# Patient Record
Sex: FEMALE | Race: WHITE | NOT HISPANIC OR LATINO | Employment: OTHER | URBAN - METROPOLITAN AREA
[De-identification: names, ages, dates, MRNs, and addresses within clinical notes are randomized per-mention and may not be internally consistent; named-entity substitution may affect disease eponyms.]

---

## 2017-01-12 PROBLEM — E66.01 MORBID OBESITY DUE TO EXCESS CALORIES (H): Status: ACTIVE | Noted: 2017-01-12

## 2017-01-30 ENCOUNTER — OFFICE VISIT (OUTPATIENT)
Dept: RHEUMATOLOGY | Facility: CLINIC | Age: 67
End: 2017-01-30
Payer: MEDICARE

## 2017-01-30 VITALS
WEIGHT: 243 LBS | SYSTOLIC BLOOD PRESSURE: 150 MMHG | RESPIRATION RATE: 18 BRPM | HEIGHT: 64 IN | BODY MASS INDEX: 41.48 KG/M2 | HEART RATE: 94 BPM | DIASTOLIC BLOOD PRESSURE: 83 MMHG

## 2017-01-30 DIAGNOSIS — M15.0 PRIMARY OSTEOARTHRITIS INVOLVING MULTIPLE JOINTS: Primary | ICD-10-CM

## 2017-01-30 PROCEDURE — 99203 OFFICE O/P NEW LOW 30 MIN: CPT | Performed by: INTERNAL MEDICINE

## 2017-01-30 RX ORDER — NAPROXEN 500 MG/1
500 TABLET ORAL 2 TIMES DAILY PRN
Qty: 60 TABLET | Refills: 3 | Status: SHIPPED | OUTPATIENT
Start: 2017-01-30 | End: 2021-01-18

## 2017-01-30 NOTE — NURSING NOTE
"Chief Complaint   Patient presents with     Consult     Arthritis, Pain in hand up through arm        Initial /83 mmHg  Pulse 94  Resp 18  Ht 1.626 m (5' 4\")  Wt 110.224 kg (243 lb)  BMI 41.69 kg/m2 Estimated body mass index is 41.69 kg/(m^2) as calculated from the following:    Height as of this encounter: 1.626 m (5' 4\").    Weight as of this encounter: 110.224 kg (243 lb).  BP completed using cuff size: kirti Figueroa CMA     "

## 2017-01-31 NOTE — PROGRESS NOTES
REFERRING PHYSICIAN:  Dung Prieto MD       CHIEF COMPLAINT:  Joint pain.      HISTORY OF PRESENT ILLNESS:  Thomas Davila is a 66-year-old female who presents for evaluation of worsening joint pain with swelling.  This is specifically the right second PIP joint.  She has apparently seen an orthopedist about this who did not give her a lot of information and did not really tell her anything about what to do or expect.  She is taking NSAIDs occasionally, which does provide her with relief.      She has not had any labs performed.  She does not have any rash or psoriasis.  She does not have chronic diarrhea history, inflammatory bowel disease.  She did not have any unexplained symptoms such as fevers, chills or sweats.  There is no history of pleurisy, pericarditis, hepatitis, nephritis.  She has never had a stroke, blood clot, cardiac event or gastric ulcer.      PAST MEDICAL HISTORY:  Breast cancer status post chemotherapy, endometrial adenocarcinoma, nontoxic multinodular goiter, morbid obesity.      MEDICATIONS:   1.  Aspirin 81 mg daily.   2.  Prilosec 20 mg daily.   3.  Maxzide 37.5/25 mg daily.   4.  Lisinopril 20 mg daily.      DRUG ALLERGIES:  Percocet.      SOCIAL HISTORY:  Not a smoker or drinker.      FAMILY HISTORY:  Sister with osteoarthritis.      REVIEW OF SYSTEMS:  As noted above.      PHYSICAL EXAMINATION:   VITAL SIGNS:  Blood pressure 150/83, pulse 94, weight 243.   HEENT:  She is normocephalic and atraumatic.  Sclerae are clear and moist.  Oropharynx without lesions.   NECK:  Supple, without lymphadenopathy.   JOINTS:  There are large Heberden's nodules at the DIP joints and Leyla's nodules of the PIP joints.  There is also bony hypertrophy consistent with osteoarthritis bilateral first CMC and MCP joints.  There is no synovitis of the wrists, MCPs or PIPs.   SKIN:  Without lesions.   NEUROLOGICAL:  Nonfocal.      IMPRESSION:  Generalized osteoarthritis.      RECOMMENDATIONS:   1.   Discussed the etiology, pathogenesis, treatment options of osteoarthritis.  Discussed that unfortunately there is no preventive or remittive treatment.  Treatment is aimed at mitigating symptoms through the use of things like Tylenol, NSAIDs and analgesics.  I will give her prescription for Naprosyn 500 mg that can be used daily or b.i.d. as needed.  Would recommend NSAID labs every 6-12 months.   2.  There are signs that she has an autoimmune or inflammatory condition that would require immunosuppression.   3.  If the finger remains a problem, she could try a cortisone injection, which is basically palliative and does not lead to any remission or improvement of the nodule.  It is just for pain relief.         TRISTIN LEWIS MD             D: 2017 16:43   T: 2017 09:30   MT: BLAINE      Name:     CONCHIS GUAMAN   MRN:      [id number]        Account:      [id number]   :      1950           Visit Date:   2017      Document: [id number]       cc: Dung Prieto MD

## 2017-03-23 ENCOUNTER — TELEPHONE (OUTPATIENT)
Dept: FAMILY MEDICINE | Facility: CLINIC | Age: 67
End: 2017-03-23

## 2017-03-23 NOTE — LETTER
[facility]  [address] [place]  [facility] [address]  Phone: [phone]    March 23, 2017    Thomas Davila  [address]  [facility] [address]              Dear Ms. Davila,      Your health care team at [facility] would like to help your keep up with your health maintenance screening.     Your provider has done a chart review and at this time it is recommended for you to complete the following:    >Mammogram for breast cancer screening. You can make an appointment by calling [phone].    >Colon cancer screening by either FIT lab test or colonoscopy. Please call the care team with your choice of screening and we will assist you in getting this important screening done. Call [phone], ask for Dr. Prieto's care team.    If you have had these done at another facility, please ask them to send the results to us, so we can have current information in your chart.            Sincerely,      DIAMOND Romano ([facility]) for   Dung Prieto MD

## 2017-03-23 NOTE — TELEPHONE ENCOUNTER
Composite cancer screening  Chart review shows that this patient is due/due soon for the following Mammogram and Colonoscopy (has done FIT in the past)  Summary:    Patient is due/failing the following:   FIT and MAMMOGRAM    Action needed:   Make appt for mammogram  She is to call care team to arrange colon cancer screening (FIT vs colonoscopy)    Type of outreach:    Sent letter.    Questions for provider review:    None                                                                                                                                    DIAMOND Harris ([facility])

## 2017-03-31 ENCOUNTER — HOSPITAL ENCOUNTER (OUTPATIENT)
Dept: LAB | Facility: CLINIC | Age: 67
Discharge: HOME | End: 2017-03-31
Attending: INTERNAL MEDICINE | Admitting: INTERNAL MEDICINE
Payer: MEDICARE

## 2017-03-31 DIAGNOSIS — C54.1 ENDOMETRIAL ADENOCARCINOMA (H): ICD-10-CM

## 2017-03-31 LAB
ALBUMIN SERPL-MCNC: 3.7 G/DL (ref 3.4–5)
ALP SERPL-CCNC: 49 U/L (ref 40–150)
ALT SERPL W P-5'-P-CCNC: 42 U/L (ref 0–50)
ANION GAP SERPL CALCULATED.3IONS-SCNC: 9 MMOL/L (ref 3–14)
AST SERPL W P-5'-P-CCNC: 17 U/L (ref 0–45)
BASOPHILS # BLD AUTO: 0.1 10E9/L (ref 0–0.2)
BASOPHILS NFR BLD AUTO: 1.1 %
BILIRUB SERPL-MCNC: 0.4 MG/DL (ref 0.2–1.3)
BUN SERPL-MCNC: 21 MG/DL (ref 7–30)
CALCIUM SERPL-MCNC: 8.6 MG/DL (ref 8.5–10.1)
CANCER AG125 SERPL-ACNC: 11 U/ML (ref 0–30)
CHLORIDE SERPL-SCNC: 107 MMOL/L (ref 94–109)
CO2 SERPL-SCNC: 26 MMOL/L (ref 20–32)
CREAT SERPL-MCNC: 0.59 MG/DL (ref 0.52–1.04)
DIFFERENTIAL METHOD BLD: NORMAL
EOSINOPHIL # BLD AUTO: 0.1 10E9/L (ref 0–0.7)
EOSINOPHIL NFR BLD AUTO: 3 %
ERYTHROCYTE [DISTWIDTH] IN BLOOD BY AUTOMATED COUNT: 13 % (ref 10–15)
GFR SERPL CREATININE-BSD FRML MDRD: ABNORMAL ML/MIN/1.7M2
GLUCOSE SERPL-MCNC: 101 MG/DL (ref 70–99)
HCT VFR BLD AUTO: 44.4 % (ref 35–47)
HGB BLD-MCNC: 14.9 G/DL (ref 11.7–15.7)
IMM GRANULOCYTES # BLD: 0 10E9/L (ref 0–0.4)
IMM GRANULOCYTES NFR BLD: 0.2 %
LYMPHOCYTES # BLD AUTO: 1.1 10E9/L (ref 0.8–5.3)
LYMPHOCYTES NFR BLD AUTO: 24.8 %
MCH RBC QN AUTO: 30.6 PG (ref 26.5–33)
MCHC RBC AUTO-ENTMCNC: 33.6 G/DL (ref 31.5–36.5)
MCV RBC AUTO: 91 FL (ref 78–100)
MONOCYTES # BLD AUTO: 0.4 10E9/L (ref 0–1.3)
MONOCYTES NFR BLD AUTO: 8.7 %
NEUTROPHILS # BLD AUTO: 2.7 10E9/L (ref 1.6–8.3)
NEUTROPHILS NFR BLD AUTO: 62.2 %
PLATELET # BLD AUTO: 155 10E9/L (ref 150–450)
POTASSIUM SERPL-SCNC: 4.2 MMOL/L (ref 3.4–5.3)
PROT SERPL-MCNC: 6.7 G/DL (ref 6.8–8.8)
RBC # BLD AUTO: 4.87 10E12/L (ref 3.8–5.2)
SODIUM SERPL-SCNC: 142 MMOL/L (ref 133–144)
WBC # BLD AUTO: 4.4 10E9/L (ref 4–11)

## 2017-03-31 PROCEDURE — 36415 COLL VENOUS BLD VENIPUNCTURE: CPT | Performed by: INTERNAL MEDICINE

## 2017-03-31 PROCEDURE — 80053 COMPREHEN METABOLIC PANEL: CPT | Performed by: INTERNAL MEDICINE

## 2017-03-31 PROCEDURE — 85025 COMPLETE CBC W/AUTO DIFF WBC: CPT | Performed by: INTERNAL MEDICINE

## 2017-03-31 PROCEDURE — 86304 IMMUNOASSAY TUMOR CA 125: CPT | Performed by: INTERNAL MEDICINE

## 2017-04-03 ENCOUNTER — TELEPHONE (OUTPATIENT)
Dept: DERMATOLOGY | Facility: CLINIC | Age: 67
End: 2017-04-03

## 2017-04-03 ENCOUNTER — ONCOLOGY VISIT (OUTPATIENT)
Dept: ONCOLOGY | Facility: CLINIC | Age: 67
End: 2017-04-03
Attending: INTERNAL MEDICINE
Payer: MEDICARE

## 2017-04-03 ENCOUNTER — OFFICE VISIT (OUTPATIENT)
Dept: DERMATOLOGY | Facility: CLINIC | Age: 67
End: 2017-04-03
Payer: MEDICARE

## 2017-04-03 VITALS — HEART RATE: 84 BPM | DIASTOLIC BLOOD PRESSURE: 68 MMHG | OXYGEN SATURATION: 95 % | SYSTOLIC BLOOD PRESSURE: 148 MMHG

## 2017-04-03 VITALS
TEMPERATURE: 97.4 F | WEIGHT: 240.7 LBS | OXYGEN SATURATION: 96 % | DIASTOLIC BLOOD PRESSURE: 73 MMHG | BODY MASS INDEX: 41.09 KG/M2 | HEART RATE: 97 BPM | SYSTOLIC BLOOD PRESSURE: 135 MMHG | HEIGHT: 64 IN | RESPIRATION RATE: 18 BRPM

## 2017-04-03 DIAGNOSIS — E04.1 THYROID NODULE: ICD-10-CM

## 2017-04-03 DIAGNOSIS — C54.1 ENDOMETRIAL ADENOCARCINOMA (H): Primary | ICD-10-CM

## 2017-04-03 DIAGNOSIS — D22.9 NEVUS: ICD-10-CM

## 2017-04-03 DIAGNOSIS — D18.00 ANGIOMA: ICD-10-CM

## 2017-04-03 DIAGNOSIS — Z86.718 PERSONAL HISTORY OF DVT (DEEP VEIN THROMBOSIS): ICD-10-CM

## 2017-04-03 DIAGNOSIS — D04.62 SQUAMOUS CELL CARCINOMA IN SITU OF SKIN OF LEFT UPPER ARM: ICD-10-CM

## 2017-04-03 DIAGNOSIS — D48.5 NEOPLASM OF UNCERTAIN BEHAVIOR OF SKIN: Primary | ICD-10-CM

## 2017-04-03 DIAGNOSIS — L81.4 LENTIGO: ICD-10-CM

## 2017-04-03 DIAGNOSIS — E66.3 OVERWEIGHT: ICD-10-CM

## 2017-04-03 DIAGNOSIS — K76.0 FATTY LIVER: ICD-10-CM

## 2017-04-03 DIAGNOSIS — Z85.828 HISTORY OF SKIN CANCER: ICD-10-CM

## 2017-04-03 DIAGNOSIS — C44.02 SQUAMOUS CELL CARCINOMA, LIP: Primary | ICD-10-CM

## 2017-04-03 DIAGNOSIS — L57.0 AK (ACTINIC KERATOSIS): ICD-10-CM

## 2017-04-03 PROCEDURE — 99214 OFFICE O/P EST MOD 30 MIN: CPT | Mod: 25 | Performed by: PHYSICIAN ASSISTANT

## 2017-04-03 PROCEDURE — 99214 OFFICE O/P EST MOD 30 MIN: CPT | Performed by: INTERNAL MEDICINE

## 2017-04-03 PROCEDURE — 11100 HC BIOPSY SKIN/SUBQ/MUC MEM, EACH ADDTL LESION: CPT | Mod: 59 | Performed by: PHYSICIAN ASSISTANT

## 2017-04-03 PROCEDURE — 88331 PATH CONSLTJ SURG 1 BLK 1SPC: CPT | Mod: 26 | Performed by: DERMATOLOGY

## 2017-04-03 PROCEDURE — 99211 OFF/OP EST MAY X REQ PHY/QHP: CPT

## 2017-04-03 PROCEDURE — 40490 BIOPSY OF LIP: CPT | Performed by: PHYSICIAN ASSISTANT

## 2017-04-03 ASSESSMENT — PAIN SCALES - GENERAL: PAINLEVEL: EXTREME PAIN (8)

## 2017-04-03 NOTE — PROGRESS NOTES
HPI:   Thomas Davila is a 67 year old female who presents for Full skin cancer screening.  chief complaint  Last Skin Exam: Over 1 year ago      1st Baseline: no  Personal HX of Skin Cancer: Yes multiple NMSC; both BCC and SCC   Personal HX of Malignant Melanoma: no   Family HX of Skin Cancer / Malignant Melanoma: no  Personal HX of Atypical Moles:   no  Risk factors: Frequent sun exposure. 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   New / Changing lesions:Yes one area on upper lip  Social History:   On review of systems, there are no further skin complaints, patient is feeling otherwise well.  See patient intake sheet.  ROS of the following were done and are negative: Constitutional, Eyes, Ears, Nose,   Mouth, Throat, Cardiovascular, Respiratory, GI, Genitourinary, Musculoskeletal,   Psychiatric, Endocrine, Allergic/Immunologic.    PHYSICAL EXAM:   Weight:  BP:   Skin exam performed as follows: Type 2 skin. Mood appropriate  Alert and Oriented X 3. Well developed, well nourished in no distress.  General appearance: Normal  Head including face: Normal  Eyes: conjunctiva and lids: Normal  Mouth: Lips, teeth, gums: Normal  Neck: Normal  Chest-breast/axillae: Normal  Back: Normal  Spleen and liver: Normal  Cardiovascular: Exam of peripheral vascular system by observation for swelling, varicosities, edema: Normal  Genitalia: groin, buttocks: Normal  Extremities: digits/nails (clubbing): Normal  Eccrine and Apocrine glands: Normal  Right upper extremity: Normal  Left upper extremity: Normal  Right lower extremity: Normal  Left lower extremity: Normal  Skin: Scalp and body hair: See below    Pt deferred exam of breasts, groin, buttocks: No    Other physical findings:  1. Multiple pigmented macules on extremities and trunk  2. Multiple pigmented macules on face, trunk and extremities  3. Multiple vascular papules on trunk, arms and legs  4. Multiple scattered keratotic  plaques  5. 3 mm pink papule on right upper lip  6. 6 mm pink papule on left upper arm  7. Numerous gritty papules on bilateral forerams       Except as noted above, no other signs of skin cancer or melanoma.     ASSESSMENT/PLAN:   Benign Full skin cancer screening today. . Patient with history of multiple BCC and SCC  Advised on monthly self exams and 1 year  Patient Education: Appropriate brochures given.    Multiple benign appearing nevi on arms, legs and trunk. Discussed ABCDEs of melanoma and sunscreen.   Multiple lentigos on arms, legs and trunk. Advised benign, no treatment needed.  Multiple scattered angiomas. Advised benign, no treatment needed.   Seborrheic keratosis on arms, legs and trunk. Advised benign, no treatment needed.  R/o BCC on right upper lip. Shave bx in typical fashion .  Area cleaned with betadyne and anesthetized with 1% lidocaine with epi .  Dermablade used to remove the lesion and sent to pathology. Bleeding was cauterized. Pt tolerated procedure well.  R/o BCC on left upper arm. Shave bx in typical fashion .  Area cleaned with betadyne and anesthetized with 1% lidocaine with epi .  Dermablade used to remove the lesion and sent to pathology. Bleeding was cauterized. Pt tolerated procedure well.  Numerous AKs on bilateral forearms, well over 15 on each. Discussed Efudex vs PDT vs LN2. After discussion, she would like to proceed with PDT. Information given and she will schedule for this.   History of multiple NMSC; continue Q 6-12 month FSE      Follow-up: pending path/Q 6-12 month FSE    1.) Patient was asked about new and changing moles. YES  2.) Patient received a complete physical skin examination: YES  3.) Patient was counseled to perform a monthly self skin examination: YES  Scribed By: Maryana Velasquez, MS, PA-C

## 2017-04-03 NOTE — PROGRESS NOTES
R upper lip:There is hyperkeratosis & parakeratosis of the epidermis, with full thickness epidermal involvement by atypical keratinocytes with rare pale vacuolated cells invading dermis.      L upper arm:There is hyperkeratosis & parakeratosis of the epidermis, with full thickness epidermal involvement by atypical keratinocytes with rare pale vacuolated cells.  Unremarkable dermis.        R upper lip squamous cell carcinoma excision  L upper arm squamous cell carcinoma in situ LASER

## 2017-04-03 NOTE — NURSING NOTE
"Thomas Davila is a 67 year old female who presents for:  Chief Complaint   Patient presents with     Oncology Clinic Visit     6 month recheck Endocrine CA, review labs         Initial Vitals:  /73 (BP Location: Right arm, Patient Position: Right side, Cuff Size: Adult Large)  Pulse 97  Temp 97.4  F (36.3  C) (Tympanic)  Resp 18  Ht 1.626 m (5' 4\")  Wt 109.2 kg (240 lb 11.2 oz)  SpO2 96%  Breastfeeding? No  BMI 41.32 kg/m2 Estimated body mass index is 41.32 kg/(m^2) as calculated from the following:    Height as of this encounter: 1.626 m (5' 4\").    Weight as of this encounter: 109.2 kg (240 lb 11.2 oz).. Body surface area is 2.22 meters squared. BP completed using cuff size: large  Extreme Pain (8) No LMP recorded. Patient has had a hysterectomy. Allergies and medications reviewed.     Medications: Medication refills not needed today.  Pharmacy name entered into EPIC:    [id number] [address]      Comments: 6 month recheck Endocrine CA, review labs     7 minutes for nursing intake (face to face time)   Kendra Jenkins CMA        "

## 2017-04-03 NOTE — PATIENT INSTRUCTIONS
We would like to see you back in 6 months with labs and a CT Chest/ABD/Pelvis scan.    When you are in need of a refill, please call your pharmacy and they will send us a request.  Copy of appointments, and after visit summary (AVS) given to patient.  If you have any questions please call Lorraine Jones RN, BSN, OCN Oncology Hematology  [facility] [phone]. For questions after business hours, or on holidays/weekends, please call our after hours Nurse Triage line [phone]. Thank you.

## 2017-04-03 NOTE — NURSING NOTE
"Initial /68  Pulse 84  SpO2 95% Estimated body mass index is 41.71 kg/(m^2) as calculated from the following:    Height as of 1/30/17: 1.626 m (5' 4\").    Weight as of 1/30/17: 110.2 kg (243 lb). .      "

## 2017-04-03 NOTE — PROGRESS NOTES
CHIEF COMPLAINT AND REASON FOR VISIT: endometrial cancer 2013 s/p adjuvant chemo, RT.      HISTORY OF PRESENT ILLNESS:  She presented with  postmenopausal bleeding in 12/2012.  It became heavier in January 2013.  Her last menstrual period was around 1998.     Endometrial biopsy performed in primary care's office showed endometrial adenocarcinoma, FIGO grade 1/3, arising from background of atypical complex hyperplasia, HPV positive, type 66.  Pelvic ultrasound indicating uterus is about 9 cm, no fibroid, endometriosis, abnormally thickened, measured 3 cm.    She saw Dr. Walker from the  and underwent Da Sasha-assisted total hysterectomy and bilateral salpingo-oophorectomy and lymph node dissection.  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  Ovaries, fallopian tubes, cervical, lower uterine segment are negative.  Cytology wash negative.  There was focal suspicion for angiolymphatic invasion.  Left pelvic lymph nodes 1/8 were positive, right pelvic nodes total 7 were negative. She has stage IIIC pathologic [id number] endometrial carcinoma with focal squamous cell differentiation.   Dr. Walker, GYN oncologist, recommended carbo and Taxol x3 followed by radiation followed by 3 more carbo and Taxol.    Chemotherapy portion was carbo and Taxol, carbo at AUC 6, Taxol 175 mg/meter squared x3 finished in April 2013. External pelvic Radiation is done 5/28/2013. She finished brachy RT  X 3 in early June 2013.  3 more carbo/taxol finished in 8/2013.    PMH: She had remote history of left breast cancer then treated with lumpectomy, left-sided axillary lymph node dissection, radiation and tamoxifen for 1 year, discontinued due to DVT.  She was treated with anticoagulation, aspirin. but is currently not on any blood thinner.   Left  "superficial thrombus in 4/2013 s/p 6 months of coumadin.  HTN    REVIEW OF SYSTEMS:   Occasional diarrhea.   Likes to eat, concerned about over weight.   Lots of arthritis.   She is seeing dermatologist for skin biopsies. She is doing light therapy for \"SCC\".       PHYSICAL EXAMINATION:   VITAL SIGNS: Blood pressure 135/73, pulse 97, temperature 97.4  F (36.3  C), temperature source Tympanic, resp. rate 18, height 1.626 m (5' 4\"), weight 109.2 kg (240 lb 11.2 oz), SpO2 96 %, not currently breastfeeding.  GENERAL APPEARANCE:   She is morbidly obese, not in acute distress.  Very pleasant.   HEENT: The patient is normocephalic, atraumatic. Pupils are equal react to light.  Sclerae are anicteric.  Moist oral mucosa.  Negative pharynx.  No oral thrush.   NECK:  Supple.  No jugular venous distention.  Thyroid is not palpable.   LYMPH NODES:  Superficial lymphadenopathy is not appreciable in the bilateral cervical, supraclavicular, axillary or inguinal adenopathy.   CARDIOVASCULAR:  S1, S2 regular with no murmurs or gallops.  No carotid or abdominal bruits.   PULMONARY:  Lungs are clear to auscultation and percussion bilaterally.  There is no wheezing or rhonchi.   GASTROINTESTINAL:  Abdomen is soft, nontender.  No hepatosplenomegaly.  No signs of ascites.  No mass appreciable.   MUSCULOSKELETAL/EXTREMITIES:  No edema.  No cyanotic changes. multiple joints deformities on fingers.  No lymphedema.   NEUROLOGIC:  Cranial nerves II-XII are grossly intact.  Sensation intact.  Muscle strength and muscle tone symmetrical all through 5/5.   BACK:  No spinal or paraspinal tenderness.  No CVA tenderness.   Skin: erythematous scaly lesion on right upper arm.      CURRENT LAB DATA   CBC DIFF, CMP,  are fine    Current image  CT 9/2016 body CT: negative.     Old data reviewed with summary  CT 9/2015:  1. No evidence of metastatic or recurrent neoplasm.  2. There is a small superficial collection and mild adjacent inflammatory " change involving the lower right abdominal wall at the site of a previously seen fat-containing ventral hernia. This could  relate to interval hernia repair with possibly a small hematoma or seroma (she had ventral hernia surgery at [facility] in 7/2015)    Thyroid US 3/2015: Stable multinodular thyroid gland from 9/19/2014.     CT body  9/2014- 1. No convincing evidence for recurrent or metastatic malignancy in the chest, abdomen, or pelvis.   2. Diffuse fatty infiltration of the liver.   3. Indeterminate 1.6 cm left thyroid nodule is unchanged. Consider thyroid ultrasound for further evaluation.     CT body 10/2013 - Hepatic fatty infiltration. Scattered colonic diverticulosis. Nonenlarged left external iliac lymph node.    Sono of left leg 4/2013 - There is extensive occlusive thrombus throughout the greater saphenous vein.    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  Ovaries, fallopian tubes, cervical, lower uterine segment are negative.  Cytology wash negative.  There was focal suspicion for angiolymphatic invasion.  Left pelvic lymph nodes 1/8 were positive, right pelvic nodes total 7 were negative. She has stage IIIC pathologic T1bN1.    Pelvic ultrasound 01/2013 -  indicating uterus is about 9 cm, no fibroid, endometriosis, abnormally thickened, measured 3 cm.            ASSESSMENT AND PLAN:     1.  stage IIIC endometrioid carcinoma with focal squamous cell differentiation, human papilloma virus positive, status post Da Sasha-assisted total hysterectomy, bilateral salpingo-oophorectomy, lymph node dissection.      Treatment plan was sandwich chemo -radiation followed by more chemotherapy.  Chemotherapy portion is carbo and Taxol, carbo at AUC 6, Taxol 175 mg/meter squared x3 finished in April. External pelvic  Radiation is done 5/28/2013. She had brachy RT  X 3 in early June.  3 more chemo with carbo/taxol till 8/2013.    She needs ongoing cancer surveillance visit q 6 months with labs and CT prn at this point.      2. Left thyroid nodule on CT.  Thyroid sono 3/2015  found stable multinodular thyroid gland. Encourage communicate with PMD may need to see endo. She is in the process of it now.     3. Fatty liver. Diet fat reduction counseling is provided.    4. Hx of DVT on tamoxifen. She is off coumadin now.   She is advised on  mg use post meals.      5. Overweight. Weight loss counseling is provided. She admits she likes to eat, always hungry and not willing to change it.

## 2017-04-03 NOTE — MR AVS SNAPSHOT
After Visit Summary   4/3/2017    Thomas Davila    MRN: [id number]           Patient Information     Date Of Birth          1950        Visit Information        Provider Department      4/3/2017 12:15 PM Maryana Velasquez PA-C [facility]        Today's Diagnoses     Neoplasm of uncertain behavior of skin    -  1      Care Instructions          Wound Care Instructions     FOR SUPERFICIAL WOUNDS     [facility] [phone]    [facility] [phone]                       AFTER 24 HOURS YOU SHOULD REMOVE THE BANDAGE AND BEGIN DAILY DRESSING CHANGES AS FOLLOWS:     1) Remove Dressing.     2) Clean and dry the area with tap water using a Q-tip or sterile gauze pad.     3) Apply Vaseline, Aquaphor, Polysporin ointment or Bacitracin ointment over entire wound.  Do NOT use Neosporin ointment.     4) Cover the wound with a band-aid, or a sterile non-stick gauze pad and micropore paper tape      REPEAT THESE INSTRUCTIONS AT LEAST ONCE A DAY UNTIL THE WOUND HAS COMPLETELY HEALED.    It is an old wives tale that a wound heals better when it is exposed to air and allowed to dry out. The wound will heal faster with a better cosmetic result if it is kept moist with ointment and covered with a bandage.    **Do not let the wound dry out.**      Supplies Needed:      *Cotton tipped applicators (Q-tips)    *Polysporin Ointment or Bacitracin Ointment (NOT NEOSPORIN)    *Band-aids or non-stick gauze pads and micropore paper tape.      PATIENT INFORMATION:    During the healing process you will notice a number of changes. All wounds develop a small halo of redness surrounding the wound.  This means healing is occurring. Severe itching with extensive redness usually indicates sensitivity to the ointment or bandage tape used to dress the wound.  You should call our office if this develops.      Swelling  and/or discoloration around your surgical site is common, particularly  when performed around the eye.    All wounds normally drain.  The larger the wound the more drainage there will be.  After 7-10 days, you will notice the wound beginning to shrink and new skin will begin to grow.  The wound is healed when you can see skin has formed over the entire area.  A healed wound has a healthy, shiny look to the surface and is red to dark pink in color to normalize.  Wounds may take approximately 4-6 weeks to heal.  Larger wounds may take 6-8 weeks.  After the wound is healed you may discontinue dressing changes.    You may experience a sensation of tightness as your wound heals. This is normal and will gradually subside.    Your healed wound may be sensitive to temperature changes. This sensitivity improves with time, but if you re having a lot of discomfort, try to avoid temperature extremes.    Patients frequently experience itching after their wound appears to have healed because of the continue healing under the skin.  Plain Vaseline will help relieve the itching.        POSSIBLE COMPLICATIONS    BLEEDIN. Leave the bandage in place.  2. Use tightly rolled up gauze or a cloth to apply direct pressure over the bandage for 30  minutes.  3. Reapply pressure for an additional 30 minutes if necessary  4. Use additional gauze and tape to maintain pressure once the bleeding has stopped.          Follow-ups after your visit        Your next 10 appointments already scheduled     2017  1:00 PM CDT   Return Visit with Alissa Chacon MD   [facility] ([facility])    [facility]  [address]  [facility] [id number] [phone]              Who to contact     If you have questions or need follow up information about today's clinic visit or your schedule please contact [facility] directly at [phone].  Normal or non-critical lab and imaging results will be communicated to you by MyChart, letter or phone within 4  "business days after the clinic has received the results. If you do not hear from us within 7 days, please contact the clinic through [employer] or phone. If you have a critical or abnormal lab result, we will notify you by phone as soon as possible.  Submit refill requests through [employer]ari or call your pharmacy and they will forward the refill request to us. Please allow 3 business days for your refill to be completed.          Additional Information About Your Visit        Zipari Information     [employer]ari lets you send messages to your doctor, view your test results, renew your prescriptions, schedule appointments and more. To sign up, go to [email or web address] . Click on \"Log in\" on the left side of the screen, which will take you to the Welcome page. Then click on \"Sign up Now\" on the right side of the page.     You will be asked to enter the access code listed below, as well as some personal information. Please follow the directions to create your username and password.     Your access code is: [email or web address]  Expires: 2017 12:23 PM     Your access code will  in 90 days. If you need help or a new code, please call your [place] clinic or [phone].        Care EveryWhere ID     This is your Care EveryWhere ID. This could be used by other organizations to access your [place] medical records  [id number]        Your Vitals Were     Pulse Pulse Oximetry                84 95%           Blood Pressure from Last 3 Encounters:   17 148/68   17 150/83   16 144/70    Weight from Last 3 Encounters:   17 110.2 kg (243 lb)   16 110.2 kg (243 lb)   10/03/16 110.5 kg (243 lb 8 oz)              We Performed the Following     BIOPSY SKIN/SUBQ/MUC MEM, EACH ADDTL LESION     BIOPSY SKIN/SUBQ/MUC MEM, SINGLE LESION        Primary Care Provider Office Phone # Fax #    Dung Prieto [phone]       [facility] [address] [address]      "   Thank you!     Thank you for choosing [facility]  for your care. Our goal is always to provide you with excellent care. Hearing back from our patients is one way we can continue to improve our services. Please take a few minutes to complete the written survey that you may receive in the mail after your visit with us. Thank you!             Your Updated Medication List - Protect others around you: Learn how to safely use, store and throw away your medicines at www.disposemymeds.org.          This list is accurate as of: 4/3/17 12:23 PM.  Always use your most recent med list.                   Brand Name Dispense Instructions for use    ACETAMINOPHEN PO      Take 500 mg by mouth as needed for pain       * aspirin 325 MG tablet      Take 1 tablet by mouth every other day       * aspirin 81 MG tablet      Take by mouth daily       lisinopril 20 MG tablet    PRINIVIL/ZESTRIL    90 tablet    Take 1 tablet (20 mg) by mouth daily       naproxen 500 MG tablet    NAPROSYN    60 tablet    Take 1 tablet (500 mg) by mouth 2 times daily as needed for moderate pain       omeprazole 20 MG CR capsule    priLOSEC    90 capsule    Take 1 capsule (20 mg) by mouth daily       * order for DME     1 Device    Medium Tri [place]       * order for DME     1 Device    Equipment being ordered: Gell Heel inserts       triamterene-hydrochlorothiazide 37.5-25 MG per tablet    MAXZIDE-25    90 tablet    Take 1 tablet by mouth daily       * Notice:  This list has 4 medication(s) that are the same as other medications prescribed for you. Read the directions carefully, and ask your doctor or other care provider to review them with you.

## 2017-04-03 NOTE — TELEPHONE ENCOUNTER
Spoke to pt and reviewed results. Pt verbalized understanding.  PDT cancelled and MOHS and laser scheduled. Packet sent.  [place] DEE RN BSN PHN  Specialty Clinics

## 2017-04-03 NOTE — TELEPHONE ENCOUNTER
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

## 2017-04-03 NOTE — TELEPHONE ENCOUNTER
Patient is scheduled for PDT on 4-10-17. Should probably postpone that appt and have Skin cancers treated first. Message left to return call. Azucena Calle RN

## 2017-04-03 NOTE — PATIENT INSTRUCTIONS
Wound Care Instructions     FOR SUPERFICIAL WOUNDS     [facility] [phone]    [facility] [phone]                       AFTER 24 HOURS YOU SHOULD REMOVE THE BANDAGE AND BEGIN DAILY DRESSING CHANGES AS FOLLOWS:     1) Remove Dressing.     2) Clean and dry the area with tap water using a Q-tip or sterile gauze pad.     3) Apply Vaseline, Aquaphor, Polysporin ointment or Bacitracin ointment over entire wound.  Do NOT use Neosporin ointment.     4) Cover the wound with a band-aid, or a sterile non-stick gauze pad and micropore paper tape      REPEAT THESE INSTRUCTIONS AT LEAST ONCE A DAY UNTIL THE WOUND HAS COMPLETELY HEALED.    It is an old wives tale that a wound heals better when it is exposed to air and allowed to dry out. The wound will heal faster with a better cosmetic result if it is kept moist with ointment and covered with a bandage.    **Do not let the wound dry out.**      Supplies Needed:      *Cotton tipped applicators (Q-tips)    *Polysporin Ointment or Bacitracin Ointment (NOT NEOSPORIN)    *Band-aids or non-stick gauze pads and micropore paper tape.      PATIENT INFORMATION:    During the healing process you will notice a number of changes. All wounds develop a small halo of redness surrounding the wound.  This means healing is occurring. Severe itching with extensive redness usually indicates sensitivity to the ointment or bandage tape used to dress the wound.  You should call our office if this develops.      Swelling  and/or discoloration around your surgical site is common, particularly when performed around the eye.    All wounds normally drain.  The larger the wound the more drainage there will be.  After 7-10 days, you will notice the wound beginning to shrink and new skin will begin to grow.  The wound is healed when you can see skin has formed over the entire area.  A healed wound has a healthy, shiny look to the surface and is red to dark pink in color  to normalize.  Wounds may take approximately 4-6 weeks to heal.  Larger wounds may take 6-8 weeks.  After the wound is healed you may discontinue dressing changes.    You may experience a sensation of tightness as your wound heals. This is normal and will gradually subside.    Your healed wound may be sensitive to temperature changes. This sensitivity improves with time, but if you re having a lot of discomfort, try to avoid temperature extremes.    Patients frequently experience itching after their wound appears to have healed because of the continue healing under the skin.  Plain Vaseline will help relieve the itching.        POSSIBLE COMPLICATIONS    BLEEDIN. Leave the bandage in place.  2. Use tightly rolled up gauze or a cloth to apply direct pressure over the bandage for 30  minutes.  3. Reapply pressure for an additional 30 minutes if necessary  4. Use additional gauze and tape to maintain pressure once the bleeding has stopped.

## 2017-04-10 ENCOUNTER — OFFICE VISIT (OUTPATIENT)
Dept: DERMATOLOGY | Facility: CLINIC | Age: 67
End: 2017-04-10
Payer: MEDICARE

## 2017-04-10 VITALS — DIASTOLIC BLOOD PRESSURE: 75 MMHG | OXYGEN SATURATION: 93 % | SYSTOLIC BLOOD PRESSURE: 151 MMHG | HEART RATE: 81 BPM

## 2017-04-10 DIAGNOSIS — C44.02 SQUAMOUS CELL CARCINOMA, LIP: Primary | ICD-10-CM

## 2017-04-10 DIAGNOSIS — D04.62 SQUAMOUS CELL CARCINOMA IN SITU OF SKIN OF LEFT UPPER ARM: ICD-10-CM

## 2017-04-10 PROCEDURE — 17311 MOHS 1 STAGE H/N/HF/G: CPT | Performed by: DERMATOLOGY

## 2017-04-10 PROCEDURE — 17262 DSTRJ MAL LES T/A/L 1.1-2.0: CPT | Mod: 59 | Performed by: DERMATOLOGY

## 2017-04-10 PROCEDURE — 13152 CMPLX RPR E/N/E/L 2.6-7.5 CM: CPT | Mod: 59 | Performed by: DERMATOLOGY

## 2017-04-10 NOTE — PATIENT INSTRUCTIONS
Sutured Wound Care Instructions  For Lips or Chin       ? No strenuous activity for 48 hours. Resume moderate activity in 48 hours. No heavy exercising until you are seen for follow up in one week.     ? Take Tylenol as needed for discomfort.                         ? Do not drink alcoholic beverages for 48 hours.     ? Keep the pressure bandage in place for 24 hours. If the bandage becomes blood tinged or loose, reinforce it with gauze and tape.       (Refer to the reverse side of this page for management of bleeding).    ? Remove bandage in 24 hours     ? Leave the flat bandage in place until your follow up appointment.    ? Keep the bandage dry. Wash around it carefully.    ? If the tape becomes soiled or starts to come off, reinforce it with additional paper tape.    ? Do not smoke for 3 weeks; smoking is detrimental to wound healing.    ? It is normal to have swelling and bruising around the surgical site. The bruising will fade in approximately 10-14 days. Elevate the area to reduce swelling.    ? Try to keep your lips / chin as immobile as possible. Refrain from laughing, smiling and yawning for 3 weeks.    ? Eat soft foods for the first 24 hours and take small bites of food for the entire three weeks.    ? When brushing your teeth, you should use a child s toothbrush or use mouth wash to prevent stretching of surgery site.    ? Numbness, itchiness and sensitivity to temperature changes can occur after surgery and may take up to 18 months to normalize.          POSSIBLE COMPLICATIONS      BLEEDIN. Leave the bandage in place.  2. Use tightly rolled up gauze or a cloth to apply direct pressure over the bandage for 20   minutes.  3. Reapply pressure for an additional 20 minutes if necessary  4. Call the office or go to the nearest emergency room if pressure fails to stop the bleeding.  5. Use additional gauze and tape to maintain pressure once the bleeding has stopped.        PAIN:    1. Post  operative pain should slowly get better, never worse.  2. A severe increase in pain may indicate a problem. Call the office if this occurs.            In case of emergency phone:  Dr Golden: [phone]      Open Wound Care           ? No strenuous activity for 48 hours    ? Take Tylenol as needed for discomfort.                                                .         ? Do not drink alcoholic beverages for 48 hours.    ? Keep the pressure bandage in place for 24 hours. If the bandage becomes blood tinged or loose, reinforce it with gauze and tape.        (Refer to the reverse side of this page for management of bleeding).    ? Remove bandage in 24 hours and begin wound care as follows:     1. Clean area with tap water using a Q tip or gauze pad, (shower / bathe normally)  2. Dry wound with Q tip or gauze pad  3. Apply Aquaphor, Vaseline, Polysporin or Bacitracin Ointment with a Q tip    Do NOT use Neosporin Ointment *  4. Cover the wound with a band-aid or nonstick gauze pad and paper tape.  5. Repeat wound care once a day until wound is completely healed.    It is an old wives tale that a wound heals better when it is exposed to air and allowed to dry out. The wound will heal faster with a better cosmetic result if it is kept moist with ointment and covered with a bandage.  Do not let the wound dry out.      Supplies Needed:                Qtips or gauze pads                Polysporin or Bacitracin Ointment                Bandaids or nonstick gauze pads and paper tape    Wound care kits and brown paper tape are available for purchase at   the pharmacy.    BLEEDIN. Use tightly rolled up gauze or cloth to apply direct pressure over the bandage for 20   minutes.  2. Reapply pressure for an additional 20 minutes if necessary  3. Call the office or go to the nearest emergency room if pressure fails to stop the bleeding.  4. Use additional gauze and tape to maintain pressure once the bleeding has  stopped.  5. Begin wound care 24 hours after surgery as directed.                  WOUND HEALING    1. One week after surgery a pink / red halo will form around the outside of the wound.   This is new skin.  2. The center of the wound will appear yellowish white and produce some drainage.  3. The pink halo will slowly migrate in toward the center of the wound until the wound is covered with new shiny pink skin.  4. There will be no more drainage when the wound is completely healed.  5. It will take six months to one year for the redness to fade.  6. The scar may be itchy, tight and sensitive to extreme temperatures for a year after the surgery.  7. Massaging the area several times a day for several minutes after the wound is completely healed will help the scar soften and normalize faster. Begin massage only after healing is complete.      In case of emergency call: Dr Golden: [phone]     [facility]: [phone]    [facility]: [phone]

## 2017-04-10 NOTE — NURSING NOTE
"Initial /75  Pulse 81  SpO2 93% Estimated body mass index is 41.32 kg/(m^2) as calculated from the following:    Height as of 4/3/17: 1.626 m (5' 4\").    Weight as of 4/3/17: 109.2 kg (240 lb 11.2 oz). .      "

## 2017-04-10 NOTE — MR AVS SNAPSHOT
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 No strenuous activity for 48 hours. Resume moderate activity in 48 hours. No heavy exercising until you are seen for follow up in one week.     ? Take Tylenol as needed for discomfort.                         ? Do not drink alcoholic beverages for 48 hours.     ? Keep the pressure bandage in place for 24 hours. If the bandage becomes blood tinged or loose, reinforce it with gauze and tape.       (Refer to the reverse side of this page for management of bleeding).    ? Remove bandage in 24 hours     ? Leave the flat bandage in place until your follow up appointment.    ? Keep the bandage dry. Wash around it carefully.    ? If the tape becomes soiled or starts to come off, reinforce it with additional paper tape.    ? Do not smoke for 3 weeks; smoking is detrimental to wound healing.    ? It is normal to have swelling and bruising around the surgical site. The bruising will fade in approximately 10-14 days. Elevate the area to reduce swelling.    ? Try to keep your lips / chin as immobile as possible. Refrain from laughing, smiling and yawning for 3 weeks.    ? Eat soft foods for the first 24 hours and take small bites of food for the entire three weeks.    ? When brushing your teeth, you should use a child s toothbrush or use mouth wash to prevent stretching of surgery site.    ? Numbness, itchiness and sensitivity to temperature changes can occur after surgery and may take up to 18 months to normalize.          POSSIBLE  COMPLICATIONS      BLEEDIN. Leave the bandage in place.  2. Use tightly rolled up gauze or a cloth to apply direct pressure over the bandage for 20   minutes.  3. Reapply pressure for an additional 20 minutes if necessary  4. Call the office or go to the nearest emergency room if pressure fails to stop the bleeding.  5. Use additional gauze and tape to maintain pressure once the bleeding has stopped.        PAIN:    1. Post operative pain should slowly get better, never worse.  2. A severe increase in pain may indicate a problem. Call the office if this occurs.            In case of emergency phone:  Dr Golden: [phone]      Open Wound Care           ? No strenuous activity for 48 hours    ? Take Tylenol as needed for discomfort.                                                .         ? Do not drink alcoholic beverages for 48 hours.    ? Keep the pressure bandage in place for 24 hours. If the bandage becomes blood tinged or loose, reinforce it with gauze and tape.        (Refer to the reverse side of this page for management of bleeding).    ? Remove bandage in 24 hours and begin wound care as follows:     1. Clean area with tap water using a Q tip or gauze pad, (shower / bathe normally)  2. Dry wound with Q tip or gauze pad  3. Apply Aquaphor, Vaseline, Polysporin or Bacitracin Ointment with a Q tip    Do NOT use Neosporin Ointment *  4. Cover the wound with a band-aid or nonstick gauze pad and paper tape.  5. Repeat wound care once a day until wound is completely healed.    It is an old wives tale that a wound heals better when it is exposed to air and allowed to dry out. The wound will heal faster with a better cosmetic result if it is kept moist with ointment and covered with a bandage.  Do not let the wound dry out.      Supplies Needed:                Qtips or gauze pads                Polysporin or Bacitracin Ointment                Bandaids or nonstick gauze pads and paper tape    Wound care  kits and brown paper tape are available for purchase at   the pharmacy.    BLEEDIN. Use tightly rolled up gauze or cloth to apply direct pressure over the bandage for 20   minutes.  2. Reapply pressure for an additional 20 minutes if necessary  3. Call the office or go to the nearest emergency room if pressure fails to stop the bleeding.  4. Use additional gauze and tape to maintain pressure once the bleeding has stopped.  5. Begin wound care 24 hours after surgery as directed.                  WOUND HEALING    1. One week after surgery a pink / red halo will form around the outside of the wound.   This is new skin.  2. The center of the wound will appear yellowish white and produce some drainage.  3. The pink halo will slowly migrate in toward the center of the wound until the wound is covered with new shiny pink skin.  4. There will be no more drainage when the wound is completely healed.  5. It will take six months to one year for the redness to fade.  6. The scar may be itchy, tight and sensitive to extreme temperatures for a year after the surgery.  7. Massaging the area several times a day for several minutes after the wound is completely healed will help the scar soften and normalize faster. Begin massage only after healing is complete.      In case of emergency call: Dr Golden: [phone]     [facility]: [phone]    [facility]: [phone]            Follow-ups after your visit        Your next 10 appointments already scheduled     Sep 27, 2017 12:50 PM CDT   LAB with [facility] ([facility])    [address] [address] [phone]           Patient must bring picture ID.  Patient should be prepared to give a urine specimen  Please do not eat 10-12 hours before your appointment if you are coming in fasting for labs on lipids, cholesterol, or glucose (sugar).  Pregnant women should follow their Care Team  instructions. Water with medications is okay. Do not drink coffee or other fluids.   If you have concerns about taking  your medications, please ask at office or if scheduling via Acumen, send a message by clicking on Secure Messaging, Message Your Care Team.            Sep 27, 2017  1:00 PM CDT   CT CHEST/ABDOMEN/PELVIS W CONTRAST with [facility]   [facility] ([facility])    [address]  [facility] [place] [address] [phone]           Please bring any scans or X-rays taken at other hospitals, if similar tests were done. Also bring a list of your medicines, including vitamins, minerals and over-the-counter drugs. It is safest to leave personal items at home.  Be sure to tell your doctor:   If you have any allergies.   If there s any chance you are pregnant.   If you are breastfeeding.   If you have any special needs.  You may have contrast for this exam. To prepare:   Do not eat or drink for 2 hours before your exam. If you need to take medicine, you may take it with small sips of water. (We may ask you to take liquid medicine as well.)   The day before your exam, drink extra fluids at least six 8-ounce glasses (unless your doctor tells you to restrict your fluids).  Patients over 70 or patients with diabetes or kidney problems:   If you haven t had a blood test (creatinine test) within the last 30 days, go to your clinic or Diagnostic Imaging Department for this test.  If you have diabetes:   If your kidney function is normal, continue taking your metformin (Avandamet, Glucophage, Glucovance, Metaglip) on the day of your exam.   If your kidney function is abnormal, wait 48 hours before restarting this medicine.  You will have oral contrast for this exam:   You will drink the contrast at home. Get this from your clinic or Diagnostic Imaging Department. Please follow the directions given.  Please wear loose clothing, such as a sweat suit or jogging clothes. Avoid snaps, zippers and other  "metal. We may ask you to undress and put on a hospital gown.  If you have any questions, please call the Imaging Department where you will have your exam.            Oct 02, 2017  1:30 PM CDT   Return Visit with Alissa Chacon MD   [facility] ([facility])    [facility]  [address]  [facility] [place] [address]   [phone]              Who to contact     If you have questions or need follow up information about today's clinic visit or your schedule please contact [facility] directly at [phone].  Normal or non-critical lab and imaging results will be communicated to you by EBDSofthart, letter or phone within 4 business days after the clinic has received the results. If you do not hear from us within 7 days, please contact the clinic through MyChart or phone. If you have a critical or abnormal lab result, we will notify you by phone as soon as possible.  Submit refill requests through [employer] or call your pharmacy and they will forward the refill request to us. Please allow 3 business days for your refill to be completed.          Additional Information About Your Visit        EBDSoftharMedPassage Information     [employer] lets you send messages to your doctor, view your test results, renew your prescriptions, schedule appointments and more. To sign up, go to [email or web address] . Click on \"Log in\" on the left side of the screen, which will take you to the Welcome page. Then click on \"Sign up Now\" on the right side of the page.     You will be asked to enter the access code listed below, as well as some personal information. Please follow the directions to create your username and password.     Your access code is: [email or web address]  Expires: 2017 12:23 PM     Your access code will  in 90 days. If you need help or a new code, please call your [facility] clinic or [phone].        Care EveryWhere ID     This is your Care EveryWhere ID. This could be used " by other organizations to access your [facility] medical records  [id number]        Your Vitals Were     Pulse Pulse Oximetry                81 93%           Blood Pressure from Last 3 Encounters:   04/10/17 151/75   04/03/17 135/73   04/03/17 148/68    Weight from Last 3 Encounters:   04/03/17 109.2 kg (240 lb 11.2 oz)   01/30/17 110.2 kg (243 lb)   12/30/16 110.2 kg (243 lb)              We Performed the Following     DESTR MALIG LESION TRUNK/ARM/LEG 0.6-1.0 CM     MOHS HEAD/NCK/HND/FT/GEN 1ST STAGE UP T0 5 BLOCKS     REPAIR COMPLEX, WOUND LID/NOSE/EAR/LIP 2.6-7.5 CM        Primary Care Provider Office Phone # Fax #    Dung Prieto [phone]       [facility] [address] [place] [address]        Thank you!     Thank you for choosing [facility]  for your care. Our goal is always to provide you with excellent care. Hearing back from our patients is one way we can continue to improve our services. Please take a few minutes to complete the written survey that you may receive in the mail after your visit with us. Thank you!             Your Updated Medication List - Protect others around you: Learn how to safely use, store and throw away your medicines at www.disposemymeds.org.          This list is accurate as of: 4/10/17 11:27 AM.  Always use your most recent med list.                   Brand Name Dispense Instructions for use    ACETAMINOPHEN PO      Take 500 mg by mouth as needed for pain       aspirin 81 MG tablet      Take by mouth daily       lisinopril 20 MG tablet    PRINIVIL/ZESTRIL    90 tablet    Take 1 tablet (20 mg) by mouth daily       naproxen 500 MG tablet    NAPROSYN    60 tablet    Take 1 tablet (500 mg) by mouth 2 times daily as needed for moderate pain       omeprazole 20 MG CR capsule    priLOSEC     TAKE ONE CAPSULE BY MOUTH DAILY BEFORE A MEAL       * order for DME     1 Device    Medium Tri Tracy       * order for DME     1 Device     Equipment being ordered: Gell Heel inserts       triamterene-hydrochlorothiazide 37.5-25 MG per tablet    MAXZIDE-25    90 tablet    Take 1 tablet by mouth daily       * Notice:  This list has 2 medication(s) that are the same as other medications prescribed for you. Read the directions carefully, and ask your doctor or other care provider to review them with you.

## 2017-04-10 NOTE — PROGRESS NOTES
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 Associated symptoms: none.  Patient has no other skin complaints today.  Remainder of the HPI, Meds, PMH, Allergies, FH, and SH was reviewed in chart.    Pertinent Hx:   Non-melanoma skin cancer   Past Medical History:   Diagnosis Date     Acute parametritis and pelvic cellulitis     salpingitis     ASCUS with positive high risk HPV 2013     Asthma     No recent issues     Basal cell carcinoma      breast cancer     left lumpectomy, radiation, tamoxifen     Breast cancer (H)     L Breast; Lumpectomy & Radiation     Chronic salpingitis and oophoritis     PID        Embolism and thrombosis of unspecified site     left leg, due to tamoxifen therapy     Generalized osteoarthrosis, unspecified site     hands     Leiomyoma of uterus, unspecified      Other malignant neoplasm of skin, site unspecified 2006    Basal cell cancer on nose     Squamous cell carcinoma (H)      Thrombosis of leg     Left     Unspecified essential hypertension        Past Surgical History:   Procedure Laterality Date     BREAST LUMPECTOMY, RT/LT      left     C/SECTION, LOW TRANSVERSE  1972,     , Low Transverse x2     CL AFF SURGICAL PATHOLOGY      Breast reduction     COLONOSCOPY  10/15/02    normal     FOOT SURGERY      R Foot      HC EXCISION BREAST LESION, OPEN >=1  98    1) Needle localization with generous lumpectomy 2) Left axillary node dissection.     HC LAPAROSCOPY, SURGICAL, ABDOMEN, PERITONEUM & OMENTUM; DX W/ OR W/O SPECIMEN(S)  94     HYSTERECTOMY, PAP NO LONGER INDICATED       INSERT PORT VASCULAR ACCESS  3/14/2013    Procedure: INSERT PORT VASCULAR ACCESS;  Port Revision;  Surgeon: Arash Puente MD;  Location: [facility] OR     LAPAROSCOPIC HYSTERECTOMY TOTAL, BILATERAL  SALPINGO-OOPHORECTOMY, NODE DISSECTION, COMBINED  1/31/2013    Procedure: COMBINED LAPAROSCOPIC HYSTERECTOMY TOTAL, SALPINGO-OOPHORECTOMY, NODE DISSECTION;  Laparosocpic  Total Abdominal Hysterectomy, Bilateral Salphino-Oophorectomy, Bilateral Pelvic Lymph Node Dissection, Pelvic Washings, Cystoscopy;  Surgeon: Tanya Walker MD;  Location: [facility] OR     SURGICAL HISTORY OF -   06/22/93    1) Excision of digital cyst right mid finger  2)  Excision of dermatofibroma left calf     SURGICAL HISTORY OF -   12/18/2001    Excision of mucinous cyst & partial ostectomy, bone removal of benign osteophytic overgrowth osteophyte of the distal aspect of the middle phalanx and distal phalanx     SURGICAL HISTORY OF -   2006    Basal cell cancer removal     TONSILLECTOMY       TUBAL LIGATION  1978        Family History   Problem Relation Age of Onset     CEREBROVASCULAR DISEASE Mother      Hypertension Mother      DIABETES Mother      Thyroid Disease Mother      Arthritis Mother      Alzheimer Disease Mother      Neurologic Disorder Mother      Parkinsons     HEART DISEASE Father      Hypertension Father      DIABETES Father      Thyroid Disease Father      Arthritis Father      Blood Disease Father      Anesthesia Reaction Sister      Thyroid Disease Sister      Anesthesia Reaction Sister      Arthritis Sister      RA     Allergies Son      GASTROINTESTINAL DISEASE Son      GERD     Allergies Son      percocett     GASTROINTESTINAL DISEASE Son      GERD     Hypertension Son        Social History     Social History     Marital status:      Spouse name: N/A     Number of children: 2     Years of education: N/A     Occupational History      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 No     Social History Narrative    Sabianism--declines all blood products       Outpatient Encounter Prescriptions as of 4/10/2017   Medication Sig Dispense Refill     omeprazole (PRILOSEC) 20 MG CR capsule TAKE ONE CAPSULE BY MOUTH DAILY BEFORE A MEAL  3     ACETAMINOPHEN PO Take 500 mg by mouth as needed for pain       naproxen (NAPROSYN) 500 MG tablet Take 1 tablet (500 mg) by mouth 2 times daily as needed for moderate pain 60 tablet 3     aspirin 81 MG tablet Take by mouth daily       triamterene-hydrochlorothiazide (MAXZIDE-25) 37.5-25 MG per tablet Take 1 tablet by mouth daily 90 tablet 3     lisinopril (PRINIVIL/ZESTRIL) 20 MG tablet Take 1 tablet (20 mg) by mouth daily 90 tablet 3     ORDER FOR DME Equipment being ordered: Gell Heel inserts 1 Device 0     ORDER FOR DME Medium Tri Tracy 1 Device 0     No facility-administered encounter medications on file as of 4/10/2017.              Review Of Systems  Skin: As above  Eyes: negative  Ears/Nose/Throat: negative  Respiratory: No shortness of breath, dyspnea on exertion, cough, or hemoptysis  Cardiovascular: negative  Gastrointestinal: negative  Genitourinary: negative  Musculoskeletal: negative  Neurologic: negative  Psychiatric: negative  Hematologic/Lymphatic/Immunologic: negative  Endocrine: negative      O:   NAD, WDWN, Alert & Oriented, Mood & Affect wnl, Vitals stable   Here today alone   /75  Pulse 81  SpO2 93%   General appearance normal   Vitals stable   Alert, oriented and in no acute distress      Following lymph nodes palpated: Occipital, Cervical, Supraclavicular no lad   r upper lip at v 4mm scaly papule    L arm 6mm red scaly papule       Eyes: Conjunctivae/lids:Normal     ENT: Lips, buccal mucosa, tongue: normal    MSK:Normal    Cardiovascular: peripheral edema none    Pulm: Breathing Normal    Lymph Nodes: No Head and Neck Lymphadenopathy     Neuro/Psych: Orientation:Normal;  Mood/Affect:Normal      A/P:  1. L arm squamous cell carcinoma in situ   LASER DESTRUCTION:  Treatment options discussed. Clinical lesion marked under good light. Lesion plus 4mm margin of clinically normal skin treated with CO2 laser vaporization x 2 passes. Patient tolerated procedure well. Routine wound care.    2. R lip squamous cell carcinoma   MOHS:   Location    After PGACAC discussed with patient, decision for Mohs surgery was made. Indication for Mohs was Location. Patient confirmed the site with Dr. Golden.  After anesthesia with LEC, the tumor was excised using standard Mohs technique in 1 stages(s).  CLEAR MARGINS OBTAINED and Final defect size was 0.9cm.       REPAIR COMPLEX: Because of the tightness of the surrounding skin and Because of the size and full thickness nature of the defect, a complex closure was planned. After LEC anesthesia and prep, Burow's triangles were excised in the relaxed skin tension lines. The wound edges were widely undermined by dissection in the subcutaneous plane until adequate tissue mobility was obtained. Hemostasis was obtained. The wound edges were closed in a layered fashion using Vicryl and Fast Absorbing Plain Gut sutures. Postoperative length was 3 cm.   EBL minimal; complications none; wound care routine.  The patient was discharged in good condition and will return in one week for wound evaluation.    BENIGN LESIONS DISCUSSED WITH PATIENT:  I discussed the specifics of tumor, prognosis, and genetics of benign lesions.  I explained that treatment of these lesions would be purely cosmetic and not medically neccessary.  I discussed with patient different removal options including excision, cautery and /or laser.      Nature and genetics of benign skin lesions dicussed with patient.  Signs and Symptoms of skin cancer discussed with patient.  Patient encouraged to perform monthly skin exams.  UV precautions reviewed with patient.  Skin care regimen reviewed with  patient: Eliminate harsh soaps, i.e. Dial, zest, irsih spring; Mild soaps such as Cetaphil or Dove sensitive skin, avoid hot or cold showers, aggressive use of emollients including vanicream, cetaphil or cerave discussed with patient.    Risks of non-melanoma skin cancer discussed with patient   Return to clinic 6 months

## 2017-04-10 NOTE — NURSING NOTE
Surgical Office Location :   [facility]  [address] [place] [address]

## 2017-04-17 ENCOUNTER — ALLIED HEALTH/NURSE VISIT (OUTPATIENT)
Dept: DERMATOLOGY | Facility: CLINIC | Age: 67
End: 2017-04-17
Payer: MEDICARE

## 2017-04-17 ENCOUNTER — OFFICE VISIT (OUTPATIENT)
Dept: DERMATOLOGY | Facility: CLINIC | Age: 67
End: 2017-04-17
Payer: MEDICARE

## 2017-04-17 DIAGNOSIS — Z53.9 ERRONEOUS ENCOUNTER--DISREGARD: Primary | ICD-10-CM

## 2017-04-17 DIAGNOSIS — L57.0 AK (ACTINIC KERATOSIS): Primary | ICD-10-CM

## 2017-04-17 PROCEDURE — 17000 DESTRUCT PREMALG LESION: CPT | Mod: 79

## 2017-04-17 NOTE — PATIENT INSTRUCTIONS
WOUND CARE INSTRUCTIONS  for  ONE WEEK AFTER SURGERY          1) Leave flat bandage on your skin for one week after today s bandage change.  2) In one week when you remove the bandage, you may resume your regular skin care routine, including washing with mild soap and water, applying moisturizer, make-up and sunscreen.    3) If there are any open or bleeding areas at the incision/graft site you should begin to cover the area with a bandage daily as follows:    1) Clean and dry the area with plain tap water using a Q-tip or sterile gauze pad.  2) Apply Polysporin or Bacitracin ointment to the open area.  3) Cover the wound with a band-aid or a sterile non-stick gauze pad and micropore paper tape.             *Once the bandages are removed, the scar will be red and firm (especially in the lip/chin area). This is normal and will fade in time. It might take 6-12 months for this to happen.     *Massaging the area will help the scar soften and fade quicker. Begin to massage the area one month after the bandages have been removed. To massage apply pressure directly and firmly over the scar with the fingertips and move in a circular motion. Massage the area for a few minutes several times a day. Continue to massage the site for several months.    *Approximately 6-8 weeks after surgery it is not uncommon to see the formation of  tender pimple-like  bump along the scar. This is normal. As the scar continues to mature and the stitches underneath the skin begin to dissolve, this might occur. Do not pick or squeeze, this will resolve on it s own. Should one break open producing a small amount of drainage, apply Polysporin or Bacitracin ointment a few times a day until the wound is completely healed.    *Numbness in the surgical area is expected. It might take 12-18 months for the feeling to return to normal. During this time sensations of itchiness, tingling and occasional sharp pains might be noted. These feelings are normal  and will subside once the nerves have completely healed.         IN CASE OF EMERGENCY: Dr Golden [phone]     If you were seen in [place] call: [phone]    If you were seen in [place] call: [phone]

## 2017-04-17 NOTE — NURSING NOTE
Pt returned to clinic for post surgery 1 week follow up bandage change. Pt has no complaints, denies pain. Patients bandage fell off a few days ago. Right upper lip was cleansed with normal saline. Site is healing and wound edges approximating well. Reapplied new steri strips and paper tape.    Advised to watch for signs/sx of infection; spreading redness, drainage, odor, fever. Call or report promptly to clinic. Pt given written instructions and informed to rtc as needed. Patient verbalized understanding.     Eri Campos, CMA

## 2017-04-17 NOTE — MR AVS SNAPSHOT
After Visit Summary   4/17/2017    Thomas Davila    MRN: [id number]           Patient Information     Date Of Birth          1950        Visit Information        Provider Department      4/17/2017 2:15 PM Lisa Keys [facility]        Care Instructions    WOUND CARE INSTRUCTIONS  for  ONE WEEK AFTER SURGERY          1) Leave flat bandage on your skin for one week after today s bandage change.  2) In one week when you remove the bandage, you may resume your regular skin care routine, including washing with mild soap and water, applying moisturizer, make-up and sunscreen.    3) If there are any open or bleeding areas at the incision/graft site you should begin to cover the area with a bandage daily as follows:    1) Clean and dry the area with plain tap water using a Q-tip or sterile gauze pad.  2) Apply Polysporin or Bacitracin ointment to the open area.  3) Cover the wound with a band-aid or a sterile non-stick gauze pad and micropore paper tape.             *Once the bandages are removed, the scar will be red and firm (especially in the lip/chin area). This is normal and will fade in time. It might take 6-12 months for this to happen.     *Massaging the area will help the scar soften and fade quicker. Begin to massage the area one month after the bandages have been removed. To massage apply pressure directly and firmly over the scar with the fingertips and move in a circular motion. Massage the area for a few minutes several times a day. Continue to massage the site for several months.    *Approximately 6-8 weeks after surgery it is not uncommon to see the formation of  tender pimple-like  bump along the scar. This is normal. As the scar continues to mature and the stitches underneath the skin begin to dissolve, this might occur. Do not pick or squeeze, this will resolve on it s own. Should one break open producing a small amount of drainage, apply Polysporin or Bacitracin  ointment a few times a day until the wound is completely healed.    *Numbness in the surgical area is expected. It might take 12-18 months for the feeling to return to normal. During this time sensations of itchiness, tingling and occasional sharp pains might be noted. These feelings are normal and will subside once the nerves have completely healed.         IN CASE OF EMERGENCY: Dr Golden [phone]     If you were seen in [place] call: [phone]    If you were seen in [place] call: [phone]            Follow-ups after your visit        Your next 10 appointments already scheduled     Apr 17, 2017  3:00 PM CDT   Return Visit with Maryana Velasquez PA-C   [facility]    [address] [address]   [phone]            Apr 20, 2017  1:30 PM CDT   PDT with Maryana Velasquez PA-C   [facility] ([facility])    [address] [address]   [phone]            Sep 27, 2017 12:50 PM CDT   LAB with [facility] ([facility])    [address] [address]   [phone]           Patient must bring picture ID.  Patient should be prepared to give a urine specimen  Please do not eat 10-12 hours before your appointment if you are coming in fasting for labs on lipids, cholesterol, or glucose (sugar).  Pregnant women should follow their Care Team instructions. Water with medications is okay. Do not drink coffee or other fluids.   If you have concerns about taking  your medications, please ask at office or if scheduling via [employer], send a message by clicking on Secure Messaging, Message Your Care Team.            Sep 27, 2017  1:00 PM CDT   CT CHEST/ABDOMEN/PELVIS W CONTRAST with WYCT1   [facility] ([facility])    [address] [address]   [phone]           Please bring any scans or X-rays taken at  other hospitals, if similar tests were done. Also bring a list of your medicines, including vitamins, minerals and over-the-counter drugs. It is safest to leave personal items at home.  Be sure to tell your doctor:   If you have any allergies.   If there s any chance you are pregnant.   If you are breastfeeding.   If you have any special needs.  You may have contrast for this exam. To prepare:   Do not eat or drink for 2 hours before your exam. If you need to take medicine, you may take it with small sips of water. (We may ask you to take liquid medicine as well.)   The day before your exam, drink extra fluids at least six 8-ounce glasses (unless your doctor tells you to restrict your fluids).  Patients over 70 or patients with diabetes or kidney problems:   If you haven t had a blood test (creatinine test) within the last 30 days, go to your clinic or Diagnostic Imaging Department for this test.  If you have diabetes:   If your kidney function is normal, continue taking your metformin (Avandamet, Glucophage, Glucovance, Metaglip) on the day of your exam.   If your kidney function is abnormal, wait 48 hours before restarting this medicine.  You will have oral contrast for this exam:   You will drink the contrast at home. Get this from your clinic or Diagnostic Imaging Department. Please follow the directions given.  Please wear loose clothing, such as a sweat suit or jogging clothes. Avoid snaps, zippers and other metal. We may ask you to undress and put on a hospital gown.  If you have any questions, please call the Imaging Department where you will have your exam.            Oct 02, 2017  1:30 PM CDT   Return Visit with Alissa Chacon MD   [facility] ([facility])    [facility]  [address]  [facility] [address] [phone]              Who to contact     If you have questions or need follow up information about today's clinic visit or your schedule  "please contact [facility] directly at [phone].  Normal or non-critical lab and imaging results will be communicated to you by MyChart, letter or phone within 4 business days after the clinic has received the results. If you do not hear from us within 7 days, please contact the clinic through [employer] or phone. If you have a critical or abnormal lab result, we will notify you by phone as soon as possible.  Submit refill requests through [employer] or call your pharmacy and they will forward the refill request to us. Please allow 3 business days for your refill to be completed.          Additional Information About Your Visit        [email or web address] Information     [employer] lets you send messages to your doctor, view your test results, renew your prescriptions, schedule appointments and more. To sign up, go to [email or web address] . Click on \"Log in\" on the left side of the screen, which will take you to the Welcome page. Then click on \"Sign up Now\" on the right side of the page.     You will be asked to enter the access code listed below, as well as some personal information. Please follow the directions to create your username and password.     Your access code is: [email or web address]  Expires: 2017 12:23 PM     Your access code will  in 90 days. If you need help or a new code, please call your [place] clinic or [phone].        Care EveryWhere ID     This is your Care EveryWhere ID. This could be used by other organizations to access your [place] medical records  [id number]         Blood Pressure from Last 3 Encounters:   04/10/17 151/75   17 135/73   17 148/68    Weight from Last 3 Encounters:   17 109.2 kg (240 lb 11.2 oz)   17 110.2 kg (243 lb)   16 110.2 kg (243 lb)              Today, you had the following     No orders found for display       Primary Care Provider Office Phone # Fax #    Dung Prieto [phone]       [facility] [address] [address]        Thank you!     Thank you for choosing [facility]  for your care. Our goal is always to provide you with excellent care. Hearing back from our patients is one way we can continue to improve our services. Please take a few minutes to complete the written survey that you may receive in the mail after your visit with us. Thank you!             Your Updated Medication List - Protect others around you: Learn how to safely use, store and throw away your medicines at www.disposemymeds.org.          This list is accurate as of: 4/17/17  2:21 PM.  Always use your most recent med list.                   Brand Name Dispense Instructions for use    ACETAMINOPHEN PO      Take 500 mg by mouth as needed for pain       aspirin 81 MG tablet      Take by mouth daily       lisinopril 20 MG tablet    PRINIVIL/ZESTRIL    90 tablet    Take 1 tablet (20 mg) by mouth daily       naproxen 500 MG tablet    NAPROSYN    60 tablet    Take 1 tablet (500 mg) by mouth 2 times daily as needed for moderate pain       omeprazole 20 MG CR capsule    priLOSEC     TAKE ONE CAPSULE BY MOUTH DAILY BEFORE A MEAL       * order for DME     1 Device    Medium Tri Tracy       * order for DME     1 Device    Equipment being ordered: Gell Heel inserts       triamterene-hydrochlorothiazide 37.5-25 MG per tablet    MAXZIDE-25    90 tablet    Take 1 tablet by mouth daily       * Notice:  This list has 2 medication(s) that are the same as other medications prescribed for you. Read the directions carefully, and ask your doctor or other care provider to review them with you.

## 2017-04-17 NOTE — PROGRESS NOTES
HPI:   Thomas Davila is a 67 year old female who presents for evaluation of a scaly spot on the lip  chief complaint  Location: left upper lip   Condition present for:  awhile.   Previous treatments include: none    Review Of Systems  Eyes: negative  Ears/Nose/Throat: negative  Respiratory: No shortness of breath, dyspnea on exertion, cough, or hemoptysis  Cardiovascular: negative  Gastrointestinal: negative  Genitourinary: negative  Musculoskeletal: negative  Neurologic: negative  Psychiatric: negative        PHYSICAL EXAM:      Skin exam performed as follows: Type 2 skin. Mood appropriate  Alert and Oriented X 3. Well developed, well nourished in no distress.  General appearance: Normal  Head including face: Normal  Eyes: conjunctiva and lids: Normal  Mouth: Lips, teeth, gums: Normal  Neck: Normal  Chest-breast/axillae: Normal  Back: Normal  Spleen and liver: Normal  Cardiovascular: Exam of peripheral vascular system by observation for swelling, varicosities, edema: Normal  Genitalia: groin, buttocks: Normal  Extremities: digits/nails (clubbing): Normal  Eccrine and Apocrine glands: Normal  Right upper extremity: Normal  Left upper extremity: Normal  Right lower extremity: Normal  Left lower extremity: Normal  Skin: Scalp and body hair: See below    1. Gritty papule on left upper vermilion border    ASSESSMENT/PLAN:     1. Actinic keratosis on left upper vermilion border. As precancerous, cryosurgery performed. Advised on blistering and post-op care. Advised if not resolved in 1-2 months to return for evaluation (by July 2017)          Follow-up: 6 month FSE/PRN sooner  CC:   Scribed By: Maryana Velasquez MS, PA-C

## 2017-04-17 NOTE — MR AVS SNAPSHOT
After Visit Summary   4/17/2017    Thomas Davila    MRN: [id number]           Patient Information     Date Of Birth          1950        Visit Information        Provider Department      4/17/2017 3:00 PM Maryana Velasquez PA-C [facility]        Today's Diagnoses     ERRONEOUS ENCOUNTER--DISREGARD    -  1       Follow-ups after your visit        Your next 10 appointments already scheduled     Apr 17, 2017  3:00 PM CDT   Return Visit with Maryana Velasquez PA-C   [facility] ([facility])    [address] [address]   [phone]            Apr 20, 2017  1:30 PM CDT   PDT with Maryana Velasquez PA-C   [facility] ([facility])    [address] [address]   [phone]            Sep 27, 2017 12:50 PM CDT   LAB with [facility] ([facility])    [address] [address]   [phone]           Patient must bring picture ID.  Patient should be prepared to give a urine specimen  Please do not eat 10-12 hours before your appointment if you are coming in fasting for labs on lipids, cholesterol, or glucose (sugar).  Pregnant women should follow their Care Team instructions. Water with medications is okay. Do not drink coffee or other fluids.   If you have concerns about taking  your medications, please ask at office or if scheduling via [employer], send a message by clicking on Secure Messaging, Message Your Care Team.            Sep 27, 2017  1:00 PM CDT   CT CHEST/ABDOMEN/PELVIS W CONTRAST with WYCT1   [facility] CT ([facility])    [address] [address]   [phone]           Please bring any scans or X-rays taken at other hospitals, if similar tests were done. Also bring a list of your medicines, including vitamins, minerals and over-the-counter drugs. It is safest to leave personal  items at home.  Be sure to tell your doctor:   If you have any allergies.   If there s any chance you are pregnant.   If you are breastfeeding.   If you have any special needs.  You may have contrast for this exam. To prepare:   Do not eat or drink for 2 hours before your exam. If you need to take medicine, you may take it with small sips of water. (We may ask you to take liquid medicine as well.)   The day before your exam, drink extra fluids at least six 8-ounce glasses (unless your doctor tells you to restrict your fluids).  Patients over 70 or patients with diabetes or kidney problems:   If you haven t had a blood test (creatinine test) within the last 30 days, go to your clinic or Diagnostic Imaging Department for this test.  If you have diabetes:   If your kidney function is normal, continue taking your metformin (Avandamet, Glucophage, Glucovance, Metaglip) on the day of your exam.   If your kidney function is abnormal, wait 48 hours before restarting this medicine.  You will have oral contrast for this exam:   You will drink the contrast at home. Get this from your clinic or Diagnostic Imaging Department. Please follow the directions given.  Please wear loose clothing, such as a sweat suit or jogging clothes. Avoid snaps, zippers and other metal. We may ask you to undress and put on a hospital gown.  If you have any questions, please call the Imaging Department where you will have your exam.            Oct 02, 2017  1:30 PM CDT   Return Visit with Alissa Chacon MD   [facility] ([facility])    [facility]  [address]  [facility] [address] [phone]              Who to contact     If you have questions or need follow up information about today's clinic visit or your schedule please contact [facility] directly at [phone].  Normal or non-critical lab and imaging results will be communicated to you by MyChart, letter or phone  "within 4 business days after the clinic has received the results. If you do not hear from us within 7 days, please contact the clinic through [email or web address] or phone. If you have a critical or abnormal lab result, we will notify you by phone as soon as possible.  Submit refill requests through [email or web address] or call your pharmacy and they will forward the refill request to us. Please allow 3 business days for your refill to be completed.          Additional Information About Your Visit        [email or web address] Information     [email or web address] lets you send messages to your doctor, view your test results, renew your prescriptions, schedule appointments and more. To sign up, go to [email or web address] . Click on \"Log in\" on the left side of the screen, which will take you to the Welcome page. Then click on \"Sign up Now\" on the right side of the page.     You will be asked to enter the access code listed below, as well as some personal information. Please follow the directions to create your username and password.     Your access code is: [email or web address]  Expires: 2017 12:23 PM     Your access code will  in 90 days. If you need help or a new code, please call your [place] clinic or [phone].        Care EveryWhere ID     This is your Care EveryWhere ID. This could be used by other organizations to access your [place] medical records  [id number]         Blood Pressure from Last 3 Encounters:   04/10/17 151/75   17 135/73   17 148/68    Weight from Last 3 Encounters:   17 109.2 kg (240 lb 11.2 oz)   17 110.2 kg (243 lb)   16 110.2 kg (243 lb)              Today, you had the following     No orders found for display       Primary Care Provider Office Phone # Fax #    Dung Prieto [phone]       [facility] [address] [address]        Thank you!     Thank you for choosing [facility]  for your care. Our goal is always to provide you with " excellent care. Hearing back from our patients is one way we can continue to improve our services. Please take a few minutes to complete the written survey that you may receive in the mail after your visit with us. Thank you!             Your Updated Medication List - Protect others around you: Learn how to safely use, store and throw away your medicines at www.disposemymeds.org.          This list is accurate as of: 4/17/17  2:35 PM.  Always use your most recent med list.                   Brand Name Dispense Instructions for use    ACETAMINOPHEN PO      Take 500 mg by mouth as needed for pain       aspirin 81 MG tablet      Take by mouth daily       lisinopril 20 MG tablet    PRINIVIL/ZESTRIL    90 tablet    Take 1 tablet (20 mg) by mouth daily       naproxen 500 MG tablet    NAPROSYN    60 tablet    Take 1 tablet (500 mg) by mouth 2 times daily as needed for moderate pain       omeprazole 20 MG CR capsule    priLOSEC     TAKE ONE CAPSULE BY MOUTH DAILY BEFORE A MEAL       * order for DME     1 Device    Medium Tri Tracy       * order for DME     1 Device    Equipment being ordered: Gell Heel inserts       triamterene-hydrochlorothiazide 37.5-25 MG per tablet    MAXZIDE-25    90 tablet    Take 1 tablet by mouth daily       * Notice:  This list has 2 medication(s) that are the same as other medications prescribed for you. Read the directions carefully, and ask your doctor or other care provider to review them with you.

## 2017-05-09 NOTE — LETTER
[facility]  [address] [address]  Phone: [phone]    April 3, 2017      Thomas Davila  [address] [address]              Dear Ms. Davila,    You are scheduled for Mohs Surgery on   April 10th at  7:45 am.    We are located at the [facility] in [place]. Please check in at the Dermatology Clinic located on the 2nd floor at the end of the sanchez.    You don't need to arrive more than 5-10 minutes prior to your appointment time.    Be sure to eat a good breakfast and bathe and wash your hair prior to Surgery.   If you are taking any anti-coagulants that are prescribed by your Doctor (such as Coumadin/warfarin, Plavix, Aspirin, Ibuprofen), please continue taking them.    However, If you are taking anti-coagulants over the counter without a Doctor's order for a Medical condition, please discontinue them 10 days prior to Surgery.         Walt Golden [phone]                [facility]  Outpatient Pulmonary Rehabilitation Flowsheet  [address], [address] [place], [place] [address]    Tha Gresham  1944       Patient's carry-over of treatment delivered by: performs DB at home    Objective Daily Findings      Respiratory Muscle Functioning/Exercise Conditioning/Strengthening Session:    Time In: 12:30  Time Out::1:30  Session Number: 10  O2 with Theapy: 0 L/min    Pre SPO2 95  Pre HR:92  Pre BP: 109/83    RR: 18 Wt: 146  Temp: not tested by patient   Post SPO2: 97 Post HR: 90  Post BP: 112/70    Self Care Home Management Instruction/Education    Self Care Home Management Instruction Education: Breathing Retaining and Exercise Concepts. Patient's Response to Education: Patient actively participated in education. Comments: Individual Therapy               Goal     Actual                      During Therapy                 Post-Therapy     % SpO2 HR RPD 1 - 4 RPE 6-20 Pain  1 - 10 % SpO2 HR RPD 1 - 4 RPE 6-20 Pain 0 - 10   Stretching/DB  /Monitoring               IS 20 min x 1750 10 min x 1500 96 82 1 8 1 96 80 1 8 1   Stepper 30 min x L5 25 min x L4 97 95 2 9 1 96 98 2 10 1   Arm Bike 30 min x 25 coy 25 min x 20 coy 96 68 2 12 4 96 76 2 13 4   Weighted DB                Arm Weights                 Patient's Response to Therapy: Good effort and Good motivation. Comments: c/o pain in shoulders on arm bike. Time In:      Session Number:  Time Out:      O2 with therapy:  L/min         Individual Therapy               Goal     Actual                      During Therapy           Post Therapy     % SpO2 HR RPD 1 - 4 RPE 6-20 Pain  1 - 10 % SpO2 HR RPD 1 - 4 RPE 6-20 Pain 0 - 10   Stretching/DB  /Monitoring               Stat Bike               Stepper               Treadmill               Rest/PLB                    Patient's response to therapy: Good effort and Good motivation.     Assessment    Patient's response: Patient progressing towardsd LTGs evident by: Increased PLB and Increased Pacing. Plan: Continue as written    Code     Billin units      Physician supervision provided this date of service by: Dr. Madalyn Saleh     Therapist Signature:  GIANCARLO Brink    Therapist PRINTED Name and Credential: GIANCARLO Brink    2017  4:22 PM

## 2017-08-09 ENCOUNTER — TELEPHONE (OUTPATIENT)
Dept: FAMILY MEDICINE | Facility: CLINIC | Age: 67
End: 2017-08-09

## 2017-08-09 NOTE — TELEPHONE ENCOUNTER
8/9/2017      Patient state that she has up coming MRI scan appointment and if anything is out of the ordinary she will schedule.          Outreach ,  [place]

## 2017-09-26 RX ORDER — IOPAMIDOL 755 MG/ML
100 INJECTION, SOLUTION INTRAVASCULAR ONCE
Status: DISCONTINUED | OUTPATIENT
Start: 2017-09-27 | End: 2017-09-27 | Stop reason: CLARIF

## 2017-09-27 ENCOUNTER — HOSPITAL ENCOUNTER (OUTPATIENT)
Dept: LAB | Facility: CLINIC | Age: 67
End: 2017-09-27
Attending: INTERNAL MEDICINE
Payer: MEDICARE

## 2017-09-27 ENCOUNTER — TELEPHONE (OUTPATIENT)
Dept: DERMATOLOGY | Facility: CLINIC | Age: 67
End: 2017-09-27

## 2017-09-27 ENCOUNTER — HOSPITAL ENCOUNTER (OUTPATIENT)
Dept: CT IMAGING | Facility: CLINIC | Age: 67
Discharge: HOME | End: 2017-09-27
Attending: INTERNAL MEDICINE | Admitting: INTERNAL MEDICINE
Payer: MEDICARE

## 2017-09-27 ENCOUNTER — OFFICE VISIT (OUTPATIENT)
Dept: DERMATOLOGY | Facility: CLINIC | Age: 67
End: 2017-09-27
Payer: MEDICARE

## 2017-09-27 VITALS — HEIGHT: 64 IN | OXYGEN SATURATION: 97 % | HEART RATE: 89 BPM

## 2017-09-27 DIAGNOSIS — C54.1 ENDOMETRIAL ADENOCARCINOMA (H): ICD-10-CM

## 2017-09-27 DIAGNOSIS — Z85.828 HISTORY OF SKIN CANCER: ICD-10-CM

## 2017-09-27 DIAGNOSIS — D04.39 SQUAMOUS CELL CARCINOMA IN SITU OF SKIN OF RIGHT CHEEK: ICD-10-CM

## 2017-09-27 DIAGNOSIS — D18.00 ANGIOMA: ICD-10-CM

## 2017-09-27 DIAGNOSIS — H01.113: Primary | ICD-10-CM

## 2017-09-27 DIAGNOSIS — L82.1 SK (SEBORRHEIC KERATOSIS): ICD-10-CM

## 2017-09-27 DIAGNOSIS — L81.4 LENTIGO: ICD-10-CM

## 2017-09-27 LAB
ALBUMIN SERPL-MCNC: 4.1 G/DL (ref 3.4–5)
ALP SERPL-CCNC: 59 U/L (ref 40–150)
ALT SERPL W P-5'-P-CCNC: 49 U/L (ref 0–50)
ANION GAP SERPL CALCULATED.3IONS-SCNC: 10 MMOL/L (ref 3–14)
AST SERPL W P-5'-P-CCNC: 23 U/L (ref 0–45)
BASOPHILS # BLD AUTO: 0 10E9/L (ref 0–0.2)
BASOPHILS NFR BLD AUTO: 0.7 %
BILIRUB SERPL-MCNC: 0.6 MG/DL (ref 0.2–1.3)
BUN SERPL-MCNC: 14 MG/DL (ref 7–30)
CALCIUM SERPL-MCNC: 9.1 MG/DL (ref 8.5–10.1)
CANCER AG125 SERPL-ACNC: 10 U/ML (ref 0–30)
CHLORIDE SERPL-SCNC: 102 MMOL/L (ref 94–109)
CO2 SERPL-SCNC: 27 MMOL/L (ref 20–32)
CREAT SERPL-MCNC: 0.62 MG/DL (ref 0.52–1.04)
DIFFERENTIAL METHOD BLD: NORMAL
EOSINOPHIL # BLD AUTO: 0.1 10E9/L (ref 0–0.7)
EOSINOPHIL NFR BLD AUTO: 1.8 %
ERYTHROCYTE [DISTWIDTH] IN BLOOD BY AUTOMATED COUNT: 12.9 % (ref 10–15)
GFR SERPL CREATININE-BSD FRML MDRD: >90 ML/MIN/1.7M2
GLUCOSE SERPL-MCNC: 87 MG/DL (ref 70–99)
HCT VFR BLD AUTO: 45.3 % (ref 35–47)
HGB BLD-MCNC: 15.4 G/DL (ref 11.7–15.7)
IMM GRANULOCYTES # BLD: 0 10E9/L (ref 0–0.4)
IMM GRANULOCYTES NFR BLD: 0.2 %
LYMPHOCYTES # BLD AUTO: 1.1 10E9/L (ref 0.8–5.3)
LYMPHOCYTES NFR BLD AUTO: 24.9 %
MCH RBC QN AUTO: 30.7 PG (ref 26.5–33)
MCHC RBC AUTO-ENTMCNC: 34 G/DL (ref 31.5–36.5)
MCV RBC AUTO: 90 FL (ref 78–100)
MONOCYTES # BLD AUTO: 0.4 10E9/L (ref 0–1.3)
MONOCYTES NFR BLD AUTO: 8.2 %
NEUTROPHILS # BLD AUTO: 2.9 10E9/L (ref 1.6–8.3)
NEUTROPHILS NFR BLD AUTO: 64.2 %
PLATELET # BLD AUTO: 170 10E9/L (ref 150–450)
POTASSIUM SERPL-SCNC: 3.5 MMOL/L (ref 3.4–5.3)
PROT SERPL-MCNC: 7.6 G/DL (ref 6.8–8.8)
RBC # BLD AUTO: 5.02 10E12/L (ref 3.8–5.2)
SODIUM SERPL-SCNC: 139 MMOL/L (ref 133–144)
WBC # BLD AUTO: 4.5 10E9/L (ref 4–11)

## 2017-09-27 PROCEDURE — 85025 COMPLETE CBC W/AUTO DIFF WBC: CPT | Performed by: INTERNAL MEDICINE

## 2017-09-27 PROCEDURE — 74176 CT ABD & PELVIS W/O CONTRAST: CPT

## 2017-09-27 PROCEDURE — 86304 IMMUNOASSAY TUMOR CA 125: CPT | Performed by: INTERNAL MEDICINE

## 2017-09-27 PROCEDURE — 11100 HC BIOPSY SKIN/SUBQ/MUC MEM, SINGLE LESION: CPT | Performed by: DERMATOLOGY

## 2017-09-27 PROCEDURE — 88331 PATH CONSLTJ SURG 1 BLK 1SPC: CPT | Performed by: DERMATOLOGY

## 2017-09-27 PROCEDURE — 36415 COLL VENOUS BLD VENIPUNCTURE: CPT | Performed by: INTERNAL MEDICINE

## 2017-09-27 PROCEDURE — 99214 OFFICE O/P EST MOD 30 MIN: CPT | Mod: 25 | Performed by: DERMATOLOGY

## 2017-09-27 PROCEDURE — 80053 COMPREHEN METABOLIC PANEL: CPT | Performed by: INTERNAL MEDICINE

## 2017-09-27 RX ORDER — PIMECROLIMUS 10 MG/G
CREAM TOPICAL 2 TIMES DAILY
Qty: 30 G | Refills: 0 | Status: SHIPPED | OUTPATIENT
Start: 2017-09-27 | End: 2017-12-04

## 2017-09-27 NOTE — MR AVS SNAPSHOT
After Visit Summary   9/27/2017    Thomas Davila    MRN: [id number]           Patient Information     Date Of Birth          1950        Visit Information        Provider Department      9/27/2017 1:45 PM Walt Golden MD [facility]        Care Instructions          Wound Care Instructions     FOR SUPERFICIAL WOUNDS     [facility] [phone]    [facility] [phone]    Right cheek                   AFTER 24 HOURS YOU SHOULD REMOVE THE BANDAGE AND BEGIN DAILY DRESSING CHANGES AS FOLLOWS:     1) Remove Dressing.     2) Clean and dry the area with tap water using a Q-tip or sterile gauze pad.     3) Apply Vaseline, Aquaphor, Polysporin ointment or Bacitracin ointment over entire wound.  Do NOT use Neosporin ointment.     4) Cover the wound with a band-aid, or a sterile non-stick gauze pad and micropore paper tape      REPEAT THESE INSTRUCTIONS AT LEAST ONCE A DAY UNTIL THE WOUND HAS COMPLETELY HEALED.    It is an old wives tale that a wound heals better when it is exposed to air and allowed to dry out. The wound will heal faster with a better cosmetic result if it is kept moist with ointment and covered with a bandage.    **Do not let the wound dry out.**      Supplies Needed:      *Cotton tipped applicators (Q-tips)    *Polysporin Ointment or Bacitracin Ointment (NOT NEOSPORIN)    *Band-aids or non-stick gauze pads and micropore paper tape.      PATIENT INFORMATION:    During the healing process you will notice a number of changes. All wounds develop a small halo of redness surrounding the wound.  This means healing is occurring. Severe itching with extensive redness usually indicates sensitivity to the ointment or bandage tape used to dress the wound.  You should call our office if this develops.      Swelling  and/or discoloration around your surgical site is common, particularly when performed around the eye.    All wounds normally drain.   The larger the wound the more drainage there will be.  After 7-10 days, you will notice the wound beginning to shrink and new skin will begin to grow.  The wound is healed when you can see skin has formed over the entire area.  A healed wound has a healthy, shiny look to the surface and is red to dark pink in color to normalize.  Wounds may take approximately 4-6 weeks to heal.  Larger wounds may take 6-8 weeks.  After the wound is healed you may discontinue dressing changes.    You may experience a sensation of tightness as your wound heals. This is normal and will gradually subside.    Your healed wound may be sensitive to temperature changes. This sensitivity improves with time, but if you re having a lot of discomfort, try to avoid temperature extremes.    Patients frequently experience itching after their wound appears to have healed because of the continue healing under the skin.  Plain Vaseline will help relieve the itching.        POSSIBLE COMPLICATIONS    BLEEDIN. Leave the bandage in place.  2. Use tightly rolled up gauze or a cloth to apply direct pressure over the bandage for 30  minutes.  3. Reapply pressure for an additional 30 minutes if necessary  4. Use additional gauze and tape to maintain pressure once the bleeding has stopped.            Follow-ups after your visit        Your next 10 appointments already scheduled     Oct 02, 2017  1:30 PM CDT   Return Visit with Alissa Chacon MD   [facility] ([facility])    [facility]  [address]  [place] [facility] [address]   [phone]              Future tests that were ordered for you today     Open Future Orders        Priority Expected Expires Ordered    CT Chest Abdomen Pelvis w/o Contrast Routine 10/1/2017 2017 4/3/2017            Who to contact     If you have questions or need follow up information about today's clinic visit or your schedule please contact [facility]  "directly at [phone].  Normal or non-critical lab and imaging results will be communicated to you by [employer], letter or phone within 4 business days after the clinic has received the results. If you do not hear from us within 7 days, please contact the clinic through [employer] or phone. If you have a critical or abnormal lab result, we will notify you by phone as soon as possible.  Submit refill requests through [employer] or call your pharmacy and they will forward the refill request to us. Please allow 3 business days for your refill to be completed.          Additional Information About Your Visit        [employer] Information     [employer] lets you send messages to your doctor, view your test results, renew your prescriptions, schedule appointments and more. To sign up, go to [email or web address] . Click on \"Log in\" on the left side of the screen, which will take you to the Welcome page. Then click on \"Sign up Now\" on the right side of the page.     You will be asked to enter the access code listed below, as well as some personal information. Please follow the directions to create your username and password.     Your access code is: [email or web address]  Expires: 2017  1:56 PM     Your access code will  in 90 days. If you need help or a new code, please call your [place] clinic or [phone].        Care EveryWhere ID     This is your Care EveryWhere ID. This could be used by other organizations to access your [place] medical records  [id number]        Your Vitals Were     Pulse Height Pulse Oximetry             89 1.626 m (5' 4\") 97%          Blood Pressure from Last 3 Encounters:   04/10/17 151/75   17 135/73   17 148/68    Weight from Last 3 Encounters:   17 109.2 kg (240 lb 11.2 oz)   17 110.2 kg (243 lb)   16 110.2 kg (243 lb)              Today, you had the following     No orders found for display       Primary Care Provider Office Phone # Fax #    Dung Prieto, " [phone]       [address] [address]        Equal Access to Services     DASHAWN SALDAÑA : Hadii aad ku haddominickalexis Janeeali, wawillardda glynnbrendonha, yennyta kabhupendrada chuyita, perez sandeein hayaan omachan seaman laHuyyeimy sanchez. So [facility] [phone].    ATENCIÓN: Si habla español, tiene a schneider disposición servicios gratuitos de asistencia lingüística. Llame al [phone].    We comply with applicable federal civil rights laws and [place] laws. We do not discriminate on the basis of race, color, national origin, age, disability sex, sexual orientation or gender identity.            Thank you!     Thank you for choosing [facility]  for your care. Our goal is always to provide you with excellent care. Hearing back from our patients is one way we can continue to improve our services. Please take a few minutes to complete the written survey that you may receive in the mail after your visit with us. Thank you!             Your Updated Medication List - Protect others around you: Learn how to safely use, store and throw away your medicines at www.disposemymeds.org.          This list is accurate as of: 9/27/17  1:57 PM.  Always use your most recent med list.                   Brand Name Dispense Instructions for use Diagnosis    ACETAMINOPHEN PO      Take 500 mg by mouth as needed for pain        aspirin 81 MG tablet      Take by mouth daily        lisinopril 20 MG tablet    PRINIVIL/ZESTRIL    90 tablet    Take 1 tablet (20 mg) by mouth daily    Essential hypertension       naproxen 500 MG tablet    NAPROSYN    60 tablet    Take 1 tablet (500 mg) by mouth 2 times daily as needed for moderate pain    Primary osteoarthritis involving multiple joints       omeprazole 20 MG CR capsule    priLOSEC     TAKE ONE CAPSULE BY MOUTH DAILY BEFORE A MEAL        * order for DME     1 Device    Medium Tri Tracy    Right foot pain, Posterior tibialis muscle dysfunction       * order for DME     1 Device     Equipment being ordered: Gell Heel inserts    Plantar fasciitis, left       triamterene-hydrochlorothiazide 37.5-25 MG per tablet    MAXZIDE-25    90 tablet    Take 1 tablet by mouth daily    Essential hypertension       * Notice:  This list has 2 medication(s) that are the same as other medications prescribed for you. Read the directions carefully, and ask your doctor or other care provider to review them with you.

## 2017-09-27 NOTE — TELEPHONE ENCOUNTER
Spoke with pt and reviewed results. Pt verbalized understanding. Appt made for mohs and letter and packet sent.  [place] DEE RN BSN PHN  Specialty Clinics

## 2017-09-27 NOTE — NURSING NOTE
"Initial Pulse 89  Ht 1.626 m (5' 4\")  SpO2 97% Estimated body mass index is 41.32 kg/(m^2) as calculated from the following:    Height as of 4/3/17: 1.626 m (5' 4\").    Weight as of 4/3/17: 109.2 kg (240 lb 11.2 oz). .      "

## 2017-09-27 NOTE — PROGRESS NOTES
Thomas Davila is a 67 year old year old female patient here today for f/u hx of non-melanoma skin cancer.  Today she notes ithcing on right eyelid and non healing spot on right cheek.   .  Patient states this has been present for weeks on eyelid and not sure on right cheek .  Patient reports the following symptoms:  No thealing on right cheek .  Patient reports the following previous treatments none.  Patient reports the following modifying factors none.  Associated symptoms: none.  Patient has no other skin complaints today.  Remainder of the HPI, Meds, PMH, Allergies, FH, and SH was reviewed in chart.    Pertinent Hx:   Non-melanoma skin cancer   Past Medical History:   Diagnosis Date     Acute parametritis and pelvic cellulitis     salpingitis     ASCUS with positive high risk HPV 2013     Asthma     No recent issues     Basal cell carcinoma      breast cancer     left lumpectomy, radiation, tamoxifen     Breast cancer (H)     L Breast; Lumpectomy & Radiation     Chronic salpingitis and oophoritis     PID        Embolism and thrombosis of unspecified site     left leg, due to tamoxifen therapy     Generalized osteoarthrosis, unspecified site     hands     Leiomyoma of uterus, unspecified      Other malignant neoplasm of skin, site unspecified 2006    Basal cell cancer on nose     Squamous cell carcinoma      Thrombosis of leg     Left     Unspecified essential hypertension        Past Surgical History:   Procedure Laterality Date     BREAST LUMPECTOMY, RT/LT      left     C/SECTION, LOW TRANSVERSE  1972,     , Low Transverse x2     CL AFF SURGICAL PATHOLOGY      Breast reduction     COLONOSCOPY  10/15/02    normal     FOOT SURGERY      R Foot      HC EXCISION BREAST LESION, OPEN >=1  98    1) Needle localization with generous lumpectomy 2) Left axillary node dissection.     HC LAPAROSCOPY, SURGICAL, ABDOMEN, PERITONEUM & OMENTUM; DX W/ OR W/O SPECIMEN(S)   02/07/94     HYSTERECTOMY, PAP NO LONGER INDICATED       INSERT PORT VASCULAR ACCESS  3/14/2013    Procedure: INSERT PORT VASCULAR ACCESS;  Port Revision;  Surgeon: Arash Puente MD;  Location: [facility] OR     LAPAROSCOPIC HYSTERECTOMY TOTAL, BILATERAL SALPINGO-OOPHORECTOMY, NODE DISSECTION, COMBINED  1/31/2013    Procedure: COMBINED LAPAROSCOPIC HYSTERECTOMY TOTAL, SALPINGO-OOPHORECTOMY, NODE DISSECTION;  Laparosocpic  Total Abdominal Hysterectomy, Bilateral Salphino-Oophorectomy, Bilateral Pelvic Lymph Node Dissection, Pelvic Washings, Cystoscopy;  Surgeon: Tanya Walker MD;  Location: [facility] OR     SURGICAL HISTORY OF -   06/22/93    1) Excision of digital cyst right mid finger  2)  Excision of dermatofibroma left calf     SURGICAL HISTORY OF -   12/18/2001    Excision of mucinous cyst & partial ostectomy, bone removal of benign osteophytic overgrowth osteophyte of the distal aspect of the middle phalanx and distal phalanx     SURGICAL HISTORY OF -   2006    Basal cell cancer removal     TONSILLECTOMY       TUBAL LIGATION  1978        Family History   Problem Relation Age of Onset     CEREBROVASCULAR DISEASE Mother      Hypertension Mother      DIABETES Mother      Thyroid Disease Mother      Arthritis Mother      Alzheimer Disease Mother      Neurologic Disorder Mother      Parkinsons     HEART DISEASE Father      Hypertension Father      DIABETES Father      Thyroid Disease Father      Arthritis Father      Blood Disease Father      Anesthesia Reaction Sister      Thyroid Disease Sister      Anesthesia Reaction Sister      Arthritis Sister      RA     Allergies Son      GASTROINTESTINAL DISEASE Son      GERD     Allergies Son      percocett     GASTROINTESTINAL DISEASE Son      GERD     Hypertension Son        Social History     Social History     Marital status:      Spouse name: N/A     Number of children: 2     Years of education: N/A     Occupational History      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 No     Social History Narrative    [employer]--declines all blood products       Outpatient Encounter Prescriptions as of 9/27/2017   Medication Sig Dispense Refill     omeprazole (PRILOSEC) 20 MG CR capsule TAKE ONE CAPSULE BY MOUTH DAILY BEFORE A MEAL  3     ACETAMINOPHEN PO Take 500 mg by mouth as needed for pain       naproxen (NAPROSYN) 500 MG tablet Take 1 tablet (500 mg) by mouth 2 times daily as needed for moderate pain 60 tablet 3     aspirin 81 MG tablet Take by mouth daily       triamterene-hydrochlorothiazide (MAXZIDE-25) 37.5-25 MG per tablet Take 1 tablet by mouth daily 90 tablet 3     lisinopril (PRINIVIL/ZESTRIL) 20 MG tablet Take 1 tablet (20 mg) by mouth daily 90 tablet 3     ORDER FOR DME Equipment being ordered: Gell Heel inserts 1 Device 0     ORDER FOR DME Medium Tri Tracy 1 Device 0     [DISCONTINUED] iopamidol (ISOVUE-370) solution 100 mL        [DISCONTINUED] sodium chloride 0.9 % bag 500mL for CT scan flush use        No facility-administered encounter medications on file as of 9/27/2017.              Review Of Systems  Skin: As above  Eyes: negative  Ears/Nose/Throat: negative  Respiratory: No shortness of breath, dyspnea on exertion, cough, or hemoptysis  Cardiovascular: negative  Gastrointestinal: negative  Genitourinary: negative  Musculoskeletal: negative  Neurologic: negative  Psychiatric: negative  Hematologic/Lymphatic/Immunologic: negative  Endocrine: negative      O:   NAD, WDWN, Alert & Oriented, Mood & Affect wnl, Vitals stable   Here today alone   Pulse 89  Ht 1.626 m (5' 4\")  SpO2 97%   General appearance normal   Vitals stable   Alert, oriented and in no acute " distress      Following lymph nodes palpated: Occipital, Cervical, Supraclavicular no lad   R uppe rlid lichenified plaque    Stuck on papules and brown macules on trunk and ext    Red papules on trunk    R cheek ill-defined crusted 1cm bleeding scaly papule         The remainder of the full exam was unremarkable; the following areas were examined:  conjunctiva/lids, oral mucosa, neck, peripheral vascular system, abdomen, lymph nodes, digits/nails, eccrine and apocrine glands, scalp/hair, face, neck, chest, abdomen, buttocks, back, RUE, LUE, RLE, LLE       Eyes: Conjunctivae/lids:Normal     ENT: Lips, buccal mucosa, tongue: normal    MSK:Normal    Cardiovascular: peripheral edema none    Pulm: Breathing Normal    Lymph Nodes: No Head and Neck Lymphadenopathy     Neuro/Psych: Orientation:Normal; Mood/Affect:Normal      MICRO:   R cheek:There is hyperkeratosis & parakeratosis of the epidermis, with full thickness epidermal involvement by atypical keratinocytes with rare pale vacuolated cells.  Unremarkable dermis.    A/P:  1. Seborrheic keratosis, lentigo, hx of non-melanoma skin cancer, angioma  2. Eyelid dermatitis  Skin care discussed with patient   elidel twice daily  3. R cheek r.o squamous cell carcinoma   TANGENTIAL BIOPSY IN HOUSE:  After consent, anesthesia with LEC and prep, tangential excision performed and dx above confirmed with frozen section histology.  No complications and routine wound care.  Patient told result squamous cell carcinoma in situ carlos gonzalez .      BENIGN LESIONS DISCUSSED WITH PATIENT:  I discussed the specifics of tumor, prognosis, and genetics of benign lesions.  I explained that treatment of these lesions would be purely cosmetic and not medically neccessary.  I discussed with patient different removal options including excision, cautery and /or laser.      Nature and genetics of benign skin lesions dicussed with patient.  Signs and Symptoms of skin cancer discussed with  patient.  ABCDEs of melanoma reviewed with patient.  Patient encouraged to perform monthly skin exams.  UV precautions reviewed with patient.  Skin care regimen reviewed with patient: Eliminate harsh soaps, i.e. Dial, zest, irsih spring; Mild soaps such as Cetaphil or Dove sensitive skin, avoid hot or cold showers, aggressive use of emollients including vanicream, cetaphil or cerave discussed with patient.    Risks of non-melanoma skin cancer discussed with patient   Return to clinic 6 months

## 2017-09-27 NOTE — PATIENT INSTRUCTIONS
Wound Care Instructions     FOR SUPERFICIAL WOUNDS     [facility] [phone]    [facility] [phone]    Right cheek                   AFTER 24 HOURS YOU SHOULD REMOVE THE BANDAGE AND BEGIN DAILY DRESSING CHANGES AS FOLLOWS:     1) Remove Dressing.     2) Clean and dry the area with tap water using a Q-tip or sterile gauze pad.     3) Apply Vaseline, Aquaphor, Polysporin ointment or Bacitracin ointment over entire wound.  Do NOT use Neosporin ointment.     4) Cover the wound with a band-aid, or a sterile non-stick gauze pad and micropore paper tape      REPEAT THESE INSTRUCTIONS AT LEAST ONCE A DAY UNTIL THE WOUND HAS COMPLETELY HEALED.    It is an old wives tale that a wound heals better when it is exposed to air and allowed to dry out. The wound will heal faster with a better cosmetic result if it is kept moist with ointment and covered with a bandage.    **Do not let the wound dry out.**      Supplies Needed:      *Cotton tipped applicators (Q-tips)    *Polysporin Ointment or Bacitracin Ointment (NOT NEOSPORIN)    *Band-aids or non-stick gauze pads and micropore paper tape.      PATIENT INFORMATION:    During the healing process you will notice a number of changes. All wounds develop a small halo of redness surrounding the wound.  This means healing is occurring. Severe itching with extensive redness usually indicates sensitivity to the ointment or bandage tape used to dress the wound.  You should call our office if this develops.      Swelling  and/or discoloration around your surgical site is common, particularly when performed around the eye.    All wounds normally drain.  The larger the wound the more drainage there will be.  After 7-10 days, you will notice the wound beginning to shrink and new skin will begin to grow.  The wound is healed when you can see skin has formed over the entire area.  A healed wound has a healthy, shiny look to the surface and is red to dark  pink in color to normalize.  Wounds may take approximately 4-6 weeks to heal.  Larger wounds may take 6-8 weeks.  After the wound is healed you may discontinue dressing changes.    You may experience a sensation of tightness as your wound heals. This is normal and will gradually subside.    Your healed wound may be sensitive to temperature changes. This sensitivity improves with time, but if you re having a lot of discomfort, try to avoid temperature extremes.    Patients frequently experience itching after their wound appears to have healed because of the continue healing under the skin.  Plain Vaseline will help relieve the itching.        POSSIBLE COMPLICATIONS    BLEEDIN. Leave the bandage in place.  2. Use tightly rolled up gauze or a cloth to apply direct pressure over the bandage for 30  minutes.  3. Reapply pressure for an additional 30 minutes if necessary  4. Use additional gauze and tape to maintain pressure once the bleeding has stopped.

## 2017-09-27 NOTE — LETTER
[facility]  [address] [address]  Phone: [phone]       September 27, 2017         Thomas Davila  [address] [address]            Dear Thomas:    You are scheduled for Mohs Surgery on    November 6th at 7:45 am    We are located at the [facility] in [place]. Please check in at the Dermatology Clinic located on the 2nd floor at the end of the sanchez.    You don't need to arrive more than 5-10 minutes prior to your appointment time.    Be sure to eat a good breakfast and bathe and wash your hair prior to Surgery.   If you are taking any anti-coagulants that are prescribed by your Doctor (such as Coumadin/warfarin, Plavix, Aspirin, Ibuprofen), please continue taking them.    However, If you are taking anti-coagulants over the counter without a Doctor's order for a Medical condition, please discontinue them 10 days prior to Surgery.         Walt Golden [phone] .

## 2017-09-27 NOTE — TELEPHONE ENCOUNTER
----- Message from Walt Golden MD sent at 9/27/2017  2:22 PM CDT -----  R cheek squamous cell carcinoma in situ schedule

## 2017-10-02 ENCOUNTER — ONCOLOGY VISIT (OUTPATIENT)
Dept: ONCOLOGY | Facility: CLINIC | Age: 67
End: 2017-10-02
Attending: INTERNAL MEDICINE
Payer: MEDICARE

## 2017-10-02 VITALS
HEART RATE: 106 BPM | SYSTOLIC BLOOD PRESSURE: 134 MMHG | OXYGEN SATURATION: 95 % | HEIGHT: 64 IN | RESPIRATION RATE: 18 BRPM | BODY MASS INDEX: 41.64 KG/M2 | DIASTOLIC BLOOD PRESSURE: 78 MMHG | WEIGHT: 243.9 LBS | TEMPERATURE: 97.9 F

## 2017-10-02 DIAGNOSIS — K76.0 FATTY LIVER: ICD-10-CM

## 2017-10-02 DIAGNOSIS — C54.1 ENDOMETRIAL ADENOCARCINOMA (H): Primary | ICD-10-CM

## 2017-10-02 DIAGNOSIS — Z86.718 PERSONAL HISTORY OF DVT (DEEP VEIN THROMBOSIS): ICD-10-CM

## 2017-10-02 DIAGNOSIS — E66.3 OVERWEIGHT: ICD-10-CM

## 2017-10-02 PROCEDURE — 99211 OFF/OP EST MAY X REQ PHY/QHP: CPT

## 2017-10-02 PROCEDURE — 99214 OFFICE O/P EST MOD 30 MIN: CPT | Performed by: INTERNAL MEDICINE

## 2017-10-02 ASSESSMENT — PAIN SCALES - GENERAL: PAINLEVEL: NO PAIN (0)

## 2017-10-02 NOTE — PATIENT INSTRUCTIONS
We would like to see you back in 6 months for a follow up appointment with labs prior. When you are in need of a refill, please call your pharmacy and they will send us a request.  Copy of appointments, and after visit summary (AVS) given to patient.  If you have any questions please call Jojo Banks RN, BSN Oncology Hematology  [facility] [phone]. For questions after business hours, or on holidays/weekends, please call our after hours Nurse Triage line [phone]. Thank you.             6 months f/u with labs.

## 2017-10-02 NOTE — PROGRESS NOTES
CHIEF COMPLAINT AND REASON FOR VISIT: endometrial cancer 2013 s/p adjuvant chemo, RT.      HISTORY OF PRESENT ILLNESS:  She presented with  postmenopausal bleeding in 12/2012.  It became heavier in January 2013.  Her last menstrual period was around 1998.     Endometrial biopsy performed in primary care's office showed endometrial adenocarcinoma, FIGO grade 1/3, arising from background of atypical complex hyperplasia, HPV positive, type 66.  Pelvic ultrasound indicating uterus is about 9 cm, no fibroid, endometriosis, abnormally thickened, measured 3 cm.    She saw Dr. Walker from the  and underwent Da Sasha-assisted total hysterectomy and bilateral salpingo-oophorectomy and lymph node dissection.  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  Ovaries, fallopian tubes, cervical, lower uterine segment are negative.  Cytology wash negative.  There was focal suspicion for angiolymphatic invasion.  Left pelvic lymph nodes 1/8 were positive, right pelvic nodes total 7 were negative. She has stage IIIC pathologic [id number] endometrial carcinoma with focal squamous cell differentiation.   Dr. Walker, GYN oncologist, recommended carbo and Taxol x3 followed by radiation followed by 3 more carbo and Taxol.    Chemotherapy portion was carbo and Taxol, carbo at AUC 6, Taxol 175 mg/meter squared x3 finished in April 2013. External pelvic Radiation is done 5/28/2013. She finished brachy RT  X 3 in early June 2013.  3 more carbo/taxol finished in 8/2013.      PMH: She had remote history of left breast cancer then treated with lumpectomy, left-sided axillary lymph node dissection, radiation and tamoxifen for 1 year, discontinued due to DVT.  She was treated with anticoagulation, aspirin. but is currently not on any blood thinner.   Left  "superficial thrombus in 4/2013 s/p 6 months of coumadin.  HTN      REVIEW OF SYSTEMS:   Likes to eat, concerned about over weight. She is always hungry. She is under stress taking care of her  who has Perkinism's disease.    Lots of arthritis.   She is seeing dermatologist for skin biopsies. She is doing light therapy for \"SCC\".       PHYSICAL EXAMINATION:   VITAL SIGNS: Blood pressure 134/78, pulse 106, temperature 97.9  F (36.6  C), temperature source Tympanic, resp. rate 18, height 1.632 m (5' 4.25\"), weight 110.6 kg (243 lb 14.4 oz), SpO2 95 %, not currently breastfeeding.  GENERAL APPEARANCE:   She is morbidly obese, not in acute distress.  Very pleasant.   HEENT: The patient is normocephalic, atraumatic. Pupils are equal react to light.  Sclerae are anicteric.  Moist oral mucosa.  Negative pharynx.  No oral thrush.   NECK:  Supple.  No jugular venous distention.  Thyroid is not palpable.   LYMPH NODES:  Superficial lymphadenopathy is not appreciable in the bilateral cervical, supraclavicular, axillary or inguinal adenopathy.   CARDIOVASCULAR:  S1, S2 regular with no murmurs or gallops.  No carotid or abdominal bruits.   PULMONARY:  Lungs are clear to auscultation and percussion bilaterally.  There is no wheezing or rhonchi.   GASTROINTESTINAL:  Abdomen is soft, nontender.  No hepatosplenomegaly.  No signs of ascites.  No mass appreciable.   MUSCULOSKELETAL/EXTREMITIES:  No edema.  No cyanotic changes. multiple joints deformities on fingers.  No lymphedema.   NEUROLOGIC:  Cranial nerves II-XII are grossly intact.  Sensation intact.  Muscle strength and muscle tone symmetrical all through 5/5.   BACK:  No spinal or paraspinal tenderness.  No CVA tenderness.   Skin: erythematous scaly lesion on right upper arm.      CURRENT LAB DATA REVIEWED  CBC DIFF, CMP,  are fine    Current image REVIEWED  CT 9/2017 body CT: negative, fatty liver.     Old data reviewed with summary  CT 9/2015:  1. No evidence of " metastatic or recurrent neoplasm.  2. There is a small superficial collection and mild adjacent inflammatory change involving the lower right abdominal wall at the site of a previously seen fat-containing ventral hernia. This could  relate to interval hernia repair with possibly a small hematoma or seroma (she had ventral hernia surgery at [facility] in 7/2015)    Thyroid US 3/2015: Stable multinodular thyroid gland from 9/19/2014.     CT body  9/2014- 1. No convincing evidence for recurrent or metastatic malignancy in the chest, abdomen, or pelvis.   2. Diffuse fatty infiltration of the liver.   3. Indeterminate 1.6 cm left thyroid nodule is unchanged. Consider thyroid ultrasound for further evaluation.     CT body 10/2013 - Hepatic fatty infiltration. Scattered colonic diverticulosis. Nonenlarged left external iliac lymph node.    Sono of left leg 4/2013 - There is extensive occlusive thrombus throughout the greater saphenous vein.    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  Ovaries, fallopian tubes, cervical, lower uterine segment are negative.  Cytology wash negative.  There was focal suspicion for angiolymphatic invasion.  Left pelvic lymph nodes 1/8 were positive, right pelvic nodes total 7 were negative. She has stage IIIC pathologic T1bN1.    Pelvic ultrasound 01/2013 -  indicating uterus is about 9 cm, no fibroid, endometriosis, abnormally thickened, measured 3 cm.            ASSESSMENT AND PLAN:     1.  stage IIIC endometrioid carcinoma with focal squamous cell differentiation, human papilloma virus positive, status post Da Sasha-assisted total hysterectomy, bilateral salpingo-oophorectomy, lymph node dissection.      Treatment plan was sandwich chemo -radiation followed by more chemotherapy.  Chemotherapy  portion is carbo and Taxol, carbo at AUC 6, Taxol 175 mg/meter squared x3 finished in April. External pelvic Radiation is done 5/28/2013. She had brachy RT  X 3 in early June.  3 more chemo with carbo/taxol till 8/2013.    She needs ongoing cancer surveillance visit q 6 months with labs and CT prn at this point.      2. Fatty liver. Diet fat reduction counseling is provided.    3. Hx of DVT on tamoxifen. She is off coumadin now.   She is advised on ASAuse post meals.      4. Overweight. Weight loss counseling is provided. She admits she likes to eat, always hungry and not willing to change it.

## 2017-10-02 NOTE — NURSING NOTE
"Oncology Rooming Note    October 2, 2017 1:17 PM   Thomas Davila is a 67 year old female who presents for:    Chief Complaint   Patient presents with     Oncology Clinic Visit     6 month recheck Endometrial CA, review Labs & CT      Initial Vitals: /78 (BP Location: Right arm, Patient Position: Sitting, Cuff Size: Adult Regular)  Pulse 106  Temp 97.9  F (36.6  C) (Tympanic)  Resp 18  Ht 1.632 m (5' 4.25\")  Wt 110.6 kg (243 lb 14.4 oz)  SpO2 95%  Breastfeeding? No  BMI 41.54 kg/m2 Estimated body mass index is 41.54 kg/(m^2) as calculated from the following:    Height as of this encounter: 1.632 m (5' 4.25\").    Weight as of this encounter: 110.6 kg (243 lb 14.4 oz). Body surface area is 2.24 meters squared.  No Pain (0) Comment: Data Unavailable   No LMP recorded. Patient has had a hysterectomy.  Allergies reviewed: Yes  Medications reviewed: Yes    Medications: Medication refills not needed today.  Pharmacy name entered into EPIC:      [id number] - [place], [place] - [address] [place] [address]  [employer] - EMPLOY[employer] ONLY MAIL ORDER    Clinical concerns: 6 month recheck Endometrial CA, review Labs & CT     7  minutes for nursing intake (face to face time)     Kendra Jenkins CMA              "

## 2017-11-06 ENCOUNTER — OFFICE VISIT (OUTPATIENT)
Dept: DERMATOLOGY | Facility: CLINIC | Age: 67
End: 2017-11-06
Payer: MEDICARE

## 2017-11-06 VITALS — OXYGEN SATURATION: 94 % | HEART RATE: 96 BPM | DIASTOLIC BLOOD PRESSURE: 80 MMHG | SYSTOLIC BLOOD PRESSURE: 151 MMHG

## 2017-11-06 DIAGNOSIS — D04.39 SQUAMOUS CELL CARCINOMA IN SITU OF SKIN OF RIGHT CHEEK: Primary | ICD-10-CM

## 2017-11-06 PROCEDURE — 13132 CMPLX RPR F/C/C/M/N/AX/G/H/F: CPT | Mod: 51 | Performed by: DERMATOLOGY

## 2017-11-06 PROCEDURE — 17311 MOHS 1 STAGE H/N/HF/G: CPT | Performed by: DERMATOLOGY

## 2017-11-06 NOTE — PATIENT INSTRUCTIONS
Sutured Wound Care     [facility]: [phone]    [facility]: [phone]    Right cheek      ? No strenuous activity for 48 hours. Resume moderate activity in 48 hours. No heavy exercising until you are seen for follow up in one week.     ? Take Tylenol as needed for discomfort.                         ? Do not drink alcoholic beverages for 48 hours.     ? Keep the pressure bandage in place for 24 hours. If the bandage becomes blood tinged or loose, reinforce it with gauze and tape.        (Refer to the reverse side of this page for management of bleeding).    ? Remove pressure bandage in 24 hours     ? Leave the flat bandage in place until your follow up appointment.    ? Keep the bandage dry. Wash around it carefully.    ? If the tape becomes soiled or starts to come off, reinforce it with additional paper tape.    ? Do not smoke for 3 weeks; smoking is detrimental to wound healing.    ? It is normal to have swelling and bruising around the surgical site. The bruising will fade in approximately 10-14 days. Elevate the area to reduce swelling.    ? Numbness, itchiness and sensitivity to temperature changes can occur after surgery and may take up to 18 months to normalize.      POSSIBLE COMPLICATIONS    BLEEDIN. Leave the bandage in place.  2. Use tightly rolled up gauze or a cloth to apply direct pressure over the bandage for 20   minutes.  3. Reapply pressure for an additional 20 minutes if necessary  4. Call the office or go to the nearest emergency room if pressure fails to stop the bleeding.  5. Use additional gauze and tape to maintain pressure once the bleeding has stopped.        PAIN:    1. Post operative pain should slowly get better, never worse.  2. A severe increase in pain may indicate a problem. Call the office if this occurs.    In case of emergency phone:Dr Golden [phone]

## 2017-11-06 NOTE — LETTER
2017         RE: Thomas Davila  [id number]  [facility] [id number]        Dear Colleague,    Thank you for referring your patient, Thomas Davila, to the [facility]. Please see a copy of my visit note below.    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 Patient reports the following modifying factors none.  Associated symptoms: none.  Patient has no other skin complaints today.  Remainder of the HPI, Meds, PMH, Allergies, FH, and SH was reviewed in chart.    Pertinent Hx:   Non-melanoma skin cancer   Past Medical History:   Diagnosis Date     Acute parametritis and pelvic cellulitis     salpingitis     ASCUS with positive high risk HPV 2013     Asthma     No recent issues     Basal cell carcinoma      breast cancer     left lumpectomy, radiation, tamoxifen     Breast cancer (H)     L Breast; Lumpectomy & Radiation     Chronic salpingitis and oophoritis     PID        Embolism and thrombosis of unspecified site     left leg, due to tamoxifen therapy     Generalized osteoarthrosis, unspecified site     hands     Leiomyoma of uterus, unspecified      Other malignant neoplasm of skin, site unspecified 2006    Basal cell cancer on nose     Squamous cell carcinoma      Thrombosis of leg     Left     Unspecified essential hypertension        Past Surgical History:   Procedure Laterality Date     BREAST LUMPECTOMY, RT/LT      left     C/SECTION, LOW TRANSVERSE  1972,     , Low Transverse x2     CL AFF SURGICAL PATHOLOGY      Breast reduction     COLONOSCOPY  10/15/02    normal     FOOT SURGERY      R Foot      HC EXCISION BREAST LESION, OPEN >=1  98    1) Needle localization with generous lumpectomy 2) Left axillary node dissection.     HC LAPAROSCOPY, SURGICAL, ABDOMEN, PERITONEUM & OMENTUM; DX W/ OR W/O SPECIMEN(S)  94      HYSTERECTOMY, PAP NO LONGER INDICATED       INSERT PORT VASCULAR ACCESS  3/14/2013    Procedure: INSERT PORT VASCULAR ACCESS;  Port Revision;  Surgeon: Arash Puente MD;  Location: [facility] OR     LAPAROSCOPIC HYSTERECTOMY TOTAL, BILATERAL SALPINGO-OOPHORECTOMY, NODE DISSECTION, COMBINED  1/31/2013    Procedure: COMBINED LAPAROSCOPIC HYSTERECTOMY TOTAL, SALPINGO-OOPHORECTOMY, NODE DISSECTION;  Laparosocpic  Total Abdominal Hysterectomy, Bilateral Salphino-Oophorectomy, Bilateral Pelvic Lymph Node Dissection, Pelvic Washings, Cystoscopy;  Surgeon: Tanya Walker MD;  Location: [facility] OR     SURGICAL HISTORY OF -   06/22/93    1) Excision of digital cyst right mid finger  2)  Excision of dermatofibroma left calf     SURGICAL HISTORY OF -   12/18/2001    Excision of mucinous cyst & partial ostectomy, bone removal of benign osteophytic overgrowth osteophyte of the distal aspect of the middle phalanx and distal phalanx     SURGICAL HISTORY OF -   2006    Basal cell cancer removal     TONSILLECTOMY       TUBAL LIGATION  1978        Family History   Problem Relation Age of Onset     CEREBROVASCULAR DISEASE Mother      Hypertension Mother      DIABETES Mother      Thyroid Disease Mother      Arthritis Mother      Alzheimer Disease Mother      Neurologic Disorder Mother      Parkinsons     HEART DISEASE Father      Hypertension Father      DIABETES Father      Thyroid Disease Father      Arthritis Father      Blood Disease Father      Anesthesia Reaction Sister      Thyroid Disease Sister      Anesthesia Reaction Sister      Arthritis Sister      RA     Allergies Son      GASTROINTESTINAL DISEASE Son      GERD     Allergies Son      percocett     GASTROINTESTINAL DISEASE Son      GERD     Hypertension Son        Social History     Social History     Marital status:      Spouse name: N/A     Number of children: 2     Years of education: N/A     Occupational History      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 No     Social History Narrative    Ep[employer]op[employer]--declines all blood products       Outpatient Encounter Prescriptions as of 11/6/2017   Medication Sig Dispense Refill     pimecrolimus (ELIDEL) 1 % cream Apply topically 2 times daily 30 g 0     omeprazole (PRILOSEC) 20 MG CR capsule TAKE ONE CAPSULE BY MOUTH DAILY BEFORE A MEAL  3     ACETAMINOPHEN PO Take 500 mg by mouth as needed for pain       naproxen (NAPROSYN) 500 MG tablet Take 1 tablet (500 mg) by mouth 2 times daily as needed for moderate pain 60 tablet 3     aspirin 81 MG tablet Take by mouth daily       triamterene-hydrochlorothiazide (MAXZIDE-25) 37.5-25 MG per tablet Take 1 tablet by mouth daily 90 tablet 3     lisinopril (PRINIVIL/ZESTRIL) 20 MG tablet Take 1 tablet (20 mg) by mouth daily 90 tablet 3     ORDER FOR DME Equipment being ordered: Gell Heel inserts 1 Device 0     ORDER FOR DME Medium Tri Tracy 1 Device 0     No facility-administered encounter medications on file as of 11/6/2017.              Review Of Systems  Skin: As above  Eyes: negative  Ears/Nose/Throat: negative  Respiratory: No shortness of breath, dyspnea on exertion, cough, or hemoptysis  Cardiovascular: negative  Gastrointestinal: negative  Genitourinary: negative  Musculoskeletal: negative  Neurologic: negative  Psychiatric: negative  Hematologic/Lymphatic/Immunologic: negative  Endocrine: negative      O:   NAD, WDWN, Alert & Oriented, Mood & Affect wnl, Vitals stable   Here today alone   /80  Pulse 96  SpO2 94%   General appearance normal   Vitals stable   Alert, oriented and in no acute distress      Following lymph nodes palpated: Occipital, Cervical, Supraclavicular no lad   r cheekl  ill-defined 1cm scaly papule       Eyes: Conjunctivae/lids:Normal     ENT: Lips, buccal mucosa, tongue: normal    MSK:Normal    Cardiovascular: peripheral edema none    Pulm: Breathing Normal    Lymph Nodes: No Head and Neck Lymphadenopathy     Neuro/Psych: Orientation:Normal; Mood/Affect:Normal      A/P:  1., R cheek squamous cell carcinoma in situ  MOHS:   Location and Ill-defined margins    After PGACAC discussed with patient, decision for Mohs surgery was made. Indication for Mohs was Location and Ill-defined margins. Patient confirmed the site with Dr. Golden.  After anesthesia with LEC, the tumor was excised using standard Mohs technique in 1 stages(s).  CLEAR MARGINS OBTAINED and Final defect size was 1.5 cm.       REPAIR COMPLEX: Because of the tightness of the surrounding skin and Because of the size and full thickness nature of the defect, a complex closure was planned. After LEC anesthesia and prep, Burow's triangles were excised in the relaxed skin tension lines. The wound edges were widely undermined by dissection in the subcutaneous plane until adequate tissue mobility was obtained. Hemostasis was obtained. The wound edges were closed in a layered fashion using Vicryl and Fast Absorbing Plain Gut sutures. Postoperative length was 3.9 cm.   EBL minimal; complications none; wound care routine.  The patient was discharged in good condition and will return in one week for wound evaluation.    BENIGN LESIONS DISCUSSED WITH PATIENT:  I discussed the specifics of tumor, prognosis, and genetics of benign lesions.  I explained that treatment of these lesions would be purely cosmetic and not medically neccessary.  I discussed with patient different removal options including excision, cautery and /or laser.      Nature and genetics of benign skin lesions dicussed with patient.  Signs and Symptoms of skin cancer discussed with patient.  Patient encouraged to perform monthly skin exams.  UV precautions reviewed  with patient.  Skin care regimen reviewed with patient: Eliminate harsh soaps, i.e. Dial, zest, irsih spring; Mild soaps such as Cetaphil or Dove sensitive skin, avoid hot or cold showers, aggressive use of emollients including vanicream, cetaphil or cerave discussed with patient.    Risks of non-melanoma skin cancer discussed with patient   Return to clinic 6 months      Again, thank you for allowing me to participate in the care of your patient.        Sincerely,        Walt Golden MD

## 2017-11-06 NOTE — MR AVS SNAPSHOT
After Visit Summary   2017    Thomas Davila    MRN: [id number]           Patient Information     Date Of Birth          1950        Visit Information        Provider Department      2017 7:45 AM Walt Golden MD [facility]        Care Instructions      Sutured Wound Care     [facility]: [phone]    [facility]: [phone]    Right cheek      ? No strenuous activity for 48 hours. Resume moderate activity in 48 hours. No heavy exercising until you are seen for follow up in one week.     ? Take Tylenol as needed for discomfort.                         ? Do not drink alcoholic beverages for 48 hours.     ? Keep the pressure bandage in place for 24 hours. If the bandage becomes blood tinged or loose, reinforce it with gauze and tape.        (Refer to the reverse side of this page for management of bleeding).    ? Remove pressure bandage in 24 hours     ? Leave the flat bandage in place until your follow up appointment.    ? Keep the bandage dry. Wash around it carefully.    ? If the tape becomes soiled or starts to come off, reinforce it with additional paper tape.    ? Do not smoke for 3 weeks; smoking is detrimental to wound healing.    ? It is normal to have swelling and bruising around the surgical site. The bruising will fade in approximately 10-14 days. Elevate the area to reduce swelling.    ? Numbness, itchiness and sensitivity to temperature changes can occur after surgery and may take up to 18 months to normalize.      POSSIBLE COMPLICATIONS    BLEEDIN. Leave the bandage in place.  2. Use tightly rolled up gauze or a cloth to apply direct pressure over the bandage for 20   minutes.  3. Reapply pressure for an additional 20 minutes if necessary  4. Call the office or go to the nearest emergency room if pressure fails to stop the bleeding.  5. Use additional gauze and tape to maintain pressure once the bleeding has  stopped.        PAIN:    1. Post operative pain should slowly get better, never worse.  2. A severe increase in pain may indicate a problem. Call the office if this occurs.    In case of emergency phone:Dr Golden [phone]            Follow-ups after your visit        Your next 10 appointments already scheduled     Mar 28, 2018  1:20 PM CDT   LAB with [facility] ([facility])    [address]  [facility] [place] [id number] [phone]           Please do not eat 10-12 hours before your appointment if you are coming in fasting for labs on lipids, cholesterol, or glucose (sugar). This does not apply to pregnant women. Water, hot tea and black coffee (with nothing added) are okay. Do not drink other fluids, diet soda or chew gum.            Apr 02, 2018  1:45 PM CDT   Return Visit with Alissa Chacon MD   [facility] ([facility])    [facility]  [address]  [facility] [place] [id number] [phone]              Who to contact     If you have questions or need follow up information about today's clinic visit or your schedule please contact [facility] directly at [phone].  Normal or non-critical lab and imaging results will be communicated to you by LMN-1hart, letter or phone within 4 business days after the clinic has received the results. If you do not hear from us within 7 days, please contact the clinic through LMN-1hart or phone. If you have a critical or abnormal lab result, we will notify you by phone as soon as possible.  Submit refill requests through My 1% or call your pharmacy and they will forward the refill request to us. Please allow 3 business days for your refill to be completed.          Additional Information About Your Visit        My 1% Information     My 1% lets you send messages to your doctor, view your test results, renew your prescriptions, schedule appointments and more. To  "sign up, go to [email or web address] . Click on \"Log in\" on the left side of the screen, which will take you to the Welcome page. Then click on \"Sign up Now\" on the right side of the page.     You will be asked to enter the access code listed below, as well as some personal information. Please follow the directions to create your username and password.     Your access code is: [email or web address]  Expires: 2017 12:56 PM     Your access code will  in 90 days. If you need help or a new code, please call your [place] clinic or [phone].        Care EveryWhere ID     This is your Care EveryWhere ID. This could be used by other organizations to access your [place] medical records  [id number]        Your Vitals Were     Pulse Pulse Oximetry                96 94%           Blood Pressure from Last 3 Encounters:   17 151/80   10/02/17 134/78   04/10/17 151/75    Weight from Last 3 Encounters:   10/02/17 110.6 kg (243 lb 14.4 oz)   17 109.2 kg (240 lb 11.2 oz)   17 110.2 kg (243 lb)              Today, you had the following     No orders found for display       Primary Care Provider Office Phone # Fax #    Dung Prieto [phone] [address] [facility] [address] [facility] [address]        Equal Access to Services     DASHAWN SALDAÑA AH: Hadii florence velez hadasho Sojazmyne, waaxda luqadaha, qaybta kaalmada adechanyada, perez sanchez. So [facility] [phone].    ATENCIÓN: Si habla español, tiene a schneider disposición servicios gratuitos de asistencia lingüística. Negro garsia [phone].    We comply with applicable federal civil rights laws and [place] laws. We do not discriminate on the basis of race, color, national origin, age, disability, sex, sexual orientation, or gender identity.            Thank you!     Thank you for choosing [facility]  for your care. Our goal is always to provide you with excellent care. Hearing back from our patients is one way we " can continue to improve our services. Please take a few minutes to complete the written survey that you may receive in the mail after your visit with us. Thank you!             Your Updated Medication List - Protect others around you: Learn how to safely use, store and throw away your medicines at www.disposemymeds.org.          This list is accurate as of: 11/6/17  9:55 AM.  Always use your most recent med list.                   Brand Name Dispense Instructions for use Diagnosis    ACETAMINOPHEN PO      Take 500 mg by mouth as needed for pain        aspirin 81 MG tablet      Take by mouth daily        lisinopril 20 MG tablet    PRINIVIL/ZESTRIL    90 tablet    Take 1 tablet (20 mg) by mouth daily    Essential hypertension       naproxen 500 MG tablet    NAPROSYN    60 tablet    Take 1 tablet (500 mg) by mouth 2 times daily as needed for moderate pain    Primary osteoarthritis involving multiple joints       omeprazole 20 MG CR capsule    priLOSEC     TAKE ONE CAPSULE BY MOUTH DAILY BEFORE A MEAL        * order for DME     1 Device    Medium Tri Tracy    Right foot pain, Posterior tibialis muscle dysfunction       * order for DME     1 Device    Equipment being ordered: Gell Heel inserts    Plantar fasciitis, left       pimecrolimus 1 % cream    ELIDEL    30 g    Apply topically 2 times daily    Contact dermatitis of eyelid, right, Squamous cell carcinoma in situ of skin of right cheek, Lentigo, SK (seborrheic keratosis), History of skin cancer, Angioma       triamterene-hydrochlorothiazide 37.5-25 MG per tablet    MAXZIDE-25    90 tablet    Take 1 tablet by mouth daily    Essential hypertension       * Notice:  This list has 2 medication(s) that are the same as other medications prescribed for you. Read the directions carefully, and ask your doctor or other care provider to review them with you.

## 2017-11-06 NOTE — PROGRESS NOTES
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 Patient reports the following modifying factors none.  Associated symptoms: none.  Patient has no other skin complaints today.  Remainder of the HPI, Meds, PMH, Allergies, FH, and SH was reviewed in chart.    Pertinent Hx:   Non-melanoma skin cancer   Past Medical History:   Diagnosis Date     Acute parametritis and pelvic cellulitis     salpingitis     ASCUS with positive high risk HPV 2013     Asthma     No recent issues     Basal cell carcinoma      breast cancer     left lumpectomy, radiation, tamoxifen     Breast cancer (H)     L Breast; Lumpectomy & Radiation     Chronic salpingitis and oophoritis     PID        Embolism and thrombosis of unspecified site     left leg, due to tamoxifen therapy     Generalized osteoarthrosis, unspecified site     hands     Leiomyoma of uterus, unspecified      Other malignant neoplasm of skin, site unspecified 2006    Basal cell cancer on nose     Squamous cell carcinoma      Thrombosis of leg     Left     Unspecified essential hypertension        Past Surgical History:   Procedure Laterality Date     BREAST LUMPECTOMY, RT/LT      left     C/SECTION, LOW TRANSVERSE  1972,     , Low Transverse x2     CL [facility]FF SURGICAL PATHOLOGY      Breast reduction     COLONOSCOPY  10/15/02    normal     FOOT SURGERY      R Foot      HC EXCISION BREAST LESION, OPEN >=1  98    1) Needle localization with generous lumpectomy 2) Left axillary node dissection.     HC LAPAROSCOPY, SURGICAL, ABDOMEN, PERITONEUM & OMENTUM; DX W/ OR W/O SPECIMEN(S)  94     HYSTERECTOMY, PAP NO LONGER INDICATED       INSERT PORT VASCULAR ACCESS  3/14/2013    Procedure: INSERT PORT VASCULAR ACCESS;  Port Revision;  Surgeon: Arash Puente MD;  Location: [facility] OR     LAPAROSCOPIC HYSTERECTOMY TOTAL, BILATERAL  SALPINGO-OOPHORECTOMY, NODE DISSECTION, COMBINED  1/31/2013    Procedure: COMBINED LAPAROSCOPIC HYSTERECTOMY TOTAL, SALPINGO-OOPHORECTOMY, NODE DISSECTION;  Laparosocpic  Total Abdominal Hysterectomy, Bilateral Salphino-Oophorectomy, Bilateral Pelvic Lymph Node Dissection, Pelvic Washings, Cystoscopy;  Surgeon: Tanya Walker MD;  Location: [facility] OR     SURGICAL HISTORY OF -   06/22/93    1) Excision of digital cyst right mid finger  2)  Excision of dermatofibroma left calf     SURGICAL HISTORY OF -   12/18/2001    Excision of mucinous cyst & partial ostectomy, bone removal of benign osteophytic overgrowth osteophyte of the distal aspect of the middle phalanx and distal phalanx     SURGICAL HISTORY OF -   2006    Basal cell cancer removal     TONSILLECTOMY       TUBAL LIGATION  1978        Family History   Problem Relation Age of Onset     CEREBROVASCULAR DISEASE Mother      Hypertension Mother      DIABETES Mother      Thyroid Disease Mother      Arthritis Mother      Alzheimer Disease Mother      Neurologic Disorder Mother      Parkinsons     HEART DISEASE Father      Hypertension Father      DIABETES Father      Thyroid Disease Father      Arthritis Father      Blood Disease Father      Anesthesia Reaction Sister      Thyroid Disease Sister      Anesthesia Reaction Sister      Arthritis Sister      RA     Allergies Son      GASTROINTESTINAL DISEASE Son      GERD     Allergies Son      percocett     GASTROINTESTINAL DISEASE Son      GERD     Hypertension Son        Social History     Social History     Marital status:      Spouse name: N/A     Number of children: 2     Years of education: N/A     Occupational History      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 No     Social History Narrative    Presybeterian--declines all blood products       Outpatient Encounter Prescriptions as of 11/6/2017   Medication Sig Dispense Refill     pimecrolimus (ELIDEL) 1 % cream Apply topically 2 times daily 30 g 0     omeprazole (PRILOSEC) 20 MG CR capsule TAKE ONE CAPSULE BY MOUTH DAILY BEFORE A MEAL  3     ACETAMINOPHEN PO Take 500 mg by mouth as needed for pain       naproxen (NAPROSYN) 500 MG tablet Take 1 tablet (500 mg) by mouth 2 times daily as needed for moderate pain 60 tablet 3     aspirin 81 MG tablet Take by mouth daily       triamterene-hydrochlorothiazide (MAXZIDE-25) 37.5-25 MG per tablet Take 1 tablet by mouth daily 90 tablet 3     lisinopril (PRINIVIL/ZESTRIL) 20 MG tablet Take 1 tablet (20 mg) by mouth daily 90 tablet 3     ORDER FOR DME Equipment being ordered: Gell Heel inserts 1 Device 0     ORDER FOR DME Medium Tri Tracy 1 Device 0     No facility-administered encounter medications on file as of 11/6/2017.              Review Of Systems  Skin: As above  Eyes: negative  Ears/Nose/Throat: negative  Respiratory: No shortness of breath, dyspnea on exertion, cough, or hemoptysis  Cardiovascular: negative  Gastrointestinal: negative  Genitourinary: negative  Musculoskeletal: negative  Neurologic: negative  Psychiatric: negative  Hematologic/Lymphatic/Immunologic: negative  Endocrine: negative      O:   NAD, WDWN, Alert & Oriented, Mood & Affect wnl, Vitals stable   Here today alone   /80  Pulse 96  SpO2 94%   General appearance normal   Vitals stable   Alert, oriented and in no acute distress      Following lymph nodes palpated: Occipital, Cervical, Supraclavicular no lad   r cheekl ill-defined 1cm scaly papule       Eyes: Conjunctivae/lids:Normal     ENT: Lips, buccal mucosa, tongue: normal    MSK:Normal    Cardiovascular: peripheral edema none    Pulm: Breathing Normal    Lymph Nodes: No Head and Neck Lymphadenopathy      Neuro/Psych: Orientation:Normal; Mood/Affect:Normal      A/P:  1., R cheek squamous cell carcinoma in situ  MOHS:   Location and Ill-defined margins    After PGACAC discussed with patient, decision for Mohs surgery was made. Indication for Mohs was Location and Ill-defined margins. Patient confirmed the site with Dr. Golden.  After anesthesia with LEC, the tumor was excised using standard Mohs technique in 1 stages(s).  CLEAR MARGINS OBTAINED and Final defect size was 1.5 cm.       REPAIR COMPLEX: Because of the tightness of the surrounding skin and Because of the size and full thickness nature of the defect, a complex closure was planned. After LEC anesthesia and prep, Burow's triangles were excised in the relaxed skin tension lines. The wound edges were widely undermined by dissection in the subcutaneous plane until adequate tissue mobility was obtained. Hemostasis was obtained. The wound edges were closed in a layered fashion using Vicryl and Fast Absorbing Plain Gut sutures. Postoperative length was 3.9 cm.   EBL minimal; complications none; wound care routine.  The patient was discharged in good condition and will return in one week for wound evaluation.    BENIGN LESIONS DISCUSSED WITH PATIENT:  I discussed the specifics of tumor, prognosis, and genetics of benign lesions.  I explained that treatment of these lesions would be purely cosmetic and not medically neccessary.  I discussed with patient different removal options including excision, cautery and /or laser.      Nature and genetics of benign skin lesions dicussed with patient.  Signs and Symptoms of skin cancer discussed with patient.  Patient encouraged to perform monthly skin exams.  UV precautions reviewed with patient.  Skin care regimen reviewed with patient: Eliminate harsh soaps, i.e. Dial, zest, irsih spring; Mild soaps such as Cetaphil or Dove sensitive skin, avoid hot or cold showers, aggressive use of emollients including vanicream,  cetaphil or cerave discussed with patient.    Risks of non-melanoma skin cancer discussed with patient   Return to clinic 6 months

## 2017-11-06 NOTE — NURSING NOTE
"Initial /80  Pulse 96  SpO2 94% Estimated body mass index is 41.54 kg/(m^2) as calculated from the following:    Height as of 10/2/17: 1.632 m (5' 4.25\").    Weight as of 10/2/17: 110.6 kg (243 lb 14.4 oz). .    Moon Soto LPN    "

## 2017-11-10 DIAGNOSIS — K21.9 GASTROESOPHAGEAL REFLUX DISEASE WITHOUT ESOPHAGITIS: ICD-10-CM

## 2017-11-13 ENCOUNTER — ALLIED HEALTH/NURSE VISIT (OUTPATIENT)
Dept: DERMATOLOGY | Facility: CLINIC | Age: 67
End: 2017-11-13
Payer: MEDICARE

## 2017-11-13 DIAGNOSIS — Z48.01 ENCOUNTER FOR CHANGE OR REMOVAL OF SURGICAL WOUND DRESSING: Primary | ICD-10-CM

## 2017-11-13 PROCEDURE — 99207 ZZC NO CHARGE NURSE ONLY: CPT

## 2017-11-13 NOTE — PATIENT INSTRUCTIONS
WOUND CARE INSTRUCTIONS  for  ONE WEEK AFTER SURGERY          1) Leave flat bandage on your skin for one week after today s bandage change.  2) In one week when you remove the bandage, you may resume your regular skin care routine, including washing with mild soap and water, applying moisturizer, make-up and sunscreen.    3) If there are any open or bleeding areas at the incision/graft site you should begin to cover the area with a bandage daily as follows:    1) Clean and dry the area with plain tap water using a Q-tip or sterile gauze pad.  2) Apply Polysporin or Bacitracin ointment to the open area.  3) Cover the wound with a band-aid or a sterile non-stick gauze pad and micropore paper tape.             *Once the bandages are removed, the scar will be red and firm (especially in the lip/chin area). This is normal and will fade in time. It might take 6-12 months for this to happen.     *Massaging the area will help the scar soften and fade quicker. Begin to massage the area one month after the bandages have been removed. To massage apply pressure directly and firmly over the scar with the fingertips and move in a circular motion. Massage the area for a few minutes several times a day. Continue to massage the site for several months.    *Approximately 6-8 weeks after surgery it is not uncommon to see the formation of  tender pimple-like  bump along the scar. This is normal. As the scar continues to mature and the stitches underneath the skin begin to dissolve, this might occur. Do not pick or squeeze, this will resolve on it s own. Should one break open producing a small amount of drainage, apply Polysporin or Bacitracin ointment a few times a day until the wound is completely healed.    *Numbness in the surgical area is expected. It might take 12-18 months for the feeling to return to normal. During this time sensations of itchiness, tingling and occasional sharp pains might be noted. These feelings are normal  and will subside once the nerves have completely healed.         IN CASE OF EMERGENCY: Dr Golden [phone]     If you were seen in [place] call: [phone]    If you were seen in [place] call: [phone]

## 2017-11-13 NOTE — MR AVS SNAPSHOT
After Visit Summary   11/13/2017    Thomas Davila    MRN: [id number]           Patient Information     Date Of Birth          1950        Visit Information        Provider Department      11/13/2017 1:00 PM [facility]        Care Instructions    WOUND CARE INSTRUCTIONS  for  ONE WEEK AFTER SURGERY          1) Leave flat bandage on your skin for one week after today s bandage change.  2) In one week when you remove the bandage, you may resume your regular skin care routine, including washing with mild soap and water, applying moisturizer, make-up and sunscreen.    3) If there are any open or bleeding areas at the incision/graft site you should begin to cover the area with a bandage daily as follows:    1) Clean and dry the area with plain tap water using a Q-tip or sterile gauze pad.  2) Apply Polysporin or Bacitracin ointment to the open area.  3) Cover the wound with a band-aid or a sterile non-stick gauze pad and micropore paper tape.             *Once the bandages are removed, the scar will be red and firm (especially in the lip/chin area). This is normal and will fade in time. It might take 6-12 months for this to happen.     *Massaging the area will help the scar soften and fade quicker. Begin to massage the area one month after the bandages have been removed. To massage apply pressure directly and firmly over the scar with the fingertips and move in a circular motion. Massage the area for a few minutes several times a day. Continue to massage the site for several months.    *Approximately 6-8 weeks after surgery it is not uncommon to see the formation of  tender pimple-like  bump along the scar. This is normal. As the scar continues to mature and the stitches underneath the skin begin to dissolve, this might occur. Do not pick or squeeze, this will resolve on it s own. Should one break open producing a small amount of drainage, apply Polysporin or Bacitracin  ointment a few times a day until the wound is completely healed.    *Numbness in the surgical area is expected. It might take 12-18 months for the feeling to return to normal. During this time sensations of itchiness, tingling and occasional sharp pains might be noted. These feelings are normal and will subside once the nerves have completely healed.         IN CASE OF EMERGENCY: Dr Golden [phone]     If you were seen in [place] call: [phone]    If you were seen in [place] call: [phone]              Follow-ups after your visit        Your next 10 appointments already scheduled     Mar 28, 2018  1:20 PM CDT   LAB with [facility] ([facility])    [address] [facility] [address] [phone]           Please do not eat 10-12 hours before your appointment if you are coming in fasting for labs on lipids, cholesterol, or glucose (sugar). This does not apply to pregnant women. Water, hot tea and black coffee (with nothing added) are okay. Do not drink other fluids, diet soda or chew gum.            Apr 02, 2018  1:45 PM CDT   Return Visit with Alissa Chacon MD   [facility] ([facility])    [facility]  [address]  [facility] [address] [phone]              Who to contact     If you have questions or need follow up information about today's clinic visit or your schedule please contact [facility] directly at [phone].  Normal or non-critical lab and imaging results will be communicated to you by MyChart, letter or phone within 4 business days after the clinic has received the results. If you do not hear from us within 7 days, please contact the clinic through MyChart or phone. If you have a critical or abnormal lab result, we will notify you by phone as soon as possible.  Submit refill requests through [employer] or call your pharmacy and they will forward the refill  "request to us. Please allow 3 business days for your refill to be completed.          Additional Information About Your Visit        MyChart Information     [email or web address] lets you send messages to your doctor, view your test results, renew your prescriptions, schedule appointments and more. To sign up, go to [email or web address] . Click on \"Log in\" on the left side of the screen, which will take you to the Welcome page. Then click on \"Sign up Now\" on the right side of the page.     You will be asked to enter the access code listed below, as well as some personal information. Please follow the directions to create your username and password.     Your access code is: [email or web address]  Expires: 2017 12:56 PM     Your access code will  in 90 days. If you need help or a new code, please call your [facility] clinic or [phone].        Care EveryWhere ID     This is your Care EveryWhere ID. This could be used by other organizations to access your [facility] medical records  [id number]         Blood Pressure from Last 3 Encounters:   17 151/80   10/02/17 134/78   04/10/17 151/75    Weight from Last 3 Encounters:   10/02/17 110.6 kg (243 lb 14.4 oz)   17 109.2 kg (240 lb 11.2 oz)   17 110.2 kg (243 lb)              Today, you had the following     No orders found for display       Primary Care Provider Office Phone # Fax #    Dung Prieto [phone] [address] [address]        Equal Access to Services     [facility] PIPER : Hadii florence velez hadasho Sojazmyne, waaxda luqadaha, qaybta kaalmada perez boogie. So [facility] [phone].    ATENCIÓN: Si habla español, tiene a schneider disposición servicios gratuitos de asistencia lingüística. Llame al [phone].    We comply with applicable federal civil rights laws and [place] laws. We do not discriminate on the basis of race, color, national origin, age, disability, sex, sexual " orientation, or gender identity.            Thank you!     Thank you for choosing [facility]  for your care. Our goal is always to provide you with excellent care. Hearing back from our patients is one way we can continue to improve our services. Please take a few minutes to complete the written survey that you may receive in the mail after your visit with us. Thank you!             Your Updated Medication List - Protect others around you: Learn how to safely use, store and throw away your medicines at www.disposemymeds.org.          This list is accurate as of: 11/13/17  1:01 PM.  Always use your most recent med list.                   Brand Name Dispense Instructions for use Diagnosis    ACETAMINOPHEN PO      Take 500 mg by mouth as needed for pain        aspirin 81 MG tablet      Take by mouth daily        lisinopril 20 MG tablet    PRINIVIL/ZESTRIL    90 tablet    Take 1 tablet (20 mg) by mouth daily    Essential hypertension       naproxen 500 MG tablet    NAPROSYN    60 tablet    Take 1 tablet (500 mg) by mouth 2 times daily as needed for moderate pain    Primary osteoarthritis involving multiple joints       omeprazole 20 MG CR capsule    priLOSEC     TAKE ONE CAPSULE BY MOUTH DAILY BEFORE A MEAL        * order for DME     1 Device    Medium Tri Tracy    Right foot pain, Posterior tibialis muscle dysfunction       * order for DME     1 Device    Equipment being ordered: Gell Heel inserts    Plantar fasciitis, left       pimecrolimus 1 % cream    ELIDEL    30 g    Apply topically 2 times daily    Contact dermatitis of eyelid, right, 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 Read the directions carefully, and ask your doctor or other care provider to review them with you.

## 2017-11-15 NOTE — TELEPHONE ENCOUNTER
Routing refill request to provider for review/approval because:  Medication is reported/historical    Shelley Munguia RN

## 2017-11-22 NOTE — TELEPHONE ENCOUNTER
Pharmacy is calling wanting this filled today   Please advise     Stefany Morales  [facility] [place]

## 2017-11-22 NOTE — TELEPHONE ENCOUNTER
Patient is requesting Omeprazole Rx  She usually get this OTC, but it is cheaper if she has an Rx for it   Scheduled appt with provider 12/4/17  Patient would like enough medication to last her to her appt 12/4/17  Pharm ready    Routing refill request to provider for review/approval because:  Medication is reported/historical    Annette GOMEZ Rn

## 2017-12-04 ENCOUNTER — OFFICE VISIT (OUTPATIENT)
Dept: FAMILY MEDICINE | Facility: CLINIC | Age: 67
End: 2017-12-04
Payer: MEDICARE

## 2017-12-04 VITALS
BODY MASS INDEX: 40.98 KG/M2 | SYSTOLIC BLOOD PRESSURE: 122 MMHG | WEIGHT: 246 LBS | TEMPERATURE: 98.3 F | HEART RATE: 76 BPM | DIASTOLIC BLOOD PRESSURE: 70 MMHG | HEIGHT: 65 IN

## 2017-12-04 DIAGNOSIS — Z00.01 ENCOUNTER FOR ROUTINE ADULT HEALTH EXAMINATION WITH ABNORMAL FINDINGS: Primary | ICD-10-CM

## 2017-12-04 DIAGNOSIS — H91.93 DECREASED HEARING OF BOTH EARS: ICD-10-CM

## 2017-12-04 DIAGNOSIS — Z13.6 CARDIOVASCULAR SCREENING; LDL GOAL LESS THAN 130: ICD-10-CM

## 2017-12-04 DIAGNOSIS — I10 ESSENTIAL HYPERTENSION, BENIGN: ICD-10-CM

## 2017-12-04 DIAGNOSIS — R79.89 ABNORMAL TSH: ICD-10-CM

## 2017-12-04 DIAGNOSIS — Z12.11 SPECIAL SCREENING FOR MALIGNANT NEOPLASMS, COLON: ICD-10-CM

## 2017-12-04 DIAGNOSIS — I10 ESSENTIAL HYPERTENSION: ICD-10-CM

## 2017-12-04 DIAGNOSIS — E66.01 MORBID OBESITY (H): ICD-10-CM

## 2017-12-04 DIAGNOSIS — Z12.31 ENCOUNTER FOR SCREENING MAMMOGRAM FOR BREAST CANCER: ICD-10-CM

## 2017-12-04 PROCEDURE — G0438 PPPS, INITIAL VISIT: HCPCS | Performed by: FAMILY MEDICINE

## 2017-12-04 PROCEDURE — 99213 OFFICE O/P EST LOW 20 MIN: CPT | Mod: 25 | Performed by: FAMILY MEDICINE

## 2017-12-04 PROCEDURE — 82274 ASSAY TEST FOR BLOOD FECAL: CPT | Performed by: FAMILY MEDICINE

## 2017-12-04 RX ORDER — LISINOPRIL 20 MG/1
20 TABLET ORAL DAILY
Qty: 90 TABLET | Refills: 3 | Status: SHIPPED | OUTPATIENT
Start: 2017-12-04 | End: 2018-12-14

## 2017-12-04 RX ORDER — TRIAMTERENE/HYDROCHLOROTHIAZID 37.5-25 MG
1 TABLET ORAL DAILY
Qty: 90 TABLET | Refills: 3 | Status: SHIPPED | OUTPATIENT
Start: 2017-12-04 | End: 2018-12-14

## 2017-12-04 NOTE — PROGRESS NOTES
SUBJECTIVE:   Thomas Davila is a 67 year old female who presents for Preventive Visit.  Chief Complaint   Patient presents with     Wellness Visit     had coffee with cream this morning     Are you in the first 12 months of your Medicare Part B coverage?  No    Healthy Habits:    Do you get at least three servings of calcium containing foods daily (dairy, green leafy vegetables, etc.)? yes    Amount of exercise or daily activities, outside of work: SILVER SNEAKERS DAILY    Problems taking medications regularly No    Medication side effects: No    Have you had an eye exam in the past two years? yes    Do you see a dentist twice per year? once    Do you have sleep apnea, excessive snoring or daytime drowsiness?yes-snores    COGNITIVE SCREEN  1) Repeat 3 items (Banana, Sunrise, Chair)    2) Clock draw: NORMAL  3) 3 item recall: Recalls 3 objects  Results: 3 items recalled: COGNITIVE IMPAIRMENT LESS LIKELY    Mini-CogTM Copyright S Brennen. Licensed by the author for use in [facility]; reprinted with permission (vasile@[facility]). All rights reserved.       and family report she is not hearing well.  She has to ask questions on what they are saying.  She feels her hearing is fine.  No pain.  No otorrhea.  No problems with wax that she is aware of at this time.       Reviewed and updated as needed this visit by clinical staffTobacco  Allergies  Med Hx  Surg Hx  Fam Hx  Soc Hx        Reviewed and updated as needed this visit by Provider        Social History   Substance Use Topics     Smoking status: Never Smoker     Smokeless tobacco: Never Used     Alcohol use Yes      Comment: Rare       The patient does not drink >3 drinks per day nor >7 drinks per week.     Today's PHQ-2 Score:   PHQ-2 ( 1999 [employer]) 12/4/2017 4/3/2017   Q1: Little interest or pleasure in doing things 0 0   Q2: Feeling down, depressed or hopeless 0 0   PHQ-2 Score 0 0       Do you feel safe in your environment - Yes    Do you  "have a Health Care Directive?: Yes: Advance Directive has been received and scanned.    Current providers sharing in care for this patient include:   Patient Care Team:  Dung Prieto MD as PCP - General  , Alissa Kelly MD as MD (Oncology)  Ana Maria Padilla, RN as Registered Nurse (Oncology)      Hearing impairment: Yes, Feel that people are mumbling or not speaking clearly.    Ability to successfully perform activities of daily living: Yes, no assistance needed     Fall risk:  Fallen 2 or more times in the past year?: Yes  Any fall with injury in the past year?: No  Timed Up and Go Test (>13.5 is fall risk; contact physician) : 10    Home safety:  none identified      The following health maintenance items are reviewed in Epic and correct as of today:  Health Maintenance   Topic Date Due     HEPATITIS C SCREENING  02/17/1966     ADVANCE DIRECTIVE PLANNING Q5 YRS  02/17/2003     PNEUMOCOCCAL (1 of 2 - PCV13) 02/17/2013     MAMMO SCREEN Q2 YR (SYSTEM ASSIGNED)  05/07/2016     FIT Q1 YR  03/14/2017     INFLUENZA VACCINE (SYSTEM ASSIGNED)  09/01/2017     FALL RISK ASSESSMENT  04/03/2018     LIPID SCREEN Q5 YR FEMALE (SYSTEM ASSIGNED)  03/10/2021     TETANUS IMMUNIZATION (SYSTEM ASSIGNED)  07/18/2022     DEXA SCAN SCREENING (SYSTEM ASSIGNED)  Completed             ROS:  Review Of Systems  Skin: seeing dermatology regularly  Eyes: has start of cataracts  Ears/Nose/Throat: question of hearing  Respiratory: No shortness of breath, dyspnea on exertion, cough, or hemoptysis  Cardiovascular: negative  Gastrointestinal: negative  Genitourinary: negative  Musculoskeletal: negative  Neurologic: negative  Psychiatric: negative  Hematologic/Lymphatic/Immunologic: negative  Endocrine: negative      OBJECTIVE:   /70 (Cuff Size: Adult Large)  Pulse 76  Temp 98.3  F (36.8  C) (Tympanic)  Ht 5' 4.75\" (1.645 m)  Wt 246 lb (111.6 kg)  BMI 41.25 kg/m2 Estimated body mass index is 41.25 kg/(m^2) as calculated from the " "following:    Height as of this encounter: 5' 4.75\" (1.645 m).    Weight as of this encounter: 246 lb (111.6 kg).  EXAM:   GENERAL: healthy, alert and no distress  EYES: Eyes grossly normal to inspection, PERRL and conjunctivae and sclerae normal  HENT: ear canals and TM's normal, nose and mouth without ulcers or lesions  NECK: no adenopathy, no asymmetry, masses, or scars and thyroid normal to palpation  RESP: lungs clear to auscultation - no rales, rhonchi or wheezes  BREAST: NE  CV: regular rate and rhythm, normal S1 S2, no S3 or S4, no murmur, click or rub, no peripheral edema and peripheral pulses strong  ABDOMEN: soft, nontender, no hepatosplenomegaly, no masses and bowel sounds normal   (female): NE  MS: no gross musculoskeletal defects noted, no edema  SKIN: no suspicious lesions or rashes  NEURO: Normal strength and tone, mentation intact and speech normal  PSYCH: mentation appears normal, affect normal/bright  LYMPH: anterior cervical: no adenopathy  posterior cervical: no adenopathy    ASSESSMENT / PLAN:     (Z00.01) Encounter for routine adult health examination with abnormal findings  (primary encounter diagnosis)  Comment: Discussed healthy lifestyle and preventative cares.    Plan:     (H91.93) Decreased hearing of both ears  Comment: referral is done for further eval  Plan: AUDIOLOGY ADULT REFERRAL, OTOLARYNGOLOGY         REFERRAL, OFFICE/OUTPT VISIT,EST,LEVL III          Need to work on staying active, due to weight and blood pressure    (I10) Essential hypertension, benign  Comment: controlled  Plan:     (Z12.31) Encounter for screening mammogram for breast cancer  Comment:   Plan: *MA Screening Digital Bilateral            (Z13.6) CARDIOVASCULAR SCREENING; LDL GOAL LESS THAN 130  Comment:   Plan: Lipid panel reflex to direct LDL Fasting            (I10) Essential hypertension  Comment:   Plan: lisinopril (PRINIVIL/ZESTRIL) 20 MG tablet,         triamterene-hydrochlorothiazide (MAXZIDE-25)        " " 37.5-25 MG per tablet            (R94.6) Abnormal TSH  Comment:   Plan: TSH, T4 FREE, CANCELED: TSH, CANCELED: T4 FREE        Needs to have this checked    (Z12.11) Special screening for malignant neoplasms, colon  Comment:   Plan: Fecal colorectal cancer screen (FIT)                End of Life Planning:  Patient currently has an advanced directive: No.  I have verified the patient's ablity to prepare an advanced directive/make health care decisions.  Literature was provided to assist patient in preparing an advanced directive.    COUNSELING:  Reviewed preventive health counseling, as reflected in patient instructions       Regular exercise       Healthy diet/nutrition       Vision screening       Hearing screening       Dental care       Colon cancer screening        Estimated body mass index is 41.25 kg/(m^2) as calculated from the following:    Height as of this encounter: 5' 4.75\" (1.645 m).    Weight as of this encounter: 246 lb (111.6 kg).  Weight management plan: diet and exercise   reports that she has never smoked. She has never used smokeless tobacco.  diet and exercise    Appropriate preventive services were discussed with this patient, including applicable screening as appropriate for cardiovascular disease, diabetes, osteopenia/osteoporosis, and glaucoma.  As appropriate for age/gender, discussed screening for colorectal cancer, prostate cancer, breast cancer, and cervical cancer. Checklist reviewing preventive services available has been given to the patient.    Reviewed patients plan of care and provided an AVS. The Basic Care Plan (routine screening as documented in Health Maintenance) for Thomas meets the Care Plan requirement. This Care Plan has been established and reviewed with the Patient.    Counseling Resources:  ATP IV Guidelines  Pooled Cohorts Equation Calculator  Breast Cancer Risk Calculator  FRAX Risk Assessment  ICSI Preventive Guidelines  Dietary Guidelines for Americans, 2010  USDA's " MyPlate  ASA Prophylaxis  Lung CA Screening    Dung Prieto MD  [facility]

## 2017-12-04 NOTE — NURSING NOTE
HEARING FREQUENCY:   Right Ear:  500 Hz: 25 db HL   1000 Hz: 20 db HL   2000 Hz: 25 db HL   4000 Hz: unable to hear  Left Ear:  500 Hz: 20 db HL   1000 Hz: 20 db HL   2000 Hz: 20 db HL   4000 Hz: unable to hear  DIAMOND Harris ([facility])

## 2017-12-04 NOTE — MR AVS SNAPSHOT
After Visit Summary   12/4/2017    Thomas Davila    MRN: [id number]           Patient Information     Date Of Birth          1950        Visit Information        Provider Department      12/4/2017 9:40 AM Dung Prieto MD [facility]        Today's Diagnoses     Encounter for routine adult health examination without abnormal findings    -  1    Essential hypertension, benign        Encounter for screening mammogram for breast cancer        CARDIOVASCULAR SCREENING; LDL GOAL LESS THAN 130        Essential hypertension        Abnormal TSH        Special screening for malignant neoplasms, colon        Decreased hearing of both ears          Care Instructions    Please schedule a fasting lab visit for your thyroid and also cholesterol.    Please return your FIT test.    Please get a mammogram done.    I refilled your medications.    When you get your labs done on 3/28/2018, please make sure you are fasting since I will be checking a cholesterol level that day.     Please see audiology for your decrease hearing.       Preventive Health Recommendations    Female Ages 65 +    Yearly exam:     See your health care provider every year in order to  o Review health changes.   o Discuss preventive care.    o Review your medicines if your doctor has prescribed any.      You no longer need a yearly Pap test unless you've had an abnormal Pap test in the past 10 years. If you have vaginal symptoms, such as bleeding or discharge, be sure to talk with your provider about a Pap test.      Every 1 to 2 years, have a mammogram.  If you are over 69, talk with your health care provider about whether or not you want to continue having screening mammograms.      Every 10 years, have a colonoscopy. Or, have a yearly FIT test (stool test). These exams will check for colon cancer.       Have a cholesterol test every 5 years, or more often if your doctor advises it.       Have a diabetes test (fasting  glucose) every three years. If you are at risk for diabetes, you should have this test more often.       At age 65, have a bone density scan (DEXA) to check for osteoporosis (brittle bone disease).    Shots:    Get a flu shot each year.    Get a tetanus shot every 10 years.    Talk to your doctor about your pneumonia vaccines. There are now two you should receive - Pneumovax (PPSV 23) and Prevnar (PCV 13).    Talk to your doctor about the shingles vaccine.    Talk to your doctor about the hepatitis B vaccine.    Nutrition:     Eat at least 5 servings of fruits and vegetables each day.      Eat whole-grain bread, whole-wheat pasta and brown rice instead of white grains and rice.      Talk to your provider about Calcium and Vitamin D.     Lifestyle    Exercise at least 150 minutes a week (30 minutes a day, 5 days a week). This will help you control your weight and prevent disease.      Limit alcohol to one drink per day.      No smoking.       Wear sunscreen to prevent skin cancer.       See your dentist twice a year for an exam and cleaning.      See your eye doctor every 1 to 2 years to screen for conditions such as glaucoma, macular degeneration and cataracts.          Follow-ups after your visit        Additional Services     AUDIOLOGY ADULT REFERRAL       Your provider has referred you to: [facility] [phone]   [email or web address]    Specialty Testing:  Audiogram w/Tymps and Reflexes (Comprehensive Audiology Evaluation)            OTOLARYNGOLOGY REFERRAL       Your provider has referred you to: [facility]: [facility] [phone]   [email or web address]    Please be aware that coverage of these services is subject to the terms and limitations of your health insurance plan.  Call member services at your health plan with any benefit or coverage questions.      Please bring the following with you to your  appointment:    (1) Any X-Rays, CTs or MRIs which have been performed.  Contact the facility where they were done to arrange for  prior to your scheduled appointment.   (2) List of current medications  (3) This referral request   (4) Any documents/labs given to you for this referral                  Follow-up notes from your care team     Return in about 1 year (around 12/4/2018).      Your next 10 appointments already scheduled     Mar 28, 2018  1:20 PM CDT   LAB with [facility] ([facility])    [address]  [facility] [place] [address]   [phone]           Please do not eat 10-12 hours before your appointment if you are coming in fasting for labs on lipids, cholesterol, or glucose (sugar). This does not apply to pregnant women. Water, hot tea and black coffee (with nothing added) are okay. Do not drink other fluids, diet soda or chew gum.            Apr 02, 2018  1:45 PM CDT   Return Visit with Alissa Chacon MD   [facility] ([facility])    [facility]  [address]  [facility] [place] [address]   [phone]              Future tests that were ordered for you today     Open Future Orders        Priority Expected Expires Ordered    TSH Routine  4/4/2018 12/4/2017    T4 FREE Routine  4/4/2018 12/4/2017    Fecal colorectal cancer screen (FIT) Routine 12/25/2017 2/26/2018 12/4/2017    *MA Screening Digital Bilateral Routine  12/4/2018 12/4/2017    Lipid panel reflex to direct LDL Fasting Routine  6/4/2018 12/4/2017            Who to contact     If you have questions or need follow up information about today's clinic visit or your schedule please contact [facility] directly at [phone].  Normal or non-critical lab and imaging results will be communicated to you by MyChart, letter or phone within 4 business days after the clinic has received the results. If you do not hear from us within 7 days,  "please contact the clinic through [employer] or phone. If you have a critical or abnormal lab result, we will notify you by phone as soon as possible.  Submit refill requests through [employer] or call your pharmacy and they will forward the refill request to us. Please allow 3 business days for your refill to be completed.          Additional Information About Your Visit        [employer] Information     [employer] lets you send messages to your doctor, view your test results, renew your prescriptions, schedule appointments and more. To sign up, go to [email or web address] . Click on \"Log in\" on the left side of the screen, which will take you to the Welcome page. Then click on \"Sign up Now\" on the right side of the page.     You will be asked to enter the access code listed below, as well as some personal information. Please follow the directions to create your username and password.     Your access code is: [email or web address]  Expires: 2017 12:56 PM     Your access code will  in 90 days. If you need help or a new code, please call your [place] clinic or [phone].        Care EveryWhere ID     This is your Care EveryWhere ID. This could be used by other organizations to access your [place] medical records  [id number]        Your Vitals Were     Pulse Temperature Height BMI (Body Mass Index)          76 98.3  F (36.8  C) (Tympanic) 5' 4.75\" (1.645 m) 41.25 kg/m2         Blood Pressure from Last 3 Encounters:   17 122/70   17 151/80   10/02/17 134/78    Weight from Last 3 Encounters:   17 246 lb (111.6 kg)   10/02/17 243 lb 14.4 oz (110.6 kg)   17 240 lb 11.2 oz (109.2 kg)              We Performed the Following     AUDIOLOGY ADULT REFERRAL     OTOLARYNGOLOGY REFERRAL          Today's Medication Changes          These changes are accurate as of: 17 11:12 AM.  If you have any questions, ask your nurse or doctor.               These medicines have changed or have updated prescriptions. "        Dose/Directions    omeprazole 20 MG CR capsule   Commonly known as:  priLOSEC   This may have changed:  Another medication with the same name was removed. Continue taking this medication, and follow the directions you see here.   Used for:  Gastroesophageal reflux disease without esophagitis   Changed by:  Dung Prieto MD        TAKE ONE CAPSULE BY MOUTH DAILY BEFORE A MEAL   Quantity:  90 capsule   Refills:  1            Where to get your medicines      These medications were sent to [id number] [address]  [address], [facility] [address]     Phone:  [phone]     lisinopril 20 MG tablet    triamterene-hydrochlorothiazide 37.5-25 MG per tablet                Primary Care Provider Office Phone # Fax #    Dung Prieto [phone] [address] [facility] [address]        Equal Access to Services     [facility]: Hadii florence ku hadasho Soomaali, waaxda luqadaha, qaybta kaalmada adeegyada, perez ignacioin hayaan marcos martinez . So [facility] [phone].    ATENCIÓN: Si habla español, tiene a schneider disposición servicios gratuitos de asistencia lingüística. Llame al [phone].    We comply with applicable federal civil rights laws and [place] laws. We do not discriminate on the basis of race, color, national origin, age, disability, sex, sexual orientation, or gender identity.            Thank you!     Thank you for choosing [facility]  for your care. Our goal is always to provide you with excellent care. Hearing back from our patients is one way we can continue to improve our services. Please take a few minutes to complete the written survey that you may receive in the mail after your visit with us. Thank you!             Your Updated Medication List - Protect others around you: Learn how to safely use, store and throw away your medicines at www.disposemymeds.org.          This list is accurate  as of: 12/4/17 11:12 AM.  Always use your most recent med list.                   Brand Name Dispense Instructions for use Diagnosis    ACETAMINOPHEN PO      Take 500 mg by mouth as needed for pain        aspirin 81 MG tablet      Take by mouth daily        lisinopril 20 MG tablet    PRINIVIL/ZESTRIL    90 tablet    Take 1 tablet (20 mg) by mouth daily    Essential hypertension       naproxen 500 MG tablet    NAPROSYN    60 tablet    Take 1 tablet (500 mg) by mouth 2 times daily as needed for moderate pain    Primary osteoarthritis involving multiple joints       omeprazole 20 MG CR capsule    priLOSEC    90 capsule    TAKE ONE CAPSULE BY MOUTH DAILY BEFORE A MEAL    Gastroesophageal reflux disease without esophagitis       * order for DME     1 Device    Medium Tri Tracy    Right foot pain, Posterior tibialis muscle dysfunction       * order for DME     1 Device    Equipment being ordered: Gell Heel inserts    Plantar fasciitis, left       triamterene-hydrochlorothiazide 37.5-25 MG per tablet    MAXZIDE-25    90 tablet    Take 1 tablet by mouth daily    Essential hypertension       UNABLE TO FIND      MEDICATION NAME: TUMERIC SUPPLEMENT        * Notice:  This list has 2 medication(s) that are the same as other medications prescribed for you. Read the directions carefully, and ask your doctor or other care provider to review them with you.

## 2017-12-04 NOTE — NURSING NOTE
"Chief Complaint   Patient presents with     Wellness Visit     had coffee with cream this morning       Initial /86 (Cuff Size: Adult Large)  Pulse 76  Temp 98.3  F (36.8  C) (Tympanic)  Ht 5' 4.75\" (1.645 m)  Wt 246 lb (111.6 kg)  BMI 41.25 kg/m2 Estimated body mass index is 41.25 kg/(m^2) as calculated from the following:    Height as of this encounter: 5' 4.75\" (1.645 m).    Weight as of this encounter: 246 lb (111.6 kg).  Medication Reconciliation: complete  "

## 2017-12-04 NOTE — PATIENT INSTRUCTIONS
Please schedule a fasting lab visit for your thyroid and also cholesterol.    Please return your FIT test.    Please get a mammogram done.    I refilled your medications.    When you get your labs done on 3/28/2018, please make sure you are fasting since I will be checking a cholesterol level that day.     Please see audiology for your decrease hearing.       Preventive Health Recommendations    Female Ages 65 +    Yearly exam:     See your health care provider every year in order to  o Review health changes.   o Discuss preventive care.    o Review your medicines if your doctor has prescribed any.      You no longer need a yearly Pap test unless you've had an abnormal Pap test in the past 10 years. If you have vaginal symptoms, such as bleeding or discharge, be sure to talk with your provider about a Pap test.      Every 1 to 2 years, have a mammogram.  If you are over 69, talk with your health care provider about whether or not you want to continue having screening mammograms.      Every 10 years, have a colonoscopy. Or, have a yearly FIT test (stool test). These exams will check for colon cancer.       Have a cholesterol test every 5 years, or more often if your doctor advises it.       Have a diabetes test (fasting glucose) every three years. If you are at risk for diabetes, you should have this test more often.       At age 65, have a bone density scan (DEXA) to check for osteoporosis (brittle bone disease).    Shots:    Get a flu shot each year.    Get a tetanus shot every 10 years.    Talk to your doctor about your pneumonia vaccines. There are now two you should receive - Pneumovax (PPSV 23) and Prevnar (PCV 13).    Talk to your doctor about the shingles vaccine.    Talk to your doctor about the hepatitis B vaccine.    Nutrition:     Eat at least 5 servings of fruits and vegetables each day.      Eat whole-grain bread, whole-wheat pasta and brown rice instead of white grains and rice.      Talk to your  provider about Calcium and Vitamin D.     Lifestyle    Exercise at least 150 minutes a week (30 minutes a day, 5 days a week). This will help you control your weight and prevent disease.      Limit alcohol to one drink per day.      No smoking.       Wear sunscreen to prevent skin cancer.       See your dentist twice a year for an exam and cleaning.      See your eye doctor every 1 to 2 years to screen for conditions such as glaucoma, macular degeneration and cataracts.

## 2017-12-05 ENCOUNTER — OFFICE VISIT (OUTPATIENT)
Dept: AUDIOLOGY | Facility: CLINIC | Age: 67
End: 2017-12-05
Payer: MEDICARE

## 2017-12-05 DIAGNOSIS — Z13.6 CARDIOVASCULAR SCREENING; LDL GOAL LESS THAN 130: ICD-10-CM

## 2017-12-05 DIAGNOSIS — R79.89 ABNORMAL TSH: ICD-10-CM

## 2017-12-05 DIAGNOSIS — H90.3 BILATERAL HIGH FREQUENCY SENSORINEURAL HEARING LOSS: Primary | ICD-10-CM

## 2017-12-05 LAB
CHOLEST SERPL-MCNC: 176 MG/DL
HDLC SERPL-MCNC: 49 MG/DL
LDLC SERPL CALC-MCNC: 100 MG/DL
NONHDLC SERPL-MCNC: 127 MG/DL
T4 FREE SERPL-MCNC: 1.04 NG/DL (ref 0.76–1.46)
TRIGL SERPL-MCNC: 135 MG/DL
TSH SERPL DL<=0.005 MIU/L-ACNC: 3.78 MU/L (ref 0.4–4)

## 2017-12-05 PROCEDURE — 84443 ASSAY THYROID STIM HORMONE: CPT | Performed by: FAMILY MEDICINE

## 2017-12-05 PROCEDURE — 84439 ASSAY OF FREE THYROXINE: CPT | Performed by: FAMILY MEDICINE

## 2017-12-05 PROCEDURE — 92557 COMPREHENSIVE HEARING TEST: CPT | Performed by: AUDIOLOGIST

## 2017-12-05 PROCEDURE — 99207 ZZC NO CHARGE LOS: CPT | Performed by: AUDIOLOGIST

## 2017-12-05 PROCEDURE — 92567 TYMPANOMETRY: CPT | Performed by: AUDIOLOGIST

## 2017-12-05 PROCEDURE — 36415 COLL VENOUS BLD VENIPUNCTURE: CPT | Performed by: FAMILY MEDICINE

## 2017-12-05 PROCEDURE — 80061 LIPID PANEL: CPT | Performed by: FAMILY MEDICINE

## 2017-12-05 NOTE — PROGRESS NOTES
AUDIOLOGY REPORT    SUBJECTIVE:  Thomas Davila is a 67 year old female who was seen in the Audiology Clinic at [facility] for an audiologic evaluation, referred by Dr. Prieto.  No previous audiograms are available at today's appointment.  The patient reports she is having difficulty hearing her  who speaks softly and is unable to annunciate. . The patient denies bilateral otalgia, bilateral drainage and history of noise exposure.     OBJECTIVE:  Otoscopic exam indicates ears are clear of cerumen bilaterally     Pure Tone Thresholds assessed using conventional audiometry with good  reliability from 250-8000 Hz bilaterally using circumaural headphones     RIGHT:  normal and mild sensorineural hearing loss    LEFT:    normal and mild sensorineural hearing loss    Tympanogram:    RIGHT: restricted eardrum mobility     LEFT:   normal eardrum mobility    Speech Reception Threshold:    RIGHT: 15 dB HL    LEFT:   15 dB HL  Word Recognition Score:     RIGHT: 92% at 55 dB HL using W22 recorded word list.    LEFT:   96% at 55 dB HL using W22 recorded word list.      ASSESSMENT:   Normal hearing through 2000 Hz sloping to a mild high frequency sensorineural hearing loss bilaterally.     Today s results were discussed with the patient in detail.     PLAN: It is recommended that the patient return to clinic if notes changes in her hearing. Patient was counseled regarding hearing loss and impact on communication.    Please call this clinic with questions regarding these results or recommendations.        Nancy Flynn M.A. IVETTE-AAA  Clinical audiologist [place] # [id number]  12/5/2017

## 2017-12-05 NOTE — MR AVS SNAPSHOT
"              After Visit Summary   12/5/2017    Thomas Davila    MRN: [id number]           Patient Information     Date Of Birth          1950        Visit Information        Provider Department      12/5/2017 11:00 AM Nancy Flynn AuD [facility]        Today's Diagnoses     Bilateral high frequency sensorineural hearing loss    -  1       Follow-ups after your visit        Your next 10 appointments already scheduled     Dec 12, 2017  4:15 PM CST   (Arrive by 4:00 PM)   MA SCREENING DIGITAL BILATERAL with [facility]   [facility] ([facility])    [address] [facility] [place] [address]   [phone]           Do not use any powder, lotion or deodorant under your arms or on your breast. If you do, we will ask you to remove it before your exam.  Wear comfortable, two-piece clothing.  If you have any allergies, tell your care team.  Bring any previous mammograms from other facilities or have them mailed to the breast center. Three-dimensional (3D) mammograms are available at [place] locations in [facility], [facility], [facility], and [place]. [facility] locations include [place] [facility] in [place]. Benefits of 3D mammograms include: - Improved rate of cancer detection - Decreases your chance of having to go back for more tests, which means fewer: - \"False-positive\" results (This means that there is an abnormal area but it isn't cancer.) - Invasive testing procedures, such as a biopsy or surgery - Can provide clearer images of the breast if you have dense breast tissue. 3D mammography is an optional exam that anyone can have with a 2D mammogram. It doesn't replace or take the place of a 2D mammogram. 2D mammograms remain an effective screening test for all women.  Not all insurance companies cover the cost of a 3D mammogram. Check with your insurance.            Mar 28, 2018  1:20 PM CDT   [facility] with [place] [facility]   [facility] ([facility])    [address]  [facility] [address] [phone]           Please do not eat 10-12 hours before your appointment if you are coming in fasting for labs on lipids, cholesterol, or glucose (sugar). This does not apply to pregnant women. Water, hot tea and black coffee (with nothing added) are okay. Do not drink other fluids, diet soda or chew gum.            Apr 02, 2018  1:45 PM CDT   Return Visit with Alissa Chacon MD   [facility] ([facility])    [facility]  [address]  [facility] [address] [phone]              Future tests that were ordered for you today     Open Future Orders        Priority Expected Expires Ordered    Fecal colorectal cancer screen (FIT) Routine 12/25/2017 2/26/2018 12/4/2017    *MA Screening Digital Bilateral Routine  12/4/2018 12/4/2017            Who to contact     If you have questions or need follow up information about today's clinic visit or your schedule please contact [facility] directly at [phone].  Normal or non-critical lab and imaging results will be communicated to you by MyChart, letter or phone within 4 business days after the clinic has received the results. If you do not hear from us within 7 days, please contact the clinic through [employer] or phone. If you have a critical or abnormal lab result, we will notify you by phone as soon as possible.  Submit refill requests through [employer] or call your pharmacy and they will forward the refill request to us. Please allow 3 business days for your refill to be completed.          Additional Information About Your Visit        [employer] Information     [employer] lets you send messages to your doctor, view your test results, renew your prescriptions, schedule appointments and more. To sign up, go to [email or web address] . Click on \"Log in\" on the left side of the screen, which " "will take you to the Welcome page. Then click on \"Sign up Now\" on the right side of the page.     You will be asked to enter the access code listed below, as well as some personal information. Please follow the directions to create your username and password.     Your access code is: [email or web address]  Expires: 2017 12:56 PM     Your access code will  in 90 days. If you need help or a new code, please call your [place] clinic or [phone].        Care EveryWhere ID     This is your Care EveryWhere ID. This could be used by other organizations to access your [place] medical records  [id number]         Blood Pressure from Last 3 Encounters:   17 122/70   17 151/80   10/02/17 134/78    Weight from Last 3 Encounters:   17 246 lb (111.6 kg)   10/02/17 243 lb 14.4 oz (110.6 kg)   17 240 lb 11.2 oz (109.2 kg)              We Performed the Following     AUDIOGRAM/TYMPANOGRAM - INTERFACE     COMPREHENSIVE HEARING TEST     TYMPANOMETRY        Primary Care Provider Office Phone # Fax #    Dung Prieto [phone] [address] [facility] [address]        Equal Access to Services     DASHAWN SALDAÑA AH: Hadii aad ku hadasho Soomaali, waaxda luqadaha, qaybta kaalmada adeegyada, waxay idiin hayaan marcos martinez . So [facility] [phone].    ATENCIÓN: Si habla español, tiene a schneider disposición servicios gratuitos de asistencia lingüística. Llame al [phone].    We comply with applicable federal civil rights laws and [place] laws. We do not discriminate on the basis of race, color, national origin, age, disability, sex, sexual orientation, or gender identity.            Thank you!     Thank you for choosing [facility]  for your care. Our goal is always to provide you with excellent care. Hearing back from our patients is one way we can continue to improve our services. Please take a few minutes to complete the written survey that you may receive " in the mail after your visit with us. Thank you!             Your Updated Medication List - Protect others around you: Learn how to safely use, store and throw away your medicines at www.disposemymeds.org.          This list is accurate as of: 12/5/17 12:59 PM.  Always use your most recent med list.                   Brand Name Dispense Instructions for use Diagnosis    ACETAMINOPHEN PO      Take 500 mg by mouth as needed for pain        aspirin 81 MG tablet      Take by mouth daily        lisinopril 20 MG tablet    PRINIVIL/ZESTRIL    90 tablet    Take 1 tablet (20 mg) by mouth daily    Essential hypertension       naproxen 500 MG tablet    NAPROSYN    60 tablet    Take 1 tablet (500 mg) by mouth 2 times daily as needed for moderate pain    Primary osteoarthritis involving multiple joints       omeprazole 20 MG CR capsule    priLOSEC    90 capsule    TAKE ONE CAPSULE BY MOUTH DAILY BEFORE A MEAL    Gastroesophageal reflux disease without esophagitis       * order for DME     1 Device    Medium Tri Tracy    Right foot pain, Posterior tibialis muscle dysfunction       * order for DME     1 Device    Equipment being ordered: Gell Heel inserts    Plantar fasciitis, left       triamterene-hydrochlorothiazide 37.5-25 MG per tablet    MAXZIDE-25    90 tablet    Take 1 tablet by mouth daily    Essential hypertension       UNABLE TO FIND      MEDICATION NAME: TUMERIC SUPPLEMENT        * Notice:  This list has 2 medication(s) that are the same as other medications prescribed for you. Read the directions carefully, and ask your doctor or other care provider to review them with you.

## 2017-12-08 DIAGNOSIS — Z12.11 SPECIAL SCREENING FOR MALIGNANT NEOPLASMS, COLON: ICD-10-CM

## 2017-12-09 LAB — HEMOCCULT STL QL IA: NEGATIVE

## 2017-12-12 ENCOUNTER — HOSPITAL ENCOUNTER (OUTPATIENT)
Dept: MAMMOGRAPHY | Facility: CLINIC | Age: 67
Discharge: HOME | End: 2017-12-12
Attending: FAMILY MEDICINE | Admitting: FAMILY MEDICINE
Payer: MEDICARE

## 2017-12-12 DIAGNOSIS — Z12.31 ENCOUNTER FOR SCREENING MAMMOGRAM FOR BREAST CANCER: ICD-10-CM

## 2017-12-12 PROCEDURE — 77063 BREAST TOMOSYNTHESIS BI: CPT

## 2017-12-12 PROCEDURE — G0202 SCR MAMMO BI INCL CAD: HCPCS

## 2018-03-28 ENCOUNTER — HOSPITAL ENCOUNTER (OUTPATIENT)
Dept: LAB | Facility: CLINIC | Age: 68
Discharge: HOME | End: 2018-03-28
Attending: INTERNAL MEDICINE | Admitting: INTERNAL MEDICINE
Payer: MEDICARE

## 2018-03-28 DIAGNOSIS — Z86.718 PERSONAL HISTORY OF DVT (DEEP VEIN THROMBOSIS): ICD-10-CM

## 2018-03-28 DIAGNOSIS — C54.1 ENDOMETRIAL ADENOCARCINOMA (H): ICD-10-CM

## 2018-03-28 DIAGNOSIS — K76.0 FATTY LIVER: ICD-10-CM

## 2018-03-28 DIAGNOSIS — E66.3 OVERWEIGHT: ICD-10-CM

## 2018-03-28 LAB
ALBUMIN SERPL-MCNC: 3.7 G/DL (ref 3.4–5)
ALP SERPL-CCNC: 45 U/L (ref 40–150)
ALT SERPL W P-5'-P-CCNC: 49 U/L (ref 0–50)
ANION GAP SERPL CALCULATED.3IONS-SCNC: 8 MMOL/L (ref 3–14)
AST SERPL W P-5'-P-CCNC: 29 U/L (ref 0–45)
BASOPHILS # BLD AUTO: 0 10E9/L (ref 0–0.2)
BASOPHILS NFR BLD AUTO: 0.2 %
BILIRUB SERPL-MCNC: 0.3 MG/DL (ref 0.2–1.3)
BUN SERPL-MCNC: 23 MG/DL (ref 7–30)
CALCIUM SERPL-MCNC: 8.6 MG/DL (ref 8.5–10.1)
CANCER AG125 SERPL-ACNC: 9 U/ML (ref 0–30)
CHLORIDE SERPL-SCNC: 103 MMOL/L (ref 94–109)
CO2 SERPL-SCNC: 25 MMOL/L (ref 20–32)
CREAT SERPL-MCNC: 0.99 MG/DL (ref 0.52–1.04)
DIFFERENTIAL METHOD BLD: NORMAL
EOSINOPHIL # BLD AUTO: 0.1 10E9/L (ref 0–0.7)
EOSINOPHIL NFR BLD AUTO: 1.9 %
ERYTHROCYTE [DISTWIDTH] IN BLOOD BY AUTOMATED COUNT: 13.3 % (ref 10–15)
GFR SERPL CREATININE-BSD FRML MDRD: 55 ML/MIN/1.7M2
GLUCOSE SERPL-MCNC: 141 MG/DL (ref 70–99)
HCT VFR BLD AUTO: 43.5 % (ref 35–47)
HGB BLD-MCNC: 15 G/DL (ref 11.7–15.7)
IMM GRANULOCYTES # BLD: 0 10E9/L (ref 0–0.4)
IMM GRANULOCYTES NFR BLD: 0.2 %
LYMPHOCYTES # BLD AUTO: 1.2 10E9/L (ref 0.8–5.3)
LYMPHOCYTES NFR BLD AUTO: 24.5 %
MCH RBC QN AUTO: 31.3 PG (ref 26.5–33)
MCHC RBC AUTO-ENTMCNC: 34.5 G/DL (ref 31.5–36.5)
MCV RBC AUTO: 91 FL (ref 78–100)
MONOCYTES # BLD AUTO: 0.3 10E9/L (ref 0–1.3)
MONOCYTES NFR BLD AUTO: 7.2 %
NEUTROPHILS # BLD AUTO: 3.1 10E9/L (ref 1.6–8.3)
NEUTROPHILS NFR BLD AUTO: 66 %
PLATELET # BLD AUTO: 164 10E9/L (ref 150–450)
POTASSIUM SERPL-SCNC: 3.5 MMOL/L (ref 3.4–5.3)
PROT SERPL-MCNC: 6.9 G/DL (ref 6.8–8.8)
RBC # BLD AUTO: 4.79 10E12/L (ref 3.8–5.2)
SODIUM SERPL-SCNC: 136 MMOL/L (ref 133–144)
WBC # BLD AUTO: 4.7 10E9/L (ref 4–11)

## 2018-03-28 PROCEDURE — 36415 COLL VENOUS BLD VENIPUNCTURE: CPT | Performed by: INTERNAL MEDICINE

## 2018-03-28 PROCEDURE — 86304 IMMUNOASSAY TUMOR CA 125: CPT | Performed by: INTERNAL MEDICINE

## 2018-03-28 PROCEDURE — 85025 COMPLETE CBC W/AUTO DIFF WBC: CPT | Performed by: INTERNAL MEDICINE

## 2018-03-28 PROCEDURE — 80053 COMPREHEN METABOLIC PANEL: CPT | Performed by: INTERNAL MEDICINE

## 2018-04-02 ENCOUNTER — ONCOLOGY VISIT (OUTPATIENT)
Dept: ONCOLOGY | Facility: CLINIC | Age: 68
End: 2018-04-02
Attending: INTERNAL MEDICINE
Payer: MEDICARE

## 2018-04-02 VITALS
HEART RATE: 101 BPM | BODY MASS INDEX: 43.17 KG/M2 | TEMPERATURE: 98.3 F | RESPIRATION RATE: 20 BRPM | HEIGHT: 64 IN | DIASTOLIC BLOOD PRESSURE: 78 MMHG | OXYGEN SATURATION: 95 % | WEIGHT: 252.9 LBS | SYSTOLIC BLOOD PRESSURE: 165 MMHG

## 2018-04-02 DIAGNOSIS — K76.0 FATTY LIVER: ICD-10-CM

## 2018-04-02 DIAGNOSIS — Z86.718 PERSONAL HISTORY OF DVT (DEEP VEIN THROMBOSIS): ICD-10-CM

## 2018-04-02 DIAGNOSIS — E66.3 OVERWEIGHT: ICD-10-CM

## 2018-04-02 DIAGNOSIS — C54.1 ENDOMETRIAL ADENOCARCINOMA (H): Primary | ICD-10-CM

## 2018-04-02 PROCEDURE — 99214 OFFICE O/P EST MOD 30 MIN: CPT | Performed by: INTERNAL MEDICINE

## 2018-04-02 PROCEDURE — G0463 HOSPITAL OUTPT CLINIC VISIT: HCPCS

## 2018-04-02 ASSESSMENT — PAIN SCALES - GENERAL: PAINLEVEL: NO PAIN (0)

## 2018-04-02 NOTE — PATIENT INSTRUCTIONS
We would like to see you back in 1 year for a follow up appointment with labs prior.     When you are in need of a refill, please call your pharmacy and they will send us a request.      Copy of appointments, and after visit summary (AVS) given to patient.      If you have any questions please call Jojo Banks RN, BSN Oncology Hematology  [facility] [phone]. For questions after business hours, or on holidays/weekends, please call our after hours Nurse Triage line [phone]. Thank you.       1 yr f/u with labs.

## 2018-04-02 NOTE — NURSING NOTE
"Oncology Rooming Note    April 2, 2018 1:43 PM   Thomas Davila is a 68 year old female who presents for:    Chief Complaint   Patient presents with     Oncology Clinic Visit     6 month recheck Endometrial adenocarcinoma, review labs     Initial Vitals: /78 (BP Location: Right arm, Patient Position: Sitting, Cuff Size: Adult Large)  Pulse 101  Temp 98.3  F (36.8  C) (Tympanic)  Resp 20  Ht 1.626 m (5' 4\")  Wt 114.7 kg (252 lb 14.4 oz)  SpO2 95%  Breastfeeding? No  BMI 43.41 kg/m2 Estimated body mass index is 43.41 kg/(m^2) as calculated from the following:    Height as of this encounter: 1.626 m (5' 4\").    Weight as of this encounter: 114.7 kg (252 lb 14.4 oz). Body surface area is 2.28 meters squared.  No Pain (0) Comment: Data Unavailable   No LMP recorded. Patient has had a hysterectomy.  Allergies reviewed: Yes  Medications reviewed: Yes    Medications: Medication refills not needed today.  Pharmacy name entered into EPIC:        [id number] - [address]      Clinical concerns: 6 month recheck Endometrial adenocarcinoma, review labs.     10  minutes for nursing intake (face to face time)     Kendra Jenkins CMA              "

## 2018-04-02 NOTE — LETTER
4/2/2018         RE: Thomas Davila  [address]  [place] [address]        Dear Colleague,    Thank you for referring your patient, Thomas Davila, to the [facility]. Please see a copy of my visit note below.    CHIEF COMPLAINT AND REASON FOR VISIT: endometrial cancer 2013 s/p adjuvant chemo, RT.      HISTORY OF PRESENT ILLNESS:  She presented with  postmenopausal bleeding in 12/2012.  It became heavier in January 2013.  Her last menstrual period was around 1998.     Endometrial biopsy performed in primary care's office showed endometrial adenocarcinoma, FIGO grade 1/3, arising from background of atypical complex hyperplasia, HPV positive, type 66.  Pelvic ultrasound indicating uterus is about 9 cm, no fibroid, endometriosis, abnormally thickened, measured 3 cm.    She saw Dr. 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  Ovaries, fallopian tubes, cervical, lower uterine segment are negative.  Cytology wash negative.  There was focal suspicion for angiolymphatic invasion.  Left pelvic lymph nodes 1/8 were positive, right pelvic nodes total 7 were negative. She has stage IIIC pathologic [id number] endometrial carcinoma with focal squamous cell differentiation.   Dr. Walker, GYN oncologist, recommended carbo and Taxol x3 followed by radiation followed by 3 more carbo and Taxol.    Chemotherapy portion was carbo and Taxol, carbo at AUC 6, Taxol 175 mg/meter squared x3 finished in April 2013. External pelvic Radiation is done 5/28/2013. She finished brachy RT  X 3 in early June 2013.  3 more carbo/taxol finished in 8/2013.      PMH: She had remote history of left breast  "cancer then treated with lumpectomy, left-sided axillary lymph node dissection, radiation and tamoxifen for 1 year, discontinued due to DVT.  She was treated with anticoagulation, aspirin. but is currently not on any blood thinner.   Left superficial thrombus in 4/2013 s/p 6 months of coumadin.  HTN      REVIEW OF SYSTEMS:   Likes to eat, concerned about over weight. She is always hungry.   She has lots of arthritis.   She is seeing dermatologist for skin biopsies and \"SCC\".       PHYSICAL EXAMINATION:   VITAL SIGNS: Blood pressure 165/78, pulse 101, temperature 98.3  F (36.8  C), temperature source Tympanic, resp. rate 20, height 1.626 m (5' 4\"), weight 114.7 kg (252 lb 14.4 oz), SpO2 95 %, not currently breastfeeding.  GENERAL APPEARANCE:   She is morbidly obese, not in acute distress.  Very pleasant.   HEENT: The patient is normocephalic, atraumatic. Pupils are equal react to light.  Sclerae are anicteric.  Moist oral mucosa.  Negative pharynx.  No oral thrush.   NECK:  Supple.  No jugular venous distention.  Thyroid is not palpable.   LYMPH NODES:  Superficial lymphadenopathy is not appreciable in the bilateral cervical, supraclavicular, axillary or inguinal adenopathy.   CARDIOVASCULAR:  S1, S2 regular with no murmurs or gallops.  No carotid or abdominal bruits.   PULMONARY:  Lungs are clear to auscultation and percussion bilaterally.  There is no wheezing or rhonchi.   GASTROINTESTINAL:  Abdomen is soft, nontender.  No hepatosplenomegaly.  No signs of ascites.  No mass appreciable.   MUSCULOSKELETAL/EXTREMITIES:  No edema.  No cyanotic changes. multiple joints deformities on fingers.  No lymphedema.   NEUROLOGIC:  Cranial nerves II-XII are grossly intact.  Sensation intact.  Muscle strength and muscle tone symmetrical all through 5/5.   BACK:  No spinal or paraspinal tenderness.  No CVA tenderness.   Skin: erythematous scaly lesion on right upper arm.      CURRENT LAB DATA REVIEWED  CBC DIFF, CMP,  are " fine    Current image REVIEWED  12/2017 MA: negative      Old data reviewed with summary  CT 9/2017 body CT: negative, fatty liver.   CT 9/2015:  1. No evidence of metastatic or recurrent neoplasm.  2. There is a small superficial collection and mild adjacent inflammatory change involving the lower right abdominal wall at the site of a previously seen fat-containing ventral hernia. This could  relate to interval hernia repair with possibly a small hematoma or seroma (she had ventral hernia surgery at [facility] in 7/2015)    Thyroid US 3/2015: Stable multinodular thyroid gland from 9/19/2014.     CT body  9/2014- 1. No convincing evidence for recurrent or metastatic malignancy in the chest, abdomen, or pelvis.   2. Diffuse fatty infiltration of the liver.   3. Indeterminate 1.6 cm left thyroid nodule is unchanged. Consider thyroid ultrasound for further evaluation.     CT body 10/2013 - Hepatic fatty infiltration. Scattered colonic diverticulosis. Nonenlarged left external iliac lymph node.    Sono of left leg 4/2013 - There is extensive occlusive thrombus throughout the greater saphenous vein.    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  Ovaries, fallopian tubes, cervical, lower uterine segment are negative.  Cytology wash negative.  There was focal suspicion for angiolymphatic invasion.  Left pelvic lymph nodes 1/8 were positive, right pelvic nodes total 7 were negative. She has stage IIIC pathologic T1bN1.    Pelvic ultrasound 01/2013 -  indicating uterus is about 9 cm, no fibroid, endometriosis, abnormally thickened, measured 3 cm.            ASSESSMENT AND PLAN:     1.  stage IIIC endometrioid carcinoma with focal squamous cell differentiation, human papilloma virus positive, status post Da Sasha-assisted total  hysterectomy, bilateral salpingo-oophorectomy, lymph node dissection.      Treatment plan was sandwich chemo -radiation followed by more chemotherapy.  Chemotherapy portion is carbo and Taxol, carbo at AUC 6, Taxol 175 mg/meter squared x3 finished in April. External pelvic Radiation is done 5/28/2013. She had brachy RT  X 3 in early June.  3 more chemo with carbo/taxol till 8/2013.    She needs ongoing cancer surveillance visit q 12 months with labs and CT prn at this point.      2. Fatty liver. Diet fat reduction counseling is provided.    3. Hx of DVT on tamoxifen. She is off coumadin now.   She is advised on ASAuse post meals.      4. Overweight. Weight loss counseling is provided. She admits she likes to eat, always hungry and not willing to change it.         Again, thank you for allowing me to participate in the care of your patient.        Sincerely,        Alissa Chacon MD, MD

## 2018-04-02 NOTE — PROGRESS NOTES
CHIEF COMPLAINT AND REASON FOR VISIT: endometrial cancer 2013 s/p adjuvant chemo, RT.      HISTORY OF PRESENT ILLNESS:  She presented with  postmenopausal bleeding in 12/2012.  It became heavier in January 2013.  Her last menstrual period was around 1998.     Endometrial biopsy performed in primary care's office showed endometrial adenocarcinoma, FIGO grade 1/3, arising from background of atypical complex hyperplasia, HPV positive, type 66.  Pelvic ultrasound indicating uterus is about 9 cm, no fibroid, endometriosis, abnormally thickened, measured 3 cm.    She saw Dr. 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  Ovaries, fallopian tubes, cervical, lower uterine segment are negative.  Cytology wash negative.  There was focal suspicion for angiolymphatic invasion.  Left pelvic lymph nodes 1/8 were positive, right pelvic nodes total 7 were negative. She has stage IIIC pathologic [id number] endometrial carcinoma with focal squamous cell differentiation.   Dr. Walker, GYN oncologist, recommended carbo and Taxol x3 followed by radiation followed by 3 more carbo and Taxol.    Chemotherapy portion was carbo and Taxol, carbo at AUC 6, Taxol 175 mg/meter squared x3 finished in April 2013. External pelvic Radiation is done 5/28/2013. She finished brachy RT  X 3 in early June 2013.  3 more carbo/taxol finished in 8/2013.      PMH: She had remote history of left breast cancer then treated with lumpectomy, left-sided axillary lymph node dissection, radiation and tamoxifen for 1 year, discontinued due to DVT.  She was treated with anticoagulation, aspirin. but is currently not on any blood thinner.   Left  "superficial thrombus in 4/2013 s/p 6 months of coumadin.  HTN      REVIEW OF SYSTEMS:   Likes to eat, concerned about over weight. She is always hungry.   She has lots of arthritis.   She is seeing dermatologist for skin biopsies and \"SCC\".       PHYSICAL EXAMINATION:   VITAL SIGNS: Blood pressure 165/78, pulse 101, temperature 98.3  F (36.8  C), temperature source Tympanic, resp. rate 20, height 1.626 m (5' 4\"), weight 114.7 kg (252 lb 14.4 oz), SpO2 95 %, not currently breastfeeding.  GENERAL APPEARANCE:   She is morbidly obese, not in acute distress.  Very pleasant.   HEENT: The patient is normocephalic, atraumatic. Pupils are equal react to light.  Sclerae are anicteric.  Moist oral mucosa.  Negative pharynx.  No oral thrush.   NECK:  Supple.  No jugular venous distention.  Thyroid is not palpable.   LYMPH NODES:  Superficial lymphadenopathy is not appreciable in the bilateral cervical, supraclavicular, axillary or inguinal adenopathy.   CARDIOVASCULAR:  S1, S2 regular with no murmurs or gallops.  No carotid or abdominal bruits.   PULMONARY:  Lungs are clear to auscultation and percussion bilaterally.  There is no wheezing or rhonchi.   GASTROINTESTINAL:  Abdomen is soft, nontender.  No hepatosplenomegaly.  No signs of ascites.  No mass appreciable.   MUSCULOSKELETAL/EXTREMITIES:  No edema.  No cyanotic changes. multiple joints deformities on fingers.  No lymphedema.   NEUROLOGIC:  Cranial nerves II-XII are grossly intact.  Sensation intact.  Muscle strength and muscle tone symmetrical all through 5/5.   BACK:  No spinal or paraspinal tenderness.  No CVA tenderness.   Skin: erythematous scaly lesion on right upper arm.      CURRENT LAB DATA REVIEWED  CBC DIFF, CMP,  are fine    Current image REVIEWED  12/2017 MA: negative      Old data reviewed with summary  CT 9/2017 body CT: negative, fatty liver.   CT 9/2015:  1. No evidence of metastatic or recurrent neoplasm.  2. There is a small superficial " collection and mild adjacent inflammatory change involving the lower right abdominal wall at the site of a previously seen fat-containing ventral hernia. This could  relate to interval hernia repair with possibly a small hematoma or seroma (she had ventral hernia surgery at [facility] in 7/2015)    Thyroid US 3/2015: Stable multinodular thyroid gland from 9/19/2014.     CT body  9/2014- 1. No convincing evidence for recurrent or metastatic malignancy in the chest, abdomen, or pelvis.   2. Diffuse fatty infiltration of the liver.   3. Indeterminate 1.6 cm left thyroid nodule is unchanged. Consider thyroid ultrasound for further evaluation.     CT body 10/2013 - Hepatic fatty infiltration. Scattered colonic diverticulosis. Nonenlarged left external iliac lymph node.    Sono of left leg 4/2013 - There is extensive occlusive thrombus throughout the greater saphenous vein.    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  Ovaries, fallopian tubes, cervical, lower uterine segment are negative.  Cytology wash negative.  There was focal suspicion for angiolymphatic invasion.  Left pelvic lymph nodes 1/8 were positive, right pelvic nodes total 7 were negative. She has stage IIIC pathologic T1bN1.    Pelvic ultrasound 01/2013 -  indicating uterus is about 9 cm, no fibroid, endometriosis, abnormally thickened, measured 3 cm.            ASSESSMENT AND PLAN:     1.  stage IIIC endometrioid carcinoma with focal squamous cell differentiation, human papilloma virus positive, status post Da Sasha-assisted total hysterectomy, bilateral salpingo-oophorectomy, lymph node dissection.      Treatment plan was sandwich chemo -radiation followed by more chemotherapy.  Chemotherapy portion is carbo and Taxol, carbo at AUC 6, Taxol 175 mg/meter squared  x3 finished in April. External pelvic Radiation is done 5/28/2013. She had brachy RT  X 3 in early June.  3 more chemo with carbo/taxol till 8/2013.    She needs ongoing cancer surveillance visit q 12 months with labs and CT prn at this point.      2. Fatty liver. Diet fat reduction counseling is provided.    3. Hx of DVT on tamoxifen. She is off coumadin now.   She is advised on ASAuse post meals.      4. Overweight. Weight loss counseling is provided. She admits she likes to eat, always hungry and not willing to change it.

## 2018-04-02 NOTE — MR AVS SNAPSHOT
After Visit Summary   4/2/2018    Thomas Davila    MRN: [id number]           Patient Information     Date Of Birth          1950        Visit Information        Provider Department      4/2/2018 1:45 PM Alissa Chacon MD [facility]        Today's Diagnoses     Endometrial adenocarcinoma (H)    -  1    Personal history of DVT (deep vein thrombosis)        Overweight        Fatty liver          Care Instructions    We would like to see you back in 1 year for a follow up appointment with labs prior.     When you are in need of a refill, please call your pharmacy and they will send us a request.      Copy of appointments, and after visit summary (AVS) given to patient.      If you have any questions please call Jojo Banks RN, BSN Oncology Hematology  [facility] [phone]. For questions after business hours, or on holidays/weekends, please call our after hours Nurse Triage line [phone]. Thank you.       1 yr f/u with labs.           Follow-ups after your visit        Your next 10 appointments already scheduled     Mar 27, 2019  1:20 PM CDT   LAB with [facility] ([facility])    [address] [address] [phone]           Please do not eat 10-12 hours before your appointment if you are coming in fasting for labs on lipids, cholesterol, or glucose (sugar). This does not apply to pregnant women. Water, hot tea and black coffee (with nothing added) are okay. Do not drink other fluids, diet soda or chew gum.            Apr 01, 2019 11:15 AM CDT   Return Visit with Alissa Chacon MD   [facility] ([facility])    [facility]  [address]  [place] [place] [facility] [place] [address]   [phone]              Future tests that were ordered for you today     Open Future Orders        Priority Expected Expires Ordered    CBC with platelets  "differential Routine 3/1/2019 2019 2018    Comprehensive metabolic panel Routine 3/1/2019 2019 2018     Routine 3/1/2019 2019 2018            Who to contact     If you have questions or need follow up information about today's clinic visit or your schedule please contact [facility] directly at [phone].  Normal or non-critical lab and imaging results will be communicated to you by [employer], letter or phone within 4 business days after the clinic has received the results. If you do not hear from us within 7 days, please contact the clinic through [employer] or phone. If you have a critical or abnormal lab result, we will notify you by phone as soon as possible.  Submit refill requests through [employer] or call your pharmacy and they will forward the refill request to us. Please allow 3 business days for your refill to be completed.          Additional Information About Your Visit        [employer] Information     [employer] lets you send messages to your doctor, view your test results, renew your prescriptions, schedule appointments and more. To sign up, go to [email or web address] . Click on \"Log in\" on the left side of the screen, which will take you to the Welcome page. Then click on \"Sign up Now\" on the right side of the page.     You will be asked to enter the access code listed below, as well as some personal information. Please follow the directions to create your username and password.     Your access code is: [email or web address]  Expires: 2018  2:06 PM     Your access code will  in 90 days. If you need help or a new code, please call your [facility] clinic or [phone].        Care EveryWhere ID     This is your Care EveryWhere ID. This could be used by other organizations to access your [facility] medical records  [id number]        Your Vitals Were     Pulse Temperature Respirations Height Pulse Oximetry Breastfeeding?    101 98.3  F (36.8  C) (Tympanic) 20 1.626 m (5' " "4\") 95% No    BMI (Body Mass Index)                   43.41 kg/m2            Blood Pressure from Last 3 Encounters:   04/02/18 165/78   12/04/17 122/70   11/06/17 151/80    Weight from Last 3 Encounters:   04/02/18 114.7 kg (252 lb 14.4 oz)   12/04/17 111.6 kg (246 lb)   10/02/17 110.6 kg (243 lb 14.4 oz)               Primary Care Provider Office Phone # Fax #    Dung Prieto [phone] [address] [address]        Equal Access to Services     [facility] : Hadii florence Alcantara, waaxda luomar, qaybta kaalmada chuyita, perez sanchez. So [facility] [phone].    ATENCIÓN: Si habla español, tiene a schneider disposición servicios gratuitos de asistencia lingüística. [facility] [phone].    We comply with applicable federal civil rights laws and [place] laws. We do not discriminate on the basis of race, color, national origin, age, disability, sex, sexual orientation, or gender identity.            Thank you!     Thank you for choosing [facility]  for your care. Our goal is always to provide you with excellent care. Hearing back from our patients is one way we can continue to improve our services. Please take a few minutes to complete the written survey that you may receive in the mail after your visit with us. Thank you!             Your Updated Medication List - Protect others around you: Learn how to safely use, store and throw away your medicines at www.disposemymeds.org.          This list is accurate as of 4/2/18  2:06 PM.  Always use your most recent med list.                   Brand Name Dispense Instructions for use Diagnosis    ACETAMINOPHEN PO      Take 500 mg by mouth as needed for pain        aspirin 81 MG tablet      Take by mouth daily        lisinopril 20 MG tablet    PRINIVIL/ZESTRIL    90 tablet    Take 1 tablet (20 mg) by mouth daily    Essential hypertension       naproxen 500 MG tablet    NAPROSYN    60 tablet "    Take 1 tablet (500 mg) by mouth 2 times daily as needed for moderate pain    Primary osteoarthritis involving multiple joints       omeprazole 20 MG CR capsule    priLOSEC    90 capsule    TAKE ONE CAPSULE BY MOUTH DAILY BEFORE A MEAL    Gastroesophageal reflux disease without esophagitis       * order for DME     1 Device    Medium Tri Tracy    Right foot pain, Posterior tibialis muscle dysfunction       * order for DME     1 Device    Equipment being ordered: Gell Heel inserts    Plantar fasciitis, left       triamterene-hydrochlorothiazide 37.5-25 MG per tablet    MAXZIDE-25    90 tablet    Take 1 tablet by mouth daily    Essential hypertension       * Notice:  This list has 2 medication(s) that are the same as other medications prescribed for you. Read the directions carefully, and ask your doctor or other care provider to review them with you.

## 2018-05-07 ENCOUNTER — OFFICE VISIT (OUTPATIENT)
Dept: DERMATOLOGY | Facility: CLINIC | Age: 68
End: 2018-05-07
Payer: MEDICARE

## 2018-05-07 ENCOUNTER — TELEPHONE (OUTPATIENT)
Dept: DERMATOLOGY | Facility: CLINIC | Age: 68
End: 2018-05-07

## 2018-05-07 VITALS — OXYGEN SATURATION: 97 % | DIASTOLIC BLOOD PRESSURE: 91 MMHG | SYSTOLIC BLOOD PRESSURE: 131 MMHG | HEART RATE: 88 BPM

## 2018-05-07 DIAGNOSIS — L82.1 SK (SEBORRHEIC KERATOSIS): ICD-10-CM

## 2018-05-07 DIAGNOSIS — L81.4 LENTIGO: ICD-10-CM

## 2018-05-07 DIAGNOSIS — L57.0 AK (ACTINIC KERATOSIS): Primary | ICD-10-CM

## 2018-05-07 DIAGNOSIS — Z85.828 HISTORY OF SKIN CANCER: ICD-10-CM

## 2018-05-07 PROCEDURE — 11100 CL FROZEN SECTION FIRST SPEC: CPT | Performed by: DERMATOLOGY

## 2018-05-07 PROCEDURE — 99213 OFFICE O/P EST LOW 20 MIN: CPT | Mod: 25 | Performed by: DERMATOLOGY

## 2018-05-07 PROCEDURE — 88331 PATH CONSLTJ SURG 1 BLK 1SPC: CPT | Performed by: DERMATOLOGY

## 2018-05-07 RX ORDER — FLUOROURACIL 50 MG/G
CREAM TOPICAL
Qty: 40 G | Refills: 1 | Status: SHIPPED | OUTPATIENT
Start: 2018-05-07 | End: 2019-03-15

## 2018-05-07 NOTE — PROGRESS NOTES
Thomas Davila is a 68 year old year old female patient here today for f/u h xof msc.  Today she note sbleeding spot on ab.  She also notes rough spot son arms.   .  Patient states this has been present for months on ab.  .  Patient reports the following symptoms:  bleeding.  Patient reports the following previous treatments none.  Patient reports the following modifying factors none.  Associated symptoms: none.  Patient has no other skin complaints today.  Remainder of the HPI, Meds, PMH, Allergies, FH, and SH was reviewed in chart.      Past Medical History:   Diagnosis Date     Acute parametritis and pelvic cellulitis     salpingitis     ASCUS with positive high risk HPV 2013     Asthma     No recent issues     Basal cell carcinoma      breast cancer     left lumpectomy, radiation, tamoxifen     Breast cancer (H)     L Breast; Lumpectomy & Radiation     Chronic salpingitis and oophoritis     PID        Embolism and thrombosis of unspecified site     left leg, due to tamoxifen therapy     Generalized osteoarthrosis, unspecified site     hands     Leiomyoma of uterus, unspecified      Other malignant neoplasm of skin, site unspecified 2006    Basal cell cancer on nose     Squamous cell carcinoma      Thrombosis of leg     Left     Unspecified essential hypertension        Past Surgical History:   Procedure Laterality Date     BREAST LUMPECTOMY, RT/LT      left     C/SECTION, LOW TRANSVERSE  1972,     , Low Transverse x2     CL AFF SURGICAL PATHOLOGY      Breast reduction     COLONOSCOPY  10/15/02    normal     FOOT SURGERY      R Foot      HC EXCISION BREAST LESION, OPEN >=1  98    1) Needle localization with generous lumpectomy 2) Left axillary node dissection.     HC LAPAROSCOPY, SURGICAL, ABDOMEN, PERITONEUM & OMENTUM; DX W/ OR W/O SPECIMEN(S)  94     HYSTERECTOMY, PAP NO LONGER INDICATED       INSERT PORT VASCULAR ACCESS  3/14/2013    Procedure:  INSERT PORT VASCULAR ACCESS;  Port Revision;  Surgeon: Arash Puente MD;  Location: [facility]     LAPAROSCOPIC HYSTERECTOMY TOTAL, BILATERAL SALPINGO-OOPHORECTOMY, NODE DISSECTION, COMBINED  1/31/2013    Procedure: COMBINED LAPAROSCOPIC HYSTERECTOMY TOTAL, SALPINGO-OOPHORECTOMY, NODE DISSECTION;  Laparosocpic  Total Abdominal Hysterectomy, Bilateral Salphino-Oophorectomy, Bilateral Pelvic Lymph Node Dissection, Pelvic Washings, Cystoscopy;  Surgeon: Tanya Walker MD;  Location: [facility]     SURGICAL HISTORY OF -   06/22/93    1) Excision of digital cyst right mid finger  2)  Excision of dermatofibroma left calf     SURGICAL HISTORY OF -   12/18/2001    Excision of mucinous cyst & partial ostectomy, bone removal of benign osteophytic overgrowth osteophyte of the distal aspect of the middle phalanx and distal phalanx     SURGICAL HISTORY OF -   2006    Basal cell cancer removal     TONSILLECTOMY       TUBAL LIGATION  1978        Family History   Problem Relation Age of Onset     CEREBROVASCULAR DISEASE Mother      Hypertension Mother      DIABETES Mother      Thyroid Disease Mother      Arthritis Mother      Alzheimer Disease Mother      Neurologic Disorder Mother      Parkinsons     HEART DISEASE Father      Hypertension Father      DIABETES Father      Thyroid Disease Father      Arthritis Father      Blood Disease Father      Anesthesia Reaction Sister      Thyroid Disease Sister      Anesthesia Reaction Sister      Arthritis Sister      RA     Hypertension Brother      Allergies Son      GASTROINTESTINAL DISEASE Son      GERD     Hypertension Brother      Allergies Son      percocett     GASTROINTESTINAL DISEASE Son      GERD     Hypertension Son        Social History     Social History     Marital status:      Spouse name: N/A     Number of children: 2     Years of education: N/A     Occupational History      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 No     Social History Narrative    Sikhism--declines all blood products       Outpatient Encounter Prescriptions as of 5/7/2018   Medication Sig Dispense Refill     ACETAMINOPHEN PO Take 500 mg by mouth as needed for pain       aspirin 81 MG tablet Take by mouth daily       lisinopril (PRINIVIL/ZESTRIL) 20 MG tablet Take 1 tablet (20 mg) by mouth daily 90 tablet 3     naproxen (NAPROSYN) 500 MG tablet Take 1 tablet (500 mg) by mouth 2 times daily as needed for moderate pain 60 tablet 3     omeprazole (PRILOSEC) 20 MG CR capsule TAKE ONE CAPSULE BY MOUTH DAILY BEFORE A MEAL 90 capsule 1     ORDER FOR DME Medium Tri Tracy 1 Device 0     ORDER FOR DME Equipment being ordered: Gell Heel inserts 1 Device 0     triamterene-hydrochlorothiazide (MAXZIDE-25) 37.5-25 MG per tablet Take 1 tablet by mouth daily 90 tablet 3     No facility-administered encounter medications on file as of 5/7/2018.              Review Of Systems  Skin: As above  Eyes: negative  Ears/Nose/Throat: negative  Respiratory: No shortness of breath, dyspnea on exertion, cough, or hemoptysis  Cardiovascular: negative  Gastrointestinal: negative  Genitourinary: negative  Musculoskeletal: negative  Neurologic: negative  Psychiatric: negative  Hematologic/Lymphatic/Immunologic: negative  Endocrine: negative      O:   NAD, WDWN, Alert & Oriented, Mood & Affect wnl, Vitals stable   Here today alone   BP (!) 131/91  Pulse 88  SpO2 97%   General appearance normal   Vitals stable   Alert, oriented and in no acute distress      Following lymph nodes palpated: Occipital, Cervical, Supraclavicular no lad   grittypapules on arms   bleeidng 1.2cm red plaque on right ab    Stuck on papules and brown macules on trunk and ext            The remainder of the full exam was unremarkable; the following areas were examined:  conjunctiva/lids, oral mucosa, neck, peripheral vascular system, abdomen, lymph nodes, digits/nails, eccrine and apocrine glands, scalp/hair, face, neck, chest, abdomen, buttocks, back, RUE, LUE, RLE, LLE       Eyes: Conjunctivae/lids:Normal     ENT: Lips, buccal mucosa, tongue: normal    MSK:Normal    Cardiovascular: peripheral edema none    Pulm: Breathing Normal    Lymph Nodes: No Head and Neck Lymphadenopathy     Neuro/Psych: Orientation:Normal; Mood/Affect:Normal      MICRO:   R ab:There is hyperkeratosis and focal parakeratosis of the epidermis, overlying atypical keratinocytes,  by areas of orthokeratosis, there are scattered basal atypical keratinocytes: with varying degrees of overlying loss of maturation, hyperchromatism, pleomorphism, increased and abnormal mitoses, dyskeratosis.  The dermis shows a variable inflammatory infiltrate.   A/P:  1. R ab r/o basal cell carcinoma   TANGENTIAL BIOPSY IN HOUSE:  After consent, anesthesia with LEC and prep, tangential excision performed and dx above confirmed with frozen section histology.  No complications and routine wound care.  Patient told result actinic keratosis     2. Seborrheic keratosis, letnigo, hx of non-melanoma skin cancer  3. Actinic keratosis  Efudex twice daily 3 weeks to arms   Irritation discussed with patient  .      BENIGN LESIONS DISCUSSED WITH PATIENT:  I discussed the specifics of tumor, prognosis, and genetics of benign lesions.  I explained that treatment of these lesions would be purely cosmetic and not medically neccessary.  I discussed with patient different removal options including excision, cautery and /or laser.      Nature and genetics of benign skin lesions dicussed with patient.  Signs and Symptoms of skin cancer discussed with patient.  Patient encouraged to perform monthly skin exams.  UV precautions reviewed with  patient.  Patient to follow up with Primary Care provider regarding elevated blood pressure.  Skin care regimen reviewed with patient: Eliminate harsh soaps, i.e. Dial, zest, irsih spring; Mild soaps such as Cetaphil or Dove sensitive skin, avoid hot or cold showers, aggressive use of emollients including vanicream, cetaphil or cerave discussed with patient.    Risks of non-melanoma skin cancer discussed with patient   Return to clinic 4 months

## 2018-05-07 NOTE — TELEPHONE ENCOUNTER
----- Message from Walt Golden MD sent at 5/7/2018  1:36 PM CDT -----  r ab actinic keratosis cryo

## 2018-05-07 NOTE — MR AVS SNAPSHOT
After Visit Summary   5/7/2018    Thomas Davila    MRN: [id number]           Patient Information     Date Of Birth          1950        Visit Information        Provider Department      5/7/2018 11:45 AM Walt Golden MD [facility]        Care Instructions          Wound Care Instructions     FOR SUPERFICIAL WOUNDS     [facility] [phone]    [facility] [phone]  Right abdomen                       AFTER 24 HOURS YOU SHOULD REMOVE THE BANDAGE AND BEGIN DAILY DRESSING CHANGES AS FOLLOWS:     1) Remove Dressing.     2) Clean and dry the area with tap water using a Q-tip or sterile gauze pad.     3) Apply Vaseline, Aquaphor, Polysporin ointment or Bacitracin ointment over entire wound.  Do NOT use Neosporin ointment.     4) Cover the wound with a band-aid, or a sterile non-stick gauze pad and micropore paper tape      REPEAT THESE INSTRUCTIONS AT LEAST ONCE A DAY UNTIL THE WOUND HAS COMPLETELY HEALED.    It is an old wives tale that a wound heals better when it is exposed to air and allowed to dry out. The wound will heal faster with a better cosmetic result if it is kept moist with ointment and covered with a bandage.    **Do not let the wound dry out.**      Supplies Needed:      *Cotton tipped applicators (Q-tips)    *Polysporin Ointment or Bacitracin Ointment (NOT NEOSPORIN)    *Band-aids or non-stick gauze pads and micropore paper tape.      PATIENT INFORMATION:    During the healing process you will notice a number of changes. All wounds develop a small halo of redness surrounding the wound.  This means healing is occurring. Severe itching with extensive redness usually indicates sensitivity to the ointment or bandage tape used to dress the wound.  You should call our office if this develops.      Swelling  and/or discoloration around your surgical site is common, particularly when performed around the eye.    All wounds normally  drain.  The larger the wound the more drainage there will be.  After 7-10 days, you will notice the wound beginning to shrink and new skin will begin to grow.  The wound is healed when you can see skin has formed over the entire area.  A healed wound has a healthy, shiny look to the surface and is red to dark pink in color to normalize.  Wounds may take approximately 4-6 weeks to heal.  Larger wounds may take 6-8 weeks.  After the wound is healed you may discontinue dressing changes.    You may experience a sensation of tightness as your wound heals. This is normal and will gradually subside.    Your healed wound may be sensitive to temperature changes. This sensitivity improves with time, but if you re having a lot of discomfort, try to avoid temperature extremes.    Patients frequently experience itching after their wound appears to have healed because of the continue healing under the skin.  Plain Vaseline will help relieve the itching.        POSSIBLE COMPLICATIONS    BLEEDIN. Leave the bandage in place.  2. Use tightly rolled up gauze or a cloth to apply direct pressure over the bandage for 30  minutes.  3. Reapply pressure for an additional 30 minutes if necessary  4. Use additional gauze and tape to maintain pressure once the bleeding has stopped.            Follow-ups after your visit        Your next 10 appointments already scheduled     Mar 27, 2019  1:20 PM CDT   LAB with [facility] ([facility])    [address] [place] [id number] [phone]           Please do not eat 10-12 hours before your appointment if you are coming in fasting for labs on lipids, cholesterol, or glucose (sugar). This does not apply to pregnant women. Water, hot tea and black coffee (with nothing added) are okay. Do not drink other fluids, diet soda or chew gum.            2019 11:15 AM CDT   Return Visit with Alissa Chacon MD   [facility] ([place]  "[facility]  [address] [place] [address]  [facility] [address]   [phone]              Who to contact     If you have questions or need follow up information about today's clinic visit or your schedule please contact [facility] directly at [phone].  Normal or non-critical lab and imaging results will be communicated to you by MyChart, letter or phone within 4 business days after the clinic has received the results. If you do not hear from us within 7 days, please contact the clinic through MyChart or phone. If you have a critical or abnormal lab result, we will notify you by phone as soon as possible.  Submit refill requests through [employer] or call your pharmacy and they will forward the refill request to us. Please allow 3 business days for your refill to be completed.          Additional Information About Your Visit        [employer] Information     [employer] lets you send messages to your doctor, view your test results, renew your prescriptions, schedule appointments and more. To sign up, go to [email or web address] . Click on \"Log in\" on the left side of the screen, which will take you to the Welcome page. Then click on \"Sign up Now\" on the right side of the page.     You will be asked to enter the access code listed below, as well as some personal information. Please follow the directions to create your username and password.     Your access code is: [email or web address]  Expires: 2018  2:06 PM     Your access code will  in 90 days. If you need help or a new code, please call your [facility] or [phone].        Care EveryWhere ID     This is your Care EveryWhere ID. This could be used by other organizations to access your [place] medical records  [id number]        Your Vitals Were     Pulse Pulse Oximetry                88 97%           Blood Pressure from Last 3 Encounters:   18 (!) 131/91   18 165/78   17 122/70    " Weight from Last 3 Encounters:   04/02/18 114.7 kg (252 lb 14.4 oz)   12/04/17 111.6 kg (246 lb)   10/02/17 110.6 kg (243 lb 14.4 oz)              Today, you had the following     No orders found for display       Primary Care Provider Office Phone # Fax #    Dung Prieto [phone] [address] [facility] [address]        Equal Access to Services     DASHAWN SALDAÑA : Hadii aad ku hadasho Soomaali, waaxda luqadaha, qaybta kaalmada adeegyada, waxay idiin hayaan adeeg kharash la'yeimy . [facility] [phone].    ATENCIÓN: Si habla español, tiene a schneider disposición servicios gratuitos de asistencia lingüística. [facility] [phone].    We comply with applicable federal civil rights laws and [place] laws. We do not discriminate on the basis of race, color, national origin, age, disability, sex, sexual orientation, or gender identity.            Thank you!     Thank you for choosing [facility]  for your care. Our goal is always to provide you with excellent care. Hearing back from our patients is one way we can continue to improve our services. Please take a few minutes to complete the written survey that you may receive in the mail after your visit with us. Thank you!             Your Updated Medication List - Protect others around you: Learn how to safely use, store and throw away your medicines at www.disposemymeds.org.          This list is accurate as of 5/7/18 11:54 AM.  Always use your most recent med list.                   Brand Name Dispense Instructions for use Diagnosis    ACETAMINOPHEN PO      Take 500 mg by mouth as needed for pain        aspirin 81 MG tablet      Take by mouth daily        lisinopril 20 MG tablet    PRINIVIL/ZESTRIL    90 tablet    Take 1 tablet (20 mg) by mouth daily    Essential hypertension       naproxen 500 MG tablet    NAPROSYN    60 tablet    Take 1 tablet (500 mg) by mouth 2 times daily as needed for moderate pain    Primary  osteoarthritis involving multiple joints       omeprazole 20 MG CR capsule    priLOSEC    90 capsule    TAKE ONE CAPSULE BY MOUTH DAILY BEFORE A MEAL    Gastroesophageal reflux disease without esophagitis       * order for DME     1 Device    Medium Tri Tracy    Right foot pain, Posterior tibialis muscle dysfunction       * order for DME     1 Device    Equipment being ordered: Gell Heel inserts    Plantar fasciitis, left       triamterene-hydrochlorothiazide 37.5-25 MG per tablet    MAXZIDE-25    90 tablet    Take 1 tablet by mouth daily    Essential hypertension       * Notice:  This list has 2 medication(s) that are the same as other medications prescribed for you. Read the directions carefully, and ask your doctor or other care provider to review them with you.

## 2018-05-07 NOTE — LETTER
2018         RE: Thomas Davila  [address]  [facility] [address]        Dear Colleague,    Thank you for referring your patient, Thomas Davila, to the [facility]. Please see a copy of my visit note below.    Thomas Davila is a 68 year old year old female patient here today for f/u h xof msc.  Today she note sbleeding spot on ab.  She also notes rough spot son arms.   .  Patient states this has been present for months on ab.  .  Patient reports the following symptoms:  bleeding.  Patient reports the following previous treatments none.  Patient reports the following modifying factors none.  Associated symptoms: none.  Patient has no other skin complaints today.  Remainder of the HPI, Meds, PMH, Allergies, FH, and SH was reviewed in chart.      Past Medical History:   Diagnosis Date     Acute parametritis and pelvic cellulitis     salpingitis     ASCUS with positive high risk HPV 2013     Asthma     No recent issues     Basal cell carcinoma      breast cancer     left lumpectomy, radiation, tamoxifen     Breast cancer (H)     L Breast; Lumpectomy & Radiation     Chronic salpingitis and oophoritis     PID        Embolism and thrombosis of unspecified site     left leg, due to tamoxifen therapy     Generalized osteoarthrosis, unspecified site     hands     Leiomyoma of uterus, unspecified      Other malignant neoplasm of skin, site unspecified 2006    Basal cell cancer on nose     Squamous cell carcinoma      Thrombosis of leg     Left     Unspecified essential hypertension        Past Surgical History:   Procedure Laterality Date     BREAST LUMPECTOMY, RT/LT      left     C/SECTION, LOW TRANSVERSE  1972,     , Low Transverse x2     CL AFF SURGICAL PATHOLOGY      Breast reduction     COLONOSCOPY  10/15/02    normal     FOOT SURGERY      R Foot      HC EXCISION BREAST LESION, OPEN >=1  98    1) Needle localization with generous  lumpectomy 2) Left axillary node dissection.     HC LAPAROSCOPY, SURGICAL, ABDOMEN, PERITONEUM & OMENTUM; DX W/ OR W/O SPECIMEN(S)  02/07/94     HYSTERECTOMY, PAP NO LONGER INDICATED       INSERT PORT VASCULAR ACCESS  3/14/2013    Procedure: INSERT PORT VASCULAR ACCESS;  Port Revision;  Surgeon: Arash Puente MD;  Location: [facility]     LAPAROSCOPIC HYSTERECTOMY TOTAL, BILATERAL SALPINGO-OOPHORECTOMY, NODE DISSECTION, COMBINED  1/31/2013    Procedure: COMBINED LAPAROSCOPIC HYSTERECTOMY TOTAL, SALPINGO-OOPHORECTOMY, NODE DISSECTION;  Laparosocpic  Total Abdominal Hysterectomy, Bilateral Salphino-Oophorectomy, Bilateral Pelvic Lymph Node Dissection, Pelvic Washings, Cystoscopy;  Surgeon: Tanya Walker MD;  Location: [facility]     SURGICAL HISTORY OF -   06/22/93    1) Excision of digital cyst right mid finger  2)  Excision of dermatofibroma left calf     SURGICAL HISTORY OF -   12/18/2001    Excision of mucinous cyst & partial ostectomy, bone removal of benign osteophytic overgrowth osteophyte of the distal aspect of the middle phalanx and distal phalanx     SURGICAL HISTORY OF -   2006    Basal cell cancer removal     TONSILLECTOMY       TUBAL LIGATION  1978        Family History   Problem Relation Age of Onset     CEREBROVASCULAR DISEASE Mother      Hypertension Mother      DIABETES Mother      Thyroid Disease Mother      Arthritis Mother      Alzheimer Disease Mother      Neurologic Disorder Mother      Parkinsons     HEART DISEASE Father      Hypertension Father      DIABETES Father      Thyroid Disease Father      Arthritis Father      Blood Disease Father      Anesthesia Reaction Sister      Thyroid Disease Sister      Anesthesia Reaction Sister      Arthritis Sister      RA     Hypertension Brother      Allergies Son      GASTROINTESTINAL DISEASE Son      GERD     Hypertension Brother      Allergies Son      percocett     GASTROINTESTINAL DISEASE Son      GERD     Hypertension Son        Social History      Social History     Marital status:      Spouse name: N/A     Number of children: 2     Years of education: N/A     Occupational History      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 No     Social History Narrative    L[employer]-day--declines all blood products       Outpatient Encounter Prescriptions as of 5/7/2018   Medication Sig Dispense Refill     ACETAMINOPHEN PO Take 500 mg by mouth as needed for pain       aspirin 81 MG tablet Take by mouth daily       lisinopril (PRINIVIL/ZESTRIL) 20 MG tablet Take 1 tablet (20 mg) by mouth daily 90 tablet 3     naproxen (NAPROSYN) 500 MG tablet Take 1 tablet (500 mg) by mouth 2 times daily as needed for moderate pain 60 tablet 3     omeprazole (PRILOSEC) 20 MG CR capsule TAKE ONE CAPSULE BY MOUTH DAILY BEFORE A MEAL 90 capsule 1     ORDER FOR DME Medium Tri Tracy 1 Device 0     ORDER FOR DME Equipment being ordered: Gell Heel inserts 1 Device 0     triamterene-hydrochlorothiazide (MAXZIDE-25) 37.5-25 MG per tablet Take 1 tablet by mouth daily 90 tablet 3     No facility-administered encounter medications on file as of 5/7/2018.              Review Of Systems  Skin: As above  Eyes: negative  Ears/Nose/Throat: negative  Respiratory: No shortness of breath, dyspnea on exertion, cough, or hemoptysis  Cardiovascular: negative  Gastrointestinal: negative  Genitourinary: negative  Musculoskeletal: negative  Neurologic: negative  Psychiatric: negative  Hematologic/Lymphatic/Immunologic: negative  Endocrine: negative      O:   NAD, WDWN, Alert & Oriented, Mood & Affect wnl, Vitals stable   Here today alone   BP (!) 131/91  Pulse 88  SpO2 97%   General appearance normal   Vitals  stable   Alert, oriented and in no acute distress      Following lymph nodes palpated: Occipital, Cervical, Supraclavicular no lad   grittypapules on arms   bleeidng 1.2cm red plaque on right ab    Stuck on papules and brown macules on trunk and ext           The remainder of the full exam was unremarkable; the following areas were examined:  conjunctiva/lids, oral mucosa, neck, peripheral vascular system, abdomen, lymph nodes, digits/nails, eccrine and apocrine glands, scalp/hair, face, neck, chest, abdomen, buttocks, back, RUE, LUE, RLE, LLE       Eyes: Conjunctivae/lids:Normal     ENT: Lips, buccal mucosa, tongue: normal    MSK:Normal    Cardiovascular: peripheral edema none    Pulm: Breathing Normal    Lymph Nodes: No Head and Neck Lymphadenopathy     Neuro/Psych: Orientation:Normal; Mood/Affect:Normal      MICRO:   R ab:There is hyperkeratosis and focal parakeratosis of the epidermis, overlying atypical keratinocytes,  by areas of orthokeratosis, there are scattered basal atypical keratinocytes: with varying degrees of overlying loss of maturation, hyperchromatism, pleomorphism, increased and abnormal mitoses, dyskeratosis.  The dermis shows a variable inflammatory infiltrate.   A/P:  1. R ab r/o basal cell carcinoma   TANGENTIAL BIOPSY IN HOUSE:  After consent, anesthesia with LEC and prep, tangential excision performed and dx above confirmed with frozen section histology.  No complications and routine wound care.  Patient told result actinic keratosis     2. Seborrheic keratosis, letnigo, hx of non-melanoma skin cancer  3. Actinic keratosis  Efudex twice daily 3 weeks to arms   Irritation discussed with patient  .      BENIGN LESIONS DISCUSSED WITH PATIENT:  I discussed the specifics of tumor, prognosis, and genetics of benign lesions.  I explained that treatment of these lesions would be purely cosmetic and not medically neccessary.  I discussed with patient different removal options including  excision, cautery and /or laser.      Nature and genetics of benign skin lesions dicussed with patient.  Signs and Symptoms of skin cancer discussed with patient.  Patient encouraged to perform monthly skin exams.  UV precautions reviewed with patient.  Patient to follow up with Primary Care provider regarding elevated blood pressure.  Skin care regimen reviewed with patient: Eliminate harsh soaps, i.e. Dial, zest, irsih spring; Mild soaps such as Cetaphil or Dove sensitive skin, avoid hot or cold showers, aggressive use of emollients including vanicream, cetaphil or cerave discussed with patient.    Risks of non-melanoma skin cancer discussed with patient   Return to clinic 4 months      Again, thank you for allowing me to participate in the care of your patient.        Sincerely,        Walt Golden MD

## 2018-05-07 NOTE — PATIENT INSTRUCTIONS
Wound Care Instructions     FOR SUPERFICIAL WOUNDS     [facility] [phone]    [facility] [phone]  Right abdomen                       AFTER 24 HOURS YOU SHOULD REMOVE THE BANDAGE AND BEGIN DAILY DRESSING CHANGES AS FOLLOWS:     1) Remove Dressing.     2) Clean and dry the area with tap water using a Q-tip or sterile gauze pad.     3) Apply Vaseline, Aquaphor, Polysporin ointment or Bacitracin ointment over entire wound.  Do NOT use Neosporin ointment.     4) Cover the wound with a band-aid, or a sterile non-stick gauze pad and micropore paper tape      REPEAT THESE INSTRUCTIONS AT LEAST ONCE A DAY UNTIL THE WOUND HAS COMPLETELY HEALED.    It is an old wives tale that a wound heals better when it is exposed to air and allowed to dry out. The wound will heal faster with a better cosmetic result if it is kept moist with ointment and covered with a bandage.    **Do not let the wound dry out.**      Supplies Needed:      *Cotton tipped applicators (Q-tips)    *Polysporin Ointment or Bacitracin Ointment (NOT NEOSPORIN)    *Band-aids or non-stick gauze pads and micropore paper tape.      PATIENT INFORMATION:    During the healing process you will notice a number of changes. All wounds develop a small halo of redness surrounding the wound.  This means healing is occurring. Severe itching with extensive redness usually indicates sensitivity to the ointment or bandage tape used to dress the wound.  You should call our office if this develops.      Swelling  and/or discoloration around your surgical site is common, particularly when performed around the eye.    All wounds normally drain.  The larger the wound the more drainage there will be.  After 7-10 days, you will notice the wound beginning to shrink and new skin will begin to grow.  The wound is healed when you can see skin has formed over the entire area.  A healed wound has a healthy, shiny look to the surface and is red to dark  pink in color to normalize.  Wounds may take approximately 4-6 weeks to heal.  Larger wounds may take 6-8 weeks.  After the wound is healed you may discontinue dressing changes.    You may experience a sensation of tightness as your wound heals. This is normal and will gradually subside.    Your healed wound may be sensitive to temperature changes. This sensitivity improves with time, but if you re having a lot of discomfort, try to avoid temperature extremes.    Patients frequently experience itching after their wound appears to have healed because of the continue healing under the skin.  Plain Vaseline will help relieve the itching.        POSSIBLE COMPLICATIONS    BLEEDIN. Leave the bandage in place.  2. Use tightly rolled up gauze or a cloth to apply direct pressure over the bandage for 30  minutes.  3. Reapply pressure for an additional 30 minutes if necessary  4. Use additional gauze and tape to maintain pressure once the bleeding has stopped.

## 2018-05-07 NOTE — NURSING NOTE
Chief Complaint   Patient presents with     Skin Check       Vitals:    05/07/18 1140 05/07/18 1141   BP: (!) 131/91 (!) 131/91   Pulse: 88    SpO2: 97%      Wt Readings from Last 1 Encounters:   04/02/18 114.7 kg (252 lb 14.4 oz)       Mila Snider LPN.................5/7/2018

## 2018-05-09 ENCOUNTER — OFFICE VISIT (OUTPATIENT)
Dept: DERMATOLOGY | Facility: CLINIC | Age: 68
End: 2018-05-09
Payer: MEDICARE

## 2018-05-09 DIAGNOSIS — K21.9 GASTROESOPHAGEAL REFLUX DISEASE WITHOUT ESOPHAGITIS: ICD-10-CM

## 2018-05-09 DIAGNOSIS — L57.0 AK (ACTINIC KERATOSIS): Primary | ICD-10-CM

## 2018-05-09 PROCEDURE — 17000 DESTRUCT PREMALG LESION: CPT | Performed by: DERMATOLOGY

## 2018-05-09 NOTE — LETTER
2018         RE: Thomas Davila  [address]  [facility] [address]        Dear Colleague,    Thank you for referring your patient, Thomas Davila, to the [facility]. Please see a copy of my visit note below.    Thomas Davila is a 68 year old year old female patient here today for evaluation and managment of actinic keratosis.  Surgical site doing well. Patient has no other skin complaints today.  Remainder of the HPI, Meds, PMH, Allergies, FH, and SH was reviewed in chart.      Past Medical History:   Diagnosis Date     Acute parametritis and pelvic cellulitis     salpingitis     ASCUS with positive high risk HPV 2013     Asthma     No recent issues     Basal cell carcinoma      breast cancer     left lumpectomy, radiation, tamoxifen     Breast cancer (H)     L Breast; Lumpectomy & Radiation     Chronic salpingitis and oophoritis     PID        Embolism and thrombosis of unspecified site     left leg, due to tamoxifen therapy     Generalized osteoarthrosis, unspecified site     hands     Leiomyoma of uterus, unspecified      Other malignant neoplasm of skin, site unspecified 2006    Basal cell cancer on nose     Squamous cell carcinoma      Thrombosis of leg     Left     Unspecified essential hypertension        Past Surgical History:   Procedure Laterality Date     BREAST LUMPECTOMY, RT/LT      left     C/SECTION, LOW TRANSVERSE  1972,     , Low Transverse x2     CL AFF SURGICAL PATHOLOGY      Breast reduction     COLONOSCOPY  10/15/02    normal     FOOT SURGERY      R Foot      HC EXCISION BREAST LESION, OPEN >=1  98    1) Needle localization with generous lumpectomy 2) Left axillary node dissection.     HC LAPAROSCOPY, SURGICAL, ABDOMEN, PERITONEUM & OMENTUM; DX W/ OR W/O SPECIMEN(S)  94     HYSTERECTOMY, PAP NO LONGER INDICATED       INSERT PORT VASCULAR ACCESS  3/14/2013    Procedure: INSERT PORT VASCULAR ACCESS;   Port Revision;  Surgeon: Arash Puente MD;  Location: [facility]     LAPAROSCOPIC HYSTERECTOMY TOTAL, BILATERAL SALPINGO-OOPHORECTOMY, NODE DISSECTION, COMBINED  1/31/2013    Procedure: COMBINED LAPAROSCOPIC HYSTERECTOMY TOTAL, SALPINGO-OOPHORECTOMY, NODE DISSECTION;  Laparosocpic  Total Abdominal Hysterectomy, Bilateral Salphino-Oophorectomy, Bilateral Pelvic Lymph Node Dissection, Pelvic Washings, Cystoscopy;  Surgeon: Tanya Walker MD;  Location: [facility]     SURGICAL HISTORY OF -   06/22/93    1) Excision of digital cyst right mid finger  2)  Excision of dermatofibroma left calf     SURGICAL HISTORY OF -   12/18/2001    Excision of mucinous cyst & partial ostectomy, bone removal of benign osteophytic overgrowth osteophyte of the distal aspect of the middle phalanx and distal phalanx     SURGICAL HISTORY OF -   2006    Basal cell cancer removal     TONSILLECTOMY       TUBAL LIGATION  1978        Family History   Problem Relation Age of Onset     CEREBROVASCULAR DISEASE Mother      Hypertension Mother      DIABETES Mother      Thyroid Disease Mother      Arthritis Mother      Alzheimer Disease Mother      Neurologic Disorder Mother      Parkinsons     HEART DISEASE Father      Hypertension Father      DIABETES Father      Thyroid Disease Father      Arthritis Father      Blood Disease Father      Anesthesia Reaction Sister      Thyroid Disease Sister      Anesthesia Reaction Sister      Arthritis Sister      RA     Hypertension Brother      Allergies Son      GASTROINTESTINAL DISEASE Son      GERD     Hypertension Brother      Allergies Son      percocett     GASTROINTESTINAL DISEASE Son      GERD     Hypertension Son        Social History     Social History     Marital status:      Spouse name: N/A     Number of children: 2     Years of education: N/A     Occupational History      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 No     Social History Narrative    [employer]--declines all blood products       Outpatient Encounter Prescriptions as of 5/9/2018   Medication Sig Dispense Refill     ACETAMINOPHEN PO Take 500 mg by mouth as needed for pain       aspirin 81 MG tablet Take by mouth daily       fluorouracil (EFUDEX) 5 % cream Twice daily for 3 weeks to arms 40 g 1     lisinopril (PRINIVIL/ZESTRIL) 20 MG tablet Take 1 tablet (20 mg) by mouth daily 90 tablet 3     naproxen (NAPROSYN) 500 MG tablet Take 1 tablet (500 mg) by mouth 2 times daily as needed for moderate pain 60 tablet 3     omeprazole (PRILOSEC) 20 MG CR capsule TAKE ONE CAPSULE BY MOUTH DAILY BEFORE A MEAL 90 capsule 1     ORDER FOR DME Medium Tri Tracy 1 Device 0     ORDER FOR DME Equipment being ordered: Gell Heel inserts 1 Device 0     triamterene-hydrochlorothiazide (MAXZIDE-25) 37.5-25 MG per tablet Take 1 tablet by mouth daily 90 tablet 3     No facility-administered encounter medications on file as of 5/9/2018.              Review Of Systems  Skin: As above  Eyes: negative  Ears/Nose/Throat: negative  Respiratory: No shortness of breath, dyspnea on exertion, cough, or hemoptysis  Cardiovascular: negative  Gastrointestinal: negative  Genitourinary: negative  Musculoskeletal: negative  Neurologic: negative  Psychiatric: negative  Hematologic/Lymphatic/Immunologic: negative  Endocrine: negative      O:   NAD, WDWN, Alert & Oriented, Mood & Affect wnl, Vitals stable   Here today alone   There were no vitals taken for this visit.   General appearance normal   Vitals stable   Alert, oriented and in no acute distress     R ab gritty plaque       Eyes: Conjunctivae/lids:Normal     ENT: Lips, buccal mucosa, tongue: normal    MSK:Normal    Cardiovascular: peripheral  edema none    Pulm: Breathing Normal        A/P:  1. Actinic keratosis   Pathophysiology discussed with pateint   LN2:  Treated with LN2 for 5s for 1-2 cycles. Warned risks of blistering, pain, pigment change, scarring, and incomplete resolution.  Advised patient to return if lesions do not completely resolve.  Wound care sheet given.    Patient to follow up with Primary Care provider regarding elevated blood pressure.  BENIGN LESIONS DISCUSSED WITH PATIENT:  I discussed the specifics of tumor, prognosis, and genetics of benign lesions.  I explained that treatment of these lesions would be purely cosmetic and not medically neccessary.  I discussed with patient different removal options including excision, cautery and /or laser.      Nature and genetics of benign skin lesions dicussed with patient.  Signs and Symptoms of skin cancer discussed with patient.  Patient encouraged to perform monthly skin exams.  UV precautions reviewed with patient.  Patient to follow up with Primary Care provider regarding elevated blood pressure.  Skin care regimen reviewed with patient: Eliminate harsh soaps, i.e. Dial, zest, irsih spring; Mild soaps such as Cetaphil or Dove sensitive skin, avoid hot or cold showers, aggressive use of emollients including vanicream, cetaphil or cerave discussed with patient.    Risks of non-melanoma skin cancer discussed with patient   Return to clinic 6 months      Again, thank you for allowing me to participate in the care of your patient.        Sincerely,        Walt Golden MD

## 2018-05-09 NOTE — MR AVS SNAPSHOT
After Visit Summary   5/9/2018    Thomas Davila    MRN: [id number]           Patient Information     Date Of Birth          1950        Visit Information        Provider Department      5/9/2018 10:00 AM Walt Golden MD [facility]        Care Instructions    WOUND CARE INSTRUCTIONS   FOR CRYOSURGERY   This area treated with liquid nitrogen will form a blister. You do not need to bandage the area until after the blister forms and breaks (which may be a few days). When the blister breaks, begin daily dressing changes as follows:   1) Clean and dry the area with tap water using clean Q-tip or sterile gauze pad.   2) Apply Polysporin ointment or Bacitracin ointment over entire wound. Do NOT use Neosporin ointment.   3) Cover the wound with a band-aid or sterile non-stick gauze pad and micropore paper tape.   REPEAT THESE INSTRUCTIONS AT LEAST ONCE A DAY UNTIL THE WOUND HAS COMPLETELY HEALED.   It is an old wives tale that a wound heals better when it is exposed to air and allowed to dry out. The wound will heal faster with a better cosmetic result if it is kept moist with ointment and covered with a bandage.   Do not let the wound dry out.   IMPORTANT INFORMATION ON REVERSE SIDE   Supplies Needed:   *Cotton tipped applicators (Q-tips)   *Polysporin ointment or Bacitracin ointment (NOT NEOSPORIN)   *Band-aids, or non stick gauze pads and micropore paper tape   PATIENT INFORMATION   During the healing process you will notice a number of changes. All wounds develop a small halo of redness surrounding the wound. This means healing is occurring. Severe itching with extensive redness usually indicates sensitivity to the ointment or bandage tape used to dress the wound. You should call our office if this develops.   Swelling and/or discoloration around your surgical site is common, particularly when performed around the eye.   All wounds normally drain. The larger the wound the  more drainage there will be. After 7-10 days, you will notice the wound beginning to shrink and new skin will begin to grow. The wound is healed when you can see skin has formed over the entire area. A healed wound has a healthy, shiny look to the surface and is red to dark pink in color to normalize. Wounds may take approximately 4-6 weeks to heal. Larger wounds may take 6-8 weeks. After the wound is healed you may discontinue dressing changes.   You may experience a sensation of tightness as your wound heals. This is normal and will gradually subside.   Your healed wound may be sensitive to temperature changes. This sensitivity improves with time, but if you re having a lot of discomfort, try to avoid temperature extremes.   Patients frequently experience itching after their wound appears to have healed because of the continue healing under the skin. Plain Vaseline will help relieve the itching.                 Follow-ups after your visit        Your next 10 appointments already scheduled     Mar 27, 2019  1:20 PM CDT   LAB with [facility] ([facility])    [address] [id number] [phone]           Please do not eat 10-12 hours before your appointment if you are coming in fasting for labs on lipids, cholesterol, or glucose (sugar). This does not apply to pregnant women. Water, hot tea and black coffee (with nothing added) are okay. Do not drink other fluids, diet soda or chew gum.            Apr 01, 2019 11:15 AM CDT   Return Visit with Alissa Chacon MD   [facility] ([facility])    [facility]  [address]  [facility] [id number] [phone]              Who to contact     If you have questions or need follow up information about today's clinic visit or your schedule please contact [facility] directly at [phone].  Normal or non-critical lab and imaging results will be  "communicated to you by MyChart, letter or phone within 4 business days after the clinic has received the results. If you do not hear from us within 7 days, please contact the clinic through [employer] or phone. If you have a critical or abnormal lab result, we will notify you by phone as soon as possible.  Submit refill requests through [employer] or call your pharmacy and they will forward the refill request to us. Please allow 3 business days for your refill to be completed.          Additional Information About Your Visit        [employer] Information     [employer] lets you send messages to your doctor, view your test results, renew your prescriptions, schedule appointments and more. To sign up, go to [email or web address] . Click on \"Log in\" on the left side of the screen, which will take you to the Welcome page. Then click on \"Sign up Now\" on the right side of the page.     You will be asked to enter the access code listed below, as well as some personal information. Please follow the directions to create your username and password.     Your access code is: [email or web address]  Expires: 2018  2:06 PM     Your access code will  in 90 days. If you need help or a new code, please call your [place] clinic or [phone].        Care EveryWhere ID     This is your Care EveryWhere ID. This could be used by other organizations to access your [place] medical records  [id number]         Blood Pressure from Last 3 Encounters:   18 (!) 131/91   18 165/78   17 122/70    Weight from Last 3 Encounters:   18 114.7 kg (252 lb 14.4 oz)   17 111.6 kg (246 lb)   10/02/17 110.6 kg (243 lb 14.4 oz)              Today, you had the following     No orders found for display       Primary Care Provider Office Phone # Fax #    Dung Prieto [phone] [address] [facility] [address]        Equal Access to Services     DASHAWN SALDAÑA AH: Hadii pa Alvarez " abhijeet letyblake herculesperez forbes. So [facility] [phone].    ATENCIÓN: Si bert stock, tiene a schneider disposición servicios gratuitos de asistencia lingüística. Negro al [phone].    We comply with applicable federal civil rights laws and [place] laws. We do not discriminate on the basis of race, color, national origin, age, disability, sex, sexual orientation, or gender identity.            Thank you!     Thank you for choosing [facility]  for your care. Our goal is always to provide you with excellent care. Hearing back from our patients is one way we can continue to improve our services. Please take a few minutes to complete the written survey that you may receive in the mail after your visit with us. Thank you!             Your Updated Medication List - Protect others around you: Learn how to safely use, store and throw away your medicines at www.disposemymeds.org.          This list is accurate as of 5/9/18 10:08 AM.  Always use your most recent med list.                   Brand Name Dispense Instructions for use Diagnosis    ACETAMINOPHEN PO      Take 500 mg by mouth as needed for pain        aspirin 81 MG tablet      Take by mouth daily        fluorouracil 5 % cream    EFUDEX    40 g    Twice daily for 3 weeks to arms    AK (actinic keratosis), History of skin cancer, SK (seborrheic keratosis), Lentigo       lisinopril 20 MG tablet    PRINIVIL/ZESTRIL    90 tablet    Take 1 tablet (20 mg) by mouth daily    Essential hypertension       naproxen 500 MG tablet    NAPROSYN    60 tablet    Take 1 tablet (500 mg) by mouth 2 times daily as needed for moderate pain    Primary osteoarthritis involving multiple joints       omeprazole 20 MG CR capsule    priLOSEC    90 capsule    TAKE ONE CAPSULE BY MOUTH DAILY BEFORE A MEAL    Gastroesophageal reflux disease without esophagitis       * order for DME     1 Device    Medium Tri Tracy    Right foot pain,  Posterior tibialis muscle dysfunction       * order for DME     1 Device    Equipment being ordered: Gell Heel inserts    Plantar fasciitis, left       triamterene-hydrochlorothiazide 37.5-25 MG per tablet    MAXZIDE-25    90 tablet    Take 1 tablet by mouth daily    Essential hypertension       * Notice:  This list has 2 medication(s) that are the same as other medications prescribed for you. Read the directions carefully, and ask your doctor or other care provider to review them with you.

## 2018-05-09 NOTE — PROGRESS NOTES
Thomas Davila is a 68 year old year old female patient here today for evaluation and managment of actinic keratosis.  Surgical site doing well. Patient has no other skin complaints today.  Remainder of the HPI, Meds, PMH, Allergies, FH, and SH was reviewed in chart.      Past Medical History:   Diagnosis Date     Acute parametritis and pelvic cellulitis     salpingitis     ASCUS with positive high risk HPV 2013     Asthma     No recent issues     Basal cell carcinoma      breast cancer     left lumpectomy, radiation, tamoxifen     Breast cancer (H)     L Breast; Lumpectomy & Radiation     Chronic salpingitis and oophoritis     PID        Embolism and thrombosis of unspecified site     left leg, due to tamoxifen therapy     Generalized osteoarthrosis, unspecified site     hands     Leiomyoma of uterus, unspecified      Other malignant neoplasm of skin, site unspecified 2006    Basal cell cancer on nose     Squamous cell carcinoma      Thrombosis of leg     Left     Unspecified essential hypertension        Past Surgical History:   Procedure Laterality Date     BREAST LUMPECTOMY, RT/LT      left     C/SECTION, LOW TRANSVERSE  1972,     , Low Transverse x2     CL AFF SURGICAL PATHOLOGY      Breast reduction     COLONOSCOPY  10/15/02    normal     FOOT SURGERY      R Foot      HC EXCISION BREAST LESION, OPEN >=1  98    1) Needle localization with generous lumpectomy 2) Left axillary node dissection.     HC LAPAROSCOPY, SURGICAL, ABDOMEN, PERITONEUM & OMENTUM; DX W/ OR W/O SPECIMEN(S)  94     HYSTERECTOMY, PAP NO LONGER INDICATED       INSERT PORT VASCULAR ACCESS  3/14/2013    Procedure: INSERT PORT VASCULAR ACCESS;  Port Revision;  Surgeon: Arash Puente MD;  Location: [facility] OR     LAPAROSCOPIC HYSTERECTOMY TOTAL, BILATERAL SALPINGO-OOPHORECTOMY, NODE DISSECTION, COMBINED  2013    Procedure: COMBINED LAPAROSCOPIC HYSTERECTOMY TOTAL,  SALPINGO-OOPHORECTOMY, NODE DISSECTION;  Laparosocpic  Total Abdominal Hysterectomy, Bilateral Salphino-Oophorectomy, Bilateral Pelvic Lymph Node Dissection, Pelvic Washings, Cystoscopy;  Surgeon: Tanya Walker MD;  Location: [facility] OR     SURGICAL HISTORY OF -   06/22/93    1) Excision of digital cyst right mid finger  2)  Excision of dermatofibroma left calf     SURGICAL HISTORY OF -   12/18/2001    Excision of mucinous cyst & partial ostectomy, bone removal of benign osteophytic overgrowth osteophyte of the distal aspect of the middle phalanx and distal phalanx     SURGICAL HISTORY OF -   2006    Basal cell cancer removal     TONSILLECTOMY       TUBAL LIGATION  1978        Family History   Problem Relation Age of Onset     CEREBROVASCULAR DISEASE Mother      Hypertension Mother      DIABETES Mother      Thyroid Disease Mother      Arthritis Mother      Alzheimer Disease Mother      Neurologic Disorder Mother      Parkinsons     HEART DISEASE Father      Hypertension Father      DIABETES Father      Thyroid Disease Father      Arthritis Father      Blood Disease Father      Anesthesia Reaction Sister      Thyroid Disease Sister      Anesthesia Reaction Sister      Arthritis Sister      RA     Hypertension Brother      Allergies Son      GASTROINTESTINAL DISEASE Son      GERD     Hypertension Brother      Allergies Son      percocett     GASTROINTESTINAL DISEASE Son      GERD     Hypertension Son        Social History     Social History     Marital status:      Spouse name: N/A     Number of children: 2     Years of education: N/A     Occupational History      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 No     Social History  Narrative    Buddhism--declines all blood products       Outpatient Encounter Prescriptions as of 5/9/2018   Medication Sig Dispense Refill     ACETAMINOPHEN PO Take 500 mg by mouth as needed for pain       aspirin 81 MG tablet Take by mouth daily       fluorouracil (EFUDEX) 5 % cream Twice daily for 3 weeks to arms 40 g 1     lisinopril (PRINIVIL/ZESTRIL) 20 MG tablet Take 1 tablet (20 mg) by mouth daily 90 tablet 3     naproxen (NAPROSYN) 500 MG tablet Take 1 tablet (500 mg) by mouth 2 times daily as needed for moderate pain 60 tablet 3     omeprazole (PRILOSEC) 20 MG CR capsule TAKE ONE CAPSULE BY MOUTH DAILY BEFORE A MEAL 90 capsule 1     ORDER FOR DME Medium Tri Tracy 1 Device 0     ORDER FOR DME Equipment being ordered: Gell Heel inserts 1 Device 0     triamterene-hydrochlorothiazide (MAXZIDE-25) 37.5-25 MG per tablet Take 1 tablet by mouth daily 90 tablet 3     No facility-administered encounter medications on file as of 5/9/2018.              Review Of Systems  Skin: As above  Eyes: negative  Ears/Nose/Throat: negative  Respiratory: No shortness of breath, dyspnea on exertion, cough, or hemoptysis  Cardiovascular: negative  Gastrointestinal: negative  Genitourinary: negative  Musculoskeletal: negative  Neurologic: negative  Psychiatric: negative  Hematologic/Lymphatic/Immunologic: negative  Endocrine: negative      O:   NAD, WDWN, Alert & Oriented, Mood & Affect wnl, Vitals stable   Here today alone   There were no vitals taken for this visit.   General appearance normal   Vitals stable   Alert, oriented and in no acute distress     R ab gritty plaque       Eyes: Conjunctivae/lids:Normal     ENT: Lips, buccal mucosa, tongue: normal    MSK:Normal    Cardiovascular: peripheral edema none    Pulm: Breathing Normal        A/P:  1. Actinic keratosis   Pathophysiology discussed with pateint   LN2:  Treated with LN2 for 5s for 1-2 cycles. Warned risks of blistering, pain, pigment change, scarring, and  incomplete resolution.  Advised patient to return if lesions do not completely resolve.  Wound care sheet given.    Patient to follow up with Primary Care provider regarding elevated blood pressure.  BENIGN LESIONS DISCUSSED WITH PATIENT:  I discussed the specifics of tumor, prognosis, and genetics of benign lesions.  I explained that treatment of these lesions would be purely cosmetic and not medically neccessary.  I discussed with patient different removal options including excision, cautery and /or laser.      Nature and genetics of benign skin lesions dicussed with patient.  Signs and Symptoms of skin cancer discussed with patient.  Patient encouraged to perform monthly skin exams.  UV precautions reviewed with patient.  Patient to follow up with Primary Care provider regarding elevated blood pressure.  Skin care regimen reviewed with patient: Eliminate harsh soaps, i.e. Dial, zest, irsih spring; Mild soaps such as Cetaphil or Dove sensitive skin, avoid hot or cold showers, aggressive use of emollients including vanicream, cetaphil or cerave discussed with patient.    Risks of non-melanoma skin cancer discussed with patient   Return to clinic 6 months

## 2018-11-03 DIAGNOSIS — K21.9 GASTROESOPHAGEAL REFLUX DISEASE WITHOUT ESOPHAGITIS: ICD-10-CM

## 2018-11-05 NOTE — TELEPHONE ENCOUNTER
Medication is being filled for 1 time refill only due to:  Patient needs to be seen because she is due for OV Dec 2018..   Mattie GOMEZ RN

## 2018-11-05 NOTE — TELEPHONE ENCOUNTER
"Requested Prescriptions   Pending Prescriptions Disp Refills     omeprazole (PRILOSEC) 20 MG CR capsule [Pharmacy Med Name: OMEPRAZOLE 20MG DR CAP] 90 capsule      Sig: TAKE ONE CAPSULE BY MOUTH DAILY BEFORE A MEAL    PPI Protocol Passed    11/3/2018  1:00 AM       Passed - Not on Clopidogrel (unless Pantoprazole ordered)       Passed - No diagnosis of osteoporosis on record       Passed - Recent (12 mo) or future (30 days) visit within the authorizing provider's specialty    Patient had office visit in the last 12 months or has a visit in the next 30 days with authorizing provider or within the authorizing provider's specialty.  See \"Patient Info\" tab in inbasket, or \"Choose Columns\" in Meds & Orders section of the refill encounter.             Passed - Patient is age 18 or older       Passed - No active pregnacy on record       Passed - No positive pregnancy test in past 12 months          "

## 2018-11-07 ENCOUNTER — OFFICE VISIT (OUTPATIENT)
Dept: DERMATOLOGY | Facility: CLINIC | Age: 68
End: 2018-11-07
Payer: MEDICARE

## 2018-11-07 VITALS — DIASTOLIC BLOOD PRESSURE: 81 MMHG | HEART RATE: 101 BPM | SYSTOLIC BLOOD PRESSURE: 139 MMHG | RESPIRATION RATE: 16 BRPM

## 2018-11-07 DIAGNOSIS — D18.00 ANGIOMA: ICD-10-CM

## 2018-11-07 DIAGNOSIS — L82.1 SK (SEBORRHEIC KERATOSIS): ICD-10-CM

## 2018-11-07 DIAGNOSIS — Z85.828 HISTORY OF SKIN CANCER: ICD-10-CM

## 2018-11-07 DIAGNOSIS — L81.4 LENTIGO: ICD-10-CM

## 2018-11-07 DIAGNOSIS — L57.0 AK (ACTINIC KERATOSIS): Primary | ICD-10-CM

## 2018-11-07 PROCEDURE — 99213 OFFICE O/P EST LOW 20 MIN: CPT | Performed by: DERMATOLOGY

## 2018-11-07 NOTE — PROGRESS NOTES
Thomas Davila is a 68 year old year old female patient here today for rough spot son arms.  She has not used efudex yet.   .  Patient states this has been present for a while.  Patient reports the following symptoms:  none .  Patient reports the following previous treatments none.  Patient reports the following modifying factors none.  Associated symptoms: none.  Patient has no other skin complaints today.  Remainder of the HPI, Meds, PMH, Allergies, FH, and SH was reviewed in chart.      Past Medical History:   Diagnosis Date     Acute parametritis and pelvic cellulitis     salpingitis     ASCUS with positive high risk HPV 2013     Asthma     No recent issues     Basal cell carcinoma      breast cancer     left lumpectomy, radiation, tamoxifen     Breast cancer (H)     L Breast; Lumpectomy & Radiation     Chronic salpingitis and oophoritis     PID        Embolism and thrombosis of unspecified site     left leg, due to tamoxifen therapy     Generalized osteoarthrosis, unspecified site     hands     Leiomyoma of uterus, unspecified      Other malignant neoplasm of skin, site unspecified 2006    Basal cell cancer on nose     Squamous cell carcinoma      Thrombosis of leg     Left     Unspecified essential hypertension        Past Surgical History:   Procedure Laterality Date     BREAST LUMPECTOMY, RT/LT      left     C/SECTION, LOW TRANSVERSE  1972,     , Low Transverse x2     CL AFF SURGICAL PATHOLOGY      Breast reduction     COLONOSCOPY  10/15/02    normal     FOOT SURGERY      R Foot      HC EXCISION BREAST LESION, OPEN >=1  98    1) Needle localization with generous lumpectomy 2) Left axillary node dissection.     HC LAPAROSCOPY, SURGICAL, ABDOMEN, PERITONEUM & OMENTUM; DX W/ OR W/O SPECIMEN(S)  94     HYSTERECTOMY, PAP NO LONGER INDICATED       INSERT PORT VASCULAR ACCESS  3/14/2013    Procedure: INSERT PORT VASCULAR ACCESS;  Port Revision;  Surgeon:  Arash Puente MD;  Location: [facility]     LAPAROSCOPIC HYSTERECTOMY TOTAL, BILATERAL SALPINGO-OOPHORECTOMY, NODE DISSECTION, COMBINED  1/31/2013    Procedure: COMBINED LAPAROSCOPIC HYSTERECTOMY TOTAL, SALPINGO-OOPHORECTOMY, NODE DISSECTION;  Laparosocpic  Total Abdominal Hysterectomy, Bilateral Salphino-Oophorectomy, Bilateral Pelvic Lymph Node Dissection, Pelvic Washings, Cystoscopy;  Surgeon: Tanya Walker MD;  Location: [facility]     SURGICAL HISTORY OF -   06/22/93    1) Excision of digital cyst right mid finger  2)  Excision of dermatofibroma left calf     SURGICAL HISTORY OF -   12/18/2001    Excision of mucinous cyst & partial ostectomy, bone removal of benign osteophytic overgrowth osteophyte of the distal aspect of the middle phalanx and distal phalanx     SURGICAL HISTORY OF -   2006    Basal cell cancer removal     TONSILLECTOMY       TUBAL LIGATION  1978        Family History   Problem Relation Age of Onset     Cerebrovascular Disease Mother      Hypertension Mother      Diabetes Mother      Thyroid Disease Mother      Arthritis Mother      Alzheimer Disease Mother      Neurologic Disorder Mother      Parkinsons     HEART DISEASE Father      Hypertension Father      Diabetes Father      Thyroid Disease Father      Arthritis Father      Blood Disease Father      Anesthesia Reaction Sister      Thyroid Disease Sister      Anesthesia Reaction Sister      Arthritis Sister      RA     Hypertension Brother      Allergies Son      GASTROINTESTINAL DISEASE Son      GERD     Hypertension Brother      Allergies Son      percocett     GASTROINTESTINAL DISEASE Son      GERD     Hypertension Son        Social History     Social History     Marital status:      Spouse name: N/A     Number of children: 2     Years of education: N/A     Occupational History      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 No     Social History Narrative    [employer]--declines all blood products       Outpatient Encounter Prescriptions as of 11/7/2018   Medication Sig Dispense Refill     ACETAMINOPHEN PO Take 500 mg by mouth as needed for pain       aspirin 81 MG tablet Take by mouth daily       lisinopril (PRINIVIL/ZESTRIL) 20 MG tablet Take 1 tablet (20 mg) by mouth daily 90 tablet 3     naproxen (NAPROSYN) 500 MG tablet Take 1 tablet (500 mg) by mouth 2 times daily as needed for moderate pain 60 tablet 3     omeprazole (PRILOSEC) 20 MG CR capsule TAKE ONE CAPSULE BY MOUTH DAILY BEFORE A MEAL 30 capsule 0     triamterene-hydrochlorothiazide (MAXZIDE-25) 37.5-25 MG per tablet Take 1 tablet by mouth daily 90 tablet 3     fluorouracil (EFUDEX) 5 % cream Twice daily for 3 weeks to arms (Patient not taking: Reported on 11/7/2018) 40 g 1     ORDER FOR DME Equipment being ordered: Gell Heel inserts 1 Device 0     ORDER FOR DME Medium Tri Tracy 1 Device 0     No facility-administered encounter medications on file as of 11/7/2018.              Review Of Systems  Skin: As above  Eyes: negative  Ears/Nose/Throat: negative  Respiratory: No shortness of breath, dyspnea on exertion, cough, or hemoptysis  Cardiovascular: negative  Gastrointestinal: negative  Genitourinary: negative  Musculoskeletal: negative  Neurologic: negative  Psychiatric: negative  Hematologic/Lymphatic/Immunologic: negative  Endocrine: negative      O:   NAD, WDWN, Alert & Oriented, Mood & Affect wnl, Vitals stable   Here today alone   /81  Pulse 101  Resp 16   General appearance normal   Vitals stable   Alert, oriented and in no acute distress      Following lymph nodes palpated: Occipital, Cervical, Supraclavicular no lad   Gritty papules on arsm        Stuck on papules and brown  macules on trunk and ext   Red papules on trunk     The remainder of the full exam was unremarkable; the following areas were examined:  conjunctiva/lids, oral mucosa, neck, peripheral vascular system, abdomen, lymph nodes, digits/nails, eccrine and apocrine glands, scalp/hair, face, neck, chest, abdomen, buttocks, back, RUE, LUE, RLE, LLE       Eyes: Conjunctivae/lids:Normal     ENT: Lips, buccal mucosa, tongue: normal    MSK:Normal    Cardiovascular: peripheral edema none    Pulm: Breathing Normal    Lymph Nodes: No Head and Neck Lymphadenopathy     Neuro/Psych: Orientation:Normal; Mood/Affect:Normal      A/P:  1. Seborrheic keratosis, lentigo, angioma, hx of non-melanoma skin cancer   2. Actinic keratosis   Efudex twice daily 3 weeks to arms    BENIGN LESIONS DISCUSSED WITH PATIENT:  I discussed the specifics of tumor, prognosis, and genetics of benign lesions.  I explained that treatment of these lesions would be purely cosmetic and not medically neccessary.  I discussed with patient different removal options including excision, cautery and /or laser.      Nature and genetics of benign skin lesions dicussed with patient.  Signs and Symptoms of skin cancer discussed with patient.  ABCDEs of melanoma reviewed with patient.  Patient encouraged to perform monthly skin exams.  UV precautions reviewed with patient.  Patient to follow up with Primary Care provider regarding elevated blood pressure.  Skin care regimen reviewed with patient: Eliminate harsh soaps, i.e. Dial, zest, irsih spring; Mild soaps such as Cetaphil or Dove sensitive skin, avoid hot or cold showers, aggressive use of emollients including vanicream, cetaphil or cerave discussed with patient.    Risks of non-melanoma skin cancer discussed with patient   Return to clinic 6 months  Patient to follow up with Primary Care provider regarding elevated blood pressure.

## 2018-11-07 NOTE — MR AVS SNAPSHOT
After Visit Summary   11/7/2018    Thomas Davila    MRN: [id number]           Patient Information     Date Of Birth          1950        Visit Information        Provider Department      11/7/2018 1:45 PM Walt Golden MD [facility]        Today's Diagnoses     AK (actinic keratosis)    -  1    History of skin cancer        Lentigo        SK (seborrheic keratosis)        Angioma           Follow-ups after your visit        Your next 10 appointments already scheduled     Nov 07, 2018  1:45 PM CST   [facility] Visit with Walt Golden MD   [facility]    [address] [address]   [phone]            Mar 27, 2019  1:20 PM CDT   LAB [facility]    [address] [address]   [phone]           Please do not eat 10-12 hours before your appointment if you are coming in fasting for labs on lipids, cholesterol, or glucose (sugar). This does not apply to pregnant women. Water, hot tea and black coffee (with nothing added) are okay. Do not drink other fluids, diet soda or chew gum.            Apr 01, 2019 11:15 AM CDT   Return Visit with Alissa Chacon MD   [facility]    [facility]  [address]  [place] [address]   [phone]              Who to contact     If you have questions or need follow up information about today's clinic visit or your schedule please contact [facility] directly at [phone].  Normal or non-critical lab and imaging results will be communicated to you by MyChart, letter or phone within 4 business days after the clinic has received the results. If you do not hear from us within 7 days, please contact the clinic through MyChart or phone. If you have a critical or abnormal lab result, we will notify you by phone as  soon as possible.  Submit refill requests through [employer] or call your pharmacy and they will forward the refill request to us. Please allow 3 business days for your refill to be completed.          Additional Information About Your Visit        Care EveryWhere ID     This is your Care EveryWhere ID. This could be used by other organizations to access your [facility] medical records  [id number]        Your Vitals Were     Pulse Respirations                101 16           Blood Pressure from Last 3 Encounters:   11/07/18 139/81   05/07/18 (!) 131/91   04/02/18 165/78    Weight from Last 3 Encounters:   04/02/18 114.7 kg (252 lb 14.4 oz)   12/04/17 111.6 kg (246 lb)   10/02/17 110.6 kg (243 lb 14.4 oz)              Today, you had the following     No orders found for display       Primary Care Provider Office Phone # Fax #    Dung Prieto [phone] [address] [address]        Equal Access to Services     DASHAWN SALDAÑA : Hadii aad ku hadasho Soomaali, waaxda luqadaha, qaybta kaalmada adeegyada, waxay idiin haypieron marcos martinez . So [facility] [phone].    ATENCIÓN: Si habla español, tiene a schneider disposición servicios gratuitos de asistencia lingüística. Negro al [phone].    We comply with applicable federal civil rights laws and [place] laws. We do not discriminate on the basis of race, color, national origin, age, disability, sex, sexual orientation, or gender identity.            Thank you!     Thank you for choosing [facility]  for your care. Our goal is always to provide you with excellent care. Hearing back from our patients is one way we can continue to improve our services. Please take a few minutes to complete the written survey that you may receive in the mail after your visit with us. Thank you!             Your Updated Medication List - Protect others around you: Learn how to safely use, store and throw away your medicines at  www.disposemymeds.org.          This list is accurate as of 11/7/18  1:41 PM.  Always use your most recent med list.                   Brand Name Dispense Instructions for use Diagnosis    ACETAMINOPHEN PO      Take 500 mg by mouth as needed for pain        aspirin 81 MG tablet      Take by mouth daily        fluorouracil 5 % cream    EFUDEX    40 g    Twice daily for 3 weeks to arms    AK (actinic keratosis), History of skin cancer, SK (seborrheic keratosis), Lentigo       lisinopril 20 MG tablet    PRINIVIL/ZESTRIL    90 tablet    Take 1 tablet (20 mg) by mouth daily    Essential hypertension       naproxen 500 MG tablet    NAPROSYN    60 tablet    Take 1 tablet (500 mg) by mouth 2 times daily as needed for moderate pain    Primary osteoarthritis involving multiple joints       omeprazole 20 MG CR capsule    priLOSEC    30 capsule    TAKE ONE CAPSULE BY MOUTH DAILY BEFORE A MEAL    Gastroesophageal reflux disease without esophagitis       * order for DME     1 Device    Medium Tri Tracy    Right foot pain, Posterior tibialis muscle dysfunction       * order for DME     1 Device    Equipment being ordered: Gell Heel inserts    Plantar fasciitis, left       triamterene-hydrochlorothiazide 37.5-25 MG per tablet    MAXZIDE-25    90 tablet    Take 1 tablet by mouth daily    Essential hypertension       * Notice:  This list has 2 medication(s) that are the same as other medications prescribed for you. Read the directions carefully, and ask your doctor or other care provider to review them with you.

## 2018-11-07 NOTE — LETTER
2018         RE: Thomas Davila  [address]  [place] [address]        Dear Colleague,    Thank you for referring your patient, Thomas Davila, to the [facility]. Please see a copy of my visit note below.    Thomas Davila is a 68 year old year old female patient here today for rough spot son arms.  She has not used efudex yet.   .  Patient states this has been present for a while.  Patient reports the following symptoms:  none .  Patient reports the following previous treatments none.  Patient reports the following modifying factors none.  Associated symptoms: none.  Patient has no other skin complaints today.  Remainder of the HPI, Meds, PMH, Allergies, FH, and SH was reviewed in chart.      Past Medical History:   Diagnosis Date     Acute parametritis and pelvic cellulitis     salpingitis     ASCUS with positive high risk HPV 2013     Asthma     No recent issues     Basal cell carcinoma      breast cancer     left lumpectomy, radiation, tamoxifen     Breast cancer (H)     L Breast; Lumpectomy & Radiation     Chronic salpingitis and oophoritis     PID        Embolism and thrombosis of unspecified site     left leg, due to tamoxifen therapy     Generalized osteoarthrosis, unspecified site     hands     Leiomyoma of uterus, unspecified      Other malignant neoplasm of skin, site unspecified 2006    Basal cell cancer on nose     Squamous cell carcinoma      Thrombosis of leg     Left     Unspecified essential hypertension        Past Surgical History:   Procedure Laterality Date     BREAST LUMPECTOMY, RT/LT      left     C/SECTION, LOW TRANSVERSE  1972,     , Low Transverse x2     CL AFF SURGICAL PATHOLOGY      Breast reduction     COLONOSCOPY  10/15/02    normal     FOOT SURGERY      R Foot      HC EXCISION BREAST LESION, OPEN >=1  98    1) Needle localization with generous lumpectomy 2) Left axillary node dissection.      HC LAPAROSCOPY, SURGICAL, ABDOMEN, PERITONEUM & OMENTUM; DX W/ OR W/O SPECIMEN(S)  02/07/94     HYSTERECTOMY, PAP NO LONGER INDICATED       INSERT PORT VASCULAR ACCESS  3/14/2013    Procedure: INSERT PORT VASCULAR ACCESS;  Port Revision;  Surgeon: Arash Puente MD;  Location: [facility]     LAPAROSCOPIC HYSTERECTOMY TOTAL, BILATERAL SALPINGO-OOPHORECTOMY, NODE DISSECTION, COMBINED  1/31/2013    Procedure: COMBINED LAPAROSCOPIC HYSTERECTOMY TOTAL, SALPINGO-OOPHORECTOMY, NODE DISSECTION;  Laparosocpic  Total Abdominal Hysterectomy, Bilateral Salphino-Oophorectomy, Bilateral Pelvic Lymph Node Dissection, Pelvic Washings, Cystoscopy;  Surgeon: Tanya Walker MD;  Location[facility]     SURGICAL HISTORY OF -   06/22/93    1) Excision of digital cyst right mid finger  2)  Excision of dermatofibroma left calf     SURGICAL HISTORY OF -   12/18/2001    Excision of mucinous cyst & partial ostectomy, bone removal of benign osteophytic overgrowth osteophyte of the distal aspect of the middle phalanx and distal phalanx     SURGICAL HISTORY OF -   2006    Basal cell cancer removal     TONSILLECTOMY       TUBAL LIGATION  1978        Family History   Problem Relation Age of Onset     Cerebrovascular Disease Mother      Hypertension Mother      Diabetes Mother      Thyroid Disease Mother      Arthritis Mother      Alzheimer Disease Mother      Neurologic Disorder Mother      Parkinsons     HEART DISEASE Father      Hypertension Father      Diabetes Father      Thyroid Disease Father      Arthritis Father      Blood Disease Father      Anesthesia Reaction Sister      Thyroid Disease Sister      Anesthesia Reaction Sister      Arthritis Sister      RA     Hypertension Brother      Allergies Son      GASTROINTESTINAL DISEASE Son      GERD     Hypertension Brother      Allergies Son      percocett     GASTROINTESTINAL DISEASE Son      GERD     Hypertension Son        Social History     Social History     Marital status:       Spouse name: N/A     Number of children: 2     Years of education: N/A     Occupational History      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 No     Social History Narrative    [employer]--declines all blood products       Outpatient Encounter Prescriptions as of 11/7/2018   Medication Sig Dispense Refill     ACETAMINOPHEN PO Take 500 mg by mouth as needed for pain       aspirin 81 MG tablet Take by mouth daily       lisinopril (PRINIVIL/ZESTRIL) 20 MG tablet Take 1 tablet (20 mg) by mouth daily 90 tablet 3     naproxen (NAPROSYN) 500 MG tablet Take 1 tablet (500 mg) by mouth 2 times daily as needed for moderate pain 60 tablet 3     omeprazole (PRILOSEC) 20 MG CR capsule TAKE ONE CAPSULE BY MOUTH DAILY BEFORE A MEAL 30 capsule 0     triamterene-hydrochlorothiazide (MAXZIDE-25) 37.5-25 MG per tablet Take 1 tablet by mouth daily 90 tablet 3     fluorouracil (EFUDEX) 5 % cream Twice daily for 3 weeks to arms (Patient not taking: Reported on 11/7/2018) 40 g 1     ORDER FOR DME Equipment being ordered: Gell Heel inserts 1 Device 0     ORDER FOR DME Medium Tri Tracy 1 Device 0     No facility-administered encounter medications on file as of 11/7/2018.              Review Of Systems  Skin: As above  Eyes: negative  Ears/Nose/Throat: negative  Respiratory: No shortness of breath, dyspnea on exertion, cough, or hemoptysis  Cardiovascular: negative  Gastrointestinal: negative  Genitourinary: negative  Musculoskeletal: negative  Neurologic: negative  Psychiatric: negative  Hematologic/Lymphatic/Immunologic: negative  Endocrine: negative      O:   NAD, WDWN, Alert & Oriented, Mood & Affect wnl, Vitals stable   Here today alone   /81   Pulse 101  Resp 16   General appearance normal   Vitals stable   Alert, oriented and in no acute distress      Following lymph nodes palpated: Occipital, Cervical, Supraclavicular no lad   Gritty papules on arsm        Stuck on papules and brown macules on trunk and ext   Red papules on trunk     The remainder of the full exam was unremarkable; the following areas were examined:  conjunctiva/lids, oral mucosa, neck, peripheral vascular system, abdomen, lymph nodes, digits/nails, eccrine and apocrine glands, scalp/hair, face, neck, chest, abdomen, buttocks, back, RUE, LUE, RLE, LLE       Eyes: Conjunctivae/lids:Normal     ENT: Lips, buccal mucosa, tongue: normal    MSK:Normal    Cardiovascular: peripheral edema none    Pulm: Breathing Normal    Lymph Nodes: No Head and Neck Lymphadenopathy     Neuro/Psych: Orientation:Normal; Mood/Affect:Normal      A/P:  1. Seborrheic keratosis, lentigo, angioma, hx of non-melanoma skin cancer   2. Actinic keratosis   Efudex twice daily 3 weeks to arms    BENIGN LESIONS DISCUSSED WITH PATIENT:  I discussed the specifics of tumor, prognosis, and genetics of benign lesions.  I explained that treatment of these lesions would be purely cosmetic and not medically neccessary.  I discussed with patient different removal options including excision, cautery and /or laser.      Nature and genetics of benign skin lesions dicussed with patient.  Signs and Symptoms of skin cancer discussed with patient.  ABCDEs of melanoma reviewed with patient.  Patient encouraged to perform monthly skin exams.  UV precautions reviewed with patient.  Patient to follow up with Primary Care provider regarding elevated blood pressure.  Skin care regimen reviewed with patient: Eliminate harsh soaps, i.e. Dial, zest, irsih spring; Mild soaps such as Cetaphil or Dove sensitive skin, avoid hot or cold showers, aggressive use of emollients including vanicream, cetaphil or cerave discussed with patient.    Risks of  non-melanoma skin cancer discussed with patient   Return to clinic 6 months  Patient to follow up with Primary Care provider regarding elevated blood pressure.        Again, thank you for allowing me to participate in the care of your patient.        Sincerely,        Walt Golden MD

## 2018-11-07 NOTE — NURSING NOTE
"Initial There were no vitals taken for this visit. Estimated body mass index is 43.41 kg/(m^2) as calculated from the following:    Height as of 4/2/18: 5' 4\" (1.626 m).    Weight as of 4/2/18: 252 lb 14.4 oz (114.7 kg). .    Chief Complaint   Patient presents with     Derm Problem     Skin check     Summer LEON CMA    "

## 2018-12-10 ENCOUNTER — TELEPHONE (OUTPATIENT)
Dept: FAMILY MEDICINE | Facility: CLINIC | Age: 68
End: 2018-12-10

## 2018-12-10 DIAGNOSIS — K21.9 GASTROESOPHAGEAL REFLUX DISEASE WITHOUT ESOPHAGITIS: ICD-10-CM

## 2018-12-10 NOTE — TELEPHONE ENCOUNTER
"Requested Prescriptions   Pending Prescriptions Disp Refills     omeprazole (PRILOSEC) 20 MG DR capsule [Pharmacy Med Name: OMEPRAZOLE 20MG DR CAP] 30 capsule 0    Last Written Prescription Date:  11/5/18  Last Fill Quantity: 30,  # refills: 0   Last office visit: 12/4/2017 with prescribing provider:  Conner   Future Office Visit:   Next 5 appointments (look out 90 days)    Mar 04, 2019  9:00 AM CST  [facility] [address] [address]  [phone]          Sig: TAKE 1 CAPSULE BY MOUTH DAILY BEFORE A MEAL    PPI Protocol Failed - 12/10/2018  1:01 AM       Failed - Recent (12 mo) or future (30 days) visit within the authorizing provider's specialty    Patient had office visit in the last 12 months or has a visit in the next 30 days with authorizing provider or within the authorizing provider's specialty.  See \"Patient Info\" tab in inbasket, or \"Choose Columns\" in Meds & Orders section of the refill encounter.             Passed - Not on Clopidogrel (unless Pantoprazole ordered)       Passed - No diagnosis of osteoporosis on record       Passed - Patient is age 18 or older       Passed - No active pregnacy on record       Passed - No positive pregnancy test in past 12 months          "

## 2018-12-11 NOTE — TELEPHONE ENCOUNTER
Due for an OV. 30 day supply provided. Left message for patient to call back at [phone].  Marlena CHURCH RN

## 2018-12-14 ENCOUNTER — OFFICE VISIT (OUTPATIENT)
Dept: FAMILY MEDICINE | Facility: CLINIC | Age: 68
End: 2018-12-14
Payer: MEDICARE

## 2018-12-14 VITALS
WEIGHT: 246 LBS | BODY MASS INDEX: 42.23 KG/M2 | HEART RATE: 87 BPM | RESPIRATION RATE: 16 BRPM | DIASTOLIC BLOOD PRESSURE: 66 MMHG | TEMPERATURE: 98.2 F | SYSTOLIC BLOOD PRESSURE: 134 MMHG | OXYGEN SATURATION: 96 %

## 2018-12-14 DIAGNOSIS — I10 ESSENTIAL HYPERTENSION: ICD-10-CM

## 2018-12-14 DIAGNOSIS — E04.9 GOITER: Primary | ICD-10-CM

## 2018-12-14 DIAGNOSIS — K21.9 GASTROESOPHAGEAL REFLUX DISEASE WITHOUT ESOPHAGITIS: ICD-10-CM

## 2018-12-14 LAB
ANION GAP SERPL CALCULATED.3IONS-SCNC: 9 MMOL/L (ref 3–14)
BUN SERPL-MCNC: 18 MG/DL (ref 7–30)
CALCIUM SERPL-MCNC: 9 MG/DL (ref 8.5–10.1)
CHLORIDE SERPL-SCNC: 104 MMOL/L (ref 94–109)
CO2 SERPL-SCNC: 21 MMOL/L (ref 20–32)
CREAT SERPL-MCNC: 0.64 MG/DL (ref 0.52–1.04)
GFR SERPL CREATININE-BSD FRML MDRD: >90 ML/MIN/1.7M2
GLUCOSE SERPL-MCNC: 79 MG/DL (ref 70–99)
POTASSIUM SERPL-SCNC: 4.1 MMOL/L (ref 3.4–5.3)
SODIUM SERPL-SCNC: 134 MMOL/L (ref 133–144)
TSH SERPL DL<=0.005 MIU/L-ACNC: 3.1 MU/L (ref 0.4–4)

## 2018-12-14 PROCEDURE — 36415 COLL VENOUS BLD VENIPUNCTURE: CPT | Performed by: INTERNAL MEDICINE

## 2018-12-14 PROCEDURE — 80048 BASIC METABOLIC PNL TOTAL CA: CPT | Performed by: INTERNAL MEDICINE

## 2018-12-14 PROCEDURE — 84443 ASSAY THYROID STIM HORMONE: CPT | Performed by: INTERNAL MEDICINE

## 2018-12-14 PROCEDURE — 99214 OFFICE O/P EST MOD 30 MIN: CPT | Performed by: INTERNAL MEDICINE

## 2018-12-14 RX ORDER — TRIAMTERENE/HYDROCHLOROTHIAZID 37.5-25 MG
1 TABLET ORAL DAILY
Qty: 90 TABLET | Refills: 3 | Status: SHIPPED | OUTPATIENT
Start: 2018-12-14 | End: 2019-08-06

## 2018-12-14 RX ORDER — LISINOPRIL 20 MG/1
20 TABLET ORAL DAILY
Qty: 90 TABLET | Refills: 3 | Status: SHIPPED | OUTPATIENT
Start: 2018-12-14 | End: 2019-08-06

## 2018-12-14 SDOH — HEALTH STABILITY: MENTAL HEALTH: HOW OFTEN DO YOU HAVE A DRINK CONTAINING ALCOHOL?: NEVER

## 2018-12-14 NOTE — PROGRESS NOTES
SUBJECTIVE:   Thomas Davila is a 68 year old female who presents to clinic today for the following health issues:    Chief Complaint   Patient presents with     Hypertension     Blood Draw     has goiter and wants thyroid checked       Hypertension Follow-up      Outpatient blood pressures are not being checked.    Low Salt Diet: not monitoring salt      Amount of exercise or physical activity: 4-5 days/week for an average of 30-45 minutes, walking at [employer] [employer]    Problems taking medications regularly: No    Medication side effects: none    Diet: regular (no restrictions)      She is feeling somewhat lethargic but attributes this to lifestyle factors, doesn't think she is having thyroid problems currently.      Take omperazole once daily for reflux and this works well.  If she misses a day, symptoms return.       Problem list and histories reviewed & adjusted, as indicated.  Additional history: as documented    Patient Active Problem List   Diagnosis     Essential hypertension, benign     GERD (gastroesophageal reflux disease)     Sebaceous cyst     CARDIOVASCULAR SCREENING; LDL GOAL LESS THAN 130     Itchy eyes     Plantar warts     Breast cancer (H)     Endometrial adenocarcinoma (H)     Cancer of endometrium     Encounter for long-term current use of medication     Malignant neoplasm of other specified sites of female genital organs     Other specified prophylactic or treatment measure     Nontoxic multinodular goiter     Morbid obesity (H)     Past Surgical History:   Procedure Laterality Date     BREAST LUMPECTOMY, RT/LT      left     C/SECTION, LOW TRANSVERSE  1972,     , Low Transverse x2     CL AFF SURGICAL PATHOLOGY      Breast reduction     COLONOSCOPY  10/15/02    normal     FOOT SURGERY  2011    R Foot      HC EXCISION BREAST LESION, OPEN >=1  98    1) Needle localization with generous lumpectomy 2) Left axillary node dissection.     HC LAPAROSCOPY,  SURGICAL, ABDOMEN, PERITONEUM & OMENTUM; DX W/ OR W/O SPECIMEN(S)  02/07/94     HYSTERECTOMY, PAP NO LONGER INDICATED       INSERT PORT VASCULAR ACCESS  3/14/2013    Procedure: INSERT PORT VASCULAR ACCESS;  Port Revision;  Surgeon: Arash Puente MD;  Location: [facility]     LAPAROSCOPIC HYSTERECTOMY TOTAL, BILATERAL SALPINGO-OOPHORECTOMY, NODE DISSECTION, COMBINED  1/31/2013    Procedure: COMBINED LAPAROSCOPIC HYSTERECTOMY TOTAL, SALPINGO-OOPHORECTOMY, NODE DISSECTION;  Laparosocpic  Total Abdominal Hysterectomy, Bilateral Salphino-Oophorectomy, Bilateral Pelvic Lymph Node Dissection, Pelvic Washings, Cystoscopy;  Surgeon: Tanya Walker MD;  Location: [facility]     SURGICAL HISTORY OF -   06/22/93    1) Excision of digital cyst right mid finger  2)  Excision of dermatofibroma left calf     SURGICAL HISTORY OF -   12/18/2001    Excision of mucinous cyst & partial ostectomy, bone removal of benign osteophytic overgrowth osteophyte of the distal aspect of the middle phalanx and distal phalanx     SURGICAL HISTORY OF -   2006    Basal cell cancer removal     TONSILLECTOMY       TUBAL LIGATION  1978       Social History     Tobacco Use     Smoking status: Never Smoker     Smokeless tobacco: Never Used   Substance Use Topics     Alcohol use: Yes     Frequency: Never     Comment: Rare     Family History   Problem Relation Age of Onset     Cerebrovascular Disease Mother      Hypertension Mother      Diabetes Mother      Thyroid Disease Mother      Arthritis Mother      Alzheimer Disease Mother      Neurologic Disorder Mother         Parkinsons     Heart Disease Father      Hypertension Father      Diabetes Father      Thyroid Disease Father      Arthritis Father      Blood Disease Father      Anesthesia Reaction Sister      Thyroid Disease Sister      Anesthesia Reaction Sister      Arthritis Sister         RA     Hypertension Brother      Allergies Son      Gastrointestinal Disease Son         GERD     Hypertension  Brother      Allergies Son         percocett     Gastrointestinal Disease Son         GERD     Hypertension Son          Current Outpatient Medications   Medication Sig Dispense Refill     aspirin 81 MG tablet Take by mouth daily       lisinopril (PRINIVIL/ZESTRIL) 20 MG tablet Take 1 tablet (20 mg) by mouth daily 90 tablet 3     omeprazole (PRILOSEC) 20 MG DR capsule Take 1 capsule (20 mg) by mouth daily 90 capsule 3     triamterene-HCTZ (MAXZIDE-25) 37.5-25 MG tablet Take 1 tablet by mouth daily 90 tablet 3     ACETAMINOPHEN PO Take 500 mg by mouth as needed for pain       fluorouracil (EFUDEX) 5 % cream Twice daily for 3 weeks to arms (Patient not taking: Reported on 11/7/2018) 40 g 1     naproxen (NAPROSYN) 500 MG tablet Take 1 tablet (500 mg) by mouth 2 times daily as needed for moderate pain 60 tablet 3     ORDER FOR DME Equipment being ordered: Gell Heel inserts 1 Device 0     ORDER FOR DME Medium Tri Tracy 1 Device 0     Allergies   Allergen Reactions     Metal [Staples]      Not staples, but surgical screws     Percocet [Oxycodone-Acetaminophen] Nausea and Vomiting       Reviewed and updated as needed this visit by clinical staff  Tobacco  Meds  Med Hx  Surg Hx  Fam Hx  Soc Hx      Reviewed and updated as needed this visit by Provider         ROS:  Constitutional, endo, cardiovascular systems are negative, except as otherwise noted.    OBJECTIVE:     /66 (Patient Position: Left side, Cuff Size: Adult Large)   Pulse 87   Temp 98.2  F (36.8  C) (Tympanic)   Resp 16   Wt 111.6 kg (246 lb)   SpO2 96%   BMI 42.23 kg/m    Body mass index is 42.23 kg/m .  GENERAL: healthy, alert and no distress  NECK: no adenopathy, no asymmetry, masses, or scars and thyroid normal to palpation  RESP: lungs clear to auscultation - no rales, rhonchi or wheezes  CV: regular rate and rhythm, normal S1 S2, no S3 or S4, no murmur, click or rub, no peripheral edema    Diagnostic Test Results:  Results for orders placed or  performed in visit on 12/14/18 (from the past 24 hour(s))   Basic metabolic panel   Result Value Ref Range    Sodium 134 133 - 144 mmol/L    Potassium 4.1 3.4 - 5.3 mmol/L    Chloride 104 94 - 109 mmol/L    Carbon Dioxide 21 20 - 32 mmol/L    Anion Gap 9 3 - 14 mmol/L    Glucose 79 70 - 99 mg/dL    Urea Nitrogen 18 7 - 30 mg/dL    Creatinine 0.64 0.52 - 1.04 mg/dL    GFR Estimate >90 >60 mL/min/1.7m2    GFR Estimate If Black >90 >60 mL/min/1.7m2    Calcium 9.0 8.5 - 10.1 mg/dL   TSH with free T4 reflex   Result Value Ref Range    TSH 3.10 0.40 - 4.00 mU/L       ASSESSMENT/PLAN:       1. Essential hypertension    BP okay.  Discussed new recommends for goal of <130/80 but she is concerned about possible increased risk of side effects from increasing medication, so will continue current med doses for now.     - Basic metabolic panel  - lisinopril (PRINIVIL/ZESTRIL) 20 MG tablet; Take 1 tablet (20 mg) by mouth daily  Dispense: 90 tablet; Refill: 3  - triamterene-HCTZ (MAXZIDE-25) 37.5-25 MG tablet; Take 1 tablet by mouth daily  Dispense: 90 tablet; Refill: 3    2. Gastroesophageal reflux disease without esophagitis    Stable, has symptoms if she stops med, refills provided.     - omeprazole (PRILOSEC) 20 MG DR capsule; Take 1 capsule (20 mg) by mouth daily  Dispense: 90 capsule; Refill: 3    3. Goiter    No symptoms, TSH normal today.  Continue yearly monitoring.     - TSH with free T4 reflex    Bobby Gandhi MD  [facility]

## 2019-02-15 ENCOUNTER — OFFICE VISIT (OUTPATIENT)
Dept: FAMILY MEDICINE | Facility: CLINIC | Age: 69
End: 2019-02-15
Payer: MEDICARE

## 2019-02-15 VITALS
HEART RATE: 86 BPM | OXYGEN SATURATION: 99 % | BODY MASS INDEX: 42.03 KG/M2 | DIASTOLIC BLOOD PRESSURE: 82 MMHG | WEIGHT: 246.2 LBS | RESPIRATION RATE: 16 BRPM | TEMPERATURE: 97.7 F | HEIGHT: 64 IN | SYSTOLIC BLOOD PRESSURE: 124 MMHG

## 2019-02-15 DIAGNOSIS — E66.01 OBESITY, MORBID, BMI 40.0-49.9 (H): ICD-10-CM

## 2019-02-15 DIAGNOSIS — M79.18 RIGHT BUTTOCK PAIN: Primary | ICD-10-CM

## 2019-02-15 PROCEDURE — 99214 OFFICE O/P EST MOD 30 MIN: CPT | Performed by: INTERNAL MEDICINE

## 2019-02-15 RX ORDER — PHENTERMINE HYDROCHLORIDE 15 MG/1
15 CAPSULE ORAL EVERY MORNING
Qty: 30 CAPSULE | Refills: 0 | Status: SHIPPED | OUTPATIENT
Start: 2019-02-15 | End: 2019-03-15

## 2019-02-15 ASSESSMENT — MIFFLIN-ST. JEOR: SCORE: 1621.76

## 2019-02-15 NOTE — PROGRESS NOTES
SUBJECTIVE:   Thomas Davila is a 68 year old female who presents to clinic today for the following health issues:    Chief Complaint   Patient presents with     Back Pain     Patient wanted a referral for her back was wondering if she needs one for ortho or PT.      Weight Problem     Patient would like to discuss weight with you, thinking it could be why her back hurts. She stress eats.     Back Pain       Duration: about 2 months.  Had some leg issues on the right since having radiation for cancer        Specific cause: none    Description:   Location of pain: gluteus right  Character of pain: dull ache  Pain radiation:none and into right grown down front of leg into the thigh, not past the knee  New numbness or weakness in legs, not attributed to pain:  no   Has a lot of cracking and snapping when she moves her leg around    Intensity: Currently 4-7/10, moderate    History:   Pain interferes with job: No, just walking  History of back problems: no prior back problems  Any previous MRI or X-rays: None, patient had uterian cancer so lots of MRI's but nothing of the back.  Sees a specialist for back pain:  Yes years ago, PT  Therapies tried without relief: Ibuprofen, heat and stretch    Alleviating factors:   Improved by: ibuprofen      Precipitating factors:  Worsened by: Sitting and walking.      She has an appointment with Dr. Weber in sports med on 2/20, would like a referral to make sure insurance will cover it        Accompanying Signs & Symptoms:  Risk of Fracture:  None  Risk of Cauda Equina:  None  Risk of Infection:  None  Risk of Cancer:  None  Risk of Ankylosing Spondylitis:  Onset at age <35, male, AND morning back stiffness. no       Thomas was on a medication for appetite suppression in the past and had success with that.  Chart review shows that she was on phentermine 15mg in 2005- notes indicate this was working and she had lost 20 pounds.  She states she was also on something else before  then.          Problem list and histories reviewed & adjusted, as indicated.  Additional history: as documented    Patient Active Problem List   Diagnosis     Essential hypertension, benign     GERD (gastroesophageal reflux disease)     Sebaceous cyst     CARDIOVASCULAR SCREENING; LDL GOAL LESS THAN 130     Itchy eyes     Plantar warts     Breast cancer (H)     Endometrial adenocarcinoma (H)     Cancer of endometrium     Encounter for long-term current use of medication     Malignant neoplasm of other specified sites of female genital organs     Other specified prophylactic or treatment measure     Nontoxic multinodular goiter     Morbid obesity (H)     Past Surgical History:   Procedure Laterality Date     BREAST LUMPECTOMY, RT/LT      left     C/SECTION, LOW TRANSVERSE  ,     , Low Transverse x2     CL AFF SURGICAL PATHOLOGY      Breast reduction     COLONOSCOPY  10/15/02    normal     FOOT SURGERY      R Foot      HC EXCISION BREAST LESION, OPEN >=1  98    1) Needle localization with generous lumpectomy 2) Left axillary node dissection.     HC LAPAROSCOPY, SURGICAL, ABDOMEN, PERITONEUM & OMENTUM; DX W/ OR W/O SPECIMEN(S)  94     HYSTERECTOMY, PAP NO LONGER INDICATED       INSERT PORT VASCULAR ACCESS  3/14/2013    Procedure: INSERT PORT VASCULAR ACCESS;  Port Revision;  Surgeon: Arash Puente MD;  Location: [facility]     LAPAROSCOPIC HYSTERECTOMY TOTAL, BILATERAL SALPINGO-OOPHORECTOMY, NODE DISSECTION, COMBINED  2013    Procedure: COMBINED LAPAROSCOPIC HYSTERECTOMY TOTAL, SALPINGO-OOPHORECTOMY, NODE DISSECTION;  Laparosocpic  Total Abdominal Hysterectomy, Bilateral Salphino-Oophorectomy, Bilateral Pelvic Lymph Node Dissection, Pelvic Washings, Cystoscopy;  Surgeon: Tanya Walker MD;  Location: [facility] OR     SURGICAL HISTORY OF -   93    1) Excision of digital cyst right mid finger  2)  Excision of dermatofibroma left calf     SURGICAL HISTORY OF -   2001     Excision of mucinous cyst & partial ostectomy, bone removal of benign osteophytic overgrowth osteophyte of the distal aspect of the middle phalanx and distal phalanx     SURGICAL HISTORY OF -   2006    Basal cell cancer removal     TONSILLECTOMY       TUBAL LIGATION  1978       Social History     Tobacco Use     Smoking status: Never Smoker     Smokeless tobacco: Never Used   Substance Use Topics     Alcohol use: Yes     Frequency: Never     Comment: Rare     Family History   Problem Relation Age of Onset     Cerebrovascular Disease Mother      Hypertension Mother      Diabetes Mother      Thyroid Disease Mother      Arthritis Mother      Alzheimer Disease Mother      Neurologic Disorder Mother         Parkinsons     Heart Disease Father      Hypertension Father      Diabetes Father      Thyroid Disease Father      Arthritis Father      Blood Disease Father      Anesthesia Reaction Sister      Thyroid Disease Sister      Anesthesia Reaction Sister      Arthritis Sister         RA     Hypertension Brother      Allergies Son      Gastrointestinal Disease Son         GERD     Hypertension Brother      Allergies Son         percocett     Gastrointestinal Disease Son         GERD     Hypertension Son          Current Outpatient Medications   Medication Sig Dispense Refill     ACETAMINOPHEN PO Take 500 mg by mouth as needed for pain       aspirin 81 MG tablet Take by mouth daily       lisinopril (PRINIVIL/ZESTRIL) 20 MG tablet Take 1 tablet (20 mg) by mouth daily 90 tablet 3     omeprazole (PRILOSEC) 20 MG DR capsule Take 1 capsule (20 mg) by mouth daily 90 capsule 3     phentermine (ADIPEX-P) 15 MG capsule Take 1 capsule (15 mg) by mouth every morning 30 capsule 0     triamterene-HCTZ (MAXZIDE-25) 37.5-25 MG tablet Take 1 tablet by mouth daily 90 tablet 3     fluorouracil (EFUDEX) 5 % cream Twice daily for 3 weeks to arms (Patient not taking: Reported on 11/7/2018) 40 g 1     naproxen (NAPROSYN) 500 MG tablet Take 1  "tablet (500 mg) by mouth 2 times daily as needed for moderate pain (Patient not taking: Reported on 2/15/2019) 60 tablet 3     ORDER FOR DME Equipment being ordered: Gell Heel inserts 1 Device 0     ORDER FOR DME Medium Tri Tracy 1 Device 0     Allergies   Allergen Reactions     Metal [Staples]      Not staples, but surgical screws     Percocet [Oxycodone-Acetaminophen] Nausea and Vomiting       Reviewed and updated as needed this visit by clinical staff       Reviewed and updated as needed this visit by Provider         ROS:  Constitutional, MSK systems are negative, except as otherwise noted.    OBJECTIVE:     /82   Pulse 86   Temp 97.7  F (36.5  C) (Tympanic)   Resp 16   Ht 1.626 m (5' 4\")   Wt 111.7 kg (246 lb 3.2 oz)   SpO2 99%   BMI 42.26 kg/m    Body mass index is 42.26 kg/m .    GENERAL: healthy, alert and no distress, obese  BACK: No pain to palpation of back or buttocks, good ROM  NEURO: Normal strength and tone in legs, normal light touch sensation, symmetrically absent Patellar reflexes  PSYCH: tangential- told me all about how she reacts to different types of suture material      ASSESSMENT/PLAN:       1. Right buttock pain    Pain appears likely muscular due to location and lack of numbness/tingling/radiation beyond the knee.  She wondered about bursitis, but pain is not in the typical location for this.  I do think PT would be beneficial, referral placed.  She already has a sport med appointment placed- sounds like she thought this was PT, but that she will plan to keep that appointment, and she requested a referral for this as well in case that is needed for insurance coverage.     - ORTHO  REFERRAL  - PHYSICAL THERAPY REFERRAL; Future    2. Obesity, morbid, BMI 40.0-49.9 (H)    She would like to retry medication for weight loss.  Per her chart, she was on phentermine in 2005 and had some success, so we'll retry this plus I recommended RD consult.  Follow-up in 1 month for " recheck.     - NUTRITION REFERRAL  - phentermine (ADIPEX-P) 15 MG capsule; Take 1 capsule (15 mg) by mouth every morning  Dispense: 30 capsule; Refill: 0      Bobby Gandhi MD  [facility]

## 2019-02-20 ENCOUNTER — OFFICE VISIT (OUTPATIENT)
Dept: ORTHOPEDICS | Facility: CLINIC | Age: 69
End: 2019-02-20
Payer: MEDICARE

## 2019-02-20 ENCOUNTER — ANCILLARY PROCEDURE (OUTPATIENT)
Dept: GENERAL RADIOLOGY | Facility: CLINIC | Age: 69
End: 2019-02-20
Attending: PEDIATRICS
Payer: MEDICARE

## 2019-02-20 VITALS
BODY MASS INDEX: 42 KG/M2 | HEIGHT: 64 IN | WEIGHT: 246 LBS | DIASTOLIC BLOOD PRESSURE: 87 MMHG | SYSTOLIC BLOOD PRESSURE: 160 MMHG

## 2019-02-20 DIAGNOSIS — M53.3 SACRAL PAIN: Primary | ICD-10-CM

## 2019-02-20 DIAGNOSIS — M54.50 LOWER BACK PAIN: ICD-10-CM

## 2019-02-20 PROCEDURE — 72100 X-RAY EXAM L-S SPINE 2/3 VWS: CPT

## 2019-02-20 PROCEDURE — 99204 OFFICE O/P NEW MOD 45 MIN: CPT | Performed by: PEDIATRICS

## 2019-02-20 ASSESSMENT — MIFFLIN-ST. JEOR: SCORE: 1615.85

## 2019-02-20 NOTE — PATIENT INSTRUCTIONS
Plan:  - Today's Plan of Care:  MRI of the pelvis. Call [phone] to schedule MRI  Limit Weight Bearing with use of cane    -We also discussed other future treatment options:  Rehab: Physical Therapy    Follow Up: In clinic with Dr. Weber after MRI (wait at least 1-2 days)    If you have any further questions for your physician or physician s care team you can call [phone] and use option 3 to leave a voice message. Calls received during business hours will be returned same day.

## 2019-02-20 NOTE — LETTER
2/20/2019         RE: Thomas Davila  [address]  [facility] [address]        Dear Colleague,    Thank you for referring your patient, Thomas Davila, to the [facility]. Please see a copy of my visit note below.    Sports Medicine Clinic Visit    PCP: Dung Prieto    Thomas Davila is a 69 year old female who is seen  in consultation at the request of Bobby Gandhi MD presenting with right sided low back/buttock pain.    Injury: She reports 2 months lower back and SI joint pain. She reports no known injury. She states the pain is a dull ache that throbs on occasion. She reports  symptoms and pain in SI joint with motion of her right leg. She has radiating pain to her right thigh at times. She has had a history of cancer and radiation therapy to the area. She has had a significant weight increase over the last one year.    Thomas was asked to complete the Oswestry Low Back Disability Index and Jami Start Back screening tool.  today in the office.  Disability score: 28.89%.     Location of Pain: right SI joint and hip  Duration of Pain: 2 month(s)  Rating of Pain at worst: 7/10  Rating of Pain Currently: 2/10  Symptoms are better with: Ibuprofen  Symptoms are worse with: sitting on right hip and walking and stairs  Additional Features:   Positive: popping, grinding, instability and weakness   Negative: swelling, bruising, catching, locking, paresthesias and numbness  Other evaluation and/or treatments so far consists of: shoe inserts  Prior History of related problems: Several rounds on radiation due to several cancers    Social History: retired    Review of Systems  Skin: no bruising, no swelling  Musculoskeletal: as above  Neurologic: no numbness, paresthesias  Remainder of review of systems is negative including constitutional, CV, pulmonary, GI, except as noted in HPI or medical history.    Patient's current problem list, past medical and surgical  history, and family history were reviewed.    Patient Active Problem List   Diagnosis     Essential hypertension, benign     GERD (gastroesophageal reflux disease)     Sebaceous cyst     CARDIOVASCULAR SCREENING; LDL GOAL LESS THAN 130     Itchy eyes     Plantar warts     Breast cancer (H)     Endometrial adenocarcinoma (H)     Cancer of endometrium     Encounter for long-term current use of medication     Malignant neoplasm of other specified sites of female genital organs     Other specified prophylactic or treatment measure     Nontoxic multinodular goiter     Morbid obesity (H)     Past Medical History:   Diagnosis Date     Acute parametritis and pelvic cellulitis     salpingitis     ASCUS with positive high risk HPV 2013     Asthma     No recent issues     Basal cell carcinoma      breast cancer 1998    left lumpectomy, radiation, tamoxifen     Breast cancer (H)     L Breast; Lumpectomy & Radiation     Chronic salpingitis and oophoritis     PID        Embolism and thrombosis of unspecified site     left leg, due to tamoxifen therapy     Generalized osteoarthrosis, unspecified site     hands     Leiomyoma of uterus, unspecified      Other malignant neoplasm of skin, site unspecified 2006    Basal cell cancer on nose     Squamous cell carcinoma      Thrombosis of leg     Left     Unspecified essential hypertension      Past Surgical History:   Procedure Laterality Date     BREAST LUMPECTOMY, RT/LT      left     C/SECTION, LOW TRANSVERSE  1972,     , Low Transverse x2     CL AFF SURGICAL PATHOLOGY      Breast reduction     COLONOSCOPY  10/15/02    normal     FOOT SURGERY      R Foot      HC EXCISION BREAST LESION, OPEN >=1  98    1) Needle localization with generous lumpectomy 2) Left axillary node dissection.     HC LAPAROSCOPY, SURGICAL, ABDOMEN, PERITONEUM & OMENTUM; DX W/ OR W/O SPECIMEN(S)  94     HYSTERECTOMY, PAP NO LONGER INDICATED       INSERT PORT  "VASCULAR ACCESS  3/14/2013    Procedure: INSERT PORT VASCULAR ACCESS;  Port Revision;  Surgeon: Arash Puente MD;  Location: [facility]     LAPAROSCOPIC HYSTERECTOMY TOTAL, BILATERAL SALPINGO-OOPHORECTOMY, NODE DISSECTION, COMBINED  1/31/2013    Procedure: COMBINED LAPAROSCOPIC HYSTERECTOMY TOTAL, SALPINGO-OOPHORECTOMY, NODE DISSECTION;  Laparosocpic  Total Abdominal Hysterectomy, Bilateral Salphino-Oophorectomy, Bilateral Pelvic Lymph Node Dissection, Pelvic Washings, Cystoscopy;  Surgeon: Tanya Walker MD;  Location: [facility]     SURGICAL HISTORY OF -   06/22/93    1) Excision of digital cyst right mid finger  2)  Excision of dermatofibroma left calf     SURGICAL HISTORY OF -   12/18/2001    Excision of mucinous cyst & partial ostectomy, bone removal of benign osteophytic overgrowth osteophyte of the distal aspect of the middle phalanx and distal phalanx     SURGICAL HISTORY OF -   2006    Basal cell cancer removal     TONSILLECTOMY       TUBAL LIGATION  1978     Family History   Problem Relation Age of Onset     Cerebrovascular Disease Mother      Hypertension Mother      Diabetes Mother      Thyroid Disease Mother      Arthritis Mother      Alzheimer Disease Mother      Neurologic Disorder Mother         Parkinsons     Heart Disease Father      Hypertension Father      Diabetes Father      Thyroid Disease Father      Arthritis Father      Blood Disease Father      Anesthesia Reaction Sister      Thyroid Disease Sister      Anesthesia Reaction Sister      Arthritis Sister         RA     Hypertension Brother      Allergies Son      Gastrointestinal Disease Son         GERD     Hypertension Brother      Allergies Son         percocett     Gastrointestinal Disease Son         GERD     Hypertension Son        Objective  /87 (BP Location: Left arm, Patient Position: Chair, Cuff Size: Adult Regular)   Ht 1.626 m (5' 4\")   Wt 111.6 kg (246 lb)   BMI 42.23 kg/m       GENERAL APPEARANCE: healthy, alert and no " distress   GAIT: NORMAL  SKIN: no suspicious lesions or rashes  HEENT: Sclera clear, anicteric  CV: good peripheral pulses  RESP: Breathing not labored  NEURO: Normal strength and tone, mentation intact and speech normal  PSYCH:  mentation appears normal and affect normal/bright    Low back exam:    Inspection:     no visible deformity in the low back       normal skin       normal vascular       normal lymphatic       no asymmetry    Posture:      rounded shoulders and upper back       lumbar lordosis diminished    Foot Inspection:     no deformity noted    Tender:     Right sided sacrum and into buttock    Non Tender:     remainder of lumbar spine    ROM:      limited flexion due to pain       limited extension due to pain    Strength:     hip flexion 5/5 bilateral       knee extension 5/5 bilateral       ankle dorsiflexion 5/5 bilateral       ankle plantarflexion 5/5 bilateral       dorsiflexion of the great toe 5/5 bilateral       able to heel and toe walk    Reflexes:     patellar (L3, L4) symmetric diminished       achilles tendons (S1) symmetric diminished    Sensation:    grossly intact throughout lower extremities    Special tests:      straight leg raise left negative        straight leg raise right negative       positive (+) LINDA  Right - with pain in buttocks       positive (+) FADIR  Right - with pain in buttocks  - negative log roll right, negative fulcrum      Radiology  I ordered, visualized and reviewed these images with the patient  2 XR views of lumbar spine reviewed: no acute bony abnormality, mild degenerative changes throughout, no significant hip arthritis from what is included in field of view  - will follow official read    Assessment:  1. Sacral pain      Sacral pain, given radiation some concern for stress fracture, will obtain MRI.  Discussed limiting weight bearing in the interim with cane.  Would consider PT pending clinical course.    Plan:  - Today's Plan of Care:  MRI of the  pelvis. Call [phone] to schedule MRI  Limit Weight Bearing with use of cane    -We also discussed other future treatment options:  Rehab: Physical Therapy    Follow Up: In clinic with Dr. Weber after MRI (wait at least 1-2 days)    Concerning signs and symptoms were reviewed.  The patient expressed understanding of this management plan and all questions were answered at this time.    Thanks for the opportunity to participate in the care of this patient, I will keep you updated on their progress.    CC: MD Elmira Gonzalez MD [place]  Primary Care Sports Medicine  [facility]    Again, thank you for allowing me to participate in the care of your patient.        Sincerely,        Elmira Weber MD

## 2019-02-20 NOTE — PROGRESS NOTES
Sports Medicine Clinic Visit    PCP: Dung Prieto    Thomas Davila is a 69 year old female who is seen  in consultation at the request of Bobby Gandhi MD presenting with right sided low back/buttock pain.    Injury: She reports 2 months lower back and SI joint pain. She reports no known injury. She states the pain is a dull ache that throbs on occasion. She reports  symptoms and pain in SI joint with motion of her right leg. She has radiating pain to her right thigh at times. She has had a history of cancer and radiation therapy to the area. She has had a significant weight increase over the last one year.    Thomas was asked to complete the Oswestry Low Back Disability Index and Jami Start Back screening tool.  today in the office.  Disability score: 28.89%.     Location of Pain: right SI joint and hip  Duration of Pain: 2 month(s)  Rating of Pain at worst: 7/10  Rating of Pain Currently: 2/10  Symptoms are better with: Ibuprofen  Symptoms are worse with: sitting on right hip and walking and stairs  Additional Features:   Positive: popping, grinding, instability and weakness   Negative: swelling, bruising, catching, locking, paresthesias and numbness  Other evaluation and/or treatments so far consists of: shoe inserts  Prior History of related problems: Several rounds on radiation due to several cancers    Social History: retired    Review of Systems  Skin: no bruising, no swelling  Musculoskeletal: as above  Neurologic: no numbness, paresthesias  Remainder of review of systems is negative including constitutional, CV, pulmonary, GI, except as noted in HPI or medical history.    Patient's current problem list, past medical and surgical history, and family history were reviewed.    Patient Active Problem List   Diagnosis     Essential hypertension, benign     GERD (gastroesophageal reflux disease)     Sebaceous cyst     CARDIOVASCULAR SCREENING; LDL GOAL LESS THAN 130     Itchy eyes     Plantar  warts     Breast cancer (H)     Endometrial adenocarcinoma (H)     Cancer of endometrium     Encounter for long-term current use of medication     Malignant neoplasm of other specified sites of female genital organs     Other specified prophylactic or treatment measure     Nontoxic multinodular goiter     Morbid obesity (H)     Past Medical History:   Diagnosis Date     Acute parametritis and pelvic cellulitis     salpingitis     ASCUS with positive high risk HPV 2013     Asthma     No recent issues     Basal cell carcinoma      breast cancer 1998    left lumpectomy, radiation, tamoxifen     Breast cancer (H)     L Breast; Lumpectomy & Radiation     Chronic salpingitis and oophoritis     PID        Embolism and thrombosis of unspecified site     left leg, due to tamoxifen therapy     Generalized osteoarthrosis, unspecified site     hands     Leiomyoma of uterus, unspecified      Other malignant neoplasm of skin, site unspecified 2006    Basal cell cancer on nose     Squamous cell carcinoma      Thrombosis of leg     Left     Unspecified essential hypertension      Past Surgical History:   Procedure Laterality Date     BREAST LUMPECTOMY, RT/LT      left     C/SECTION, LOW TRANSVERSE  1972, 1976    , Low Transverse x2     CL AFF SURGICAL PATHOLOGY      Breast reduction     COLONOSCOPY  10/15/02    normal     FOOT SURGERY      R Foot      HC EXCISION BREAST LESION, OPEN >=1  98    1) Needle localization with generous lumpectomy 2) Left axillary node dissection.     HC LAPAROSCOPY, SURGICAL, ABDOMEN, PERITONEUM & OMENTUM; DX W/ OR W/O SPECIMEN(S)  94     HYSTERECTOMY, PAP NO LONGER INDICATED       INSERT PORT VASCULAR ACCESS  3/14/2013    Procedure: INSERT PORT VASCULAR ACCESS;  Port Revision;  Surgeon: Arash Puente MD;  Location: [facility]     LAPAROSCOPIC HYSTERECTOMY TOTAL, BILATERAL SALPINGO-OOPHORECTOMY, NODE DISSECTION, COMBINED  2013    Procedure:  "COMBINED LAPAROSCOPIC HYSTERECTOMY TOTAL, SALPINGO-OOPHORECTOMY, NODE DISSECTION;  Laparosocpic  Total Abdominal Hysterectomy, Bilateral Salphino-Oophorectomy, Bilateral Pelvic Lymph Node Dissection, Pelvic Washings, Cystoscopy;  Surgeon: Tanya Walker MD;  Location: [facility] OR     SURGICAL HISTORY OF -   06/22/93    1) Excision of digital cyst right mid finger  2)  Excision of dermatofibroma left calf     SURGICAL HISTORY OF -   12/18/2001    Excision of mucinous cyst & partial ostectomy, bone removal of benign osteophytic overgrowth osteophyte of the distal aspect of the middle phalanx and distal phalanx     SURGICAL HISTORY OF -   2006    Basal cell cancer removal     TONSILLECTOMY       TUBAL LIGATION  1978     Family History   Problem Relation Age of Onset     Cerebrovascular Disease Mother      Hypertension Mother      Diabetes Mother      Thyroid Disease Mother      Arthritis Mother      Alzheimer Disease Mother      Neurologic Disorder Mother         Parkinsons     Heart Disease Father      Hypertension Father      Diabetes Father      Thyroid Disease Father      Arthritis Father      Blood Disease Father      Anesthesia Reaction Sister      Thyroid Disease Sister      Anesthesia Reaction Sister      Arthritis Sister         RA     Hypertension Brother      Allergies Son      Gastrointestinal Disease Son         GERD     Hypertension Brother      Allergies Son         percocett     Gastrointestinal Disease Son         GERD     Hypertension Son        Objective  /87 (BP Location: Left arm, Patient Position: Chair, Cuff Size: Adult Regular)   Ht 1.626 m (5' 4\")   Wt 111.6 kg (246 lb)   BMI 42.23 kg/m      GENERAL APPEARANCE: healthy, alert and no distress   GAIT: NORMAL  SKIN: no suspicious lesions or rashes  HEENT: Sclera clear, anicteric  CV: good peripheral pulses  RESP: Breathing not labored  NEURO: Normal strength and tone, mentation intact and speech normal  PSYCH:  mentation appears normal and " affect normal/bright    Low back exam:    Inspection:     no visible deformity in the low back       normal skin       normal vascular       normal lymphatic       no asymmetry    Posture:      rounded shoulders and upper back       lumbar lordosis diminished    Foot Inspection:     no deformity noted    Tender:     Right sided sacrum and into buttock    Non Tender:     remainder of lumbar spine    ROM:      limited flexion due to pain       limited extension due to pain    Strength:     hip flexion 5/5 bilateral       knee extension 5/5 bilateral       ankle dorsiflexion 5/5 bilateral       ankle plantarflexion 5/5 bilateral       dorsiflexion of the great toe 5/5 bilateral       able to heel and toe walk    Reflexes:     patellar (L3, L4) symmetric diminished       achilles tendons (S1) symmetric diminished    Sensation:    grossly intact throughout lower extremities    Special tests:      straight leg raise left negative        straight leg raise right negative       positive (+) LINDA  Right - with pain in buttocks       positive (+) FADIR  Right - with pain in buttocks  - negative log roll right, negative fulcrum      Radiology  I ordered, visualized and reviewed these images with the patient  2 XR views of lumbar spine reviewed: no acute bony abnormality, mild degenerative changes throughout, no significant hip arthritis from what is included in field of view  - will follow official read    Assessment:  1. Sacral pain      Sacral pain, given radiation some concern for stress fracture, will obtain MRI.  Discussed limiting weight bearing in the interim with cane.  Would consider PT pending clinical course.    Plan:  - Today's Plan of Care:  MRI of the pelvis. Call [phone] to schedule MRI  Limit Weight Bearing with use of cane    -We also discussed other future treatment options:  Rehab: Physical Therapy    Follow Up: In clinic with Dr. Weber after MRI (wait at least 1-2 days)    Concerning signs and  symptoms were reviewed.  The patient expressed understanding of this management plan and all questions were answered at this time.    Thanks for the opportunity to participate in the care of this patient, I will keep you updated on their progress.    CC: MD Elmira Gonzalez MD [place]  Primary Care Sports Medicine  [facility]

## 2019-02-21 ENCOUNTER — HOSPITAL ENCOUNTER (OUTPATIENT)
Dept: MRI IMAGING | Facility: CLINIC | Age: 69
Discharge: HOME | End: 2019-02-21
Attending: PEDIATRICS | Admitting: PEDIATRICS
Payer: MEDICARE

## 2019-02-21 ENCOUNTER — HOSPITAL ENCOUNTER (OUTPATIENT)
Dept: PHYSICAL THERAPY | Facility: CLINIC | Age: 69
Setting detail: THERAPIES SERIES
End: 2019-02-21
Attending: INTERNAL MEDICINE
Payer: MEDICARE

## 2019-02-21 DIAGNOSIS — M79.18 RIGHT BUTTOCK PAIN: ICD-10-CM

## 2019-02-21 DIAGNOSIS — M53.3 SACRAL PAIN: ICD-10-CM

## 2019-02-21 PROCEDURE — 97161 PT EVAL LOW COMPLEX 20 MIN: CPT | Mod: GP | Performed by: PHYSICAL THERAPIST

## 2019-02-21 PROCEDURE — 72195 MRI PELVIS W/O DYE: CPT

## 2019-02-21 PROCEDURE — 97110 THERAPEUTIC EXERCISES: CPT | Mod: GP | Performed by: PHYSICAL THERAPIST

## 2019-02-21 NOTE — PROGRESS NOTES
[facility]          OUTPATIENT PHYSICAL THERAPY ORTHOPEDIC EVALUATION  PLAN OF TREATMENT FOR OUTPATIENT REHABILITATION  (COMPLETE FOR INITIAL CLAIMS ONLY)  Patient's Last Name, First Name, M.I.  YOB: 1950  Thomas Davila    Provider s Name:  [facility]   Medical Record No.  [id number]   Start of Care Date:  02/21/19   Onset Date:  12/01/19   Type:     _X__PT   ___OT   ___SLP Medical Diagnosis:   R Buttock pain     PT Diagnosis:  R buttock pain   Visits from SOC:  1      _________________________________________________________________________________  Plan of Treatment/Functional Goals:  balance training, neuromuscular re-education, ROM, strengthening, stretching, manual therapy, joint mobilization  to address impairments above, improve overall functional capacity.         Goals  Goal Identifier: 1  Goal Description: Pt will be able to bend down and pick items off the floor without pain  Target Date: 05/02/19    Goal Identifier: 2  Goal Description: Pt will be able to walk > 1 mile without pain with normalized gait pattern  Target Date: 05/02/19    Goal Identifier: 3  Goal Description: Pt will be able to sit from > 1 hour without pain  Target Date: 05/02/19    Goal Identifier: 4  Goal Description: Pt will demonstrate independence with updated HEP  Target Date: 05/02/19      Therapy Frequency:  1 time/week  Predicted Duration of Therapy Intervention:  10    Sean Kelley PT                 I CERTIFY THE NEED FOR THESE SERVICES FURNISHED UNDER        THIS PLAN OF TREATMENT AND WHILE UNDER MY CARE     (Physician co-signature of this document indicates review and certification of the therapy plan).                       Certification Date From:    2/21/2019  Certification Date To:   5/2/2019    Referring Provider:  Bobby Gandhi MD    Initial Assessment        See Epic Evaluation Start of Care Date: 02/21/19             Thomas DUTTA PT Initial  Evaluation  02/21/19 1300   General Information   Type of Visit Initial OP Ortho PT Evaluation   Start of Care Date 02/21/19   Referring Physician Bobby Gandhi MD   Patient/Family Goals Statement no more pain, get stronger   Orders Evaluate and Treat   Insurance Type Medicare   Medical Diagnosis R buttock pain   Surgical/Medical history reviewed Yes   Precautions/Limitations no known precautions/limitations   General Information Comments History of Breast and Uterine Cancer, Strangulated hernia on the R side, HBP, 2 c-sections   Body Part(s)   Body Part(s) Hip;Lumbar Spine/SI   Presentation and Etiology   Pertinent history of current problem (include personal factors and/or comorbidities that impact the POC) Pt states that her chief R buttock pain started about 3 months ago without tramua incident. She reports a history of hernia on that side as well as lymph node removal from prior bouts with cancer. She has been exercising with silver sneakers without relief. She has had some recent increase in weight, but otherwise reports no other vague symptoms.    Impairments A. Pain;B. Decreased WB tolerance;E. Decreased flexibility;F. Decreased strength and endurance;R. Other   Impairment comment numbness and burning at times   Functional Limitations perform activities of daily living   Symptom Location R Buttock   How/Where did it occur With repetition/overuse;From insidious onset;From Degenerative Joint Disease   Onset date of current episode/exacerbation 12/01/19   Chronicity New   Pain rating (0-10 point scale) Best (/10);Worst (/10)   Best (/10) 2   Worst (/10) 7   Pain quality C. Aching;G. Cramping;H. Other   Pain quality comment throbbing   Frequency of pain/symptoms B. Intermittent   Pain/symptoms are: Other   Pain symptoms comment sometimes   Pain/symptoms exacerbated by A. Sitting;B. Walking   Pain/symptoms eased by C. Rest;I. OTC medication(s)  (Ibuprofen)   Progression of symptoms since onset: Unchanged    Current / Previous Interventions   Diagnostic Tests: X-ray   X-ray Results unremarkable   Prior Level of Function   Prior Level of Function-Mobility Independent with ADLs/IADLs, assists  who has parkinson's , exercises   Current Level of Function   Patient role/employment history F. Retired   Fall Risk Screen   Fall screen completed by PT   Have you fallen 2 or more times in the past year? No   Have you fallen and had an injury in the past year? No   Is patient a fall risk? No   Fall screen comments Pt had fallen on butt in chair this past summer, but reports no other falls and was not injured at the time   System Outcome Measures   Outcome Measures Low Back Pain (see Oswestry and Jami)  (medium risk, 14%)   Hip Objective Findings   Side (if bilateral, select both right and left) Right   Observation Pt reports relief when pressing and pulling up the lower right quadrant of the abdmoinal walls and associated adipose tissue   Integumentary  no redness, swelling or brusing   Gait/Locomotion slightly antalgic on the right.    Functional Closed Chain Screen no pain with functional squat   LINDA Test limited but non-painful R   FADIR Test +R   Hip Special Tests Comments scour + R   Palpation slight tenderness in medial R hip   Accessory Motion/Joint Mobility relief with R hip distraction   Right Hip Flexion PROM 120 no pain   Right Hip ER PROM 30 no pain   Right Hip IR PROM 15 limited pain   Right Hip Ext PROM normals   Right Hip Flexion Strength 5/5    Right Hip Abduction Strength 4-/5   Right Knee Flexion Strength 5/5   Right Knee Extension Strength 5/5   Fabio Flexibility Test normal range for psoas and TFL, retus tight   Right Hamstring Flexibility slight tightness helga   Lumbar Spine/SI Objective Findings   Flexion ROM full    Extension ROM full slight pain   Right Side Bending ROM full   Left Side Bending ROM full   Repeated Extension-Standing ROM slight improvement   Repeated Flexion-Standing ROM no pain    Pelvic Screen unremarkable   Ankle Dorsiflexion (L4) Strength 5/5 helga   SLR - helga   Crossover SLR - helga   Segmental Mobility CPA/UPA to L1-L5 non-painful   Sensation Testing unremarkable   Patellar Tendon Reflexes  1+ helga   Achilles Tendon Reflexes 1+ helga   Palpation no tenderness noted   Planned Therapy Interventions   Planned Therapy Interventions balance training;neuromuscular re-education;ROM;strengthening;stretching;manual therapy;joint mobilization   Planned Therapy Interventions Comment to address impairments above, improve overall functional capacity.    Clinical Impression   Criteria for Skilled Therapeutic Interventions Met yes, treatment indicated   PT Diagnosis R buttock pain   Influenced by the following impairments pain, weakness, decreased ROM   Functional limitations due to impairments pain with functional actvities, functional weakness   Clinical Presentation Stable/Uncomplicated   Clinical Presentation Rationale PT judgement, subjective report, objective data   Clinical Decision Making (Complexity) Low complexity   Therapy Frequency 1 time/week   Predicted Duration of Therapy Intervention (days/wks) 10   Risk & Benefits of therapy have been explained Yes   Patient, Family & other staff in agreement with plan of care Yes   Clinical Impression Comments Pt is a pleasant 68 y/o female presenting to PT with R buttock pain. Upon evaluation her pain appears more related to the hip than the low back. She will benefit from skilled PT to address problems list above and improve overall functional capacity. Pt is a good PT candidate.    Education Assessment   Preferred Learning Style Listening;Demonstration;Pictures/video   Barriers to Learning No barriers   ORTHO GOALS   PT Ortho Eval Goals 1;2;3;4   Ortho Goal 1   Goal Identifier 1   Goal Description Pt will be able to bend down and pick items off the floor without pain   Target Date 05/02/19   Ortho Goal 2   Goal Identifier 2   Goal Description Pt will be  able to walk > 1 mile without pain with normalized gait pattern   Target Date 05/02/19   Ortho Goal 3   Goal Identifier 3   Goal Description Pt will be able to sit from > 1 hour without pain   Target Date 05/02/19   Ortho Goal 4   Goal Identifier 4   Goal Description Pt will demonstrate independence with updated HEP   Target Date 05/02/19   Total Evaluation Time   PT Eval, Low Complexity Minutes (59196) 25       Please Contact me with any questions or concerns. Thank you for for patience and cooperation.     Sean Kelley PT, DPT  Flexible Workforce Physical Therapist   [facility]  [email or web address]

## 2019-02-22 ENCOUNTER — TELEPHONE (OUTPATIENT)
Dept: ORTHOPEDICS | Facility: CLINIC | Age: 69
End: 2019-02-22

## 2019-02-22 NOTE — TELEPHONE ENCOUNTER
Please call patient with MRI results:    MR PELVIS BONE WITHOUT CONTRAST  2/21/2019  3:39 PM     HISTORY: Back or sacroiliac joint pain, spondyloarthropathy suspected,  initial exam. Evaluate stress fracture, history of radiation,  posterior sacral/pelvis pain. Sacral pain.     TECHNIQUE: Multiplanar, multisequence without contrast.     FINDINGS:   Osseous and Cartilaginous Structures: Bilateral sacral fracturing with  prominent adjacent edema. There is mild edema within both iliac bones  adjacent to the sacroiliac joints. This could represent radiation  osteitis, versus reactive subchondral change related to underlying  sacroiliac arthropathy. Mild left hip osteoarthritis. Symphysis pubis  degenerative change. There is edema of the symphysis pubis which  extends into the adjacent portions of the superior and inferior pubic  rami bilaterally. This could represent radiation osteitis, versus  stress reaction; no discrete fracture line is seen in this area.      Acetabular Labrum: Minimal left juxtaacetabular cyst formation  posteriorly. This may well relate to underlying labral tearing. If  indicated clinically, MR arthrography would be considered the study of  choice in this regard.     Hip joint space: No significant overall joint effusion.      Trochanteric and Iliopsoas Bursae: Minimal right trochanteric  bursitis.     Common Hamstring Tendon: No evidence of tear or significant  tendinosis.     Additional Findings: The gluteus medius and minimus tendons appear  unremarkable.                                                                      IMPRESSION:  1. Bilateral sacral fracturing.  2. Symphysis pubis edema which extends to the adjacent superior and  inferior pubic rami bilaterally. This could represent radiation  osteitis versus stress reaction.  3. Mild left hip osteoarthritis. Minimal juxta acetabular cyst, which  may well relate to underlying labral tearing.  4. Minimal right trochanteric bursitis.    In  Summary:  - Bilateral Sacral fractures with various other areas of edema (symphesis pubis, pubic rami)  - Mild left hip arthritis  - Mild right trochanteric bursitis    I Recommend:  - Continue to limit weight bearing  - Schedule follow up with me to review results further and discuss next steps  - Follow up with primary care to discuss bone health work up    Elmira Weber MD

## 2019-02-22 NOTE — TELEPHONE ENCOUNTER
Spoke with patient. Discussed MRI results and recommendations. Scheduled f/u visit with Dr Weber on 2/26/19.  William Chawla ATC

## 2019-02-26 ENCOUNTER — HOSPITAL ENCOUNTER (EMERGENCY)
Facility: CLINIC | Age: 69
Discharge: HOME | End: 2019-02-26
Attending: NURSE PRACTITIONER | Admitting: NURSE PRACTITIONER
Payer: MEDICARE

## 2019-02-26 ENCOUNTER — OFFICE VISIT (OUTPATIENT)
Dept: ORTHOPEDICS | Facility: CLINIC | Age: 69
End: 2019-02-26
Payer: MEDICARE

## 2019-02-26 VITALS
WEIGHT: 248 LBS | HEART RATE: 102 BPM | OXYGEN SATURATION: 97 % | TEMPERATURE: 97.4 F | RESPIRATION RATE: 18 BRPM | BODY MASS INDEX: 42.34 KG/M2 | DIASTOLIC BLOOD PRESSURE: 90 MMHG | SYSTOLIC BLOOD PRESSURE: 154 MMHG | HEIGHT: 64 IN

## 2019-02-26 VITALS
DIASTOLIC BLOOD PRESSURE: 86 MMHG | HEIGHT: 64 IN | WEIGHT: 248 LBS | BODY MASS INDEX: 42.34 KG/M2 | SYSTOLIC BLOOD PRESSURE: 138 MMHG

## 2019-02-26 DIAGNOSIS — S32.10XD CLOSED FRACTURE OF SACRUM WITH ROUTINE HEALING, UNSPECIFIED PORTION OF SACRUM, SUBSEQUENT ENCOUNTER: Primary | ICD-10-CM

## 2019-02-26 DIAGNOSIS — H69.91 EUSTACHIAN TUBE DYSFUNCTION, RIGHT: ICD-10-CM

## 2019-02-26 DIAGNOSIS — M53.3 SACRAL BACK PAIN: ICD-10-CM

## 2019-02-26 DIAGNOSIS — H65.193 ACUTE MEE (MIDDLE EAR EFFUSION), BILATERAL: ICD-10-CM

## 2019-02-26 PROCEDURE — 99283 EMERGENCY DEPT VISIT LOW MDM: CPT | Mod: Z6 | Performed by: NURSE PRACTITIONER

## 2019-02-26 PROCEDURE — 99213 OFFICE O/P EST LOW 20 MIN: CPT | Performed by: PEDIATRICS

## 2019-02-26 PROCEDURE — 99282 EMERGENCY DEPT VISIT SF MDM: CPT | Performed by: NURSE PRACTITIONER

## 2019-02-26 ASSESSMENT — ENCOUNTER SYMPTOMS
FEVER: 0
COUGH: 0
NEUROLOGICAL NEGATIVE: 1
SORE THROAT: 0

## 2019-02-26 ASSESSMENT — MIFFLIN-ST. JEOR
SCORE: 1624.92
SCORE: 1624.92

## 2019-02-26 NOTE — ED NOTES
Pt presents to the ER with c/o R ear fullness.  Pt was cleaning R ear with Q tip yesterday when part of the Q tip got stuck in ear- she states she was able to get it out, but she feels like her ear still has a lot of wax in it. Pt reports difficulty hearing out of R ear.  Denies pain.

## 2019-02-26 NOTE — PATIENT INSTRUCTIONS
Plan:  - Today's Plan of Care:  Rehab: Physical Therapy: [facility] - [phone] -  Tia Adal  Follow up with PCP to [facility] bone health    Follow Up: as needed    If you have any further questions for your physician or physician s care team you can call [phone] and use option 3 to leave a voice message. Calls received during business hours will be returned same day.

## 2019-02-26 NOTE — LETTER
2/26/2019         RE: Thomas Davila  [address]  [place] [facility] [address]        Dear Colleague,    Thank you for referring your patient, Thomas Davila, to the [facility]. Please see a copy of my visit note below.    Sports Medicine Clinic Visit - Interim History February 26, 2019    PCP: Dung Prieto    Thomas Davila is a 69 year old female who is seen in f/u up for Sacral pain. Since last visit on 2/20/19, patient has completed a pelvic MRI. She reports an improvement in her sacral pain. She has not really been limiting her weight bearing. She has been to one session of physical therapy.    Initial History 2/20/2019: She reports 2 months lower back and SI joint pain. She reports no known injury. She states the pain is a dull ache that throbs on occasion. She reports  symptoms and pain in SI joint with motion of her right leg. She has radiating pain to her right thigh at times. She has had a history of cancer and radiation therapy to the area. She has had a significant weight increase over the last one year.    Symptoms are better with: Ibuprofen  Symptoms are worse with: sitting of right hip, stairs and walking  Additional Features:   Positive: popping, grinding, instability and weakness   Negative: swelling, bruising, catching, locking, paresthesias and numbness    Social History: retired    Review of Systems  Skin: no bruising, no swelling  Musculoskeletal: as above  Neurologic: no numbness, paresthesias  Remainder of review of systems is negative including constitutional, CV, pulmonary, GI, except as noted in HPI or medical history.    Patient's current problem list, past medical and surgical history, and family history were reviewed.    Patient Active Problem List   Diagnosis     Essential hypertension, benign     GERD (gastroesophageal reflux disease)     Sebaceous cyst     CARDIOVASCULAR SCREENING; LDL GOAL LESS THAN 130     Itchy eyes      Plantar warts     Breast cancer (H)     Endometrial adenocarcinoma (H)     Cancer of endometrium     Encounter for long-term current use of medication     Malignant neoplasm of other specified sites of female genital organs     Other specified prophylactic or treatment measure     Nontoxic multinodular goiter     Morbid obesity (H)     Past Medical History:   Diagnosis Date     Acute parametritis and pelvic cellulitis     salpingitis     ASCUS with positive high risk HPV 2013     Asthma     No recent issues     Basal cell carcinoma      breast cancer     left lumpectomy, radiation, tamoxifen     Breast cancer (H)     L Breast; Lumpectomy & Radiation     Chronic salpingitis and oophoritis     PID        Embolism and thrombosis of unspecified site     left leg, due to tamoxifen therapy     Generalized osteoarthrosis, unspecified site     hands     Leiomyoma of uterus, unspecified      Other malignant neoplasm of skin, site unspecified 2006    Basal cell cancer on nose     Squamous cell carcinoma      Thrombosis of leg     Left     Unspecified essential hypertension      Past Surgical History:   Procedure Laterality Date     BREAST LUMPECTOMY, RT/LT      left     C/SECTION, LOW TRANSVERSE  1972, 1976    , Low Transverse x2     CL AFF SURGICAL PATHOLOGY      Breast reduction     COLONOSCOPY  10/15/02    normal     FOOT SURGERY      R Foot      HC EXCISION BREAST LESION, OPEN >=1  98    1) Needle localization with generous lumpectomy 2) Left axillary node dissection.     HC LAPAROSCOPY, SURGICAL, ABDOMEN, PERITONEUM & OMENTUM; DX W/ OR W/O SPECIMEN(S)  94     HYSTERECTOMY, PAP NO LONGER INDICATED       INSERT PORT VASCULAR ACCESS  3/14/2013    Procedure: INSERT PORT VASCULAR ACCESS;  Port Revision;  Surgeon: Arash Puente MD;  Location: [facility]     LAPAROSCOPIC HYSTERECTOMY TOTAL, BILATERAL SALPINGO-OOPHORECTOMY, NODE DISSECTION, COMBINED  2013    Procedure:  "COMBINED LAPAROSCOPIC HYSTERECTOMY TOTAL, SALPINGO-OOPHORECTOMY, NODE DISSECTION;  Laparosocpic  Total Abdominal Hysterectomy, Bilateral Salphino-Oophorectomy, Bilateral Pelvic Lymph Node Dissection, Pelvic Washings, Cystoscopy;  Surgeon: Tanya Walker MD;  Location: [facility] OR     SURGICAL HISTORY OF -   06/22/93    1) Excision of digital cyst right mid finger  2)  Excision of dermatofibroma left calf     SURGICAL HISTORY OF -   12/18/2001    Excision of mucinous cyst & partial ostectomy, bone removal of benign osteophytic overgrowth osteophyte of the distal aspect of the middle phalanx and distal phalanx     SURGICAL HISTORY OF -   2006    Basal cell cancer removal     TONSILLECTOMY       TUBAL LIGATION  1978     Family History   Problem Relation Age of Onset     Cerebrovascular Disease Mother      Hypertension Mother      Diabetes Mother      Thyroid Disease Mother      Arthritis Mother      Alzheimer Disease Mother      Neurologic Disorder Mother         Parkinsons     Heart Disease Father      Hypertension Father      Diabetes Father      Thyroid Disease Father      Arthritis Father      Blood Disease Father      Anesthesia Reaction Sister      Thyroid Disease Sister      Anesthesia Reaction Sister      Arthritis Sister         RA     Hypertension Brother      Allergies Son      Gastrointestinal Disease Son         GERD     Hypertension Brother      Allergies Son         percocett     Gastrointestinal Disease Son         GERD     Hypertension Son        Objective  /86 (BP Location: Left arm, Patient Position: Chair, Cuff Size: Adult Regular)   Ht 1.626 m (5' 4\")   Wt 112.5 kg (248 lb)   BMI 42.57 kg/m       GENERAL APPEARANCE: healthy, alert and no distress, overweight  GAIT: NORMAL  SKIN: no suspicious lesions or rashes  HEENT: Sclera clear, anicteric  CV: good peripheral pulses  RESP: Breathing not labored  NEURO: Normal strength and tone, mentation intact and speech normal  PSYCH:  mentation appears " normal and affect normal/bright    Low back exam:     Inspection:     no visible deformity in the low back       normal skin       normal vascular       normal lymphatic       no asymmetry     Posture:      rounded shoulders and upper back       lumbar lordosis diminished     Foot Inspection:     no deformity noted     Tender:     Right sided sacrum and into buttock     Non Tender:     remainder of lumbar spine     ROM:      limited flexion due to pain       limited extension due to pain     Strength:     hip flexion 5/5 bilateral       knee extension 5/5 bilateral       ankle dorsiflexion 5/5 bilateral       ankle plantarflexion 5/5 bilateral       dorsiflexion of the great toe 5/5 bilateral       able to heel and toe walk     Reflexes:     patellar (L3, L4) symmetric diminished       achilles tendons (S1) symmetric diminished     Sensation:    grossly intact throughout lower extremities     Special tests:      straight leg raise left negative        straight leg raise right negative       positive (+) LINDA  Right - with pain in buttocks       positive (+) FADIR  Right - with pain in buttocks  - negative log roll right, negative fulcrum    Radiology  I ordered, visualized and reviewed these images with the patient  MR PELVIS BONE WITHOUT CONTRAST  2/21/2019  3:39 PM  HISTORY: Back or sacroiliac joint pain, spondyloarthropathy suspected,  initial exam. Evaluate stress fracture, history of radiation,  posterior sacral/pelvis pain. Sacral pain.  TECHNIQUE: Multiplanar, multisequence without contrast.  FINDINGS:   Osseous and Cartilaginous Structures: Bilateral sacral fracturing with  prominent adjacent edema. There is mild edema within both iliac bones  adjacent to the sacroiliac joints. This could represent radiation  osteitis, versus reactive subchondral change related to underlying  sacroiliac arthropathy. Mild left hip osteoarthritis. Symphysis pubis  degenerative change. There is edema of the symphysis pubis  which  extends into the adjacent portions of the superior and inferior pubic  rami bilaterally. This could represent radiation osteitis, versus  stress reaction; no discrete fracture line is seen in this area.   Acetabular Labrum: Minimal left juxtaacetabular cyst formation  posteriorly. This may well relate to underlying labral tearing. If  indicated clinically, MR arthrography would be considered the study of  choice in this regard.  Hip joint space: No significant overall joint effusion.   Trochanteric and Iliopsoas Bursae: Minimal right trochanteric  bursitis.  Common Hamstring Tendon: No evidence of tear or significant  tendinosis.  Additional Findings: The gluteus medius and minimus tendons appear  unremarkable.                                                     IMPRESSION:  1. Bilateral sacral fracturing.  2. Symphysis pubis edema which extends to the adjacent superior and  inferior pubic rami bilaterally. This could represent radiation  osteitis versus stress reaction.  3. Mild left hip osteoarthritis. Minimal juxta acetabular cyst, which  may well relate to underlying labral tearing.  4. Minimal right trochanteric bursitis.    Assessment:  1. Closed fracture of sacrum with routine healing, unspecified portion of sacrum, subsequent encounter    2. Sacral back pain      Bilateral Sacral stress fractures with various other areas of edema in the pelvis.  Discussed supportive care including limited weight bearing if painful, rest.  Could benefit from pelvic floor PT given injuries.  Follow up with PCP for likely bone health work up.    Plan:  - Today's Plan of Care:  Rehab: Physical Therapy: [facility] - [phone] -  Bailee Galeas  Follow up with PCP to eval bone health    Follow Up: as needed    Concerning signs and symptoms were reviewed.  The patient expressed understanding of this management plan and all questions were answered at this time.    Elmira Weber MD CAQ  Primary Care Sports  Medicine  [facility]    Again, thank you for allowing me to participate in the care of your patient.        Sincerely,        Elmira Weber MD

## 2019-02-26 NOTE — PROGRESS NOTES
Sports Medicine Clinic Visit - Interim History February 26, 2019    PCP: Dung Prieto    Thomas Davila is a 69 year old female who is seen in f/u up for Sacral pain. Since last visit on 2/20/19, patient has completed a pelvic MRI. She reports an improvement in her sacral pain. She has not really been limiting her weight bearing. She has been to one session of physical therapy.    Initial History 2/20/2019: She reports 2 months lower back and SI joint pain. She reports no known injury. She states the pain is a dull ache that throbs on occasion. She reports  symptoms and pain in SI joint with motion of her right leg. She has radiating pain to her right thigh at times. She has had a history of cancer and radiation therapy to the area. She has had a significant weight increase over the last one year.    Symptoms are better with: Ibuprofen  Symptoms are worse with: sitting of right hip, stairs and walking  Additional Features:   Positive: popping, grinding, instability and weakness   Negative: swelling, bruising, catching, locking, paresthesias and numbness    Social History: retired    Review of Systems  Skin: no bruising, no swelling  Musculoskeletal: as above  Neurologic: no numbness, paresthesias  Remainder of review of systems is negative including constitutional, CV, pulmonary, GI, except as noted in HPI or medical history.    Patient's current problem list, past medical and surgical history, and family history were reviewed.    Patient Active Problem List   Diagnosis     Essential hypertension, benign     GERD (gastroesophageal reflux disease)     Sebaceous cyst     CARDIOVASCULAR SCREENING; LDL GOAL LESS THAN 130     Itchy eyes     Plantar warts     Breast cancer (H)     Endometrial adenocarcinoma (H)     Cancer of endometrium     Encounter for long-term current use of medication     Malignant neoplasm of other specified sites of female genital organs     Other specified prophylactic or treatment  measure     Nontoxic multinodular goiter     Morbid obesity (H)     Past Medical History:   Diagnosis Date     Acute parametritis and pelvic cellulitis     salpingitis     ASCUS with positive high risk HPV 2013     Asthma     No recent issues     Basal cell carcinoma      breast cancer 1998    left lumpectomy, radiation, tamoxifen     Breast cancer (H)     L Breast; Lumpectomy & Radiation     Chronic salpingitis and oophoritis     PID        Embolism and thrombosis of unspecified site     left leg, due to tamoxifen therapy     Generalized osteoarthrosis, unspecified site     hands     Leiomyoma of uterus, unspecified      Other malignant neoplasm of skin, site unspecified 2006    Basal cell cancer on nose     Squamous cell carcinoma      Thrombosis of leg     Left     Unspecified essential hypertension      Past Surgical History:   Procedure Laterality Date     BREAST LUMPECTOMY, RT/LT      left     C/SECTION, LOW TRANSVERSE  1972,     , Low Transverse x2     CL AFF SURGICAL PATHOLOGY      Breast reduction     COLONOSCOPY  10/15/02    normal     FOOT SURGERY      R Foot      HC EXCISION BREAST LESION, OPEN >=1  98    1) Needle localization with generous lumpectomy 2) Left axillary node dissection.     HC LAPAROSCOPY, SURGICAL, ABDOMEN, PERITONEUM & OMENTUM; DX W/ OR W/O SPECIMEN(S)  94     HYSTERECTOMY, PAP NO LONGER INDICATED       INSERT PORT VASCULAR ACCESS  3/14/2013    Procedure: INSERT PORT VASCULAR ACCESS;  Port Revision;  Surgeon: Arash Puente MD;  Location: [facility]     LAPAROSCOPIC HYSTERECTOMY TOTAL, BILATERAL SALPINGO-OOPHORECTOMY, NODE DISSECTION, COMBINED  2013    Procedure: COMBINED LAPAROSCOPIC HYSTERECTOMY TOTAL, SALPINGO-OOPHORECTOMY, NODE DISSECTION;  Laparosocpic  Total Abdominal Hysterectomy, Bilateral Salphino-Oophorectomy, Bilateral Pelvic Lymph Node Dissection, Pelvic Washings, Cystoscopy;  Surgeon: Tanya Walker MD;   "Location: [facility]     SURGICAL HISTORY OF -   06/22/93    1) Excision of digital cyst right mid finger  2)  Excision of dermatofibroma left calf     SURGICAL HISTORY OF -   12/18/2001    Excision of mucinous cyst & partial ostectomy, bone removal of benign osteophytic overgrowth osteophyte of the distal aspect of the middle phalanx and distal phalanx     SURGICAL HISTORY OF -   2006    Basal cell cancer removal     TONSILLECTOMY       TUBAL LIGATION  1978     Family History   Problem Relation Age of Onset     Cerebrovascular Disease Mother      Hypertension Mother      Diabetes Mother      Thyroid Disease Mother      Arthritis Mother      Alzheimer Disease Mother      Neurologic Disorder Mother         Parkinsons     Heart Disease Father      Hypertension Father      Diabetes Father      Thyroid Disease Father      Arthritis Father      Blood Disease Father      Anesthesia Reaction Sister      Thyroid Disease Sister      Anesthesia Reaction Sister      Arthritis Sister         RA     Hypertension Brother      Allergies Son      Gastrointestinal Disease Son         GERD     Hypertension Brother      Allergies Son         percocett     Gastrointestinal Disease Son         GERD     Hypertension Son        Objective  /86 (BP Location: Left arm, Patient Position: Chair, Cuff Size: Adult Regular)   Ht 1.626 m (5' 4\")   Wt 112.5 kg (248 lb)   BMI 42.57 kg/m      GENERAL APPEARANCE: healthy, alert and no distress, overweight  GAIT: NORMAL  SKIN: no suspicious lesions or rashes  HEENT: Sclera clear, anicteric  CV: good peripheral pulses  RESP: Breathing not labored  NEURO: Normal strength and tone, mentation intact and speech normal  PSYCH:  mentation appears normal and affect normal/bright    Low back exam:     Inspection:     no visible deformity in the low back       normal skin       normal vascular       normal lymphatic       no asymmetry     Posture:      rounded shoulders and upper back       lumbar lordosis " diminished     Foot Inspection:     no deformity noted     Tender:     Right sided sacrum and into buttock     Non Tender:     remainder of lumbar spine     ROM:      limited flexion due to pain       limited extension due to pain     Strength:     hip flexion 5/5 bilateral       knee extension 5/5 bilateral       ankle dorsiflexion 5/5 bilateral       ankle plantarflexion 5/5 bilateral       dorsiflexion of the great toe 5/5 bilateral       able to heel and toe walk     Reflexes:     patellar (L3, L4) symmetric diminished       achilles tendons (S1) symmetric diminished     Sensation:    grossly intact throughout lower extremities     Special tests:      straight leg raise left negative        straight leg raise right negative       positive (+) LINDA  Right - with pain in buttocks       positive (+) FADIR  Right - with pain in buttocks  - negative log roll right, negative fulcrum    Radiology  I ordered, visualized and reviewed these images with the patient  MR PELVIS BONE WITHOUT CONTRAST  2/21/2019  3:39 PM  HISTORY: Back or sacroiliac joint pain, spondyloarthropathy suspected,  initial exam. Evaluate stress fracture, history of radiation,  posterior sacral/pelvis pain. Sacral pain.  TECHNIQUE: Multiplanar, multisequence without contrast.  FINDINGS:   Osseous and Cartilaginous Structures: Bilateral sacral fracturing with  prominent adjacent edema. There is mild edema within both iliac bones  adjacent to the sacroiliac joints. This could represent radiation  osteitis, versus reactive subchondral change related to underlying  sacroiliac arthropathy. Mild left hip osteoarthritis. Symphysis pubis  degenerative change. There is edema of the symphysis pubis which  extends into the adjacent portions of the superior and inferior pubic  rami bilaterally. This could represent radiation osteitis, versus  stress reaction; no discrete fracture line is seen in this area.   Acetabular Labrum: Minimal left juxtaacetabular cyst  formation  posteriorly. This may well relate to underlying labral tearing. If  indicated clinically, MR arthrography would be considered the study of  choice in this regard.  Hip joint space: No significant overall joint effusion.   Trochanteric and Iliopsoas Bursae: Minimal right trochanteric  bursitis.  Common Hamstring Tendon: No evidence of tear or significant  tendinosis.  Additional Findings: The gluteus medius and minimus tendons appear  unremarkable.                                                     IMPRESSION:  1. Bilateral sacral fracturing.  2. Symphysis pubis edema which extends to the adjacent superior and  inferior pubic rami bilaterally. This could represent radiation  osteitis versus stress reaction.  3. Mild left hip osteoarthritis. Minimal juxta acetabular cyst, which  may well relate to underlying labral tearing.  4. Minimal right trochanteric bursitis.    Assessment:  1. Closed fracture of sacrum with routine healing, unspecified portion of sacrum, subsequent encounter    2. Sacral back pain      Bilateral Sacral stress fractures with various other areas of edema in the pelvis.  Discussed supportive care including limited weight bearing if painful, rest.  Could benefit from pelvic floor PT given injuries.  Follow up with PCP for likely bone health work up.    Plan:  - Today's Plan of Care:  Rehab: Physical Therapy: [facility] - [phone] -  [facility]  Follow up with PCP to eval bone health    Follow Up: as needed    Concerning signs and symptoms were reviewed.  The patient expressed understanding of this management plan and all questions were answered at this time.    Elmira Weber MD CAQ  Primary Care Sports Medicine  [facility]

## 2019-02-26 NOTE — ED PROVIDER NOTES
History     Chief Complaint   Patient presents with     Foreign Body in Ear     right ear plugged.  pt put q-tip in ear yesterday and has been plugged, unable to hear out of right ear     HPI  Thomas Davila is a 69 year old female who presents emergency department for evaluation of right ear fullness.  Patient reports 2-3 days of right ear feeling plugged.  Yesterday she attempted to use a Q-tip to clean out her ear, which worsened her symptoms.  Patient reports diminished hearing from her right ear.  Denies pain.  Denies fever.    Allergies:  Allergies   Allergen Reactions     Metal [Staples]      Not staples, but surgical screws     Percocet [Oxycodone-Acetaminophen] Nausea and Vomiting       Problem List:    Patient Active Problem List    Diagnosis Date Noted     Morbid obesity (H) 12/04/2017     Priority: Medium     Nontoxic multinodular goiter 10/06/2014     Priority: Medium     Other specified prophylactic or treatment measure 07/25/2013     Priority: Medium     Malignant neoplasm of other specified sites of female genital organs 02/21/2013     Priority: Medium     Encounter for long-term current use of medication 02/13/2013     Priority: Medium     Problem list name updated by automated process. Provider to review       Cancer of endometrium 01/25/2013     Priority: Medium     1/17/13: Pap--ASCUS, +HR HPV 66.   1/31/13: MONICA performed for endometrial cancer.   2/13/13: Oncology office visit: Plan - chemotherapy with radiation following.   4/2/13: Oncology office visit: Treatment plan is chemotherapy, then Radiation, followed by more chemotherapy.  11/7/13: Surviellance visit. Next visit scheduled 3/6/14.  3/6/14: Surveillance visit. Next visit scheduled 9/4/14.    9/4/14: Surveillance visit. 6 month surveillance visit scheduled 3/5/15.   3/5/15: Surveillance visit. 6 month surveillance visit scheduled 9/14/15.   9/14/15: Surveillance visit. Next visit scheduled 3/17/16.   04/04/16: Surveillance visit.  Next visit scheduled 10/03/16.   10/03/16: Surveillance visit. Next visit scheduled 04/03/17.   04/03/17: Surveillance visit. Next visit scheduled 10/02/17.       Breast cancer (H) 01/17/2013     Priority: Medium     Dx 1998  Developed DVT with Tamoxifen       Endometrial adenocarcinoma (H) 01/17/2013     Priority: Medium     Pelvic sono with 3 cm endometrial thickening  EMB 1/17/2013  Abundant fleshy tissue removed  Pt aware that DDX: uterine cancer or endometrial polyp; if cancer then will be referred to [facility]; if polyp, then hysterectomy and polypectomy  Best contact number  Cell [phone] (pt OK with messages left)  FINAL DIAGNOSIS:  Endometrial biopsy:  - Endometrioid adenocarcinoma, FIGO Grade I of III, arising in  background of atypical complex  hyperplasia.             Itchy eyes 07/18/2012     Priority: Medium     Plantar warts 07/18/2012     Priority: Medium     CARDIOVASCULAR SCREENING; LDL GOAL LESS THAN 130 10/31/2010     Priority: Medium     Sebaceous cyst 02/15/2010     Priority: Medium     GERD (gastroesophageal reflux disease) 09/29/2009     Priority: Medium     Essential hypertension, benign 11/30/2006     Priority: Medium     Maxide, Prinivil          Past Medical History:    Past Medical History:   Diagnosis Date     Acute parametritis and pelvic cellulitis 1994     ASCUS with positive high risk HPV 1/2013     Asthma      Basal cell carcinoma      breast cancer 1998     Breast cancer (H) 1998     Chronic salpingitis and oophoritis 02/94     Embolism and thrombosis of unspecified site      Generalized osteoarthrosis, unspecified site      Leiomyoma of uterus, unspecified      Other malignant neoplasm of skin, site unspecified 11/2006     Squamous cell carcinoma      Thrombosis of leg      Unspecified essential hypertension        Past Surgical History:    Past Surgical History:   Procedure Laterality Date     BREAST LUMPECTOMY, RT/LT  1998    left     C/SECTION, LOW TRANSVERSE  1972,      , Low Transverse x2     CL AFF SURGICAL PATHOLOGY      Breast reduction     COLONOSCOPY  10/15/02    normal     FOOT SURGERY      R Foot      HC EXCISION BREAST LESION, OPEN >=1  98    1) Needle localization with generous lumpectomy 2) Left axillary node dissection.     HC LAPAROSCOPY, SURGICAL, ABDOMEN, PERITONEUM & OMENTUM; DX W/ OR W/O SPECIMEN(S)  94     HYSTERECTOMY, PAP NO LONGER INDICATED       INSERT PORT VASCULAR ACCESS  3/14/2013    Procedure: INSERT PORT VASCULAR ACCESS;  Port Revision;  Surgeon: Arash Puente MD;  Location: [facility]     LAPAROSCOPIC HYSTERECTOMY TOTAL, BILATERAL SALPINGO-OOPHORECTOMY, NODE DISSECTION, COMBINED  2013    Procedure: COMBINED LAPAROSCOPIC HYSTERECTOMY TOTAL, SALPINGO-OOPHORECTOMY, NODE DISSECTION;  Laparosocpic  Total Abdominal Hysterectomy, Bilateral Salphino-Oophorectomy, Bilateral Pelvic Lymph Node Dissection, Pelvic Washings, Cystoscopy;  Surgeon: Tanya Walker MD;  Location: [facility] OR     SURGICAL HISTORY OF -   93    1) Excision of digital cyst right mid finger  2)  Excision of dermatofibroma left calf     SURGICAL HISTORY OF -   2001    Excision of mucinous cyst & partial ostectomy, bone removal of benign osteophytic overgrowth osteophyte of the distal aspect of the middle phalanx and distal phalanx     SURGICAL HISTORY OF -       Basal cell cancer removal     TONSILLECTOMY       TUBAL LIGATION         Family History:    Family History   Problem Relation Age of Onset     Cerebrovascular Disease Mother      Hypertension Mother      Diabetes Mother      Thyroid Disease Mother      Arthritis Mother      Alzheimer Disease Mother      Neurologic Disorder Mother         Parkinsons     Heart Disease Father      Hypertension Father      Diabetes Father      Thyroid Disease Father      Arthritis Father      Blood Disease Father      Anesthesia Reaction Sister      Thyroid Disease Sister      Anesthesia Reaction  "Sister      Arthritis Sister         RA     Hypertension Brother      Allergies Son      Gastrointestinal Disease Son         GERD     Hypertension Brother      Allergies Son         percocett     Gastrointestinal Disease Son         GERD     Hypertension Son        Social History:  Marital Status:   [2]  Social History     Tobacco Use     Smoking status: Never Smoker     Smokeless tobacco: Never Used   Substance Use Topics     Alcohol use: Yes     Frequency: Never     Comment: Rare     Drug use: No        Medications:      ACETAMINOPHEN PO   aspirin 81 MG tablet   fluorouracil (EFUDEX) 5 % cream   lisinopril (PRINIVIL/ZESTRIL) 20 MG tablet   naproxen (NAPROSYN) 500 MG tablet   omeprazole (PRILOSEC) 20 MG DR capsule   ORDER FOR DME   ORDER FOR DME   phentermine (ADIPEX-P) 15 MG capsule   triamterene-HCTZ (MAXZIDE-25) 37.5-25 MG tablet         Review of Systems   Constitutional: Negative for fever.   HENT: Positive for congestion. Negative for sore throat.         Right ear fullness   Respiratory: Negative for cough.    Cardiovascular: Negative for chest pain.   Neurological: Negative.        Physical Exam   BP: 180/86  Pulse: 102  Heart Rate: 102  Temp: 97.4  F (36.3  C)  Resp: 18  Height: 162.6 cm (5' 4\")  Weight: 112.5 kg (248 lb)  SpO2: 95 %      Physical Exam    GENERAL APPEARANCE: healthy, alert and no distress  EYES: EOMI, conjunctiva clear  HENT: bilateral ear canals clear, intact, and without inflammation. Right ear canal with scant amount of cerumen at 3o'clock. Right Middle ear effusion present. Left ear canal intact. Left TM middle ear effusion. Nose normal.  Oropharynx without ulcers, erythema or lesions  NECK: supple, nontender, no lymphadenopathy  RESP: lungs clear to auscultation - no rales, rhonchi or wheezes  CV: regular rates and rhythm, normal S1 S2, no murmur noted      ED Course        Procedures             No results found for this or any previous visit (from the past 24 " hour(s)).    Medications - No data to display    Assessments & Plan (with Medical Decision Making)   69 year old with complaint of right ear fullness and diminished hearing. No pain, no fevers. Does report some sinus congestion.    On exam there is scant amount of cerumen in the canal. Bilateral ear canals are without erythema or inflammation. Bilateral ANGELY present. Right ear irrigated and cleared of the small amount of cerumen. No evidence of infection. Patient instructed as follows:  Flonase 2 sprays each nostril daily.  If still having problems with right ear after 5-7 days follow-up with your regular doctor for recheck or [phone].      I have reviewed the nursing notes.    I have reviewed the findings, diagnosis, plan and need for follow up with the patient.       Medication List      There are no discharge medications for this visit.         Final diagnoses:   Eustachian tube dysfunction, right   Acute ANGELY (middle ear effusion), bilateral       2/26/2019   [facility] EMERGENCY DEPARTMENT     Brenda Blake APRN CNP  02/26/19 1038

## 2019-02-26 NOTE — DISCHARGE INSTRUCTIONS
Flonase 2 sprays each nostril daily.  If still having problems with right ear after 5-7 days follow-up with your regular doctor for recheck or [phone].

## 2019-02-26 NOTE — ED AVS SNAPSHOT
[facility] Emergency Department  [address] [address]  Phone:  [phone]  Fax:  [phone]                                    Thomas Davila   MRN: [id number]    Department:  [facility] Emergency Department   Date of Visit:  2/26/2019           After Visit Summary Signature Page    I have received my discharge instructions, and my questions have been answered. I have discussed any challenges I see with this plan with the nurse or doctor.    ..........................................................................................................................................  Patient/Patient Representative Signature      ..........................................................................................................................................  Patient Representative Print Name and Relationship to Patient    ..................................................               ................................................  Date                                   Time    ..........................................................................................................................................  Reviewed by Signature/Title    ...................................................              ..............................................  Date                                               Time          22EPIC Rev 08/18

## 2019-03-12 ENCOUNTER — HOSPITAL ENCOUNTER (OUTPATIENT)
Dept: PHYSICAL THERAPY | Facility: CLINIC | Age: 69
Setting detail: THERAPIES SERIES
End: 2019-03-12
Attending: PEDIATRICS
Payer: MEDICARE

## 2019-03-12 DIAGNOSIS — M54.50 RIGHT-SIDED LOW BACK PAIN WITHOUT SCIATICA, UNSPECIFIED CHRONICITY: Primary | ICD-10-CM

## 2019-03-12 PROCEDURE — 97110 THERAPEUTIC EXERCISES: CPT | Mod: GP | Performed by: PHYSICAL THERAPIST

## 2019-03-12 PROCEDURE — 97140 MANUAL THERAPY 1/> REGIONS: CPT | Mod: GP | Performed by: PHYSICAL THERAPIST

## 2019-03-14 NOTE — PROGRESS NOTES
SUBJECTIVE:   Thomas Davila is a 69 year old female who presents to clinic today for the following health issues:    I saw Thomas on 2/15.  She had been on phentermine in the past and wanted to retry this.  We started a 15mg dose and she was referred to RD (no visits noted in chart).       Wt Readings from Last 10 Encounters:   03/15/19 107 kg (236 lb)   19 112.5 kg (248 lb)   19 112.5 kg (248 lb)   19 111.6 kg (246 lb)   02/15/19 111.7 kg (246 lb 3.2 oz)   18 111.6 kg (246 lb)   18 114.7 kg (252 lb 14.4 oz)   17 111.6 kg (246 lb)   10/02/17 110.6 kg (243 lb 14.4 oz)   17 109.2 kg (240 lb 11.2 oz)       She says occasionally the phentermine causes some jitters.  She feels is monitoring BPs at home and have been 130s, occasionally to 150s    She was advised to have a DEXA after falling and having some sacral stress fractures.  Scan in 2016 showed lowest bone density of -0.8 in the spine.      Problem list and histories reviewed & adjusted, as indicated.  Additional history: as documented    Patient Active Problem List   Diagnosis     Essential hypertension, benign     GERD (gastroesophageal reflux disease)     Sebaceous cyst     CARDIOVASCULAR SCREENING; LDL GOAL LESS THAN 130     Itchy eyes     Plantar warts     Breast cancer (H)     Endometrial adenocarcinoma (H)     Cancer of endometrium     Encounter for long-term current use of medication     Malignant neoplasm of other specified sites of female genital organs     Other specified prophylactic or treatment measure     Nontoxic multinodular goiter     Morbid obesity (H)     Past Surgical History:   Procedure Laterality Date     BREAST LUMPECTOMY, RT/LT      left     C/SECTION, LOW TRANSVERSE  ,     , Low Transverse x2     CL AFF SURGICAL PATHOLOGY      Breast reduction     COLONOSCOPY  10/15/02    normal     FOOT SURGERY  2011    R Foot      HC EXCISION BREAST LESION, OPEN >=1  98    1)  Needle localization with generous lumpectomy 2) Left axillary node dissection.     HC LAPAROSCOPY, SURGICAL, ABDOMEN, PERITONEUM & OMENTUM; DX W/ OR W/O SPECIMEN(S)  02/07/94     HYSTERECTOMY, PAP NO LONGER INDICATED       INSERT PORT VASCULAR ACCESS  3/14/2013    Procedure: INSERT PORT VASCULAR ACCESS;  Port Revision;  Surgeon: Arash Puente MD;  Location: [facility]     LAPAROSCOPIC HYSTERECTOMY TOTAL, BILATERAL SALPINGO-OOPHORECTOMY, NODE DISSECTION, COMBINED  1/31/2013    Procedure: COMBINED LAPAROSCOPIC HYSTERECTOMY TOTAL, SALPINGO-OOPHORECTOMY, NODE DISSECTION;  Laparosocpic  Total Abdominal Hysterectomy, Bilateral Salphino-Oophorectomy, Bilateral Pelvic Lymph Node Dissection, Pelvic Washings, Cystoscopy;  Surgeon: Tanya Walker MD;  Location: [facility] OR     SURGICAL HISTORY OF -   06/22/93    1) Excision of digital cyst right mid finger  2)  Excision of dermatofibroma left calf     SURGICAL HISTORY OF -   12/18/2001    Excision of mucinous cyst & partial ostectomy, bone removal of benign osteophytic overgrowth osteophyte of the distal aspect of the middle phalanx and distal phalanx     SURGICAL HISTORY OF -   2006    Basal cell cancer removal     TONSILLECTOMY       TUBAL LIGATION  1978       Social History     Tobacco Use     Smoking status: Never Smoker     Smokeless tobacco: Never Used   Substance Use Topics     Alcohol use: Yes     Frequency: Never     Comment: Rare     Family History   Problem Relation Age of Onset     Cerebrovascular Disease Mother      Hypertension Mother      Diabetes Mother      Thyroid Disease Mother      Arthritis Mother      Alzheimer Disease Mother      Neurologic Disorder Mother         Parkinsons     Heart Disease Father      Hypertension Father      Diabetes Father      Thyroid Disease Father      Arthritis Father      Blood Disease Father      Anesthesia Reaction Sister      Thyroid Disease Sister      Anesthesia Reaction Sister      Arthritis Sister         RA      "Hypertension Brother      Allergies Son      Gastrointestinal Disease Son         GERD     Hypertension Brother      Allergies Son         percocett     Gastrointestinal Disease Son         GERD     Hypertension Son          Current Outpatient Medications   Medication Sig Dispense Refill     aspirin 81 MG tablet Take 81 mg by mouth daily        lisinopril (PRINIVIL/ZESTRIL) 20 MG tablet Take 1 tablet (20 mg) by mouth daily 90 tablet 3     naproxen (NAPROSYN) 500 MG tablet Take 1 tablet (500 mg) by mouth 2 times daily as needed for moderate pain 60 tablet 3     omeprazole (PRILOSEC) 20 MG DR capsule Take 1 capsule (20 mg) by mouth daily 90 capsule 3     order for DME Equipment being ordered: hernia belt 1 Units 0     phentermine (ADIPEX-P) 15 MG capsule Take 1 capsule (15 mg) by mouth every morning 30 capsule 1     triamterene-HCTZ (MAXZIDE-25) 37.5-25 MG tablet Take 1 tablet by mouth daily 90 tablet 3     Allergies   Allergen Reactions     Metal [Staples] Other (See Comments)     Not staples, but surgical screws     Percocet [Oxycodone-Acetaminophen] Nausea and Vomiting       Reviewed and updated as needed this visit by clinical staff  Tobacco  Allergies  Meds  Med Hx  Surg Hx  Fam Hx  Soc Hx      Reviewed and updated as needed this visit by Provider         ROS:  Constitutional, abdominal systems are negative, except as otherwise noted.    OBJECTIVE:     /82   Pulse 100   Temp 98.2  F (36.8  C) (Tympanic)   Ht 1.626 m (5' 4\")   Wt 107 kg (236 lb)   BMI 40.51 kg/m    Body mass index is 40.51 kg/m .  GENERAL: healthy, alert and no distress  ABDOMEN: Soft, nontender, surgical incision in the right lower quadrant where she experiences discomfort from hernia      ASSESSMENT/PLAN:         1. Obesity, morbid, BMI 40.0-49.9 (H)    She has had good weight loss over the past month on the 15 mg dose of phentermine.  We will continue this for another 2 months, and recheck again in clinic.  If she wishes to " continue with medication at that point, would recommend adding in topiramate as well.  She has yet to see RD.    - phentermine (ADIPEX-P) 15 MG capsule; Take 1 capsule (15 mg) by mouth every morning  Dispense: 30 capsule; Refill: 1    2. History of Malignant neoplasm of left female breast, unspecified estrogen receptor status, unspecified site of breast (H)    She would like to discuss possible breast reconstruction surgery, referral placed to plastic surgery.    - PLASTIC SURGERY REFERRAL    3. Hernia of right abdominal wall    Physical therapy suggested that she have a hernia belt to wear during exercises, order placed.    - order for DME; Equipment being ordered: hernia belt  Dispense: 1 Units; Refill: 0    4. Post-menopausal    Sports medicine suggested that she have a repeat DEXA scan due to stress fractures in the pelvis seen recently on pelvic MRI, order placed.    - DX Hip/Pelvis/Spine; Future    Chart documentation was done using Dragon dictation software. Although reviewed after completion, some errors may remain.     Bobby Gandhi MD  [facility] - IM

## 2019-03-15 ENCOUNTER — OFFICE VISIT (OUTPATIENT)
Dept: FAMILY MEDICINE | Facility: CLINIC | Age: 69
End: 2019-03-15
Payer: MEDICARE

## 2019-03-15 VITALS
HEART RATE: 100 BPM | TEMPERATURE: 98.2 F | WEIGHT: 236 LBS | SYSTOLIC BLOOD PRESSURE: 138 MMHG | HEIGHT: 64 IN | DIASTOLIC BLOOD PRESSURE: 82 MMHG | BODY MASS INDEX: 40.29 KG/M2

## 2019-03-15 DIAGNOSIS — C50.912 MALIGNANT NEOPLASM OF LEFT FEMALE BREAST, UNSPECIFIED ESTROGEN RECEPTOR STATUS, UNSPECIFIED SITE OF BREAST (H): ICD-10-CM

## 2019-03-15 DIAGNOSIS — E66.01 OBESITY, MORBID, BMI 40.0-49.9 (H): Primary | ICD-10-CM

## 2019-03-15 DIAGNOSIS — Z78.0 POST-MENOPAUSAL: ICD-10-CM

## 2019-03-15 DIAGNOSIS — K41.90 FEMORAL HERNIA OF RIGHT SIDE: ICD-10-CM

## 2019-03-15 PROCEDURE — 99214 OFFICE O/P EST MOD 30 MIN: CPT | Performed by: INTERNAL MEDICINE

## 2019-03-15 RX ORDER — PHENTERMINE HYDROCHLORIDE 15 MG/1
15 CAPSULE ORAL EVERY MORNING
Qty: 30 CAPSULE | Refills: 1 | Status: SHIPPED | OUTPATIENT
Start: 2019-03-15 | End: 2019-05-15

## 2019-03-15 ASSESSMENT — MIFFLIN-ST. JEOR: SCORE: 1570.49

## 2019-03-15 NOTE — PATIENT INSTRUCTIONS
CALCIUM    Recommendations:  Teenagers and premenopausal women: 1200 mg/day  Pregnant and Lactating women: 1500 mg/day  Men and Postmenopausal women on estrogen: 1200mg/day  Postmenopausal women not on estrogen: 1500 mg/day    If you are not eating dairy products you also need 400 IU of vitamin D per day which can be obtained in either a multivitamin or in some of the Calcium tablets.    Dietary sources: These also contain vitamin D  Milk                            8 oz            300 mg  Yogurt                          1 cup           400 mg  Hard cheese                     1.5 oz          300 mg  Cottage cheese                  2 cup           300 mg  Orange juice with Calcium       8 oz            300 mg  Low fat dairy sources are recommended    Supplements:  Tums EX                         300 mg  Tums Ultra                      400 mg  Caltrate 600                    600 mg  Oscal                           500 mg  Oscal/D                         500 mg plus vitamin D  Women's Formula Multivitamin    450 mg

## 2019-03-19 ENCOUNTER — HOSPITAL ENCOUNTER (OUTPATIENT)
Dept: PHYSICAL THERAPY | Facility: CLINIC | Age: 69
Setting detail: THERAPIES SERIES
End: 2019-03-19
Attending: PEDIATRICS
Payer: MEDICARE

## 2019-03-19 PROCEDURE — 97140 MANUAL THERAPY 1/> REGIONS: CPT | Mod: GP | Performed by: PHYSICAL THERAPIST

## 2019-03-19 PROCEDURE — 97110 THERAPEUTIC EXERCISES: CPT | Mod: GP | Performed by: PHYSICAL THERAPIST

## 2019-03-27 ENCOUNTER — HOSPITAL ENCOUNTER (OUTPATIENT)
Dept: LAB | Facility: CLINIC | Age: 69
Discharge: HOME | End: 2019-03-27
Attending: INTERNAL MEDICINE | Admitting: INTERNAL MEDICINE
Payer: MEDICARE

## 2019-03-27 DIAGNOSIS — C54.1 ENDOMETRIAL ADENOCARCINOMA (H): ICD-10-CM

## 2019-03-27 LAB
ALBUMIN SERPL-MCNC: 3.8 G/DL (ref 3.4–5)
ALP SERPL-CCNC: 69 U/L (ref 40–150)
ALT SERPL W P-5'-P-CCNC: 58 U/L (ref 0–50)
ANION GAP SERPL CALCULATED.3IONS-SCNC: 6 MMOL/L (ref 3–14)
AST SERPL W P-5'-P-CCNC: 27 U/L (ref 0–45)
BASOPHILS # BLD AUTO: 0 10E9/L (ref 0–0.2)
BASOPHILS NFR BLD AUTO: 0.4 %
BILIRUB SERPL-MCNC: 0.3 MG/DL (ref 0.2–1.3)
BUN SERPL-MCNC: 18 MG/DL (ref 7–30)
CALCIUM SERPL-MCNC: 9.2 MG/DL (ref 8.5–10.1)
CANCER AG125 SERPL-ACNC: 11 U/ML (ref 0–30)
CHLORIDE SERPL-SCNC: 101 MMOL/L (ref 94–109)
CO2 SERPL-SCNC: 28 MMOL/L (ref 20–32)
CREAT SERPL-MCNC: 0.64 MG/DL (ref 0.52–1.04)
DIFFERENTIAL METHOD BLD: NORMAL
EOSINOPHIL # BLD AUTO: 0.1 10E9/L (ref 0–0.7)
EOSINOPHIL NFR BLD AUTO: 1.6 %
ERYTHROCYTE [DISTWIDTH] IN BLOOD BY AUTOMATED COUNT: 12.4 % (ref 10–15)
GFR SERPL CREATININE-BSD FRML MDRD: 90 ML/MIN/{1.73_M2}
GLUCOSE SERPL-MCNC: 105 MG/DL (ref 70–99)
HCT VFR BLD AUTO: 46.5 % (ref 35–47)
HGB BLD-MCNC: 15.3 G/DL (ref 11.7–15.7)
IMM GRANULOCYTES # BLD: 0 10E9/L (ref 0–0.4)
IMM GRANULOCYTES NFR BLD: 0.5 %
LYMPHOCYTES # BLD AUTO: 1.1 10E9/L (ref 0.8–5.3)
LYMPHOCYTES NFR BLD AUTO: 19.2 %
MCH RBC QN AUTO: 29.7 PG (ref 26.5–33)
MCHC RBC AUTO-ENTMCNC: 32.9 G/DL (ref 31.5–36.5)
MCV RBC AUTO: 90 FL (ref 78–100)
MONOCYTES # BLD AUTO: 0.4 10E9/L (ref 0–1.3)
MONOCYTES NFR BLD AUTO: 7.7 %
NEUTROPHILS # BLD AUTO: 3.9 10E9/L (ref 1.6–8.3)
NEUTROPHILS NFR BLD AUTO: 70.6 %
NRBC # BLD AUTO: 0 10*3/UL
NRBC BLD AUTO-RTO: 0 /100
PLATELET # BLD AUTO: 196 10E9/L (ref 150–450)
POTASSIUM SERPL-SCNC: 3.4 MMOL/L (ref 3.4–5.3)
PROT SERPL-MCNC: 7.2 G/DL (ref 6.8–8.8)
RBC # BLD AUTO: 5.16 10E12/L (ref 3.8–5.2)
SODIUM SERPL-SCNC: 135 MMOL/L (ref 133–144)
WBC # BLD AUTO: 5.6 10E9/L (ref 4–11)

## 2019-03-27 PROCEDURE — 80053 COMPREHEN METABOLIC PANEL: CPT | Performed by: INTERNAL MEDICINE

## 2019-03-27 PROCEDURE — 85025 COMPLETE CBC W/AUTO DIFF WBC: CPT | Performed by: INTERNAL MEDICINE

## 2019-03-27 PROCEDURE — 86304 IMMUNOASSAY TUMOR CA 125: CPT | Performed by: INTERNAL MEDICINE

## 2019-04-01 ENCOUNTER — ONCOLOGY VISIT (OUTPATIENT)
Dept: ONCOLOGY | Facility: CLINIC | Age: 69
End: 2019-04-01
Attending: INTERNAL MEDICINE
Payer: MEDICARE

## 2019-04-01 VITALS
HEIGHT: 64 IN | SYSTOLIC BLOOD PRESSURE: 159 MMHG | BODY MASS INDEX: 40.29 KG/M2 | TEMPERATURE: 97.4 F | DIASTOLIC BLOOD PRESSURE: 84 MMHG | RESPIRATION RATE: 20 BRPM | WEIGHT: 236 LBS | OXYGEN SATURATION: 96 % | HEART RATE: 103 BPM

## 2019-04-01 DIAGNOSIS — C54.1 ENDOMETRIAL ADENOCARCINOMA (H): Primary | ICD-10-CM

## 2019-04-01 DIAGNOSIS — E66.3 OVERWEIGHT: ICD-10-CM

## 2019-04-01 DIAGNOSIS — Z86.718 PERSONAL HISTORY OF DVT (DEEP VEIN THROMBOSIS): ICD-10-CM

## 2019-04-01 PROCEDURE — 99214 OFFICE O/P EST MOD 30 MIN: CPT | Performed by: INTERNAL MEDICINE

## 2019-04-01 PROCEDURE — G0463 HOSPITAL OUTPT CLINIC VISIT: HCPCS

## 2019-04-01 ASSESSMENT — PAIN SCALES - GENERAL: PAINLEVEL: NO PAIN (0)

## 2019-04-01 ASSESSMENT — MIFFLIN-ST. JEOR: SCORE: 1570.49

## 2019-04-01 NOTE — PATIENT INSTRUCTIONS
Dr. Chacon would like you to follow up PRN.          When you are in need of a refill, please call your pharmacy and they will send us a request.      Copy of appointments, and after visit summary (AVS) given to patient.      If you have any questions please call Jojo Banks RN, BSN Oncology Hematology  [facility] [phone]. For questions after business hours, or on holidays/weekends, please call our after hours Nurse Triage line [phone]. Thank you.       F/u prn.

## 2019-04-01 NOTE — PROGRESS NOTES
CHIEF COMPLAINT AND REASON FOR VISIT: endometrial cancer 2013 s/p adjuvant chemo, RT.      HISTORY OF PRESENT ILLNESS:  She presented with  postmenopausal bleeding in 12/2012.  It became heavier in January 2013.  Her last menstrual period was around 1998.     Endometrial biopsy performed in primary care's office showed endometrial adenocarcinoma, FIGO grade 1/3, arising from background of atypical complex hyperplasia, HPV positive, type 66.  Pelvic ultrasound indicating uterus is about 9 cm, no fibroid, endometriosis, abnormally thickened, measured 3 cm.    She saw Dr. 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  Ovaries, fallopian tubes, cervical, lower uterine segment are negative.  Cytology wash negative.  There was focal suspicion for angiolymphatic invasion.  Left pelvic lymph nodes 1/8 were positive, right pelvic nodes total 7 were negative. She has stage IIIC pathologic A1mB8PS endometrial carcinoma with focal squamous cell differentiation.   Dr. Walker, GYN oncologist, recommended carbo and Taxol x3 followed by radiation followed by 3 more carbo and Taxol.    Chemotherapy portion was carbo and Taxol, carbo at AUC 6, Taxol 175 mg/meter squared x3 finished in April 2013. External pelvic Radiation is done 5/28/2013. She finished brachy RT  X 3 in early June 2013.  3 more carbo/taxol finished in 8/2013.      PMH: She had remote history of left breast cancer then treated with lumpectomy, left-sided axillary lymph node dissection, radiation and tamoxifen for 1 year, discontinued due to DVT.  She was treated with anticoagulation, aspirin. but is currently not on any blood thinner.   Left  "superficial thrombus in 4/2013 s/p 6 months of coumadin.  HTN      REVIEW OF SYSTEMS:   Likes to eat, concerned about over weight. She is always hungry.   She has lots of arthritis.   She is seeing dermatologist for skin biopsies and \"SCC\".   She fell and fractured her pelvic.       PHYSICAL EXAMINATION:   VITAL SIGNS: Blood pressure 159/84, pulse 103, temperature 97.4  F (36.3  C), temperature source Tympanic, resp. rate 20, height 1.626 m (5' 4\"), weight 107 kg (236 lb), SpO2 96 %, not currently breastfeeding.  GENERAL APPEARANCE:   She is morbidly obese, not in acute distress.  Very pleasant.   HEENT: The patient is normocephalic, atraumatic. Pupils are equal react to light.  Sclerae are anicteric.  Moist oral mucosa.  Negative pharynx.  No oral thrush.   NECK:  Supple.  No jugular venous distention.  Thyroid is not palpable.   LYMPH NODES:  Superficial lymphadenopathy is not appreciable in the bilateral cervical, supraclavicular, axillary or inguinal adenopathy.   CARDIOVASCULAR:  S1, S2 regular with no murmurs or gallops.  No carotid or abdominal bruits.   PULMONARY:  Lungs are clear to auscultation and percussion bilaterally.  There is no wheezing or rhonchi.   GASTROINTESTINAL:  Abdomen is soft, nontender.  No hepatosplenomegaly.  No signs of ascites.  No mass appreciable.   MUSCULOSKELETAL/EXTREMITIES:  No edema.  No cyanotic changes. multiple joints deformities on fingers.  No lymphedema.   NEUROLOGIC:  Cranial nerves II-XII are grossly intact.  Sensation intact.  Muscle strength and muscle tone symmetrical all through 5/5.   BACK:  No spinal or paraspinal tenderness.  No CVA tenderness.   Skin: erythematous scaly lesion on right upper arm.      CURRENT LAB DATA REVIEWED  CBC DIFF, CMP,  are fine    Current image REVIEWED  2/2019 MRI - 1. Bilateral sacral fracturing.  2. Symphysis pubis edema which extends to the adjacent superior and inferior pubic rami bilaterally. This could represent radiation " osteitis versus stress reaction.      Old data reviewed with summary  CT 9/2017 body CT: negative, fatty liver.   CT 9/2015:  1. No evidence of metastatic or recurrent neoplasm.  2. There is a small superficial collection and mild adjacent inflammatory change involving the lower right abdominal wall at the site of a previously seen fat-containing ventral hernia. This could  relate to interval hernia repair with possibly a small hematoma or seroma (she had ventral hernia surgery at [facility] in 7/2015)    Thyroid US 3/2015: Stable multinodular thyroid gland from 9/19/2014.     CT body  9/2014- 1. No convincing evidence for recurrent or metastatic malignancy in the chest, abdomen, or pelvis.   2. Diffuse fatty infiltration of the liver.   3. Indeterminate 1.6 cm left thyroid nodule is unchanged. Consider thyroid ultrasound for further evaluation.     CT body 10/2013 - Hepatic fatty infiltration. Scattered colonic diverticulosis. Nonenlarged left external iliac lymph node.    Sono of left leg 4/2013 - There is extensive occlusive thrombus throughout the greater saphenous vein.    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  Ovaries, fallopian tubes, cervical, lower uterine segment are negative.  Cytology wash negative.  There was focal suspicion for angiolymphatic invasion.  Left pelvic lymph nodes 1/8 were positive, right pelvic nodes total 7 were negative. She has stage IIIC pathologic T1bN1.    Pelvic ultrasound 01/2013 -  indicating uterus is about 9 cm, no fibroid, endometriosis, abnormally thickened, measured 3 cm.            ASSESSMENT AND PLAN:     1.  stage IIIC endometrioid carcinoma with focal squamous cell differentiation, human papilloma virus positive, status post Da Sasha-assisted total hysterectomy, bilateral  salpingo-oophorectomy, lymph node dissection.      Treatment plan was sandwich chemo -radiation followed by more chemotherapy.  Chemotherapy portion is carbo and Taxol, carbo at AUC 6, Taxol 175 mg/meter squared x3 finished in April. External pelvic Radiation is done 5/28/2013. She had brachy RT  X 3 in early June.  3 more chemo with carbo/taxol till 8/2013.    She wants to come to see us annually still.      2. Fatty liver. Diet fat reduction counseling is provided.    3. Hx of DVT on tamoxifen. She is off coumadin now.   She is advised on ASAuse post meals.      4. Overweight. Weight loss counseling is provided. She admits she likes to eat, always hungry and not willing to change it.

## 2019-04-01 NOTE — PROGRESS NOTES
"  Oncology Rooming Note    April 1, 2019 11:38 AM   Thomas Davila is a 69 year old female who presents for:    Chief Complaint   Patient presents with     Oncology Clinic Visit     1 year recheck Endometrial adenocarcinoma, review labs      Initial Vitals: /84 (BP Location: Right arm, Patient Position: Sitting, Cuff Size: Adult Large)   Pulse 103   Temp 97.4  F (36.3  C) (Tympanic)   Resp 20   Ht 1.626 m (5' 4\")   Wt 107 kg (236 lb)   SpO2 96%   BMI 40.51 kg/m   Estimated body mass index is 40.51 kg/m  as calculated from the following:    Height as of this encounter: 1.626 m (5' 4\").    Weight as of this encounter: 107 kg (236 lb). Body surface area is 2.2 meters squared.  No Pain (0) Comment: Data Unavailable   No LMP recorded. Patient has had a hysterectomy.  Allergies reviewed: Yes  Medications reviewed: Yes    Medications: Medication refills not needed today.  Pharmacy name entered into EPIC:      [id number] - [address]      Clinical concerns: 1 year recheck Endometrial adenocarcinoma, review labs.       Kendra Jenkins CMA                      "

## 2019-04-01 NOTE — LETTER
"    4/1/2019         RE: Thomas Davila  [address]  [facility] [address]        Dear Colleague,    Thank you for referring your patient, Thomas Davila, to the [facility]. Please see a copy of my visit note below.      Oncology Rooming Note    April 1, 2019 11:38 AM   Thomas Davila is a 69 year old female who presents for:    Chief Complaint   Patient presents with     Oncology Clinic Visit     1 year recheck Endometrial adenocarcinoma, review labs      Initial Vitals: /84 (BP Location: Right arm, Patient Position: Sitting, Cuff Size: Adult Large)   Pulse 103   Temp 97.4  F (36.3  C) (Tympanic)   Resp 20   Ht 1.626 m (5' 4\")   Wt 107 kg (236 lb)   SpO2 96%   BMI 40.51 kg/m    Estimated body mass index is 40.51 kg/m  as calculated from the following:    Height as of this encounter: 1.626 m (5' 4\").    Weight as of this encounter: 107 kg (236 lb). Body surface area is 2.2 meters squared.  No Pain (0) Comment: Data Unavailable   No LMP recorded. Patient has had a hysterectomy.  Allergies reviewed: Yes  Medications reviewed: Yes    Medications: Medication refills not needed today.  Pharmacy name entered into EPIC:      [facility] [address]      Clinical concerns: 1 year recheck Endometrial adenocarcinoma, review labs.       Kendra Jenkins CMA                        CHIEF COMPLAINT AND REASON FOR VISIT: endometrial cancer 2013 s/p adjuvant chemo, RT.      HISTORY OF PRESENT ILLNESS:  She presented with  postmenopausal bleeding in 12/2012.  It became heavier in January 2013.  Her last menstrual period was around 1998.     Endometrial biopsy performed in primary care's office showed endometrial adenocarcinoma, FIGO grade 1/3, arising from background of atypical complex hyperplasia, HPV positive, type 66.  Pelvic ultrasound indicating uterus is about 9 cm, no fibroid, endometriosis, abnormally thickened, measured 3 cm.    She saw Dr. 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  Ovaries, fallopian tubes, cervical, lower uterine segment are negative.  Cytology wash negative.  There was focal suspicion for angiolymphatic invasion.  Left pelvic lymph nodes 1/8 were positive, right pelvic nodes total 7 were negative. She has stage IIIC pathologic [id number] endometrial carcinoma with focal squamous cell differentiation.   Dr. Walker, GYN oncologist, recommended carbo and Taxol x3 followed by radiation followed by 3 more carbo and Taxol.    Chemotherapy portion was carbo and Taxol, carbo at AUC 6, Taxol 175 mg/meter squared x3 finished in April 2013. External pelvic Radiation is done 5/28/2013. She finished brachy RT  X 3 in early June 2013.  3 more carbo/taxol finished in 8/2013.      PMH: She had remote history of left breast cancer then treated with lumpectomy, left-sided axillary lymph node dissection, radiation and tamoxifen for 1 year, discontinued due to DVT.  She was treated with anticoagulation, aspirin. but is currently not on any blood thinner.   Left superficial thrombus in 4/2013 s/p 6 months of coumadin.  HTN      REVIEW OF SYSTEMS:   Likes to eat, concerned about over weight. She is always hungry.   She has lots of arthritis.   She is seeing dermatologist for skin biopsies and \"SCC\".   She fell and fractured her pelvic.       PHYSICAL EXAMINATION:   VITAL SIGNS: Blood pressure 159/84, pulse 103, temperature 97.4  F (36.3  C), temperature source Tympanic, resp. rate 20, height 1.626 m (5' 4\"), weight 107 kg (236 lb), SpO2 96 %, not currently breastfeeding.  GENERAL APPEARANCE:   She is morbidly obese, not in acute distress.  " Very pleasant.   HEENT: The patient is normocephalic, atraumatic. Pupils are equal react to light.  Sclerae are anicteric.  Moist oral mucosa.  Negative pharynx.  No oral thrush.   NECK:  Supple.  No jugular venous distention.  Thyroid is not palpable.   LYMPH NODES:  Superficial lymphadenopathy is not appreciable in the bilateral cervical, supraclavicular, axillary or inguinal adenopathy.   CARDIOVASCULAR:  S1, S2 regular with no murmurs or gallops.  No carotid or abdominal bruits.   PULMONARY:  Lungs are clear to auscultation and percussion bilaterally.  There is no wheezing or rhonchi.   GASTROINTESTINAL:  Abdomen is soft, nontender.  No hepatosplenomegaly.  No signs of ascites.  No mass appreciable.   MUSCULOSKELETAL/EXTREMITIES:  No edema.  No cyanotic changes. multiple joints deformities on fingers.  No lymphedema.   NEUROLOGIC:  Cranial nerves II-XII are grossly intact.  Sensation intact.  Muscle strength and muscle tone symmetrical all through 5/5.   BACK:  No spinal or paraspinal tenderness.  No CVA tenderness.   Skin: erythematous scaly lesion on right upper arm.      CURRENT LAB DATA REVIEWED  CBC DIFF, CMP,  are fine    Current image REVIEWED  2/2019 MRI - 1. Bilateral sacral fracturing.  2. Symphysis pubis edema which extends to the adjacent superior and inferior pubic rami bilaterally. This could represent radiation osteitis versus stress reaction.      Old data reviewed with summary  CT 9/2017 body CT: negative, fatty liver.   CT 9/2015:  1. No evidence of metastatic or recurrent neoplasm.  2. There is a small superficial collection and mild adjacent inflammatory change involving the lower right abdominal wall at the site of a previously seen fat-containing ventral hernia. This could  relate to interval hernia repair with possibly a small hematoma or seroma (she had ventral hernia surgery at [facility] in 7/2015)    Thyroid US 3/2015: Stable multinodular thyroid gland from 9/19/2014.     CT body   9/2014- 1. No convincing evidence for recurrent or metastatic malignancy in the chest, abdomen, or pelvis.   2. Diffuse fatty infiltration of the liver.   3. Indeterminate 1.6 cm left thyroid nodule is unchanged. Consider thyroid ultrasound for further evaluation.     CT body 10/2013 - Hepatic fatty infiltration. Scattered colonic diverticulosis. Nonenlarged left external iliac lymph node.    Sono of left leg 4/2013 - There is extensive occlusive thrombus throughout the greater saphenous vein.    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  Ovaries, fallopian tubes, cervical, lower uterine segment are negative.  Cytology wash negative.  There was focal suspicion for angiolymphatic invasion.  Left pelvic lymph nodes 1/8 were positive, right pelvic nodes total 7 were negative. She has stage IIIC pathologic T1bN1.    Pelvic ultrasound 01/2013 -  indicating uterus is about 9 cm, no fibroid, endometriosis, abnormally thickened, measured 3 cm.            ASSESSMENT AND PLAN:     1.  stage IIIC endometrioid carcinoma with focal squamous cell differentiation, human papilloma virus positive, status post Da Sasha-assisted total hysterectomy, bilateral salpingo-oophorectomy, lymph node dissection.      Treatment plan was sandwich chemo -radiation followed by more chemotherapy.  Chemotherapy portion is carbo and Taxol, carbo at AUC 6, Taxol 175 mg/meter squared x3 finished in April. External pelvic Radiation is done 5/28/2013. She had brachy RT  X 3 in early June.  3 more chemo with carbo/taxol till 8/2013.    She wants to come to see us annually still.      2. Fatty liver. Diet fat reduction counseling is provided.    3. Hx of DVT on tamoxifen. She is off coumadin now.   She is advised on ASAuse post meals.      4. Overweight. Weight  loss counseling is provided. She admits she likes to eat, always hungry and not willing to change it.         Again, thank you for allowing me to participate in the care of your patient.        Sincerely,        Alissa Chacon MD, MD

## 2019-04-10 ENCOUNTER — HOSPITAL ENCOUNTER (OUTPATIENT)
Dept: PHYSICAL THERAPY | Facility: CLINIC | Age: 69
Setting detail: THERAPIES SERIES
End: 2019-04-10
Attending: PEDIATRICS
Payer: MEDICARE

## 2019-04-10 PROCEDURE — 97110 THERAPEUTIC EXERCISES: CPT | Mod: GP | Performed by: PHYSICAL THERAPIST

## 2019-04-10 PROCEDURE — 97140 MANUAL THERAPY 1/> REGIONS: CPT | Mod: GP | Performed by: PHYSICAL THERAPIST

## 2019-04-11 NOTE — PROGRESS NOTES
"Physical Therapy Progress Note    Patient Name: Thomas Davila  MR#: [id number]  : 1950  MD name: Bobby Gandhi MD  Pt seen from: 19 to 4/10/19  Diagnosis: LBP, hip pain           04/10/19 1400   Signing Clinician's Name / Credentials   Signing clinician's name / credentials Elisha Galeas, PT MA #[id number]   Session Number   Session Number 4/10 planned Medicare   Progress Note/Recertification   Progress Note Due Date 19   Progress Note Completed Date 04/10/19   Recertification Due Date 19   Ortho Goal 1   Goal Description Pt will be able to bend down and pick items off the floor without pain.  Met: \"for the most part yes.\"    Target Date 19   Goal Identifier 1   Date Met 04/10/19   Ortho Goal 2   Goal Description Pt will be able to walk > 1 mile without pain with normalized gait pattern.  Met: \"I can do a mile without pain limiting, on the treadmill.\"    Target Date 19   Goal Identifier 2   Date Met 04/10/19   Ortho Goal 3   Goal Description Pt will be able to sit from > 1 hour without pain.  Not Fully Met: gets \"fidgety or feel like I need to shift.\"    Target Date 19   Goal Identifier 3   Ortho Goal 4   Goal Description Pt will demonstrate independence with updated HEP  (Ongoing will update at each session.)   Target Date 19   Goal Identifier 4   Subjective Report   Subjective Report \"The exercises that you gave me really help.\"     Objective Measure 1   Objective Measure Pelvic Alignment   Details WNL in sagittal and transverse planes   Objective Measure 2   Objective Measure Segmental Mobility   Details No c/o LBP today, \"just sore from the weather, all over.\"    Objective Measure 3   Objective Measure Pain   Details Points to (R) SIJ/gluteal region today, but not rated pain.  Primary c/o today is \"amelia horses\" and muscle spasms in LE's.  Points to adductors and HS.    Therapeutic Procedure/exercise   Therapeutic Procedures: strength, endurance, ROM, " "flexibillity minutes (19200) 45   Skilled Intervention Exers: stretching, cramp relieving techniques; progression of HEP   Patient Response Pt states the exers have been helping, but give me mm cramps sometimes.  Able to demo all of today's exers without increasing symptoms.  Did get cramp in (R) HS with bridging.    Treatment Detail Reviewed HS and piriformis stretching. Told to do 3x per day - stretchier/more flexible mm will have less cramping.  Taught HS set with minimal knee flexion to relieve cramps in long sit or supine.  Told to push heel through floor if sitting to inhibit the mm.  Instructed to try ankle alphabets with feet elevated prior to any exer and prior to going to bed, as these are her primary times to get the cramps. Told to simply move LE's in marching or heel slide (knee flexion/extension) motion if been sitting > 30 mins to watch movie, read, etc.  Taught SL ITB stretching.  Additionally taught to \"tenderize\" the ITB with rolling pin or similar solid device to decrease tension at knee and hip to further decrease pelvic/groin \"soreness\" that she is c/o today.    Manual Therapy   Manual Therapy: Mobilization, MFR, MLD, friction massage minutes (25080) 10   Skilled Intervention MT: STM, MFR   Patient Response \"Oh so sore\" at first with ITB STM, but more tolerated after firs few mins.   Treatment Detail SL with pillow between knees for support.  STM and MFR to (B) ITB to decrease tension and allow ease of mvmt at (B) hips and knees.    Plan   Home program Issued sheet for ITB stretch, ankle alphabets, seated heel sliding, and HS sets in long sit.    Plan for next session See pt in one week. If doing well, then will decrease to every other week and plan for d/c to home program.  See for up to 3 more sessions over 6 weeks.   Comments   Comments Goals not yet fully met.    Total Session Time   Timed Code Treatment Minutes 55 (3TE,MT)   Total Treatment Time (sum of timed and untimed services) 55 " (3TE,MT)   Thank you for the referral of this patient.  Elisha Galeas, PT, MA  #[id number]

## 2019-04-17 ENCOUNTER — HOSPITAL ENCOUNTER (OUTPATIENT)
Dept: PHYSICAL THERAPY | Facility: CLINIC | Age: 69
Setting detail: THERAPIES SERIES
End: 2019-04-17
Attending: PEDIATRICS
Payer: MEDICARE

## 2019-04-17 PROCEDURE — 97110 THERAPEUTIC EXERCISES: CPT | Mod: GP | Performed by: PHYSICAL THERAPIST

## 2019-04-17 PROCEDURE — 97140 MANUAL THERAPY 1/> REGIONS: CPT | Mod: GP | Performed by: PHYSICAL THERAPIST

## 2019-05-14 NOTE — PROGRESS NOTES
SUBJECTIVE:   Thomas Davila is a 69 year old female who presents to clinic today for the following health issues:  Chief Complaint   Patient presents with     Recheck Medication     phentermine       HPI     I saw Thomas on March 15 for follow-up of weight loss.  She was having good weight loss with 15 mg dose of phentermine, so would continue this for another 2 months.  We are considering adding topiramate for long-term use.  She has yet to see the nutritionist at that point, and I do not see any notes since then.    Weight is down from 236 pounds on 4/1 to 231lb 9.6oz today.  Was at 248 pounds in February      Medication Followup of phentermine     Taking Medication as prescribed: yes    Side Effects:  None    Medication Helping Symptoms:  Yes    PATIENT reports she really does not watch what she eats.   sometimes wants to go out to eat and his health is not good, so she will agree to eating out when he wants.  He also cooks for her and feels bad if she doesn't eat what he makes.  Does not have the appetite she use to.      She is still deferring nutritionist visit     Additional history: as documented    Reviewed and updated as needed this visit by clinical staff  Tobacco  Allergies  Meds  Med Hx  Surg Hx  Fam Hx  Soc Hx        Reviewed and updated as needed this visit by Provider           Patient Active Problem List   Diagnosis     Essential hypertension, benign     GERD (gastroesophageal reflux disease)     Sebaceous cyst     CARDIOVASCULAR SCREENING; LDL GOAL LESS THAN 130     Itchy eyes     Plantar warts     Breast cancer (H)     Endometrial adenocarcinoma (H)     Cancer of endometrium     Encounter for long-term current use of medication     Malignant neoplasm of other specified sites of female genital organs     Other specified prophylactic or treatment measure     Nontoxic multinodular goiter     Morbid obesity (H)     Past Surgical History:   Procedure Laterality Date     BREAST  LUMPECTOMY, RT/LT      left     C/SECTION, LOW TRANSVERSE  1972, 1976    , Low Transverse x2     CL AFF SURGICAL PATHOLOGY      Breast reduction     COLONOSCOPY  10/15/02    normal     FOOT SURGERY      R Foot      HC EXCISION BREAST LESION, OPEN >=1  98    1) Needle localization with generous lumpectomy 2) Left axillary node dissection.     HC LAPAROSCOPY, SURGICAL, ABDOMEN, PERITONEUM & OMENTUM; DX W/ OR W/O SPECIMEN(S)  94     HYSTERECTOMY, PAP NO LONGER INDICATED       INSERT PORT VASCULAR ACCESS  3/14/2013    Procedure: INSERT PORT VASCULAR ACCESS;  Port Revision;  Surgeon: Arash Puente MD;  Location: [facility]     LAPAROSCOPIC HYSTERECTOMY TOTAL, BILATERAL SALPINGO-OOPHORECTOMY, NODE DISSECTION, COMBINED  2013    Procedure: COMBINED LAPAROSCOPIC HYSTERECTOMY TOTAL, SALPINGO-OOPHORECTOMY, NODE DISSECTION;  Laparosocpic  Total Abdominal Hysterectomy, Bilateral Salphino-Oophorectomy, Bilateral Pelvic Lymph Node Dissection, Pelvic Washings, Cystoscopy;  Surgeon: Tanya Walker MD;  Location: [facility] OR     SURGICAL HISTORY OF -   93    1) Excision of digital cyst right mid finger  2)  Excision of dermatofibroma left calf     SURGICAL HISTORY OF -   2001    Excision of mucinous cyst & partial ostectomy, bone removal of benign osteophytic overgrowth osteophyte of the distal aspect of the middle phalanx and distal phalanx     SURGICAL HISTORY OF -       Basal cell cancer removal     TONSILLECTOMY       TUBAL LIGATION         Social History     Tobacco Use     Smoking status: Never Smoker     Smokeless tobacco: Never Used   Substance Use Topics     Alcohol use: Yes     Frequency: Never     Comment: Rare     Family History   Problem Relation Age of Onset     Cerebrovascular Disease Mother      Hypertension Mother      Diabetes Mother      Thyroid Disease Mother      Arthritis Mother      Alzheimer Disease Mother      Neurologic Disorder Mother         Parkinsons      Heart Disease Father      Hypertension Father      Diabetes Father      Thyroid Disease Father      Arthritis Father      Blood Disease Father      Anesthesia Reaction Sister      Thyroid Disease Sister      Anesthesia Reaction Sister      Arthritis Sister         RA     Hypertension Brother      Allergies Son      Gastrointestinal Disease Son         GERD     Hypertension Brother      Allergies Son         percocett     Gastrointestinal Disease Son         GERD     Hypertension Son          Current Outpatient Medications   Medication Sig Dispense Refill     aspirin 81 MG tablet Take 81 mg by mouth daily        lisinopril (PRINIVIL/ZESTRIL) 20 MG tablet Take 1 tablet (20 mg) by mouth daily 90 tablet 3     omeprazole (PRILOSEC) 20 MG DR capsule Take 1 capsule (20 mg) by mouth daily 90 capsule 3     phentermine (ADIPEX-P) 15 MG capsule Take 1 capsule (15 mg) by mouth every morning 30 capsule 2     topiramate (TOPAMAX) 50 MG tablet Take 0.5 tablets (25 mg) by mouth daily for 14 days, THEN 1 tablet (50 mg) daily. 97 tablet 0     triamterene-HCTZ (MAXZIDE-25) 37.5-25 MG tablet Take 1 tablet by mouth daily 90 tablet 3     vitamin D3 (CHOLECALCIFEROL) 97469 units (250 mcg) capsule Take 1 capsule by mouth daily       naproxen (NAPROSYN) 500 MG tablet Take 1 tablet (500 mg) by mouth 2 times daily as needed for moderate pain (Patient not taking: Reported on 4/1/2019) 60 tablet 3     order for DME Equipment being ordered: hernia belt 1 Units 0     Allergies   Allergen Reactions     Metal [Staples] Other (See Comments)     Not staples, but surgical screws     Percocet [Oxycodone-Acetaminophen] Nausea and Vomiting       ROS:  Constitutional, cardiovascular systems are negative, except as otherwise noted.    OBJECTIVE:     /84 (BP Location: Right arm, Patient Position: Sitting, Cuff Size: Adult Large)   Pulse 87   Temp 97.6  F (36.4  C) (Tympanic)   Resp 14   Wt 105.1 kg (231 lb 9.6 oz)   SpO2 98%   BMI 39.75  kg/m    Body mass index is 39.75 kg/m .  GENERAL: healthy, alert and no distress  RESP: lungs clear to auscultation - no rales, rhonchi or wheezes  CV: regular rate and rhythm, normal S1 S2, no S3 or S4, no murmur, click or rub, no peripheral edema and peripheral pulses strong      ASSESSMENT/PLAN:         1. Obesity, Class II, BMI 35-39.9    Thomas continues to have weight loss on the phentermine.  She has been on this for 3 months now, so we'll try adding some topiramate for long term therapy.  She declines to see the RD at this point- she says she is not able to modify her diet currently due to her 's poor health.  Plan for clinic follow-up in 3 months.     - phentermine (ADIPEX-P) 15 MG capsule; Take 1 capsule (15 mg) by mouth every morning  Dispense: 30 capsule; Refill: 2  - topiramate (TOPAMAX) 50 MG tablet; Take 0.5 tablets (25 mg) by mouth daily for 14 days, THEN 1 tablet (50 mg) daily.  Dispense: 97 tablet; Refill: 0      Bobby Gandhi MD  [facility]

## 2019-05-15 ENCOUNTER — OFFICE VISIT (OUTPATIENT)
Dept: FAMILY MEDICINE | Facility: CLINIC | Age: 69
End: 2019-05-15
Payer: MEDICARE

## 2019-05-15 VITALS
SYSTOLIC BLOOD PRESSURE: 132 MMHG | DIASTOLIC BLOOD PRESSURE: 84 MMHG | OXYGEN SATURATION: 98 % | WEIGHT: 231.6 LBS | BODY MASS INDEX: 39.75 KG/M2 | TEMPERATURE: 97.6 F | RESPIRATION RATE: 14 BRPM | HEART RATE: 87 BPM

## 2019-05-15 DIAGNOSIS — E66.812 OBESITY, CLASS II, BMI 35-39.9: ICD-10-CM

## 2019-05-15 PROCEDURE — 99213 OFFICE O/P EST LOW 20 MIN: CPT | Performed by: INTERNAL MEDICINE

## 2019-05-15 RX ORDER — TOPIRAMATE 50 MG/1
TABLET, FILM COATED ORAL
Qty: 97 TABLET | Refills: 0 | Status: SHIPPED | OUTPATIENT
Start: 2019-05-15 | End: 2019-08-05

## 2019-05-15 RX ORDER — PHENTERMINE HYDROCHLORIDE 15 MG/1
15 CAPSULE ORAL EVERY MORNING
Qty: 30 CAPSULE | Refills: 2 | Status: SHIPPED | OUTPATIENT
Start: 2019-05-15 | End: 2019-08-06

## 2019-05-15 NOTE — PATIENT INSTRUCTIONS
Continue the current dose of phentermine and we'll add in topiramate as well.  If the topiramate makes you sleepy, try taking it at night.

## 2019-05-18 ENCOUNTER — NURSE TRIAGE (OUTPATIENT)
Dept: NURSING | Facility: CLINIC | Age: 69
End: 2019-05-18

## 2019-05-18 NOTE — TELEPHONE ENCOUNTER
In to see the the Dr about her weight and calling to see if now that they started her on Toprimate is she supposed to stop her Phentermine or continue taking it and how does she get it because she is out. Dr's note says continue and prescription at pharmacy, I passed that information on to  Thomas.    Yohana Kahn RN/ [facility]      Reason for Disposition    Health Information question, no triage required and triager able to answer question    Protocols used: INFORMATION ONLY CALL-A-AH

## 2019-05-20 ENCOUNTER — TELEPHONE (OUTPATIENT)
Dept: FAMILY MEDICINE | Facility: CLINIC | Age: 69
End: 2019-05-20

## 2019-05-20 ENCOUNTER — OFFICE VISIT (OUTPATIENT)
Dept: DERMATOLOGY | Facility: CLINIC | Age: 69
End: 2019-05-20
Payer: MEDICARE

## 2019-05-20 VITALS — HEART RATE: 90 BPM | DIASTOLIC BLOOD PRESSURE: 67 MMHG | OXYGEN SATURATION: 97 % | SYSTOLIC BLOOD PRESSURE: 126 MMHG

## 2019-05-20 DIAGNOSIS — L57.0 AK (ACTINIC KERATOSIS): Primary | ICD-10-CM

## 2019-05-20 DIAGNOSIS — Z85.828 HISTORY OF SKIN CANCER: ICD-10-CM

## 2019-05-20 DIAGNOSIS — L82.1 SEBORRHEIC KERATOSIS: ICD-10-CM

## 2019-05-20 DIAGNOSIS — E66.812 OBESITY, CLASS II, BMI 35-39.9: ICD-10-CM

## 2019-05-20 DIAGNOSIS — L81.4 LENTIGO: ICD-10-CM

## 2019-05-20 PROCEDURE — 17000 DESTRUCT PREMALG LESION: CPT | Performed by: DERMATOLOGY

## 2019-05-20 PROCEDURE — 17003 DESTRUCT PREMALG LES 2-14: CPT | Performed by: DERMATOLOGY

## 2019-05-20 PROCEDURE — 99213 OFFICE O/P EST LOW 20 MIN: CPT | Mod: 25 | Performed by: DERMATOLOGY

## 2019-05-20 NOTE — TELEPHONE ENCOUNTER
Left message for the patient to call the clinic.  We do not replace lost or stolen Rx this is a [facility] policy. Mary TRIMBLE RN

## 2019-05-20 NOTE — TELEPHONE ENCOUNTER
Reason for Call:  Other prescription    Detailed comments: pt is calling about phentermine Rx and stating she is not sure where it is. She took it by hand from her OV 5/15. Please advise pt  will be here to see derm at 1040    Phone Number Patient can be reached at: Home number on file [phone] (home)    Best Time: any    Can we leave a detailed message on this number? YES    Call taken on 5/20/2019 at 9:10 AM by Bonita Reyes

## 2019-05-20 NOTE — TELEPHONE ENCOUNTER
Requested Prescriptions   Pending Prescriptions Disp Refills     phentermine (ADIPEX-P) 15 MG capsule 30 capsule 2     Sig: Take 1 capsule (15 mg) by mouth every morning   Last Written Prescription Date:  5/15/19  Last Fill Quantity: 30 cap,  # refills: 2   Last office visit: 5/15/2019 with prescribing provider:  KARLO Gandhi   Future Office Visit:   Next 5 appointments (look out 90 days)    Aug 12, 2019  1:00 PM CDT  Office Visit with Bobby Gandhi MD  [facility] ([facility]) [address] [facility] [address]  [phone]             There is no refill protocol information for this order

## 2019-05-20 NOTE — LETTER
2019         RE: Thomas Davila  [id number]  [facility] [id number]        Dear Colleague,    Thank you for referring your patient, Thomas Davila, to the [facility]. Please see a copy of my visit note below.    Thomas Davila is a 69 year old year old female patient here today for rough spot on left thigh.   .  Patient states this has been present for a while.  Patient reports the following symptoms:  rough.  Patient reports the following previous treatments none.  She used efudex on arms with good effect.  .  Patient reports the following modifying factors none.  Associated symptoms: none.  Patient has no other skin complaints today.  Remainder of the HPI, Meds, PMH, Allergies, FH, and SH was reviewed in chart.      Past Medical History:   Diagnosis Date     Acute parametritis and pelvic cellulitis     salpingitis     ASCUS with positive high risk HPV 2013     Asthma     No recent issues     Basal cell carcinoma      breast cancer     left lumpectomy, radiation, tamoxifen     Breast cancer (H)     L Breast; Lumpectomy & Radiation     Chronic salpingitis and oophoritis     PID        Embolism and thrombosis of unspecified site     left leg, due to tamoxifen therapy     Generalized osteoarthrosis, unspecified site     hands     Leiomyoma of uterus, unspecified      Other malignant neoplasm of skin, site unspecified 2006    Basal cell cancer on nose     Squamous cell carcinoma      Thrombosis of leg     Left     Unspecified essential hypertension        Past Surgical History:   Procedure Laterality Date     BREAST LUMPECTOMY, RT/LT      left     C/SECTION, LOW TRANSVERSE  1972, 1976    , Low Transverse x2     CL AFF SURGICAL PATHOLOGY      Breast reduction     COLONOSCOPY  10/15/02    normal     FOOT SURGERY      R Foot      HC EXCISION BREAST LESION, OPEN >=1  98    1) Needle localization with generous lumpectomy 2) Left axillary  node dissection.     HC LAPAROSCOPY, SURGICAL, ABDOMEN, PERITONEUM & OMENTUM; DX W/ OR W/O SPECIMEN(S)  02/07/94     HYSTERECTOMY, PAP NO LONGER INDICATED       INSERT PORT VASCULAR ACCESS  3/14/2013    Procedure: INSERT PORT VASCULAR ACCESS;  Port Revision;  Surgeon: Arash Puente MD;  Location: [facility]     LAPAROSCOPIC HYSTERECTOMY TOTAL, BILATERAL SALPINGO-OOPHORECTOMY, NODE DISSECTION, COMBINED  1/31/2013    Procedure: COMBINED LAPAROSCOPIC HYSTERECTOMY TOTAL, SALPINGO-OOPHORECTOMY, NODE DISSECTION;  Laparosocpic  Total Abdominal Hysterectomy, Bilateral Salphino-Oophorectomy, Bilateral Pelvic Lymph Node Dissection, Pelvic Washings, Cystoscopy;  Surgeon: Tanya Walker MD;  Location: [facility]     SURGICAL HISTORY OF -   06/22/93    1) Excision of digital cyst right mid finger  2)  Excision of dermatofibroma left calf     SURGICAL HISTORY OF -   12/18/2001    Excision of mucinous cyst & partial ostectomy, bone removal of benign osteophytic overgrowth osteophyte of the distal aspect of the middle phalanx and distal phalanx     SURGICAL HISTORY OF -   2006    Basal cell cancer removal     TONSILLECTOMY       TUBAL LIGATION  1978        Family History   Problem Relation Age of Onset     Cerebrovascular Disease Mother      Hypertension Mother      Diabetes Mother      Thyroid Disease Mother      Arthritis Mother      Alzheimer Disease Mother      Neurologic Disorder Mother         Parkinsons     Heart Disease Father      Hypertension Father      Diabetes Father      Thyroid Disease Father      Arthritis Father      Blood Disease Father      Anesthesia Reaction Sister      Thyroid Disease Sister      Anesthesia Reaction Sister      Arthritis Sister         RA     Hypertension Brother      Allergies Son      Gastrointestinal Disease Son         GERD     Hypertension Brother      Allergies Son         percocett     Gastrointestinal Disease Son         GERD     Hypertension Son        Social History     Socioeconomic  History     Marital status:      Spouse name: Not on file     Number of children: 2     Years of education: Not on file     Highest education level: Not on file   Occupational History     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   Social Needs     Financial resource strain: Not on file     Food insecurity:     Worry: Not on file     Inability: Not on file     Transportation needs:     Medical: Not on file     Non-medical: Not on file   Tobacco Use     Smoking status: Never Smoker     Smokeless tobacco: Never Used   Substance and Sexual Activity     Alcohol use: Yes     Frequency: Never     Comment: Rare     Drug use: No     Sexual activity: Yes     Partners: Male   Lifestyle     Physical activity:     Days per week: Not on file     Minutes per session: Not on file     Stress: Not on file   Relationships     Social connections:     Talks on phone: Not on file     Gets together: Not on file     Attends Zoroastrian service: Not on file     Active member of club or organization: Not on file     Attends meetings of clubs or organizations: Not on file     Relationship status: Not on file     Intimate partner violence:     Fear of current or ex partner: Not on file     Emotionally abused: Not on file     Physically abused: Not on file     Forced sexual activity: Not on file   Other Topics Concern     Parent/sibling w/ CABG, MI or angioplasty before 65F 55M? No   Social History Narrative    Bahai--declines all blood products       Outpatient Encounter Medications as of 5/20/2019   Medication Sig Dispense Refill     aspirin 81 MG tablet Take 81 mg by mouth daily        lisinopril (PRINIVIL/ZESTRIL) 20 MG tablet Take 1 tablet (20 mg) by mouth daily 90 tablet 3     omeprazole (PRILOSEC) 20 MG DR capsule Take 1 capsule (20 mg) by mouth daily 90 capsule 3     phentermine (ADIPEX-P) 15 MG capsule Take 1 capsule (15 mg) by mouth every morning 30 capsule 2     topiramate (TOPAMAX) 50 MG tablet  Take 0.5 tablets (25 mg) by mouth daily for 14 days, THEN 1 tablet (50 mg) daily. 97 tablet 0     triamterene-HCTZ (MAXZIDE-25) 37.5-25 MG tablet Take 1 tablet by mouth daily 90 tablet 3     vitamin D3 (CHOLECALCIFEROL) 98607 units (250 mcg) capsule Take 1 capsule by mouth daily       naproxen (NAPROSYN) 500 MG tablet Take 1 tablet (500 mg) by mouth 2 times daily as needed for moderate pain (Patient not taking: Reported on 4/1/2019) 60 tablet 3     order for DME Equipment being ordered: hernia belt (Patient not taking: Reported on 5/20/2019) 1 Units 0     No facility-administered encounter medications on file as of 5/20/2019.              Review Of Systems  Skin: As above  Eyes: negative  Ears/Nose/Throat: negative  Respiratory: No shortness of breath, dyspnea on exertion, cough, or hemoptysis  Cardiovascular: negative  Gastrointestinal: negative  Genitourinary: negative  Musculoskeletal: negative  Neurologic: negative  Psychiatric: negative  Hematologic/Lymphatic/Immunologic: negative  Endocrine: negative      O:   NAD, WDWN, Alert & Oriented, Mood & Affect wnl, Vitals stable   Here today alone   /67   Pulse 90   SpO2 97%    General appearance normal   Vitals stable   Alert, oriented and in no acute distress        Stuck on papules and brown macules on trunk and ext   L thigh gritty papule   L hand 3 gritty papules     The remainder of expanded problem focused exam was unremarkable; the following areas were examined:  scalp/hair, conjunctiva/lids, face, neck, lips, legs , chest, digits/nails, RUE, LUE.      Eyes: Conjunctivae/lids:Normal     ENT: Lips, buccal mucosa, tongue: normal    MSK:Normal    Cardiovascular: peripheral edema none    Pulm: Breathing Normal    Neuro/Psych: Orientation:Normal; Mood/Affect:Normal      A/P:  1. Seborrheic keratosis, lentigo, hx of non-melanoma skin cancer   2. L thigh and L hand actinic keratosis   LN2:  Treated with LN2 for 5s for 1-2 cycles. Warned risks of blistering,  pain, pigment change, scarring, and incomplete resolution.  Advised patient to return if lesions do not completely resolve.  Wound care sheet given.    BENIGN LESIONS DISCUSSED WITH PATIENT:  I discussed the specifics of tumor, prognosis, and genetics of benign lesions.  I explained that treatment of these lesions would be purely cosmetic and not medically neccessary.  I discussed with patient different removal options including excision, cautery and /or laser.      Nature and genetics of benign skin lesions dicussed with patient.  Signs and Symptoms of skin cancer discussed with patient.  ABCDEs of melanoma reviewed with patient.  Patient encouraged to perform monthly skin exams.  UV precautions reviewed with patient.  Patient to follow up with Primary Care provider regarding elevated blood pressure.  Skin care regimen reviewed with patient: Eliminate harsh soaps, i.e. Dial, zest, irsih spring; Mild soaps such as Cetaphil or Dove sensitive skin, avoid hot or cold showers, aggressive use of emollients including vanicream, cetaphil or cerave discussed with patient.    Risks of non-melanoma skin cancer discussed with patient   Return to clinic 6 months      Again, thank you for allowing me to participate in the care of your patient.        Sincerely,        Walt Golden MD

## 2019-05-20 NOTE — PROGRESS NOTES
Thomas Davila is a 69 year old year old female patient here today for rough spot on left thigh.   .  Patient states this has been present for a while.  Patient reports the following symptoms:  rough.  Patient reports the following previous treatments none.  She used efudex on arms with good effect.  .  Patient reports the following modifying factors none.  Associated symptoms: none.  Patient has no other skin complaints today.  Remainder of the HPI, Meds, PMH, Allergies, FH, and SH was reviewed in chart.      Past Medical History:   Diagnosis Date     Acute parametritis and pelvic cellulitis     salpingitis     ASCUS with positive high risk HPV 2013     Asthma     No recent issues     Basal cell carcinoma      breast cancer     left lumpectomy, radiation, tamoxifen     Breast cancer (H)     L Breast; Lumpectomy & Radiation     Chronic salpingitis and oophoritis     PID        Embolism and thrombosis of unspecified site     left leg, due to tamoxifen therapy     Generalized osteoarthrosis, unspecified site     hands     Leiomyoma of uterus, unspecified      Other malignant neoplasm of skin, site unspecified 2006    Basal cell cancer on nose     Squamous cell carcinoma      Thrombosis of leg     Left     Unspecified essential hypertension        Past Surgical History:   Procedure Laterality Date     BREAST LUMPECTOMY, RT/LT      left     C/SECTION, LOW TRANSVERSE  1972,     , Low Transverse x2     CL AFF SURGICAL PATHOLOGY      Breast reduction     COLONOSCOPY  10/15/02    normal     FOOT SURGERY      R Foot      HC EXCISION BREAST LESION, OPEN >=1  98    1) Needle localization with generous lumpectomy 2) Left axillary node dissection.     HC LAPAROSCOPY, SURGICAL, ABDOMEN, PERITONEUM & OMENTUM; DX W/ OR W/O SPECIMEN(S)  94     HYSTERECTOMY, PAP NO LONGER INDICATED       INSERT PORT VASCULAR ACCESS  3/14/2013    Procedure: INSERT PORT VASCULAR ACCESS;  Port  Revision;  Surgeon: Arash Puente MD;  Location: [facility]     LAPAROSCOPIC HYSTERECTOMY TOTAL, BILATERAL SALPINGO-OOPHORECTOMY, NODE DISSECTION, COMBINED  1/31/2013    Procedure: COMBINED LAPAROSCOPIC HYSTERECTOMY TOTAL, SALPINGO-OOPHORECTOMY, NODE DISSECTION;  Laparosocpic  Total Abdominal Hysterectomy, Bilateral Salphino-Oophorectomy, Bilateral Pelvic Lymph Node Dissection, Pelvic Washings, Cystoscopy;  Surgeon: Tanya Walker MD;  Location: [facility]     SURGICAL HISTORY OF -   06/22/93    1) Excision of digital cyst right mid finger  2)  Excision of dermatofibroma left calf     SURGICAL HISTORY OF -   12/18/2001    Excision of mucinous cyst & partial ostectomy, bone removal of benign osteophytic overgrowth osteophyte of the distal aspect of the middle phalanx and distal phalanx     SURGICAL HISTORY OF -   2006    Basal cell cancer removal     TONSILLECTOMY       TUBAL LIGATION  1978        Family History   Problem Relation Age of Onset     Cerebrovascular Disease Mother      Hypertension Mother      Diabetes Mother      Thyroid Disease Mother      Arthritis Mother      Alzheimer Disease Mother      Neurologic Disorder Mother         Parkinsons     Heart Disease Father      Hypertension Father      Diabetes Father      Thyroid Disease Father      Arthritis Father      Blood Disease Father      Anesthesia Reaction Sister      Thyroid Disease Sister      Anesthesia Reaction Sister      Arthritis Sister         RA     Hypertension Brother      Allergies Son      Gastrointestinal Disease Son         GERD     Hypertension Brother      Allergies Son         percocett     Gastrointestinal Disease Son         GERD     Hypertension Son        Social History     Socioeconomic History     Marital status:      Spouse name: Not on file     Number of children: 2     Years of education: Not on file     Highest education level: Not on file   Occupational History     Employer: sub teacher in [employer]     Employer:  RETIRED   Social Needs     Financial resource strain: Not on file     Food insecurity:     Worry: Not on file     Inability: Not on file     Transportation needs:     Medical: Not on file     Non-medical: Not on file   Tobacco Use     Smoking status: Never Smoker     Smokeless tobacco: Never Used   Substance and Sexual Activity     Alcohol use: Yes     Frequency: Never     Comment: Rare     Drug use: No     Sexual activity: Yes     Partners: Male   Lifestyle     Physical activity:     Days per week: Not on file     Minutes per session: Not on file     Stress: Not on file   Relationships     Social connections:     Talks on phone: Not on file     Gets together: Not on file     Attends Synagogue service: Not on file     Active member of club or organization: Not on file     Attends meetings of clubs or organizations: Not on file     Relationship status: Not on file     Intimate partner violence:     Fear of current or ex partner: Not on file     Emotionally abused: Not on file     Physically abused: Not on file     Forced sexual activity: Not on file   Other Topics Concern     Parent/sibling w/ CABG, MI or angioplasty before 65F 55M? No   Social History Narrative    Mandaeism--declines all blood products       Outpatient Encounter Medications as of 5/20/2019   Medication Sig Dispense Refill     aspirin 81 MG tablet Take 81 mg by mouth daily        lisinopril (PRINIVIL/ZESTRIL) 20 MG tablet Take 1 tablet (20 mg) by mouth daily 90 tablet 3     omeprazole (PRILOSEC) 20 MG DR capsule Take 1 capsule (20 mg) by mouth daily 90 capsule 3     phentermine (ADIPEX-P) 15 MG capsule Take 1 capsule (15 mg) by mouth every morning 30 capsule 2     topiramate (TOPAMAX) 50 MG tablet Take 0.5 tablets (25 mg) by mouth daily for 14 days, THEN 1 tablet (50 mg) daily. 97 tablet 0     triamterene-HCTZ (MAXZIDE-25) 37.5-25 MG tablet Take 1 tablet by mouth daily 90 tablet 3     vitamin D3 (CHOLECALCIFEROL) 48894 units (250 mcg) capsule  Take 1 capsule by mouth daily       naproxen (NAPROSYN) 500 MG tablet Take 1 tablet (500 mg) by mouth 2 times daily as needed for moderate pain (Patient not taking: Reported on 4/1/2019) 60 tablet 3     order for DME Equipment being ordered: hernia belt (Patient not taking: Reported on 5/20/2019) 1 Units 0     No facility-administered encounter medications on file as of 5/20/2019.              Review Of Systems  Skin: As above  Eyes: negative  Ears/Nose/Throat: negative  Respiratory: No shortness of breath, dyspnea on exertion, cough, or hemoptysis  Cardiovascular: negative  Gastrointestinal: negative  Genitourinary: negative  Musculoskeletal: negative  Neurologic: negative  Psychiatric: negative  Hematologic/Lymphatic/Immunologic: negative  Endocrine: negative      O:   NAD, WDWN, Alert & Oriented, Mood & Affect wnl, Vitals stable   Here today alone   /67   Pulse 90   SpO2 97%    General appearance normal   Vitals stable   Alert, oriented and in no acute distress        Stuck on papules and brown macules on trunk and ext   L thigh gritty papule   L hand 3 gritty papules     The remainder of expanded problem focused exam was unremarkable; the following areas were examined:  scalp/hair, conjunctiva/lids, face, neck, lips, legs , chest, digits/nails, RUE, LUE.      Eyes: Conjunctivae/lids:Normal     ENT: Lips, buccal mucosa, tongue: normal    MSK:Normal    Cardiovascular: peripheral edema none    Pulm: Breathing Normal    Neuro/Psych: Orientation:Normal; Mood/Affect:Normal      A/P:  1. Seborrheic keratosis, lentigo, hx of non-melanoma skin cancer   2. L thigh and L hand actinic keratosis   LN2:  Treated with LN2 for 5s for 1-2 cycles. Warned risks of blistering, pain, pigment change, scarring, and incomplete resolution.  Advised patient to return if lesions do not completely resolve.  Wound care sheet given.    BENIGN LESIONS DISCUSSED WITH PATIENT:  I discussed the specifics of tumor, prognosis, and genetics  of benign lesions.  I explained that treatment of these lesions would be purely cosmetic and not medically neccessary.  I discussed with patient different removal options including excision, cautery and /or laser.      Nature and genetics of benign skin lesions dicussed with patient.  Signs and Symptoms of skin cancer discussed with patient.  ABCDEs of melanoma reviewed with patient.  Patient encouraged to perform monthly skin exams.  UV precautions reviewed with patient.  Patient to follow up with Primary Care provider regarding elevated blood pressure.  Skin care regimen reviewed with patient: Eliminate harsh soaps, i.e. Dial, zest, irsih spring; Mild soaps such as Cetaphil or Dove sensitive skin, avoid hot or cold showers, aggressive use of emollients including vanicream, cetaphil or cerave discussed with patient.    Risks of non-melanoma skin cancer discussed with patient   Return to clinic 6 months

## 2019-05-21 RX ORDER — PHENTERMINE HYDROCHLORIDE 15 MG/1
15 CAPSULE ORAL EVERY MORNING
Qty: 30 CAPSULE | Refills: 2 | OUTPATIENT
Start: 2019-05-21

## 2019-05-21 NOTE — TELEPHONE ENCOUNTER
Patient is contacted and she found her Rx.  It was stuck to another piece of paper. Mary TRIMBLE RN

## 2019-05-21 NOTE — PROGRESS NOTES
"Outpatient Physical Therapy Discharge Note     Patient: Thomas Davila  : 1950    Beginning/End Dates of Reporting Period:  19 to 2019    Referring Provider: Bobby Gandhi MD    Therapy Diagnosis: (R) Buttock Pain     Client Self Report: Feeling \"better.\"  Has been trying the inhibition for her muscle cramps, \"and it has been working.\"    Objective Measurements:  Objective Measure: Pelvic Alignment  Details: WNL today  Objective Measure: Segmental Mobility  Details: No LBP today, symmetrical mvmt thru Lumbar spine  Objective Measure: Pain  Details: When gets pain, it is very \"little\" and will do the stretches and they take it away.           Goals:  Goal Identifier 1   Goal Description Pt will be able to bend down and pick items off the floor without pain.  Met: \"for the most part yes.\"    Target Date 19   Date Met  04/10/19   Progress:     Goal Identifier 2   Goal Description Pt will be able to walk > 1 mile without pain with normalized gait pattern.  Met: \"I can do a mile without pain limiting, on the treadmill.\"    Target Date 19   Date Met  04/10/19   Progress:     Goal Identifier 3   Goal Description Pt will be able to sit from > 1 hour without pain.  Met: pt states \"I get sore in my back if I stand too long, but I am doing okay with the sitting now.\"    Target Date 19   Date Met  19   Progress:     Goal Identifier 4   Goal Description Pt will demonstrate independence with updated HEP.  Met: pt is indep in and reports doing her HEP daily.    Target Date 19   Date Met  19   Progress:         Progress Toward Goals:   Progress this reporting period: Pt has met her goals.    Plan:  Discharge from therapy.    Discharge:    Reason for Discharge: Patient has met all goals.    Equipment Issued: none    Discharge Plan: Patient to continue home program.    Thank you for the referral of this patient.  Elisha Galeas, PT, MA  #[id number]    "

## 2019-05-21 NOTE — ADDENDUM NOTE
Encounter addended by: Elisha Galeas, PT on: 5/21/2019 9:40 AM   Actions taken: Pend clinical note, Flowsheet accepted, Episode resolved, Sign clinical note

## 2019-06-10 NOTE — H&P
[facility]  TOTAL EYE CARE  [address]  [facility] [address]  [phone]  Dept: [phone]    OPHTHALMOLOGY PRE-OPERATIVE  HISTORY AND PHYSICAL    DATE OF H/P:  2019    DATE OF SURGERY:  2019  PROCEDURE:  Procedure(s):  Cataract Removal with Implant, Left Eye  LENS IMPLANT:  [id number] +22.5  REFRACTIVE GOAL:  PL Sph  SURGEON:  Walt Mckeon MD    ANESTHESIA:  TOPICAL / MAC    OR CASE REQUIREMENTS:  Shallow AC / narrow.    DEMOGRAPHICS:  Demographic Information on Thomas Davila:    Thomas Davila  Gender: female  : 1950  [address] [facility] [id number] [phone] (home)     Medical Record: [id number]  Social Security Number: [id number]  Pharmacy:    [id number] - [address]  [id number] - [address]  [id number] - [place] [id number] [facility]  [employer] ONLY MAIL ORDER  Primary Care Provider: Dung Prieto    Parent's names are: Data Unavailable (mother) and Data Unavailable (father).    Insurance: Payor: MEDICARE / Plan: MEDICARE / Product Type: Medicare /     OCULAR HISTORY:  Cataracts, each eye.  Narrow angles, each eye.    HISTORIES:  Past Medical History:   Diagnosis Date     Acute parametritis and pelvic cellulitis     salpingitis     ASCUS with positive high risk HPV 2013     Asthma     No recent issues     Basal cell carcinoma      breast cancer     left lumpectomy, radiation, tamoxifen     Breast cancer (H)     L Breast; Lumpectomy & Radiation     Chronic salpingitis and oophoritis     PID        Embolism and thrombosis of unspecified site     left leg, due to tamoxifen therapy     Generalized osteoarthrosis, unspecified site     hands     Leiomyoma of uterus, unspecified      Other malignant neoplasm of skin, site unspecified 2006    Basal cell cancer on nose     Squamous cell carcinoma      Thrombosis of  leg     Left     Unspecified essential hypertension        Past Surgical History:   Procedure Laterality Date     BREAST LUMPECTOMY, RT/LT      left     C/SECTION, LOW TRANSVERSE  1972,     , Low Transverse x2     CL AFF SURGICAL PATHOLOGY      Breast reduction     COLONOSCOPY  10/15/02    normal     FOOT SURGERY      R Foot      HC EXCISION BREAST LESION, OPEN >=1  98    1) Needle localization with generous lumpectomy 2) Left axillary node dissection.     HC LAPAROSCOPY, SURGICAL, ABDOMEN, PERITONEUM & OMENTUM; DX W/ OR W/O SPECIMEN(S)  94     HYSTERECTOMY, PAP NO LONGER INDICATED       INSERT PORT VASCULAR ACCESS  3/14/2013    Procedure: INSERT PORT VASCULAR ACCESS;  Port Revision;  Surgeon: Arash Puente MD;  Location: [facility]     LAPAROSCOPIC HYSTERECTOMY TOTAL, BILATERAL SALPINGO-OOPHORECTOMY, NODE DISSECTION, COMBINED  2013    Procedure: COMBINED LAPAROSCOPIC HYSTERECTOMY TOTAL, SALPINGO-OOPHORECTOMY, NODE DISSECTION;  Laparosocpic  Total Abdominal Hysterectomy, Bilateral Salphino-Oophorectomy, Bilateral Pelvic Lymph Node Dissection, Pelvic Washings, Cystoscopy;  Surgeon: Tanya Walker MD;  Location: [facility]     SURGICAL HISTORY OF -   93    1) Excision of digital cyst right mid finger  2)  Excision of dermatofibroma left calf     SURGICAL HISTORY OF -   2001    Excision of mucinous cyst & partial ostectomy, bone removal of benign osteophytic overgrowth osteophyte of the distal aspect of the middle phalanx and distal phalanx     SURGICAL HISTORY OF -       Basal cell cancer removal     TONSILLECTOMY       TUBAL LIGATION         Family History   Problem Relation Age of Onset     Cerebrovascular Disease Mother      Hypertension Mother      Diabetes Mother      Thyroid Disease Mother      Arthritis Mother      Alzheimer Disease Mother      Neurologic Disorder Mother         Parkinsons     Heart Disease Father      Hypertension Father      Diabetes  Father      Thyroid Disease Father      Arthritis Father      Blood Disease Father      Anesthesia Reaction Sister      Thyroid Disease Sister      Anesthesia Reaction Sister      Arthritis Sister         RA     Hypertension Brother      Allergies Son      Gastrointestinal Disease Son         GERD     Hypertension Brother      Allergies Son         percocett     Gastrointestinal Disease Son         GERD     Hypertension Son        Social History     Tobacco Use     Smoking status: Never Smoker     Smokeless tobacco: Never Used   Substance Use Topics     Alcohol use: Yes     Frequency: Never     Comment: Rare       MEDICATIONS:  No current facility-administered medications for this encounter.      Current Outpatient Medications   Medication Sig     aspirin 81 MG tablet Take 81 mg by mouth daily      lisinopril (PRINIVIL/ZESTRIL) 20 MG tablet Take 1 tablet (20 mg) by mouth daily     naproxen (NAPROSYN) 500 MG tablet Take 1 tablet (500 mg) by mouth 2 times daily as needed for moderate pain     omeprazole (PRILOSEC) 20 MG DR capsule Take 1 capsule (20 mg) by mouth daily     phentermine (ADIPEX-P) 15 MG capsule Take 1 capsule (15 mg) by mouth every morning     topiramate (TOPAMAX) 50 MG tablet Take 0.5 tablets (25 mg) by mouth daily for 14 days, THEN 1 tablet (50 mg) daily.     triamterene-HCTZ (MAXZIDE-25) 37.5-25 MG tablet Take 1 tablet by mouth daily     vitamin D3 (CHOLECALCIFEROL) 12439 units (250 mcg) capsule Take 1 capsule by mouth daily     order for DME Equipment being ordered: hernia belt (Patient not taking: Reported on 5/20/2019)       ALLERGIES:     Allergies   Allergen Reactions     Acetaminophen      Other reaction(s): Gastrointestinal     Metal [Staples] Other (See Comments)     Not staples, but surgical screws     Other  [No Clinical Screening - See Comments] Other (See Comments)     Metal screws, sutures     Percocet [Oxycodone-Acetaminophen] Nausea and Vomiting       PERTINENT SYSTEMS REVIEW:    1. No  - Do you have a history of heart attack, stroke, stent, bypass or surgery on an artery in the head, neck, heart or legs?  2. No - Do you ever have any pain or discomfort in your chest?  3. No - Do you have a history of  Heart Failure?  4. No - Are you troubled by shortness of breath when walking: On the level, up a slight hill or at night?  5. No - Do you currently have a cold, bronchitis or other respiratory infection?  6. No - Do you have a cough, shortness of breath or wheezing?  7. No - Do you sometimes get pains in the calves of your legs when you walk?  8. No - Do you or anyone in your family have previous history of blood clots?  9. No - Do you or does anyone in your family have a serious bleeding problem such as prolonged bleeding following surgeries or cuts?  10. No - Have you ever had problems with anemia or been told to take iron pills?  11. No - Have you had any abnormal blood loss such as black, tarry or bloody stools, or abnormal vaginal bleeding?  12. No - Have you ever had a blood transfusion?  13. No - Have you or any of your relatives ever had problems with anesthesia?  14. No - Do you have sleep apnea, excessive snoring or daytime drowsiness?  15. No - Do you have any prosthetic heart valves?  16. No - Do you have prosthetic joints?    EXAMINATION:  Vitals were reviewed                     Vison:  Va, right - 20/30, left - 20/60;   BAT, right - 20/70, left - 20/100;  HEENT:  Cataract, otherwise unremarkable.  LUNGS:  Clear  CV:  Regular rate and rhythm without murmur  ABD:  Soft and nontender  NEURO:  Alert and nonfocal    IMPRESSION:  Patient cleared for ophthalmic surgery.  Low risk with monitored, light sedation.  I have assessed the patient's DVT risk, and no additional orders necessary.    PLAN:  Procedure(s):  Cataract Removal with Implant, Left Eye      Walt Mckeon MD

## 2019-06-11 ENCOUNTER — ANESTHESIA EVENT (OUTPATIENT)
Dept: SURGERY | Facility: CLINIC | Age: 69
End: 2019-06-11
Payer: MEDICARE

## 2019-06-11 NOTE — ANESTHESIA PREPROCEDURE EVALUATION
Anesthesia Pre-Procedure Evaluation    Patient: Thomas Davila   MRN: [id number] : 1950          Preoperative Diagnosis: cataract    Procedure(s):  Cataract Removal with Implant    Past Medical History:   Diagnosis Date     Acute parametritis and pelvic cellulitis     salpingitis     ASCUS with positive high risk HPV 2013     Asthma     No recent issues     Basal cell carcinoma      breast cancer 1998    left lumpectomy, radiation, tamoxifen     Breast cancer (H)     L Breast; Lumpectomy & Radiation     Chronic salpingitis and oophoritis     PID        Embolism and thrombosis of unspecified site     left leg, due to tamoxifen therapy     Generalized osteoarthrosis, unspecified site     hands     Leiomyoma of uterus, unspecified      Other malignant neoplasm of skin, site unspecified 2006    Basal cell cancer on nose     Squamous cell carcinoma      Thrombosis of leg     Left     Unspecified essential hypertension      Past Surgical History:   Procedure Laterality Date     BREAST LUMPECTOMY, RT/LT      left     C/SECTION, LOW TRANSVERSE  1972,     , Low Transverse x2     CL AFF SURGICAL PATHOLOGY      Breast reduction     COLONOSCOPY  10/15/02    normal     FOOT SURGERY      R Foot      HC EXCISION BREAST LESION, OPEN >=1  98    1) Needle localization with generous lumpectomy 2) Left axillary node dissection.     HC LAPAROSCOPY, SURGICAL, ABDOMEN, PERITONEUM & OMENTUM; DX W/ OR W/O SPECIMEN(S)  94     HYSTERECTOMY, PAP NO LONGER INDICATED       INSERT PORT VASCULAR ACCESS  3/14/2013    Procedure: INSERT PORT VASCULAR ACCESS;  Port Revision;  Surgeon: Arash Puente MD;  Location: [facility]     LAPAROSCOPIC HYSTERECTOMY TOTAL, BILATERAL SALPINGO-OOPHORECTOMY, NODE DISSECTION, COMBINED  2013    Procedure: COMBINED LAPAROSCOPIC HYSTERECTOMY TOTAL, SALPINGO-OOPHORECTOMY, NODE DISSECTION;  Laparosocpic  Total Abdominal Hysterectomy, Bilateral  Salphino-Oophorectomy, Bilateral Pelvic Lymph Node Dissection, Pelvic Washings, Cystoscopy;  Surgeon: Tanya Walker MD;  Location: [facility]     SURGICAL HISTORY OF -   06/22/93    1) Excision of digital cyst right mid finger  2)  Excision of dermatofibroma left calf     SURGICAL HISTORY OF -   12/18/2001    Excision of mucinous cyst & partial ostectomy, bone removal of benign osteophytic overgrowth osteophyte of the distal aspect of the middle phalanx and distal phalanx     SURGICAL HISTORY OF -   2006    Basal cell cancer removal     TONSILLECTOMY       TUBAL LIGATION  1978       Anesthesia Evaluation     . Pt has had prior anesthetic. Type: General and MAC           ROS/MED HX    ENT/Pulmonary:     (+)ERIN risk factors hypertension, obese, asthma , . .    Neurologic:  - neg neurologic ROS     Cardiovascular:     (+) Dyslipidemia, hypertension----. : . . . :. .       METS/Exercise Tolerance:     Hematologic:     (+) History of blood clots -      Musculoskeletal:   (+) arthritis,  -       GI/Hepatic:     (+) GERD       Renal/Genitourinary:  - ROS Renal section negative       Endo:     (+) thyroid problem  Thyroid disease - Other Nontoxic multinodular goiter, Obesity (Morbid), .      Psychiatric:  - neg psychiatric ROS       Infectious Disease:  - neg infectious disease ROS       Malignancy:   (+) Malignancy History of Breast, Skin and Other  Breast CA status post Surgery. Skin CA status post Surgery, Other CA Endometrium status post         Other:    - neg other ROS                      Physical Exam  Normal systems: cardiovascular, pulmonary and dental    Airway   Mallampati: II  TM distance: >3 FB  Neck ROM: full    Dental     Cardiovascular       Pulmonary             Lab Results   Component Value Date    WBC 5.6 03/27/2019    HGB 15.3 03/27/2019    HCT 46.5 03/27/2019     03/27/2019    CRP 4.1 10/06/2014    SED 7 10/06/2014     03/27/2019    POTASSIUM 3.4 03/27/2019    CHLORIDE 101 03/27/2019    CO2  "28 03/27/2019    BUN 18 03/27/2019    CR 0.64 03/27/2019     (H) 03/27/2019    DIANN 9.2 03/27/2019    MAG 1.3 (L) 08/06/2013    ALBUMIN 3.8 03/27/2019    PROTTOTAL 7.2 03/27/2019    ALT 58 (H) 03/27/2019    AST 27 03/27/2019    ALKPHOS 69 03/27/2019    BILITOTAL 0.3 03/27/2019    LIPASE 123 07/10/2015    INR 1 11/12/2013    TSH 3.10 12/14/2018    T4 1.04 12/05/2017    T3 115 10/06/2014       Preop Vitals  BP Readings from Last 3 Encounters:   05/20/19 126/67   05/15/19 132/84   04/01/19 159/84    Pulse Readings from Last 3 Encounters:   05/20/19 90   05/15/19 87   04/01/19 103      Resp Readings from Last 3 Encounters:   05/15/19 14   04/01/19 20   02/26/19 18    SpO2 Readings from Last 3 Encounters:   05/20/19 97%   05/15/19 98%   04/01/19 96%      Temp Readings from Last 1 Encounters:   05/15/19 36.4  C (97.6  F) (Tympanic)    Ht Readings from Last 1 Encounters:   04/01/19 1.626 m (5' 4\")      Wt Readings from Last 1 Encounters:   05/15/19 105.1 kg (231 lb 9.6 oz)    Estimated body mass index is 39.75 kg/m  as calculated from the following:    Height as of 4/1/19: 1.626 m (5' 4\").    Weight as of 5/15/19: 105.1 kg (231 lb 9.6 oz).       Anesthesia Plan      History & Physical Review  History and physical reviewed and following examination; no interval change.    ASA Status:  3 .    NPO Status:  > 6 hours    Plan for MAC Reason for MAC:  Procedure to face, neck, head or breast  PONV prophylaxis:  Ondansetron (or other 5HT-3) and Dexamethasone or Solumedrol       Postoperative Care  Postoperative pain management:  Multi-modal analgesia.      Consents  Anesthetic plan, risks, benefits and alternatives discussed with:  Patient..                 EMMANUEL Paiz CRNA  "

## 2019-06-12 ENCOUNTER — ANESTHESIA (OUTPATIENT)
Dept: SURGERY | Facility: CLINIC | Age: 69
End: 2019-06-12
Payer: MEDICARE

## 2019-06-12 ENCOUNTER — HOSPITAL ENCOUNTER (OUTPATIENT)
Facility: CLINIC | Age: 69
Discharge: HOME | End: 2019-06-12
Attending: OPHTHALMOLOGY | Admitting: OPHTHALMOLOGY
Payer: MEDICARE

## 2019-06-12 ENCOUNTER — TELEPHONE (OUTPATIENT)
Dept: NURSING | Facility: CLINIC | Age: 69
End: 2019-06-12

## 2019-06-12 VITALS
DIASTOLIC BLOOD PRESSURE: 103 MMHG | OXYGEN SATURATION: 99 % | WEIGHT: 231 LBS | TEMPERATURE: 97.8 F | SYSTOLIC BLOOD PRESSURE: 169 MMHG | HEIGHT: 64 IN | BODY MASS INDEX: 39.44 KG/M2 | RESPIRATION RATE: 16 BRPM

## 2019-06-12 PROCEDURE — 36000025 ZZH CATARACT SURGICAL PACKAGE: Performed by: OPHTHALMOLOGY

## 2019-06-12 PROCEDURE — 71000022 ZZH RECOVERY CATRACT PACKAGE: Performed by: OPHTHALMOLOGY

## 2019-06-12 PROCEDURE — V2632 POST CHMBR INTRAOCULAR LENS: HCPCS | Performed by: OPHTHALMOLOGY

## 2019-06-12 PROCEDURE — 25000128 H RX IP 250 OP 636: Performed by: NURSE ANESTHETIST, CERTIFIED REGISTERED

## 2019-06-12 PROCEDURE — 25000125 ZZHC RX 250: Performed by: OPHTHALMOLOGY

## 2019-06-12 PROCEDURE — 37000012 ZZH ANESTHESIA CATARACT PACKAGE: Performed by: OPHTHALMOLOGY

## 2019-06-12 PROCEDURE — 25000128 H RX IP 250 OP 636: Performed by: OPHTHALMOLOGY

## 2019-06-12 PROCEDURE — 25800030 ZZH RX IP 258 OP 636: Performed by: NURSE ANESTHETIST, CERTIFIED REGISTERED

## 2019-06-12 DEVICE — EYE IMP IOL AMO PCL TECNIS ZCB00 22.5: Type: IMPLANTABLE DEVICE | Site: EYE | Status: FUNCTIONAL

## 2019-06-12 RX ORDER — BALANCED SALT SOLUTION 6.4; .75; .48; .3; 3.9; 1.7 MG/ML; MG/ML; MG/ML; MG/ML; MG/ML; MG/ML
SOLUTION OPHTHALMIC PRN
Status: DISCONTINUED | OUTPATIENT
Start: 2019-06-12 | End: 2019-06-12 | Stop reason: HOSPADM

## 2019-06-12 RX ORDER — PROPARACAINE HYDROCHLORIDE 5 MG/ML
SOLUTION/ DROPS OPHTHALMIC PRN
Status: DISCONTINUED | OUTPATIENT
Start: 2019-06-12 | End: 2019-06-12 | Stop reason: HOSPADM

## 2019-06-12 RX ORDER — SODIUM CHLORIDE, SODIUM LACTATE, POTASSIUM CHLORIDE, CALCIUM CHLORIDE 600; 310; 30; 20 MG/100ML; MG/100ML; MG/100ML; MG/100ML
INJECTION, SOLUTION INTRAVENOUS CONTINUOUS
Status: DISCONTINUED | OUTPATIENT
Start: 2019-06-12 | End: 2019-06-12 | Stop reason: HOSPADM

## 2019-06-12 RX ORDER — LIDOCAINE 40 MG/G
CREAM TOPICAL
Status: DISCONTINUED | OUTPATIENT
Start: 2019-06-12 | End: 2019-06-12 | Stop reason: HOSPADM

## 2019-06-12 RX ORDER — LIDOCAINE HYDROCHLORIDE 20 MG/ML
JELLY TOPICAL PRN
Status: DISCONTINUED | OUTPATIENT
Start: 2019-06-12 | End: 2019-06-12 | Stop reason: HOSPADM

## 2019-06-12 RX ORDER — CYCLOPENTOLATE HYDROCHLORIDE 10 MG/ML
1 SOLUTION/ DROPS OPHTHALMIC
Status: COMPLETED | OUTPATIENT
Start: 2019-06-12 | End: 2019-06-12

## 2019-06-12 RX ORDER — PHENYLEPHRINE HYDROCHLORIDE 25 MG/ML
1 SOLUTION/ DROPS OPHTHALMIC
Status: COMPLETED | OUTPATIENT
Start: 2019-06-12 | End: 2019-06-12

## 2019-06-12 RX ORDER — TROPICAMIDE 10 MG/ML
1 SOLUTION/ DROPS OPHTHALMIC
Status: COMPLETED | OUTPATIENT
Start: 2019-06-12 | End: 2019-06-12

## 2019-06-12 RX ADMIN — MIDAZOLAM 2 MG: 1 INJECTION INTRAMUSCULAR; INTRAVENOUS at 11:49

## 2019-06-12 RX ADMIN — PHENYLEPHRINE HYDROCHLORIDE 1 DROP: 25 SOLUTION/ DROPS OPHTHALMIC at 11:10

## 2019-06-12 RX ADMIN — CYCLOPENTOLATE HYDROCHLORIDE 1 DROP: 10 SOLUTION/ DROPS OPHTHALMIC at 11:22

## 2019-06-12 RX ADMIN — TROPICAMIDE 1 DROP: 10 SOLUTION/ DROPS OPHTHALMIC at 11:10

## 2019-06-12 RX ADMIN — PHENYLEPHRINE HYDROCHLORIDE 1 DROP: 25 SOLUTION/ DROPS OPHTHALMIC at 11:22

## 2019-06-12 RX ADMIN — CYCLOPENTOLATE HYDROCHLORIDE 1 DROP: 10 SOLUTION/ DROPS OPHTHALMIC at 11:08

## 2019-06-12 RX ADMIN — TROPICAMIDE 1 DROP: 10 SOLUTION/ DROPS OPHTHALMIC at 11:23

## 2019-06-12 RX ADMIN — CYCLOPENTOLATE HYDROCHLORIDE 1 DROP: 10 SOLUTION/ DROPS OPHTHALMIC at 11:14

## 2019-06-12 RX ADMIN — TROPICAMIDE 1 DROP: 10 SOLUTION/ DROPS OPHTHALMIC at 11:15

## 2019-06-12 RX ADMIN — SODIUM CHLORIDE, POTASSIUM CHLORIDE, SODIUM LACTATE AND CALCIUM CHLORIDE: 600; 310; 30; 20 INJECTION, SOLUTION INTRAVENOUS at 11:28

## 2019-06-12 RX ADMIN — PHENYLEPHRINE HYDROCHLORIDE 1 DROP: 25 SOLUTION/ DROPS OPHTHALMIC at 11:15

## 2019-06-12 ASSESSMENT — MIFFLIN-ST. JEOR: SCORE: 1547.81

## 2019-06-12 NOTE — ANESTHESIA POSTPROCEDURE EVALUATION
Patient: Thomas Davila    Procedure(s):  Cataract Removal with Implant    Diagnosis:cataract  Diagnosis Additional Information: No value filed.    Anesthesia Type:  MAC    Note:  Anesthesia Post Evaluation    Patient location during evaluation: Phase 2 and Bedside  Patient participation: Able to fully participate in evaluation  Level of consciousness: awake and alert  Pain management: adequate  Airway patency: patent  Cardiovascular status: acceptable and hemodynamically stable  Respiratory status: acceptable and room air  Hydration status: acceptable  PONV: none     Anesthetic complications: None          Last vitals:  Vitals:    06/12/19 1056   BP: (!) 181/96   Resp: 20   Temp: 36.6  C (97.8  F)   SpO2: 100%         Electronically Signed By: EMMANUEL Paiz CRNA  June 12, 2019  12:11 PM

## 2019-06-12 NOTE — ANESTHESIA CARE TRANSFER NOTE
Patient: Thomas Davila    Procedure(s):  Cataract Removal with Implant    Diagnosis: cataract  Diagnosis Additional Information: No value filed.    Anesthesia Type:   MAC     Note:  Airway :Room Air  Patient transferred to:Phase II  Handoff Report: Identifed the Patient, Identified the Reponsible Provider, Reviewed the pertinent medical history, Discussed the surgical course, Reviewed Intra-OP anesthesia mangement and issues during anesthesia, Set expectations for post-procedure period and Allowed opportunity for questions and acknowledgement of understanding      Vitals: (Last set prior to Anesthesia Care Transfer)    CRNA VITALS  6/12/2019 1139 - 6/12/2019 1210      6/12/2019             Pulse:  90    SpO2:  95 %                Electronically Signed By: EMMANUEL Paiz CRNA  June 12, 2019  12:10 PM

## 2019-06-12 NOTE — OP NOTE
CATARACT OPERATIVE NOTE    PATIENT: Thomas Davila  DATE OF SURGERY: 6/12/2019  PREOPERATIVE DIAGNOSIS:  Senile Nuclear Cataract, Left eye  POSTOPERATIVE DIAGNOSIS:  Senile Nuclear Cataract, Left eye  OPERATIVE PROCEDURE:  Phacoemulsification and placement of intraocular lens  SURGEON:  Walt Mckeon MD  ANESTHESIA:  Topical / MAC  EBL:  None  SPECIMENS:  None  COMPLICATIONS:  None    PROCEDURE:  The patient was brought to the operating room at [facility].  The left eye was prepped and draped in the usual fashion for cataract surgery.  A wire lid speculum was inserted.  A super sharp blade was used to make a paracentesis at the 5 O'clock position.  The super sharp blade was used to make a partial thickness temporal groove, which was 3 mm in length.  0.8 mL of non-preserved epi-Shugarcaine was injected into the anterior chamber.  Viscoelastic was used to inflate the anterior chamber through a cannula.  A 2.5 mm microkeratome was used to make a temporal clear corneal incision in a two-plane fashion.  A cystotome needle and forceps were used to make a capsulorrhexis.  Hydrodissection and hydrodelineation were performed with Balance Salt Solution.  The lens was then phacoemulsified and removed without complications.  The cortical material was removed with bimanual irrigation and aspiration.  The capsular bag was filled with viscoelastic.  A posterior chamber intraocular lens, preselected and recorded, was folded and inserted into the capsular bag.  The viscoelastic was removed with the irrigation and aspiration tip.  Balanced Salt Solution with Vigamox, 150mg/0.1mL, was used to refill the anterior chamber.  The wounds were checked for water tightness and required no suture.  The wire lid speculum was removed.  The patient's left eye was cleaned and a drop of each post-operative drop was placed, followed by a fallon shield.  The patient tolerated the procedure well, and there were no  complications.      Walt Mckeon MD

## 2019-06-12 NOTE — TELEPHONE ENCOUNTER
Eye drop is generic for prescribed. Patient figured this out while she was on hold. No other questions at this time. Advised to call back as needed.  Tonya Flores RN  [facility]

## 2019-06-27 NOTE — H&P
[facility]  TOTAL EYE CARE  [address] [place]  [facility] [address]  [phone]  Dept: [phone]    OPHTHALMOLOGY PRE-OPERATIVE  HISTORY AND PHYSICAL    DATE OF H/P:  2019    DATE OF SURGERY:  July 3, 2019  PROCEDURE:  Procedure(s):  Cataract Removal with Implant, Right Eye  LENS IMPLANT:  [id number] +23.5  REFRACTIVE GOAL:  PL Sph  SURGEON:  Walt cMkeon MD    ANESTHESIA:  TOPICAL / MAC    OR CASE REQUIREMENTS:  Shallow AC / narrow angles.    DEMOGRAPHICS:  Demographic Information on Thomas Davila:    Thomas Davila  Gender: female  : 1950  [address] [facility] [id number] [phone] (home)     Medical Record: [id number]  Social Security Number: [id number]  Pharmacy:    [id number] - [address]  [id number] - [address]  [id number] - [place] [id number] [facility]  [employer] ONLY MAIL ORDER  Primary Care Provider: Dung Prieto    Parent's names are: Data Unavailable (mother) and Data Unavailable (father).    Insurance: Payor: MEDICARE / Plan: MEDICARE / Product Type: Medicare /     OCULAR HISTORY:  Cataracts, s/p IOL left eye.    HISTORIES:  Past Medical History:   Diagnosis Date     Acute parametritis and pelvic cellulitis     salpingitis     ASCUS with positive high risk HPV 2013     Asthma     No recent issues     Basal cell carcinoma      breast cancer     left lumpectomy, radiation, tamoxifen     Breast cancer (H)     L Breast; Lumpectomy & Radiation     Chronic salpingitis and oophoritis     PID        Embolism and thrombosis of unspecified site     left leg, due to tamoxifen therapy     Generalized osteoarthrosis, unspecified site     hands     Leiomyoma of uterus, unspecified      Other malignant neoplasm of skin, site unspecified 2006    Basal cell cancer on nose     Squamous cell carcinoma      Thrombosis of leg     Left      Unspecified essential hypertension        Past Surgical History:   Procedure Laterality Date     BREAST LUMPECTOMY, RT/LT      left     C/SECTION, LOW TRANSVERSE  1972,     , Low Transverse x2     CL AFF SURGICAL PATHOLOGY      Breast reduction     COLONOSCOPY  10/15/02    normal     FOOT SURGERY      R Foot      HC EXCISION BREAST LESION, OPEN >=1  98    1) Needle localization with generous lumpectomy 2) Left axillary node dissection.     HC LAPAROSCOPY, SURGICAL, ABDOMEN, PERITONEUM & OMENTUM; DX W/ OR W/O SPECIMEN(S)  94     HYSTERECTOMY, PAP NO LONGER INDICATED       INSERT PORT VASCULAR ACCESS  3/14/2013    Procedure: INSERT PORT VASCULAR ACCESS;  Port Revision;  Surgeon: Arash Puente MD;  Location: [facility]     LAPAROSCOPIC HYSTERECTOMY TOTAL, BILATERAL SALPINGO-OOPHORECTOMY, NODE DISSECTION, COMBINED  2013    Procedure: COMBINED LAPAROSCOPIC HYSTERECTOMY TOTAL, SALPINGO-OOPHORECTOMY, NODE DISSECTION;  Laparosocpic  Total Abdominal Hysterectomy, Bilateral Salphino-Oophorectomy, Bilateral Pelvic Lymph Node Dissection, Pelvic Washings, Cystoscopy;  Surgeon: Tanya Walker MD;  Location: [facility]     PHACOEMULSIFICATION WITH STANDARD INTRAOCULAR LENS IMPLANT Left 2019    Procedure: Cataract Removal with Implant;  Surgeon: Walt Mckeon MD;  Location: [facility] OR     SURGICAL HISTORY OF -   93    1) Excision of digital cyst right mid finger  2)  Excision of dermatofibroma left calf     SURGICAL HISTORY OF -   2001    Excision of mucinous cyst & partial ostectomy, bone removal of benign osteophytic overgrowth osteophyte of the distal aspect of the middle phalanx and distal phalanx     SURGICAL HISTORY OF -       Basal cell cancer removal     TONSILLECTOMY       TUBAL LIGATION         Family History   Problem Relation Age of Onset     Cerebrovascular Disease Mother      Hypertension Mother      Diabetes Mother      Thyroid Disease Mother       Arthritis Mother      Alzheimer Disease Mother      Neurologic Disorder Mother         Parkinsons     Heart Disease Father      Hypertension Father      Diabetes Father      Thyroid Disease Father      Arthritis Father      Blood Disease Father      Anesthesia Reaction Sister      Thyroid Disease Sister      Anesthesia Reaction Sister      Arthritis Sister         RA     Hypertension Brother      Allergies Son      Gastrointestinal Disease Son         GERD     Hypertension Brother      Allergies Son         percocett     Gastrointestinal Disease Son         GERD     Hypertension Son        Social History     Tobacco Use     Smoking status: Never Smoker     Smokeless tobacco: Never Used   Substance Use Topics     Alcohol use: Yes     Frequency: Never     Comment: Rare       MEDICATIONS:  No current facility-administered medications for this encounter.      Current Outpatient Medications   Medication Sig     aspirin 81 MG tablet Take 81 mg by mouth daily      lisinopril (PRINIVIL/ZESTRIL) 20 MG tablet Take 1 tablet (20 mg) by mouth daily     naproxen (NAPROSYN) 500 MG tablet Take 1 tablet (500 mg) by mouth 2 times daily as needed for moderate pain     omeprazole (PRILOSEC) 20 MG DR capsule Take 1 capsule (20 mg) by mouth daily     order for DME Equipment being ordered: hernia belt (Patient not taking: Reported on 5/20/2019)     phentermine (ADIPEX-P) 15 MG capsule Take 1 capsule (15 mg) by mouth every morning     topiramate (TOPAMAX) 50 MG tablet Take 0.5 tablets (25 mg) by mouth daily for 14 days, THEN 1 tablet (50 mg) daily.     triamterene-HCTZ (MAXZIDE-25) 37.5-25 MG tablet Take 1 tablet by mouth daily     vitamin D3 (CHOLECALCIFEROL) 88390 units (250 mcg) capsule Take 1 capsule by mouth daily       ALLERGIES:     Allergies   Allergen Reactions     Acetaminophen      Other reaction(s): Gastrointestinal     Metal [Staples] Other (See Comments)     Not staples, but surgical screws     Other  [No Clinical  Screening - See Comments] Other (See Comments)     Metal screws, sutures     Percocet [Oxycodone-Acetaminophen] Nausea and Vomiting       PERTINENT SYSTEMS REVIEW:    1. No - Do you have a history of heart attack, stroke, stent, bypass or surgery on an artery in the head, neck, heart or legs?  2. No - Do you ever have any pain or discomfort in your chest?  3. No - Do you have a history of  Heart Failure?  4. No - Are you troubled by shortness of breath when walking: On the level, up a slight hill or at night?  5. No - Do you currently have a cold, bronchitis or other respiratory infection?  6. No - Do you have a cough, shortness of breath or wheezing?  7. No - Do you sometimes get pains in the calves of your legs when you walk?  8. No - Do you or anyone in your family have previous history of blood clots?  9. No - Do you or does anyone in your family have a serious bleeding problem such as prolonged bleeding following surgeries or cuts?  10. No - Have you ever had problems with anemia or been told to take iron pills?  11. No - Have you had any abnormal blood loss such as black, tarry or bloody stools, or abnormal vaginal bleeding?  12. No - Have you ever had a blood transfusion?  13. No - Have you or any of your relatives ever had problems with anesthesia?  14. No - Do you have sleep apnea, excessive snoring or daytime drowsiness?  15. No - Do you have any prosthetic heart valves?  16. No - Do you have prosthetic joints?    EXAMINATION:  Vitals were reviewed  No data found.    Vison:  Va, right - 20/30;   BAT, right - 20/70;  HEENT:  Cataract, otherwise unremarkable.  LUNGS:  Clear  CV:  Regular rate and rhythm without murmur  ABD:  Soft and nontender  NEURO:  Alert and nonfocal    IMPRESSION:  Patient cleared for ophthalmic surgery.  Low risk with monitored, light sedation.  I have assessed the patient's DVT risk, and no additional orders necessary.    PLAN:  Procedure(s):  Cataract Removal with Implant, Right  Eye      Walt Mckeon MD

## 2019-06-30 ENCOUNTER — ANESTHESIA EVENT (OUTPATIENT)
Dept: SURGERY | Facility: CLINIC | Age: 69
End: 2019-06-30
Payer: MEDICARE

## 2019-06-30 NOTE — ANESTHESIA PREPROCEDURE EVALUATION
Anesthesia Pre-Procedure Evaluation    Patient: Thomas Davila   MRN: [id number] : 1950          Preoperative Diagnosis: cataract    Procedure(s):  Cataract Removal with Implant    Past Medical History:   Diagnosis Date     Acute parametritis and pelvic cellulitis     salpingitis     ASCUS with positive high risk HPV 2013     Asthma     No recent issues     Basal cell carcinoma      breast cancer 1998    left lumpectomy, radiation, tamoxifen     Breast cancer (H)     L Breast; Lumpectomy & Radiation     Chronic salpingitis and oophoritis     PID        Embolism and thrombosis of unspecified site     left leg, due to tamoxifen therapy     Generalized osteoarthrosis, unspecified site     hands     Leiomyoma of uterus, unspecified      Other malignant neoplasm of skin, site unspecified 2006    Basal cell cancer on nose     Squamous cell carcinoma      Thrombosis of leg     Left     Unspecified essential hypertension      Past Surgical History:   Procedure Laterality Date     BREAST LUMPECTOMY, RT/LT      left     C/SECTION, LOW TRANSVERSE  1972,     , Low Transverse x2     CL AFF SURGICAL PATHOLOGY      Breast reduction     COLONOSCOPY  10/15/02    normal     FOOT SURGERY      R Foot      HC EXCISION BREAST LESION, OPEN >=1  98    1) Needle localization with generous lumpectomy 2) Left axillary node dissection.     HC LAPAROSCOPY, SURGICAL, ABDOMEN, PERITONEUM & OMENTUM; DX W/ OR W/O SPECIMEN(S)  94     HYSTERECTOMY, PAP NO LONGER INDICATED       INSERT PORT VASCULAR ACCESS  3/14/2013    Procedure: INSERT PORT VASCULAR ACCESS;  Port Revision;  Surgeon: Arash Puente MD;  Location: [facility]     LAPAROSCOPIC HYSTERECTOMY TOTAL, BILATERAL SALPINGO-OOPHORECTOMY, NODE DISSECTION, COMBINED  2013    Procedure: COMBINED LAPAROSCOPIC HYSTERECTOMY TOTAL, SALPINGO-OOPHORECTOMY, NODE DISSECTION;  Laparosocpic  Total Abdominal Hysterectomy, Bilateral  Salphino-Oophorectomy, Bilateral Pelvic Lymph Node Dissection, Pelvic Washings, Cystoscopy;  Surgeon: Tanya Walker MD;  Location: [facility] OR     PHACOEMULSIFICATION WITH STANDARD INTRAOCULAR LENS IMPLANT Left 6/12/2019    Procedure: Cataract Removal with Implant;  Surgeon: Walt Mckeon MD;  Location: [facility]     SURGICAL HISTORY OF -   06/22/93    1) Excision of digital cyst right mid finger  2)  Excision of dermatofibroma left calf     SURGICAL HISTORY OF -   12/18/2001    Excision of mucinous cyst & partial ostectomy, bone removal of benign osteophytic overgrowth osteophyte of the distal aspect of the middle phalanx and distal phalanx     SURGICAL HISTORY OF -   2006    Basal cell cancer removal     TONSILLECTOMY       TUBAL LIGATION  1978       Anesthesia Evaluation     . Pt has had prior anesthetic.            ROS/MED HX    ENT/Pulmonary:     (+)asthma , . .    Neurologic:  - neg neurologic ROS     Cardiovascular:     (+) hypertension----. : . . . :. . Previous cardiac testing Echodate:0-20-13ovnxeuo:nldate: results:ECG reviewed date:1-28-13 results:NSR date: results:          METS/Exercise Tolerance:     Hematologic: Comments: Thrombosis of leg    (+) History of blood clots -      Musculoskeletal: Comment: Generalized osteoarthrosis        GI/Hepatic: Comment: Acute parametritis and pelvic cellulitis    (+) GERD Asymptomatic on medication,       Renal/Genitourinary:         Endo: Comment: Nontoxic multinodular goiter    (+) thyroid problem Obesity, .      Psychiatric:  - neg psychiatric ROS       Infectious Disease:         Malignancy:   (+) Malignancy History of Skin and Breast  BCC  SCC        Other: Comment: Chronic salpingitis and oophoritis  ASCUS with positive high risk HPV  Sebaceous cyst  Itchy eyes  Plantar warts  Endometrial adenocarcinoma  Cancer of endometrium  Right cataract                           Physical Exam  Normal systems: cardiovascular, pulmonary and dental    Airway  "  Mallampati: II  TM distance: >3 FB  Neck ROM: full    Dental     Cardiovascular       Pulmonary             Lab Results   Component Value Date    WBC 5.6 03/27/2019    HGB 15.3 03/27/2019    HCT 46.5 03/27/2019     03/27/2019    CRP 4.1 10/06/2014    SED 7 10/06/2014     03/27/2019    POTASSIUM 3.4 03/27/2019    CHLORIDE 101 03/27/2019    CO2 28 03/27/2019    BUN 18 03/27/2019    CR 0.64 03/27/2019     (H) 03/27/2019    DIANN 9.2 03/27/2019    MAG 1.3 (L) 08/06/2013    ALBUMIN 3.8 03/27/2019    PROTTOTAL 7.2 03/27/2019    ALT 58 (H) 03/27/2019    AST 27 03/27/2019    ALKPHOS 69 03/27/2019    BILITOTAL 0.3 03/27/2019    LIPASE 123 07/10/2015    INR 1 11/12/2013    TSH 3.10 12/14/2018    T4 1.04 12/05/2017    T3 115 10/06/2014       Preop Vitals  BP Readings from Last 3 Encounters:   06/12/19 (!) 169/103   05/20/19 126/67   05/15/19 132/84    Pulse Readings from Last 3 Encounters:   05/20/19 90   05/15/19 87   04/01/19 103      Resp Readings from Last 3 Encounters:   06/12/19 16   05/15/19 14   04/01/19 20    SpO2 Readings from Last 3 Encounters:   06/12/19 99%   05/20/19 97%   05/15/19 98%      Temp Readings from Last 1 Encounters:   06/12/19 36.6  C (97.8  F) (Oral)    Ht Readings from Last 1 Encounters:   06/12/19 1.626 m (5' 4\")      Wt Readings from Last 1 Encounters:   06/12/19 104.8 kg (231 lb)    Estimated body mass index is 39.65 kg/m  as calculated from the following:    Height as of 6/12/19: 1.626 m (5' 4\").    Weight as of 6/12/19: 104.8 kg (231 lb).       Anesthesia Plan      History & Physical Review  History and physical reviewed and following examination; no interval change.    ASA Status:  3 .        Plan for MAC Reason for MAC:  Other - see comments         Postoperative Care      Consents  Anesthetic plan, risks, benefits and alternatives discussed with:  Patient..                 Walt Osorio CRNA, APRN CRNA  "

## 2019-07-03 ENCOUNTER — HOSPITAL ENCOUNTER (OUTPATIENT)
Facility: CLINIC | Age: 69
Discharge: HOME | End: 2019-07-03
Attending: OPHTHALMOLOGY | Admitting: OPHTHALMOLOGY
Payer: MEDICARE

## 2019-07-03 ENCOUNTER — ANESTHESIA (OUTPATIENT)
Dept: SURGERY | Facility: CLINIC | Age: 69
End: 2019-07-03
Payer: MEDICARE

## 2019-07-03 VITALS
BODY MASS INDEX: 39.44 KG/M2 | HEART RATE: 96 BPM | RESPIRATION RATE: 16 BRPM | DIASTOLIC BLOOD PRESSURE: 98 MMHG | TEMPERATURE: 98.2 F | HEIGHT: 64 IN | SYSTOLIC BLOOD PRESSURE: 167 MMHG | WEIGHT: 231 LBS | OXYGEN SATURATION: 99 %

## 2019-07-03 PROCEDURE — 25000128 H RX IP 250 OP 636: Performed by: NURSE ANESTHETIST, CERTIFIED REGISTERED

## 2019-07-03 PROCEDURE — 25000125 ZZHC RX 250: Performed by: OPHTHALMOLOGY

## 2019-07-03 PROCEDURE — 25800030 ZZH RX IP 258 OP 636: Performed by: NURSE ANESTHETIST, CERTIFIED REGISTERED

## 2019-07-03 PROCEDURE — 36000025 ZZH CATARACT SURGICAL PACKAGE: Performed by: OPHTHALMOLOGY

## 2019-07-03 PROCEDURE — 71000022 ZZH RECOVERY CATRACT PACKAGE: Performed by: OPHTHALMOLOGY

## 2019-07-03 PROCEDURE — 25000128 H RX IP 250 OP 636: Performed by: OPHTHALMOLOGY

## 2019-07-03 PROCEDURE — 37000012 ZZH ANESTHESIA CATARACT PACKAGE: Performed by: OPHTHALMOLOGY

## 2019-07-03 PROCEDURE — 25000125 ZZHC RX 250: Performed by: NURSE ANESTHETIST, CERTIFIED REGISTERED

## 2019-07-03 PROCEDURE — V2632 POST CHMBR INTRAOCULAR LENS: HCPCS | Performed by: OPHTHALMOLOGY

## 2019-07-03 DEVICE — EYE IMP IOL AMO PCL TECNIS ZCB00 23.5: Type: IMPLANTABLE DEVICE | Site: EYE | Status: FUNCTIONAL

## 2019-07-03 RX ORDER — BALANCED SALT SOLUTION 6.4; .75; .48; .3; 3.9; 1.7 MG/ML; MG/ML; MG/ML; MG/ML; MG/ML; MG/ML
SOLUTION OPHTHALMIC PRN
Status: DISCONTINUED | OUTPATIENT
Start: 2019-07-03 | End: 2019-07-03 | Stop reason: HOSPADM

## 2019-07-03 RX ORDER — PROPARACAINE HYDROCHLORIDE 5 MG/ML
SOLUTION/ DROPS OPHTHALMIC PRN
Status: DISCONTINUED | OUTPATIENT
Start: 2019-07-03 | End: 2019-07-03 | Stop reason: HOSPADM

## 2019-07-03 RX ORDER — SODIUM CHLORIDE, SODIUM LACTATE, POTASSIUM CHLORIDE, CALCIUM CHLORIDE 600; 310; 30; 20 MG/100ML; MG/100ML; MG/100ML; MG/100ML
INJECTION, SOLUTION INTRAVENOUS CONTINUOUS
Status: DISCONTINUED | OUTPATIENT
Start: 2019-07-03 | End: 2019-07-03 | Stop reason: HOSPADM

## 2019-07-03 RX ORDER — CYCLOPENTOLATE HYDROCHLORIDE 10 MG/ML
1 SOLUTION/ DROPS OPHTHALMIC
Status: COMPLETED | OUTPATIENT
Start: 2019-07-03 | End: 2019-07-03

## 2019-07-03 RX ORDER — LIDOCAINE 40 MG/G
CREAM TOPICAL
Status: DISCONTINUED | OUTPATIENT
Start: 2019-07-03 | End: 2019-07-03 | Stop reason: HOSPADM

## 2019-07-03 RX ORDER — LIDOCAINE HYDROCHLORIDE 20 MG/ML
JELLY TOPICAL PRN
Status: DISCONTINUED | OUTPATIENT
Start: 2019-07-03 | End: 2019-07-03 | Stop reason: HOSPADM

## 2019-07-03 RX ORDER — PHENYLEPHRINE HYDROCHLORIDE 25 MG/ML
1 SOLUTION/ DROPS OPHTHALMIC
Status: COMPLETED | OUTPATIENT
Start: 2019-07-03 | End: 2019-07-03

## 2019-07-03 RX ORDER — TROPICAMIDE 10 MG/ML
1 SOLUTION/ DROPS OPHTHALMIC
Status: COMPLETED | OUTPATIENT
Start: 2019-07-03 | End: 2019-07-03

## 2019-07-03 RX ADMIN — CYCLOPENTOLATE HYDROCHLORIDE 1 DROP: 10 SOLUTION/ DROPS OPHTHALMIC at 07:40

## 2019-07-03 RX ADMIN — LIDOCAINE HYDROCHLORIDE 1 ML: 10 INJECTION, SOLUTION EPIDURAL; INFILTRATION; INTRACAUDAL; PERINEURAL at 07:45

## 2019-07-03 RX ADMIN — TROPICAMIDE 1 DROP: 10 SOLUTION/ DROPS OPHTHALMIC at 07:40

## 2019-07-03 RX ADMIN — PHENYLEPHRINE HYDROCHLORIDE 1 DROP: 25 SOLUTION/ DROPS OPHTHALMIC at 07:46

## 2019-07-03 RX ADMIN — SODIUM CHLORIDE, POTASSIUM CHLORIDE, SODIUM LACTATE AND CALCIUM CHLORIDE: 600; 310; 30; 20 INJECTION, SOLUTION INTRAVENOUS at 07:45

## 2019-07-03 RX ADMIN — MIDAZOLAM 1 MG: 1 INJECTION INTRAMUSCULAR; INTRAVENOUS at 08:39

## 2019-07-03 RX ADMIN — PHENYLEPHRINE HYDROCHLORIDE 1 DROP: 25 SOLUTION/ DROPS OPHTHALMIC at 07:40

## 2019-07-03 RX ADMIN — PHENYLEPHRINE HYDROCHLORIDE 1 DROP: 25 SOLUTION/ DROPS OPHTHALMIC at 07:51

## 2019-07-03 RX ADMIN — CYCLOPENTOLATE HYDROCHLORIDE 1 DROP: 10 SOLUTION/ DROPS OPHTHALMIC at 07:51

## 2019-07-03 RX ADMIN — MIDAZOLAM 1 MG: 1 INJECTION INTRAMUSCULAR; INTRAVENOUS at 08:38

## 2019-07-03 RX ADMIN — CYCLOPENTOLATE HYDROCHLORIDE 1 DROP: 10 SOLUTION/ DROPS OPHTHALMIC at 07:46

## 2019-07-03 RX ADMIN — TROPICAMIDE 1 DROP: 10 SOLUTION/ DROPS OPHTHALMIC at 07:46

## 2019-07-03 RX ADMIN — TROPICAMIDE 1 DROP: 10 SOLUTION/ DROPS OPHTHALMIC at 07:51

## 2019-07-03 ASSESSMENT — MIFFLIN-ST. JEOR: SCORE: 1547.81

## 2019-07-03 NOTE — ANESTHESIA CARE TRANSFER NOTE
Patient: Thomas Davila    Procedure(s):  Cataract Removal with Implant    Diagnosis: cataract  Diagnosis Additional Information: No value filed.    Anesthesia Type:   MAC     Note:  Airway :Room Air  Patient transferred to:Phase II  Handoff Report: Identifed the Patient, Identified the Reponsible Provider, Reviewed the pertinent medical history, Discussed the surgical course, Reviewed Intra-OP anesthesia mangement and issues during anesthesia, Set expectations for post-procedure period and Allowed opportunity for questions and acknowledgement of understanding      Vitals: (Last set prior to Anesthesia Care Transfer)    CRNA VITALS  7/3/2019 0829 - 7/3/2019 0859      7/3/2019             Pulse:  88    SpO2:  98 %                Electronically Signed By: EMMANUEL Martin CRNA  July 3, 2019  8:59 AM

## 2019-07-03 NOTE — ANESTHESIA POSTPROCEDURE EVALUATION
Patient: Thomas Davila    Procedure(s):  Cataract Removal with Implant    Diagnosis:cataract  Diagnosis Additional Information: No value filed.    Anesthesia Type:  MAC    Note:  Anesthesia Post Evaluation    Patient location during evaluation: Bedside  Patient participation: Able to fully participate in evaluation  Level of consciousness: awake and alert  Pain management: adequate  Airway patency: patent  Cardiovascular status: acceptable  Respiratory status: acceptable  Hydration status: acceptable  PONV: none     Anesthetic complications: None          Last vitals:  Vitals:    07/03/19 0705 07/03/19 0903   BP: 148/88 146/73   Pulse: 94 89   Resp: 16 16   Temp: 36.8  C (98.2  F)    SpO2: 100%          Electronically Signed By: EMMANUEL Martin CRNA  July 3, 2019  9:10 AM

## 2019-07-03 NOTE — OR NURSING
Difficult iv start. This rn inserted iv in left arm after unsuccessful attempt by another rn on right arm and foot. Pt was agreeable to left iv short term. arina well. No problem with iv start. Has bug bites on feet bilat

## 2019-07-03 NOTE — OP NOTE
OPHTHALMOLOGY OPERATIVE NOTE    PATIENT: Thomas Davila  DATE OF SURGERY: 7/3/2019  PREOPERATIVE DIAGNOSIS:  Senile Nuclear Cataract, Right eye  POSTOPERATIVE DIAGNOSIS:  Senile Nuclear Cataract, Right eye  OPERATIVE PROCEDURE:  Phacoemulsification with placement of intraocular lens  SURGEON:  Walt Mckeon MD  ANESTHESIA:  Topical / MAC  EBL:  None  SPECIMENS:  None  COMPLICATIONS:  None    PROCEDURE:  The patient was brought to the operating room at [facility].  The right eye was prepped and draped in the usual fashion for cataract surgery.  A wire lid speculum was inserted.  A super sharp blade was used to make a paracentesis at the 11 O'clock position.  The super sharp blade was used to make a partial thickness temporal groove, which was 3 mm in length.  0.8 mL of non-preserved epi-Shugarcaine was injected into the anterior chamber.  Viscoelastic was used to inflate the anterior chamber through a cannula.  A 2.5 mm microkeratome was used to make a temporal clear corneal incision in a two-plane fashion.  A cystotome needle and forceps were used to make a capsulorrhexis.  Hydrodissection and hydrodelineation were performed with Balance Salt Solution.  The lens was then phacoemulsified and removed without complications.  The cortical material was removed with bimanual irrigation and aspiration.  The capsular bag was filled with viscoelastic.  A posterior chamber intraocular lens, preselected and recorded, was folded and inserted into the capsular bag.  The viscoelastic was removed with the irrigation and aspiration tip.  Balanced Salt Solution with Vigamox, 150mg/0.1mL, was used to refill the anterior chamber.  The wounds were checked for water tightness and required no suture.  The wire lid speculum was removed.  The patient's right eye was cleaned and a drop of each post-operative drop was placed, followed by a fallon shield.  The patient tolerated the procedure well, and there  were no complications.      Walt Mckeon MD

## 2019-07-29 ENCOUNTER — TELEPHONE (OUTPATIENT)
Dept: DERMATOLOGY | Facility: CLINIC | Age: 69
End: 2019-07-29

## 2019-07-29 NOTE — TELEPHONE ENCOUNTER
Spoke to patient and advised there are no hold spots at this time as wait list is 150+ patients and we are booking into 3rd week of September.     I advised she could try [facility] [phone] or [facility] [phone] to see if they could get her in sooner or could see PCP in the meantime. Can be added to our wait list as well, but I would make an appointment now as we tend to book appointments 4 to 8 weeks in advance year round. Patient verbalized understanding. Azucena Calel RN

## 2019-07-29 NOTE — TELEPHONE ENCOUNTER
Reason for Call:  Other     Detailed comments: Pt states she has a spot on her leg that she is concerned about - states it has changed since she first noticed it a month ago. Says she has a  history of skin precancer and wants to have this looked at tomorrow. Advised no openings - Please advise    Phone Number Patient can be reached at: Cell number on file:    Telephone Information:   Mobile [phone]       Best Time: Any    Can we leave a detailed message on this number? YES    Call taken on 7/29/2019 at 9:15 AM by Denise Behrendt

## 2019-08-06 ENCOUNTER — OFFICE VISIT (OUTPATIENT)
Dept: FAMILY MEDICINE | Facility: CLINIC | Age: 69
End: 2019-08-06
Payer: MEDICARE

## 2019-08-06 VITALS
DIASTOLIC BLOOD PRESSURE: 74 MMHG | RESPIRATION RATE: 14 BRPM | SYSTOLIC BLOOD PRESSURE: 138 MMHG | TEMPERATURE: 98.1 F | OXYGEN SATURATION: 96 % | BODY MASS INDEX: 36.94 KG/M2 | WEIGHT: 215.2 LBS | HEART RATE: 102 BPM

## 2019-08-06 DIAGNOSIS — E66.812 OBESITY, CLASS II, BMI 35-39.9: ICD-10-CM

## 2019-08-06 DIAGNOSIS — I10 ESSENTIAL HYPERTENSION: ICD-10-CM

## 2019-08-06 DIAGNOSIS — K21.9 GASTROESOPHAGEAL REFLUX DISEASE WITHOUT ESOPHAGITIS: ICD-10-CM

## 2019-08-06 PROCEDURE — 99213 OFFICE O/P EST LOW 20 MIN: CPT | Performed by: INTERNAL MEDICINE

## 2019-08-06 RX ORDER — PHENTERMINE HYDROCHLORIDE 15 MG/1
15 CAPSULE ORAL EVERY MORNING
Qty: 30 CAPSULE | Refills: 5 | Status: SHIPPED | OUTPATIENT
Start: 2019-08-06 | End: 2020-03-10

## 2019-08-06 RX ORDER — LISINOPRIL 40 MG/1
40 TABLET ORAL DAILY
Qty: 90 TABLET | Refills: 3 | Status: SHIPPED | OUTPATIENT
Start: 2019-08-06 | End: 2019-11-26

## 2019-08-06 RX ORDER — TRIAMTERENE/HYDROCHLOROTHIAZID 37.5-25 MG
1 TABLET ORAL DAILY
Qty: 90 TABLET | Refills: 3 | Status: SHIPPED | OUTPATIENT
Start: 2019-08-06 | End: 2020-08-15

## 2019-08-06 RX ORDER — TOPIRAMATE 50 MG/1
50 TABLET, FILM COATED ORAL DAILY
Qty: 90 TABLET | Refills: 1 | Status: SHIPPED | OUTPATIENT
Start: 2019-08-06 | End: 2020-01-20

## 2019-08-06 NOTE — PATIENT INSTRUCTIONS
Increase the lisinopril to 40mg daily (you can take two of the 20mg pills until you are out).  Return in two weeks for labs and nurse blood pressure check.

## 2019-08-06 NOTE — PROGRESS NOTES
Subjective     Thomas Davila is a 69 year old female who presents to clinic today for the following health issues:  Chief Complaint   Patient presents with     Recheck Medication     phentermine        HPI     I saw Thomas on May 15 for weight loss.  She was having weight loss on phentermine and wanted to try adding in topiramate.  She declined to see RD at that point as she felt she would not be able to modify her diet due to her 's poor health at this time.  Weight was 231 lb 10 oz at that visit.  Today her weight is 215 lb and 3 oz.  Has not been able to make any diet changes.  Having cravings for potato chips.  Having stress eating.     Had a lot of stress yesterday- [facility] .  Didn't sleep well last night.      Had some chest wall pain recently and massaged it out and noted that the pain in her R 2nd finger resolved.      Medication Followup of phentermine and topiramate    Taking Medication as prescribed: yes    Side Effects:  None    Medication Helping Symptoms:  yes       Patient Active Problem List   Diagnosis     Essential hypertension, benign     GERD (gastroesophageal reflux disease)     Sebaceous cyst     CARDIOVASCULAR SCREENING; LDL GOAL LESS THAN 130     Itchy eyes     Plantar warts     Breast cancer (H)     Endometrial adenocarcinoma (H)     Cancer of endometrium     Encounter for long-term current use of medication     Malignant neoplasm of other specified sites of female genital organs     Other specified prophylactic or treatment measure     Nontoxic multinodular goiter     Morbid obesity (H)     Past Surgical History:   Procedure Laterality Date     BREAST LUMPECTOMY, RT/LT      left     C/SECTION, LOW TRANSVERSE  ,     , Low Transverse x2     CL AFF SURGICAL PATHOLOGY      Breast reduction     COLONOSCOPY  10/15/02    normal     FOOT SURGERY      R Foot      HC EXCISION BREAST LESION, OPEN >=1  98    1) Needle  localization with generous lumpectomy 2) Left axillary node dissection.     HC LAPAROSCOPY, SURGICAL, ABDOMEN, PERITONEUM & OMENTUM; DX W/ OR W/O SPECIMEN(S)  02/07/94     HYSTERECTOMY, PAP NO LONGER INDICATED       INSERT PORT VASCULAR ACCESS  3/14/2013    Procedure: INSERT PORT VASCULAR ACCESS;  Port Revision;  Surgeon: Arash Puente MD;  Location: [facility]     LAPAROSCOPIC HYSTERECTOMY TOTAL, BILATERAL SALPINGO-OOPHORECTOMY, NODE DISSECTION, COMBINED  1/31/2013    Procedure: COMBINED LAPAROSCOPIC HYSTERECTOMY TOTAL, SALPINGO-OOPHORECTOMY, NODE DISSECTION;  Laparosocpic  Total Abdominal Hysterectomy, Bilateral Salphino-Oophorectomy, Bilateral Pelvic Lymph Node Dissection, Pelvic Washings, Cystoscopy;  Surgeon: Tanya Walker MD;  Location: [facility]     PHACOEMULSIFICATION WITH STANDARD INTRAOCULAR LENS IMPLANT Left 6/12/2019    Procedure: Cataract Removal with Implant;  Surgeon: Walt Mckeon MD;  Location: [facility]     PHACOEMULSIFICATION WITH STANDARD INTRAOCULAR LENS IMPLANT Right 7/3/2019    Procedure: Cataract Removal with Implant;  Surgeon: Walt Mckeon MD;  Location: [facility]     SURGICAL HISTORY OF -   06/22/93    1) Excision of digital cyst right mid finger  2)  Excision of dermatofibroma left calf     SURGICAL HISTORY OF -   12/18/2001    Excision of mucinous cyst & partial ostectomy, bone removal of benign osteophytic overgrowth osteophyte of the distal aspect of the middle phalanx and distal phalanx     SURGICAL HISTORY OF -   2006    Basal cell cancer removal     TONSILLECTOMY       TUBAL LIGATION  1978       Social History     Tobacco Use     Smoking status: Never Smoker     Smokeless tobacco: Never Used   Substance Use Topics     Alcohol use: Yes     Frequency: Never     Comment: Rare     Family History   Problem Relation Age of Onset     Cerebrovascular Disease Mother      Hypertension Mother      Diabetes Mother      Thyroid Disease Mother      Arthritis Mother      Alzheimer  Disease Mother      Neurologic Disorder Mother         Parkinsons     Heart Disease Father      Hypertension Father      Diabetes Father      Thyroid Disease Father      Arthritis Father      Blood Disease Father      Anesthesia Reaction Sister      Thyroid Disease Sister      Anesthesia Reaction Sister      Arthritis Sister         RA     Hypertension Brother      Allergies Son      Gastrointestinal Disease Son         GERD     Hypertension Brother      Allergies Son         percocett     Gastrointestinal Disease Son         GERD     Hypertension Son          Current Outpatient Medications   Medication Sig Dispense Refill     lisinopril (PRINIVIL/ZESTRIL) 40 MG tablet Take 1 tablet (40 mg) by mouth daily 90 tablet 3     omeprazole (PRILOSEC) 20 MG DR capsule Take 1 capsule (20 mg) by mouth daily 90 capsule 3     phentermine (ADIPEX-P) 15 MG capsule Take 1 capsule (15 mg) by mouth every morning 30 capsule 5     topiramate (TOPAMAX) 50 MG tablet Take 1 tablet (50 mg) by mouth daily 90 tablet 1     triamterene-HCTZ (MAXZIDE-25) 37.5-25 MG tablet Take 1 tablet by mouth daily 90 tablet 3     vitamin D3 (CHOLECALCIFEROL) 79919 units (250 mcg) capsule Take 1 capsule by mouth daily       aspirin 81 MG tablet Take 81 mg by mouth daily        naproxen (NAPROSYN) 500 MG tablet Take 1 tablet (500 mg) by mouth 2 times daily as needed for moderate pain 60 tablet 3     order for DME Equipment being ordered: hernia belt (Patient not taking: Reported on 5/20/2019) 1 Units 0     Allergies   Allergen Reactions     Acetaminophen      Other reaction(s): Gastrointestinal     Metal [Staples] Other (See Comments)     Not staples, but surgical screws     Other  [No Clinical Screening - See Comments] Other (See Comments)     Metal screws, sutures     Percocet [Oxycodone-Acetaminophen] Nausea and Vomiting       Reviewed and updated as needed this visit by Provider         Review of Systems   ROS COMP: Constitutional, CV systems are negative,  except as otherwise noted.      Objective    BP (!) 144/72 (BP Location: Right arm, Patient Position: Sitting, Cuff Size: Adult Large)   Pulse 102   Temp 98.1  F (36.7  C) (Tympanic)   Resp 14   Wt 97.6 kg (215 lb 3.2 oz)   SpO2 96%   BMI 36.94 kg/m    Body mass index is 36.94 kg/m .  Physical Exam   GENERAL: healthy, alert and no distress            Assessment & Plan     1. Obesity, Class II, BMI 35-39.9    She is having good weight loss on medications and tolerating them well (except for high BP as noted below), will continue.  Follow-up in 6 months.      - phentermine (ADIPEX-P) 15 MG capsule; Take 1 capsule (15 mg) by mouth every morning  Dispense: 30 capsule; Refill: 5  - topiramate (TOPAMAX) 50 MG tablet; Take 1 tablet (50 mg) by mouth daily  Dispense: 90 tablet; Refill: 1    2. Essential hypertension    Not controlled.  Will increase lisinopril from 20mg to 40mg and recheck labs and BP in two weeks.      - Basic metabolic panel; Future  - lisinopril (PRINIVIL/ZESTRIL) 40 MG tablet; Take 1 tablet (40 mg) by mouth daily  Dispense: 90 tablet; Refill: 3  - triamterene-HCTZ (MAXZIDE-25) 37.5-25 MG tablet; Take 1 tablet by mouth daily  Dispense: 90 tablet; Refill: 3    3. Gastroesophageal reflux disease without esophagitis    Refills provided    - omeprazole (PRILOSEC) 20 MG DR capsule; Take 1 capsule (20 mg) by mouth daily  Dispense: 90 capsule; Refill: 3      Return in about 6 months (around 2/6/2020) for Routine Visit, 2 week labs and BP check.    Bobby Gandhi MD  [facility]

## 2019-08-16 ENCOUNTER — TELEPHONE (OUTPATIENT)
Dept: FAMILY MEDICINE | Facility: CLINIC | Age: 69
End: 2019-08-16

## 2019-08-16 NOTE — TELEPHONE ENCOUNTER
Prescription sheet signed and faxed back for durable medical equipment.  Florecita Davis on 8/16/2019 at 2:49 PM

## 2019-08-19 ENCOUNTER — ALLIED HEALTH/NURSE VISIT (OUTPATIENT)
Dept: FAMILY MEDICINE | Facility: CLINIC | Age: 69
End: 2019-08-19
Payer: MEDICARE

## 2019-08-19 ENCOUNTER — TELEPHONE (OUTPATIENT)
Dept: FAMILY MEDICINE | Facility: CLINIC | Age: 69
End: 2019-08-19

## 2019-08-19 VITALS
BODY MASS INDEX: 36.51 KG/M2 | DIASTOLIC BLOOD PRESSURE: 60 MMHG | HEART RATE: 96 BPM | SYSTOLIC BLOOD PRESSURE: 102 MMHG | WEIGHT: 212.7 LBS

## 2019-08-19 DIAGNOSIS — I10 ESSENTIAL HYPERTENSION: ICD-10-CM

## 2019-08-19 DIAGNOSIS — E87.6 HYPOKALEMIA: Primary | ICD-10-CM

## 2019-08-19 DIAGNOSIS — I10 ESSENTIAL HYPERTENSION, BENIGN: Primary | ICD-10-CM

## 2019-08-19 LAB
ANION GAP SERPL CALCULATED.3IONS-SCNC: 6 MMOL/L (ref 3–14)
BUN SERPL-MCNC: 19 MG/DL (ref 7–30)
CALCIUM SERPL-MCNC: 9.2 MG/DL (ref 8.5–10.1)
CHLORIDE SERPL-SCNC: 105 MMOL/L (ref 94–109)
CO2 SERPL-SCNC: 26 MMOL/L (ref 20–32)
CREAT SERPL-MCNC: 0.83 MG/DL (ref 0.52–1.04)
GFR SERPL CREATININE-BSD FRML MDRD: 71 ML/MIN/{1.73_M2}
GLUCOSE SERPL-MCNC: 90 MG/DL (ref 70–99)
POTASSIUM SERPL-SCNC: 3.3 MMOL/L (ref 3.4–5.3)
SODIUM SERPL-SCNC: 137 MMOL/L (ref 133–144)

## 2019-08-19 PROCEDURE — 80048 BASIC METABOLIC PNL TOTAL CA: CPT | Performed by: INTERNAL MEDICINE

## 2019-08-19 PROCEDURE — 99207 ZZC NO CHARGE NURSE ONLY: CPT

## 2019-08-19 PROCEDURE — 36415 COLL VENOUS BLD VENIPUNCTURE: CPT | Performed by: INTERNAL MEDICINE

## 2019-08-19 NOTE — TELEPHONE ENCOUNTER
"S-(situation): RN blood pressure recheck.     B-(background): Last seen by Dr Gandhi on 8/6/19:  Essential hypertension  Not controlled.   Will increase lisinopril from 20mg to 40mg and recheck labs and BP in two weeks.        A-(assessment): Thomas Davila is a 69 year old year old patient who comes in today for a Blood Pressure check because of ongoing blood pressure monitoring since she was increased lisinopril from 20 to 40 mg daily  .  Vital Signs as repeated by RN:  104/60, pulse of 92  Rechecked at pt's request since pt says her blood pressure is usually not this low and it was 102/60, pulse of 96.  Large cuff used.     Patient is taking medication as prescribed.  Patient is tolerating medications well.  Patient is monitoring Blood Pressure at home.  Pt reports that she rarely checks her BP at home.  She does not recall her last reading.  She checked it a couple days ago but she was \"stressed out\" when she took it.  She thought her reading was 137/? But she is not certain.   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     Also, pt continues to work on weight loss.  Today's weight is 212.7 lb.  Wt Readings from Last 3 Encounters:   08/19/19 96.5 kg (212 lb 11.2 oz)   08/06/19 97.6 kg (215 lb 3.2 oz)   07/03/19 104.8 kg (231 lb)        Lastly, pt stopped baby aspirin daily.  She says that she was never told to take it; she just thought it would be a good idea to take it.  She decided to stop it not knowing if she should be on aspirin. However, she does report a history of DVT while on tamoxifen.  Pt says she was told the DVT was related to tamoxifen.  Pt asks if Dr Gandhi recommends taking baby aspirin?       Pt has lab work completed today too.     Current complaints: none.     R-(recommendations):  Routed to provider in a telephone note.  Blood pressure is at goal with recent increase of lisinopril to 40 mg daily.     Leticia Tate, " RN         BP Readings from Last 6 Encounters:   08/19/19 102/60   08/06/19 138/74   07/03/19 (!) 167/98   06/12/19 (!) 169/103   05/20/19 126/67   05/15/19 132/84            Lab Results   Component Value Date     CR 0.83 08/19/2019            Lab Results   Component Value Date     POTASSIUM 3.3 08/19/2019

## 2019-08-19 NOTE — TELEPHONE ENCOUNTER
Okay to stop the aspirin- in her age group, risk is likely greater than benefit.      BP looks good- continue current medications.    Potassium is just a bit low- recommend she increase intake of high potassium foods (banana, apricot, artichoke, cantaloupe, broccoli, brussel sprouts, carrots, beets and beet greens, orange juice sweet potato, baked potato, tomatos, yogurt, squash).  Recheck level in a couple of weeks.    Bobby Gandhi MD

## 2019-08-19 NOTE — NURSING NOTE
"S-(situation): RN blood pressure recheck.    B-(background): Last seen by Dr Gandhi on 8/6/19:  Essential hypertension  Not controlled.   Will increase lisinopril from 20mg to 40mg and recheck labs and BP in two weeks.        A-(assessment): Thomas Davila is a 69 year old year old patient who comes in today for a Blood Pressure check because of ongoing blood pressure monitoring and increased lisinopril from 20 to 40 mg daily  .  Vital Signs as repeated by RN:  104/60, pulse of 92  Rechecked at pt's request since it is usually not this low and it was 102/60, pulse of 96.  Large cuff used.    Patient is taking medication as prescribed  Patient is tolerating medications well.  Patient is monitoring Blood Pressure at home.  Pt reports that she rarely checks her BP at home.  She does not recall her last reading.  She checked it a couple days ago but she was \"stressed out\" when she took it.  She thought her reading was 137/? But she is not certain.   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    Also, pt continues to work on weight loss.  Today's weight is 212.7 lb.    Lastly, pt stopped baby aspirin daily.  She says that she was never told to take it; she just thought it would be a good idea to take it.  She decided to stop it not knowing if she should be on aspirin.  However, she does report a history of DVT while on tamoxifen.  Pt says she was told the DVT was related to tamoxifen.  Pt asks if Dr Gandhi recommends taking baby aspirin?      Pt has lab work completed today too.    Current complaints: none.    R-(recommendations):  Routed to provider in a telephone note.  Blood pressure is at goal with recent increase of lisinopril to 40 mg daily.    Leticia Tate RN    BP Readings from Last 6 Encounters:   08/19/19 102/60   08/06/19 138/74   07/03/19 (!) 167/98   06/12/19 (!) 169/103   05/20/19 126/67   05/15/19 132/84     Lab Results "   Component Value Date    CR 0.83 08/19/2019     Lab Results   Component Value Date    POTASSIUM 3.3 08/19/2019

## 2019-08-20 NOTE — TELEPHONE ENCOUNTER
Patient called back.  Writer informed patient of Dr Gandhi's message.    Patient will return in a couple weeks and have her potassium redrawn.    No further action necessary.  Encounter closed.    Everett Ambriz RN

## 2019-09-16 ENCOUNTER — OFFICE VISIT (OUTPATIENT)
Dept: DERMATOLOGY | Facility: CLINIC | Age: 69
End: 2019-09-16
Payer: MEDICARE

## 2019-09-16 ENCOUNTER — TELEPHONE (OUTPATIENT)
Dept: DERMATOLOGY | Facility: CLINIC | Age: 69
End: 2019-09-16

## 2019-09-16 VITALS — OXYGEN SATURATION: 98 % | HEART RATE: 90 BPM | SYSTOLIC BLOOD PRESSURE: 119 MMHG | DIASTOLIC BLOOD PRESSURE: 65 MMHG

## 2019-09-16 DIAGNOSIS — D23.9 DERMAL NEVUS: ICD-10-CM

## 2019-09-16 DIAGNOSIS — D18.01 ANGIOMA OF SKIN: ICD-10-CM

## 2019-09-16 DIAGNOSIS — L57.0 AK (ACTINIC KERATOSIS): ICD-10-CM

## 2019-09-16 DIAGNOSIS — L85.3 ASTEATOSIS: ICD-10-CM

## 2019-09-16 DIAGNOSIS — L82.1 SEBORRHEIC KERATOSIS: ICD-10-CM

## 2019-09-16 DIAGNOSIS — L81.4 LENTIGO: Primary | ICD-10-CM

## 2019-09-16 PROCEDURE — 99213 OFFICE O/P EST LOW 20 MIN: CPT | Performed by: DERMATOLOGY

## 2019-09-16 RX ORDER — FLUOROURACIL 50 MG/G
CREAM TOPICAL
Qty: 40 G | Refills: 1 | Status: SHIPPED | OUTPATIENT
Start: 2019-09-16 | End: 2020-03-10

## 2019-09-16 RX ORDER — FLUOCINONIDE 0.5 MG/G
CREAM TOPICAL
Qty: 120 G | Refills: 3 | Status: SHIPPED | OUTPATIENT
Start: 2019-09-16 | End: 2020-03-10

## 2019-09-16 NOTE — NURSING NOTE
Chief Complaint   Patient presents with     Skin Check       Vitals:    09/16/19 1425   BP: 119/65   Pulse: 90   SpO2: 98%     Wt Readings from Last 1 Encounters:   08/19/19 96.5 kg (212 lb 11.2 oz)       Mila Snider LPN.................9/16/2019

## 2019-09-16 NOTE — TELEPHONE ENCOUNTER
Pharmacy:  [facility] [place]    Clarification:    Fluocinonide topical cream and Effudex - Needing clarification on directions    Will also need day supply for both.     Please call pharmacy @:  [phone]    Denise Behrendt  Specialty CSS

## 2019-09-16 NOTE — PROGRESS NOTES
Thomas Davila is a 69 year old year old female patient here today for rough spots on arms.   .  Patient states this has been present for a while.  Patient reports the following symptoms:  itching.  Patient reports the following previous treatments none.  Patient reports the following modifying factors none.  Associated symptoms: none.  Patient has no other skin complaints today.  Remainder of the HPI, Meds, PMH, Allergies, FH, and SH was reviewed in chart.      Past Medical History:   Diagnosis Date     Acute parametritis and pelvic cellulitis     salpingitis     ASCUS with positive high risk HPV 2013     Asthma     No recent issues     Basal cell carcinoma      breast cancer     left lumpectomy, radiation, tamoxifen     Breast cancer (H)     L Breast; Lumpectomy & Radiation     Chronic salpingitis and oophoritis     PID        Embolism and thrombosis of unspecified site     left leg, due to tamoxifen therapy     Generalized osteoarthrosis, unspecified site     hands     Leiomyoma of uterus, unspecified      Other malignant neoplasm of skin, site unspecified 2006    Basal cell cancer on nose     Squamous cell carcinoma      Thrombosis of leg     Left     Unspecified essential hypertension        Past Surgical History:   Procedure Laterality Date     BREAST LUMPECTOMY, RT/LT      left     C/SECTION, LOW TRANSVERSE  1972,     , Low Transverse x2     CL AFF SURGICAL PATHOLOGY      Breast reduction     COLONOSCOPY  10/15/02    normal     FOOT SURGERY      R Foot      HC EXCISION BREAST LESION, OPEN >=1  98    1) Needle localization with generous lumpectomy 2) Left axillary node dissection.     HC LAPAROSCOPY, SURGICAL, ABDOMEN, PERITONEUM & OMENTUM; DX W/ OR W/O SPECIMEN(S)  94     HYSTERECTOMY, PAP NO LONGER INDICATED       INSERT PORT VASCULAR ACCESS  3/14/2013    Procedure: INSERT PORT VASCULAR ACCESS;  Port Revision;  Surgeon: Arash Puente MD;   Location: [facility]     LAPAROSCOPIC HYSTERECTOMY TOTAL, BILATERAL SALPINGO-OOPHORECTOMY, NODE DISSECTION, COMBINED  1/31/2013    Procedure: COMBINED LAPAROSCOPIC HYSTERECTOMY TOTAL, SALPINGO-OOPHORECTOMY, NODE DISSECTION;  Laparosocpic  Total Abdominal Hysterectomy, Bilateral Salphino-Oophorectomy, Bilateral Pelvic Lymph Node Dissection, Pelvic Washings, Cystoscopy;  Surgeon: Tanya Walker MD;  Location: [facility]     PHACOEMULSIFICATION WITH STANDARD INTRAOCULAR LENS IMPLANT Left 6/12/2019    Procedure: Cataract Removal with Implant;  Surgeon: Walt Mckeon MD;  Location: [facility]     PHACOEMULSIFICATION WITH STANDARD INTRAOCULAR LENS IMPLANT Right 7/3/2019    Procedure: Cataract Removal with Implant;  Surgeon: Walt Mckeon MD;  Location: [facility]     SURGICAL HISTORY OF -   06/22/93    1) Excision of digital cyst right mid finger  2)  Excision of dermatofibroma left calf     SURGICAL HISTORY OF -   12/18/2001    Excision of mucinous cyst & partial ostectomy, bone removal of benign osteophytic overgrowth osteophyte of the distal aspect of the middle phalanx and distal phalanx     SURGICAL HISTORY OF -   2006    Basal cell cancer removal     TONSILLECTOMY       TUBAL LIGATION  1978        Family History   Problem Relation Age of Onset     Cerebrovascular Disease Mother      Hypertension Mother      Diabetes Mother      Thyroid Disease Mother      Arthritis Mother      Alzheimer Disease Mother      Neurologic Disorder Mother         Parkinsons     Heart Disease Father      Hypertension Father      Diabetes Father      Thyroid Disease Father      Arthritis Father      Blood Disease Father      Anesthesia Reaction Sister      Thyroid Disease Sister      Anesthesia Reaction Sister      Arthritis Sister         RA     Hypertension Brother      Allergies Son      Gastrointestinal Disease Son         GERD     Hypertension Brother      Allergies Son         percocett     Gastrointestinal Disease Son          GERD     Hypertension Son        Social History     Socioeconomic History     Marital status:      Spouse name: Not on file     Number of children: 2     Years of education: Not on file     Highest education level: Not on file   Occupational History     Employer: sub teacher in [employer]     Employer: RETIRED   Social Needs     Financial resource strain: Not on file     Food insecurity:     Worry: Not on file     Inability: Not on file     Transportation needs:     Medical: Not on file     Non-medical: Not on file   Tobacco Use     Smoking status: Never Smoker     Smokeless tobacco: Never Used   Substance and Sexual Activity     Alcohol use: Yes     Frequency: Never     Comment: Rare     Drug use: No     Sexual activity: Yes     Partners: Male   Lifestyle     Physical activity:     Days per week: Not on file     Minutes per session: Not on file     Stress: Not on file   Relationships     Social connections:     Talks on phone: Not on file     Gets together: Not on file     Attends [employer] service: Not on file     Active member of club or organization: Not on file     Attends meetings of clubs or organizations: Not on file     Relationship status: Not on file     Intimate partner violence:     Fear of current or ex partner: Not on file     Emotionally abused: Not on file     Physically abused: Not on file     Forced sexual activity: Not on file   Other Topics Concern     Parent/sibling w/ CABG, MI or angioplasty before 65F 55M? No   Social History Narrative    Zoroastrianism--declines all blood products       Outpatient Encounter Medications as of 9/16/2019   Medication Sig Dispense Refill     lisinopril (PRINIVIL/ZESTRIL) 40 MG tablet Take 1 tablet (40 mg) by mouth daily 90 tablet 3     naproxen (NAPROSYN) 500 MG tablet Take 1 tablet (500 mg) by mouth 2 times daily as needed for moderate pain 60 tablet 3     omeprazole (PRILOSEC) 20 MG DR capsule Take 1 capsule (20 mg) by mouth daily 90 capsule 3      phentermine (ADIPEX-P) 15 MG capsule Take 1 capsule (15 mg) by mouth every morning 30 capsule 5     topiramate (TOPAMAX) 50 MG tablet Take 1 tablet (50 mg) by mouth daily 90 tablet 1     triamterene-HCTZ (MAXZIDE-25) 37.5-25 MG tablet Take 1 tablet by mouth daily 90 tablet 3     vitamin D3 (CHOLECALCIFEROL) 87213 units (250 mcg) capsule Take 1 capsule by mouth daily       No facility-administered encounter medications on file as of 9/16/2019.              Review Of Systems  Skin: As above  Eyes: negative  Ears/Nose/Throat: negative  Respiratory: No shortness of breath, dyspnea on exertion, cough, or hemoptysis  Cardiovascular: negative  Gastrointestinal: negative  Genitourinary: negative  Musculoskeletal: negative  Neurologic: negative  Psychiatric: negative  Hematologic/Lymphatic/Immunologic: negative  Endocrine: negative      O:   NAD, WDWN, Alert & Oriented, Mood & Affect wnl, Vitals stable   Here today alone   /65   Pulse 90   SpO2 98%    General appearance normal   Vitals stable   Alert, oriented and in no acute distress      Gritty apules on forearm    asteaotsis on legs      Stuck on papules and brown macules on trunk and ext   Red papules on trunk  Flesh colored papules on trunk     The remainder of the full exam was unremarkable; the following areas were examined:  conjunctiva/lids, oral mucosa, neck, peripheral vascular system, abdomen, lymph nodes, digits/nails, eccrine and apocrine glands, scalp/hair, face, neck, chest, abdomen, buttocks, back, RUE, LUE, RLE, LLE       Eyes: Conjunctivae/lids:Normal     ENT: Lips, buccal mucosa, tongue: normal    MSK:Normal    Cardiovascular: peripheral edema none    Pulm: Breathing Normal    Lymph Nodes: No Head and Neck Lymphadenopathy     Neuro/Psych: Orientation:Normal; Mood/Affect:Normal      A/P:  1. Seborrheic keratosis, lentigo, angioma, dermal nevus, hx of non-melanoma skin cancer   2. Actinic keratosis   Efudex twice daily on arms for two weeks  3.  Asteatosis on legs  Lidex prn     BENIGN LESIONS DISCUSSED WITH PATIENT:  I discussed the specifics of tumor, prognosis, and genetics of benign lesions.  I explained that treatment of these lesions would be purely cosmetic and not medically neccessary.  I discussed with patient different removal options including excision, cautery and /or laser.      Nature and genetics of benign skin lesions dicussed with patient.  Signs and Symptoms of skin cancer discussed with patient.  Patient encouraged to perform monthly skin exams.  UV precautions reviewed with patient.  Skin care regimen reviewed with patient: Eliminate harsh soaps, i.e. Dial, zest, irsih spring; Mild soaps such as Cetaphil or Dove sensitive skin, avoid hot or cold showers, aggressive use of emollients including vanicream, cetaphil or cerave discussed with patient.    Risks of non-melanoma skin cancer discussed with patient   Return to clinic 4 months

## 2019-09-16 NOTE — TELEPHONE ENCOUNTER
Spoke to pharmacy and clarified rx for Efudex cream is to be twice daily for 14 days with 40 g being a 30 d supply. (1 refill as medication sometimes needs to be repeated if AK doesn't clear)    Lidex cream should be 2 g twice a day=120 g for 30 d supply. Azucena Calle RN

## 2019-09-16 NOTE — LETTER
2019         RE: Thomas Davila  [address] [place]  [facility] [address]        Dear Colleague,    Thank you for referring your patient, Thomas Davila, to the [facility]. Please see a copy of my visit note below.    Thomas Davila is a 69 year old year old female patient here today for rough spots on arms.   .  Patient states this has been present for a while.  Patient reports the following symptoms:  itching.  Patient reports the following previous treatments none.  Patient reports the following modifying factors none.  Associated symptoms: none.  Patient has no other skin complaints today.  Remainder of the HPI, Meds, PMH, Allergies, FH, and SH was reviewed in chart.      Past Medical History:   Diagnosis Date     Acute parametritis and pelvic cellulitis     salpingitis     ASCUS with positive high risk HPV 2013     Asthma     No recent issues     Basal cell carcinoma      breast cancer     left lumpectomy, radiation, tamoxifen     Breast cancer (H)     L Breast; Lumpectomy & Radiation     Chronic salpingitis and oophoritis     PID        Embolism and thrombosis of unspecified site     left leg, due to tamoxifen therapy     Generalized osteoarthrosis, unspecified site     hands     Leiomyoma of uterus, unspecified      Other malignant neoplasm of skin, site unspecified 2006    Basal cell cancer on nose     Squamous cell carcinoma      Thrombosis of leg     Left     Unspecified essential hypertension        Past Surgical History:   Procedure Laterality Date     BREAST LUMPECTOMY, RT/LT      left     C/SECTION, LOW TRANSVERSE  ,     , Low Transverse x2     CL AFF SURGICAL PATHOLOGY      Breast reduction     COLONOSCOPY  10/15/02    normal     FOOT SURGERY      R Foot      HC EXCISION BREAST LESION, OPEN >=1  98    1) Needle localization with generous lumpectomy 2) Left axillary node dissection.     HC LAPAROSCOPY, SURGICAL,  ABDOMEN, PERITONEUM & OMENTUM; DX W/ OR W/O SPECIMEN(S)  02/07/94     HYSTERECTOMY, PAP NO LONGER INDICATED       INSERT PORT VASCULAR ACCESS  3/14/2013    Procedure: INSERT PORT VASCULAR ACCESS;  Port Revision;  Surgeon: Arash Puente MD;  Location: [facility]     LAPAROSCOPIC HYSTERECTOMY TOTAL, BILATERAL SALPINGO-OOPHORECTOMY, NODE DISSECTION, COMBINED  1/31/2013    Procedure: COMBINED LAPAROSCOPIC HYSTERECTOMY TOTAL, SALPINGO-OOPHORECTOMY, NODE DISSECTION;  Laparosocpic  Total Abdominal Hysterectomy, Bilateral Salphino-Oophorectomy, Bilateral Pelvic Lymph Node Dissection, Pelvic Washings, Cystoscopy;  Surgeon: Tanya Walker MD;  Location: [facility]     PHACOEMULSIFICATION WITH STANDARD INTRAOCULAR LENS IMPLANT Left 6/12/2019    Procedure: Cataract Removal with Implant;  Surgeon: Walt Mckeon MD;  Location: [facility]     PHACOEMULSIFICATION WITH STANDARD INTRAOCULAR LENS IMPLANT Right 7/3/2019    Procedure: Cataract Removal with Implant;  Surgeon: Walt Mckeon MD;  Location: [facility]     SURGICAL HISTORY OF -   06/22/93    1) Excision of digital cyst right mid finger  2)  Excision of dermatofibroma left calf     SURGICAL HISTORY OF -   12/18/2001    Excision of mucinous cyst & partial ostectomy, bone removal of benign osteophytic overgrowth osteophyte of the distal aspect of the middle phalanx and distal phalanx     SURGICAL HISTORY OF -   2006    Basal cell cancer removal     TONSILLECTOMY       TUBAL LIGATION  1978        Family History   Problem Relation Age of Onset     Cerebrovascular Disease Mother      Hypertension Mother      Diabetes Mother      Thyroid Disease Mother      Arthritis Mother      Alzheimer Disease Mother      Neurologic Disorder Mother         Parkinsons     Heart Disease Father      Hypertension Father      Diabetes Father      Thyroid Disease Father      Arthritis Father      Blood Disease Father      Anesthesia Reaction Sister      Thyroid Disease Sister      Anesthesia  Reaction Sister      Arthritis Sister         RA     Hypertension Brother      Allergies Son      Gastrointestinal Disease Son         GERD     Hypertension Brother      Allergies Son         percocett     Gastrointestinal Disease Son         GERD     Hypertension Son        Social History     Socioeconomic History     Marital status:      Spouse name: Not on file     Number of children: 2     Years of education: Not on file     Highest education level: Not on file   Occupational History     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   Social Needs     Financial resource strain: Not on file     Food insecurity:     Worry: Not on file     Inability: Not on file     Transportation needs:     Medical: Not on file     Non-medical: Not on file   Tobacco Use     Smoking status: Never Smoker     Smokeless tobacco: Never Used   Substance and Sexual Activity     Alcohol use: Yes     Frequency: Never     Comment: Rare     Drug use: No     Sexual activity: Yes     Partners: Male   Lifestyle     Physical activity:     Days per week: Not on file     Minutes per session: Not on file     Stress: Not on file   Relationships     Social connections:     Talks on phone: Not on file     Gets together: Not on file     Attends Taoism service: Not on file     Active member of club or organization: Not on file     Attends meetings of clubs or organizations: Not on file     Relationship status: Not on file     Intimate partner violence:     Fear of current or ex partner: Not on file     Emotionally abused: Not on file     Physically abused: Not on file     Forced sexual activity: Not on file   Other Topics Concern     Parent/sibling w/ CABG, MI or angioplasty before 65F 55M? No   Social History Narrative    Lutheran--declines all blood products       Outpatient Encounter Medications as of 9/16/2019   Medication Sig Dispense Refill     lisinopril (PRINIVIL/ZESTRIL) 40 MG tablet Take 1 tablet (40 mg) by  mouth daily 90 tablet 3     naproxen (NAPROSYN) 500 MG tablet Take 1 tablet (500 mg) by mouth 2 times daily as needed for moderate pain 60 tablet 3     omeprazole (PRILOSEC) 20 MG DR capsule Take 1 capsule (20 mg) by mouth daily 90 capsule 3     phentermine (ADIPEX-P) 15 MG capsule Take 1 capsule (15 mg) by mouth every morning 30 capsule 5     topiramate (TOPAMAX) 50 MG tablet Take 1 tablet (50 mg) by mouth daily 90 tablet 1     triamterene-HCTZ (MAXZIDE-25) 37.5-25 MG tablet Take 1 tablet by mouth daily 90 tablet 3     vitamin D3 (CHOLECALCIFEROL) 82383 units (250 mcg) capsule Take 1 capsule by mouth daily       No facility-administered encounter medications on file as of 9/16/2019.              Review Of Systems  Skin: As above  Eyes: negative  Ears/Nose/Throat: negative  Respiratory: No shortness of breath, dyspnea on exertion, cough, or hemoptysis  Cardiovascular: negative  Gastrointestinal: negative  Genitourinary: negative  Musculoskeletal: negative  Neurologic: negative  Psychiatric: negative  Hematologic/Lymphatic/Immunologic: negative  Endocrine: negative      O:   NAD, WDWN, Alert & Oriented, Mood & Affect wnl, Vitals stable   Here today alone   /65   Pulse 90   SpO2 98%    General appearance normal   Vitals stable   Alert, oriented and in no acute distress      Gritty apules on forearm    asteaotsis on legs      Stuck on papules and brown macules on trunk and ext   Red papules on trunk  Flesh colored papules on trunk     The remainder of the full exam was unremarkable; the following areas were examined:  conjunctiva/lids, oral mucosa, neck, peripheral vascular system, abdomen, lymph nodes, digits/nails, eccrine and apocrine glands, scalp/hair, face, neck, chest, abdomen, buttocks, back, RUE, LUE, RLE, LLE       Eyes: Conjunctivae/lids:Normal     ENT: Lips, buccal mucosa, tongue: normal    MSK:Normal    Cardiovascular: peripheral edema none    Pulm: Breathing Normal    Lymph Nodes: No Head and Neck  Lymphadenopathy     Neuro/Psych: Orientation:Normal; Mood/Affect:Normal      A/P:  1. Seborrheic keratosis, lentigo, angioma, dermal nevus, hx of non-melanoma skin cancer   2. Actinic keratosis   Efudex twice daily on arms for two weeks  3. Asteatosis on legs  Lidex prn     BENIGN LESIONS DISCUSSED WITH PATIENT:  I discussed the specifics of tumor, prognosis, and genetics of benign lesions.  I explained that treatment of these lesions would be purely cosmetic and not medically neccessary.  I discussed with patient different removal options including excision, cautery and /or laser.      Nature and genetics of benign skin lesions dicussed with patient.  Signs and Symptoms of skin cancer discussed with patient.  Patient encouraged to perform monthly skin exams.  UV precautions reviewed with patient.  Skin care regimen reviewed with patient: Eliminate harsh soaps, i.e. Dial, zest, irsih spring; Mild soaps such as Cetaphil or Dove sensitive skin, avoid hot or cold showers, aggressive use of emollients including vanicream, cetaphil or cerave discussed with patient.    Risks of non-melanoma skin cancer discussed with patient   Return to clinic 4 months      Again, thank you for allowing me to participate in the care of your patient.        Sincerely,        Walt Golden MD

## 2019-11-26 ENCOUNTER — TELEPHONE (OUTPATIENT)
Dept: FAMILY MEDICINE | Facility: CLINIC | Age: 69
End: 2019-11-26

## 2019-11-26 DIAGNOSIS — I10 ESSENTIAL HYPERTENSION: ICD-10-CM

## 2019-11-26 RX ORDER — LISINOPRIL 20 MG/1
20 TABLET ORAL DAILY
Qty: 90 TABLET | Refills: 3 | Status: SHIPPED | OUTPATIENT
Start: 2019-11-26 | End: 2020-08-31

## 2019-11-26 NOTE — TELEPHONE ENCOUNTER
"\"I couldn't take the 40 mg cause it made me feel short of breath, and I take 20 mg, and I have been taking my bp and it was high , I went to exercise class and the grocery store and rechecked and it is 137  / something so I think I am ok, but I just want the doctor to know I can't take the 40 mg lisinopril \"     I encouraged her to bring in her bp cuff for us to check with ours and hers, and reminded her she is due for annual exam and several things on her HM list. She agreed to schedule both an RN BP visit and her annual exam and mammo, etc.     Dr Gandhi,   Lee,   Patient reports she is only able to tolerate 20 mg lisinopril due to side effect of \"feeling like she could not breathe  on 40 mg\"  She is scheduling with you soon.     Teena Baptiste RNC    "

## 2019-11-26 NOTE — TELEPHONE ENCOUNTER
Reason for Call:  Other prescription    Detailed comments: Patient stated about a month ago she change her Lisinopril to 20mg not the 40mg because she felt like she could not breath when taking the 40mg. And the last month she stated she fells like she can breath now. Patient said she does not check her BP very often. Patient is checking while she is on the phone, but stated that she had about 8 cups of coffee and said it will be high. Her BP now is 152/91 HR 88. Patient stated she is loosing weight she said she is down to 200    Phone Number Patient can be reached at: Home number on file [phone] (home)    Best Time: any    Can we leave a detailed message on this number? YES    Call taken on 11/26/2019 at 8:35 AM by Florecita Davis

## 2019-11-26 NOTE — TELEPHONE ENCOUNTER
Thanks for the info- I updated her med list to the 20mg dose.  Agree with follow-up as stated.    Bobby Gandhi MD

## 2020-01-13 ENCOUNTER — OFFICE VISIT (OUTPATIENT)
Dept: FAMILY MEDICINE | Facility: CLINIC | Age: 70
End: 2020-01-13
Payer: MEDICARE

## 2020-01-13 ENCOUNTER — OFFICE VISIT (OUTPATIENT)
Dept: DERMATOLOGY | Facility: CLINIC | Age: 70
End: 2020-01-13
Payer: MEDICARE

## 2020-01-13 VITALS
RESPIRATION RATE: 16 BRPM | DIASTOLIC BLOOD PRESSURE: 78 MMHG | SYSTOLIC BLOOD PRESSURE: 138 MMHG | HEIGHT: 64 IN | OXYGEN SATURATION: 95 % | WEIGHT: 193 LBS | HEART RATE: 100 BPM | BODY MASS INDEX: 32.95 KG/M2 | TEMPERATURE: 97.4 F

## 2020-01-13 VITALS
RESPIRATION RATE: 16 BRPM | OXYGEN SATURATION: 97 % | DIASTOLIC BLOOD PRESSURE: 78 MMHG | HEART RATE: 82 BPM | SYSTOLIC BLOOD PRESSURE: 138 MMHG

## 2020-01-13 DIAGNOSIS — Z12.11 SPECIAL SCREENING FOR MALIGNANT NEOPLASMS, COLON: ICD-10-CM

## 2020-01-13 DIAGNOSIS — L82.1 SEBORRHEIC KERATOSIS: ICD-10-CM

## 2020-01-13 DIAGNOSIS — E66.01 OBESITY, MORBID, BMI 40.0-49.9 (H): ICD-10-CM

## 2020-01-13 DIAGNOSIS — Z13.6 CARDIOVASCULAR SCREENING; LDL GOAL LESS THAN 130: ICD-10-CM

## 2020-01-13 DIAGNOSIS — L81.4 LENTIGO: ICD-10-CM

## 2020-01-13 DIAGNOSIS — I10 ESSENTIAL HYPERTENSION, BENIGN: ICD-10-CM

## 2020-01-13 DIAGNOSIS — L57.0 AK (ACTINIC KERATOSIS): Primary | ICD-10-CM

## 2020-01-13 DIAGNOSIS — Z12.31 ENCOUNTER FOR SCREENING MAMMOGRAM FOR BREAST CANCER: ICD-10-CM

## 2020-01-13 DIAGNOSIS — Z00.00 ENCOUNTER FOR MEDICARE ANNUAL WELLNESS EXAM: Primary | ICD-10-CM

## 2020-01-13 LAB
ANION GAP SERPL CALCULATED.3IONS-SCNC: 5 MMOL/L (ref 3–14)
BUN SERPL-MCNC: 28 MG/DL (ref 7–30)
CALCIUM SERPL-MCNC: 9.6 MG/DL (ref 8.5–10.1)
CHLORIDE SERPL-SCNC: 105 MMOL/L (ref 94–109)
CO2 SERPL-SCNC: 30 MMOL/L (ref 20–32)
CREAT SERPL-MCNC: 0.82 MG/DL (ref 0.52–1.04)
GFR SERPL CREATININE-BSD FRML MDRD: 72 ML/MIN/{1.73_M2}
GLUCOSE SERPL-MCNC: 96 MG/DL (ref 70–99)
POTASSIUM SERPL-SCNC: 4.1 MMOL/L (ref 3.4–5.3)
SODIUM SERPL-SCNC: 140 MMOL/L (ref 133–144)

## 2020-01-13 PROCEDURE — 99213 OFFICE O/P EST LOW 20 MIN: CPT | Performed by: DERMATOLOGY

## 2020-01-13 PROCEDURE — 80048 BASIC METABOLIC PNL TOTAL CA: CPT | Performed by: FAMILY MEDICINE

## 2020-01-13 PROCEDURE — 36415 COLL VENOUS BLD VENIPUNCTURE: CPT | Performed by: FAMILY MEDICINE

## 2020-01-13 PROCEDURE — G0439 PPPS, SUBSEQ VISIT: HCPCS | Performed by: FAMILY MEDICINE

## 2020-01-13 ASSESSMENT — MIFFLIN-ST. JEOR: SCORE: 1375.44

## 2020-01-13 ASSESSMENT — PATIENT HEALTH QUESTIONNAIRE - PHQ9: SUM OF ALL RESPONSES TO PHQ QUESTIONS 1-9: 2

## 2020-01-13 NOTE — PROGRESS NOTES
Thomas Davila is a 69 year old year old female patient here today for f/u efudex.  Arms smoother still some rough spots.  Patient has no other skin complaints today.  Remainder of the HPI, Meds, PMH, Allergies, FH, and SH was reviewed in chart.      Past Medical History:   Diagnosis Date     Acute parametritis and pelvic cellulitis     salpingitis     ASCUS with positive high risk HPV 2013     Asthma     No recent issues     Basal cell carcinoma      breast cancer     left lumpectomy, radiation, tamoxifen     Breast cancer (H)     L Breast; Lumpectomy & Radiation     Chronic salpingitis and oophoritis     PID        Embolism and thrombosis of unspecified site     left leg, due to tamoxifen therapy     Generalized osteoarthrosis, unspecified site     hands     Leiomyoma of uterus, unspecified      Other malignant neoplasm of skin, site unspecified 2006    Basal cell cancer on nose     Squamous cell carcinoma      Thrombosis of leg     Left     Unspecified essential hypertension        Past Surgical History:   Procedure Laterality Date     BREAST LUMPECTOMY, RT/LT      left     C/SECTION, LOW TRANSVERSE  1972,     , Low Transverse x2     CL AFF SURGICAL PATHOLOGY      Breast reduction     COLONOSCOPY  10/15/02    normal     FOOT SURGERY      R Foot      HC EXCISION BREAST LESION, OPEN >=1  98    1) Needle localization with generous lumpectomy 2) Left axillary node dissection.     HC LAPAROSCOPY, SURGICAL, ABDOMEN, PERITONEUM & OMENTUM; DX W/ OR W/O SPECIMEN(S)  94     HYSTERECTOMY, PAP NO LONGER INDICATED       INSERT PORT VASCULAR ACCESS  3/14/2013    Procedure: INSERT PORT VASCULAR ACCESS;  Port Revision;  Surgeon: Arash Puente MD;  Location: [facility]     LAPAROSCOPIC HYSTERECTOMY TOTAL, BILATERAL SALPINGO-OOPHORECTOMY, NODE DISSECTION, COMBINED  2013    Procedure: COMBINED LAPAROSCOPIC HYSTERECTOMY TOTAL, SALPINGO-OOPHORECTOMY, NODE DISSECTION;   Laparosocpic  Total Abdominal Hysterectomy, Bilateral Salphino-Oophorectomy, Bilateral Pelvic Lymph Node Dissection, Pelvic Washings, Cystoscopy;  Surgeon: Tanya Walker MD;  Location: [facility] OR     PHACOEMULSIFICATION WITH STANDARD INTRAOCULAR LENS IMPLANT Left 6/12/2019    Procedure: Cataract Removal with Implant;  Surgeon: Walt Mckeon MD;  Location: [facility]     PHACOEMULSIFICATION WITH STANDARD INTRAOCULAR LENS IMPLANT Right 7/3/2019    Procedure: Cataract Removal with Implant;  Surgeon: Walt Mckeon MD;  Location: [facility]     SURGICAL HISTORY OF -   06/22/93    1) Excision of digital cyst right mid finger  2)  Excision of dermatofibroma left calf     SURGICAL HISTORY OF -   12/18/2001    Excision of mucinous cyst & partial ostectomy, bone removal of benign osteophytic overgrowth osteophyte of the distal aspect of the middle phalanx and distal phalanx     SURGICAL HISTORY OF -   2006    Basal cell cancer removal     TONSILLECTOMY       TUBAL LIGATION  1978        Family History   Problem Relation Age of Onset     Cerebrovascular Disease Mother      Hypertension Mother      Diabetes Mother      Thyroid Disease Mother      Arthritis Mother      Alzheimer Disease Mother      Neurologic Disorder Mother         Parkinsons     Heart Disease Father      Hypertension Father      Diabetes Father      Thyroid Disease Father      Arthritis Father      Blood Disease Father      Anesthesia Reaction Sister      Thyroid Disease Sister      Anesthesia Reaction Sister      Arthritis Sister         RA     Hypertension Brother      Allergies Son      Gastrointestinal Disease Son         GERD     Hypertension Brother      Allergies Son         percocett     Gastrointestinal Disease Son         GERD     Hypertension Son        Social History     Socioeconomic History     Marital status:      Spouse name: Not on file     Number of children: 2     Years of education: Not on file     Highest education level:  Not on file   Occupational History     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   Social Needs     Financial resource strain: Not on file     Food insecurity:     Worry: Not on file     Inability: Not on file     Transportation needs:     Medical: Not on file     Non-medical: Not on file   Tobacco Use     Smoking status: Never Smoker     Smokeless tobacco: Never Used   Substance and Sexual Activity     Alcohol use: Yes     Frequency: Never     Comment: Rare     Drug use: No     Sexual activity: Yes     Partners: Male   Lifestyle     Physical activity:     Days per week: Not on file     Minutes per session: Not on file     Stress: Not on file   Relationships     Social connections:     Talks on phone: Not on file     Gets together: Not on file     Attends Faith service: Not on file     Active member of club or organization: Not on file     Attends meetings of clubs or organizations: Not on file     Relationship status: Not on file     Intimate partner violence:     Fear of current or ex partner: Not on file     Emotionally abused: Not on file     Physically abused: Not on file     Forced sexual activity: Not on file   Other Topics Concern     Parent/sibling w/ CABG, MI or angioplasty before 65F 55M? No   Social History Narrative    Mormonism--declines all blood products       Outpatient Encounter Medications as of 1/13/2020   Medication Sig Dispense Refill     lisinopril (PRINIVIL/ZESTRIL) 20 MG tablet Take 1 tablet (20 mg) by mouth daily 90 tablet 3     naproxen (NAPROSYN) 500 MG tablet Take 1 tablet (500 mg) by mouth 2 times daily as needed for moderate pain 60 tablet 3     omeprazole (PRILOSEC) 20 MG DR capsule Take 1 capsule (20 mg) by mouth daily 90 capsule 3     phentermine (ADIPEX-P) 15 MG capsule Take 1 capsule (15 mg) by mouth every morning 30 capsule 5     topiramate (TOPAMAX) 50 MG tablet Take 1 tablet (50 mg) by mouth daily 90 tablet 1     triamterene-HCTZ (MAXZIDE-25)  37.5-25 MG tablet Take 1 tablet by mouth daily 90 tablet 3     vitamin D3 (CHOLECALCIFEROL) 27265 units (250 mcg) capsule Take 1 capsule by mouth daily       fluocinonide (LIDEX) 0.05 % external cream Apply sparingly to affected area twice daily as needed.  Do not apply to face. To legs (Patient not taking: Reported on 1/13/2020) 120 g 3     fluorouracil (EFUDEX) 5 % external cream twqice aily two weeks to arms (Patient not taking: Reported on 1/13/2020) 40 g 1     No facility-administered encounter medications on file as of 1/13/2020.              Review Of Systems  Skin: As above  Eyes: negative  Ears/Nose/Throat: negative  Respiratory: No shortness of breath, dyspnea on exertion, cough, or hemoptysis  Cardiovascular: negative  Gastrointestinal: negative  Genitourinary: negative  Musculoskeletal: negative  Neurologic: negative  Psychiatric: negative  Hematologic/Lymphatic/Immunologic: negative  Endocrine: negative      O:   NAD, WDWN, Alert & Oriented, Mood & Affect wnl, Vitals stable   Here today alone   /78 (BP Location: Right arm, Patient Position: Sitting, Cuff Size: Adult Large)   Pulse 82   Resp 16   SpO2 97%    General appearance normal   Vitals stable   Alert, oriented and in no acute distress     BL arms with pih and stuck on papules and brown macules on trunk and ext   Rare gritty papules     The remainder of expanded problem focused exam was unremarkable; the following areas were examined:  scalp/hair, conjunctiva/lids, face, neck, lips, chest, digits/nails, RUE, LUE.      Eyes: Conjunctivae/lids:Normal     ENT: Lips, buccal mucosa, tongue: normal    MSK:Normal    Cardiovascular: peripheral edema none    Pulm: Breathing Normal    Neuro/Psych: Orientation:Alert and Orientedx3 ; Mood/Affect:normal       A/P:  1. Seborrheic keratosis, lentigo,   2. S/p efudex  Did well  Skin care discussed with patient     BENIGN LESIONS DISCUSSED WITH PATIENT:  I discussed the specifics of tumor, prognosis, and  genetics of benign lesions.  I explained that treatment of these lesions would be purely cosmetic and not medically neccessary.  I discussed with patient different removal options including excision, cautery and /or laser.      Nature and genetics of benign skin lesions dicussed with patient.  Signs and Symptoms of skin cancer discussed with patient.  ABCDEs of melanoma reviewed with patient.  Patient encouraged to perform monthly skin exams.  UV precautions reviewed with patient.  Patient to follow up with Primary Care provider regarding elevated blood pressure.  Skin care regimen reviewed with patient: Eliminate harsh soaps, i.e. Dial, zest, irsih spring; Mild soaps such as Cetaphil or Dove sensitive skin, avoid hot or cold showers, aggressive use of emollients including vanicream, cetaphil or cerave discussed with patient.    Risks of non-melanoma skin cancer discussed with patient   Return to clinic 6 months

## 2020-01-13 NOTE — PATIENT INSTRUCTIONS
Please go to lab today to check your potassium level.    Your medications were already refilled as of August 2019.    Please return the FIT test.    Please get a mammogram done.          Thank you for choosing [facility].  You may be receiving an email and/or telephone survey request from [facility] regarding your visit today.  Please take a few minutes to respond to the survey to let us know how we are doing.      If you have questions or concerns, please contact us via [employer] or you can contact your care team at [phone].    Our Clinic hours are:  Monday 6:40 am  to 7:00 pm  Tuesday -Friday 6:40 am to 5:00 pm    The [place] outpatient lab hours are:  Monday - Friday 6:10 am to 4:45 pm  Saturdays 7:00 am to 11:00 am  Appointments are required, call [phone]    If you have clinical questions after hours or would like to schedule an appointment,  call the clinic at [phone].    Patient Education   Personalized Prevention Plan  You are due for the preventive services outlined below.  Your care team is available to assist you in scheduling these services.  If you have already completed any of these items, please share that information with your care team to update in your medical record.  Health Maintenance Due   Topic Date Due     Hepatitis C Screening  1950     Discuss Advance Care Planning  1950     Zoster (Shingles) Vaccine (1 of 2) 02/17/1998     Pneumococcal Vaccine (1 of 2 - PCV13) 02/17/2013     Annual Wellness Visit  12/04/2018     FIT Test  12/04/2018     Flu Vaccine (1) 09/01/2019     PHQ-2  01/01/2020     Mammogram  12/12/2019        Patient Education   Personalized Prevention Plan  You are due for the preventive services outlined below.  Your care team is available to assist you in scheduling these services.  If you have already completed any of these items, please share that information with your care team to update in your medical record.  [facility]  Maintenance Due   Topic Date Due     Hepatitis C Screening  1950     Discuss Advance Care Planning  1950     Zoster (Shingles) Vaccine (1 of 2) 02/17/1998     Pneumococcal Vaccine (1 of 2 - PCV13) 02/17/2013     Annual Wellness Visit  12/04/2018     FIT Test  12/04/2018     Flu Vaccine (1) 09/01/2019     PHQ-2  01/01/2020     Mammogram  12/12/2019

## 2020-01-13 NOTE — NURSING NOTE
"Initial /78 (BP Location: Right arm, Patient Position: Sitting, Cuff Size: Adult Large)   Pulse 82   Resp 16   SpO2 97%  Estimated body mass index is 33.13 kg/m  as calculated from the following:    Height as of an earlier encounter on 1/13/20: 1.626 m (5' 4\").    Weight as of an earlier encounter on 1/13/20: 87.5 kg (193 lb). .    Maya Bradford, [facility]    "

## 2020-01-13 NOTE — LETTER
2020         RE: Thomas Davila  [id number]  [facility] [address]        Dear Colleague,    Thank you for referring your patient, Thomas Davila, to the [facility]. Please see a copy of my visit note below.    Thomas Davila is a 69 year old year old female patient here today for f/u efudex.  Arms smoother still some rough spots.  Patient has no other skin complaints today.  Remainder of the HPI, Meds, PMH, Allergies, FH, and SH was reviewed in chart.      Past Medical History:   Diagnosis Date     Acute parametritis and pelvic cellulitis     salpingitis     ASCUS with positive high risk HPV 2013     Asthma     No recent issues     Basal cell carcinoma      breast cancer     left lumpectomy, radiation, tamoxifen     Breast cancer (H)     L Breast; Lumpectomy & Radiation     Chronic salpingitis and oophoritis     PID        Embolism and thrombosis of unspecified site     left leg, due to tamoxifen therapy     Generalized osteoarthrosis, unspecified site     hands     Leiomyoma of uterus, unspecified      Other malignant neoplasm of skin, site unspecified 2006    Basal cell cancer on nose     Squamous cell carcinoma      Thrombosis of leg     Left     Unspecified essential hypertension        Past Surgical History:   Procedure Laterality Date     BREAST LUMPECTOMY, RT/LT      left     C/SECTION, LOW TRANSVERSE  1972,     , Low Transverse x2     CL AFF SURGICAL PATHOLOGY      Breast reduction     COLONOSCOPY  10/15/02    normal     FOOT SURGERY  2011    R Foot      HC EXCISION BREAST LESION, OPEN >=1  98    1) Needle localization with generous lumpectomy 2) Left axillary node dissection.     HC LAPAROSCOPY, SURGICAL, ABDOMEN, PERITONEUM & OMENTUM; DX W/ OR W/O SPECIMEN(S)  94     HYSTERECTOMY, PAP NO LONGER INDICATED       INSERT PORT VASCULAR ACCESS  3/14/2013    Procedure: INSERT PORT VASCULAR ACCESS;  Port Revision;   Surgeon: Arash Puente MD;  Location: [facility] OR     LAPAROSCOPIC HYSTERECTOMY TOTAL, BILATERAL SALPINGO-OOPHORECTOMY, NODE DISSECTION, COMBINED  1/31/2013    Procedure: COMBINED LAPAROSCOPIC HYSTERECTOMY TOTAL, SALPINGO-OOPHORECTOMY, NODE DISSECTION;  Laparosocpic  Total Abdominal Hysterectomy, Bilateral Salphino-Oophorectomy, Bilateral Pelvic Lymph Node Dissection, Pelvic Washings, Cystoscopy;  Surgeon: Tanya Walker MD;  Location: [facility]     PHACOEMULSIFICATION WITH STANDARD INTRAOCULAR LENS IMPLANT Left 6/12/2019    Procedure: Cataract Removal with Implant;  Surgeon: Walt Mckeon MD;  Location: [facility]     PHACOEMULSIFICATION WITH STANDARD INTRAOCULAR LENS IMPLANT Right 7/3/2019    Procedure: Cataract Removal with Implant;  Surgeon: Walt Mckeon MD;  Location: [facility]     SURGICAL HISTORY OF -   06/22/93    1) Excision of digital cyst right mid finger  2)  Excision of dermatofibroma left calf     SURGICAL HISTORY OF -   12/18/2001    Excision of mucinous cyst & partial ostectomy, bone removal of benign osteophytic overgrowth osteophyte of the distal aspect of the middle phalanx and distal phalanx     SURGICAL HISTORY OF -   2006    Basal cell cancer removal     TONSILLECTOMY       TUBAL LIGATION  1978        Family History   Problem Relation Age of Onset     Cerebrovascular Disease Mother      Hypertension Mother      Diabetes Mother      Thyroid Disease Mother      Arthritis Mother      Alzheimer Disease Mother      Neurologic Disorder Mother         Parkinsons     Heart Disease Father      Hypertension Father      Diabetes Father      Thyroid Disease Father      Arthritis Father      Blood Disease Father      Anesthesia Reaction Sister      Thyroid Disease Sister      Anesthesia Reaction Sister      Arthritis Sister         RA     Hypertension Brother      Allergies Son      Gastrointestinal Disease Son         GERD     Hypertension Brother      Allergies Son         percocett      Gastrointestinal Disease Son         GERD     Hypertension Son        Social History     Socioeconomic History     Marital status:      Spouse name: Not on file     Number of children: 2     Years of education: Not on file     Highest education level: Not on file   Occupational History     Employer: sub teacher in [employer]     Employer: RETIRED   Social Needs     Financial resource strain: Not on file     Food insecurity:     Worry: Not on file     Inability: Not on file     Transportation needs:     Medical: Not on file     Non-medical: Not on file   Tobacco Use     Smoking status: Never Smoker     Smokeless tobacco: Never Used   Substance and Sexual Activity     Alcohol use: Yes     Frequency: Never     Comment: Rare     Drug use: No     Sexual activity: Yes     Partners: Male   Lifestyle     Physical activity:     Days per week: Not on file     Minutes per session: Not on file     Stress: Not on file   Relationships     Social connections:     Talks on phone: Not on file     Gets together: Not on file     Attends Temple service: Not on file     Active member of club or organization: Not on file     Attends meetings of clubs or organizations: Not on file     Relationship status: Not on file     Intimate partner violence:     Fear of current or ex partner: Not on file     Emotionally abused: Not on file     Physically abused: Not on file     Forced sexual activity: Not on file   Other Topics Concern     Parent/sibling w/ CABG, MI or angioplasty before 65F 55M? No   Social History Narrative    Church--declines all blood products       Outpatient Encounter Medications as of 1/13/2020   Medication Sig Dispense Refill     lisinopril (PRINIVIL/ZESTRIL) 20 MG tablet Take 1 tablet (20 mg) by mouth daily 90 tablet 3     naproxen (NAPROSYN) 500 MG tablet Take 1 tablet (500 mg) by mouth 2 times daily as needed for moderate pain 60 tablet 3     omeprazole (PRILOSEC) 20 MG DR capsule Take 1 capsule  (20 mg) by mouth daily 90 capsule 3     phentermine (ADIPEX-P) 15 MG capsule Take 1 capsule (15 mg) by mouth every morning 30 capsule 5     topiramate (TOPAMAX) 50 MG tablet Take 1 tablet (50 mg) by mouth daily 90 tablet 1     triamterene-HCTZ (MAXZIDE-25) 37.5-25 MG tablet Take 1 tablet by mouth daily 90 tablet 3     vitamin D3 (CHOLECALCIFEROL) 37554 units (250 mcg) capsule Take 1 capsule by mouth daily       fluocinonide (LIDEX) 0.05 % external cream Apply sparingly to affected area twice daily as needed.  Do not apply to face. To legs (Patient not taking: Reported on 1/13/2020) 120 g 3     fluorouracil (EFUDEX) 5 % external cream twqice aily two weeks to arms (Patient not taking: Reported on 1/13/2020) 40 g 1     No facility-administered encounter medications on file as of 1/13/2020.              Review Of Systems  Skin: As above  Eyes: negative  Ears/Nose/Throat: negative  Respiratory: No shortness of breath, dyspnea on exertion, cough, or hemoptysis  Cardiovascular: negative  Gastrointestinal: negative  Genitourinary: negative  Musculoskeletal: negative  Neurologic: negative  Psychiatric: negative  Hematologic/Lymphatic/Immunologic: negative  Endocrine: negative      O:   NAD, WDWN, Alert & Oriented, Mood & Affect wnl, Vitals stable   Here today alone   /78 (BP Location: Right arm, Patient Position: Sitting, Cuff Size: Adult Large)   Pulse 82   Resp 16   SpO2 97%    General appearance normal   Vitals stable   Alert, oriented and in no acute distress     BL arms with pih and stuck on papules and brown macules on trunk and ext   Rare gritty papules     The remainder of expanded problem focused exam was unremarkable; the following areas were examined:  scalp/hair, conjunctiva/lids, face, neck, lips, chest, digits/nails, RUE, LUE.      Eyes: Conjunctivae/lids:Normal     ENT: Lips, buccal mucosa, tongue: normal    MSK:Normal    Cardiovascular: peripheral edema none    Pulm: Breathing Normal    Neuro/Psych:  Orientation:Alert and Orientedx3 ; Mood/Affect:normal       A/P:  1. Seborrheic keratosis, lentigo,   2. S/p efudex  Did well  Skin care discussed with patient     BENIGN LESIONS DISCUSSED WITH PATIENT:  I discussed the specifics of tumor, prognosis, and genetics of benign lesions.  I explained that treatment of these lesions would be purely cosmetic and not medically neccessary.  I discussed with patient different removal options including excision, cautery and /or laser.      Nature and genetics of benign skin lesions dicussed with patient.  Signs and Symptoms of skin cancer discussed with patient.  ABCDEs of melanoma reviewed with patient.  Patient encouraged to perform monthly skin exams.  UV precautions reviewed with patient.  Patient to follow up with Primary Care provider regarding elevated blood pressure.  Skin care regimen reviewed with patient: Eliminate harsh soaps, i.e. Dial, zest, irsih spring; Mild soaps such as Cetaphil or Dove sensitive skin, avoid hot or cold showers, aggressive use of emollients including vanicream, cetaphil or cerave discussed with patient.    Risks of non-melanoma skin cancer discussed with patient   Return to clinic 6 months      Again, thank you for allowing me to participate in the care of your patient.        Sincerely,        Walt Golden MD

## 2020-01-13 NOTE — PATIENT INSTRUCTIONS
Proper skin care from Dr. Golden- [facility]     Eliminate harsh soaps, i.e. Dial, Zest, Indigo Spring;   Use mild soaps such as Cetaphil or Dove Sensitive Skin   Avoid hot or cold showers   After showering, lightly dry off.    Aggressive use of a moisturizer (including Vanicream, Cetaphil, Aquaphor or Cerave)   We recommend using a tub that needs to be scooped out, not a pump. This has more of an oil base. It will hold moisture in your skin much better than a water base moisturizer. The ones recommended are non- pore clogging.       If you have any questions call [phone] and follow the prompts to Dr. Golden's office.

## 2020-01-13 NOTE — LETTER
January 13, 2020      Thomas Davila  [address] [facility] [address]        Dear ,    We are writing to inform you of your test results.    She has normal kidney function and normal potassium.   Dung Prieto MD     Resulted Orders   Basic metabolic panel   Result Value Ref Range    Sodium 140 133 - 144 mmol/L    Potassium 4.1 3.4 - 5.3 mmol/L    Chloride 105 94 - 109 mmol/L    Carbon Dioxide 30 20 - 32 mmol/L    Anion Gap 5 3 - 14 mmol/L    Glucose 96 70 - 99 mg/dL    Urea Nitrogen 28 7 - 30 mg/dL    Creatinine 0.82 0.52 - 1.04 mg/dL    GFR Estimate 72 >60 mL/min/[1.73_m2]      Comment:      Non  GFR Calc  Starting 12/18/2018, serum creatinine based estimated GFR (eGFR) will be   calculated using the Chronic Kidney Disease Epidemiology Collaboration   (CKD-EPI) equation.      GFR Estimate If Black 83 >60 mL/min/[1.73_m2]      Comment:       GFR Calc  Starting 12/18/2018, serum creatinine based estimated GFR (eGFR) will be   calculated using the Chronic Kidney Disease Epidemiology Collaboration   (CKD-EPI) equation.      Calcium 9.6 8.5 - 10.1 mg/dL   If you have any questions or concerns, please call the clinic at the number listed above.       Sincerely,  Dung Prieto MD

## 2020-01-13 NOTE — PROGRESS NOTES
"SUBJECTIVE:   Thomas Davila is a 69 year old female who presents for Preventive Visit.    Are you in the first 12 months of your Medicare Part B coverage?  No    Physical Health:    In general, how would you rate your overall physical health? fair    Outside of work, how many days during the week do you exercise? 2-3 days/week    Outside of work, approximately how many minutes a day do you exercise?30-45 minutes    If you drink alcohol do you typically have >3 drinks per day or >7 drinks per week? No    Do you usually eat at least 4 servings of fruit and vegetables a day, include whole grains & fiber and avoid regularly eating high fat or \"junk\" foods? NO    Do you have any problems taking medications regularly?  No    Do you have any side effects from medications? none    Needs assistance for the following daily activities: no assistance needed    Which of the following safety concerns are present in your home?  none identified     Hearing impairment: No    In the past 6 months, have you been bothered by leaking of urine? no    Mental Health:    In general, how would you rate your overall mental or emotional health? fair  PHQ-9 SCORE 2011   PHQ-9 Total Score 12 5 -   PHQ-9 Total Score - - 2       Do you feel safe in your environment? Yes    Have you ever done Advance Care Planning? (For example, a Health Directive, POLST, or a discussion with a medical provider or your loved ones about your wishes): Yes, advance care planning is on file.    Additional concerns to address?  YES, weight loss    Fall risk:  Fallen 2 or more times in the past year?: No  Any fall with injury in the past year?: No    Cognitive Screenin) Repeat 3 items (Leader, Season, Table)    2) Clock draw: NORMAL  3) 3 item recall: Recalls 2 objects   Results: NORMAL clock, 1-2 items recalled: COGNITIVE IMPAIRMENT LESS LIKELY    Mini-CogTM Copyright CAROLYN Hutton. Licensed by the author for use in [facility]; " reprinted with permission (soob@.[facility]). All rights reserved.      Do you have sleep apnea, excessive snoring or daytime drowsiness?: no    Reviewed and updated as needed this visit by clinical staff  Tobacco  Allergies  Meds  Med Hx  Surg Hx  Fam Hx  Soc Hx        Reviewed and updated as needed this visit by Provider        Social History     Tobacco Use     Smoking status: Never Smoker     Smokeless tobacco: Never Used   Substance Use Topics     Alcohol use: Yes     Frequency: Never     Comment: Rare                           Current providers sharing in care for this patient include:   Patient Care Team:  Dung Prieto MD as PCP - General  Ge, Alissa Kelly MD as MD (Oncology)  Bobby Gandhi MD as Assigned PCP  Jojo Banks RN as Specialty Care Coordinator (Oncology)    The following health maintenance items are reviewed in Epic and correct as of today:  Health Maintenance   Topic Date Due     HEPATITIS C SCREENING  1950     ADVANCE CARE PLANNING  1950     ZOSTER IMMUNIZATION (1 of 2) 02/17/1998     PNEUMOCOCCAL IMMUNIZATION 65+ LOW/MEDIUM RISK (1 of 2 - PCV13) 02/17/2013     MEDICARE ANNUAL WELLNESS VISIT  12/04/2018     FIT  12/04/2018     INFLUENZA VACCINE (1) 09/01/2019     PHQ-2  01/01/2020     MAMMO SCREENING  12/12/2019     FALL RISK ASSESSMENT  02/15/2020     DTAP/TDAP/TD IMMUNIZATION (2 - Td) 07/18/2022     LIPID  12/05/2022     DEXA  Completed     IPV IMMUNIZATION  Aged Out     MENINGITIS IMMUNIZATION  Aged Out           ROS:  Review Of Systems  Skin: seeing dermatology today  Eyes: had cataract surgery  Ears/Nose/Throat: negative  Respiratory: No shortness of breath, dyspnea on exertion, cough, or hemoptysis  Cardiovascular: negative  Gastrointestinal: negative  Genitourinary: negative  Musculoskeletal: negative  Neurologic: negative  Psychiatric: has stress taking care of her .  [facility]  She also has family that lives next to them in the duplex  "house.   Hematologic/Lymphatic/Immunologic: negative  Endocrine: negative      OBJECTIVE:   /78   Pulse 100   Temp 97.4  F (36.3  C) (Tympanic)   Resp 16   Ht 1.626 m (5' 4\")   Wt 87.5 kg (193 lb)   SpO2 95%   BMI 33.13 kg/m   Estimated body mass index is 33.13 kg/m  as calculated from the following:    Height as of this encounter: 1.626 m (5' 4\").    Weight as of this encounter: 87.5 kg (193 lb).  EXAM:   GENERAL: healthy, alert and no distress  EYES: Eyes grossly normal to inspection, PERRL and conjunctivae and sclerae normal  HENT: ear canals and TM's normal, nose and mouth without ulcers or lesions  NECK: no adenopathy, no asymmetry, masses, or scars and thyroid normal to palpation  RESP: lungs clear to auscultation - no rales, rhonchi or wheezes  CV: regular rate and rhythm, normal S1 S2, no S3 or S4, no murmur, click or rub, no peripheral edema and peripheral pulses strong  ABDOMEN: soft, nontender, no hepatosplenomegaly, no masses and bowel sounds normal  MS: no gross musculoskeletal defects noted, no edema  SKIN: no suspicious lesions or rashes, see is going to derm today.  NEURO: Normal strength and tone, mentation intact and speech normal  PSYCH: mentation appears normal, affect normal/bright  LYMPH: no cervical adenopathy        ASSESSMENT / PLAN:   (Z00.00) Encounter for Medicare annual wellness exam  (primary encounter diagnosis)  Comment: Discussed healthy lifestyle and preventative cares.    Plan:     (Z13.6) CARDIOVASCULAR SCREENING; LDL GOAL LESS THAN 130  Comment:   Plan: Lipid panel reflex to direct LDL Fasting            (I10) Essential hypertension, benign  Comment: check potassium level today  Plan: Basic metabolic panel            (Z12.31) Encounter for screening mammogram for breast cancer  Comment:   Plan: MA Screen Bilateral w/Reinaldo            (Z12.11) Special screening for malignant neoplasms, colon  Comment:   Plan: Fecal colorectal cancer screen (FIT)        " "      COUNSELING:  Reviewed preventive health counseling, as reflected in patient instructions       Regular exercise       Healthy diet/nutrition       Vision screening       Hearing screening       Dental care       Colon cancer screening    Estimated body mass index is 33.13 kg/m  as calculated from the following:    Height as of this encounter: 1.626 m (5' 4\").    Weight as of this encounter: 87.5 kg (193 lb).    Weight management plan: diet and exercise     reports that she has never smoked. She has never used smokeless tobacco.      Appropriate preventive services were discussed with this patient, including applicable screening as appropriate for cardiovascular disease, diabetes, osteopenia/osteoporosis, and glaucoma.  As appropriate for age/gender, discussed screening for colorectal cancer, prostate cancer, breast cancer, and cervical cancer. Checklist reviewing preventive services available has been given to the patient.    Reviewed patients plan of care and provided an AVS. The Basic Care Plan (routine screening as documented in Health Maintenance) for Thomas meets the Care Plan requirement. This Care Plan has been established and reviewed with the Patient.    Counseling Resources:  ATP IV Guidelines  Pooled Cohorts Equation Calculator  Breast Cancer Risk Calculator  FRAX Risk Assessment  ICSI Preventive Guidelines  Dietary Guidelines for Americans, 2010  USDA's MyPlate  ASA Prophylaxis  Lung CA Screening    Dung Prieto MD  [facility]  "

## 2020-01-14 DIAGNOSIS — Z12.11 SPECIAL SCREENING FOR MALIGNANT NEOPLASMS, COLON: ICD-10-CM

## 2020-01-14 PROCEDURE — 82274 ASSAY TEST FOR BLOOD FECAL: CPT | Performed by: FAMILY MEDICINE

## 2020-01-17 DIAGNOSIS — E66.812 OBESITY, CLASS II, BMI 35-39.9: ICD-10-CM

## 2020-01-17 LAB — HEMOCCULT STL QL IA: NEGATIVE

## 2020-01-17 NOTE — TELEPHONE ENCOUNTER
"Requested Prescriptions   Pending Prescriptions Disp Refills     topiramate (TOPAMAX) 50 MG tablet [Pharmacy Med Name: TOPIRAMATE 50MG TABLET] 90 tablet 1     Sig: TAKE 1 TABLET BY MOUTH DAILY       Anti-Seizure Meds Protocol  Failed - 1/17/2020  1:01 AM        Failed - Review Authorizing provider's last note.      Refer to last progress notes: confirm request is for original authorizing provider (cannot be through other providers).          Failed - Normal ALT or AST on file in past 26 months     Recent Labs   Lab Test 03/27/19  1329   ALT 58*     Recent Labs   Lab Test 03/27/19  1329   AST 27             Passed - Recent (12 mo) or future (30 days) visit within the authorizing provider's specialty     Patient has had an office visit with the authorizing provider or a provider within the authorizing providers department within the previous 12 mos or has a future within next 30 days. See \"Patient Info\" tab in inbasket, or \"Choose Columns\" in Meds & Orders section of the refill encounter.              Passed - Normal CBC on file in past 26 months     Recent Labs   Lab Test 03/27/19  1329   WBC 5.6   RBC 5.16   HGB 15.3   HCT 46.5                    Passed - Normal serum creatinine on file in past 26 months     Recent Labs   Lab Test 01/13/20  1019   CR 0.82             Passed - Normal platelet count on file in past 26 months     Recent Labs   Lab Test 03/27/19  1329                  Passed - Medication is active on med list        Passed - No active pregnancy on record        Passed - No positive pregnancy test in last 12 months        Last Written Prescription Date:  8/6/19  Last Fill Quantity: 90,  # refills: 1   Last office visit: 1/13/2020 with prescribing provider:  Conner   Future Office Visit:      "

## 2020-01-20 DIAGNOSIS — E66.812 OBESITY, CLASS II, BMI 35-39.9: ICD-10-CM

## 2020-01-20 RX ORDER — TOPIRAMATE 50 MG/1
TABLET, FILM COATED ORAL
Qty: 90 TABLET | Refills: 3 | Status: SHIPPED | OUTPATIENT
Start: 2020-01-20 | End: 2020-08-31

## 2020-01-20 RX ORDER — TOPIRAMATE 50 MG/1
50 TABLET, FILM COATED ORAL DAILY
Qty: 90 TABLET | Refills: 1 | Status: CANCELLED | OUTPATIENT
Start: 2020-01-20

## 2020-01-20 RX ORDER — PHENTERMINE HYDROCHLORIDE 15 MG/1
15 CAPSULE ORAL EVERY MORNING
Qty: 30 CAPSULE | Refills: 5 | Status: CANCELLED | OUTPATIENT
Start: 2020-01-20

## 2020-01-20 NOTE — TELEPHONE ENCOUNTER
Routing refill request to provider for review/approval because:  Drug not on the [facility] refill protocol     Topiramate was sent earlier today.      AURELIO CarcamoN, RN

## 2020-01-20 NOTE — TELEPHONE ENCOUNTER
"Routing refill request to provider for review/approval because:  Last sent by Dr. Gandhi 08/06/19. \"She is having good weight loss on medications and tolerating them well (except for high BP as noted below), will continue.  Follow-up in 6 months.    Failed - Review Authorizing provider's last note.          Refer to last progress notes: confirm request is for original authorizing provider (cannot be through other providers).     Patient seen PCP on 01/13/20. OK for further refills?     AURELIO CarcamoN, RN      "

## 2020-01-20 NOTE — TELEPHONE ENCOUNTER
Reason for Call:  Medication or medication refill:    Do you use a [facility]?  Name of the pharmacy and phone number for the current request:  [facility] [phone]    Name of the medication requested:   Phentermine    Topiramate    Can we leave a detailed message on this number? YES    Phone number patient can be reached at: Home number on file [phone] (home)    Best Time: any    Call taken on 1/20/2020 at 2:33 PM by Florecita Davis

## 2020-01-20 NOTE — TELEPHONE ENCOUNTER
Denied.  Would need to be seen since I did not prescribe this medication.  Dung Prieto MD  Family Medicine

## 2020-01-22 NOTE — TELEPHONE ENCOUNTER
Message left for patient to check the pharmacy.  This medication has been denied by Dr. Prieto. Mary TRIMBLE RN

## 2020-01-23 ENCOUNTER — HOSPITAL ENCOUNTER (OUTPATIENT)
Dept: MAMMOGRAPHY | Facility: CLINIC | Age: 70
Discharge: HOME | End: 2020-01-23
Attending: FAMILY MEDICINE | Admitting: FAMILY MEDICINE
Payer: MEDICARE

## 2020-01-23 DIAGNOSIS — Z12.31 ENCOUNTER FOR SCREENING MAMMOGRAM FOR BREAST CANCER: ICD-10-CM

## 2020-01-23 PROCEDURE — 77067 SCR MAMMO BI INCL CAD: CPT

## 2020-01-28 ENCOUNTER — HOSPITAL ENCOUNTER (OUTPATIENT)
Dept: MAMMOGRAPHY | Facility: CLINIC | Age: 70
Discharge: HOME | End: 2020-01-28
Attending: FAMILY MEDICINE | Admitting: FAMILY MEDICINE
Payer: MEDICARE

## 2020-01-28 DIAGNOSIS — R92.8 ABNORMAL MAMMOGRAM: ICD-10-CM

## 2020-01-28 PROCEDURE — 77065 DX MAMMO INCL CAD UNI: CPT | Mod: RT

## 2020-02-03 ENCOUNTER — TELEPHONE (OUTPATIENT)
Dept: FAMILY MEDICINE | Facility: CLINIC | Age: 70
End: 2020-02-03

## 2020-02-03 NOTE — TELEPHONE ENCOUNTER
Reason for call:    Symptom or request:     Patient called stating if appt is needed for a med check patient was just seen on 01/13/2020.    She is also requesting lab work for thyriod.     Best Time:  Any    Can we leave a detailed message on this number?  YES     Rosalva GARCIA  Station

## 2020-02-03 NOTE — TELEPHONE ENCOUNTER
There is not much of a message here.  After looking into past messages Dr. Prieto did deny her phentermine request.  I have left a message to call us back for discussion about this medication. Mary TRIMBLE RN

## 2020-02-04 NOTE — TELEPHONE ENCOUNTER
Patient calls and she will schedule an appt with Dr. Prieto to have a discussion about her phentermine. Mary TRIMBLE RN

## 2020-02-19 DIAGNOSIS — E66.812 OBESITY, CLASS II, BMI 35-39.9: ICD-10-CM

## 2020-02-19 RX ORDER — PHENTERMINE HYDROCHLORIDE 15 MG/1
15 CAPSULE ORAL EVERY MORNING
Qty: 30 CAPSULE | Refills: 0 | Status: CANCELLED | OUTPATIENT
Start: 2020-02-19

## 2020-02-19 RX ORDER — PHENTERMINE HYDROCHLORIDE 15 MG/1
CAPSULE ORAL
Qty: 30 CAPSULE | OUTPATIENT
Start: 2020-02-19

## 2020-02-19 NOTE — TELEPHONE ENCOUNTER
Routing refill request to provider for review/approval because:  Drug not on the [facility] refill protocol     Dr. Prieto,  Would you be ok filling this med until her appt 3-10-20 with you?  She had to reschedule appt today due to ER visit - not at [facility].    Last prescribed by Dr. Gandhi 8-6-19 via OV.    Mattie GOMEZ RN      PHENTERMINE  Last Written Prescription Date:  8-6-19  Last Fill Quantity: 30,  # refills: 5   Last office visit: 1/13/2020 with prescribing provider:  Dr. Prieto   Future Office Visit:   Next 5 appointments (look out 90 days)    Mar 10, 2020  8:20 AM CDT  [facility]  Arrive at: Clinic [facility] [address] [address]  [phone]

## 2020-02-19 NOTE — TELEPHONE ENCOUNTER
Reason for call:    Symptom or request:     Patient had to can appt today 02/19/20 however she is in the ED with her , requesting a jamey fill until OV on 03/10/2020.    Phentermine last rx 08/06/2019  Qty 30and refills 5    thirfty [facility]      Best Time:  any    Can we leave a detailed message on this number?  YES     Rosalva GARCIA  Station

## 2020-02-19 NOTE — TELEPHONE ENCOUNTER
Heath Phelps,  I want to see the patient in the clinic.  I need to check her blood pressure.  I need to talk with her about duration of the use of the medication.  I did not start her on the medication so that is why I am not going to refill it.  This medication is really only designed for 6 months of use maybe slightly longer.    Dung Prieto MD  Family Medicine

## 2020-03-10 ENCOUNTER — OFFICE VISIT (OUTPATIENT)
Dept: FAMILY MEDICINE | Facility: CLINIC | Age: 70
End: 2020-03-10
Payer: MEDICARE

## 2020-03-10 VITALS
HEART RATE: 88 BPM | SYSTOLIC BLOOD PRESSURE: 129 MMHG | BODY MASS INDEX: 33.14 KG/M2 | WEIGHT: 194.13 LBS | HEIGHT: 64 IN | OXYGEN SATURATION: 99 % | DIASTOLIC BLOOD PRESSURE: 68 MMHG | TEMPERATURE: 97.8 F | RESPIRATION RATE: 18 BRPM

## 2020-03-10 PROCEDURE — 99213 OFFICE O/P EST LOW 20 MIN: CPT | Performed by: FAMILY MEDICINE

## 2020-03-10 RX ORDER — PHENTERMINE HYDROCHLORIDE 15 MG/1
15 CAPSULE ORAL EVERY MORNING
Qty: 30 CAPSULE | Refills: 2 | Status: SHIPPED | OUTPATIENT
Start: 2020-03-10 | End: 2020-08-31

## 2020-03-10 ASSESSMENT — MIFFLIN-ST. JEOR: SCORE: 1375.55

## 2020-03-10 NOTE — PROGRESS NOTES
"Subjective     Thomas Davila is a 70 year old female who presents to clinic today for the following health issues:    HPI   Hypertension Follow-up      Do you check your blood pressure regularly outside of the clinic? Yes     Are you following a low salt diet? Yes    Are your blood pressures ever more than 140 on the top number (systolic) OR more   than 90 on the bottom number (diastolic), for example 140/90? Yes      How many servings of fruits and vegetables do you eat daily?  0-1    On average, how many sweetened beverages do you drink each day (Examples: soda, juice, sweet tea, etc.  Do NOT count diet or artificially sweetened beverages)?   0    How many days per week do you exercise enough to make your heart beat faster? 3 or less    How many minutes a day do you exercise enough to make your heart beat faster? 30 - 60    How many days per week do you miss taking your medication? 0    Medication Followup of PHENTERMINE     Taking Medication as prescribed: no, patient has been out of Rx for about 5-6 days    Side Effects:  None    Medication Helping Symptoms:  Yes, starting weight was 248, weight today is 194.             Reviewed and updated as needed this visit by Provider         Review of Systems   Review Of Systems  Skin: negative  Eyes: negative  Ears/Nose/Throat:   Respiratory: No shortness of breath, dyspnea on exertion, cough, or hemoptysis  Cardiovascular: negative  Gastrointestinal:   Genitourinary:   Musculoskeletal:   Neurologic:   Psychiatric: she ran out of her medication for weight loss, she is trying lifestyle changes  Hematologic/Lymphatic/Immunologic:   Endocrine:         Objective    /68   Pulse 88   Temp 97.8  F (36.6  C) (Tympanic)   Resp 18   Ht 1.626 m (5' 4\")   Wt 88.1 kg (194 lb 2 oz)   SpO2 99%   BMI 33.32 kg/m    Body mass index is 33.32 kg/m .  Physical Exam   GENERAL APPEARANCE: alert, no distress and cooperative  MS: extremities normal- no gross deformities " noted  SKIN: no suspicious lesions or rashes  NEURO: Normal strength and tone, mentation intact and speech normal  PSYCH: mentation appears normal and affect normal/bright            Assessment & Plan     (Z68.33) BMI 33.0-33.9,adult  (primary encounter diagnosis)  Comment:   Wt Readings from Last 4 Encounters:   03/10/20 88.1 kg (194 lb 2 oz)   01/13/20 87.5 kg (193 lb)   08/19/19 96.5 kg (212 lb 11.2 oz)   08/06/19 97.6 kg (215 lb 3.2 oz)       Plan: phentermine (ADIPEX-P) 15 MG capsule        Patient has been off the medication due to running out, seemed to plateau, no longer morbidly obese, will give 3 more months.  Continue with lifestyle changes and continue with this med and also topimax medication.  Follow up in 3 months.  She will check with her eye doc to make sure she does not have glaucoma. She is not on any eye drop medications.  Explained that this medication is a stimulant medication.         See Patient Instructions    Return in about 3 months (around 6/10/2020) for follow up for weight check and office visit..  Counseling/Coordination of care is over 10 min in 15 min appt.    Dung Prieto MD  [facility]

## 2020-03-10 NOTE — PATIENT INSTRUCTIONS
Please restart the phentermine 15 mg once a day.    Follow up in 3 months.    Continue with the exercise.    Continue with the dietary changes too.    Please make sure you do not have any signs of glaucoma.        Thank you for choosing [facility].  You may be receiving an email and/or telephone survey request from [facility] Customer Experience regarding your visit today.  Please take a few minutes to respond to the survey to let us know how we are doing.      If you have questions or concerns, please contact us via [employer] or you can contact your care team at [phone].    Our Clinic hours are:  Monday 6:40 am  to 7:00 pm  Tuesday -Friday 6:40 am to 5:00 pm    The [place] outpatient lab hours are:  Monday - Friday 6:10 am to 4:45 pm  Saturdays 7:00 am to 11:00 am  Appointments are required, call [phone]    If you have clinical questions after hours or would like to schedule an appointment,  call the clinic at [phone].

## 2020-05-21 ENCOUNTER — VIRTUAL VISIT (OUTPATIENT)
Dept: FAMILY MEDICINE | Facility: CLINIC | Age: 70
End: 2020-05-21
Payer: MEDICARE
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
## 2020-05-21 PROCEDURE — 99213 OFFICE O/P EST LOW 20 MIN: CPT | Mod: 95 | Performed by: FAMILY MEDICINE

## 2020-05-21 RX ORDER — DOXYCYCLINE 100 MG/1
100 CAPSULE ORAL 2 TIMES DAILY
Qty: 28 CAPSULE | Refills: 0 | Status: SHIPPED | OUTPATIENT
Start: 2020-05-21 | End: 2020-06-04

## 2020-05-21 NOTE — PATIENT INSTRUCTIONS
I am concerned about a tick bite.    I am covering for lyme disease with doxycycline 100 mg taking one capsule twice a day for 2 weeks.    I also included information on prevention of lyme disease.      Thank you for choosing [facility].  You may be receiving an email and/or telephone survey request from [facility] Customer Experience regarding your visit today.  Please take a few minutes to respond to the survey to let us know how we are doing.      If you have questions or concerns, please contact us via [employer] or you can contact your care team at [phone].    Our Clinic hours are:  Monday 6:40 am  to 7:00 pm  Tuesday -Friday 6:40 am to 5:00 pm    The [place] outpatient lab hours are:  Monday - Friday 6:10 am to 4:45 pm  Saturdays 7:00 am to 11:00 am  Appointments are required, call [phone]    If you have clinical questions after hours or would like to schedule an appointment,  call the clinic at [phone].    Preventing Lyme Disease  Ticks are small spider-like arachnids that feed on the blood of animals and humans. They can carry the bacteria that cause Lyme disease. The bacteria can pass to a person from a tick bite. (It is not passed from person to person.) Lyme disease can lead to serious health problems if it is not treated with antibiotics. The best way to prevent Lyme disease is to avoid being bitten by a tick.     The tick that carries Lyme disease is very small--about the size of a poppy seed.   Preventing tick bites  The disease is carried by the blacklegged tick, also called a  deer tick.  Young ticks called nymphs are the most likely to carry the bacteria. They are very small--about the size of a poppy seed. They can be found on deer, rodents, and birds. Often the animal brushes the tick onto leaves or other plants as it runs through the woods and then the tick lives in bushes, grasses, and dead leaves. The most active time of year for infected ticks varies by region of the country.  In the East and [place], they are more active from April to September. In the West, they may be more active from December to February. But you can still be bitten at other times of the year. Below are tips for protecting yourself from tick bites.  Protect your body    Wear clothes that protect you. Clothing can help protect you from tick bites. Wear long pants and long sleeves in outdoor areas where ticks may live. Tuck your shirt into your pants. Tuck pant legs into your socks. And wear light colors so you can more easily see ticks on your clothes.    Use insect repellent. Spray insect repellent containing at least 20 percent DEET on your exposed skin. You can also use it on clothing, shoes, and camping gear. Avoid getting DEET on children s hands, mouth, or eyes. You can use products with permethrin on clothing, shoes, and camping gear. But do not spray permethrin on skin. It will cause a rash. Follow the directions on the package of the spray you use. For more information on bug sprays, visit the National Pesticide Information Center at http://npic.[facility].edu.    Avoid tick-infested areas. Avoid brushing against grasses, bushes, and other plants. Avoid walking through dead leaves and other ground vegetation. Do not sit on fallen logs. And avoid areas with large numbers of deer and rodents.    Check yourself for ticks. After being outdoors, check your clothes and skin for ticks. Keep in mind that the ticks are about the size of a poppy seed. Use both a hand-held and a full-length mirror to view all of your skin. Pay special attention to areas with hair. Make sure to thoroughly check these areas:  ? Scalp  ? Behind the ears  ? Armpits  ? Belly button  ? Waist  ? Groin  ? Backs of the knees    Use the clothes dryer. Putting clothing or bedding into a clothes dryer for 1 hour at high heat has been shown to kill ticks.  Control ticks around your home    Create tick-free safety zones. Ticks that transmit Lyme disease  live in ground vegetation. Ticks crawl onto people from shrubs and grasses in and around wooded areas. Cut bushes and other plants away from the deck or patio and any child play areas. Keep all grasses cut short. Remove dead leaves and other dead vegetation. Do not put children s play equipment near wooded areas. Put wood chips or gravel on the ground between lawns and wooded areas.    Use pesticides. You can apply them yourself or hire a pest control expert. States have different regulations about pesticides. Ask your local health department for information.    Keep deer away. Deer often carry the ticks that can infect you with Lyme disease. Do not attempt to pet or feed deer. Ask your local [place] center about deer-resistant plants. Ask your local health department about deer control in your area.    Prevent ticks on pets. Pets can bring ticks into your home. Use tick control medicine as advised by your . Check your pet for ticks after it comes indoors. Pay special attention to the ears.    Ask about local tick-control methods. Some states have tick-control programs. Local health departments may be using methods that can help you control ticks at home.  If you have a tick  If you find a tick on your skin, do not panic. Most ticks don't carry Lyme disease. And the tick must be attached for 36 to 48 hours before it might infect you. If you find a tick on you, here s what to do:    If the tick is not yet attached to your skin, remove the tick with tweezers or a tissue. Flush it down the toilet. If you see a tick on your clothes, use a piece of tape to lift it off. Do not touch it with your bare hands.    If the tick is attached to your skin:  ? Carefully remove the tick with tweezers. If you don t have tweezers, use your fingers protected by a paper towel or a thin cloth. Grasp the tick as close to your skin as possible. Pull slowly and gently to remove the tick. The tick may not let go right away. Do not  pull harder. Be patient and keep trying gently. Do not twist the head or body as you pull. Do not crush or squeeze the body. This can release the bacteria into your body. Never use a hot match, petroleum jelly, or other products to remove a tick. (If you can t remove the tick or if part of the tick remains in the skin, call your healthcare provider right away.)  ? Wash your skin with soap and water after you remove the tick. This will help ensure you remove any bacteria.  ? If you can, save the tick in a tightly sealed glass or plastic container. Take it to your healthcare provider. He or she may be able to have someone identify if it is the type of tick that transmits Lyme.  ? Call your healthcare provider and describe the bite and the tick. You may be asked to come in for an exam. You may be tested for Lyme. You may also be prescribed antibiotics to help prevent infection.  ? Over the next month, watch for the symptoms below, especially a rash at the site of the bite.  Symptoms of Lyme disease  Call your healthcare provider if you develop any symptoms of Lyme disease, even if you don t remember being bitten. These include:    A round, red rash (called a bull s-eye rash)    Fever and chills    Tiredness or body aches    Headache or a stiff neck    Joint pain and swelling (arthritis), especially in large joints such as the knees   To learn more    Centers for Disease Control and Prevention: www.cdc.gov/lyme    American Lyme Disease Foundation: www.aldf.com  Date Last Reviewed: 11/1/2016 2000-2019 [employer]. [address] [place] [address], [place] [address]. All rights reserved. This information is not intended as a substitute for professional medical care. Always follow your healthcare professional's instructions.

## 2020-05-21 NOTE — PROGRESS NOTES
"Thomas Davila is a 70 year old female who is being evaluated via a billable video visit.      The patient has been notified of following:     \"This video visit will be conducted via a call between you and your physician/provider. We have found that certain health care needs can be provided without the need for an in-person physical exam.  This service lets us provide the care you need with a video conversation.  If a prescription is necessary we can send it directly to your pharmacy.  If lab work is needed we can place an order for that and you can then stop by our lab to have the test done at a later time.    Video visits are billed at different rates depending on your insurance coverage.  Please reach out to your insurance provider with any questions.    If during the course of the call the physician/provider feels a video visit is not appropriate, you will not be charged for this service.\"    Patient has given verbal consent for Video visit? Yes    How would you like to obtain your AVS? Mail a copy    Patient would like the video invitation sent by: Text to cell phone: [phone]    Will anyone else be joining your video visit? No      Subjective     Thomas Davila is a 70 year old female who presents today via video visit for the following health issues:    HPI  Chief Complaint   Patient presents with     Insect Bites     Possible lyme disease, was out in the woods, about 4 days ago, bug bite below left knee, some drainage, redness, dark scab in the middle.  No fever, no body aches more than usual.      She was out in the woods.  Wearing long pants.  She reports noted a red myah with a scab in the center on her left shin. No fever or chills.  They do have deer ticks in the area.  No prior lyme disease.  She has talked with her family and concern about a deer tick and lyme disease.  The bite occurred 4 days ago.  No joint aches, muscle aches or headaches.         Video Start Time: 0949 " "am            Reviewed and updated as needed this visit by Provider         Review of Systems         Objective    There were no vitals taken for this visit.  Estimated body mass index is 33.32 kg/m  as calculated from the following:    Height as of 3/10/20: 1.626 m (5' 4\").    Weight as of 3/10/20: 88.1 kg (194 lb 2 oz).  Physical Exam     GENERAL: Healthy, alert and no distress  EYES: Eyes grossly normal to inspection.  No discharge or erythema, or obvious scleral/conjunctival abnormalities.  RESP: No audible wheeze, cough, or visible cyanosis.  No visible retractions or increased work of breathing.    SKIN: noted on left  Shin a red macule about 0.5-1 cm with a scab in the center, no other redness NEURO: Cranial nerves grossly intact.  Mentation and speech appropriate for age.  PSYCH: Mentation appears normal, affect normal/bright, judgement and insight intact, normal speech and appearance well-groomed.              Assessment & Plan     (W57.XXXA) Tick bite, initial encounter  (primary encounter diagnosis)  Comment: cover for lyme disease.  Instructed on antibiotic use.  Plan: doxycycline hyclate (VIBRAMYCIN) 100 MG capsule        At this time no further blood work, if symptoms change to please rtc         See Patient Instructions    Return in about 4 weeks (around 6/18/2020) for If not better.    Dung Prieto MD  [facility]      Video-Visit Details    Type of service:  Video Visit    Video End Time:1003 am    Originating Location (pt. Location): Home    Distant Location (provider location):  [facility]     Platform used for Video Visit: Oksana    Return in about 4 weeks (around 6/18/2020) for If not better.       Dung Prieto MD      "

## 2020-07-13 ENCOUNTER — OFFICE VISIT (OUTPATIENT)
Dept: DERMATOLOGY | Facility: CLINIC | Age: 70
End: 2020-07-13
Payer: MEDICARE

## 2020-07-13 VITALS — SYSTOLIC BLOOD PRESSURE: 152 MMHG | HEART RATE: 73 BPM | DIASTOLIC BLOOD PRESSURE: 78 MMHG | OXYGEN SATURATION: 99 %

## 2020-07-13 DIAGNOSIS — L81.4 LENTIGO: Primary | ICD-10-CM

## 2020-07-13 DIAGNOSIS — D18.01 ANGIOMA OF SKIN: ICD-10-CM

## 2020-07-13 DIAGNOSIS — L57.0 AK (ACTINIC KERATOSIS): ICD-10-CM

## 2020-07-13 DIAGNOSIS — L82.1 SEBORRHEIC KERATOSIS: ICD-10-CM

## 2020-07-13 DIAGNOSIS — Z85.828 HISTORY OF SKIN CANCER: ICD-10-CM

## 2020-07-13 DIAGNOSIS — D23.9 DERMAL NEVUS: ICD-10-CM

## 2020-07-13 PROCEDURE — 99213 OFFICE O/P EST LOW 20 MIN: CPT | Performed by: DERMATOLOGY

## 2020-07-13 NOTE — LETTER
2020         RE: Thomas Davila  [address]  [facility] [address]        Dear Colleague,    Thank you for referring your patient, Thomas Davila, to the [facility]. Please see a copy of my visit note below.    Thomas Davila is a 70 year old year old female patient here today for hx of non-melanoma skin cancer.  Still some rough areas on arms.  Otherwise doing well.   .  Patient states this has been present for a while.  Patient reports the following symptoms:  scale.  Patient reports the following previous treatments efudx.  Patient reports the following modifying factors none.  Associated symptoms: none.  Patient has no other skin complaints today.  Remainder of the HPI, Meds, PMH, Allergies, FH, and SH was reviewed in chart.      Past Medical History:   Diagnosis Date     Acute parametritis and pelvic cellulitis     salpingitis     ASCUS with positive high risk HPV 2013     Asthma     No recent issues     Basal cell carcinoma      breast cancer     left lumpectomy, radiation, tamoxifen     Breast cancer (H)     L Breast; Lumpectomy & Radiation     Chronic salpingitis and oophoritis     PID        Embolism and thrombosis of unspecified site     left leg, due to tamoxifen therapy     Generalized osteoarthrosis, unspecified site     hands     Leiomyoma of uterus, unspecified      Other malignant neoplasm of skin, site unspecified 2006    Basal cell cancer on nose     Squamous cell carcinoma      Thrombosis of leg     Left     Unspecified essential hypertension        Past Surgical History:   Procedure Laterality Date     BREAST LUMPECTOMY, RT/LT      left     C/SECTION, LOW TRANSVERSE  1972, 1976    , Low Transverse x2     CL AFF SURGICAL PATHOLOGY      Breast reduction     COLONOSCOPY  10/15/02    normal     FOOT SURGERY      R Foot      HC EXCISION BREAST LESION, OPEN >=1  98    1) Needle localization with generous lumpectomy  2) Left axillary node dissection.     HC LAPAROSCOPY, SURGICAL, ABDOMEN, PERITONEUM & OMENTUM; DX W/ OR W/O SPECIMEN(S)  02/07/94     HYSTERECTOMY, PAP NO LONGER INDICATED       INSERT PORT VASCULAR ACCESS  3/14/2013    Procedure: INSERT PORT VASCULAR ACCESS;  Port Revision;  Surgeon: Arash Puente MD;  Location: [facility]     LAPAROSCOPIC HYSTERECTOMY TOTAL, BILATERAL SALPINGO-OOPHORECTOMY, NODE DISSECTION, COMBINED  1/31/2013    Procedure: COMBINED LAPAROSCOPIC HYSTERECTOMY TOTAL, SALPINGO-OOPHORECTOMY, NODE DISSECTION;  Laparosocpic  Total Abdominal Hysterectomy, Bilateral Salphino-Oophorectomy, Bilateral Pelvic Lymph Node Dissection, Pelvic Washings, Cystoscopy;  Surgeon: Tanya Walker MD;  Location: [facility]     PHACOEMULSIFICATION WITH STANDARD INTRAOCULAR LENS IMPLANT Left 6/12/2019    Procedure: Cataract Removal with Implant;  Surgeon: Walt Mckeon MD;  Location: [facility]     PHACOEMULSIFICATION WITH STANDARD INTRAOCULAR LENS IMPLANT Right 7/3/2019    Procedure: Cataract Removal with Implant;  Surgeon: Walt Mckeon MD;  Location: [facility]     SURGICAL HISTORY OF -   06/22/93    1) Excision of digital cyst right mid finger  2)  Excision of dermatofibroma left calf     SURGICAL HISTORY OF -   12/18/2001    Excision of mucinous cyst & partial ostectomy, bone removal of benign osteophytic overgrowth osteophyte of the distal aspect of the middle phalanx and distal phalanx     SURGICAL HISTORY OF -   2006    Basal cell cancer removal     TONSILLECTOMY       TUBAL LIGATION  1978        Family History   Problem Relation Age of Onset     Cerebrovascular Disease Mother      Hypertension Mother      Diabetes Mother      Thyroid Disease Mother      Arthritis Mother      Alzheimer Disease Mother      Neurologic Disorder Mother         Parkinsons     Heart Disease Father      Hypertension Father      Diabetes Father      Thyroid Disease Father      Arthritis Father      Blood Disease Father       Anesthesia Reaction Sister      Thyroid Disease Sister      Anesthesia Reaction Sister      Arthritis Sister         RA     Hypertension Brother      Allergies Son      Gastrointestinal Disease Son         GERD     Hypertension Brother      Allergies Son         percocett     Gastrointestinal Disease Son         GERD     Hypertension Son        Social History     Socioeconomic History     Marital status:      Spouse name: Not on file     Number of children: 2     Years of education: Not on file     Highest education level: Not on file   Occupational History     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   Social Needs     Financial resource strain: Not on file     Food insecurity     Worry: Not on file     Inability: Not on file     Transportation needs     Medical: Not on file     Non-medical: Not on file   Tobacco Use     Smoking status: Never Smoker     Smokeless tobacco: Never Used   Substance and Sexual Activity     Alcohol use: Yes     Frequency: Never     Comment: Rare     Drug use: No     Sexual activity: Yes     Partners: Male   Lifestyle     Physical activity     Days per week: Not on file     Minutes per session: Not on file     Stress: Not on file   Relationships     Social connections     Talks on phone: Not on file     Gets together: Not on file     Attends Rastafari service: Not on file     Active member of club or organization: Not on file     Attends meetings of clubs or organizations: Not on file     Relationship status: Not on file     Intimate partner violence     Fear of current or ex partner: Not on file     Emotionally abused: Not on file     Physically abused: Not on file     Forced sexual activity: Not on file   Other Topics Concern     Parent/sibling w/ CABG, MI or angioplasty before 65F 55M? No   Social History Narrative    Protestant--declines all blood products       Outpatient Encounter Medications as of 7/13/2020   Medication Sig Dispense Refill      lisinopril (PRINIVIL/ZESTRIL) 20 MG tablet Take 1 tablet (20 mg) by mouth daily 90 tablet 3     naproxen (NAPROSYN) 500 MG tablet Take 1 tablet (500 mg) by mouth 2 times daily as needed for moderate pain 60 tablet 3     omeprazole (PRILOSEC) 20 MG DR capsule Take 1 capsule (20 mg) by mouth daily 90 capsule 3     phentermine (ADIPEX-P) 15 MG capsule Take 1 capsule (15 mg) by mouth every morning 30 capsule 2     topiramate (TOPAMAX) 50 MG tablet TAKE 1 TABLET BY MOUTH DAILY 90 tablet 3     triamterene-HCTZ (MAXZIDE-25) 37.5-25 MG tablet Take 1 tablet by mouth daily 90 tablet 3     vitamin D3 (CHOLECALCIFEROL) 14633 units (250 mcg) capsule Take 1 capsule by mouth daily       [] doxycycline hyclate (VIBRAMYCIN) 100 MG capsule Take 1 capsule (100 mg) by mouth 2 times daily for 14 days 28 capsule 0     No facility-administered encounter medications on file as of 2020.              Review Of Systems  Skin: As above  Eyes: negative  Ears/Nose/Throat: negative  Respiratory: No shortness of breath, dyspnea on exertion, cough, or hemoptysis  Cardiovascular: negative  Gastrointestinal: negative  Genitourinary: negative  Musculoskeletal: negative  Neurologic: negative  Psychiatric: negative  Hematologic/Lymphatic/Immunologic: negative  Endocrine: negative      O:   NAD, WDWN, Alert & Oriented, Mood & Affect wnl, Vitals stable   Here today alone   BP (!) 152/78   Pulse 73   SpO2 99%    General appearance normal   Vitals stable   Alert, oriented and in no acute distress      Following lymph nodes palpated: Occipital, Cervical, Supraclavicular no lad   Arms gritty papules       Stuck on papules and brown macules on trunk and ext   Red papules on trunk  Flesh colored papules on trunk     The remainder of the full exam was normal; the following areas were examined:  conjunctiva/lids, oral mucosa, neck, peripheral vascular system, abdomen, lymph nodes, digits/nails, eccrine and apocrine glands, scalp/hair, face, neck,  chest, abdomen, buttocks, back, RUE, LUE, RLE, LLE       Eyes: Conjunctivae/lids:Normal     ENT: Lips, buccal mucosa, tongue: normal    MSK:Normal    Cardiovascular: peripheral edema none    Pulm: Breathing Normal    Lymph Nodes: No Head and Neck Lymphadenopathy     Neuro/Psych: Orientation:Alert and Orientedx3 ; Mood/Affect:normal       A/P:  1. Seborrheic keratosis, lentigo, angioma, dermal nevus, hx of non-melanoma skin cancer   2. Actinic keratosis   Efudex this winter  BENIGN LESIONS DISCUSSED WITH PATIENT:  I discussed the specifics of tumor, prognosis, and genetics of benign lesions.  I explained that treatment of these lesions would be purely cosmetic and not medically neccessary.  I discussed with patient different removal options including excision, cautery and /or laser.      Nature and genetics of benign skin lesions dicussed with patient.  Signs and Symptoms of skin cancer discussed with patient.  ABCDEs of melanoma reviewed with patient.  Patient encouraged to perform monthly skin exams.  UV precautions reviewed with patient.  Skin care regimen reviewed with patient: Eliminate harsh soaps, i.e. Dial, zest, irsih spring; Mild soaps such as Cetaphil or Dove sensitive skin, avoid hot or cold showers, aggressive use of emollients including vanicream, cetaphil or cerave discussed with patient.    Risks of non-melanoma skin cancer discussed with patient   Return to clinic 6 months      Again, thank you for allowing me to participate in the care of your patient.        Sincerely,        Walt Golden MD

## 2020-07-13 NOTE — PROGRESS NOTES
Thomas Davila is a 70 year old year old female patient here today for hx of non-melanoma skin cancer.  Still some rough areas on arms.  Otherwise doing well.   .  Patient states this has been present for a while.  Patient reports the following symptoms:  scale.  Patient reports the following previous treatments efudx.  Patient reports the following modifying factors none.  Associated symptoms: none.  Patient has no other skin complaints today.  Remainder of the HPI, Meds, PMH, Allergies, FH, and SH was reviewed in chart.      Past Medical History:   Diagnosis Date     Acute parametritis and pelvic cellulitis     salpingitis     ASCUS with positive high risk HPV 2013     Asthma     No recent issues     Basal cell carcinoma      breast cancer     left lumpectomy, radiation, tamoxifen     Breast cancer (H)     L Breast; Lumpectomy & Radiation     Chronic salpingitis and oophoritis     PID        Embolism and thrombosis of unspecified site     left leg, due to tamoxifen therapy     Generalized osteoarthrosis, unspecified site     hands     Leiomyoma of uterus, unspecified      Other malignant neoplasm of skin, site unspecified 2006    Basal cell cancer on nose     Squamous cell carcinoma      Thrombosis of leg     Left     Unspecified essential hypertension        Past Surgical History:   Procedure Laterality Date     BREAST LUMPECTOMY, RT/LT      left     C/SECTION, LOW TRANSVERSE  1972,     , Low Transverse x2     CL AFF SURGICAL PATHOLOGY      Breast reduction     COLONOSCOPY  10/15/02    normal     FOOT SURGERY      R Foot      HC EXCISION BREAST LESION, OPEN >=1  98    1) Needle localization with generous lumpectomy 2) Left axillary node dissection.     HC LAPAROSCOPY, SURGICAL, ABDOMEN, PERITONEUM & OMENTUM; DX W/ OR W/O SPECIMEN(S)  94     HYSTERECTOMY, PAP NO LONGER INDICATED       INSERT PORT VASCULAR ACCESS  3/14/2013    Procedure: INSERT PORT  VASCULAR ACCESS;  Port Revision;  Surgeon: Arash Puente MD;  Location: [facility]     LAPAROSCOPIC HYSTERECTOMY TOTAL, BILATERAL SALPINGO-OOPHORECTOMY, NODE DISSECTION, COMBINED  1/31/2013    Procedure: COMBINED LAPAROSCOPIC HYSTERECTOMY TOTAL, SALPINGO-OOPHORECTOMY, NODE DISSECTION;  Laparosocpic  Total Abdominal Hysterectomy, Bilateral Salphino-Oophorectomy, Bilateral Pelvic Lymph Node Dissection, Pelvic Washings, Cystoscopy;  Surgeon: Tanya Walker MD;  Location: [facility]     PHACOEMULSIFICATION WITH STANDARD INTRAOCULAR LENS IMPLANT Left 6/12/2019    Procedure: Cataract Removal with Implant;  Surgeon: Walt Mckeon MD;  Location: [facility]     PHACOEMULSIFICATION WITH STANDARD INTRAOCULAR LENS IMPLANT Right 7/3/2019    Procedure: Cataract Removal with Implant;  Surgeon: Walt Mckeon MD;  Location: [facility]     SURGICAL HISTORY OF -   06/22/93    1) Excision of digital cyst right mid finger  2)  Excision of dermatofibroma left calf     SURGICAL HISTORY OF -   12/18/2001    Excision of mucinous cyst & partial ostectomy, bone removal of benign osteophytic overgrowth osteophyte of the distal aspect of the middle phalanx and distal phalanx     SURGICAL HISTORY OF -   2006    Basal cell cancer removal     TONSILLECTOMY       TUBAL LIGATION  1978        Family History   Problem Relation Age of Onset     Cerebrovascular Disease Mother      Hypertension Mother      Diabetes Mother      Thyroid Disease Mother      Arthritis Mother      Alzheimer Disease Mother      Neurologic Disorder Mother         Parkinsons     Heart Disease Father      Hypertension Father      Diabetes Father      Thyroid Disease Father      Arthritis Father      Blood Disease Father      Anesthesia Reaction Sister      Thyroid Disease Sister      Anesthesia Reaction Sister      Arthritis Sister         RA     Hypertension Brother      Allergies Son      Gastrointestinal Disease Son         GERD     Hypertension Brother       Allergies Son         percocett     Gastrointestinal Disease Son         GERD     Hypertension Son        Social History     Socioeconomic History     Marital status:      Spouse name: Not on file     Number of children: 2     Years of education: Not on file     Highest education level: Not on file   Occupational History     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   Social Needs     Financial resource strain: Not on file     Food insecurity     Worry: Not on file     Inability: Not on file     Transportation needs     Medical: Not on file     Non-medical: Not on file   Tobacco Use     Smoking status: Never Smoker     Smokeless tobacco: Never Used   Substance and Sexual Activity     Alcohol use: Yes     Frequency: Never     Comment: Rare     Drug use: No     Sexual activity: Yes     Partners: Male   Lifestyle     Physical activity     Days per week: Not on file     Minutes per session: Not on file     Stress: Not on file   Relationships     Social connections     Talks on phone: Not on file     Gets together: Not on file     Attends Rastafarian service: Not on file     Active member of club or organization: Not on file     Attends meetings of clubs or organizations: Not on file     Relationship status: Not on file     Intimate partner violence     Fear of current or ex partner: Not on file     Emotionally abused: Not on file     Physically abused: Not on file     Forced sexual activity: Not on file   Other Topics Concern     Parent/sibling w/ CABG, MI or angioplasty before 65F 55M? No   Social History Narrative    Gnosticism--declines all blood products       Outpatient Encounter Medications as of 7/13/2020   Medication Sig Dispense Refill     lisinopril (PRINIVIL/ZESTRIL) 20 MG tablet Take 1 tablet (20 mg) by mouth daily 90 tablet 3     naproxen (NAPROSYN) 500 MG tablet Take 1 tablet (500 mg) by mouth 2 times daily as needed for moderate pain 60 tablet 3     omeprazole (PRILOSEC) 20  MG DR capsule Take 1 capsule (20 mg) by mouth daily 90 capsule 3     phentermine (ADIPEX-P) 15 MG capsule Take 1 capsule (15 mg) by mouth every morning 30 capsule 2     topiramate (TOPAMAX) 50 MG tablet TAKE 1 TABLET BY MOUTH DAILY 90 tablet 3     triamterene-HCTZ (MAXZIDE-25) 37.5-25 MG tablet Take 1 tablet by mouth daily 90 tablet 3     vitamin D3 (CHOLECALCIFEROL) 98024 units (250 mcg) capsule Take 1 capsule by mouth daily       [] doxycycline hyclate (VIBRAMYCIN) 100 MG capsule Take 1 capsule (100 mg) by mouth 2 times daily for 14 days 28 capsule 0     No facility-administered encounter medications on file as of 2020.              Review Of Systems  Skin: As above  Eyes: negative  Ears/Nose/Throat: negative  Respiratory: No shortness of breath, dyspnea on exertion, cough, or hemoptysis  Cardiovascular: negative  Gastrointestinal: negative  Genitourinary: negative  Musculoskeletal: negative  Neurologic: negative  Psychiatric: negative  Hematologic/Lymphatic/Immunologic: negative  Endocrine: negative      O:   NAD, WDWN, Alert & Oriented, Mood & Affect wnl, Vitals stable   Here today alone   BP (!) 152/78   Pulse 73   SpO2 99%    General appearance normal   Vitals stable   Alert, oriented and in no acute distress      Following lymph nodes palpated: Occipital, Cervical, Supraclavicular no lad   Arms gritty papules       Stuck on papules and brown macules on trunk and ext   Red papules on trunk  Flesh colored papules on trunk     The remainder of the full exam was normal; the following areas were examined:  conjunctiva/lids, oral mucosa, neck, peripheral vascular system, abdomen, lymph nodes, digits/nails, eccrine and apocrine glands, scalp/hair, face, neck, chest, abdomen, buttocks, back, RUE, LUE, RLE, LLE       Eyes: Conjunctivae/lids:Normal     ENT: Lips, buccal mucosa, tongue: normal    MSK:Normal    Cardiovascular: peripheral edema none    Pulm: Breathing Normal    Lymph Nodes: No Head and Neck  Lymphadenopathy     Neuro/Psych: Orientation:Alert and Orientedx3 ; Mood/Affect:normal       A/P:  1. Seborrheic keratosis, lentigo, angioma, dermal nevus, hx of non-melanoma skin cancer   2. Actinic keratosis   Efudex this winter  BENIGN LESIONS DISCUSSED WITH PATIENT:  I discussed the specifics of tumor, prognosis, and genetics of benign lesions.  I explained that treatment of these lesions would be purely cosmetic and not medically neccessary.  I discussed with patient different removal options including excision, cautery and /or laser.      Nature and genetics of benign skin lesions dicussed with patient.  Signs and Symptoms of skin cancer discussed with patient.  ABCDEs of melanoma reviewed with patient.  Patient encouraged to perform monthly skin exams.  UV precautions reviewed with patient.  Skin care regimen reviewed with patient: Eliminate harsh soaps, i.e. Dial, zest, irsih spring; Mild soaps such as Cetaphil or Dove sensitive skin, avoid hot or cold showers, aggressive use of emollients including vanicream, cetaphil or cerave discussed with patient.    Risks of non-melanoma skin cancer discussed with patient   Return to clinic 6 months

## 2020-07-22 ENCOUNTER — ANCILLARY PROCEDURE (OUTPATIENT)
Dept: MAMMOGRAPHY | Facility: CLINIC | Age: 70
End: 2020-07-22
Attending: FAMILY MEDICINE
Payer: MEDICARE

## 2020-07-22 DIAGNOSIS — R92.8 BI-RADS CATEGORY 3 MAMMOGRAM RESULT: ICD-10-CM

## 2020-08-14 DIAGNOSIS — I10 ESSENTIAL HYPERTENSION: ICD-10-CM

## 2020-08-14 DIAGNOSIS — K21.9 GASTROESOPHAGEAL REFLUX DISEASE WITHOUT ESOPHAGITIS: ICD-10-CM

## 2020-08-14 NOTE — LETTER
[facility]  [address] [address]  Phone: [phone]       August 17, 2020         Thomas Davila  [address] [address]            Dear Thomas:    We are concerned about your health care.  We recently provided you with medication refills.  Many medications require routine follow-up with your doctor.    Your prescription(s) have been refilled for 90 days so you may have time for the above noted follow-up. Please call to schedule soon so we can assure you have an appointment before your next refills are needed.    Thank you,      Dung Prieto MD / Mary TRIMBLE RN

## 2020-08-14 NOTE — TELEPHONE ENCOUNTER
"Requested Prescriptions   Pending Prescriptions Disp Refills     triamterene-HCTZ (MAXZIDE-25) 37.5-25 MG tablet [Pharmacy Med Name: TRIAMTERENE/HCTZ 37.5-25MG TAB] 90 tablet 3     Sig: TAKE 1 TABLET BY MOUTH DAILY       Diuretics (Including Combos) Protocol Failed - 8/14/2020  1:00 AM        Failed - Blood pressure under 140/90 in past 12 months     BP Readings from Last 3 Encounters:   07/13/20 (!) 152/78   03/10/20 129/68   01/13/20 138/78                 Passed - Recent (12 mo) or future (30 days) visit within the authorizing provider's specialty     Patient has had an office visit with the authorizing provider or a provider within the authorizing providers department within the previous 12 mos or has a future within next 30 days. See \"Patient Info\" tab in inbasket, or \"Choose Columns\" in Meds & Orders section of the refill encounter.              Passed - Medication is active on med list        Passed - Patient is age 18 or older        Passed - No active pregancy on record        Passed - Normal serum creatinine on file in past 12 months     Recent Labs   Lab Test 01/13/20  1019   CR 0.82              Passed - Normal serum potassium on file in past 12 months     Recent Labs   Lab Test 01/13/20  1019   POTASSIUM 4.1                    Passed - Normal serum sodium on file in past 12 months     Recent Labs   Lab Test 01/13/20  1019                 Passed - No positive pregnancy test in past 12 months           omeprazole (PRILOSEC) 20 MG DR capsule [Pharmacy Med Name: OMEPRAZOLE 20MG DR CAPSULE] 90 capsule 3     Sig: TAKE ONE CAPSULE BY MOUTH DAILY       PPI Protocol Passed - 8/14/2020  1:00 AM        Passed - Not on Clopidogrel (unless Pantoprazole ordered)        Passed - No diagnosis of osteoporosis on record        Passed - Recent (12 mo) or future (30 days) visit within the authorizing provider's specialty     Patient has had an office visit with the authorizing provider or a provider within the " "authorizing providers department within the previous 12 mos or has a future within next 30 days. See \"Patient Info\" tab in inbasket, or \"Choose Columns\" in Meds & Orders section of the refill encounter.              Passed - Medication is active on med list        Passed - Patient is age 18 or older        Passed - No active pregnacy on record        Passed - No positive pregnancy test in past 12 months             "

## 2020-08-15 RX ORDER — TRIAMTERENE/HYDROCHLOROTHIAZID 37.5-25 MG
1 TABLET ORAL DAILY
Qty: 90 TABLET | Refills: 0 | Status: SHIPPED | OUTPATIENT
Start: 2020-08-15 | End: 2020-08-31

## 2020-08-31 ENCOUNTER — VIRTUAL VISIT (OUTPATIENT)
Dept: FAMILY MEDICINE | Facility: CLINIC | Age: 70
End: 2020-08-31
Payer: MEDICARE

## 2020-08-31 DIAGNOSIS — K21.9 GASTROESOPHAGEAL REFLUX DISEASE WITHOUT ESOPHAGITIS: ICD-10-CM

## 2020-08-31 DIAGNOSIS — I10 ESSENTIAL HYPERTENSION: ICD-10-CM

## 2020-08-31 DIAGNOSIS — R63.5 WEIGHT GAIN: Primary | ICD-10-CM

## 2020-08-31 PROCEDURE — 99442 ZZC PHYSICIAN TELEPHONE EVALUATION 11-20 MIN: CPT | Mod: 95 | Performed by: FAMILY MEDICINE

## 2020-08-31 RX ORDER — TRIAMTERENE/HYDROCHLOROTHIAZID 37.5-25 MG
1 TABLET ORAL DAILY
Qty: 90 TABLET | Refills: 3 | Status: SHIPPED | OUTPATIENT
Start: 2020-08-31 | End: 2021-09-14

## 2020-08-31 RX ORDER — PHENTERMINE HYDROCHLORIDE 15 MG/1
15 CAPSULE ORAL EVERY MORNING
Qty: 30 CAPSULE | Refills: 1 | Status: SHIPPED | OUTPATIENT
Start: 2020-08-31 | End: 2020-08-31

## 2020-08-31 RX ORDER — PHENTERMINE HYDROCHLORIDE 30 MG/1
30 CAPSULE ORAL EVERY MORNING
Qty: 30 CAPSULE | Refills: 1 | Status: SHIPPED | OUTPATIENT
Start: 2020-08-31 | End: 2020-11-06

## 2020-08-31 RX ORDER — PHENTERMINE HYDROCHLORIDE 15 MG/1
CAPSULE ORAL
Qty: 1 CAPSULE | Refills: 0 | Status: SHIPPED | OUTPATIENT
Start: 2020-08-31 | End: 2020-08-31 | Stop reason: DRUGHIGH

## 2020-08-31 RX ORDER — LISINOPRIL 20 MG/1
20 TABLET ORAL DAILY
Qty: 90 TABLET | Refills: 3 | Status: SHIPPED | OUTPATIENT
Start: 2020-08-31 | End: 2021-09-14

## 2020-08-31 NOTE — PATIENT INSTRUCTIONS
Please make a lab visit in the near future.    Please make an office visit in 2 months.    I have refilled your medications.    Regarding your weight gain I have increased your phentermine medication to 30 mg a day to see if this will help with your weight loss since your reported a weight gain.          Thank you for choosing [facility].  You may be receiving an email and/or telephone survey request from [facility] [employer] regarding your visit today.  Please take a few minutes to respond to the survey to let us know how we are doing.      If you have questions or concerns, please contact us via [employer] or you can contact your care team at [phone].    Our Clinic hours are:  Monday 6:40 am  to 7:00 pm  Tuesday -Friday 6:40 am to 5:00 pm    The [place] outpatient lab hours are:  Monday - Friday 6:10 am to 4:45 pm  Saturdays 7:00 am to 11:00 am  Appointments are required, call [phone]    If you have clinical questions after hours or would like to schedule an appointment,  call the clinic at [phone].

## 2020-08-31 NOTE — PROGRESS NOTES
"Thomas Davila is a 70 year old female who is being evaluated via a billable telephone visit.      The patient has been notified of following:     \"This telephone visit will be conducted via a call between you and your physician/provider. We have found that certain health care needs can be provided without the need for a physical exam.  This service lets us provide the care you need with a short phone conversation.  If a prescription is necessary we can send it directly to your pharmacy.  If lab work is needed we can place an order for that and you can then stop by our lab to have the test done at a later time.    Telephone visits are billed at different rates depending on your insurance coverage. During this emergency period, for some insurers they may be billed the same as an in-person visit.  Please reach out to your insurance provider with any questions.    If during the course of the call the physician/provider feels a telephone visit is not appropriate, you will not be charged for this service.\"    Patient has given verbal consent for Telephone visit?  Yes    What phone number would you like to be contacted at? [phone]    How would you like to obtain your AVS? Mail a copy    Subjective     Thomas Davila is a 70 year old female who presents via phone visit today for the following health issues:    HPI    Hypertension Follow-up      Do you check your blood pressure regularly outside of the clinic? Yes     Are you following a low salt diet? No    Are your blood pressures ever more than 140 on the top number (systolic) OR more   than 90 on the bottom number (diastolic), for example 140/90? Yes      How many servings of fruits and vegetables do you eat daily?  2-3    On average, how many sweetened beverages do you drink each day (Examples: soda, juice, sweet tea, etc.  Do NOT count diet or artificially sweetened beverages)?   0    How many days per week do you exercise enough to make your heart beat " faster? 3 or less    How many minutes a day do you exercise enough to make your heart beat faster? 9 or less    How many days per week do you miss taking your medication? 0         Medication Followup of Phentermine    Taking Medication as prescribed: yes    Side Effects:  None    Medication Helping Symptoms:  yes       has endocarditis.  In the hospital. She has a lot of stress.  Weight has gone up.  She needs to have med refilled.  She is also wondering about her thyroid function too.      Review of Systems   CONSTITUTIONAL:as above  INTEGUMENTARY/SKIN: NEGATIVE for worrisome rashes, moles or lesions  RESP:NEGATIVE for significant cough or SOB  CV: NEGATIVE for chest pain, palpitations or peripheral edema  GI: NEGATIVE for nausea, abdominal pain, heartburn, or change in bowel habits  MUSCULOSKELETAL: NEGATIVE for significant arthralgias or myalgia  NEURO: NEGATIVE for weakness, dizziness or paresthesias  PSYCHIATRIC: increase in stress        Objective          Vitals:  No vitals were obtained today due to virtual visit.    healthy, alert and no distress  PSYCH: Alert and oriented times 3; coherent speech, normal   rate and volume, able to articulate logical thoughts, able   to abstract reason, no tangential thoughts, no hallucinations   or delusions  Her affect is normal  RESP: No cough, no audible wheezing, able to talk in full sentences  Remainder of exam unable to be completed due to telephone visits            Assessment/Plan:    Assessment & Plan     BMI 33.0-33.9,adult  Will increase her medication due to report of significant weight gain. Follow up in 2 months,. And recheck bp  - phentermine (ADIPEX-P) 30 MG capsule; Take 1 capsule (30 mg) by mouth every morning    Essential hypertension  Refilled med and check in the office  - Basic metabolic panel; Future  - triamterene-HCTZ (MAXZIDE-25) 37.5-25 MG tablet; Take 1 tablet by mouth daily  - lisinopril (ZESTRIL) 20 MG tablet; Take 1 tablet (20 mg) by  "mouth daily    Gastroesophageal reflux disease without esophagitis  Refilled med and check for side effects  - omeprazole (PRILOSEC) 20 MG DR capsule; Take 1 capsule (20 mg) by mouth daily  - Magnesium; Future  - Vitamin B12; Future    Weight gain    - Basic metabolic panel; Future  - TSH with free T4 reflex; Future  - phentermine (ADIPEX-P) 30 MG capsule; Take 1 capsule (30 mg) by mouth every morning     BMI:   Estimated body mass index is 33.32 kg/m  as calculated from the following:    Height as of 3/10/20: 1.626 m (5' 4\").    Weight as of 3/10/20: 88.1 kg (194 lb 2 oz).           Work on weight loss    Return in about 2 months (around 10/31/2020) for Office Visit.    Dung Preito MD  [facility]    Phone call duration:  11 minutes              "

## 2020-09-01 ENCOUNTER — OFFICE VISIT (OUTPATIENT)
Dept: DERMATOLOGY | Facility: CLINIC | Age: 70
End: 2020-09-01
Payer: MEDICARE

## 2020-09-01 VITALS — HEART RATE: 88 BPM | SYSTOLIC BLOOD PRESSURE: 154 MMHG | DIASTOLIC BLOOD PRESSURE: 60 MMHG | OXYGEN SATURATION: 97 %

## 2020-09-01 DIAGNOSIS — D18.01 ANGIOMA OF SKIN: ICD-10-CM

## 2020-09-01 DIAGNOSIS — L81.4 LENTIGO: ICD-10-CM

## 2020-09-01 DIAGNOSIS — Z85.828 HISTORY OF SKIN CANCER: Primary | ICD-10-CM

## 2020-09-01 DIAGNOSIS — L82.1 SEBORRHEIC KERATOSIS: ICD-10-CM

## 2020-09-01 PROCEDURE — 99213 OFFICE O/P EST LOW 20 MIN: CPT | Performed by: DERMATOLOGY

## 2020-09-01 NOTE — LETTER
2020         RE: Thomas Davila  [address] [address] [facility] [address]        Dear Colleague,    Thank you for referring your patient, Thomas Davila, to the [facility]. Please see a copy of my visit note below.    Thomas Davila is a 70 year old year old female patient here today for spot on left cheek and r cheek.   .  Patient states this has been present for a while.  Patient reports the following symptoms:  new.  Patient reports the following previous treatments none.  These treatments did not work.  Patient reports the following modifying factors none.  Associated symptoms: none.  Patient has no other skin complaints today.  Remainder of the HPI, Meds, PMH, Allergies, FH, and SH was reviewed in chart.      Past Medical History:   Diagnosis Date     Acute parametritis and pelvic cellulitis     salpingitis     ASCUS with positive high risk HPV 2013     Asthma     No recent issues     Basal cell carcinoma      breast cancer     left lumpectomy, radiation, tamoxifen     Breast cancer (H)     L Breast; Lumpectomy & Radiation     Chronic salpingitis and oophoritis     PID        Embolism and thrombosis of unspecified site     left leg, due to tamoxifen therapy     Generalized osteoarthrosis, unspecified site     hands     Leiomyoma of uterus, unspecified      Other malignant neoplasm of skin, site unspecified 2006    Basal cell cancer on nose     Squamous cell carcinoma      Thrombosis of leg     Left     Unspecified essential hypertension        Past Surgical History:   Procedure Laterality Date     BREAST LUMPECTOMY, RT/LT      left     C/SECTION, LOW TRANSVERSE  1972, 1976    , Low Transverse x2     CL AFF SURGICAL PATHOLOGY      Breast reduction     COLONOSCOPY  10/15/02    normal     FOOT SURGERY      R Foot      HC EXCISION BREAST LESION, OPEN >=1  98    1) Needle localization with generous lumpectomy 2) Left axillary node  dissection.     HC LAPAROSCOPY, SURGICAL, ABDOMEN, PERITONEUM & OMENTUM; DX W/ OR W/O SPECIMEN(S)  02/07/94     HYSTERECTOMY, PAP NO LONGER INDICATED       INSERT PORT VASCULAR ACCESS  3/14/2013    Procedure: INSERT PORT VASCULAR ACCESS;  Port Revision;  Surgeon: Arash Puente MD;  Location: [facility]     LAPAROSCOPIC HYSTERECTOMY TOTAL, BILATERAL SALPINGO-OOPHORECTOMY, NODE DISSECTION, COMBINED  1/31/2013    Procedure: COMBINED LAPAROSCOPIC HYSTERECTOMY TOTAL, SALPINGO-OOPHORECTOMY, NODE DISSECTION;  Laparosocpic  Total Abdominal Hysterectomy, Bilateral Salphino-Oophorectomy, Bilateral Pelvic Lymph Node Dissection, Pelvic Washings, Cystoscopy;  Surgeon: Tanya Walker MD;  Location: [facility]     PHACOEMULSIFICATION WITH STANDARD INTRAOCULAR LENS IMPLANT Left 6/12/2019    Procedure: Cataract Removal with Implant;  Surgeon: Walt Mckeon MD;  Location: [facility]     PHACOEMULSIFICATION WITH STANDARD INTRAOCULAR LENS IMPLANT Right 7/3/2019    Procedure: Cataract Removal with Implant;  Surgeon: Walt Mckeon MD;  Location: [facility]     SURGICAL HISTORY OF -   06/22/93    1) Excision of digital cyst right mid finger  2)  Excision of dermatofibroma left calf     SURGICAL HISTORY OF -   12/18/2001    Excision of mucinous cyst & partial ostectomy, bone removal of benign osteophytic overgrowth osteophyte of the distal aspect of the middle phalanx and distal phalanx     SURGICAL HISTORY OF -   2006    Basal cell cancer removal     TONSILLECTOMY       TUBAL LIGATION  1978        Family History   Problem Relation Age of Onset     Cerebrovascular Disease Mother      Hypertension Mother      Diabetes Mother      Thyroid Disease Mother      Arthritis Mother      Alzheimer Disease Mother      Neurologic Disorder Mother         Parkinsons     Heart Disease Father      Hypertension Father      Diabetes Father      Thyroid Disease Father      Arthritis Father      Blood Disease Father      Anesthesia Reaction Sister       Thyroid Disease Sister      Anesthesia Reaction Sister      Arthritis Sister         RA     Hypertension Brother      Allergies Son      Gastrointestinal Disease Son         GERD     Hypertension Brother      Allergies Son         percocett     Gastrointestinal Disease Son         GERD     Hypertension Son        Social History     Socioeconomic History     Marital status:      Spouse name: Not on file     Number of children: 2     Years of education: Not on file     Highest education level: Not on file   Occupational History     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   Social Needs     Financial resource strain: Not on file     Food insecurity     Worry: Not on file     Inability: Not on file     Transportation needs     Medical: Not on file     Non-medical: Not on file   Tobacco Use     Smoking status: Never Smoker     Smokeless tobacco: Never Used   Substance and Sexual Activity     Alcohol use: Yes     Frequency: Never     Comment: Rare     Drug use: No     Sexual activity: Yes     Partners: Male   Lifestyle     Physical activity     Days per week: Not on file     Minutes per session: Not on file     Stress: Not on file   Relationships     Social connections     Talks on phone: Not on file     Gets together: Not on file     Attends [employer] service: Not on file     Active member of club or organization: Not on file     Attends meetings of clubs or organizations: Not on file     Relationship status: Not on file     Intimate partner violence     Fear of current or ex partner: Not on file     Emotionally abused: Not on file     Physically abused: Not on file     Forced sexual activity: Not on file   Other Topics Concern     Parent/sibling w/ CABG, MI or angioplasty before 65F 55M? No   Social History Narrative    [employer]--declines all blood products       Outpatient Encounter Medications as of 9/1/2020   Medication Sig Dispense Refill     lisinopril (ZESTRIL) 20 MG  tablet Take 1 tablet (20 mg) by mouth daily 90 tablet 3     naproxen (NAPROSYN) 500 MG tablet Take 1 tablet (500 mg) by mouth 2 times daily as needed for moderate pain 60 tablet 3     omeprazole (PRILOSEC) 20 MG DR capsule Take 1 capsule (20 mg) by mouth daily 90 capsule 3     phentermine (ADIPEX-P) 30 MG capsule Take 1 capsule (30 mg) by mouth every morning 30 capsule 1     triamterene-HCTZ (MAXZIDE-25) 37.5-25 MG tablet Take 1 tablet by mouth daily 90 tablet 3     vitamin D3 (CHOLECALCIFEROL) 10421 units (250 mcg) capsule Take 1 capsule by mouth daily       No facility-administered encounter medications on file as of 9/1/2020.              Review Of Systems  Skin: As above  Eyes: negative  Ears/Nose/Throat: negative  Respiratory: No shortness of breath, dyspnea on exertion, cough, or hemoptysis  Cardiovascular: negative  Gastrointestinal: negative  Genitourinary: negative  Musculoskeletal: negative  Neurologic: negative  Psychiatric: negative  Hematologic/Lymphatic/Immunologic: negative  Endocrine: negative      O:   NAD, WDWN, Alert & Oriented, Mood & Affect wnl, Vitals stable   Here today alone   BP (!) 154/60   Pulse 88   SpO2 97%    General appearance normal   Vitals stable   Alert, oriented and in no acute distress     L cheek and R medial cheek stuck on appule     Stuck on papules and brown macules on face, neck, chest   Red papules on neck      The remainder of expanded problem focused exam was normal; the following areas were examined:  , conjunctiva/lids, face, neck, lipschest, digits/nails, RUE, LUE.      Eyes: Conjunctivae/lids:Normal     ENT: Lips, buccal mucosa, tongue: normal    MSK:Normal    Cardiovascular: peripheral edema none    Pulm: Breathing Normal    Neuro/Psych: Orientation:Alert and Orientedx3 ; Mood/Affect:normal       A/P:  1. Seborrheic keratosis, lentigo, angioma, hx of non-melanoma skin cancer     BENIGN LESIONS DISCUSSED WITH PATIENT:  I discussed the specifics of tumor, prognosis,  and genetics of benign lesions.  I explained that treatment of these lesions would be purely cosmetic and not medically neccessary.  I discussed with patient different removal options including excision, cautery and /or laser.      Nature and genetics of benign skin lesions dicussed with patient.  Signs and Symptoms of skin cancer discussed with patient.  Patient encouraged to perform monthly skin exams.  UV precautions reviewed with patient.  Skin care regimen reviewed with patient: Eliminate harsh soaps, i.e. Dial, zest, irsih spring; Mild soaps such as Cetaphil or Dove sensitive skin, avoid hot or cold showers, aggressive use of emollients including vanicream, cetaphil or cerave discussed with patient.    Risks of non-melanoma skin cancer discussed with patient   Return to clinic 6 months      Again, thank you for allowing me to participate in the care of your patient.        Sincerely,        Walt Golden MD

## 2020-09-01 NOTE — PROGRESS NOTES
Thomas Davila is a 70 year old year old female patient here today for spot on left cheek and r cheek.   .  Patient states this has been present for a while.  Patient reports the following symptoms:  new.  Patient reports the following previous treatments none.  These treatments did not work.  Patient reports the following modifying factors none.  Associated symptoms: none.  Patient has no other skin complaints today.  Remainder of the HPI, Meds, PMH, Allergies, FH, and SH was reviewed in chart.      Past Medical History:   Diagnosis Date     Acute parametritis and pelvic cellulitis     salpingitis     ASCUS with positive high risk HPV 2013     Asthma     No recent issues     Basal cell carcinoma      breast cancer     left lumpectomy, radiation, tamoxifen     Breast cancer (H)     L Breast; Lumpectomy & Radiation     Chronic salpingitis and oophoritis     PID        Embolism and thrombosis of unspecified site     left leg, due to tamoxifen therapy     Generalized osteoarthrosis, unspecified site     hands     Leiomyoma of uterus, unspecified      Other malignant neoplasm of skin, site unspecified 2006    Basal cell cancer on nose     Squamous cell carcinoma      Thrombosis of leg     Left     Unspecified essential hypertension        Past Surgical History:   Procedure Laterality Date     BREAST LUMPECTOMY, RT/LT      left     C/SECTION, LOW TRANSVERSE  1972,     , Low Transverse x2     CL AFF SURGICAL PATHOLOGY      Breast reduction     COLONOSCOPY  10/15/02    normal     FOOT SURGERY      R Foot      HC EXCISION BREAST LESION, OPEN >=1  98    1) Needle localization with generous lumpectomy 2) Left axillary node dissection.     HC LAPAROSCOPY, SURGICAL, ABDOMEN, PERITONEUM & OMENTUM; DX W/ OR W/O SPECIMEN(S)  94     HYSTERECTOMY, PAP NO LONGER INDICATED       INSERT PORT VASCULAR ACCESS  3/14/2013    Procedure: INSERT PORT VASCULAR ACCESS;  Port Revision;   Surgeon: Arash Puente MD;  Location: [facility]     LAPAROSCOPIC HYSTERECTOMY TOTAL, BILATERAL SALPINGO-OOPHORECTOMY, NODE DISSECTION, COMBINED  1/31/2013    Procedure: COMBINED LAPAROSCOPIC HYSTERECTOMY TOTAL, SALPINGO-OOPHORECTOMY, NODE DISSECTION;  Laparosocpic  Total Abdominal Hysterectomy, Bilateral Salphino-Oophorectomy, Bilateral Pelvic Lymph Node Dissection, Pelvic Washings, Cystoscopy;  Surgeon: Tanya Walker MD;  Location: [facility]     PHACOEMULSIFICATION WITH STANDARD INTRAOCULAR LENS IMPLANT Left 6/12/2019    Procedure: Cataract Removal with Implant;  Surgeon: Walt Mckeon MD;  Location: [facility]     PHACOEMULSIFICATION WITH STANDARD INTRAOCULAR LENS IMPLANT Right 7/3/2019    Procedure: Cataract Removal with Implant;  Surgeon: Walt Mckeon MD;  Location: [facility]     SURGICAL HISTORY OF -   06/22/93    1) Excision of digital cyst right mid finger  2)  Excision of dermatofibroma left calf     SURGICAL HISTORY OF -   12/18/2001    Excision of mucinous cyst & partial ostectomy, bone removal of benign osteophytic overgrowth osteophyte of the distal aspect of the middle phalanx and distal phalanx     SURGICAL HISTORY OF -   2006    Basal cell cancer removal     TONSILLECTOMY       TUBAL LIGATION  1978        Family History   Problem Relation Age of Onset     Cerebrovascular Disease Mother      Hypertension Mother      Diabetes Mother      Thyroid Disease Mother      Arthritis Mother      Alzheimer Disease Mother      Neurologic Disorder Mother         Parkinsons     Heart Disease Father      Hypertension Father      Diabetes Father      Thyroid Disease Father      Arthritis Father      Blood Disease Father      Anesthesia Reaction Sister      Thyroid Disease Sister      Anesthesia Reaction Sister      Arthritis Sister         RA     Hypertension Brother      Allergies Son      Gastrointestinal Disease Son         GERD     Hypertension Brother      Allergies Son         percocett      Gastrointestinal Disease Son         GERD     Hypertension Son        Social History     Socioeconomic History     Marital status:      Spouse name: Not on file     Number of children: 2     Years of education: Not on file     Highest education level: Not on file   Occupational History     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   Social Needs     Financial resource strain: Not on file     Food insecurity     Worry: Not on file     Inability: Not on file     Transportation needs     Medical: Not on file     Non-medical: Not on file   Tobacco Use     Smoking status: Never Smoker     Smokeless tobacco: Never Used   Substance and Sexual Activity     Alcohol use: Yes     Frequency: Never     Comment: Rare     Drug use: No     Sexual activity: Yes     Partners: Male   Lifestyle     Physical activity     Days per week: Not on file     Minutes per session: Not on file     Stress: Not on file   Relationships     Social connections     Talks on phone: Not on file     Gets together: Not on file     Attends Rastaf[employer] service: Not on file     Active member of club or organization: Not on file     Attends meetings of clubs or organizations: Not on file     Relationship status: Not on file     Intimate partner violence     Fear of current or ex partner: Not on file     Emotionally abused: Not on file     Physically abused: Not on file     Forced sexual activity: Not on file   Other Topics Concern     Parent/sibling w/ CABG, MI or angioplasty before 65F 55M? No   Social History Narrative    [employer]--declines all blood products       Outpatient Encounter Medications as of 9/1/2020   Medication Sig Dispense Refill     lisinopril (ZESTRIL) 20 MG tablet Take 1 tablet (20 mg) by mouth daily 90 tablet 3     naproxen (NAPROSYN) 500 MG tablet Take 1 tablet (500 mg) by mouth 2 times daily as needed for moderate pain 60 tablet 3     omeprazole (PRILOSEC) 20 MG DR capsule Take 1 capsule (20 mg) by mouth  daily 90 capsule 3     phentermine (ADIPEX-P) 30 MG capsule Take 1 capsule (30 mg) by mouth every morning 30 capsule 1     triamterene-HCTZ (MAXZIDE-25) 37.5-25 MG tablet Take 1 tablet by mouth daily 90 tablet 3     vitamin D3 (CHOLECALCIFEROL) 95219 units (250 mcg) capsule Take 1 capsule by mouth daily       No facility-administered encounter medications on file as of 9/1/2020.              Review Of Systems  Skin: As above  Eyes: negative  Ears/Nose/Throat: negative  Respiratory: No shortness of breath, dyspnea on exertion, cough, or hemoptysis  Cardiovascular: negative  Gastrointestinal: negative  Genitourinary: negative  Musculoskeletal: negative  Neurologic: negative  Psychiatric: negative  Hematologic/Lymphatic/Immunologic: negative  Endocrine: negative      O:   NAD, WDWN, Alert & Oriented, Mood & Affect wnl, Vitals stable   Here today alone   BP (!) 154/60   Pulse 88   SpO2 97%    General appearance normal   Vitals stable   Alert, oriented and in no acute distress     L cheek and R medial cheek stuck on appule     Stuck on papules and brown macules on face, neck, chest   Red papules on neck      The remainder of expanded problem focused exam was normal; the following areas were examined:  , conjunctiva/lids, face, neck, lipschest, digits/nails, RUE, LUE.      Eyes: Conjunctivae/lids:Normal     ENT: Lips, buccal mucosa, tongue: normal    MSK:Normal    Cardiovascular: peripheral edema none    Pulm: Breathing Normal    Neuro/Psych: Orientation:Alert and Orientedx3 ; Mood/Affect:normal       A/P:  1. Seborrheic keratosis, lentigo, angioma, hx of non-melanoma skin cancer     BENIGN LESIONS DISCUSSED WITH PATIENT:  I discussed the specifics of tumor, prognosis, and genetics of benign lesions.  I explained that treatment of these lesions would be purely cosmetic and not medically neccessary.  I discussed with patient different removal options including excision, cautery and /or laser.      Nature and genetics of  benign skin lesions dicussed with patient.  Signs and Symptoms of skin cancer discussed with patient.  Patient encouraged to perform monthly skin exams.  UV precautions reviewed with patient.  Skin care regimen reviewed with patient: Eliminate harsh soaps, i.e. Dial, zest, irsih spring; Mild soaps such as Cetaphil or Dove sensitive skin, avoid hot or cold showers, aggressive use of emollients including vanicream, cetaphil or cerave discussed with patient.    Risks of non-melanoma skin cancer discussed with patient   Return to clinic 6 months

## 2020-09-10 DIAGNOSIS — K21.9 GASTROESOPHAGEAL REFLUX DISEASE WITHOUT ESOPHAGITIS: ICD-10-CM

## 2020-09-10 DIAGNOSIS — R63.5 WEIGHT GAIN: ICD-10-CM

## 2020-09-10 DIAGNOSIS — I10 ESSENTIAL HYPERTENSION: ICD-10-CM

## 2020-09-10 DIAGNOSIS — Z13.6 CARDIOVASCULAR SCREENING; LDL GOAL LESS THAN 130: ICD-10-CM

## 2020-09-10 LAB
ANION GAP SERPL CALCULATED.3IONS-SCNC: 7 MMOL/L (ref 3–14)
BUN SERPL-MCNC: 23 MG/DL (ref 7–30)
CALCIUM SERPL-MCNC: 9 MG/DL (ref 8.5–10.1)
CHLORIDE SERPL-SCNC: 106 MMOL/L (ref 94–109)
CHOLEST SERPL-MCNC: 193 MG/DL
CO2 SERPL-SCNC: 26 MMOL/L (ref 20–32)
CREAT SERPL-MCNC: 0.61 MG/DL (ref 0.52–1.04)
GFR SERPL CREATININE-BSD FRML MDRD: >90 ML/MIN/{1.73_M2}
GLUCOSE SERPL-MCNC: 94 MG/DL (ref 70–99)
HDLC SERPL-MCNC: 65 MG/DL
LDLC SERPL CALC-MCNC: 111 MG/DL
MAGNESIUM SERPL-MCNC: 1.8 MG/DL (ref 1.6–2.3)
NONHDLC SERPL-MCNC: 128 MG/DL
POTASSIUM SERPL-SCNC: 3.4 MMOL/L (ref 3.4–5.3)
SODIUM SERPL-SCNC: 139 MMOL/L (ref 133–144)
TRIGL SERPL-MCNC: 87 MG/DL
TSH SERPL DL<=0.005 MIU/L-ACNC: 3.57 MU/L (ref 0.4–4)
VIT B12 SERPL-MCNC: 366 PG/ML (ref 193–986)

## 2020-09-10 PROCEDURE — 80061 LIPID PANEL: CPT | Performed by: FAMILY MEDICINE

## 2020-09-10 PROCEDURE — 80048 BASIC METABOLIC PNL TOTAL CA: CPT | Performed by: FAMILY MEDICINE

## 2020-09-10 PROCEDURE — 36415 COLL VENOUS BLD VENIPUNCTURE: CPT | Performed by: FAMILY MEDICINE

## 2020-09-10 PROCEDURE — 83735 ASSAY OF MAGNESIUM: CPT | Performed by: FAMILY MEDICINE

## 2020-09-10 PROCEDURE — 82607 VITAMIN B-12: CPT | Performed by: FAMILY MEDICINE

## 2020-09-10 PROCEDURE — 84443 ASSAY THYROID STIM HORMONE: CPT | Performed by: FAMILY MEDICINE

## 2020-09-30 ENCOUNTER — IMMUNIZATION (OUTPATIENT)
Dept: FAMILY MEDICINE | Facility: CLINIC | Age: 70
End: 2020-09-30
Payer: MEDICARE

## 2020-09-30 PROCEDURE — 90662 IIV NO PRSV INCREASED AG IM: CPT

## 2020-09-30 PROCEDURE — G0008 ADMIN INFLUENZA VIRUS VAC: HCPCS

## 2020-10-13 ENCOUNTER — TELEPHONE (OUTPATIENT)
Dept: FAMILY MEDICINE | Facility: CLINIC | Age: 70
End: 2020-10-13

## 2020-10-13 DIAGNOSIS — R63.5 WEIGHT GAIN: ICD-10-CM

## 2020-10-13 NOTE — TELEPHONE ENCOUNTER
Requested Prescriptions   Pending Prescriptions Disp Refills     phentermine (ADIPEX-P) 30 MG capsule [Pharmacy Med Name: PHENTERMINE 30MG CAPSULE] 30 capsule      Sig: TAKE 1 CAPSULE BY MOUTH EVERY MORNING       There is no refill protocol information for this order

## 2020-10-14 NOTE — TELEPHONE ENCOUNTER
Routing refill request to provider for review/approval because:  Drug not on the [facility] refill protocol     Mattie GOMEZ RN BSN

## 2020-10-15 RX ORDER — PHENTERMINE HYDROCHLORIDE 30 MG/1
CAPSULE ORAL
Qty: 30 CAPSULE | OUTPATIENT
Start: 2020-10-15

## 2020-10-15 NOTE — TELEPHONE ENCOUNTER
Next 5 appointments (look out 90 days)    Nov 06, 2020  9:20 AM  SHORT with Dung Prieto MD  [facility] ([facility])  Arrive at: [facility] [address] [facility] [id number] [phone]

## 2020-10-15 NOTE — TELEPHONE ENCOUNTER
Message left for patient to return call to clinic.  CSS - ok to deliver message below.    Mattie GOMEZ RN BSN

## 2020-11-06 ENCOUNTER — OFFICE VISIT (OUTPATIENT)
Dept: FAMILY MEDICINE | Facility: CLINIC | Age: 70
End: 2020-11-06
Payer: MEDICARE

## 2020-11-06 VITALS
HEART RATE: 85 BPM | WEIGHT: 191.8 LBS | SYSTOLIC BLOOD PRESSURE: 122 MMHG | TEMPERATURE: 97.1 F | BODY MASS INDEX: 32.92 KG/M2 | DIASTOLIC BLOOD PRESSURE: 70 MMHG | OXYGEN SATURATION: 97 %

## 2020-11-06 DIAGNOSIS — H10.13 ALLERGIC CONJUNCTIVITIS, BILATERAL: ICD-10-CM

## 2020-11-06 PROCEDURE — 99214 OFFICE O/P EST MOD 30 MIN: CPT | Performed by: FAMILY MEDICINE

## 2020-11-06 RX ORDER — PHENTERMINE HYDROCHLORIDE 30 MG/1
30 CAPSULE ORAL EVERY MORNING
Qty: 30 CAPSULE | Refills: 2 | Status: SHIPPED | OUTPATIENT
Start: 2020-11-06 | End: 2021-01-18

## 2020-11-06 RX ORDER — OLOPATADINE HYDROCHLORIDE 1 MG/ML
1 SOLUTION/ DROPS OPHTHALMIC 2 TIMES DAILY
Qty: 1 BOTTLE | Refills: 1 | Status: SHIPPED | OUTPATIENT
Start: 2020-11-06 | End: 2021-01-18

## 2020-11-06 NOTE — PROGRESS NOTES
Subjective     Thomas Davila is a 70 year old female who presents to clinic today for the following health issues:  Chief Complaint   Patient presents with     Results     lab results      Recheck Medication     phentermine      Watery Eye(s)     x 2 months, watery/mattery left eye, using OTC eyewash - works for a little bit        HPI         Medication Followup of phentermine     Taking Medication as prescribed: yes    Side Effects:  None    Medication Helping Symptoms:  Yes, a little.     Has a lot of stress w/ taking care of .     She has plateaued with her weight loss.  No side effects of her medication.  She is less active due to the COVID and trying to make home adjustments.      Eye(s) Problem  Onset/Duration: x 2 months   Description:   Location: left eye mattery/watery  Pain: no  Redness: YES  Accompanying Signs & Symptoms:  Discharge/mattering: YES  Swelling: no  Visual changes: no  Fever: no  Nasal Congestion: YES  Bothered by bright lights: no  History:  Trauma: no  Foreign body exposure: no  Wearing contacts: no  Precipitating or alleviating factors:   Therapies tried and outcome: OTC eyewash       Review of Systems   Review Of Systems  Skin:   Eyes: as above  Ears/Nose/Throat: negative  Respiratory: No shortness of breath, dyspnea on exertion, cough, or hemoptysis  Cardiovascular: negative  Gastrointestinal:   Genitourinary:   Musculoskeletal:   Neurologic:   Psychiatric:   Hematologic/Lymphatic/Immunologic:   Endocrine:         Objective    BP (!) 144/70 (BP Location: Right arm, Patient Position: Sitting, Cuff Size: Adult Regular)   Pulse 85   Temp 97.1  F (36.2  C) (Tympanic)   Wt 87 kg (191 lb 12.8 oz)   SpO2 97%   BMI 32.92 kg/m    Body mass index is 32.92 kg/m .  Physical Exam   GENERAL APPEARANCE: healthy, alert and no distress  EYES: clear, no bacterial conjunctivitis  RESP: lungs clear to auscultation - no rales, rhonchi or wheezes  CV: regular rates and rhythm, normal S1 S2, no  "S3 or S4 and no murmur, click or rub  MS: extremities normal- no gross deformities noted  SKIN: no suspicious lesions or rashes  NEURO: Normal strength and tone, mentation intact and speech normal  PSYCH: mentation appears normal and affect normal/bright            Assessment & Plan     BMI 32.0-32.9,adult  Will continue with medication for 3 months and then re check blood pressure and weight  - phentermine (ADIPEX-P) 30 MG capsule; Take 1 capsule (30 mg) by mouth every morning    Allergic conjunctivitis, bilateral  Due to her symptoms of watery eyes, no pain, and for 2 months, treat for allegies  - olopatadine (PATANOL) 0.1 % ophthalmic solution; Place 1 drop into both eyes 2 times daily     BMI:   Estimated body mass index is 32.92 kg/m  as calculated from the following:    Height as of 3/10/20: 1.626 m (5' 4\").    Weight as of this encounter: 87 kg (191 lb 12.8 oz).   Weight management plan: diet and lifestyle         See Patient Instructions    Return in about 3 months (around 2/6/2021) for Office Visit.    Dung Prieto MD  [facility]    "

## 2020-11-06 NOTE — PATIENT INSTRUCTIONS
I refilled your weight loss medication.    Please follow up in 3 months in the clinic for blood pressure and weight check.    For your eyes I do think you have an allergic conjunctivitis and use the generic Patanol medication.  I sent a prescription to your pharmacy.          Thank you for choosing [facility].  You may be receiving an email and/or telephone survey request from [facility] Customer Experience regarding your visit today.  Please take a few minutes to respond to the survey to let us know how we are doing.      If you have questions or concerns, please contact us via [employer] or you can contact your care team at [phone].    Our Clinic hours are:  Monday 6:40 am  to 7:00 pm  Tuesday -Friday 6:40 am to 5:00 pm    The [place] outpatient lab hours are:  Monday - Friday 6:10 am to 4:45 pm  Saturdays 7:00 am to 11:00 am  Appointments are required, call [phone]    If you have clinical questions after hours or would like to schedule an appointment,  call the clinic at [phone].

## 2021-01-11 DIAGNOSIS — E66.812 OBESITY, CLASS II, BMI 35-39.9: ICD-10-CM

## 2021-01-11 NOTE — TELEPHONE ENCOUNTER
"Requested Prescriptions   Pending Prescriptions Disp Refills     topiramate (TOPAMAX) 50 MG tablet [Pharmacy Med Name: TOPIRAMATE 50MG TABLET] 90 tablet 3     Sig: TAKE 1 TABLET BY MOUTH DAILY       Anti-Seizure Meds Protocol  Failed - 1/11/2021  1:09 AM        Failed - Review Authorizing provider's last note.      Refer to last progress notes: confirm request is for original authorizing provider (cannot be through other providers).          Failed - Normal ALT or AST on file in past 26 months     Recent Labs   Lab Test 03/27/19  1329   ALT 58*     Recent Labs   Lab Test 03/27/19  1329   AST 27             Failed - Medication is active on med list        Passed - Recent (12 mo) or future (30 days) visit within the authorizing provider's specialty     Patient has had an office visit with the authorizing provider or a provider within the authorizing providers department within the previous 12 mos or has a future within next 30 days. See \"Patient Info\" tab in inbasket, or \"Choose Columns\" in Meds & Orders section of the refill encounter.              Passed - Normal CBC on file in past 26 months     Recent Labs   Lab Test 03/27/19  1329   WBC 5.6   RBC 5.16   HGB 15.3   HCT 46.5                    Passed - Normal serum creatinine on file in past 26 months     Recent Labs   Lab Test 09/10/20  0715   CR 0.61       Ok to refill medication if creatinine is low          Passed - Normal platelet count on file in past 26 months     Recent Labs   Lab Test 03/27/19  1329                  Passed - No active pregnancy on record        Passed - No positive pregnancy test in last 12 months           phentermine (ADIPEX-P) 30 MG capsule [Pharmacy Med Name: PHENTERMINE 30MG CAPSULE] 30 capsule      Sig: TAKE 1 CAPSULE BY MOUTH EVERY MORNING       There is no refill protocol information for this order          "

## 2021-01-14 RX ORDER — PHENTERMINE HYDROCHLORIDE 30 MG/1
CAPSULE ORAL
Qty: 30 CAPSULE
Start: 2021-01-14

## 2021-01-14 RX ORDER — TOPIRAMATE 50 MG/1
TABLET, FILM COATED ORAL
Qty: 90 TABLET | Refills: 3 | OUTPATIENT
Start: 2021-01-14

## 2021-01-14 NOTE — TELEPHONE ENCOUNTER
Patient needs to follow virtually since the phentermine is a controlled medication.  Dung Prieto MD  Family Medicine

## 2021-01-18 ENCOUNTER — VIRTUAL VISIT (OUTPATIENT)
Dept: FAMILY MEDICINE | Facility: CLINIC | Age: 71
End: 2021-01-18
Payer: MEDICARE

## 2021-01-18 DIAGNOSIS — Z13.6 CARDIOVASCULAR SCREENING; LDL GOAL LESS THAN 130: ICD-10-CM

## 2021-01-18 DIAGNOSIS — Z00.00 ENCOUNTER FOR MEDICARE ANNUAL WELLNESS EXAM: Primary | ICD-10-CM

## 2021-01-18 DIAGNOSIS — I10 ESSENTIAL HYPERTENSION, BENIGN: ICD-10-CM

## 2021-01-18 DIAGNOSIS — E66.812 OBESITY, CLASS II, BMI 35-39.9: ICD-10-CM

## 2021-01-18 PROCEDURE — G0439 PPPS, SUBSEQ VISIT: HCPCS | Mod: 95 | Performed by: FAMILY MEDICINE

## 2021-01-18 RX ORDER — TOPIRAMATE 50 MG/1
50 TABLET, FILM COATED ORAL DAILY
Qty: 90 TABLET | Refills: 3 | Status: SHIPPED | OUTPATIENT
Start: 2021-01-18 | End: 2021-04-05

## 2021-01-18 NOTE — PROGRESS NOTES
Akhil is a 70 year old who is being evaluated via a billable telephone visit.      What phone number would you like to be contacted at? [phone]  How would you like to obtain your AVS? Declined AVS  Assessment & Plan     Encounter for Medicare annual wellness exam  Discussed healthy lifestyle and preventative cares.      Obesity, Class II, BMI 35-39.9  Working on weight loss, did not tolerate the stimulate medication  - topiramate (TOPAMAX) 50 MG tablet; Take 1 tablet (50 mg) by mouth daily    Refilled only the topiramate medication.  Will follow up in 6 months for lab and office visit.                         Return in about 6 months (around 7/18/2021) for Office Visit.    Dung Prieto MD  [facility]     Akhil is a 70 year old who presents to clinic today for the following health issues     HPI       Hypertension Follow-up      Do you check your blood pressure regularly outside of the clinic? No     Are you following a low salt diet? Yes    Are your blood pressures ever more than 140 on the top number (systolic) OR more   than 90 on the bottom number (diastolic), for example 140/90? Yes- sometimes. Stress makes her blood pressure increase.     Medication check for weight loss: Phentermine 30 mg     Taking as prescribed: NO     Side effects: Yes. Patient would feel lightheaded when she was under a lot of stress.    Helping symptoms:NO. Patient states she was not dropping any weight.    Annual Wellness Visit  Patient has been advised of split billing requirements and indicates understanding: Yes     Are you in the first 12 months of your Medicare Part B coverage?  No    Physical Health:    In general, how would you rate your overall physical health? good    Outside of work, how many days during the week do you exercise?2-3 days/week    Outside of work, approximately how many minutes a day do you exercise?30-45 minutes    If you drink alcohol do you typically have >3 drinks  "per day or >7 drinks per week? No    Do you usually eat at least 4 servings of fruit and vegetables a day, include whole grains & fiber and avoid regularly eating high fat or \"junk\" foods? NO    Do you have any problems taking medications regularly? YES- does forget once in a while ago.    Do you have any side effects from medications? light headedness - from the phentermine    Needs assistance for the following daily activities: no assistance needed    Which of the following safety concerns are present in your home?  none identified     Hearing impairment: Yes, Patient states she has a slight hearing impairment with certain tines. Patient recently had her hearing checked. No hearing aids needed.    In the past 6 months, have you been bothered by leaking of urine? no    There were no vitals taken for this visit.  Weight: Provided by patient, 195#  Height:   BMI:   Blood Pressure:     Mental Health:    In general, how would you rate your overall mental or emotional health? fair  PHQ-2 Score:      Do you feel safe in your environment? Yes    Have you ever done Advance Care Planning? (For example, a Health Directive, POLST, or a discussion with a medical provider or your loved ones about your wishes)? Yes, advance care planning is on file.    Fall risk:       Cognitive Screening: Unable to complete due to virtual visit; need for additional assessment in future face-to-face visit    Do you have sleep apnea, excessive snoring or daytime drowsiness?: no    Current providers sharing in care for this patient include:   Patient Care Team:  Dung Prieto MD as PCP - General  Alissa Chacon MD as MD (Oncology)  Jojo Banks RN as Specialty Care Coordinator (Oncology)  Dung Prieto MD as Assigned PCP  Walt Golden MD as Assigned Surgical Provider    Patient has been advised of split billing requirements and indicates understanding: Yes    Review of Systems   Review Of Systems  Skin: negative  Eyes: " negative  Ears/Nose/Throat: negative  Respiratory: No shortness of breath, dyspnea on exertion, cough, or hemoptysis  Cardiovascular: negative  Gastrointestinal: heartburn is controlled  Genitourinary: negative  Musculoskeletal:   Neurologic: felt like headedness with the phentermine medication  Psychiatric: stress right now due to 's health.  Hematologic/Lymphatic/Immunologic: negative  Endocrine: negative        Objective           Vitals:  No vitals were obtained today due to virtual visit.    Physical Exam   healthy, alert and no distress  PSYCH: Alert and oriented times 3; coherent speech, normal   rate and volume, able to articulate logical thoughts, able   to abstract reason, no tangential thoughts, no hallucinations   or delusions  Her affect is normal  RESP: No cough, no audible wheezing, able to talk in full sentences  Remainder of exam unable to be completed due to telephone visits                Phone call duration: 9 minutes  12 minutes with reviewing chart, visit and documentation.

## 2021-01-18 NOTE — PATIENT INSTRUCTIONS
I have refilled your medication call topiramate 50 mg a day.    I agree with you stopping the phentermine medication.          Thank you for choosing [facility].  You may be receiving an email and/or telephone survey request from [facility] Customer Experience regarding your visit today.  Please take a few minutes to respond to the survey to let us know how we are doing.      If you have questions or concerns, please contact us via [employer] or you can contact your care team at [phone].    Our Clinic hours are:  Monday 6:40 am  to 7:00 pm  Tuesday -Friday 6:40 am to 5:00 pm    The [place] outpatient lab hours are:  Monday - Friday 6:10 am to 4:45 pm  Saturdays 7:00 am to 11:00 am  Appointments are required, call [phone]    If you have clinical questions after hours or would like to schedule an appointment,  call the clinic at [phone].    Patient Education   Personalized Prevention Plan  You are due for the preventive services outlined below.  Your care team is available to assist you in scheduling these services.  If you have already completed any of these items, please share that information with your care team to update in your medical record.  Health Maintenance Due   Topic Date Due     Hepatitis C Screening  02/17/1968     Zoster (Shingles) Vaccine (1 of 2) 02/17/1998     Pneumococcal Vaccine (1 of 1 - PPSV23) 02/17/2013     FALL RISK ASSESSMENT  01/13/2021     Colorectal Cancer Screening  01/14/2021

## 2021-02-11 ENCOUNTER — OFFICE VISIT (OUTPATIENT)
Dept: DERMATOLOGY | Facility: CLINIC | Age: 71
End: 2021-02-11
Payer: MEDICARE

## 2021-02-11 VITALS — DIASTOLIC BLOOD PRESSURE: 87 MMHG | RESPIRATION RATE: 16 BRPM | HEART RATE: 83 BPM | SYSTOLIC BLOOD PRESSURE: 175 MMHG

## 2021-02-11 DIAGNOSIS — D48.5 NEOPLASM OF UNCERTAIN BEHAVIOR OF SKIN: Primary | ICD-10-CM

## 2021-02-11 PROCEDURE — 99213 OFFICE O/P EST LOW 20 MIN: CPT | Mod: 25 | Performed by: PHYSICIAN ASSISTANT

## 2021-02-11 PROCEDURE — 88305 TISSUE EXAM BY PATHOLOGIST: CPT | Performed by: DERMATOLOGY

## 2021-02-11 PROCEDURE — 11102 TANGNTL BX SKIN SINGLE LES: CPT | Performed by: PHYSICIAN ASSISTANT

## 2021-02-11 NOTE — PATIENT INSTRUCTIONS
Wound Care Instructions     FOR SUPERFICIAL WOUNDS     AFTER 24 HOURS YOU SHOULD REMOVE THE BANDAGE AND BEGIN DAILY DRESSING CHANGES AS FOLLOWS:     1) Remove Dressing.     2) Clean and dry the area with tap water using a Q-tip or sterile gauze pad.     3) Apply Polysporin ointment, vaseline, aquaphor, or Bacitracin ointment over entire wound.  Do NOT use Neosporin ointment.     4) Cover the wound with a band-aid, or a sterile non-stick gauze pad and micropore paper tape      REPEAT THESE INSTRUCTIONS AT LEAST ONCE A DAY UNTIL THE WOUND HAS COMPLETELY HEALED.    It is an old wives tale that a wound heals better when it is exposed to air and allowed to dry out. The wound will heal faster with a better cosmetic result if it is kept moist with ointment and covered with a bandage.    **Do not let the wound dry out.**      Supplies Needed:      *Cotton tipped applicators (Q-tips)    *Polysporin Ointment or Bacitracin Ointment (NOT NEOSPORIN)    *Band-aids or non-stick gauze pads and micropore paper tape.    PATIENT INFORMATION:    During the healing process you will notice a number of changes. All wounds develop a small halo of redness surrounding the wound.  This means healing is occurring. Severe itching with extensive redness usually indicates sensitivity to the ointment or bandage tape used to dress the wound.  You should call our office if this develops.      Swelling  and/or discoloration around your surgical site is common, particularly when performed around the eye.    All wounds normally drain.  The larger the wound the more drainage there will be.  After 7-10 days, you will notice the wound beginning to shrink and new skin will begin to grow.  The wound is healed when you can see skin has formed over the entire area.  A healed wound has a healthy, shiny look to the surface and is red to dark pink in color to normalize.  Wounds may take approximately 4-6 weeks to heal.  Larger wounds may take 6-8 weeks.  After  the wound is healed you may discontinue dressing changes.    You may experience a sensation of tightness as your wound heals. This is normal and will gradually subside.    Your healed wound may be sensitive to temperature changes. This sensitivity improves with time, but if you re having a lot of discomfort, try to avoid temperature extremes.    Patients frequently experience itching after their wound appears to have healed because of the continue healing under the skin.  Plain Vaseline will help relieve the itching.    BLEEDIN. Leave the bandage in place.  2. Use tightly rolled up gauze or a cloth to apply direct pressure over the bandage for 20 minutes.  3. Reapply pressure for an additional 20 minutes if necessary  4. Call the office or go to the nearest emergency room if pressure fails to stop the bleeding.  5. Use additional gauze and tape to maintain pressure once the bleeding has stopped.  6. If pressure alone does not stop the bleeding, call the Dermatology Clinic at [phone] or go to the nearest Emergency room.

## 2021-02-11 NOTE — NURSING NOTE
"Initial BP (!) 175/87 (BP Location: Right arm, Patient Position: Sitting, Cuff Size: Adult Large)   Pulse 83   Resp 16  Estimated body mass index is 32.92 kg/m  as calculated from the following:    Height as of 3/10/20: 1.626 m (5' 4\").    Weight as of 11/6/20: 87 kg (191 lb 12.8 oz). .    Maya Bradford, [facility]    "

## 2021-02-11 NOTE — LETTER
2021         RE: Thomas Davila  [address] [address]        Dear Colleague,    Thank you for referring your patient, Thomas Davila, to the [facility]. Please see a copy of my visit note below.    Thomas Davila is an extremely pleasant 70 year old year old female patient here today for spot on right lower chest. She notes sore not healing, present for one month. Located on scar from previous breast reduction. She notes that she does pick at it. Patient has no other skin complaints today.  Remainder of the HPI, Meds, PMH, Allergies, FH, and SH was reviewed in chart.    Past Medical History:   Diagnosis Date     Acute parametritis and pelvic cellulitis     salpingitis     ASCUS with positive high risk HPV 2013     Asthma     No recent issues     Basal cell carcinoma      breast cancer     left lumpectomy, radiation, tamoxifen     Breast cancer (H)     L Breast; Lumpectomy & Radiation     Chronic salpingitis and oophoritis     PID        Embolism and thrombosis of unspecified site     left leg, due to tamoxifen therapy     Generalized osteoarthrosis, unspecified site     hands     Leiomyoma of uterus, unspecified      Other malignant neoplasm of skin, site unspecified 2006    Basal cell cancer on nose     Squamous cell carcinoma      Thrombosis of leg     Left     Unspecified essential hypertension        Past Surgical History:   Procedure Laterality Date     BREAST LUMPECTOMY, RT/LT      left     C/SECTION, LOW TRANSVERSE  1972,     , Low Transverse x2     CL AFF SURGICAL PATHOLOGY      Breast reduction     COLONOSCOPY  10/15/02    normal     FOOT SURGERY      R Foot      HC EXCISION BREAST LESION, OPEN >=1  98    1) Needle localization with generous lumpectomy 2) Left axillary node dissection.     HC LAPAROSCOPY, SURGICAL, ABDOMEN, PERITONEUM & OMENTUM; DX W/ OR W/O SPECIMEN(S)  94     HYSTERECTOMY,  PAP NO LONGER INDICATED       INSERT PORT VASCULAR ACCESS  3/14/2013    Procedure: INSERT PORT VASCULAR ACCESS;  Port Revision;  Surgeon: Arash Puente MD;  Location: [facility] OR     LAPAROSCOPIC HYSTERECTOMY TOTAL, BILATERAL SALPINGO-OOPHORECTOMY, NODE DISSECTION, COMBINED  1/31/2013    Procedure: COMBINED LAPAROSCOPIC HYSTERECTOMY TOTAL, SALPINGO-OOPHORECTOMY, NODE DISSECTION;  Laparosocpic  Total Abdominal Hysterectomy, Bilateral Salphino-Oophorectomy, Bilateral Pelvic Lymph Node Dissection, Pelvic Washings, Cystoscopy;  Surgeon: Tanya Walker MD;  Location: [facility] OR     PHACOEMULSIFICATION WITH STANDARD INTRAOCULAR LENS IMPLANT Left 6/12/2019    Procedure: Cataract Removal with Implant;  Surgeon: Walt Mckeon MD;  Location: [facility] OR     PHACOEMULSIFICATION WITH STANDARD INTRAOCULAR LENS IMPLANT Right 7/3/2019    Procedure: Cataract Removal with Implant;  Surgeon: Walt Mckeon MD;  Location: [facility] OR     SURGICAL HISTORY OF -   06/22/93    1) Excision of digital cyst right mid finger  2)  Excision of dermatofibroma left calf     SURGICAL HISTORY OF -   12/18/2001    Excision of mucinous cyst & partial ostectomy, bone removal of benign osteophytic overgrowth osteophyte of the distal aspect of the middle phalanx and distal phalanx     SURGICAL HISTORY OF -   2006    Basal cell cancer removal     TONSILLECTOMY       TUBAL LIGATION  1978        Family History   Problem Relation Age of Onset     Cerebrovascular Disease Mother      Hypertension Mother      Diabetes Mother      Thyroid Disease Mother      Arthritis Mother      Alzheimer Disease Mother      Neurologic Disorder Mother         Parkinsons     Heart Disease Father      Hypertension Father      Diabetes Father      Thyroid Disease Father      Arthritis Father      Blood Disease Father      Anesthesia Reaction Sister      Thyroid Disease Sister      Anesthesia Reaction Sister      Arthritis Sister         RA     Hypertension Brother       Allergies Son      Gastrointestinal Disease Son         GERD     Hypertension Brother      Allergies Son         percocett     Gastrointestinal Disease Son         GERD     Hypertension Son        Social History     Socioeconomic History     Marital status:      Spouse name: Not on file     Number of children: 2     Years of education: Not on file     Highest education level: Not on file   Occupational History     Employer: sub teacher in [employer]     Employer: RETIRED   Social Needs     Financial resource strain: Not on file     Food insecurity     Worry: Not on file     Inability: Not on file     Transportation needs     Medical: Not on file     Non-medical: Not on file   Tobacco Use     Smoking status: Never Smoker     Smokeless tobacco: Never Used   Substance and Sexual Activity     Alcohol use: Yes     Frequency: Never     Comment: Rare     Drug use: No     Sexual activity: Yes     Partners: Male   Lifestyle     Physical activity     Days per week: Not on file     Minutes per session: Not on file     Stress: Not on file   Relationships     Social connections     Talks on phone: Not on file     Gets together: Not on file     Attends Quaker service: Not on file     Active member of club or organization: Not on file     Attends meetings of clubs or organizations: Not on file     Relationship status: Not on file     Intimate partner violence     Fear of current or ex partner: Not on file     Emotionally abused: Not on file     Physically abused: Not on file     Forced sexual activity: Not on file   Other Topics Concern     Parent/sibling w/ CABG, MI or angioplasty before 65F 55M? No   Social History Narrative    Scientology--declines all blood products       Outpatient Encounter Medications as of 2/11/2021   Medication Sig Dispense Refill     lisinopril (ZESTRIL) 20 MG tablet Take 1 tablet (20 mg) by mouth daily 90 tablet 3     omeprazole (PRILOSEC) 20 MG DR capsule Take 1 capsule (20 mg) by  mouth daily 90 capsule 3     topiramate (TOPAMAX) 50 MG tablet Take 1 tablet (50 mg) by mouth daily 90 tablet 3     triamterene-HCTZ (MAXZIDE-25) 37.5-25 MG tablet Take 1 tablet by mouth daily 90 tablet 3     No facility-administered encounter medications on file as of 2/11/2021.              Review Of Systems  Skin: As above  Eyes: negative  Ears/Nose/Throat: negative  Respiratory: No shortness of breath, dyspnea on exertion, cough      O:   NAD, WDWN, Alert & Oriented, Mood & Affect wnl, Vitals stable   Here today alone   BP (!) 175/87 (BP Location: Right arm, Patient Position: Sitting, Cuff Size: Adult Large)   Pulse 83   Resp 16    General appearance normal   Vitals stable   Alert, oriented and in no acute distress   0.5 cm pink scabbed papule on right inferior breast with car     Eyes: Conjunctivae/lids:Normal     ENT: Lips: normal    MSK:Normal    Pulm: Breathing Normal    Neuro/Psych: Orientation:Alert and Orientedx3 ; Mood/Affect:normal   A/P:  1. R/O NMSC on right inferior chest  TANGENTIAL BIOPSY SENT OUT:  After consent, anesthesia with LEC and prep, tangential excision performed and specimen sent out for permanent section histology.  No complications and routine wound care. Patient told to call our office in 1-2 weeks for result.            Again, thank you for allowing me to participate in the care of your patient.        Sincerely,        Lynette Penny PA-C

## 2021-02-11 NOTE — PROGRESS NOTES
Thomas Davila is an extremely pleasant 70 year old year old female patient here today for spot on right lower chest. She notes sore not healing, present for one month. Located on scar from previous breast reduction. She notes that she does pick at it. Patient has no other skin complaints today.  Remainder of the HPI, Meds, PMH, Allergies, FH, and SH was reviewed in chart.    Past Medical History:   Diagnosis Date     Acute parametritis and pelvic cellulitis     salpingitis     ASCUS with positive high risk HPV 2013     Asthma     No recent issues     Basal cell carcinoma      breast cancer     left lumpectomy, radiation, tamoxifen     Breast cancer (H)     L Breast; Lumpectomy & Radiation     Chronic salpingitis and oophoritis     PID        Embolism and thrombosis of unspecified site     left leg, due to tamoxifen therapy     Generalized osteoarthrosis, unspecified site     hands     Leiomyoma of uterus, unspecified      Other malignant neoplasm of skin, site unspecified 2006    Basal cell cancer on nose     Squamous cell carcinoma      Thrombosis of leg     Left     Unspecified essential hypertension        Past Surgical History:   Procedure Laterality Date     BREAST LUMPECTOMY, RT/LT      left     C/SECTION, LOW TRANSVERSE  1972,     , Low Transverse x2     CL [facility]FF SURGICAL PATHOLOGY      Breast reduction     COLONOSCOPY  10/15/02    normal     FOOT SURGERY      R Foot      HC EXCISION BREAST LESION, OPEN >=1  98    1) Needle localization with generous lumpectomy 2) Left axillary node dissection.     HC LAPAROSCOPY, SURGICAL, ABDOMEN, PERITONEUM & OMENTUM; DX W/ OR W/O SPECIMEN(S)  94     HYSTERECTOMY, PAP NO LONGER INDICATED       INSERT PORT VASCULAR ACCESS  3/14/2013    Procedure: INSERT PORT VASCULAR ACCESS;  Port Revision;  Surgeon: Arash Puente MD;  Location: [facility]     LAPAROSCOPIC HYSTERECTOMY TOTAL, BILATERAL SALPINGO-OOPHORECTOMY, NODE  DISSECTION, COMBINED  1/31/2013    Procedure: COMBINED LAPAROSCOPIC HYSTERECTOMY TOTAL, SALPINGO-OOPHORECTOMY, NODE DISSECTION;  Laparosocpic  Total Abdominal Hysterectomy, Bilateral Salphino-Oophorectomy, Bilateral Pelvic Lymph Node Dissection, Pelvic Washings, Cystoscopy;  Surgeon: Tanya Walker MD;  Location: [facility]     PHACOEMULSIFICATION WITH STANDARD INTRAOCULAR LENS IMPLANT Left 6/12/2019    Procedure: Cataract Removal with Implant;  Surgeon: Walt Mckeon MD;  Location: [facility]     PHACOEMULSIFICATION WITH STANDARD INTRAOCULAR LENS IMPLANT Right 7/3/2019    Procedure: Cataract Removal with Implant;  Surgeon: Walt Mckeon MD;  Location: [facility]     SURGICAL HISTORY OF -   06/22/93    1) Excision of digital cyst right mid finger  2)  Excision of dermatofibroma left calf     SURGICAL HISTORY OF -   12/18/2001    Excision of mucinous cyst & partial ostectomy, bone removal of benign osteophytic overgrowth osteophyte of the distal aspect of the middle phalanx and distal phalanx     SURGICAL HISTORY OF -   2006    Basal cell cancer removal     TONSILLECTOMY       TUBAL LIGATION  1978        Family History   Problem Relation Age of Onset     Cerebrovascular Disease Mother      Hypertension Mother      Diabetes Mother      Thyroid Disease Mother      Arthritis Mother      Alzheimer Disease Mother      Neurologic Disorder Mother         Parkinsons     Heart Disease Father      Hypertension Father      Diabetes Father      Thyroid Disease Father      Arthritis Father      Blood Disease Father      Anesthesia Reaction Sister      Thyroid Disease Sister      Anesthesia Reaction Sister      Arthritis Sister         RA     Hypertension Brother      Allergies Son      Gastrointestinal Disease Son         GERD     Hypertension Brother      Allergies Son         percocett     Gastrointestinal Disease Son         GERD     Hypertension Son        Social History     Socioeconomic History     Marital status:       Spouse name: Not on file     Number of children: 2     Years of education: Not on file     Highest education level: Not on file   Occupational History     Employer: sub teacher in [employer]     Employer: RETIRED   Social Needs     Financial resource strain: Not on file     Food insecurity     Worry: Not on file     Inability: Not on file     Transportation needs     Medical: Not on file     Non-medical: Not on file   Tobacco Use     Smoking status: Never Smoker     Smokeless tobacco: Never Used   Substance and Sexual Activity     Alcohol use: Yes     Frequency: Never     Comment: Rare     Drug use: No     Sexual activity: Yes     Partners: Male   Lifestyle     Physical activity     Days per week: Not on file     Minutes per session: Not on file     Stress: Not on file   Relationships     Social connections     Talks on phone: Not on file     Gets together: Not on file     Attends Restoration service: Not on file     Active member of club or organization: Not on file     Attends meetings of clubs or organizations: Not on file     Relationship status: Not on file     Intimate partner violence     Fear of current or ex partner: Not on file     Emotionally abused: Not on file     Physically abused: Not on file     Forced sexual activity: Not on file   Other Topics Concern     Parent/sibling w/ CABG, MI or angioplasty before 65F 55M? No   Social History Narrative    Islam--declines all blood products       Outpatient Encounter Medications as of 2/11/2021   Medication Sig Dispense Refill     lisinopril (ZESTRIL) 20 MG tablet Take 1 tablet (20 mg) by mouth daily 90 tablet 3     omeprazole (PRILOSEC) 20 MG DR capsule Take 1 capsule (20 mg) by mouth daily 90 capsule 3     topiramate (TOPAMAX) 50 MG tablet Take 1 tablet (50 mg) by mouth daily 90 tablet 3     triamterene-HCTZ (MAXZIDE-25) 37.5-25 MG tablet Take 1 tablet by mouth daily 90 tablet 3     No facility-administered encounter medications on  file as of 2/11/2021.              Review Of Systems  Skin: As above  Eyes: negative  Ears/Nose/Throat: negative  Respiratory: No shortness of breath, dyspnea on exertion, cough      O:   NAD, WDWN, Alert & Oriented, Mood & Affect wnl, Vitals stable   Here today alone   BP (!) 175/87 (BP Location: Right arm, Patient Position: Sitting, Cuff Size: Adult Large)   Pulse 83   Resp 16    General appearance normal   Vitals stable   Alert, oriented and in no acute distress   0.5 cm pink scabbed papule on right inferior breast with car     Eyes: Conjunctivae/lids:Normal     ENT: Lips: normal    MSK:Normal    Pulm: Breathing Normal    Neuro/Psych: Orientation:Alert and Orientedx3 ; Mood/Affect:normal   A/P:  1. R/O NMSC on right inferior chest  TANGENTIAL BIOPSY SENT OUT:  After consent, anesthesia with LEC and prep, tangential excision performed and specimen sent out for permanent section histology.  No complications and routine wound care. Patient told to call our office in 1-2 weeks for result.

## 2021-02-15 LAB — COPATH REPORT: NORMAL

## 2021-02-27 ENCOUNTER — TRANSFERRED RECORDS (OUTPATIENT)
Dept: HEALTH INFORMATION MANAGEMENT | Facility: CLINIC | Age: 71
End: 2021-02-27

## 2021-03-01 ENCOUNTER — TELEPHONE (OUTPATIENT)
Dept: FAMILY MEDICINE | Facility: CLINIC | Age: 71
End: 2021-03-01

## 2021-03-01 NOTE — TELEPHONE ENCOUNTER
Patient is called and if without fever or illness ok to proceed with COVID injection. Mary TRIMBLE RN

## 2021-03-01 NOTE — TELEPHONE ENCOUNTER
Reason for call:    Symptom or request:     Family calling stating that patient is scheduled for covid vaccine at [facility]; family want to make sure with patient illness/medical problems it is safe for her to receive it at [facility]--Wednesday 03/03.       Best Time:  any    Can we leave a detailed message on this number?  YES     Rosalva GARCIA  Station   ]

## 2021-03-08 ENCOUNTER — VIRTUAL VISIT (OUTPATIENT)
Dept: FAMILY MEDICINE | Facility: CLINIC | Age: 71
End: 2021-03-08
Payer: MEDICARE

## 2021-03-08 DIAGNOSIS — I80.9 SUPERFICIAL THROMBOPHLEBITIS, INVOLVING UNSPECIFIED SITE: Primary | ICD-10-CM

## 2021-03-08 DIAGNOSIS — E66.01 OBESITY, MORBID, BMI 40.0-49.9 (H): ICD-10-CM

## 2021-03-08 PROCEDURE — 99442 PR PHYSICIAN TELEPHONE EVALUATION 11-20 MIN: CPT | Mod: 95 | Performed by: FAMILY MEDICINE

## 2021-03-08 RX ORDER — PHENTERMINE HYDROCHLORIDE 15 MG/1
15 CAPSULE ORAL EVERY MORNING
Qty: 30 CAPSULE | Refills: 0 | Status: SHIPPED | OUTPATIENT
Start: 2021-03-08 | End: 2021-04-05

## 2021-03-08 RX ORDER — ASPIRIN 325 MG
325 TABLET, DELAYED RELEASE (ENTERIC COATED) ORAL DAILY
COMMUNITY
Start: 2021-03-08 | End: 2021-03-16 | Stop reason: DRUGHIGH

## 2021-03-08 NOTE — PATIENT INSTRUCTIONS
Superficial Thrombophlebitis   The superficial veins are the veins near the surface of the skin. Superficial thrombophlebitis is a problem that occurs when one or more of these veins become red, irritated, and swollen. This is most often because of a blood clot.   Causes  The problem may occur after injury to a vein. It may also occur after having an intravenous (IV) line placed. Other factors that can make the problem more likely include:     Varicose veins    Venous insufficiency    Bleeding disorders    Prolonged periods of rest and not moving around    IV drug abuse    Pregnancy    Use of birth control pills or estrogen therapy  Symptoms  Symptoms may appear in the affected area. They can include:    Pain    Tenderness    Redness    Warmth    Swelling    Hardening of the vein  In most cases, superficial thrombophlebitis resolves on its own with no problems. Treatment is focused on relieving symptoms.   Sometimes, there is a risk that the deep veins in the body may also be involved. This can lead to more serious problems. In such cases, further testing and treatments may be needed. Your healthcare provider can tell you more about this.   Home care  To help relieve pain and swelling, you may be told to:    Apply heat or cold to the affected area. Do this for up to 10 minutes as often as directed.  ? Heat: Use a warm compress, such as a heating pad.  ? Cold: Use a cold compress, such as a cold pack or bag of ice wrapped in a thin towel.    Take nonsteroidal anti-inflammatory drugs (NSAIDS), such as ibuprofen. In some cases, other pain medicines may be prescribed.    Keep the affected limb (arm or leg) raised above heart level as directed.    Wear elastic compression stockings or bandages as directed.    Don't sit or stand for long periods. Get up and walk often.  To help treat a blood clot, a blood thinner (anticoagulant) may be prescribed. If this is needed, be sure to take the medicine exactly as directed.    Follow-up care  Follow up with your healthcare provider as advised. If imaging tests are done, they will be reviewed by a doctor. You ll be told the results and any new findings that may affect your treatment.   When to seek medical advice  Call your healthcare provider right away if any of these occur:    Fever of 100.4 F (38 C) or higher, or as directed by your healthcare provider    Increasing pain, swelling, or tenderness in the affected area    Spreading warmth or redness in the affected area  Call 911  Call 911 if any of these occur:     Trouble breathing    Chest pain or discomfort that worsens with deep breathing or coughing    Coughing (may cough up blood)    Fast or irregular heartbeat    Sweating    Anxiety    Lightheadedness, dizziness, or fainting    Extreme confusion    Extreme drowsiness or trouble waking up    New pain in the chest, arm, shoulder, neck, or upper back  Kes[employer] last reviewed this educational content on 4/1/2018 2000-2020 [employer]. All rights reserved. This information is not intended as a substitute for professional medical care. Always follow your healthcare professional's instructions.       Restart your phentermine 15 mg a day for weight loss.  Follow up in 4 weeks.

## 2021-03-08 NOTE — PROGRESS NOTES
Akhil is a 71 year old who is being evaluated via a billable telephone visit.      What phone number would you like to be contacted at? Home phone number  How would you like to obtain your AVS? MyChart    Assessment & Plan     Superficial thrombophlebitis, involving unspecified site  Increase asa to higher dose, handout given, follow up if worsening signs symptoms, pain, swelling, shortness of breath etc  - aspirin (ASA) 325 MG EC tablet; Take 1 tablet (325 mg) by mouth daily    Obesity, morbid, BMI 40.0-49.9 (H)  Restart medication  - phentermine (ADIPEX-P) 15 MG capsule; Take 1 capsule (15 mg) by mouth every morning  She states weight has gone back up and would like to restart her weight loss medication.             See Patient Instructions    Return in about 4 weeks (around 4/5/2021) for Office Visit, recheck weight loss.    Dung Prieto MD  [facility] NORBERTO Landaverde is a 71 year old who presents for the following health issues     HPI       Musculoskeletal problem/pain/ follow up DVT  Onset/Duration: x 1 week. Patient was sitting in a car for a long time about a week ago. Patient states on Friday 3-5-21 she was at [facility] and had an ultrasound. They saw superficial blood clots.  Description  Location: leg - right. Knee up to the groin. On the inside.  Joint Swelling: no. Varicose veins.  Redness: no  Pain: YES- very achy.  Warmth: yes- little bit  Intensity:  mild  Progression of Symptoms:  same  Accompanying signs and symptoms:   Fevers: no  Numbness/tingling/weakness: no  History  Trauma to the area: no  Recent illness:  no  Previous similar problem: YES  Previous evaluation:  YES  Precipitating or alleviating factors:  Aggravating factors include: none  Therapies tried and outcome: heat and baby aspirin- helps for a little bit.        Review of Systems         Objective           Vitals:  No vitals were obtained today due to virtual visit.    Physical Exam    healthy, alert and no distress  PSYCH: Alert and oriented times 3; coherent speech, normal   rate and volume, able to articulate logical thoughts, able   to abstract reason, no tangential thoughts, no hallucinations   or delusions  Her affect is normal  RESP: No cough, no audible wheezing, able to talk in full sentences  Remainder of exam unable to be completed due to telephone visits                Phone call duration: 11 minutes

## 2021-03-16 ENCOUNTER — TELEPHONE (OUTPATIENT)
Dept: FAMILY MEDICINE | Facility: CLINIC | Age: 71
End: 2021-03-16

## 2021-03-16 ENCOUNTER — HOSPITAL ENCOUNTER (OUTPATIENT)
Dept: ULTRASOUND IMAGING | Facility: CLINIC | Age: 71
Discharge: HOME | End: 2021-03-16
Attending: FAMILY MEDICINE | Admitting: FAMILY MEDICINE
Payer: MEDICARE

## 2021-03-16 ENCOUNTER — OFFICE VISIT (OUTPATIENT)
Dept: FAMILY MEDICINE | Facility: CLINIC | Age: 71
End: 2021-03-16
Payer: MEDICARE

## 2021-03-16 VITALS
WEIGHT: 200.13 LBS | DIASTOLIC BLOOD PRESSURE: 78 MMHG | RESPIRATION RATE: 16 BRPM | TEMPERATURE: 97.1 F | SYSTOLIC BLOOD PRESSURE: 131 MMHG | OXYGEN SATURATION: 97 % | HEART RATE: 89 BPM | BODY MASS INDEX: 34.17 KG/M2 | HEIGHT: 64 IN

## 2021-03-16 DIAGNOSIS — M79.89 SWELLING OF THIGH: ICD-10-CM

## 2021-03-16 DIAGNOSIS — I80.01 THROMBOPHLEBITIS OF SUPERFICIAL VEINS OF RIGHT LOWER EXTREMITY: ICD-10-CM

## 2021-03-16 DIAGNOSIS — Z83.2 FAMILY HISTORY OF FACTOR V DEFICIENCY: ICD-10-CM

## 2021-03-16 DIAGNOSIS — M79.89 SWELLING OF THIGH: Primary | ICD-10-CM

## 2021-03-16 LAB — INR PPP: 1.01 (ref 0.86–1.14)

## 2021-03-16 PROCEDURE — 36415 COLL VENOUS BLD VENIPUNCTURE: CPT | Performed by: FAMILY MEDICINE

## 2021-03-16 PROCEDURE — 99214 OFFICE O/P EST MOD 30 MIN: CPT | Performed by: FAMILY MEDICINE

## 2021-03-16 PROCEDURE — 85220 BLOOC CLOT FACTOR V TEST: CPT | Performed by: FAMILY MEDICINE

## 2021-03-16 PROCEDURE — 85610 PROTHROMBIN TIME: CPT | Performed by: FAMILY MEDICINE

## 2021-03-16 PROCEDURE — 93971 EXTREMITY STUDY: CPT | Mod: RT

## 2021-03-16 RX ORDER — WARFARIN SODIUM 5 MG/1
5 TABLET ORAL DAILY
Qty: 30 TABLET | Refills: 0 | Status: SHIPPED | OUTPATIENT
Start: 2021-03-16 | End: 2021-04-05

## 2021-03-16 ASSESSMENT — MIFFLIN-ST. JEOR: SCORE: 1394.27

## 2021-03-16 NOTE — PATIENT INSTRUCTIONS
Patient Education     Superficial Thrombophlebitis   The superficial veins are the veins near the surface of the skin. Superficial thrombophlebitis is a problem that occurs when one or more of these veins become red, irritated, and swollen. This is most often because of a blood clot.   Causes  The problem may occur after injury to a vein. It may also occur after having an intravenous (IV) line placed. Other factors that can make the problem more likely include:     Varicose veins    Venous insufficiency    Bleeding disorders    Prolonged periods of rest and not moving around    IV drug abuse    Pregnancy    Use of birth control pills or estrogen therapy  Symptoms  Symptoms may appear in the affected area. They can include:    Pain    Tenderness    Redness    Warmth    Swelling    Hardening of the vein  In most cases, superficial thrombophlebitis resolves on its own with no problems. Treatment is focused on relieving symptoms.   Sometimes, there is a risk that the deep veins in the body may also be involved. This can lead to more serious problems. In such cases, further testing and treatments may be needed. Your healthcare provider can tell you more about this.   Home care  To help relieve pain and swelling, you may be told to:    Apply heat or cold to the affected area. Do this for up to 10 minutes as often as directed.  ? Heat: Use a warm compress, such as a heating pad.  ? Cold: Use a cold compress, such as a cold pack or bag of ice wrapped in a thin towel.    Take nonsteroidal anti-inflammatory drugs (NSAIDS), such as ibuprofen. In some cases, other pain medicines may be prescribed.    Keep the affected limb (arm or leg) raised above heart level as directed.    Wear elastic compression stockings or bandages as directed.    Don't sit or stand for long periods. Get up and walk often.  To help treat a blood clot, a blood thinner (anticoagulant) may be prescribed. If this is needed, be sure to take the medicine  exactly as directed.   Follow-up care  Follow up with your healthcare provider as advised. If imaging tests are done, they will be reviewed by a doctor. You ll be told the results and any new findings that may affect your treatment.   When to seek medical advice  Call your healthcare provider right away if any of these occur:    Fever of 100.4 F (38 C) or higher, or as directed by your healthcare provider    Increasing pain, swelling, or tenderness in the affected area    Spreading warmth or redness in the affected area  Call 911  Call 911 if any of these occur:     Trouble breathing    Chest pain or discomfort that worsens with deep breathing or coughing    Coughing (may cough up blood)    Fast or irregular heartbeat    Sweating    Anxiety    Lightheadedness, dizziness, or fainting    Extreme confusion    Extreme drowsiness or trouble waking up    New pain in the chest, arm, shoulder, neck, or upper back  Keshav last reviewed this educational content on 4/1/2018 2000-2020 [employer]. All rights reserved. This information is not intended as a substitute for professional medical care. Always follow your healthcare professional's instructions.

## 2021-03-16 NOTE — PROGRESS NOTES
Assessment & Plan     Swelling of thigh  US negative for DVT.   - US Lower Extremity Venous Duplex Right; Future    Thrombophlebitis of superficial veins of right lower extremity  Given the extent of the thrombosis and the veins affected, it will be prudent to treat this with anticoagulation. I have faxed Rivaroxaban to the pharmacy for the patient, if too expensive we had already had the discussion to switch to coumadin.    - rivaroxaban ANTICOAGULANT (XARELTO ANTICOAGULANT) 10 MG TABS tablet; Take 1 tablet (10 mg) by mouth daily (with dinner)    Family history of factor V deficiency  Patient will be notified of results  - INR  - Factor 5 assay      FUTURE APPOINTMENTS:       - Follow-up visit in one month or sooner as needed.  Patient Instructions     Patient Education     Superficial Thrombophlebitis   The superficial veins are the veins near the surface of the skin. Superficial thrombophlebitis is a problem that occurs when one or more of these veins become red, irritated, and swollen. This is most often because of a blood clot.   Causes  The problem may occur after injury to a vein. It may also occur after having an intravenous (IV) line placed. Other factors that can make the problem more likely include:     Varicose veins    Venous insufficiency    Bleeding disorders    Prolonged periods of rest and not moving around    IV drug abuse    Pregnancy    Use of birth control pills or estrogen therapy  Symptoms  Symptoms may appear in the affected area. They can include:    Pain    Tenderness    Redness    Warmth    Swelling    Hardening of the vein  In most cases, superficial thrombophlebitis resolves on its own with no problems. Treatment is focused on relieving symptoms.   Sometimes, there is a risk that the deep veins in the body may also be involved. This can lead to more serious problems. In such cases, further testing and treatments may be needed. Your healthcare provider can tell you more about this.    Home care  To help relieve pain and swelling, you may be told to:    Apply heat or cold to the affected area. Do this for up to 10 minutes as often as directed.  ? Heat: Use a warm compress, such as a heating pad.  ? Cold: Use a cold compress, such as a cold pack or bag of ice wrapped in a thin towel.    Take nonsteroidal anti-inflammatory drugs (NSAIDS), such as ibuprofen. In some cases, other pain medicines may be prescribed.    Keep the affected limb (arm or leg) raised above heart level as directed.    Wear elastic compression stockings or bandages as directed.    Don't sit or stand for long periods. Get up and walk often.  To help treat a blood clot, a blood thinner (anticoagulant) may be prescribed. If this is needed, be sure to take the medicine exactly as directed.   Follow-up care  Follow up with your healthcare provider as advised. If imaging tests are done, they will be reviewed by a doctor. You ll be told the results and any new findings that may affect your treatment.   When to seek medical advice  Call your healthcare provider right away if any of these occur:    Fever of 100.4 F (38 C) or higher, or as directed by your healthcare provider    Increasing pain, swelling, or tenderness in the affected area    Spreading warmth or redness in the affected area  Call 911  Call 911 if any of these occur:     Trouble breathing    Chest pain or discomfort that worsens with deep breathing or coughing    Coughing (may cough up blood)    Fast or irregular heartbeat    Sweating    Anxiety    Lightheadedness, dizziness, or fainting    Extreme confusion    Extreme drowsiness or trouble waking up    New pain in the chest, arm, shoulder, neck, or upper back  [employer] last reviewed this educational content on 4/1/2018 2000-2020 [employer]. All rights reserved. This information is not intended as a substitute for professional medical care. Always follow your healthcare professional's  "instructions.               No follow-ups on file.    Lizabeth Zavala MD  [facility] NORBERTO Landaverde is a 71 year old who presents for the following health issues     HPI   71 yr old female here for right thigh swelling. She states this swelling has been ongoing for about two weeks. She says she was seen at the [facility] on the 5th of March and she was told that it was a superficial clot and to start taking aspirin. She states that she has been taking the aspirin but it appears the area is getting worse with warmth and redness and pain. She has had DVTs in the past and also mentioned that she has a family history of Factor V Leiden. Patient also has varicose veins.   Concern - DVT  Onset: 3/5/2021  Description: right upper thigh  Intensity: moderate  Progression of Symptoms:  same  Accompanying Signs & Symptoms: bruised; tender and moving towards right calf  Previous history of similar problem: Reports DVT years ago in left leg; cannot recall another blood clot, but believes she has had three total.  Precipitating factors:        Worsened by: nothing  Alleviating factors:        Improved by: moving, walking, elevated, heating pad  Therapies tried and outcome: see above        Review of Systems   Constitutional, HEENT, cardiovascular, pulmonary, gi and gu systems are negative, except as otherwise noted.      Objective    /78   Pulse 89   Temp 97.1  F (36.2  C) (Tympanic)   Resp 16   Ht 1.62 m (5' 3.78\")   Wt 90.8 kg (200 lb 2 oz)   SpO2 97%   BMI 34.59 kg/m    Body mass index is 34.59 kg/m .  Physical Exam  Constitutional:       Appearance: Normal appearance. She is obese.   HENT:      Head: Normocephalic and atraumatic.      Right Ear: Tympanic membrane normal.      Left Ear: Tympanic membrane normal.   Eyes:      Pupils: Pupils are equal, round, and reactive to light.   Cardiovascular:      Rate and Rhythm: Normal rate and regular rhythm.      Pulses: " Normal pulses.      Heart sounds: Normal heart sounds.   Pulmonary:      Effort: Pulmonary effort is normal.      Breath sounds: Normal breath sounds.   Musculoskeletal: Normal range of motion.   Skin:     General: Skin is warm.      Findings: Bruising and erythema present.             Comments: Right upper extremity - area is warm to the touch, red and tender.    Neurological:      Mental Status: She is alert and oriented to person, place, and time.   Psychiatric:         Mood and Affect: Mood normal.         Behavior: Behavior normal.          Ultrasound    IMPRESSION:   1.  No DVT demonstrated.  2.  Superficial venous thrombus involving a right anterior accessory  saphenous vein in the proximal thigh and the right great saphenous  vein at the saphenofemoral junction and from the proximal to mid calf.  It is not imaged from the mid thigh through the knee.

## 2021-03-16 NOTE — TELEPHONE ENCOUNTER
Reason for call:  Patient reporting a symptom    Symptom or request: Patient was seen in [facility] on or around the 5th or 12 she does not remember for a blood clot and she said they where supposed to send the Ultrasound to us. I have not seen it, I gave her the fax number to call them again. Patient said that she is having tightness, It is black and blue. She said the verucose veins are red. It aches she said. But if she is moving its not that bad.     Duration (how long have symptoms been present): was seen on 5th of march or 12th.     Have you been treated for this before? Yes      Phone Number patient can be reached at:  Home number on file [phone] (home)    Best Time:  any    Can we leave a detailed message on this number:  YES    Call taken on 3/16/2021 at 8:42 AM by Florecita Davis

## 2021-03-16 NOTE — TELEPHONE ENCOUNTER
Patient states her superficial thrombophlebitis is now a bruise the size of a pie plate.  Increase in pain.  Scheduled appt. Mary TRIMBLE RN

## 2021-03-16 NOTE — TELEPHONE ENCOUNTER
Reason for Call:  Medication change:    Do you use a [facility]?  Name of the pharmacy and phone number for the current request:  [facility] [phone]    Name of the medication requested: Coumadin    Other request: Patient wants rx for Coumadin, the Rivaroxaban Dr. Zavala prescribed is too expensive    Can we leave a detailed message on this number? YES    Phone number patient can be reached at: Home number on file [phone] (home)    Best Time: Any    Call taken on 3/16/2021 at 1:55 PM by Valerie Amaro

## 2021-03-17 ENCOUNTER — TELEPHONE (OUTPATIENT)
Dept: FAMILY MEDICINE | Facility: CLINIC | Age: 71
End: 2021-03-17

## 2021-03-17 LAB — FACT V ACT/NOR PPP: 109 % (ref 60–140)

## 2021-03-17 NOTE — TELEPHONE ENCOUNTER
Pt called to see if she could exercise at her gym since she was being treated for a blood clot.  Told patient she should walk around and not sit a lot, according to handout patient received from Dr Zavala.  Gege Nava RN

## 2021-03-17 NOTE — RESULT ENCOUNTER NOTE
Please inform patient that test result was within normal parameters.   Thank you.     Lizabeth Zavala M.D.

## 2021-03-19 ENCOUNTER — APPOINTMENT (OUTPATIENT)
Dept: LAB | Facility: CLINIC | Age: 71
End: 2021-03-19
Payer: MEDICARE

## 2021-03-19 ENCOUNTER — ANTICOAGULATION THERAPY VISIT (OUTPATIENT)
Dept: ANTICOAGULATION | Facility: CLINIC | Age: 71
End: 2021-03-19

## 2021-03-19 DIAGNOSIS — I80.01 THROMBOPHLEBITIS OF SUPERFICIAL VEINS OF RIGHT LOWER EXTREMITY: ICD-10-CM

## 2021-03-19 LAB
CAPILLARY BLOOD COLLECTION: NORMAL
INR PPP: 1.4 (ref 0.86–1.14)

## 2021-03-19 PROCEDURE — 85610 PROTHROMBIN TIME: CPT | Performed by: FAMILY MEDICINE

## 2021-03-19 PROCEDURE — 36416 COLLJ CAPILLARY BLOOD SPEC: CPT | Performed by: FAMILY MEDICINE

## 2021-03-19 NOTE — PROGRESS NOTES
ANTICOAGULATION MANAGEMENT     Patient Name:  Thmoas Davila  Date:  3/19/2021    ASSESSMENT /SUBJECTIVE:    Today's INR result of 1.40 is subtherapeutic. Goal INR of 2.0-3.0      Warfarin dose taken: Warfarin taken as instructed    Diet: No new diet changes affecting INR    Medication changes/ interactions: No new medications/supplements affecting INR    Previous INR: Subtherapeutic     S/S of bleeding or thromboembolism: No    New injury or illness: No    Upcoming surgery, procedure or cardioversion: No    Additional findings: New start. Bridging on Lovenox.      PLAN:    Telephone call with Thomas regarding INR result and instructed:     Warfarin Dosing Instructions: Continue your current warfarin dose 5 mg daily. Continue Lovenox injections.    Instructed patient to follow up no later than: 3 days.  Lab visit scheduled    Education provided: Target INR goal and significance of current INR result, Importance of therapeutic range, Monitoring for clotting signs and symptoms and Lovenox/Heparin education provided: prescribed dose and frequency       Akhil verbalizes understanding and agrees to warfarin dosing plan.    Instructed to call the Anticoagulation Clinic for any changes, questions or concerns. (#[phone])        Eusebia Ceballos RN      OBJECTIVE:  Recent labs: (last 7 days)     21  1254 21  0843   INR 1.01 1.40*         No question data found.  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      Anticoagulation Care Providers     Provider Role Specialty Phone number    Lizabeth Zavala MD Referring Family Medicine [phone]

## 2021-03-22 ENCOUNTER — ANTICOAGULATION THERAPY VISIT (OUTPATIENT)
Dept: ANTICOAGULATION | Facility: CLINIC | Age: 71
End: 2021-03-22

## 2021-03-22 DIAGNOSIS — I80.01 THROMBOPHLEBITIS OF SUPERFICIAL VEINS OF RIGHT LOWER EXTREMITY: ICD-10-CM

## 2021-03-22 LAB
CAPILLARY BLOOD COLLECTION: NORMAL
INR PPP: 2 (ref 0.86–1.14)

## 2021-03-22 PROCEDURE — 36416 COLLJ CAPILLARY BLOOD SPEC: CPT | Performed by: FAMILY MEDICINE

## 2021-03-22 PROCEDURE — 85610 PROTHROMBIN TIME: CPT | Performed by: FAMILY MEDICINE

## 2021-03-22 NOTE — PROGRESS NOTES
ANTICOAGULATION MANAGEMENT     Patient Name:  Thomas Davila  Date:  3/22/2021    ASSESSMENT /SUBJECTIVE:    Today's INR result of 2.0 is therapeutic. Goal INR of 2.0-3.0      Warfarin dose taken: Warfarin taken as instructed    Diet: No new diet changes affecting INR    Medication changes/ interactions: No new medications/supplements affecting INR    Previous INR: Subtherapeutic     S/S of bleeding or thromboembolism: No    New injury or illness: No    Upcoming surgery, procedure or cardioversion: No    Additional findings: None      PLAN:    Telephone call with Thomas regarding INR result and instructed:     Warfarin Dosing Instructions: Continue your current warfarin dose 5 mg daily  Continue with BID lovenox injections until recheck     Instructed patient to follow up no later than: 3/24/21  Lab visit scheduled    Education provided: Target INR goal and significance of current INR result, Importance of therapeutic range, Importance of following up for INR monitoring at instructed interval and Importance of taking warfarin as instructed      Akhil verbalizes understanding and agrees to warfarin dosing plan.    Instructed to call the Anticoagulation Clinic for any changes, questions or concerns. (#[phone])        Maya Marrero RN      OBJECTIVE:  Recent labs: (last 7 days)     21  1254 21  0843 21  1121   INR 1.01 1.40* 2.00*         No question data found.  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      Anticoagulation Care Providers     Provider Role Specialty Phone number    Lizabeth Zavala MD Referring Family Medicine [phone]

## 2021-03-24 ENCOUNTER — ANTICOAGULATION THERAPY VISIT (OUTPATIENT)
Dept: ANTICOAGULATION | Facility: CLINIC | Age: 71
End: 2021-03-24

## 2021-03-24 DIAGNOSIS — I80.01 THROMBOPHLEBITIS OF SUPERFICIAL VEINS OF RIGHT LOWER EXTREMITY: ICD-10-CM

## 2021-03-24 LAB
CAPILLARY BLOOD COLLECTION: NORMAL
INR PPP: 2.1 (ref 0.86–1.14)

## 2021-03-24 PROCEDURE — 36416 COLLJ CAPILLARY BLOOD SPEC: CPT | Performed by: FAMILY MEDICINE

## 2021-03-24 PROCEDURE — 85610 PROTHROMBIN TIME: CPT | Performed by: FAMILY MEDICINE

## 2021-03-24 NOTE — PROGRESS NOTES
ANTICOAGULATION MANAGEMENT     Patient Name:  Thomas Davila  Date:  3/24/2021    ASSESSMENT /SUBJECTIVE:    Today's INR result of 2.1 is therapeutic. Goal INR of 2.0-3.0      Warfarin dose taken: Warfarin taken as instructed    Diet: No new diet changes affecting INR    Medication changes/ interactions: No new medications/supplements affecting INR    Previous INR: Therapeutic     S/S of bleeding or thromboembolism: No    New injury or illness: No    Upcoming surgery, procedure or cardioversion: No    Additional findings: None      PLAN:    Telephone call with Thmoas regarding INR result and instructed:     Warfarin Dosing Instructions: Continue your current warfarin dose 5 mg  Stop bridging with Enoxaparin    Instructed patient to follow up no later than: 1 week  Lab visit scheduled    Education provided: Target INR goal and significance of current INR result, Importance of therapeutic range, Importance of following up for INR monitoring at instructed interval and Importance of taking warfarin as instructed      Akhil verbalizes understanding and agrees to warfarin dosing plan.    Instructed to call the Anticoagulation Clinic for any changes, questions or concerns. (#[phone])        Maya Marrero RN      OBJECTIVE:  Recent labs: (last 7 days)     21  0843 21  1121 21  0958   INR 1.40* 2.00* 2.10*         No question data found.  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      Anticoagulation Care Providers     Provider Role Specialty Phone number    Lizabeth Zavala MD Referring Family Medicine [phone]

## 2021-03-29 ENCOUNTER — ANTICOAGULATION THERAPY VISIT (OUTPATIENT)
Dept: ANTICOAGULATION | Facility: CLINIC | Age: 71
End: 2021-03-29

## 2021-03-29 DIAGNOSIS — I80.01 THROMBOPHLEBITIS OF SUPERFICIAL VEINS OF RIGHT LOWER EXTREMITY: ICD-10-CM

## 2021-03-29 LAB
CAPILLARY BLOOD COLLECTION: NORMAL
INR PPP: 3.7 (ref 0.86–1.14)

## 2021-03-29 PROCEDURE — 36416 COLLJ CAPILLARY BLOOD SPEC: CPT | Performed by: FAMILY MEDICINE

## 2021-03-29 PROCEDURE — 85610 PROTHROMBIN TIME: CPT | Performed by: FAMILY MEDICINE

## 2021-03-29 NOTE — PROGRESS NOTES
ANTICOAGULATION MANAGEMENT     Patient Name:  Thomas Davila  Date:  3/29/2021    ASSESSMENT /SUBJECTIVE:    Today's INR result of 3.7 is therapeutic. Goal INR of 2.0-3.0      Warfarin dose taken: Warfarin taken as instructed    Diet: No new diet changes affecting INR    Medication changes/ interactions: No new medications/supplements affecting INR    Previous INR: Therapeutic     S/S of bleeding or thromboembolism: Yes: patient states she has bruising on her abdomen from the lovenox     New injury or illness: No    Upcoming surgery, procedure or cardioversion: No  Additional findings: Patient states she has developed some itching from the lovenox injections after she stopped the lovenox injections as well as some bruising.  Advised patient to take Benadryl for the itching and hives. Denies any difficulty breathing.    PLAN:    Telephone call with Thomas regarding INR result and instructed:     Warfarin Dosing Instructions: hold today then continue your current warfarin dose of 5 mg daily    Instructed patient to follow up no later than: 4/1/21  Lab visit scheduled    Education provided: Target INR goal and significance of current INR result, Importance of therapeutic range, Importance of following up for INR monitoring at instructed interval, Importance of taking warfarin as instructed, Monitoring for bleeding signs and symptoms and When to seek medical attention/emergency care      Akhil verbalizes understanding and agrees to warfarin dosing plan.    Instructed to call the Anticoagulation Clinic for any changes, questions or concerns. (#[phone])        Maya Marrero RN      OBJECTIVE:  Recent labs: (last 7 days)     03/22/21  1121 03/24/21  0958 03/29/21  0845   INR 2.00* 2.10* 3.70*         No question data found.  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      Anticoagulation Care Providers     Provider Role Specialty Phone number    Lizabeth Zavala MD Referring Family Medicine [phone]

## 2021-04-01 ENCOUNTER — ANTICOAGULATION THERAPY VISIT (OUTPATIENT)
Dept: ANTICOAGULATION | Facility: CLINIC | Age: 71
End: 2021-04-01

## 2021-04-01 DIAGNOSIS — I80.01 THROMBOPHLEBITIS OF SUPERFICIAL VEINS OF RIGHT LOWER EXTREMITY: ICD-10-CM

## 2021-04-01 LAB
CAPILLARY BLOOD COLLECTION: NORMAL
INR PPP: 1.5 (ref 0.86–1.14)

## 2021-04-01 PROCEDURE — 36416 COLLJ CAPILLARY BLOOD SPEC: CPT | Performed by: FAMILY MEDICINE

## 2021-04-01 PROCEDURE — 85610 PROTHROMBIN TIME: CPT | Performed by: FAMILY MEDICINE

## 2021-04-01 NOTE — PROGRESS NOTES
ANTICOAGULATION MANAGEMENT     Patient Name:  Thomas Davila  Date:  2021    ASSESSMENT /SUBJECTIVE:    Today's INR result of 1.5 is subtherapeutic. Goal INR of 2.0-3.0      Warfarin dose taken: Warfarin taken as instructed    Diet: No new diet changes affecting INR    Medication changes/ interactions: No new medications/supplements affecting INR    Previous INR: Supratherapeutic     S/S of bleeding or thromboembolism: No    New injury or illness: No    Upcoming surgery, procedure or cardioversion: No    Additional findings: Second COVID vaccine on 3/31/21.  Patient states she is does not comfortable taking any more lovenox injections due to continued side effects of itching at injection sites.      PLAN:    Telephone call with Thomas regarding INR result and instructed:     Warfarin Dosing Instructions: Boost today, take 2.5 mg this morning and 5 mg this evening then continue your current warfarin dose of 5 mg daily.  Consulted with Pharm D., Solange SEYMOUR    Instructed patient to follow up no later than: 21  Check at provider office visit    Education provided: Target INR goal and significance of current INR result, Importance of therapeutic range, Importance of following up for INR monitoring at instructed interval and Importance of taking warfarin as instructed      Akhil verbalizes understanding and agrees to warfarin dosing plan.    Instructed to call the Anticoagulation Clinic for any changes, questions or concerns. (#[phone])        Maya Marrero RN      OBJECTIVE:  Recent labs: (last 7 days)     21  0845 21  0837   INR 3.70* 1.50*         No question data found.  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      Anticoagulation Care Providers     Provider Role Specialty Phone number    Lizabeth Zavala MD Referring Family Medicine [phone]

## 2021-04-04 ENCOUNTER — HOSPITAL ENCOUNTER (EMERGENCY)
Facility: CLINIC | Age: 71
Discharge: HOME | End: 2021-04-04
Attending: EMERGENCY MEDICINE | Admitting: EMERGENCY MEDICINE
Payer: MEDICARE

## 2021-04-04 VITALS
TEMPERATURE: 98 F | WEIGHT: 197 LBS | BODY MASS INDEX: 34.05 KG/M2 | HEART RATE: 74 BPM | DIASTOLIC BLOOD PRESSURE: 73 MMHG | OXYGEN SATURATION: 98 % | RESPIRATION RATE: 16 BRPM | SYSTOLIC BLOOD PRESSURE: 147 MMHG

## 2021-04-04 DIAGNOSIS — T78.40XA ALLERGIC REACTION, INITIAL ENCOUNTER: ICD-10-CM

## 2021-04-04 LAB — INR PPP: 2.28 (ref 0.86–1.14)

## 2021-04-04 PROCEDURE — 99284 EMERGENCY DEPT VISIT MOD MDM: CPT | Performed by: EMERGENCY MEDICINE

## 2021-04-04 PROCEDURE — 99284 EMERGENCY DEPT VISIT MOD MDM: CPT | Mod: 25 | Performed by: EMERGENCY MEDICINE

## 2021-04-04 PROCEDURE — 96375 TX/PRO/DX INJ NEW DRUG ADDON: CPT | Performed by: EMERGENCY MEDICINE

## 2021-04-04 PROCEDURE — 96366 THER/PROPH/DIAG IV INF ADDON: CPT | Performed by: EMERGENCY MEDICINE

## 2021-04-04 PROCEDURE — 96365 THER/PROPH/DIAG IV INF INIT: CPT | Performed by: EMERGENCY MEDICINE

## 2021-04-04 PROCEDURE — 85610 PROTHROMBIN TIME: CPT | Performed by: EMERGENCY MEDICINE

## 2021-04-04 PROCEDURE — 250N000011 HC RX IP 250 OP 636: Performed by: EMERGENCY MEDICINE

## 2021-04-04 PROCEDURE — 96376 TX/PRO/DX INJ SAME DRUG ADON: CPT | Performed by: EMERGENCY MEDICINE

## 2021-04-04 PROCEDURE — 250N000012 HC RX MED GY IP 250 OP 636 PS 637: Performed by: EMERGENCY MEDICINE

## 2021-04-04 RX ORDER — FAMOTIDINE 20 MG/1
20 TABLET, FILM COATED ORAL 2 TIMES DAILY
Qty: 10 TABLET | Refills: 0 | Status: SHIPPED | OUTPATIENT
Start: 2021-04-04 | End: 2021-04-09

## 2021-04-04 RX ORDER — DIPHENHYDRAMINE HYDROCHLORIDE 50 MG/ML
25 INJECTION INTRAMUSCULAR; INTRAVENOUS ONCE
Status: COMPLETED | OUTPATIENT
Start: 2021-04-04 | End: 2021-04-04

## 2021-04-04 RX ORDER — HYDROXYZINE PAMOATE 50 MG/1
50-100 CAPSULE ORAL 4 TIMES DAILY PRN
Qty: 20 CAPSULE | Refills: 0 | Status: SHIPPED | OUTPATIENT
Start: 2021-04-04 | End: 2021-08-10

## 2021-04-04 RX ORDER — PREDNISONE 20 MG/1
60 TABLET ORAL ONCE
Status: COMPLETED | OUTPATIENT
Start: 2021-04-04 | End: 2021-04-04

## 2021-04-04 RX ORDER — PREDNISONE 20 MG/1
TABLET ORAL
Qty: 10 TABLET | Refills: 0 | Status: SHIPPED | OUTPATIENT
Start: 2021-04-04 | End: 2021-05-07

## 2021-04-04 RX ORDER — FAMOTIDINE 20 MG/1
20 TABLET, FILM COATED ORAL ONCE
Status: DISCONTINUED | OUTPATIENT
Start: 2021-04-04 | End: 2021-04-04

## 2021-04-04 RX ADMIN — DIPHENHYDRAMINE HYDROCHLORIDE 25 MG: 50 INJECTION, SOLUTION INTRAMUSCULAR; INTRAVENOUS at 08:03

## 2021-04-04 RX ADMIN — FAMOTIDINE 20 MG: 20 INJECTION, SOLUTION INTRAVENOUS at 11:12

## 2021-04-04 RX ADMIN — PREDNISONE 60 MG: 20 TABLET ORAL at 10:59

## 2021-04-04 RX ADMIN — DIPHENHYDRAMINE HYDROCHLORIDE 25 MG: 50 INJECTION, SOLUTION INTRAMUSCULAR; INTRAVENOUS at 11:01

## 2021-04-04 NOTE — ED PROVIDER NOTES
History     Chief Complaint   Patient presents with     Hives     itchy hives last 2 days, pt taking Lovenox for 2 weeks for a DVT. states she's had an itchy localized reaction in abd for two weeks that has progressed to whole body rash, itchy. states it's gotten worse and spreading to whole body since starting coumadin.      Allergic Reaction     HPI  Thomas Davila is a 71 year old female with history of DVT and recent extensive right lower extremity superficial thrombophlebitis who was recently started on Lovenox and Coumadin who presents for development of pruritic rash and ? hives.  Another potential allergen exposure is first dose of COVID-19 vaccine given this past week, several days ago.  No other signs or symptoms of allergic reaction or anaphylactic reaction.  No other known potential allergen exposures.  She was initially prescribed Xarelto, but this was too expensive and she was switched to Lovenox/Coumadin.    Reports itching at site of Lovenox injections, with localized  erythematous rash around the sites of Lovenox injection. Then ~ 3 days ago rash extended more diffusely to the abdominal wall, then to the extremities, chest and back.  No Lovenox x ~ 1 week.    Previous Records Reviewed:  Newer meds:  Coumadin and Lovenox prescribed 3/16/2021.  Phentermine prescribed 3/8/21.    3/16/2021 right lower extremity ultrasound evaluation: No DVT.  Superficial venous thrombus involving the right anterior accessory saphenous vein in the proximal thigh and right great saphenous vein at the saphenofemoral junction from the proximal to mid calf.    2/27/2021 Right lower extremity ultrasound evaluation ([facility]): No DVT.  Multiple areas of superficial venous thrombus in the great saphenous vein and in varicose veins in the distal thigh in the area of redness.    Allergies:  Allergies   Allergen Reactions     Acetaminophen      Other reaction(s): Gastrointestinal     Lovenox [Enoxaparin] Itching      Metal [Staples] Other (See Comments)     Not staples, but surgical screws     Other  [No Clinical Screening - See Comments] Other (See Comments)     Metal screws, sutures     Percocet [Oxycodone-Acetaminophen] Nausea and Vomiting       Problem List:    Patient Active Problem List    Diagnosis Date Noted     Thrombophlebitis of superficial veins of right lower extremity 03/19/2021     Priority: Medium     BMI 32.0-32.9,adult 11/06/2020     Priority: Medium     Morbid obesity (H) 12/04/2017     Priority: Medium     Nontoxic multinodular goiter 10/06/2014     Priority: Medium     Other specified prophylactic or treatment measure 07/25/2013     Priority: Medium     Malignant neoplasm of other specified sites of female genital organs 02/21/2013     Priority: Medium     Encounter for long-term current use of medication 02/13/2013     Priority: Medium     Problem list name updated by automated process. Provider to review       Cancer of endometrium 01/25/2013     Priority: Medium     1/17/13: Pap--ASCUS, +HR HPV 66.   1/31/13: MONICA performed for endometrial cancer.   2/13/13: Oncology office visit: Plan - chemotherapy with radiation following.   4/2/13: Oncology office visit: Treatment plan is chemotherapy, then Radiation, followed by more chemotherapy.  11/7/13: Surviellance visit. Next visit scheduled 3/6/14.  3/6/14: Surveillance visit. Next visit scheduled 9/4/14.    9/4/14: Surveillance visit. 6 month surveillance visit scheduled 3/5/15.   3/5/15: Surveillance visit. 6 month surveillance visit scheduled 9/14/15.   9/14/15: Surveillance visit. Next visit scheduled 3/17/16.   04/04/16: Surveillance visit. Next visit scheduled 10/03/16.   10/03/16: Surveillance visit. Next visit scheduled 04/03/17.   04/03/17: Surveillance visit. Next visit scheduled 10/02/17.       Breast cancer (H) 01/17/2013     Priority: Medium     Dx 1998  Developed DVT with Tamoxifen       Endometrial adenocarcinoma (H) 01/17/2013     Priority:  Medium     Pelvic sono with 3 cm endometrial thickening  EMB 2013  Abundant fleshy tissue removed  Pt aware that DDX: uterine cancer or endometrial polyp; if cancer then will be referred to [facility] at [facility]; if polyp, then hysterectomy and polypectomy  Best contact number  Cell [phone] (pt OK with messages left)  FINAL DIAGNOSIS:  Endometrial biopsy:  - Endometrioid adenocarcinoma, FIGO Grade I of III, arising in  background of atypical complex  hyperplasia.             Itchy eyes 2012     Priority: Medium     Plantar warts 2012     Priority: Medium     CARDIOVASCULAR SCREENING; LDL GOAL LESS THAN 130 10/31/2010     Priority: Medium     Sebaceous cyst 02/15/2010     Priority: Medium     GERD (gastroesophageal reflux disease) 2009     Priority: Medium     Essential hypertension, benign 2006     Priority: Medium     Maxide, Prinivil          Past Medical History:    Past Medical History:   Diagnosis Date     Acute parametritis and pelvic cellulitis      ASCUS with positive high risk HPV 2013     Asthma      Basal cell carcinoma      breast cancer 1998     Breast cancer (H) 1998     Chronic salpingitis and oophoritis      Embolism and thrombosis of unspecified site      Generalized osteoarthrosis, unspecified site      Leiomyoma of uterus, unspecified      Other malignant neoplasm of skin, site unspecified 2006     Squamous cell carcinoma      Thrombosis of leg      Unspecified essential hypertension        Past Surgical History:    Past Surgical History:   Procedure Laterality Date     BREAST LUMPECTOMY, RT/LT      left     C/SECTION, LOW TRANSVERSE  1972,     , Low Transverse x2     CL AFF SURGICAL PATHOLOGY      Breast reduction     COLONOSCOPY  10/15/02    normal     FOOT SURGERY      R Foot      HC EXCISION BREAST LESION, OPEN >=1  98    1) Needle localization with generous lumpectomy 2) Left axillary node dissection.     HC  LAPAROSCOPY, SURGICAL, ABDOMEN, PERITONEUM & OMENTUM; DX W/ OR W/O SPECIMEN(S)  02/07/94     HYSTERECTOMY, PAP NO LONGER INDICATED       INSERT PORT VASCULAR ACCESS  3/14/2013    Procedure: INSERT PORT VASCULAR ACCESS;  Port Revision;  Surgeon: Arash Puente MD;  Location: [facility] OR     LAPAROSCOPIC HYSTERECTOMY TOTAL, BILATERAL SALPINGO-OOPHORECTOMY, NODE DISSECTION, COMBINED  1/31/2013    Procedure: COMBINED LAPAROSCOPIC HYSTERECTOMY TOTAL, SALPINGO-OOPHORECTOMY, NODE DISSECTION;  Laparosocpic  Total Abdominal Hysterectomy, Bilateral Salphino-Oophorectomy, Bilateral Pelvic Lymph Node Dissection, Pelvic Washings, Cystoscopy;  Surgeon: Tanya Walker MD;  Location: [facility] OR     PHACOEMULSIFICATION WITH STANDARD INTRAOCULAR LENS IMPLANT Left 6/12/2019    Procedure: Cataract Removal with Implant;  Surgeon: Walt Mckeon MD;  Location: [facility] OR     PHACOEMULSIFICATION WITH STANDARD INTRAOCULAR LENS IMPLANT Right 7/3/2019    Procedure: Cataract Removal with Implant;  Surgeon: Walt Mckeon MD;  Location: [facility] OR     SURGICAL HISTORY OF -   06/22/93    1) Excision of digital cyst right mid finger  2)  Excision of dermatofibroma left calf     SURGICAL HISTORY OF -   12/18/2001    Excision of mucinous cyst & partial ostectomy, bone removal of benign osteophytic overgrowth osteophyte of the distal aspect of the middle phalanx and distal phalanx     SURGICAL HISTORY OF -   2006    Basal cell cancer removal     TONSILLECTOMY       TUBAL LIGATION  1978       Family History:    Family History   Problem Relation Age of Onset     Cerebrovascular Disease Mother      Hypertension Mother      Diabetes Mother      Thyroid Disease Mother      Arthritis Mother      Alzheimer Disease Mother      Neurologic Disorder Mother         Parkinsons     Heart Disease Father      Hypertension Father      Diabetes Father      Thyroid Disease Father      Arthritis Father      Blood Disease Father      Factor V Leiden deficiency  Father      Anesthesia Reaction Sister      Thyroid Disease Sister      Anesthesia Reaction Sister      Arthritis Sister         RA     Hypertension Brother      Factor V Leiden deficiency Brother      Allergies Son      Gastrointestinal Disease Son         GERD     Hypertension Brother      Allergies Son         percocett     Gastrointestinal Disease Son         GERD     Hypertension Son      Factor V Leiden deficiency Niece        Social History:  Marital Status:   [2]  Social History     Tobacco Use     Smoking status: Never Smoker     Smokeless tobacco: Never Used   Substance Use Topics     Alcohol use: Yes     Frequency: Never     Comment: Rare     Drug use: No        Medications:    famotidine (PEPCID) 20 MG tablet  hydrOXYzine (VISTARIL) 50 MG capsule  predniSONE (DELTASONE) 20 MG tablet  Aspirin 500 MG TABS  ASPIRIN 81 PO  enoxaparin ANTICOAGULANT (LOVENOX) 100 MG/ML syringe  lisinopril (ZESTRIL) 20 MG tablet  omeprazole (PRILOSEC) 20 MG DR capsule  phentermine (ADIPEX-P) 15 MG capsule  topiramate (TOPAMAX) 50 MG tablet  triamterene-HCTZ (MAXZIDE-25) 37.5-25 MG tablet  warfarin ANTICOAGULANT (COUMADIN) 5 MG tablet      Review of Systems  As mentioned above in the history present illness.  All other systems were reviewed and are negative.    Physical Exam   BP: (!) 147/82  Pulse: 105  Temp: 98  F (36.7  C)  Resp: 16  Weight: 89.4 kg (197 lb)  SpO2: 96 %      Physical Exam  Vitals signs and nursing note reviewed.   Constitutional:       General: She is not in acute distress.     Appearance: Normal appearance. She is well-developed. She is not ill-appearing or diaphoretic.   HENT:      Head: Normocephalic and atraumatic.      Right Ear: External ear normal.      Left Ear: External ear normal.      Nose: Nose normal.      Mouth/Throat:      Mouth: Mucous membranes are moist.      Pharynx: Oropharynx is clear.      Comments: No tongue, lip or oropharyngeal angioedema.  Eyes:      General: No scleral  icterus.     Extraocular Movements: Extraocular movements intact.      Conjunctiva/sclera: Conjunctivae normal.   Neck:      Musculoskeletal: Normal range of motion and neck supple.      Trachea: No tracheal deviation.   Cardiovascular:      Rate and Rhythm: Normal rate and regular rhythm.      Heart sounds: Normal heart sounds. No murmur. No friction rub. No gallop.    Pulmonary:      Effort: Pulmonary effort is normal. No respiratory distress.      Breath sounds: Normal breath sounds. No wheezing, rhonchi or rales.   Abdominal:      General: There is no distension.      Palpations: Abdomen is soft.      Tenderness: There is no abdominal tenderness.   Musculoskeletal: Normal range of motion.         General: No tenderness.      Right lower leg: No edema.      Left lower leg: No edema.   Skin:     General: Skin is warm and dry.      Coloration: Skin is not pale.      Findings: Rash ( Raised maculopapular rash on the back, chest and abdomen.  No angioedema.) present. No erythema.   Neurological:      General: No focal deficit present.      Mental Status: She is alert and oriented to person, place, and time.      Coordination: Coordination normal.   Psychiatric:         Mood and Affect: Mood normal.         Behavior: Behavior normal.         ED Course        Procedures                 Results for orders placed or performed during the hospital encounter of 04/04/21 (from the past 24 hour(s))   INR   Result Value Ref Range    INR 2.28 (H) 0.86 - 1.14       Medications   diphenhydrAMINE (BENADRYL) injection 25 mg (25 mg Intravenous Given 4/4/21 0803)   diphenhydrAMINE (BENADRYL) injection 25 mg (25 mg Intravenous Given 4/4/21 1101)   predniSONE (DELTASONE) tablet 60 mg (60 mg Oral Given 4/4/21 1059)   famotidine (PEPCID) infusion 20 mg (0 mg Intravenous Stopped 4/4/21 1307)       Assessments & Plan (with Medical Decision Making)   71-year-old female with a pruritic maculopapular rash on the trunk which appears to be  secondary to one of her medications.  She recently started Lovenox and Coumadin for extensive superficial thrombophlebitis after she could not afford to have Xarelto Rx filled.  Prior history of DVT.  Last dose of Lovenox appears to have been approximately 10 days ago.  She reports a similar itchy raised rash in the area of Lovenox, but did not develop generalization of this rash until the past several days.  Other relatively new medications are Phentermine started in early March and Topamax started in January of this year, both for weight gain to facilitate weight loss.  Other potential medications which caused this are triamterene-HCTZ and lisinopril, which she has been on these for quite some time.  In addition she is on omeprazole and a baby aspirin daily.  No other signs or symptoms of allergic or anaphylactic reaction. INR 2.8 and symptoms of superficial thrombophlebitis have improved.  No signs or symptoms of DVT or PE.  She was improved after Benadryl IV in the emergency department and was given prednisone and Pepcid as well.  She was observed for extended period time, remained stable and improved and was felt to be stable for discharge home with plan for her to follow-up in allergy clinic as soon as possible this coming week.  I will have her continue Coumadin as the likelihood of this being the precipitant is low.  She can stop Topamax and Phentermine as these medications are not necessary in the short-term.  Could consider stopping her antihypertensives, but I am concerned that her blood pressure with rise significantly.  I instructed she return immediately if worsening in any way.  I asked that she follow-up with her primary care provider this coming week as well.  We will continue antihistamine, Rx Vistaril to use if needed, otherwise she can use Benadryl.  Will Rx a Prednisone burst and Pepcid.  She was provided instructions for supportive care and will return as needed for worsened condition or  worsening symptoms, or new problems or concerns.      I have reviewed the nursing notes.    I have reviewed the findings, diagnosis, plan and need for follow up with the patient.    Discharge Medication List as of 4/4/2021  1:11 PM      START taking these medications    Details   famotidine (PEPCID) 20 MG tablet Take 1 tablet (20 mg) by mouth 2 times daily for 5 days, Disp-10 tablet, R-0, E-Prescribe      hydrOXYzine (VISTARIL) 50 MG capsule Take 1-2 capsules ( mg) by mouth 4 times daily as needed for itching (symptoms of allergic reaction, for or anxiety or insomnia), Disp-20 capsule, R-0, E-Prescribe      predniSONE (DELTASONE) 20 MG tablet Take 2 tablets (40 mg) by mouth once daily for 5 days., Disp-10 tablet, R-0, E-Prescribe             Final diagnoses:   Allergic reaction, initial encounter       4/4/2021   [facility] EMERGENCY DEPT     Omi Ross MD  04/04/21 [id number]

## 2021-04-04 NOTE — DISCHARGE INSTRUCTIONS
Stop Topamax and Phentermine.    Okay to continue Coumadin.    Use Benadryl 25-50 mg every 6 hours, or Vistaril/Hydroxyzine as prescribed for rash and itching (these are both antihistamines, use one or the other).    Use Prednisone as prescribed.        Use Pepcid/Famotidine as prescribed.  This works with the antihistamine to combat the allergic reaction.    Stop Omeprazole/Prilosec while using Pepcid/Famotidine as they both decrease stomach acid production.    Follow up in Allergy clinic as soon as possible this coming week.  A referral was made for you.  Recheck in your primary care clinic this coming week as well

## 2021-04-04 NOTE — ED NOTES
Pt here with itching and hives that started after her 3rd shot of lovenox. Denies difficulty breathing. Pt took benadryl last at 0330.

## 2021-04-05 ENCOUNTER — DOCUMENTATION ONLY (OUTPATIENT)
Dept: FAMILY MEDICINE | Facility: CLINIC | Age: 71
End: 2021-04-05

## 2021-04-05 ENCOUNTER — ANTICOAGULATION THERAPY VISIT (OUTPATIENT)
Dept: ANTICOAGULATION | Facility: CLINIC | Age: 71
End: 2021-04-05

## 2021-04-05 ENCOUNTER — OFFICE VISIT (OUTPATIENT)
Dept: FAMILY MEDICINE | Facility: CLINIC | Age: 71
End: 2021-04-05
Payer: MEDICARE

## 2021-04-05 VITALS
HEART RATE: 86 BPM | BODY MASS INDEX: 34.29 KG/M2 | SYSTOLIC BLOOD PRESSURE: 112 MMHG | WEIGHT: 198.4 LBS | OXYGEN SATURATION: 98 % | TEMPERATURE: 97.3 F | RESPIRATION RATE: 20 BRPM | DIASTOLIC BLOOD PRESSURE: 60 MMHG

## 2021-04-05 DIAGNOSIS — E66.811 CLASS 1 OBESITY DUE TO EXCESS CALORIES WITHOUT SERIOUS COMORBIDITY WITH BODY MASS INDEX (BMI) OF 34.0 TO 34.9 IN ADULT: ICD-10-CM

## 2021-04-05 DIAGNOSIS — I80.01 THROMBOPHLEBITIS OF SUPERFICIAL VEINS OF RIGHT LOWER EXTREMITY: ICD-10-CM

## 2021-04-05 DIAGNOSIS — L50.9 HIVES: Primary | ICD-10-CM

## 2021-04-05 DIAGNOSIS — E66.09 CLASS 1 OBESITY DUE TO EXCESS CALORIES WITHOUT SERIOUS COMORBIDITY WITH BODY MASS INDEX (BMI) OF 34.0 TO 34.9 IN ADULT: ICD-10-CM

## 2021-04-05 DIAGNOSIS — Z12.11 SCREEN FOR COLON CANCER: ICD-10-CM

## 2021-04-05 LAB
CAPILLARY BLOOD COLLECTION: NORMAL
INR PPP: 3 (ref 0.86–1.14)

## 2021-04-05 PROCEDURE — 85610 PROTHROMBIN TIME: CPT | Performed by: FAMILY MEDICINE

## 2021-04-05 PROCEDURE — 99214 OFFICE O/P EST MOD 30 MIN: CPT | Performed by: FAMILY MEDICINE

## 2021-04-05 PROCEDURE — 36416 COLLJ CAPILLARY BLOOD SPEC: CPT | Performed by: FAMILY MEDICINE

## 2021-04-05 RX ORDER — WARFARIN SODIUM 5 MG/1
TABLET ORAL
Qty: 30 TABLET | Refills: 0
Start: 2021-04-05 | End: 2021-04-12

## 2021-04-05 NOTE — PROGRESS NOTES
Chart reviewed with [facility] RN, in light of previous HX warfarin dose and lack of maintenance dose adjustment last week with INR 3.7 (smd subsequent drop in INR withhold), recommend dose adjustment ~7% to 2.5mg today, 5mg ROW.  Recheck INR end of week.  Currently also on prednisone boost due to rash/itching.      Solange Claros, PharmD BCACP  Anticoagulation Clinical Pharmacist

## 2021-04-05 NOTE — PROGRESS NOTES
Assessment & Plan     Hives  Continue with hydroxyzine. Follow up in 4 weeks to consider restarting weight loss medication    Thrombophlebitis of superficial veins of right lower extremity  See INR clinic for treatment since she was having worsening signs and symptoms on ASA,   - INR    Class 1 obesity due to excess calories without serious comorbidity with body mass index (BMI) of 34.0 to 34.9 in adult  Recheck in 4 weeks    Screen for colon cancer  Return the FIT test  - Fecal colorectal cancer screen (FIT); Future             See Patient Instructions    Return in about 4 weeks (around 5/3/2021).    Dung Prieto MD  [facility] NORBERTO Landaverde is a 71 year old who presents for the following health issues     HPI     ED/UC Followup:    Facility:  [facility]  Date of visit: 4/4/2021  Reason for visit: Hives/ Med reaction  Current Status: Patient states she is doing better but when the Hydroxyzine and Prednisone wear off she starts to itch again. Patient is on Coumadin instead of the Lovenox. Patient had recent blood clot so needs to be on a blood thinner.       Medication Followup of Phentermine and Topamax    Taking Medication as prescribed: NO-Stopped medications due to hives. Patient was in ED yesterday. Patient and ED is thinking the reaction was from the Lovenox but are not 100% sure so they suggested for her to stop the weight loss medication.    Side Effects:  None/ unknown    Medication Helping Symptoms:  yes     She had a blood clot, superficial, symptoms were getting worse so put on warfarin, on lovenox and warfarin, broke out in hives on extremities and torso.  No SOB or HEATON.  No tongue swelling.  Went to ED yesterday, stopped two other meds for weight loss, here to follow up.  Still needing hydroxyzine medication.  Needs to have INR checked today.        Review of Systems   Review Of Systems  Skin: hives are improving  Eyes:   Ears/Nose/Throat:  negative  Respiratory: No shortness of breath, dyspnea on exertion, cough, or hemoptysis  Cardiovascular: negative  Gastrointestinal: negative  Genitourinary:   Musculoskeletal:   Neurologic:   Psychiatric:   Hematologic/Lymphatic/Immunologic: negative  Endocrine:         Objective    /60   Pulse 86   Temp 97.3  F (36.3  C) (Tympanic)   Resp 20   Wt 90 kg (198 lb 6.4 oz)   SpO2 98%   BMI 34.29 kg/m    Body mass index is 34.29 kg/m .  Physical Exam   GENERAL APPEARANCE: alert, no distress and cooperative  RESP: lungs clear to auscultation - no rales, rhonchi or wheezes  CV: regular rates and rhythm, normal S1 S2, no S3 or S4 and no murmur, click or rub  MS: extremities normal- no gross deformities noted  SKIN: noted moderate hives on upper extremity and torso  NEURO:   PSYCH: mentation appears normal and affect normal/bright

## 2021-04-05 NOTE — PATIENT INSTRUCTIONS
Please go to lab.    You will get an INR done today.    Please hold the topamax and phentermine medication at this time.    Follow up in one month to recheck your weight and see if we need to restart any medication.          Thank you for choosing [facility].  You may be receiving an email and/or telephone survey request from [facility] [employer] regarding your visit today.  Please take a few minutes to respond to the survey to let us know how we are doing.      If you have questions or concerns, please contact us via [employer] or you can contact your care team at [phone].    Our Clinic hours are:  Monday 6:40 am  to 7:00 pm  Tuesday -Friday 6:40 am to 5:00 pm    The [place] outpatient lab hours are:  Monday - Friday 6:10 am to 4:45 pm  Saturdays 7:00 am to 11:00 am  Appointments are required, call [phone]    If you have clinical questions after hours or would like to schedule an appointment,  call the clinic at [phone].

## 2021-04-05 NOTE — PROGRESS NOTES
ANTICOAGULATION MANAGEMENT     Patient Name:  Thomas Davila  Date:  4/5/2021    ASSESSMENT /SUBJECTIVE:    Today's INR result of 3.00 is therapeutic. Goal INR of 2.0-3.0      Warfarin dose taken: Warfarin taken as instructed    Diet: No new diet changes affecting INR    Medication changes/ interactions: Patient started on prednisone x 5 days, stopped topamax and phentermine. Also started on pepcid and vistaril     Previous INR: Therapeutic 2.28 yesterday (INR on Thu was 1.50 and a booster dose of 7.5 mg given)    S/S of bleeding or thromboembolism: No    New injury or illness: No    Upcoming surgery, procedure or cardioversion: No    Additional findings: Patient was in ED yesterday for a rash/allergic reaction suspected to be from Lovenox but also recommended stopping Topamax and Phentermine by ED Provider. She had an office visit today with PCP and per notes those meds remain on hold, recheck in 4 weeks. Since patient already took her dose of warfarin today, will adjust dose tomorrow. Patient reports she is feeling better, itching is getting better already.       PLAN:    Telephone call with Thomas regarding INR result and instructed:     Warfarin Dosing Instructions: Change your warfarin dose to 2.5 mg every Tue; 5 mg all other days  . (7 % change)    Instructed patient to follow up no later than: 4 days  Lab visit scheduled    Education provided: Target INR goal and significance of current INR result, Potential interaction between warfarin and prednisone and When to seek medical attention/emergency care      Akhil verbalizes understanding and agrees to warfarin dosing plan.    Instructed to call the Anticoagulation Clinic for any changes, questions or concerns. (#[phone])        Nithya Muniz RN      OBJECTIVE:  Recent labs: (last 7 days)     04/01/21  0837 04/04/21  0750 04/05/21  1325   INR 1.50* 2.28* 3.00*         No question data found.  Anticoagulation Summary  As of 4/5/2021    INR goal:   2.0-3.0   TTR:  51.7 % (1 wk)   INR used for dosing:  3.00 (4/5/2021)   Warfarin maintenance plan:  2.5 mg (5 mg x 0.5) every Tue; 5 mg (5 mg x 1) all other days   Full warfarin instructions:  4/6: 2.5 mg; Otherwise 2.5 mg every Tue; 5 mg all other days   Weekly warfarin total:  32.5 mg   Plan last modified:  Nithya Muniz RN (4/5/2021)   Next INR check:  4/9/2021   Priority:  High   Target end date:  5/16/2021    Indications    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      Anticoagulation Care Providers     Provider Role Specialty Phone number    Lizabeth Zavala MD Referring Family Medicine [phone]

## 2021-04-05 NOTE — PROGRESS NOTES
ANTICOAGULATION  MANAGEMENT: Discharge Review    Thomas Davila chart reviewed for anticoagulation continuity of care    Emergency room visit on 4/4/21 for hives/allergic reaction. Allergic reaction possibly from Lovenox. Other newer medications include phentermine and topamax.     ED recommended patient continue warfarin.    Discharge disposition: Home    Results:    Recent labs: (last 7 days)     04/01/21  0837 04/04/21  0750   INR 1.50* 2.28*     Anticoagulation inpatient management:     not applicable     Anticoagulation discharge instructions:     Warfarin dosing: home regimen continued   Bridging: No   INR goal change: No      Medication changes affecting anticoagulation: Yes: prednisone 40mg daily for 5 days    Also prescribed pepcid and vistaril    Additional factors affecting anticoagulation: No    Plan     No adjustment to anticoagulation plan needed    Patient not contacted -- patient was instructed to recheck her INR today at her PCP visit.     No adjustment to Anticoagulation Calendar was required    Thuy Arroyo RN CACP

## 2021-04-09 ENCOUNTER — ANTICOAGULATION THERAPY VISIT (OUTPATIENT)
Dept: ANTICOAGULATION | Facility: CLINIC | Age: 71
End: 2021-04-09

## 2021-04-09 DIAGNOSIS — I80.01 THROMBOPHLEBITIS OF SUPERFICIAL VEINS OF RIGHT LOWER EXTREMITY: ICD-10-CM

## 2021-04-09 LAB
CAPILLARY BLOOD COLLECTION: NORMAL
INR PPP: 2.9 (ref 0.86–1.14)

## 2021-04-09 PROCEDURE — 85610 PROTHROMBIN TIME: CPT | Performed by: FAMILY MEDICINE

## 2021-04-09 PROCEDURE — 36416 COLLJ CAPILLARY BLOOD SPEC: CPT | Performed by: FAMILY MEDICINE

## 2021-04-09 NOTE — PROGRESS NOTES
ANTICOAGULATION MANAGEMENT     Patient Name:  Thomas Davila  Date:  2021    ASSESSMENT /SUBJECTIVE:    Today's INR result of 2.9 is therapeutic. Goal INR of 2.0-3.0      Warfarin dose taken: Warfarin taken as instructed    Diet: No new diet changes affecting INR    Medication changes/ interactions: patient done with prednisone burst    Previous INR: Therapeutic     S/S of bleeding or thromboembolism: No    New injury or illness: Yes: Recent ED visit for hives/ithcing which is improved    Upcoming surgery, procedure or cardioversion: No    Additional findings: None      PLAN:    Telephone call with Thomas regarding INR result and instructed:     Warfarin Dosing Instructions: Continue your current warfarin dose 2.5 mg Tuesday and 5 mg all other days    Instructed patient to follow up no later than: 2 weeks  Lab visit scheduled    Education provided: Target INR goal and significance of current INR result, Importance of therapeutic range, Importance of following up for INR monitoring at instructed interval and Importance of taking warfarin as instructed      Akhil verbalizes understanding and agrees to warfarin dosing plan.    Instructed to call the Anticoagulation Clinic for any changes, questions or concerns. (#[phone])        Maya Marrero RN      OBJECTIVE:  Recent labs: (last 7 days)     21  0750 21  1325 21  0746   INR 2.28* 3.00* 2.90*         No question data found.  Anticoagulation Summary  As of 2021    INR goal:  2.0-3.0   TTR:  67.8 % (1.6 wk)   INR used for dosin.90 (2021)   Warfarin maintenance plan:  2.5 mg (5 mg x 0.5) every Tue; 5 mg (5 mg x 1) all other days   Full warfarin instructions:  2.5 mg every Tue; 5 mg all other days   Weekly warfarin total:  32.5 mg   No change documented:  Maya Marrero RN   Plan last modified:  Nithya Muniz RN (2021)   Next INR check:  2021   Priority:  High   Target end date:  2021    Indications     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      Anticoagulation Care Providers     Provider Role Specialty Phone number    Lizabeth Zavala MD Referring Family Medicine [phone]

## 2021-04-12 ENCOUNTER — TELEPHONE (OUTPATIENT)
Dept: FAMILY MEDICINE | Facility: CLINIC | Age: 71
End: 2021-04-12

## 2021-04-12 DIAGNOSIS — I80.01 THROMBOPHLEBITIS OF SUPERFICIAL VEINS OF RIGHT LOWER EXTREMITY: ICD-10-CM

## 2021-04-12 RX ORDER — WARFARIN SODIUM 5 MG/1
TABLET ORAL
Qty: 84 TABLET | Refills: 1 | Status: SHIPPED | OUTPATIENT
Start: 2021-04-12 | End: 2021-04-23

## 2021-04-12 RX ORDER — WARFARIN SODIUM 5 MG/1
TABLET ORAL
Qty: 30 TABLET | Refills: 0 | Status: SHIPPED | OUTPATIENT
Start: 2021-04-12 | End: 2021-04-12

## 2021-04-12 NOTE — TELEPHONE ENCOUNTER
Reason for call:    Symptom or request:     Patient called stating that she has been on coumadin, however now she feels that lightheadness and high bp--176/? But not sure.  Hx of blood clot.         Best Time:  any    Can we leave a detailed message on this number?  YES     Rosalva GARCIA  Station

## 2021-04-12 NOTE — TELEPHONE ENCOUNTER
S-(situation): dizziness    B-(background): experiencing some dizziness.  Denies any sinus drainage and has not taken any decongestants.  Patient does admit she is not well hydrated.  Drinks a lot of caffeine coffee.  Patient states her bp has been higher than usual. Patient sits down and takes her bp for us, 148/90.  Patient states she is short pills for her coumadin too.  Does not seem to know the plan with her coumadin.    A-(assessment): dizziness    R-(recommendations): to try to hydrate herself.  I have refilled her coumadin for her.  If this continues she needs office visit.  S and S of stroke or what to go to the ER are gone over with the patient. Patient states she has an upcoming appt scheduled with Dr. Prieto. Mary TRIMBLE RN

## 2021-04-15 ENCOUNTER — OFFICE VISIT (OUTPATIENT)
Dept: ALLERGY | Facility: CLINIC | Age: 71
End: 2021-04-15
Attending: EMERGENCY MEDICINE
Payer: MEDICARE

## 2021-04-15 VITALS
SYSTOLIC BLOOD PRESSURE: 166 MMHG | TEMPERATURE: 98.2 F | OXYGEN SATURATION: 95 % | BODY MASS INDEX: 34.41 KG/M2 | WEIGHT: 199.08 LBS | DIASTOLIC BLOOD PRESSURE: 90 MMHG | HEART RATE: 90 BPM

## 2021-04-15 DIAGNOSIS — T50.905A ADVERSE EFFECT OF DRUG, INITIAL ENCOUNTER: Primary | ICD-10-CM

## 2021-04-15 PROCEDURE — 99203 OFFICE O/P NEW LOW 30 MIN: CPT | Performed by: ALLERGY & IMMUNOLOGY

## 2021-04-15 ASSESSMENT — ENCOUNTER SYMPTOMS
ADENOPATHY: 0
DIARRHEA: 0
HEADACHES: 0
EYE REDNESS: 0
EYE DISCHARGE: 0
FEVER: 0
RHINORRHEA: 0
MYALGIAS: 0
CHILLS: 0
SINUS PRESSURE: 0
EYE ITCHING: 1
WHEEZING: 0
SHORTNESS OF BREATH: 0
FACIAL SWELLING: 0
CHEST TIGHTNESS: 0
COUGH: 0
ARTHRALGIAS: 0
JOINT SWELLING: 0
VOMITING: 0
ACTIVITY CHANGE: 0
NAUSEA: 0

## 2021-04-15 NOTE — PROGRESS NOTES
"SUBJECTIVE:                                                                   Thomas Davila is a 71 year old female who presents today to our Allergy Clinic at [facility]; she is being seen in consultation at the request of Omi Ross MD for allergic reaction evaluation.    Years ago, she was on Lovenox and Coumadin, and didn't have a problem with it.    In April, he was diagnosed with thrombophlebitis, and started Lovenox injections along with coumadin. She was getting Lovenox for the first 7 days. Several days after, she started experiencing a local pruritus at the injection site, that later progressed into a localized erythematous, \"lumpy \"pruritic rash.  After receiving Lovenox for about 7 days, she decided to stop it because she could not tolerate it anymore.  Despite stopping the medicine the rash progressed to her chest and back.  It took several weeks for the rash to improve and finally almost resolve.  The initial rash on the abdomen persisted on the same spot for weeks as well.  She did not have any angioedema or other systemic symptoms with that rash.  She was evaluated in the emergency department on April 4, 2021.She was evaluated in the emergency department on April 4, 2021.  Macular papular rash on the back, chest, and abdomen was documented.  Prednisone and oral antihistamines helped with the rash.      Patient Active Problem List   Diagnosis     Essential hypertension, benign     GERD (gastroesophageal reflux disease)     Sebaceous cyst     CARDIOVASCULAR SCREENING; LDL GOAL LESS THAN 130     Itchy eyes     Plantar warts     Breast cancer (H)     Endometrial adenocarcinoma (H)     Cancer of endometrium     Encounter for long-term current use of medication     Other specified prophylactic or treatment measure     Nontoxic multinodular goiter     Morbid obesity (H)     BMI 32.0-32.9,adult     Thrombophlebitis of superficial veins of right lower extremity       Past " Medical History:   Diagnosis Date     Acute parametritis and pelvic cellulitis     salpingitis     ASCUS with positive high risk HPV 2013     Asthma     No recent issues     Basal cell carcinoma      breast cancer 1998    left lumpectomy, radiation, tamoxifen     Breast cancer (H)     L Breast; Lumpectomy & Radiation     Chronic salpingitis and oophoritis     PID        Embolism and thrombosis of unspecified site     left leg, due to tamoxifen therapy     Generalized osteoarthrosis, unspecified site     hands     Leiomyoma of uterus, unspecified      Other malignant neoplasm of skin, site unspecified 2006    Basal cell cancer on nose     Squamous cell carcinoma      Thrombosis of leg     Left     Unspecified essential hypertension       Problem (# of Occurrences) Relation (Name,Age of Onset)    Allergies (2) Son (1), Son (2): percocett    Alzheimer Disease (1) Mother    Anesthesia Reaction (2) Sister (3), Sister (4)    Arthritis (3) Mother, Father, Sister (4): RA    Blood Disease (1) Father    Cerebrovascular Disease (1) Mother    Diabetes (2) Mother, Father    Factor V Leiden deficiency (3) Father, Brother (Pb), Niece (Sandra)    Gastrointestinal Disease (2) Son (1): GERD, Son (2): GERD    Heart Disease (1) Father    Hypertension (5) Mother, Father, Brother (Pb), Brother (Ford), Son (2)    Neurologic Disorder (1) Mother: Parkinsons    Thyroid Disease (3) Mother, Father, Sister (3)        Past Surgical History:   Procedure Laterality Date     BREAST LUMPECTOMY, RT/LT      left     C/SECTION, LOW TRANSVERSE  1972,     , Low Transverse x2     CL AFF SURGICAL PATHOLOGY  2006    Breast reduction     COLONOSCOPY  10/15/02    normal     FOOT SURGERY      R Foot      HC EXCISION BREAST LESION, OPEN >=1  98    1) Needle localization with generous lumpectomy 2) Left axillary node dissection.     HC LAPAROSCOPY, SURGICAL, ABDOMEN, PERITONEUM & OMENTUM; DX W/ OR W/O  SPECIMEN(S)  02/07/94     HYSTERECTOMY, PAP NO LONGER INDICATED       INSERT PORT VASCULAR ACCESS  3/14/2013    Procedure: INSERT PORT VASCULAR ACCESS;  Port Revision;  Surgeon: Arash Puente MD;  Location: [facility] OR     LAPAROSCOPIC HYSTERECTOMY TOTAL, BILATERAL SALPINGO-OOPHORECTOMY, NODE DISSECTION, COMBINED  1/31/2013    Procedure: COMBINED LAPAROSCOPIC HYSTERECTOMY TOTAL, SALPINGO-OOPHORECTOMY, NODE DISSECTION;  Laparosocpic  Total Abdominal Hysterectomy, Bilateral Salphino-Oophorectomy, Bilateral Pelvic Lymph Node Dissection, Pelvic Washings, Cystoscopy;  Surgeon: Tanya Walker MD;  Location: [facility] OR     PHACOEMULSIFICATION WITH STANDARD INTRAOCULAR LENS IMPLANT Left 6/12/2019    Procedure: Cataract Removal with Implant;  Surgeon: Walt Mckeon MD;  Location: [facility] OR     PHACOEMULSIFICATION WITH STANDARD INTRAOCULAR LENS IMPLANT Right 7/3/2019    Procedure: Cataract Removal with Implant;  Surgeon: Walt Mckeon MD;  Location: [facility] OR     SURGICAL HISTORY OF -   06/22/93    1) Excision of digital cyst right mid finger  2)  Excision of dermatofibroma left calf     SURGICAL HISTORY OF -   12/18/2001    Excision of mucinous cyst & partial ostectomy, bone removal of benign osteophytic overgrowth osteophyte of the distal aspect of the middle phalanx and distal phalanx     SURGICAL HISTORY OF -   2006    Basal cell cancer removal     TONSILLECTOMY       TUBAL LIGATION  1978     Social History     Socioeconomic History     Marital status:      Spouse name: None     Number of children: 2     Years of education: None     Highest education level: None   Occupational History     Employer: sub teacher in [employer]     Employer: RETIRED   Social Needs     Financial resource strain: None     Food insecurity     Worry: None     Inability: None     Transportation needs     Medical: None     Non-medical: None   Tobacco Use     Smoking status: Never Smoker     Smokeless tobacco: Never Used    Substance and Sexual Activity     Alcohol use: Yes     Frequency: Never     Comment: Rare     Drug use: No     Sexual activity: Yes     Partners: Male   Lifestyle     Physical activity     Days per week: None     Minutes per session: None     Stress: None   Relationships     Social connections     Talks on phone: None     Gets together: None     Attends Christianity service: None     Active member of club or organization: None     Attends meetings of clubs or organizations: None     Relationship status: None     Intimate partner violence     Fear of current or ex partner: None     Emotionally abused: None     Physically abused: None     Forced sexual activity: None   Other Topics Concern     Parent/sibling w/ CABG, MI or angioplasty before 65F 55M? No   Social History Narrative    P[employer]-- DECLINES ALL BLOOD PRODUCTS        April 15, 2021    ENVIRONMENTAL HISTORY: [place] The home is heated with a forced air. They do have central air conditioning. The patient's bedroom is furnished with hard vijaya in bedroom and animals sleep in bedroom.  Pets inside the house include 2 cat(s). There is no history of cockroach or mice infestation. There is/are 0 smokers in the house.  The house does not have a basement.            Review of Systems   Constitutional: Negative for activity change, chills and fever.   HENT: Negative for congestion, dental problem, ear pain, facial swelling, nosebleeds, postnasal drip, rhinorrhea, sinus pressure and sneezing.    Eyes: Positive for itching. Negative for discharge and redness.   Respiratory: Negative for cough, chest tightness, shortness of breath and wheezing.    Cardiovascular: Negative for chest pain.   Gastrointestinal: Negative for diarrhea, nausea and vomiting.   Musculoskeletal: Negative for arthralgias, joint swelling and myalgias.   Skin: Negative for rash.   Neurological: Negative for headaches.   Hematological: Negative  for adenopathy.   Psychiatric/Behavioral: Negative for behavioral problems and self-injury.           Current Outpatient Medications:      omeprazole (PRILOSEC) 20 MG DR capsule, Take 20 mg by mouth daily, Disp: , Rfl:      triamterene-HCTZ (MAXZIDE-25) 37.5-25 MG tablet, Take 1 tablet by mouth daily, Disp: 90 tablet, Rfl: 3     warfarin ANTICOAGULANT (JANTOVEN ANTICOAGULANT) 5 MG tablet, 2.5 mg Tuesday and 5 mg all other days, Disp: 84 tablet, Rfl: 1     hydrOXYzine (VISTARIL) 50 MG capsule, Take 1-2 capsules ( mg) by mouth 4 times daily as needed for itching (symptoms of allergic reaction, for or anxiety or insomnia) (Patient not taking: Reported on 4/15/2021), Disp: 20 capsule, Rfl: 0     lisinopril (ZESTRIL) 20 MG tablet, Take 1 tablet (20 mg) by mouth daily, Disp: 90 tablet, Rfl: 3     predniSONE (DELTASONE) 20 MG tablet, Take 2 tablets (40 mg) by mouth once daily for 5 days., Disp: 10 tablet, Rfl: 0  Immunization History   Administered Date(s) Administered     COVID-19,PF,Moderna 03/03/2021, 03/31/2021     Influenza (IIV3) PF 11/10/1998, 12/12/2012     Influenza, Quad, High Dose, Pf, 65yr + 09/30/2020     TDAP Vaccine (Adacel) 07/18/2012     Allergies   Allergen Reactions     Acetaminophen      Other reaction(s): Gastrointestinal     Lovenox [Enoxaparin] Itching     Metal [Staples] Other (See Comments)     Not staples, but metal surgical screws; Sutures     Percocet [Oxycodone-Acetaminophen] Nausea and Vomiting     OBJECTIVE:                                                                 BP (!) 166/90 (BP Location: Right arm, Patient Position: Sitting, Cuff Size: Adult Large)   Pulse 90   Temp 98.2  F (36.8  C) (Tympanic)   Wt 90.3 kg (199 lb 1.2 oz)   SpO2 95%   BMI 34.41 kg/m          Physical Exam  Vitals signs and nursing note reviewed.   Constitutional:       General: She is not in acute distress.     Appearance: She is not ill-appearing, toxic-appearing or diaphoretic.   HENT:      Head:  Normocephalic and atraumatic.      Right Ear: Tympanic membrane, ear canal and external ear normal.      Left Ear: Tympanic membrane, ear canal and external ear normal.      Nose: No mucosal edema, congestion or rhinorrhea.      Right Turbinates: Not enlarged, swollen or pale.      Left Turbinates: Not enlarged, swollen or pale.      Mouth/Throat:      Lips: Pink.      Mouth: Mucous membranes are moist.      Pharynx: Oropharynx is clear. No pharyngeal swelling, oropharyngeal exudate, posterior oropharyngeal erythema or uvula swelling.   Eyes:      General:         Right eye: No discharge.         Left eye: No discharge.      Conjunctiva/sclera: Conjunctivae normal.   Neck:      Musculoskeletal: Normal range of motion.   Cardiovascular:      Rate and Rhythm: Normal rate and regular rhythm.      Heart sounds: Normal heart sounds.   Pulmonary:      Effort: Pulmonary effort is normal. No respiratory distress.      Breath sounds: Normal air entry. No decreased air movement or transmitted upper airway sounds. No decreased breath sounds, wheezing or rhonchi.   Musculoskeletal: Normal range of motion.   Lymphadenopathy:      Cervical: No cervical adenopathy.   Skin:     General: Skin is warm.      Comments: Mild dryness and postinflammatory hyperpigmentation on the lower abdomen, and there left and right quadrants.   Neurological:      Mental Status: She is alert and oriented to person, place, and time.   Psychiatric:         Mood and Affect: Mood normal.         Behavior: Behavior normal.         ASSESSMENT/PLAN:    Adverse effect of drug, initial encounter  Heparins rarely cause immediate-type hypersensitivity (IgE mediated) reactions.  Most commonly, they cause delayed hypersensitivity reactions, manifested by macular papular eruptions or eczematous plaques with pruritus, that first appeared around the sites of injection after 7 to 10 days after continuous initial treatment.  However, they can appear more rapidly on  subsequent exposures.  The patient was on Lovenox in the past.  Considering the history, description in the ED, and the fact that it took weeks for the rash to resolve, I think that what happened.    There are wide ranges of cross-reactivity that have been documented to occur with LMWHs in [facility], but fondaparinux and  danaparoid are generally well tolerated. Hypersensitivity to heparin is not cross-reactive with structurally distinct anticoagulants such as hirudins or factor Xa inhibitors, and these are often used as alternative agents.    I doubt that the rash started because of Topamax (started in January) or phentermine that she started in early March.  I think she can introduce them back, one by one, with approximately 2 weeks gap in between.      I offered the patient to consider testing for Lovenox.  Usually, in [place], delayed hypersensitivity testing involves delayed intradermal tests.  However, the negative predictive value is unknown, and a challenge could be necessary.  The patient does not think she wants to do that.  I offered her to give us a phone call if she changes her mind.       Return if symptoms worsen or fail to improve.    Thank you for allowing us to participate in the care of this patient. Please feel free to contact us if there are any questions or concerns about the patient.    Disclaimer: This note consists of symbols derived from keyboarding, dictation and/or voice recognition software. As a result, there may be errors in the script that have gone undetected. Please consider this when interpreting information found in this chart.    Trevor Elena MD, FAAAAI, FACAAI  Allergy, Asthma and Immunology    [facility]

## 2021-04-20 DIAGNOSIS — Z12.11 SCREEN FOR COLON CANCER: ICD-10-CM

## 2021-04-20 PROCEDURE — 82274 ASSAY TEST FOR BLOOD FECAL: CPT | Performed by: FAMILY MEDICINE

## 2021-04-23 ENCOUNTER — ANTICOAGULATION THERAPY VISIT (OUTPATIENT)
Dept: ANTICOAGULATION | Facility: CLINIC | Age: 71
End: 2021-04-23

## 2021-04-23 DIAGNOSIS — I80.01 THROMBOPHLEBITIS OF SUPERFICIAL VEINS OF RIGHT LOWER EXTREMITY: ICD-10-CM

## 2021-04-23 LAB
CAPILLARY BLOOD COLLECTION: NORMAL
INR PPP: 3.8 (ref 0.86–1.14)

## 2021-04-23 PROCEDURE — 85610 PROTHROMBIN TIME: CPT | Performed by: FAMILY MEDICINE

## 2021-04-23 PROCEDURE — 36416 COLLJ CAPILLARY BLOOD SPEC: CPT | Performed by: FAMILY MEDICINE

## 2021-04-23 RX ORDER — WARFARIN SODIUM 5 MG/1
TABLET ORAL
Qty: 84 TABLET | Refills: 1 | COMMUNITY
Start: 2021-04-23 | End: 2021-12-20

## 2021-04-23 NOTE — PROGRESS NOTES
ANTICOAGULATION MANAGEMENT     Patient Name:  Thomas Davila  Date:  4/23/2021    ASSESSMENT /SUBJECTIVE:    Today's INR result of 3.80 is supratherapeutic. Goal INR of 2.0-3.0      Warfarin dose taken: Warfarin taken as instructed    Diet: No new diet changes affecting INR    Medication changes/ interactions: No new medications/supplements affecting INR. Restarted topamax and phentermine (neither show warfarin interaction)    Previous INR: Therapeutic 2.90    S/S of bleeding or thromboembolism: No    New injury or illness: No    Upcoming surgery, procedure or cardioversion: No    Additional findings: None      PLAN:    Telephone call with Thomas regarding INR result and instructed:     Warfarin Dosing Instructions: Change your warfarin dose to 2.5mg Tues/Fri; 5mg all other days  . (8 % change)    Instructed patient to follow up no later than: 2 weeks  Lab visit scheduled    Education provided: Importance of notifying clinic for changes in medications or health; a sooner lab recheck maybe needed      Akhil verbalizes understanding and agrees to warfarin dosing plan.    Instructed to call the Anticoagulation Clinic for any changes, questions or concerns. (#[phone])        Thuy Arroyo RN CACP       OBJECTIVE:  Recent labs: (last 7 days)     04/23/21  0744   INR 3.80*         INR assessment SUPRA    Recheck INR In: 2 WEEKS    INR Location Clinic      Anticoagulation Summary  As of 4/23/2021    INR goal:  2.0-3.0   TTR:  36.5 % (3.6 wk)   INR used for dosing:  3.80 (4/23/2021)   Warfarin maintenance plan:  2.5 mg (5 mg x 0.5) every Tue, Fri; 5 mg (5 mg x 1) all other days   Full warfarin instructions:  2.5 mg every Tue, Fri; 5 mg all other days   Weekly warfarin total:  30 mg   Plan last modified:  Thuy Arroyo RN (4/23/2021)   Next INR check:  5/7/2021   Priority:  High   Target end date:  5/16/2021    Indications    Thrombophlebitis of superficial veins of right lower extremity [I80.01]              Anticoagulation Episode Summary     INR check location:      Preferred lab:      Send INR reminders to:  ALEJANDRO THORNTON    Comments:  * Previously on Coumadin in 2013 for a DVT. Allergy to Lovenox      Anticoagulation Care Providers     Provider Role Specialty Phone number    Lizabeth Zavala MD Referring Family Medicine [phone]

## 2021-04-25 LAB — HEMOCCULT STL QL IA: NEGATIVE

## 2021-05-06 RX ORDER — OLOPATADINE HYDROCHLORIDE 1 MG/ML
SOLUTION/ DROPS OPHTHALMIC
COMMUNITY
Start: 2020-11-06 | End: 2021-05-07

## 2021-05-06 RX ORDER — WARFARIN SODIUM 1 MG/1
TABLET ORAL
COMMUNITY
Start: 2021-03-16 | End: 2021-08-10

## 2021-05-06 RX ORDER — PHENTERMINE HYDROCHLORIDE 37.5 MG/1
TABLET ORAL EVERY 24 HOURS
COMMUNITY
End: 2021-08-10

## 2021-05-06 RX ORDER — MUPIROCIN 20 MG/G
OINTMENT TOPICAL
COMMUNITY
End: 2021-05-07

## 2021-05-06 RX ORDER — RIVAROXABAN 10 MG/1
TABLET, FILM COATED ORAL
COMMUNITY
Start: 2021-03-16 | End: 2021-08-10

## 2021-05-06 RX ORDER — PHENTERMINE HYDROCHLORIDE 15 MG/1
CAPSULE ORAL
COMMUNITY
Start: 2021-03-08 | End: 2021-08-10

## 2021-05-06 RX ORDER — QUINIDINE SULFATE 200 MG
TABLET ORAL
COMMUNITY
End: 2021-08-10

## 2021-05-06 RX ORDER — PHENTERMINE HYDROCHLORIDE 30 MG/1
CAPSULE ORAL
COMMUNITY
Start: 2020-12-17 | End: 2021-08-04

## 2021-05-06 RX ORDER — TOPIRAMATE 50 MG/1
50 TABLET, FILM COATED ORAL DAILY
COMMUNITY
Start: 2021-01-18 | End: 2021-08-10

## 2021-05-07 ENCOUNTER — ANTICOAGULATION THERAPY VISIT (OUTPATIENT)
Dept: ANTICOAGULATION | Facility: CLINIC | Age: 71
End: 2021-05-07

## 2021-05-07 ENCOUNTER — OFFICE VISIT (OUTPATIENT)
Dept: FAMILY MEDICINE | Facility: CLINIC | Age: 71
End: 2021-05-07
Payer: MEDICARE

## 2021-05-07 VITALS
BODY MASS INDEX: 33.29 KG/M2 | HEIGHT: 64 IN | HEART RATE: 91 BPM | TEMPERATURE: 98 F | DIASTOLIC BLOOD PRESSURE: 70 MMHG | OXYGEN SATURATION: 98 % | SYSTOLIC BLOOD PRESSURE: 136 MMHG | WEIGHT: 195 LBS

## 2021-05-07 DIAGNOSIS — I80.01 THROMBOPHLEBITIS OF SUPERFICIAL VEINS OF RIGHT LOWER EXTREMITY: ICD-10-CM

## 2021-05-07 PROBLEM — E66.01 MORBID OBESITY (H): Status: RESOLVED | Noted: 2017-12-04 | Resolved: 2021-05-07

## 2021-05-07 LAB
CAPILLARY BLOOD COLLECTION: NORMAL
INR PPP: 4.4 (ref 0.86–1.14)

## 2021-05-07 PROCEDURE — 99214 OFFICE O/P EST MOD 30 MIN: CPT | Performed by: FAMILY MEDICINE

## 2021-05-07 PROCEDURE — 36416 COLLJ CAPILLARY BLOOD SPEC: CPT | Performed by: FAMILY MEDICINE

## 2021-05-07 PROCEDURE — 85610 PROTHROMBIN TIME: CPT | Performed by: FAMILY MEDICINE

## 2021-05-07 RX ORDER — PHENTERMINE HYDROCHLORIDE 30 MG/1
30 CAPSULE ORAL EVERY MORNING
Qty: 30 CAPSULE | Refills: 2 | Status: SHIPPED | OUTPATIENT
Start: 2021-05-07 | End: 2021-08-10

## 2021-05-07 ASSESSMENT — MIFFLIN-ST. JEOR: SCORE: 1366.57

## 2021-05-07 NOTE — PROGRESS NOTES
"    Assessment & Plan     BMI 34.0-34.9,adult  She is struggling with her obesity.  She was on the 15 mg capsule, no side effects would like to go up to 30 mg dose.  She also is interested in surgical weight loss.  Did referral.  Her bp is controlled so I refilled her medication for 3 months. Referral is done for her to get further consultation on weight loss  - phentermine (ADIPEX-P) 30 MG capsule; Take 1 capsule (30 mg) by mouth every morning  - COMPREHENSIVE WEIGHT MANAGEMENT    Thrombophlebitis of superficial veins of right lower extremity  On blood thinner and will get INR today  - INR             BMI:   Estimated body mass index is 34 kg/m  as calculated from the following:    Height as of this encounter: 1.613 m (5' 3.5\").    Weight as of this encounter: 88.5 kg (195 lb).   Weight management plan: as above    See Patient Instructions    Return in about 3 months (around 8/7/2021).    Dung Prieto MD  [facility] NORBERTO Landaverde is a 71 year old who presents for the following health issues     HPI   Patient is here to follow up with weight loss    Medication Followup of Phentermine and Topiramate    Taking Medication as prescribed: yes    Side Effects:  None    Medication Helping Symptoms:  yes     Patient would like to have INR checked as well.         Review of Systems   Review Of Systems  Skin: she has a large panus that is bothering her, weight loss has not affected this area of her body  Eyes:   Ears/Nose/Throat: negative  Respiratory: No shortness of breath, dyspnea on exertion, cough, or hemoptysis  Cardiovascular: negative  Gastrointestinal:   Genitourinary:   Musculoskeletal:   Neurologic:   Psychiatric:   Hematologic/Lymphatic/Immunologic:   Endocrine:         Objective    /70   Pulse 91   Temp 98  F (36.7  C) (Tympanic)   Ht 1.613 m (5' 3.5\")   Wt 88.5 kg (195 lb)   SpO2 98%   BMI 34.00 kg/m    Body mass index is 34 kg/m .  Physical Exam   GENERAL " APPEARANCE: alert, no distress and cooperative  RESP: lungs clear to auscultation - no rales, rhonchi or wheezes  CV: regular rates and rhythm, normal S1 S2, no S3 or S4 and no murmur, click or rub  ABDOMEN: noted a large panus around her mid section  MS: extremities normal- no gross deformities noted  SKIN: no suspicious lesions or rashes  NEURO: Normal strength and tone, mentation intact and speech normal  PSYCH: mentation appears normal and affect normal/bright

## 2021-05-07 NOTE — PATIENT INSTRUCTIONS
Please go to lab.    Please follow up in the office in 3 months.    I have done a referral for surgical weight loss to see if you would qualify.    Thank you for choosing [facility].  You may be receiving an email and/or telephone survey request from [facility] Customer Experience regarding your visit today.  Please take a few minutes to respond to the survey to let us know how we are doing.      If you have questions or concerns, please contact us via [employer] or you can contact your care team at [phone].    Our Clinic hours are:  Monday 6:40 am  to 7:00 pm  Tuesday -Friday 6:40 am to 5:00 pm    The [place] outpatient lab hours are:  Monday - Friday 6:10 am to 4:45 pm  Saturdays 7:00 am to 11:00 am  Appointments are required, call [phone]    If you have clinical questions after hours or would like to schedule an appointment,  call the clinic at [phone].

## 2021-05-07 NOTE — PROGRESS NOTES
ANTICOAGULATION MANAGEMENT     Patient Name:  Thomas Davila  Date:  2021    ASSESSMENT /SUBJECTIVE:    Today's INR result of 4.40 is supratherapeutic. Goal INR of 2.0-3.0      Warfarin dose taken: Warfarin taken as instructed    Diet: No new diet changes affecting INR    Medication changes/ interactions: No new medications/supplements affecting INR    Previous INR: Supratherapeutic 3.80, resulting in 8% dose decrease    S/S of bleeding or thromboembolism: No    New injury or illness: No    Upcoming surgery, procedure or cardioversion: No    Additional findings: None      PLAN:    Telephone call with  Akhil regarding INR result and instructed:     Warfarin Dosing Instructions: Hold dose today then change your warfarin dose to 2.5mg Mon,Wed,Sat; 5mg all other days  . (8 % change)    Instructed patient to follow up no later than: 10 days  Patient elected to schedule next visit in 11 days due to lab times available    Education provided: Importance of notifying clinic for changes in medications or health; a sooner lab recheck maybe needed, Monitoring for bleeding signs and symptoms and When to seek medical attention/emergency care      Akhil verbalizes understanding and agrees to warfarin dosing plan.    Instructed to call the Anticoagulation Clinic for any changes, questions or concerns. (#[phone])        Thuy Arroyo RN CACP       OBJECTIVE:  Recent labs: (last 7 days)     21  0838   INR 4.40*         INR assessment SUPRA    Recheck INR In: 10 DAYS    INR Location Clinic      Anticoagulation Summary  As of 2021    INR goal:  2.0-3.0   TTR:  23.5 % (1.3 mo)   INR used for dosin.40 (2021)   Warfarin maintenance plan:  2.5 mg (5 mg x 0.5) every Mon, Wed, Sat; 5 mg (5 mg x 1) all other days   Full warfarin instructions:  : Hold; Otherwise 2.5 mg every Mon, Wed, Sat; 5 mg all other days   Weekly warfarin total:  27.5 mg   Plan last modified:  Thuy Arroyo RN (2021)    Next INR check:  5/18/2021   Priority:  High   Target end date:  5/16/2021    Indications    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 Allergy to Lovenox      Anticoagulation Care Providers     Provider Role Specialty Phone number    Lizabeth Zavala MD Referring Family Medicine [phone]

## 2021-05-18 ENCOUNTER — ANTICOAGULATION THERAPY VISIT (OUTPATIENT)
Dept: ANTICOAGULATION | Facility: CLINIC | Age: 71
End: 2021-05-18

## 2021-05-18 DIAGNOSIS — I80.01 THROMBOPHLEBITIS OF SUPERFICIAL VEINS OF RIGHT LOWER EXTREMITY: ICD-10-CM

## 2021-05-18 LAB
CAPILLARY BLOOD COLLECTION: NORMAL
INR PPP: 3.1 (ref 0.86–1.14)

## 2021-05-18 PROCEDURE — 85610 PROTHROMBIN TIME: CPT | Performed by: FAMILY MEDICINE

## 2021-05-18 PROCEDURE — 36416 COLLJ CAPILLARY BLOOD SPEC: CPT | Performed by: FAMILY MEDICINE

## 2021-05-18 NOTE — PROGRESS NOTES
ANTICOAGULATION MANAGEMENT     Patient Name:  Thomas Davila  Date:  5/18/2021    ASSESSMENT /SUBJECTIVE:    Today's INR result of 3.10 is supratherapeutic. Goal INR of 2.0-3.0      Warfarin dose taken: Warfarin taken as instructed    Diet: No new diet changes affecting INR    Medication changes/ interactions: No new medications/supplements affecting INR    Previous INR: Supratherapeutic 4.40    S/S of bleeding or thromboembolism: No    New injury or illness: No    Upcoming surgery, procedure or cardioversion: No    Additional findings: None      PLAN:    Telephone call with Thomas regarding INR result and instructed:     Warfarin Dosing Instructions: Change your warfarin dose to 5 mg every Sun, Tue, Thu; 2.5 mg all other days  . (9.1 % change)    Instructed patient to follow up no later than: 2 weeks  Lab visit scheduled    Education provided: Target INR goal and significance of current INR result      Akhil verbalizes understanding and agrees to warfarin dosing plan.    Instructed to call the Anticoagulation Clinic for any changes, questions or concerns. (#[phone])        Nithya Muniz RN      OBJECTIVE:  Recent labs: (last 7 days)     05/18/21  0730   INR 3.10*         No question data found.  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 Allergy to Lovenox      Anticoagulation Care Providers     Provider Role Specialty Phone number    Lizabeth Zavala MD Referring Family Medicine [phone]

## 2021-06-01 ENCOUNTER — ANTICOAGULATION THERAPY VISIT (OUTPATIENT)
Dept: ANTICOAGULATION | Facility: CLINIC | Age: 71
End: 2021-06-01

## 2021-06-01 DIAGNOSIS — I80.01 THROMBOPHLEBITIS OF SUPERFICIAL VEINS OF RIGHT LOWER EXTREMITY: ICD-10-CM

## 2021-06-01 LAB
CAPILLARY BLOOD COLLECTION: NORMAL
INR PPP: 2.3 (ref 0.86–1.14)

## 2021-06-01 PROCEDURE — 36416 COLLJ CAPILLARY BLOOD SPEC: CPT | Performed by: FAMILY MEDICINE

## 2021-06-01 PROCEDURE — 85610 PROTHROMBIN TIME: CPT | Performed by: FAMILY MEDICINE

## 2021-06-01 NOTE — PROGRESS NOTES
ANTICOAGULATION MANAGEMENT     Patient Name:  Thomas Davila  Date:  2021    ASSESSMENT /SUBJECTIVE:    Today's INR result of 2.3 is therapeutic. Goal INR of 2.0-3.0      Warfarin dose taken: Warfarin taken as instructed    Diet: No new diet changes affecting INR    Medication changes/ interactions: No new medications/supplements affecting INR    Previous INR: Supratherapeutic     S/S of bleeding or thromboembolism: No    New injury or illness: No    Upcoming surgery, procedure or cardioversion: No    Additional findings: None      PLAN:    Warfarin Dosing Instructions: Continue your current warfarin dose 5 mg every Sun, Tue, Thur; 2.5 mg all other days    Instructed patient to follow up no later than: 4 weeks  Lab visit scheduled    Education provided: Please call back if any changes to your diet, medications or how you've been taking warfarin    Telephone call with Thomas whom verbalizes understanding and agrees to plan    Instructed to call the Anticoagulation Clinic for any changes, questions or concerns. (#[phone])        Harper Kline RN      OBJECTIVE:  Recent labs: (last 7 days)     21  0804   INR 2.30*         No question data found.  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 Allergy to Lovenox      Anticoagulation Care Providers     Provider Role Specialty Phone number    Lizabeth Zavala MD Referring Family Medicine [phone]

## 2021-06-29 ENCOUNTER — ANTICOAGULATION THERAPY VISIT (OUTPATIENT)
Dept: ANTICOAGULATION | Facility: CLINIC | Age: 71
End: 2021-06-29

## 2021-06-29 ENCOUNTER — TELEPHONE (OUTPATIENT)
Dept: FAMILY MEDICINE | Facility: CLINIC | Age: 71
End: 2021-06-29

## 2021-06-29 DIAGNOSIS — I80.01 THROMBOPHLEBITIS OF SUPERFICIAL VEINS OF RIGHT LOWER EXTREMITY: ICD-10-CM

## 2021-06-29 DIAGNOSIS — Z79.899 ENCOUNTER FOR LONG-TERM CURRENT USE OF MEDICATION: Primary | ICD-10-CM

## 2021-06-29 DIAGNOSIS — Z83.2 FAMILY HISTORY OF FACTOR V DEFICIENCY: ICD-10-CM

## 2021-06-29 LAB
CAPILLARY BLOOD COLLECTION: NORMAL
INR PPP: 1.9 (ref 0.86–1.14)

## 2021-06-29 PROCEDURE — 36416 COLLJ CAPILLARY BLOOD SPEC: CPT | Performed by: FAMILY MEDICINE

## 2021-06-29 PROCEDURE — 85610 PROTHROMBIN TIME: CPT | Performed by: FAMILY MEDICINE

## 2021-06-29 NOTE — PROGRESS NOTES
ANTICOAGULATION MANAGEMENT     Thomas Davila 71 year old female is on warfarin with subtherapeutic INR result. (Goal INR 2.0-3.0)    Recent labs: (last 7 days)     06/29/21  0755   INR 1.90*       ASSESSMENT     Source(s): Patient/Caregiver Call       Warfarin doses taken: Warfarin taken as instructed    Diet: No new diet changes identified    New illness, injury, or hospitalization: No    Medication/supplement changes: None noted    Signs or symptoms of bleeding or clotting: No    Previous INR: Therapeutic last visit; previously outside of goal range    Additional findings: None     PLAN     Recommended plan for no diet, medication or health factor changes affecting INR     Dosing Instructions: Continue your current warfarin dose with next INR in 2 weeks       Summary  As of 6/29/2021    Full warfarin instructions:  5 mg every Sun, Tue, Thu; 2.5 mg all other days   Next INR check:  7/13/2021             Telephone call with Thomas whom verbalizes understanding and agrees to plan    Lab visit scheduled    Education provided: Contact [phone]  with any changes, questions or concerns.     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 Allergy to Lovenox         Anticoagulation Care Providers     Provider Role Specialty Phone number    Lizabeth Zavala MD Referring Family Medicine [phone]                Anticoagulation Management    Unable to reach Akhil today.    Today's INR result of 1.90 is subtherapeutic (goal INR of 2.0-3.0).  Result received from:  Clinic Lab    Follow up required to confirm warfarin dose taken and assess for changes    Left message to continue current dose of warfarin 5 mg tonight.      Anticoagulation clinic to follow up    Nithya Muniz RN

## 2021-06-29 NOTE — TELEPHONE ENCOUNTER
Patient's current INR clinic referral has a target end date of 5/16/2021 for her therapy. She is on warfarin for Thrombophlebitis of superficial veins of right lower extremity. Referral was sent on 3/16/21.  Also noted that patient was previously on warfarin in 2013 for a DVT.    Patient inquiring how long she is to remain on warfarin.     Please advise.

## 2021-07-02 DIAGNOSIS — I80.01 THROMBOPHLEBITIS OF SUPERFICIAL VEINS OF RIGHT LOWER EXTREMITY: Primary | ICD-10-CM

## 2021-07-02 DIAGNOSIS — Z79.01 LONG TERM CURRENT USE OF ANTICOAGULANT THERAPY: ICD-10-CM

## 2021-07-02 NOTE — TELEPHONE ENCOUNTER
I would continue coumadin at this time and I would like to see her in clinic to see how she is doing and discuss a recommendation.  Dung Prieto MD  Family Medicine

## 2021-07-02 NOTE — TELEPHONE ENCOUNTER
21    Dr. Prieto,     The patient has an appointment to see you on 21. Can you please sign the updated INR referral with an estimated date for expiration. The one on file is currently . Thank you!    Eusebia Ceballos RN, BSN, N  Anticoagulation Clinic   [phone]

## 2021-07-05 PROBLEM — Z83.2 FAMILY HISTORY OF FACTOR V DEFICIENCY: Status: ACTIVE | Noted: 2021-07-05

## 2021-07-13 ENCOUNTER — ANTICOAGULATION THERAPY VISIT (OUTPATIENT)
Dept: ANTICOAGULATION | Facility: CLINIC | Age: 71
End: 2021-07-13

## 2021-07-13 ENCOUNTER — LAB (OUTPATIENT)
Dept: LAB | Facility: CLINIC | Age: 71
End: 2021-07-13
Payer: MEDICARE

## 2021-07-13 DIAGNOSIS — I80.01 THROMBOPHLEBITIS OF SUPERFICIAL VEINS OF RIGHT LOWER EXTREMITY: ICD-10-CM

## 2021-07-13 DIAGNOSIS — Z79.899 ENCOUNTER FOR LONG-TERM CURRENT USE OF MEDICATION: ICD-10-CM

## 2021-07-13 DIAGNOSIS — I80.01 THROMBOPHLEBITIS OF SUPERFICIAL VEINS OF RIGHT LOWER EXTREMITY: Primary | ICD-10-CM

## 2021-07-13 DIAGNOSIS — Z83.2 FAMILY HISTORY OF FACTOR V DEFICIENCY: ICD-10-CM

## 2021-07-13 LAB — INR BLD: 1.5 (ref 0.9–1.1)

## 2021-07-13 PROCEDURE — 85610 PROTHROMBIN TIME: CPT

## 2021-07-13 PROCEDURE — 36416 COLLJ CAPILLARY BLOOD SPEC: CPT

## 2021-07-13 NOTE — PROGRESS NOTES
ANTICOAGULATION MANAGEMENT     Thomas Davila 71 year old female is on warfarin with subtherapeutic INR result. (Goal INR 2.0-3.0)    Recent labs: (last 7 days)     07/13/21  0821   INR 1.5*       ASSESSMENT     Source(s): Patient/Caregiver Call       Warfarin doses taken: Missed dose(s) may be affecting INR    Diet: No new diet changes identified    New illness, injury, or hospitalization: No    Medication/supplement changes: None noted    Signs or symptoms of bleeding or clotting: No    Previous INR: Subtherapeutic    Additional findings:  recently passed away.     PLAN     Recommended plan for no diet, medication or health factor changes affecting INR     Dosing Instructions: Booster dose then continue your current warfarin dose with next INR in 1 week       Summary  As of 7/13/2021    Full warfarin instructions:  7/13: 7.5 mg; Otherwise 5 mg every Sun, Tue, Thu; 2.5 mg all other days   Next INR check:  7/20/2021             Telephone call with Thomas who verbalizes understanding and agrees to plan    Patient offered & declined to schedule next visit. Please contact patient when reminder comes up next week to attempt to schedule the INR. Patient's  passed away so she is overwhelmed at this time.    Education provided: Please call back if any changes to your diet, medications or how you've been taking warfarin, Monitoring for clotting signs and symptoms, When to seek medical attention/emergency care and Contact [phone]  with any changes, questions or concerns.     Plan made per [facility] anticoagulation protocol    Eusebia Ceballos RN  Anticoagulation Clinic  7/13/2021    _______________________________________________________________________     Anticoagulation Episode Summary     Current INR goal:  2.0-3.0   TTR:  40.0 % (3.5 mo)   Target end date:  5/16/2021   Send INR reminders to:  ALEJANDRO THORNTON    Indications    Thrombophlebitis of superficial veins of right lower extremity  [I80.01]  Family history of factor V deficiency [Z83.2]  Encounter for long-term current use of medication [Z79.899]           Comments:  * Previously on Coumadin in 2013 for a DVT. Allergy to Lovenox         Anticoagulation Care Providers     Provider Role Specialty Phone number    Lizabeth Zavala MD Referring Family Medicine [phone]    Dung Prieto MD Referring Family Medicine [phone]

## 2021-07-16 ENCOUNTER — TELEPHONE (OUTPATIENT)
Dept: FAMILY MEDICINE | Facility: CLINIC | Age: 71
End: 2021-07-16

## 2021-07-16 NOTE — TELEPHONE ENCOUNTER
10:16 AM    Writer spoke with the patient. Verified it is ok to have the INR checked a day late.    Eusebia Ceballos RN, BSN, PHN  Anticoagulation Clinic   [place] # [id number] [phone]

## 2021-07-16 NOTE — TELEPHONE ENCOUNTER
Recommended plan for no diet, medication or health factor changes affecting INR      Dosing Instructions: Booster dose then continue your current warfarin dose with next INR in 1 week             Summary  As of 7/13/2021     Full warfarin instructions:  7/13: 7.5 mg; Otherwise 5 mg every Sun, Tue, Thu; 2.5 mg all other days   Next INR check:  7/20/2021

## 2021-07-16 NOTE — TELEPHONE ENCOUNTER
Pt called to confirm her warfarin dose and her follow up with [facility].    Read instructions below to pt.    Transferred pt to scheduling to make her appt on 7/20/21    Leticia Tate RN

## 2021-07-16 NOTE — TELEPHONE ENCOUNTER
Patient was to get her INR checked on 7-20-21 and she cannot get in until 7-21-21. Is it ok if she is one day late?

## 2021-07-21 ENCOUNTER — LAB (OUTPATIENT)
Dept: LAB | Facility: CLINIC | Age: 71
End: 2021-07-21
Payer: MEDICARE

## 2021-07-21 ENCOUNTER — NURSE TRIAGE (OUTPATIENT)
Dept: ANTICOAGULATION | Facility: CLINIC | Age: 71
End: 2021-07-21

## 2021-07-21 ENCOUNTER — ANTICOAGULATION THERAPY VISIT (OUTPATIENT)
Dept: ANTICOAGULATION | Facility: CLINIC | Age: 71
End: 2021-07-21

## 2021-07-21 DIAGNOSIS — Z83.2 FAMILY HISTORY OF FACTOR V DEFICIENCY: ICD-10-CM

## 2021-07-21 DIAGNOSIS — I80.01 THROMBOPHLEBITIS OF SUPERFICIAL VEINS OF RIGHT LOWER EXTREMITY: Primary | ICD-10-CM

## 2021-07-21 DIAGNOSIS — I80.01 THROMBOPHLEBITIS OF SUPERFICIAL VEINS OF RIGHT LOWER EXTREMITY: ICD-10-CM

## 2021-07-21 DIAGNOSIS — Z79.899 ENCOUNTER FOR LONG-TERM CURRENT USE OF MEDICATION: ICD-10-CM

## 2021-07-21 LAB — INR BLD: 2 (ref 0.9–1.1)

## 2021-07-21 PROCEDURE — 85610 PROTHROMBIN TIME: CPT

## 2021-07-21 PROCEDURE — 36416 COLLJ CAPILLARY BLOOD SPEC: CPT

## 2021-07-21 NOTE — TELEPHONE ENCOUNTER
07/21/21 10:20 AM    Writer spoke with the patient in regards to her INR result for today. Patient c/o lightheadedness. Writer advised patient to contact her PCP.    Eusebia Ceballos RN, BSN, N  Anticoagulation Clinic   [place] # [id number]  [phone]

## 2021-07-21 NOTE — TELEPHONE ENCOUNTER
"    Reason for Disposition    [1] MILD dizziness (e.g., walking normally) AND [2] has NOT been evaluated by physician for this  (Exception: dizziness caused by heat exposure, sudden standing, or poor fluid intake)    Additional Information    Negative: Severe difficulty breathing (e.g., struggling for each breath, speaks in single words)    Negative: [1] Difficulty breathing or swallowing AND [2] started suddenly after medicine, an allergic food or bee sting    Negative: Shock suspected (e.g., cold/pale/clammy skin, too weak to stand, low BP, rapid pulse)    Negative: Difficult to awaken or acting confused (e.g., disoriented, slurred speech)    Negative: [1] Weakness (i.e., paralysis, loss of muscle strength) of the face, arm or leg on one side of the body AND [2] sudden onset AND [3] present now    Negative: [1] Numbness (i.e., loss of sensation) of the face, arm or leg on one side of the body AND [2] sudden onset AND [3] present now    Negative: [1] Loss of speech or garbled speech AND [2] sudden onset AND [3] present now    Negative: Overdose (accidental or intentional) of medications    Negative: [1] Fainted > 15 minutes ago AND [2] still feels too weak or dizzy to stand    Negative: Heart beating < 50 beats per minute OR > 140 beats per minute    Negative: Sounds like a life-threatening emergency to the triager    Negative: Chest pain    Negative: Rectal bleeding, bloody stool, or tarry-black stool    Negative: [1] Vomiting AND [2] contains red blood or black (\"coffee ground\") material    Negative: Vomiting is main symptom    Negative: Diarrhea is main symptom    Negative: Headache is main symptom    Negative: Patient states that he/she is having an anxiety/panic attack    Negative: Dizziness from low blood sugar (i.e., < 60 mg/dl or 3.5 mmol/l)    Negative: Dizziness is described as a spinning sensation (i.e., vertigo)    Negative: Heat exhaustion suspected (i.e., dehydration from heat exposure)    Negative: " "Difficulty breathing    Negative: SEVERE dizziness (e.g., unable to stand, requires support to walk, feels like passing out now)    Negative: Extra heart beats OR irregular heart beating  (i.e., \"palpitations\")    Negative: [1] Drinking very little AND [2] dehydration suspected (e.g., no urine > 12 hours, very dry mouth, very lightheaded)    Negative: Patient sounds very sick or weak to the triager    Negative: [1] Dizziness caused by heat exposure, sudden standing, or poor fluid intake AND [2] no improvement after 2 hours of rest and fluids    Negative: [1] Fever > 103 F (39.4 C) AND [2] not able to get the fever down using Fever Care Advice    Negative: [1] Fever > 101 F (38.3 C) AND [2] age > 60    Negative: [1] Fever > 100.0 F (37.8 C) AND [2] bedridden (e.g., nursing home patient, CVA, chronic illness, recovering from surgery)    Negative: [1] Fever > 100.0 F (37.8 C) AND [2] diabetes mellitus or weak immune system (e.g., HIV positive, cancer chemo, splenectomy, organ transplant, chronic steroids)    Negative: [1] MODERATE dizziness (e.g., interferes with normal activities) AND [2] has NOT been evaluated by physician for this  (Exception: dizziness caused by heat exposure, sudden standing, or poor fluid intake)    Negative: Fever present > 3 days (72 hours)    Negative: Taking a medicine that could cause dizziness (e.g., blood pressure medications, diuretics)    Negative: [1] MODERATE dizziness (e.g., interferes with normal activities) AND [2] has been evaluated by physician for this    Answer Assessment - Initial Assessment Questions  1. DESCRIPTION: \"Describe your dizziness.\"      Happens with standing.  Feels like may black out.  2. LIGHTHEADED: \"Do you feel lightheaded?\" (e.g., somewhat faint, woozy, weak upon standing)      Yes, weak upon standing  3. VERTIGO: \"Do you feel like either you or the room is spinning or tilting?\" (i.e. vertigo)      Denies  4. SEVERITY: \"How bad is it?\"  \"Do you feel like you " "are going to faint?\" \"Can you stand and walk?\"    - MILD - walking normally    - MODERATE - interferes with normal activities (e.g., work, school)     - SEVERE - unable to stand, requires support to walk, feels like passing out now.       Moderate.  If gets up too fast, has to stop and steady herself.    5. ONSET:  \"When did the dizziness begin?\"      Started 3 days ago.  6. AGGRAVATING FACTORS: \"Does anything make it worse?\" (e.g., standing, change in head position)      Getting up too fast.  7. HEART RATE: \"Can you tell me your heart rate?\" \"How many beats in 15 seconds?\"  (Note: not all patients can do this)        100  8. CAUSE: \"What do you think is causing the dizziness?\"       passed away on 21.  Pt has been so busy with the  preparations that she has not been eating and drinking very much.      9. RECURRENT SYMPTOM: \"Have you had dizziness before?\" If so, ask: \"When was the last time?\" \"What happened that time?\"      Denies  10. OTHER SYMPTOMS: \"Do you have any other symptoms?\" (e.g., fever, chest pain, vomiting, diarrhea, bleeding)        Diarrhea, last night  11. PREGNANCY: \"Is there any chance you are pregnant?\" \"When was your last menstrual period?\"        NA    Protocols used: DIZZINESS - [id number]      "

## 2021-07-21 NOTE — PROGRESS NOTES
ANTICOAGULATION MANAGEMENT     Thomas Davila 71 year old female is on warfarin with therapeutic INR result. (Goal INR 2.0-3.0)    Recent labs: (last 7 days)     07/21/21  0843   INR 2.0*       ASSESSMENT     Source(s): Patient/Caregiver Call       Warfarin doses taken: Warfarin taken as instructed    Diet: No new diet changes identified    New illness, injury, or hospitalization: No    Medication/supplement changes: None noted    Signs or symptoms of bleeding or clotting: No    Previous INR: Subtherapeutic    Additional findings: Patient c/o lightheadedness. Writer advised patient to contact her PCP.     PLAN     Recommended plan for no diet, medication or health factor changes affecting INR     Dosing Instructions: Continue your current warfarin dose with next INR in 2 weeks       Summary  As of 7/21/2021    Full warfarin instructions:  5 mg every Sun, Tue, Thu; 2.5 mg all other days   Next INR check:  8/5/2021             Telephone call with Thomas who verbalizes understanding and agrees to plan    Lab visit scheduled    Education provided: Please call back if any changes to your diet, medications or how you've been taking warfarin and Contact [phone]  with any changes, questions or concerns.     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 Allergy to Lovenox         Anticoagulation Care Providers     Provider Role Specialty Phone number    Puzrxoqt,  Lizabeth Waters MD Referring Family Medicine [phone]    Dung Prieto MD Referring Family Medicine [phone]

## 2021-08-06 ENCOUNTER — ANTICOAGULATION THERAPY VISIT (OUTPATIENT)
Dept: ANTICOAGULATION | Facility: CLINIC | Age: 71
End: 2021-08-06

## 2021-08-06 ENCOUNTER — LAB (OUTPATIENT)
Dept: LAB | Facility: CLINIC | Age: 71
End: 2021-08-06

## 2021-08-06 DIAGNOSIS — Z79.899 ENCOUNTER FOR LONG-TERM CURRENT USE OF MEDICATION: ICD-10-CM

## 2021-08-06 DIAGNOSIS — Z83.2 FAMILY HISTORY OF FACTOR V DEFICIENCY: ICD-10-CM

## 2021-08-06 DIAGNOSIS — I10 ESSENTIAL HYPERTENSION, BENIGN: ICD-10-CM

## 2021-08-06 DIAGNOSIS — I80.01 THROMBOPHLEBITIS OF SUPERFICIAL VEINS OF RIGHT LOWER EXTREMITY: Primary | ICD-10-CM

## 2021-08-06 DIAGNOSIS — I80.01 THROMBOPHLEBITIS OF SUPERFICIAL VEINS OF RIGHT LOWER EXTREMITY: ICD-10-CM

## 2021-08-06 DIAGNOSIS — Z13.6 CARDIOVASCULAR SCREENING; LDL GOAL LESS THAN 130: ICD-10-CM

## 2021-08-06 LAB
ANION GAP SERPL CALCULATED.3IONS-SCNC: 8 MMOL/L (ref 3–14)
BUN SERPL-MCNC: 27 MG/DL (ref 7–30)
CALCIUM SERPL-MCNC: 8.9 MG/DL (ref 8.5–10.1)
CHLORIDE BLD-SCNC: 104 MMOL/L (ref 94–109)
CHOLEST SERPL-MCNC: 192 MG/DL
CO2 SERPL-SCNC: 25 MMOL/L (ref 20–32)
CREAT SERPL-MCNC: 0.81 MG/DL (ref 0.52–1.04)
FASTING STATUS PATIENT QL REPORTED: YES
GFR SERPL CREATININE-BSD FRML MDRD: 72 ML/MIN/1.73M2
GLUCOSE BLD-MCNC: 90 MG/DL (ref 70–99)
HDLC SERPL-MCNC: 64 MG/DL
INR BLD: 1.9 (ref 0.9–1.1)
LDLC SERPL CALC-MCNC: 113 MG/DL
NONHDLC SERPL-MCNC: 128 MG/DL
POTASSIUM BLD-SCNC: 3.5 MMOL/L (ref 3.4–5.3)
SODIUM SERPL-SCNC: 137 MMOL/L (ref 133–144)
TRIGL SERPL-MCNC: 75 MG/DL

## 2021-08-06 PROCEDURE — 36415 COLL VENOUS BLD VENIPUNCTURE: CPT

## 2021-08-06 PROCEDURE — 85610 PROTHROMBIN TIME: CPT

## 2021-08-06 PROCEDURE — 80048 BASIC METABOLIC PNL TOTAL CA: CPT

## 2021-08-06 PROCEDURE — 80061 LIPID PANEL: CPT

## 2021-08-06 NOTE — RESULT ENCOUNTER NOTE
Covering for primary/ordering provider  Cholesterol is similar to last check 1 year ago.  Kidney function, electrolytes, blood sugar is all normal.

## 2021-08-06 NOTE — LETTER
August 9, 2021      Thomas Davila  [address] [address] [id number]        Dear ,    We are writing to inform you of your test results.  Covering for primary/ordering provider   Cholesterol is similar to last check 1 year ago.  Kidney function, electrolytes, blood sugar is all normal.       Resulted Orders   Lipid panel reflex to direct LDL Fasting   Result Value Ref Range    Cholesterol 192 <200 mg/dL      Comment:      Age 0-19 years  Desirable: <170 mg/dL  Borderline high:  170-199 mg/dl  High:            >199 mg/dl    Age 20 years and older  Desirable: <200 mg/dL    Triglycerides 75 <150 mg/dL      Comment:      0-9 years:  Normal:    Less than 75 mg/dL  Borderline high:  75-99 mg/dL  High:             Greater than or equal to 100 mg/dL    0-19 years:  Normal:    Less than 90 mg/dL  Borderline high:   mg/dL  High:             Greater than or equal to 130 mg/dL    20 years and older:  Normal:    Less than 150 mg/dL  Borderline high:  150-199 mg/dL  High:             200-499 mg/dL  Very high:   Greater than or equal to 500 mg/dL    Direct Measure HDL 64 >=50 mg/dL      Comment:      0-19 years:       Greater than or equal to 45 mg/dL   Low: Less than 40 mg/dL   Borderline low: 40-44 mg/dL     20 years and older:   Female: Greater than or equal to 50 mg/dL   Male:   Greater than or equal to 40 mg/dL         LDL Cholesterol Calculated 113 (H) <=100 mg/dL      Comment:      Age 0-19 years:  Desirable: 0-110 mg/dL   Borderline high: 110-129 mg/dL   High: >= 130 mg/dL    Age 20 years and older:  Desirable: <100mg/dL  Above desirable: 100-129 mg/dL   Borderline high: 130-159 mg/dL   High: 160-189 mg/dL   Very high: >= 190 mg/dL    Non HDL Cholesterol 128 <130 mg/dL      Comment:      0-19 years:  Desirable:          Less than 120 mg/dL  Borderline high:   120-144 mg/dL  High:                   Greater than or equal to 145 mg/dL    20 years and older:  Desirable:          130 mg/dL  Above  Desirable: 130-159 mg/dL  Borderline high:   160-189 mg/dL  High:               190-219 mg/dL  Very high:     Greater than or equal to 220 mg/dL    Patient Fasting > 8hrs? Yes    Basic metabolic panel   Result Value Ref Range    Sodium 137 133 - 144 mmol/L    Potassium 3.5 3.4 - 5.3 mmol/L    Chloride 104 94 - 109 mmol/L    Carbon Dioxide (CO2) 25 20 - 32 mmol/L    Anion Gap 8 3 - 14 mmol/L    Urea Nitrogen 27 7 - 30 mg/dL    Creatinine 0.81 0.52 - 1.04 mg/dL    Calcium 8.9 8.5 - 10.1 mg/dL    Glucose 90 70 - 99 mg/dL    GFR Estimate 72 >60 mL/min/1.73m2      Comment:      As of July 11, 2021, eGFR is calculated by the CKD-EPI creatinine equation, without race adjustment. eGFR can be influenced by muscle mass, exercise, and diet. The reported eGFR is an estimation only and is only applicable if the renal function is stable.       If you have any questions or concerns, please call the clinic at the number listed above.       Sincerely,      Dung Prieto MD

## 2021-08-06 NOTE — PROGRESS NOTES
ANTICOAGULATION MANAGEMENT     Thomas Davila 71 year old female is on warfarin with subtherapeutic INR result. (Goal INR 2.0-3.0)    Recent labs: (last 7 days)     08/06/21  0840   INR 1.9*       ASSESSMENT     Source(s): Chart Review and Patient/Caregiver Call       Warfarin doses taken: Reviewed in chart    Diet:     New illness, injury, or hospitalization:     Medication/supplement changes:     Signs or symptoms of bleeding or clotting:     Previous INR: Therapeutic last visit; previously outside of goal range    Additional findings: None     PLAN     Recommended plan for no diet, medication or health factor changes affecting INR     Dosing Instructions:  Increase your warfarin dose (10% change) with next INR in 2 weeks       Summary  As of 8/6/2021    Full warfarin instructions:  2.5 mg every Mon, Wed, Sat; 5 mg all other days   Next INR check:               Detailed voice message left for Thomas with dosing instructions and follow up date.     Contact [phone]  to schedule and with any changes, questions or concerns.     Education provided: Please call back if any changes to your diet, medications or how you've been taking warfarin and Contact [phone]  with any changes, questions or concerns.     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 Allergy to Lovenox         Anticoagulation Care Providers     Provider Role Specialty Phone number    Lduoeguf,  Lizabeth Waters MD Referring Family Medicine [phone]    Dung Prieto MD Referring Family Medicine [phone]

## 2021-08-10 ENCOUNTER — OFFICE VISIT (OUTPATIENT)
Dept: FAMILY MEDICINE | Facility: CLINIC | Age: 71
End: 2021-08-10
Payer: MEDICARE

## 2021-08-10 VITALS
RESPIRATION RATE: 17 BRPM | HEART RATE: 90 BPM | WEIGHT: 185.1 LBS | DIASTOLIC BLOOD PRESSURE: 72 MMHG | TEMPERATURE: 98.3 F | HEIGHT: 64 IN | BODY MASS INDEX: 31.6 KG/M2 | SYSTOLIC BLOOD PRESSURE: 122 MMHG | OXYGEN SATURATION: 96 %

## 2021-08-10 PROCEDURE — 99214 OFFICE O/P EST MOD 30 MIN: CPT | Performed by: NURSE PRACTITIONER

## 2021-08-10 RX ORDER — PHENTERMINE HYDROCHLORIDE 30 MG/1
30 CAPSULE ORAL EVERY MORNING
Qty: 90 CAPSULE | Refills: 1 | Status: SHIPPED | OUTPATIENT
Start: 2021-08-10 | End: 2022-01-07

## 2021-08-10 RX ORDER — TOPIRAMATE 50 MG/1
50 TABLET, FILM COATED ORAL DAILY
Qty: 90 TABLET | Refills: 1 | Status: SHIPPED | OUTPATIENT
Start: 2021-08-10 | End: 2022-01-07

## 2021-08-10 ASSESSMENT — MIFFLIN-ST. JEOR: SCORE: 1321.67

## 2021-08-10 NOTE — PROGRESS NOTES
"    Assessment & Plan     BMI 34.0-34.9,adult  Patient has had 10 lbs weight loss with increase dose.  Stable on current treatment.  Refills ordered for 6 months and recommend follow-up with PCP for further refills.  - phentermine (ADIPEX-P) 30 MG capsule; Take 1 capsule (30 mg) by mouth every morning  - topiramate (TOPAMAX) 50 MG tablet; Take 1 tablet (50 mg) by mouth daily    See Patient Instructions    Return in about 6 months (around 2/10/2022) for Follow up.    Solange Marrreo NP  [facility] NORBERTO Landaverde is a 71 year old who presents for the following health issues;     HPI   Chief Complaint   Patient presents with     Weight Check     patient is taking phentermine and topamax for weight loss,  is here today for weight check.        Medication Followup of Phentermine and Topamax    Taking Medication as prescribed: yes    Side Effects:  None    Medication Helping Symptoms:  yes   Has been on Topamax and phentermine for awhile with increase in dosing with phentermine in May by her primary care provider.  Patient has had a 10 lbs weight in last 3 months.  She attributes this to her loss of her  more than the medication.      Review of Systems   CONSTITUTIONAL: NEGATIVE for fever, chills, change in weight  RESP: NEGATIVE for significant cough or SOB  CV: NEGATIVE for chest pain, palpitations or peripheral edema  PSYCHIATRIC: NEGATIVE for changes in mood or affect  ROS otherwise negative      Objective    /72   Pulse 90   Temp 98.3  F (36.8  C) (Tympanic)   Resp 17   Ht 1.613 m (5' 3.5\")   Wt 84 kg (185 lb 1.6 oz)   SpO2 96%   BMI 32.27 kg/m    Body mass index is 32.27 kg/m .  Physical Exam   GENERAL: healthy, alert and no distress  RESP: lungs clear to auscultation - no rales, rhonchi or wheezes  CV: regular rate and rhythm, normal S1 S2, no S3 or S4, no murmur, click or rub, no peripheral edema and peripheral pulses strong  MS: no gross musculoskeletal defects " noted, no edema  PSYCH: mentation appears normal, affect normal/bright

## 2021-08-11 RX ORDER — PHENTERMINE HYDROCHLORIDE 30 MG/1
30 CAPSULE ORAL EVERY MORNING
Qty: 30 CAPSULE | OUTPATIENT
Start: 2021-08-11

## 2021-08-20 ENCOUNTER — ANTICOAGULATION THERAPY VISIT (OUTPATIENT)
Dept: ANTICOAGULATION | Facility: CLINIC | Age: 71
End: 2021-08-20

## 2021-08-20 ENCOUNTER — LAB (OUTPATIENT)
Dept: LAB | Facility: CLINIC | Age: 71
End: 2021-08-20
Payer: MEDICARE

## 2021-08-20 DIAGNOSIS — Z83.2 FAMILY HISTORY OF FACTOR V DEFICIENCY: ICD-10-CM

## 2021-08-20 DIAGNOSIS — I80.01 THROMBOPHLEBITIS OF SUPERFICIAL VEINS OF RIGHT LOWER EXTREMITY: ICD-10-CM

## 2021-08-20 DIAGNOSIS — Z79.899 ENCOUNTER FOR LONG-TERM CURRENT USE OF MEDICATION: ICD-10-CM

## 2021-08-20 DIAGNOSIS — I80.01 THROMBOPHLEBITIS OF SUPERFICIAL VEINS OF RIGHT LOWER EXTREMITY: Primary | ICD-10-CM

## 2021-08-20 LAB — INR BLD: 1.5 (ref 0.9–1.1)

## 2021-08-20 PROCEDURE — 85610 PROTHROMBIN TIME: CPT

## 2021-08-20 PROCEDURE — 36416 COLLJ CAPILLARY BLOOD SPEC: CPT

## 2021-08-20 NOTE — PROGRESS NOTES
ANTICOAGULATION MANAGEMENT     Thomas Davila 71 year old female is on warfarin with subtherapeutic INR result. (Goal INR 2.0-3.0)    Recent labs: (last 7 days)     08/20/21  0903   INR 1.5*       ASSESSMENT     Source(s): Patient/Caregiver Call       Warfarin doses taken: Warfarin taken as instructed    Diet: Increased greens/vitamin K in diet; plans to resume previous intake    New illness, injury, or hospitalization: No    Medication/supplement changes: None noted    Signs or symptoms of bleeding or clotting: No    Previous INR: Subtherapeutic    Additional findings: None     PLAN     Recommended plan for no diet, medication or health factor changes affecting INR     Dosing Instructions: Booster dose then continue your current warfarin dose with next INR in 2 weeks       Summary  As of 8/20/2021    Full warfarin instructions:  8/20: 10 mg; Otherwise 2.5 mg every Mon, Wed, Sat; 5 mg all other days   Next INR check:  9/3/2021             Telephone call with Thomas who verbalizes understanding and agrees to plan    Lab visit scheduled    Education provided: Please call back if any changes to your diet, medications or how you've been taking warfarin, Importance of consistent vitamin K intake, Monitoring for clotting signs and symptoms, When to seek medical attention/emergency care and Contact [phone]  with any changes, questions or concerns.     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 Allergy to Lovenox         Anticoagulation Care Providers     Provider Role Specialty Phone number    Lizabeth Zavala MD Referring Family Medicine [phone]    Dung Prieto MD Referring Family Medicine [phone]        Anticoagulation Management    Unable to reach Akhil today.    Today's INR result of 1.5 is supratherapeutic (goal INR of 2.0-3.0).  Result received from: Clinic Lab    Follow up required to confirm warfarin dose taken and assess for changes    Left message to take a booster dose of warfarin,  10 mg tonight. Tentative plan to continue on maintenance dose and recheck in 1-2 weeks.      Anticoagulation clinic to follow up    Eusebia Ceballos RN

## 2021-09-01 ENCOUNTER — ANCILLARY PROCEDURE (OUTPATIENT)
Dept: MAMMOGRAPHY | Facility: CLINIC | Age: 71
End: 2021-09-01
Attending: FAMILY MEDICINE
Payer: MEDICARE

## 2021-09-01 DIAGNOSIS — Z12.31 VISIT FOR SCREENING MAMMOGRAM: ICD-10-CM

## 2021-09-01 PROCEDURE — 77063 BREAST TOMOSYNTHESIS BI: CPT | Mod: TC | Performed by: RADIOLOGY

## 2021-09-01 PROCEDURE — 77067 SCR MAMMO BI INCL CAD: CPT | Mod: TC | Performed by: RADIOLOGY

## 2021-09-03 ENCOUNTER — LAB (OUTPATIENT)
Dept: LAB | Facility: CLINIC | Age: 71
End: 2021-09-03
Payer: MEDICARE

## 2021-09-03 ENCOUNTER — ANTICOAGULATION THERAPY VISIT (OUTPATIENT)
Dept: FAMILY MEDICINE | Facility: CLINIC | Age: 71
End: 2021-09-03

## 2021-09-03 DIAGNOSIS — Z79.899 ENCOUNTER FOR LONG-TERM CURRENT USE OF MEDICATION: ICD-10-CM

## 2021-09-03 DIAGNOSIS — I80.01 THROMBOPHLEBITIS OF SUPERFICIAL VEINS OF RIGHT LOWER EXTREMITY: Primary | ICD-10-CM

## 2021-09-03 DIAGNOSIS — I80.01 THROMBOPHLEBITIS OF SUPERFICIAL VEINS OF RIGHT LOWER EXTREMITY: ICD-10-CM

## 2021-09-03 DIAGNOSIS — Z83.2 FAMILY HISTORY OF FACTOR V DEFICIENCY: ICD-10-CM

## 2021-09-03 LAB — INR BLD: 2.2 (ref 0.9–1.1)

## 2021-09-03 PROCEDURE — 85610 PROTHROMBIN TIME: CPT

## 2021-09-03 PROCEDURE — 36416 COLLJ CAPILLARY BLOOD SPEC: CPT

## 2021-09-03 NOTE — PROGRESS NOTES
ANTICOAGULATION MANAGEMENT     Thomas Davila 71 year old female is on warfarin with therapeutic INR result. (Goal INR 2.0-3.0)    Recent labs: (last 7 days)     09/03/21  0831   INR 2.2*       ASSESSMENT     Source(s): Chart Review and Patient/Caregiver Call       Warfarin doses taken: Warfarin taken as instructed and pt may have missed doses - but she is not sure.    Diet: No new diet changes identified    New illness, injury, or hospitalization: No    Medication/supplement changes: None noted    Signs or symptoms of bleeding or clotting: No    Previous INR: Subtherapeutic    Additional findings: None     PLAN     Recommended plan for no diet, medication or health factor changes affecting INR     Dosing Instructions: Continue your current warfarin dose with next INR in 2 weeks       Summary  As of 9/3/2021    Full warfarin instructions:  2.5 mg every Mon, Wed, Sat; 5 mg all other days   Next INR check:  9/17/2021             Telephone call with Thomas who verbalizes understanding and agrees to plan    Lab visit scheduled    Education provided: Please call back if any changes to your diet, medications or how you've been taking warfarin    Plan made per [facility] anticoagulation protocol    Harper Kline RN  Anticoagulation Clinic  9/3/2021    _______________________________________________________________________     Anticoagulation Episode Summary     Current INR goal:  2.0-3.0   TTR:  29.4 % (5.3 mo)   Target end date:  5/16/2021   Send INR reminders to:  [facility]    Indications    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 Allergy to Lovenox         Anticoagulation Care Providers     Provider Role Specialty Phone number    Lizabeth Zavala MD Referring Family Medicine [phone]    Conner  Dung MCGUIRE MD [facility] [phone]

## 2021-09-08 DIAGNOSIS — I10 ESSENTIAL HYPERTENSION: ICD-10-CM

## 2021-09-08 DIAGNOSIS — K21.9 GASTROESOPHAGEAL REFLUX DISEASE WITHOUT ESOPHAGITIS: Primary | ICD-10-CM

## 2021-09-12 ENCOUNTER — HEALTH MAINTENANCE LETTER (OUTPATIENT)
Age: 71
End: 2021-09-12

## 2021-09-14 RX ORDER — LISINOPRIL 20 MG/1
TABLET ORAL
Qty: 90 TABLET | Refills: 0 | Status: SHIPPED | OUTPATIENT
Start: 2021-09-14 | End: 2021-12-08

## 2021-09-14 RX ORDER — TRIAMTERENE/HYDROCHLOROTHIAZID 37.5-25 MG
1 TABLET ORAL DAILY
Qty: 90 TABLET | Refills: 0 | Status: SHIPPED | OUTPATIENT
Start: 2021-09-14 | End: 2021-12-08

## 2021-09-14 NOTE — TELEPHONE ENCOUNTER
"Routing refill request to provider for review/approval because:  Medication is reported/historical    Requested Prescriptions   Pending Prescriptions Disp Refills     triamterene-HCTZ (MAXZIDE-25) 37.5-25 MG tablet [Pharmacy Med Name: TRIAMTERENE/HCTZ 37.5-25MG TAB] 90 tablet 2     Sig: TAKE 1 TABLET BY MOUTH DAILY       Diuretics (Including Combos) Protocol Passed - 9/8/2021  1:01 AM        Passed - Blood pressure under 140/90 in past 12 months     BP Readings from Last 3 Encounters:   08/10/21 122/72   05/07/21 136/70   04/15/21 (!) 166/90                 Passed - Recent (12 mo) or future (30 days) visit within the authorizing provider's specialty     Patient has had an office visit with the authorizing provider or a provider within the authorizing providers department within the previous 12 mos or has a future within next 30 days. See \"Patient Info\" tab in inbasket, or \"Choose Columns\" in Meds & Orders section of the refill encounter.              Passed - Medication is active on med list        Passed - Patient is age 18 or older        Passed - No active pregancy on record        Passed - Normal serum creatinine on file in past 12 months     Recent Labs   Lab Test 08/06/21  0840   CR 0.81              Passed - Normal serum potassium on file in past 12 months     Recent Labs   Lab Test 08/06/21  0840   POTASSIUM 3.5                    Passed - Normal serum sodium on file in past 12 months     Recent Labs   Lab Test 08/06/21  0840                 Passed - No positive pregnancy test in past 12 months           lisinopril (ZESTRIL) 20 MG tablet [Pharmacy Med Name: LISINOPRIL 20MG TABLET] 90 tablet 2     Sig: TAKE 1 TABLET BY MOUTH DAILY       ACE Inhibitors (Including Combos) Protocol Passed - 9/8/2021  1:01 AM        Passed - Blood pressure under 140/90 in past 12 months     BP Readings from Last 3 Encounters:   08/10/21 122/72   05/07/21 136/70   04/15/21 (!) 166/90                 Passed - Recent (12 mo) " "or future (30 days) visit within the authorizing provider's specialty     Patient has had an office visit with the authorizing provider or a provider within the authorizing providers department within the previous 12 mos or has a future within next 30 days. See \"Patient Info\" tab in inbasket, or \"Choose Columns\" in Meds & Orders section of the refill encounter.              Passed - Medication is active on med list        Passed - Patient is age 18 or older        Passed - No active pregnancy on record        Passed - Normal serum creatinine on file in past 12 months     Recent Labs   Lab Test 08/06/21  0840   CR 0.81       Ok to refill medication if creatinine is low          Passed - Normal serum potassium on file in past 12 months     Recent Labs   Lab Test 08/06/21  0840   POTASSIUM 3.5             Passed - No positive pregnancy test within past 12 months           omeprazole (PRILOSEC) 20 MG DR capsule [Pharmacy Med Name: OMEPRAZOLE 20MG DR CAPSULE] 90 capsule 2     Sig: TAKE ONE CAPSULE BY MOUTH DAILY       PPI Protocol Passed - 9/8/2021  1:01 AM        Passed - Not on Clopidogrel (unless Pantoprazole ordered)        Passed - No diagnosis of osteoporosis on record        Passed - Recent (12 mo) or future (30 days) visit within the authorizing provider's specialty     Patient has had an office visit with the authorizing provider or a provider within the authorizing providers department within the previous 12 mos or has a future within next 30 days. See \"Patient Info\" tab in inbasket, or \"Choose Columns\" in Meds & Orders section of the refill encounter.              Passed - Medication is active on med list        Passed - Patient is age 18 or older        Passed - No active pregnacy on record        Passed - No positive pregnancy test in past 12 months                   "

## 2021-09-17 ENCOUNTER — DOCUMENTATION ONLY (OUTPATIENT)
Dept: FAMILY MEDICINE | Facility: CLINIC | Age: 71
End: 2021-09-17

## 2021-09-17 ENCOUNTER — ANTICOAGULATION THERAPY VISIT (OUTPATIENT)
Dept: ANTICOAGULATION | Facility: CLINIC | Age: 71
End: 2021-09-17

## 2021-09-17 ENCOUNTER — LAB (OUTPATIENT)
Dept: LAB | Facility: CLINIC | Age: 71
End: 2021-09-17
Payer: MEDICARE

## 2021-09-17 DIAGNOSIS — Z83.2 FAMILY HISTORY OF FACTOR V DEFICIENCY: ICD-10-CM

## 2021-09-17 DIAGNOSIS — Z79.899 ENCOUNTER FOR LONG-TERM CURRENT USE OF MEDICATION: ICD-10-CM

## 2021-09-17 DIAGNOSIS — I80.01 THROMBOPHLEBITIS OF SUPERFICIAL VEINS OF RIGHT LOWER EXTREMITY: Primary | ICD-10-CM

## 2021-09-17 DIAGNOSIS — Z79.01 LONG TERM CURRENT USE OF ANTICOAGULANT THERAPY: Primary | ICD-10-CM

## 2021-09-17 DIAGNOSIS — I80.01 THROMBOPHLEBITIS OF SUPERFICIAL VEINS OF RIGHT LOWER EXTREMITY: ICD-10-CM

## 2021-09-17 LAB — INR BLD: 1.1 (ref 0.9–1.1)

## 2021-09-17 PROCEDURE — 36416 COLLJ CAPILLARY BLOOD SPEC: CPT

## 2021-09-17 PROCEDURE — 85610 PROTHROMBIN TIME: CPT

## 2021-09-17 NOTE — PROGRESS NOTES
ANTICOAGULATION MANAGEMENT     Thomas Davila 71 year old female is on warfarin with subtherapeutic INR result. (Goal INR 2.0-3.0)    Recent labs: (last 7 days)     09/17/21  0751   INR 1.1       ASSESSMENT     Source(s): Chart Review and Patient/Caregiver Call       Warfarin doses taken: Missed dose(s) may be affecting INR    Diet: No new diet changes identified    New illness, injury, or hospitalization: No    Medication/supplement changes: None noted    Signs or symptoms of bleeding or clotting: No    Previous INR: Therapeutic last visit; previously outside of goal range    Additional findings: Patient states she thinks she only missed one dose in last 7 days     PLAN     Recommended plan for temporary change(s) affecting INR     Dosing Instructions: Booster dose then continue your current warfarin dose with next INR in 6 days       Summary  As of 9/17/2021    Full warfarin instructions:  9/17: 10 mg; Otherwise 2.5 mg every Mon, Wed, Sat; 5 mg all other days   Next INR check:               Telephone call with Thomas who verbalizes understanding and agrees to plan    Lab visit scheduled    Education provided: Please call back if any changes to your diet, medications or how you've been taking warfarin, Importance of taking warfarin as instructed, Monitoring for clotting signs and symptoms, When to seek medical attention/emergency care and Contact [phone]  with any changes, questions or concerns.     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 Allergy to Lovenox         Anticoagulation Care Providers     Provider Role Specialty Phone number    Lizabeth Zavala MD Referring Family Medicine [phone]    Dung Prieto MD Referring Family Medicine [phone]

## 2021-09-17 NOTE — PROGRESS NOTES
Dr. Prieto,    Could you please sign the pended [facility] referral? I have put indefinite as the length of therapy for now. Our current referral is . Thank you.    Idalia Green, RN, BSN, PHN

## 2021-09-20 ENCOUNTER — TELEPHONE (OUTPATIENT)
Dept: FAMILY MEDICINE | Facility: CLINIC | Age: 71
End: 2021-09-20

## 2021-09-20 NOTE — TELEPHONE ENCOUNTER
Reason for Call: Request for an order or referral:    Order or referral being requested: Patient is calling asking for a new order for a menisectomy bra and prosthetics, Patient would like them sent to [facility] in [place]. Please fax the the order [phone]     Date needed: at your convenience    Has the patient been seen by the PCP for this problem? YES    Phone number Patient can be reached at:  Home number on file [phone] (home)    Best Time:  any    Can we leave a detailed message on this number?  YES    Call taken on 9/20/2021 at 10:10 AM by Florecita Davis

## 2021-09-20 NOTE — LETTER
September 21, 2021      Thomas GRACIELA Speedyshitalbecki  [address]  [place] [address]        To Whom It May Concern,    Please order 2 mastectomy bras for this patient.  It is medically necessary for her history of breast cancer.          Sincerely,        Dung Prieto MD

## 2021-09-21 NOTE — TELEPHONE ENCOUNTER
Letter was put on Dr Prieto's desk for mastectomy Bra.   Please fax when signed.  [facility] in [place].  [phone]

## 2021-09-23 ENCOUNTER — ANTICOAGULATION THERAPY VISIT (OUTPATIENT)
Dept: ANTICOAGULATION | Facility: CLINIC | Age: 71
End: 2021-09-23

## 2021-09-23 ENCOUNTER — LAB (OUTPATIENT)
Dept: LAB | Facility: CLINIC | Age: 71
End: 2021-09-23
Payer: MEDICARE

## 2021-09-23 DIAGNOSIS — Z83.2 FAMILY HISTORY OF FACTOR V DEFICIENCY: ICD-10-CM

## 2021-09-23 DIAGNOSIS — I80.01 THROMBOPHLEBITIS OF SUPERFICIAL VEINS OF RIGHT LOWER EXTREMITY: Primary | ICD-10-CM

## 2021-09-23 DIAGNOSIS — I80.01 THROMBOPHLEBITIS OF SUPERFICIAL VEINS OF RIGHT LOWER EXTREMITY: ICD-10-CM

## 2021-09-23 DIAGNOSIS — Z79.899 ENCOUNTER FOR LONG-TERM CURRENT USE OF MEDICATION: ICD-10-CM

## 2021-09-23 DIAGNOSIS — Z79.01 LONG TERM CURRENT USE OF ANTICOAGULANT THERAPY: ICD-10-CM

## 2021-09-23 LAB — INR BLD: 1.7 (ref 0.9–1.1)

## 2021-09-23 PROCEDURE — 36416 COLLJ CAPILLARY BLOOD SPEC: CPT

## 2021-09-23 PROCEDURE — 85610 PROTHROMBIN TIME: CPT

## 2021-09-23 NOTE — PROGRESS NOTES
ANTICOAGULATION MANAGEMENT     Thomas Davila 71 year old female is on warfarin with subtherapeutic INR result. (Goal INR 2.0-3.0)    Recent labs: (last 7 days)     09/23/21  0802   INR 1.7*       ASSESSMENT     Source(s): Chart Review and Patient/Caregiver Call       Warfarin doses taken: Warfarin taken as instructed, denies missed doses in the last week (prior INR was low due to at least 1 missed dose)    Diet: No new diet changes identified    New illness, injury, or hospitalization: No    Medication/supplement changes: None noted    Signs or symptoms of bleeding or clotting: No    Previous INR: Subtherapeutic    Additional findings: None     PLAN     Recommended plan for no diet, medication or health factor changes affecting INR     Dosing Instructions: Booster dose then Increase your warfarin dose (9% change) with next INR in 1 week       Summary  As of 9/23/2021    Full warfarin instructions:  9/23: 7.5 mg; Otherwise 2.5 mg every Wed, Sat; 5 mg all other days   Next INR check:  10/1/2021             Telephone call with  Akhil who verbalizes understanding and agrees to plan    Lab visit scheduled    Education provided: Contact [phone]  with any changes, questions or concerns.     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 Allergy to Lovenox         Anticoagulation Care Providers     Provider  Role Specialty Phone number    Lizabeth Zavala MD Referring Family Medicine [phone]    Dung Prieto MD Referring Family Medicine [phone]

## 2021-10-01 ENCOUNTER — ANTICOAGULATION THERAPY VISIT (OUTPATIENT)
Dept: ANTICOAGULATION | Facility: CLINIC | Age: 71
End: 2021-10-01

## 2021-10-01 ENCOUNTER — LAB (OUTPATIENT)
Dept: LAB | Facility: CLINIC | Age: 71
End: 2021-10-01
Payer: MEDICARE

## 2021-10-01 ENCOUNTER — TELEPHONE (OUTPATIENT)
Dept: FAMILY MEDICINE | Facility: CLINIC | Age: 71
End: 2021-10-01

## 2021-10-01 DIAGNOSIS — Z79.899 ENCOUNTER FOR LONG-TERM CURRENT USE OF MEDICATION: ICD-10-CM

## 2021-10-01 DIAGNOSIS — Z83.2 FAMILY HISTORY OF FACTOR V DEFICIENCY: ICD-10-CM

## 2021-10-01 DIAGNOSIS — Z79.01 LONG TERM CURRENT USE OF ANTICOAGULANT THERAPY: ICD-10-CM

## 2021-10-01 DIAGNOSIS — I80.01 THROMBOPHLEBITIS OF SUPERFICIAL VEINS OF RIGHT LOWER EXTREMITY: Primary | ICD-10-CM

## 2021-10-01 DIAGNOSIS — I80.01 THROMBOPHLEBITIS OF SUPERFICIAL VEINS OF RIGHT LOWER EXTREMITY: ICD-10-CM

## 2021-10-01 LAB — INR BLD: 2.2 (ref 0.9–1.1)

## 2021-10-01 PROCEDURE — 36416 COLLJ CAPILLARY BLOOD SPEC: CPT

## 2021-10-01 PROCEDURE — 85610 PROTHROMBIN TIME: CPT

## 2021-10-01 NOTE — PROGRESS NOTES
ANTICOAGULATION MANAGEMENT     Thomas Davila 71 year old female is on warfarin with therapeutic INR result. (Goal INR 2.0-3.0)    Recent labs: (last 7 days)     10/01/21  0800   INR 2.2*       ASSESSMENT     Source(s): Chart Review and Patient/Caregiver Call       Warfarin doses taken: Warfarin taken as instructed    Diet: No new diet changes identified    New illness, injury, or hospitalization: No    Medication/supplement changes: None noted    Signs or symptoms of bleeding or clotting: No    Previous INR: Therapeutic last 2(+) visits    Additional findings: None     PLAN     Recommended plan for no diet, medication or health factor changes affecting INR     Dosing Instructions: Continue your current warfarin dose with next INR in 2 weeks       Summary  As of 10/1/2021    Full warfarin instructions:  2.5 mg every Wed, Sat; 5 mg all other days   Next INR check:               Telephone call with Thomas who verbalizes understanding and agrees to plan    Lab visit scheduled    Education provided: Please call back if any changes to your diet, medications or how you've been taking warfarin    Plan made per [facility] anticoagulation protocol    Lyssa Kirby RN  Anticoagulation Clinic  10/1/2021    _______________________________________________________________________     Anticoagulation Episode Summary     Current INR goal:  2.0-3.0   TTR:  28.0 % (6.2 mo)   Target end date:  Indefinite   Send INR reminders to:  [facility]    Indications    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 Allergy to Lovenox         Anticoagulation Care Providers     Provider Role Specialty Phone number    Lizabeth Zavala MD Referring Family Medicine [phone]     Dung Prieto MD [facility] [phone]

## 2021-10-15 ENCOUNTER — IMMUNIZATION (OUTPATIENT)
Dept: FAMILY MEDICINE | Facility: CLINIC | Age: 71
End: 2021-10-15
Payer: MEDICARE

## 2021-10-15 ENCOUNTER — ANTICOAGULATION THERAPY VISIT (OUTPATIENT)
Dept: FAMILY MEDICINE | Facility: CLINIC | Age: 71
End: 2021-10-15

## 2021-10-15 ENCOUNTER — LAB (OUTPATIENT)
Dept: LAB | Facility: CLINIC | Age: 71
End: 2021-10-15
Payer: MEDICARE

## 2021-10-15 DIAGNOSIS — Z79.01 LONG TERM CURRENT USE OF ANTICOAGULANT THERAPY: ICD-10-CM

## 2021-10-15 DIAGNOSIS — Z79.899 ENCOUNTER FOR LONG-TERM CURRENT USE OF MEDICATION: ICD-10-CM

## 2021-10-15 DIAGNOSIS — I80.01 THROMBOPHLEBITIS OF SUPERFICIAL VEINS OF RIGHT LOWER EXTREMITY: Primary | ICD-10-CM

## 2021-10-15 DIAGNOSIS — Z83.2 FAMILY HISTORY OF FACTOR V DEFICIENCY: ICD-10-CM

## 2021-10-15 DIAGNOSIS — Z23 NEED FOR PROPHYLACTIC VACCINATION AND INOCULATION AGAINST INFLUENZA: Primary | ICD-10-CM

## 2021-10-15 DIAGNOSIS — I80.01 THROMBOPHLEBITIS OF SUPERFICIAL VEINS OF RIGHT LOWER EXTREMITY: ICD-10-CM

## 2021-10-15 LAB — INR BLD: 1.9 (ref 0.9–1.1)

## 2021-10-15 PROCEDURE — 36416 COLLJ CAPILLARY BLOOD SPEC: CPT

## 2021-10-15 PROCEDURE — 99207 PR NO CHARGE NURSE ONLY: CPT

## 2021-10-15 PROCEDURE — 90662 IIV NO PRSV INCREASED AG IM: CPT

## 2021-10-15 PROCEDURE — 85610 PROTHROMBIN TIME: CPT

## 2021-10-15 PROCEDURE — G0008 ADMIN INFLUENZA VIRUS VAC: HCPCS

## 2021-10-15 NOTE — PROGRESS NOTES
ANTICOAGULATION MANAGEMENT     Thomas Davila 71 year old female is on warfarin with subtherapeutic INR result. (Goal INR 2.0-3.0)    Recent labs: (last 7 days)     10/15/21  0812   INR 1.9*       ASSESSMENT     Source(s): Chart Review and Patient/Caregiver Call       Warfarin doses taken: Warfarin taken as instructed    Diet: No new diet changes identified    New illness, injury, or hospitalization: No    Medication/supplement changes: None noted    Signs or symptoms of bleeding or clotting: Yes: bloody nose today from her cat - no concerns at this time.     Previous INR: Therapeutic last visit; previously outside of goal range    Additional findings: None     PLAN     Recommended plan for temporary change(s) affecting INR     Dosing Instructions:  Increase your warfarin dose (8.3% change) with next INR in 2 weeks       Summary  As of 10/15/2021    Full warfarin instructions:  2.5 mg every Wed; 5 mg all other days   Next INR check:  10/29/2021             Telephone call with Thomas who verbalizes understanding and agrees to plan    Lab visit scheduled    Education provided: Please call back if any changes to your diet, medications or how you've been taking warfarin and Monitoring for clotting signs and symptoms    Plan made per [facility] anticoagulation protocol    Harper Kline RN  Anticoagulation Clinic  10/15/2021    _______________________________________________________________________     Anticoagulation Episode Summary     Current INR goal:  2.0-3.0   TTR:  30.7 % (6.7 mo)   Target end date:  Indefinite   Send INR reminders to:  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 Allergy to Lovenox         Anticoagulation Care  Providers     Provider Role Specialty Phone number    Lizabeth Zavala MD Referring Family Medicine [phone]    Dung Prieto MD Referring Family Medicine [phone]

## 2021-10-28 ENCOUNTER — ANTICOAGULATION THERAPY VISIT (OUTPATIENT)
Dept: ANTICOAGULATION | Facility: CLINIC | Age: 71
End: 2021-10-28

## 2021-10-28 ENCOUNTER — LAB (OUTPATIENT)
Dept: LAB | Facility: CLINIC | Age: 71
End: 2021-10-28
Payer: MEDICARE

## 2021-10-28 DIAGNOSIS — I80.01 THROMBOPHLEBITIS OF SUPERFICIAL VEINS OF RIGHT LOWER EXTREMITY: Primary | ICD-10-CM

## 2021-10-28 DIAGNOSIS — Z83.2 FAMILY HISTORY OF FACTOR V DEFICIENCY: ICD-10-CM

## 2021-10-28 DIAGNOSIS — Z79.899 ENCOUNTER FOR LONG-TERM CURRENT USE OF MEDICATION: ICD-10-CM

## 2021-10-28 DIAGNOSIS — I80.01 THROMBOPHLEBITIS OF SUPERFICIAL VEINS OF RIGHT LOWER EXTREMITY: ICD-10-CM

## 2021-10-28 DIAGNOSIS — Z79.01 LONG TERM CURRENT USE OF ANTICOAGULANT THERAPY: ICD-10-CM

## 2021-10-28 LAB — INR BLD: 2.5 (ref 0.9–1.1)

## 2021-10-28 PROCEDURE — 85610 PROTHROMBIN TIME: CPT

## 2021-10-28 PROCEDURE — 36416 COLLJ CAPILLARY BLOOD SPEC: CPT

## 2021-10-28 NOTE — PROGRESS NOTES
ANTICOAGULATION MANAGEMENT     Thomas Davila 71 year old female is on warfarin with therapeutic INR result. (Goal INR 2.0-3.0)    Recent labs: (last 7 days)     10/28/21  1031   INR 2.5*       ASSESSMENT     Source(s): Chart Review and Patient/Caregiver Call       Warfarin doses taken: Reviewed in chart    Diet:     New illness, injury, or hospitalization:     Medication/supplement changes:     Signs or symptoms of bleeding or clotting:     Previous INR: Subtherapeutic    Additional findings: DVM left per calendar comments     PLAN     Recommended plan for no diet, medication or health factor changes affecting INR     Dosing Instructions: Continue your current warfarin dose with next INR in 3 weeks       Summary  As of 10/28/2021    Full warfarin instructions:  2.5 mg every Wed; 5 mg all other days   Next INR check:  11/18/2021             Detailed voice message left for Thomas with dosing instructions and follow up date.     Contact [phone]  to schedule and with any changes, questions or concerns.     Education provided: Please call back if any changes to your diet, medications or how you've been taking warfarin, Importance of notifying clinic for changes in medications; a sooner lab recheck maybe needed., Importance of notifying clinic for diarrhea, nausea/vomiting, reduced intake, and/or illness; a sooner lab recheck maybe needed. and Contact [phone]  with any changes, questions or concerns.     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 Allergy to Lovenox         Anticoagulation Care Providers     Provider Role Specialty Phone number    Lizabeth Zavala MD Referring Family Medicine [phone]    Dung Prieto MD Referring Family Medicine [phone]

## 2021-10-29 NOTE — PROGRESS NOTES
10/29/21    Writer left a VM with the patient reviewing the dose and recheck date. Requested a call back with any further questions. [facility] may want to reach out to verify the patient did receive the message.    Eusebia Ceballos RN, BSN, PHN  Anticoagulation Clinic   [place] # [id number]  [phone]

## 2021-11-01 NOTE — PROGRESS NOTES
left for pt to return call to [facility] [phone].  Harper Kline RN on 11/1/2021 at 11:00 AM

## 2021-11-01 NOTE — PROGRESS NOTES
ANTICOAGULATION MANAGEMENT     Thomas Davila 71 year old female is on warfarin with therapeutic INR result. (Goal INR 2.0-3.0)    Recent labs: (last 7 days)     10/28/21  1031   INR 2.5*       ASSESSMENT     Source(s): Chart Review and Patient/Caregiver Call       Warfarin doses taken: Warfarin taken as instructed    Diet: No new diet changes identified    New illness, injury, or hospitalization: No    Medication/supplement changes: None noted    Signs or symptoms of bleeding or clotting: No    Previous INR: Subtherapeutic    Additional findings: None     PLAN     Recommended plan for no diet, medication or health factor changes affecting INR     Dosing Instructions: Continue your current warfarin dose with next INR in 3 weeks       Summary  As of 10/28/2021    Full warfarin instructions:  2.5 mg every Wed; 5 mg all other days   Next INR check:  11/18/2021             Telephone call with Thomas who verbalizes understanding and agrees to plan    Lab visit scheduled    Education provided: Please call back if any changes to your diet, medications or how you've been taking warfarin    Plan made per [facility] anticoagulation protocol    Harper Kline RN  Anticoagulation Clinic  11/1/2021    _______________________________________________________________________     Anticoagulation Episode Summary     Current INR goal:  2.0-3.0   TTR:  34.0 % (7.1 mo)   Target end date:  Indefinite   Send INR reminders to:  [facility]    Indications    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 Allergy to Lovenox         Anticoagulation Care Providers     Provider Role Specialty Phone number    Lizabeth Zavala MD Referring Family Medicine [phone]    Conner  Dung MCGUIRE MD [facility] [phone]

## 2021-11-18 ENCOUNTER — LAB (OUTPATIENT)
Dept: LAB | Facility: CLINIC | Age: 71
End: 2021-11-18
Payer: MEDICARE

## 2021-11-18 ENCOUNTER — ANTICOAGULATION THERAPY VISIT (OUTPATIENT)
Dept: ANTICOAGULATION | Facility: CLINIC | Age: 71
End: 2021-11-18

## 2021-11-18 DIAGNOSIS — Z83.2 FAMILY HISTORY OF FACTOR V DEFICIENCY: ICD-10-CM

## 2021-11-18 DIAGNOSIS — Z79.899 ENCOUNTER FOR LONG-TERM CURRENT USE OF MEDICATION: ICD-10-CM

## 2021-11-18 DIAGNOSIS — I80.01 THROMBOPHLEBITIS OF SUPERFICIAL VEINS OF RIGHT LOWER EXTREMITY: Primary | ICD-10-CM

## 2021-11-18 DIAGNOSIS — I80.01 THROMBOPHLEBITIS OF SUPERFICIAL VEINS OF RIGHT LOWER EXTREMITY: ICD-10-CM

## 2021-11-18 DIAGNOSIS — Z79.01 LONG TERM CURRENT USE OF ANTICOAGULANT THERAPY: ICD-10-CM

## 2021-11-18 LAB — INR BLD: 3 (ref 0.9–1.1)

## 2021-11-18 PROCEDURE — 85610 PROTHROMBIN TIME: CPT

## 2021-11-18 PROCEDURE — 36416 COLLJ CAPILLARY BLOOD SPEC: CPT

## 2021-11-18 NOTE — PROGRESS NOTES
ANTICOAGULATION MANAGEMENT     Thomas Davila 71 year old female is on warfarin with therapeutic INR result. (Goal INR 2.0-3.0)    Recent labs: (last 7 days)     11/18/21  0902   INR 3.0*       ASSESSMENT     Source(s): Chart Review and Patient/Caregiver Call       Warfarin doses taken: Reviewed in chart    Diet:     New illness, injury, or hospitalization:     Medication/supplement changes:     Signs or symptoms of bleeding or clotting:     Previous INR: Therapeutic last 2(+) visits    Additional findings: None     PLAN     Recommended plan for no diet, medication or health factor changes affecting INR     Dosing Instructions: Continue your current warfarin dose with next INR in 4 weeks       Summary  As of 11/18/2021    Full warfarin instructions:  2.5 mg every Wed; 5 mg all other days   Next INR check:  12/16/2021             Detailed voice message left for Thomas with dosing instructions and follow up date.     Contact [phone]  to schedule and with any changes, questions or concerns.     Education provided: Please call back if any changes to your diet, medications or how you've been taking warfarin and Contact [phone]  with any changes, questions or concerns.     Plan made per [facility] anticoagulation protocol    Idalia Green RN  Anticoagulation Clinic  11/18/2021    _______________________________________________________________________     Anticoagulation Episode Summary     Current INR goal:  2.0-3.0   TTR:  39.9 % (7.8 mo)   Target end date:  Indefinite   Send INR reminders to:  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 Allergy to Lovenox         Anticoagulation Care Providers     Provider Role Specialty  Phone number    Lizabeth Zavala MD Referring Family Medicine [phone]    Dung Prieto MD Referring Family Medicine [phone]

## 2021-12-07 DIAGNOSIS — K21.9 GASTROESOPHAGEAL REFLUX DISEASE WITHOUT ESOPHAGITIS: ICD-10-CM

## 2021-12-07 DIAGNOSIS — I10 ESSENTIAL HYPERTENSION: ICD-10-CM

## 2021-12-08 RX ORDER — LISINOPRIL 20 MG/1
TABLET ORAL
Qty: 90 TABLET | Refills: 0 | Status: SHIPPED | OUTPATIENT
Start: 2021-12-08 | End: 2022-01-07

## 2021-12-08 RX ORDER — TRIAMTERENE/HYDROCHLOROTHIAZID 37.5-25 MG
1 TABLET ORAL DAILY
Qty: 90 TABLET | Refills: 0 | Status: SHIPPED | OUTPATIENT
Start: 2021-12-08 | End: 2022-01-07

## 2021-12-17 ENCOUNTER — LAB (OUTPATIENT)
Dept: LAB | Facility: CLINIC | Age: 71
End: 2021-12-17
Payer: MEDICARE

## 2021-12-17 ENCOUNTER — ANTICOAGULATION THERAPY VISIT (OUTPATIENT)
Dept: ANTICOAGULATION | Facility: CLINIC | Age: 71
End: 2021-12-17

## 2021-12-17 DIAGNOSIS — Z79.899 ENCOUNTER FOR LONG-TERM CURRENT USE OF MEDICATION: ICD-10-CM

## 2021-12-17 DIAGNOSIS — I80.01 THROMBOPHLEBITIS OF SUPERFICIAL VEINS OF RIGHT LOWER EXTREMITY: Primary | ICD-10-CM

## 2021-12-17 DIAGNOSIS — Z83.2 FAMILY HISTORY OF FACTOR V DEFICIENCY: ICD-10-CM

## 2021-12-17 DIAGNOSIS — I80.01 THROMBOPHLEBITIS OF SUPERFICIAL VEINS OF RIGHT LOWER EXTREMITY: ICD-10-CM

## 2021-12-17 DIAGNOSIS — Z79.01 LONG TERM CURRENT USE OF ANTICOAGULANT THERAPY: ICD-10-CM

## 2021-12-17 LAB — INR BLD: 3.4 (ref 0.9–1.1)

## 2021-12-17 PROCEDURE — 36416 COLLJ CAPILLARY BLOOD SPEC: CPT

## 2021-12-17 PROCEDURE — 85610 PROTHROMBIN TIME: CPT

## 2021-12-17 NOTE — PROGRESS NOTES
ANTICOAGULATION MANAGEMENT     Thomas Davila 71 year old female is on warfarin with supratherapeutic INR result. (Goal INR 2.0-3.0)    Recent labs: (last 7 days)     12/17/21  0802   INR 3.4*       ASSESSMENT     Source(s): Chart Review       Warfarin doses taken: Warfarin taken as instructed    Diet: Increased greens/vitamin K in diet; plans to resume previous intake    New illness, injury, or hospitalization: No    Medication/supplement changes: None noted    Signs or symptoms of bleeding or clotting: No    Previous INR: Therapeutic last 2(+) visits    Additional findings: recently was off diet meds now restarted. Phentermine and topamax     PLAN     Recommended plan for no diet, medication or health factor changes affecting INR     Dosing Instructions: Partial hold then continue your current warfarin dose with next INR in 2 weeks       Summary  As of 12/17/2021    Full warfarin instructions:  2.5 mg every Wed; 5 mg all other days   Next INR check:               Telephone call with Thomas who verbalizes understanding and agrees to plan    Lab visit scheduled    Education provided: Please call back if any changes to your diet, medications or how you've been taking warfarin and Monitoring for bleeding signs and symptoms    Plan made per [facility] anticoagulation protocol    Lyssa Kirby RN  Anticoagulation Clinic  12/17/2021    _______________________________________________________________________     Anticoagulation Episode Summary     Current INR goal:  2.0-3.0   TTR:  35.5 % (8.8 mo)   Target end date:  Indefinite   Send INR reminders to:  [facility]    Indications    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 Allergy to Lovenox          Anticoagulation Care Providers     Provider Role Specialty Phone number    Lizabeth Zavala MD Referring Family Medicine [phone]    Dung Prieto MD Referring Family Medicine [phone]

## 2021-12-19 DIAGNOSIS — I80.01 THROMBOPHLEBITIS OF SUPERFICIAL VEINS OF RIGHT LOWER EXTREMITY: ICD-10-CM

## 2021-12-20 ENCOUNTER — TELEPHONE (OUTPATIENT)
Dept: FAMILY MEDICINE | Facility: CLINIC | Age: 71
End: 2021-12-20
Payer: OTHER GOVERNMENT

## 2021-12-20 DIAGNOSIS — I80.01 THROMBOPHLEBITIS OF SUPERFICIAL VEINS OF RIGHT LOWER EXTREMITY: ICD-10-CM

## 2021-12-20 RX ORDER — WARFARIN SODIUM 5 MG/1
TABLET ORAL
Qty: 84 TABLET | Refills: 0 | Status: SHIPPED | OUTPATIENT
Start: 2021-12-20 | End: 2021-12-31

## 2021-12-20 RX ORDER — WARFARIN SODIUM 5 MG/1
TABLET ORAL
Qty: 84 TABLET | Refills: 1 | Status: SHIPPED | OUTPATIENT
Start: 2021-12-20 | End: 2021-12-31

## 2021-12-20 NOTE — TELEPHONE ENCOUNTER
Reason for Call:  Medication or medication refill:    Do you use a [facility]?  Name of the pharmacy and phone number for the current request[facility]    Name of the medication requested: Warfarin    Other request:     Can we leave a detailed message on this number? NO    Phone number patient can be reached at: Cell number on file:    Telephone Information:   Mobile [phone]       Best Time:     Call taken on 12/20/2021 at 8:07 AM by Reyna Fonseca

## 2021-12-20 NOTE — TELEPHONE ENCOUNTER
VM left for Akhil that her prescription has been called into the pharmacy. Susana Ponce RN, BSN  [facility] Anticoagulation Team

## 2021-12-20 NOTE — TELEPHONE ENCOUNTER
Patient left VM on [facility] phone 12/20/21 @ 7:34am.  Patient reporting she is out of warfarin and missed a day.   Patient would like to know what to do and if a refill can be sent.     No pharmacy information left.     Please call patient back.    Zoe Olivera, RN, BSN, PHN  Anticoagulation Nurse  [phone]

## 2021-12-31 ENCOUNTER — LAB (OUTPATIENT)
Dept: LAB | Facility: CLINIC | Age: 71
End: 2021-12-31
Payer: MEDICARE

## 2021-12-31 ENCOUNTER — ANTICOAGULATION THERAPY VISIT (OUTPATIENT)
Dept: ANTICOAGULATION | Facility: CLINIC | Age: 71
End: 2021-12-31

## 2021-12-31 DIAGNOSIS — Z79.01 LONG TERM CURRENT USE OF ANTICOAGULANT THERAPY: ICD-10-CM

## 2021-12-31 DIAGNOSIS — I80.01 THROMBOPHLEBITIS OF SUPERFICIAL VEINS OF RIGHT LOWER EXTREMITY: Primary | ICD-10-CM

## 2021-12-31 DIAGNOSIS — Z83.2 FAMILY HISTORY OF FACTOR V DEFICIENCY: ICD-10-CM

## 2021-12-31 DIAGNOSIS — Z79.899 ENCOUNTER FOR LONG-TERM CURRENT USE OF MEDICATION: ICD-10-CM

## 2021-12-31 DIAGNOSIS — I80.01 THROMBOPHLEBITIS OF SUPERFICIAL VEINS OF RIGHT LOWER EXTREMITY: ICD-10-CM

## 2021-12-31 LAB — INR BLD: 3.5 (ref 0.9–1.1)

## 2021-12-31 PROCEDURE — 85610 PROTHROMBIN TIME: CPT

## 2021-12-31 PROCEDURE — 36416 COLLJ CAPILLARY BLOOD SPEC: CPT

## 2021-12-31 RX ORDER — WARFARIN SODIUM 5 MG/1
TABLET ORAL
Qty: 84 TABLET | Refills: 1
Start: 2021-12-31 | End: 2022-01-07

## 2021-12-31 NOTE — PROGRESS NOTES
ANTICOAGULATION MANAGEMENT     Thomas Davila 71 year old female is on warfarin with supratherapeutic INR result. (Goal INR 2.0-3.0)    Recent labs: (last 7 days)     12/31/21  1022   INR 3.5*       ASSESSMENT     Source(s): Chart Review and Patient/Caregiver Call       Warfarin doses taken: Missed dose(s) may be affecting INR    Diet: No new diet changes identified    New illness, injury, or hospitalization: No    Medication/supplement changes: None noted    Signs or symptoms of bleeding or clotting: No    Previous INR: Supratherapeutic    Additional findings: already took warfarin today     PLAN     Recommended plan for no diet, medication or health factor changes affecting INR     Dosing Instructions:  Decrease your warfarin dose (15% change) with next INR in 2 weeks       Summary  As of 12/31/2021    Full warfarin instructions:  2.5 mg every Mon, Wed, Sat; 5 mg all other days   Next INR check:  1/14/2022             Telephone call with Thomas who verbalizes understanding and agrees to plan    Lab visit scheduled    Education provided: Please call back if any changes to your diet, medications or how you've been taking warfarin, Importance of taking warfarin as instructed and Contact [phone]  with any changes, questions or concerns.     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 Allergy to  Lovenox         Anticoagulation Care Providers     Provider Role Specialty Phone number    Lizabeth Zavala MD Referring Family Medicine [phone]    Dung Prieto MD Referring Family Medicine [phone]

## 2022-01-07 ENCOUNTER — OFFICE VISIT (OUTPATIENT)
Dept: FAMILY MEDICINE | Facility: CLINIC | Age: 72
End: 2022-01-07
Payer: MEDICARE

## 2022-01-07 VITALS
WEIGHT: 193.6 LBS | BODY MASS INDEX: 33.05 KG/M2 | HEIGHT: 64 IN | OXYGEN SATURATION: 99 % | HEART RATE: 88 BPM | TEMPERATURE: 97.6 F | DIASTOLIC BLOOD PRESSURE: 80 MMHG | SYSTOLIC BLOOD PRESSURE: 136 MMHG

## 2022-01-07 DIAGNOSIS — K21.9 GASTROESOPHAGEAL REFLUX DISEASE WITHOUT ESOPHAGITIS: ICD-10-CM

## 2022-01-07 DIAGNOSIS — I10 ESSENTIAL HYPERTENSION: Primary | ICD-10-CM

## 2022-01-07 DIAGNOSIS — Z82.49 FAMILY HISTORY OF BLOOD CLOTS: ICD-10-CM

## 2022-01-07 DIAGNOSIS — Z79.01 LONG TERM CURRENT USE OF ANTICOAGULANT THERAPY: ICD-10-CM

## 2022-01-07 DIAGNOSIS — I80.01 THROMBOPHLEBITIS OF SUPERFICIAL VEINS OF RIGHT LOWER EXTREMITY: ICD-10-CM

## 2022-01-07 PROCEDURE — 99214 OFFICE O/P EST MOD 30 MIN: CPT | Performed by: FAMILY MEDICINE

## 2022-01-07 RX ORDER — LISINOPRIL 20 MG/1
20 TABLET ORAL DAILY
Qty: 90 TABLET | Refills: 3 | Status: SHIPPED | OUTPATIENT
Start: 2022-01-07 | End: 2022-12-16

## 2022-01-07 RX ORDER — TOPIRAMATE 50 MG/1
50 TABLET, FILM COATED ORAL DAILY
Qty: 90 TABLET | Refills: 3 | Status: SHIPPED | OUTPATIENT
Start: 2022-01-07 | End: 2022-12-16

## 2022-01-07 RX ORDER — WARFARIN SODIUM 5 MG/1
TABLET ORAL
Qty: 84 TABLET | Refills: 1 | Status: SHIPPED | OUTPATIENT
Start: 2022-01-07 | End: 2022-04-18

## 2022-01-07 RX ORDER — PHENTERMINE HYDROCHLORIDE 30 MG/1
30 CAPSULE ORAL EVERY MORNING
Qty: 90 CAPSULE | Refills: 0 | Status: SHIPPED | OUTPATIENT
Start: 2022-01-07 | End: 2022-04-08

## 2022-01-07 RX ORDER — TRIAMTERENE/HYDROCHLOROTHIAZID 37.5-25 MG
1 TABLET ORAL DAILY
Qty: 90 TABLET | Refills: 3 | Status: SHIPPED | OUTPATIENT
Start: 2022-01-07 | End: 2022-12-16

## 2022-01-07 ASSESSMENT — PAIN SCALES - GENERAL: PAINLEVEL: NO PAIN (0)

## 2022-01-07 ASSESSMENT — MIFFLIN-ST. JEOR: SCORE: 1362.78

## 2022-01-07 NOTE — PROGRESS NOTES
"  Assessment & Plan     Essential hypertension  Borderline controlled, refilled medication, labs done about 4 months ago  - lisinopril (ZESTRIL) 20 MG tablet; Take 1 tablet (20 mg) by mouth daily  - triamterene-HCTZ (MAXZIDE-25) 37.5-25 MG tablet; Take 1 tablet by mouth daily    BMI 34.0-34.9,adult  Working on weight loss, on medication and refilled  - topiramate (TOPAMAX) 50 MG tablet; Take 1 tablet (50 mg) by mouth daily  - phentermine (ADIPEX-P) 30 MG capsule; Take 1 capsule (30 mg) by mouth every morning    Gastroesophageal reflux disease without esophagitis  Controlled on med  - omeprazole (PRILOSEC) 20 MG DR capsule; TAKE 1 CAPSULE BY MOUTH ONCE DAILY    Thrombophlebitis of superficial veins of right lower extremity  On medication to prevent blood clots  - warfarin ANTICOAGULANT (JANTOVEN ANTICOAGULANT) 5 MG tablet; Take 2.5 mg every Mon, Wed, Sat; 5 mg all other days or As directed by Anticoagulation clinic    Long term current use of anticoagulant therapy  Due to family history will get a consult with a more detailed work up  - Adult Oncology/Hematology Referral; Future    Family history of blood clots    - Adult Oncology/Hematology Referral; Future             BMI:   Estimated body mass index is 33.59 kg/m  as calculated from the following:    Height as of this encounter: 1.617 m (5' 3.66\").    Weight as of this encounter: 87.8 kg (193 lb 9.6 oz).   Weight management plan: diet and medication    See Patient Instructions    Return in about 3 months (around 4/7/2022) for Office Visit.    Dung Prieto MD  [facility]    Patito Landaverde is a 71 year old who presents for the following health issues     HPI     Hypertension Follow-up      Do you check your blood pressure regularly outside of the clinic? No     Are you following a low salt diet? No    Are your blood pressures ever more than 140 on the top number (systolic) OR more   than 90 on the bottom number (diastolic), for " "example 140/90? Yes      How many servings of fruits and vegetables do you eat daily?  0-1    On average, how many sweetened beverages do you drink each day (Examples: soda, juice, sweet tea, etc.  Do NOT count diet or artificially sweetened beverages)?   0    How many days per week do you exercise enough to make your heart beat faster? 4    How many minutes a day do you exercise enough to make your heart beat faster? 20 - 29    How many days per week do you miss taking your medication? 0    Medication Followup of Phentermine     Taking Medication as prescribed: yes    Side Effects:  None    Medication Helping Symptoms:  yes   Medication Followup of Topiramate    Taking Medication as prescribed: yes    Side Effects:  None    Medication Helping Symptoms:  yes          Review of Systems         Objective    /80   Pulse 88   Temp 97.6  F (36.4  C) (Tympanic)   Ht 1.617 m (5' 3.66\")   Wt 87.8 kg (193 lb 9.6 oz)   SpO2 99%   BMI 33.59 kg/m    Body mass index is 33.59 kg/m .  Physical Exam   GENERAL APPEARANCE: healthy, alert, no distress and Nourishment obese   RESP: lungs clear to auscultation - no rales, rhonchi or wheezes  CV: regular rates and rhythm, normal S1 S2, no S3 or S4 and no murmur, click or rub  MS: extremities normal- no gross deformities noted  SKIN: no suspicious lesions or rashes  NEURO: Normal strength and tone, mentation intact and speech normal  PSYCH: mentation appears normal and affect normal/bright                "

## 2022-01-07 NOTE — PATIENT INSTRUCTIONS
I have refilled your medications.    Please follow up in clinic in 3 months for medicare annual wellness and to follow your weight.      Please see hematology about question on family h/o blood clots.        Thank you for choosing [facility].  You may be receiving an email and/or telephone survey request from [facility] [employer] regarding your visit today.  Please take a few minutes to respond to the survey to let us know how we are doing.      If you have questions or concerns, please contact us via [employer] or you can contact your care team at [phone] option 2.    Our Clinic hours are:  Monday - Thursday 7am-6pm  Friday 7am-5pm    The [place] outpatient lab hours are:  Monday - Friday 7am-4:30pm    Appointments are required, call [phone]    If you have clinical questions after hours or would like to schedule an appointment,  call the clinic at [phone].

## 2022-01-14 ENCOUNTER — LAB (OUTPATIENT)
Dept: LAB | Facility: CLINIC | Age: 72
End: 2022-01-14
Payer: MEDICARE

## 2022-01-14 ENCOUNTER — ANTICOAGULATION THERAPY VISIT (OUTPATIENT)
Dept: ANTICOAGULATION | Facility: CLINIC | Age: 72
End: 2022-01-14

## 2022-01-14 DIAGNOSIS — Z83.2 FAMILY HISTORY OF FACTOR V DEFICIENCY: ICD-10-CM

## 2022-01-14 DIAGNOSIS — I80.01 THROMBOPHLEBITIS OF SUPERFICIAL VEINS OF RIGHT LOWER EXTREMITY: Primary | ICD-10-CM

## 2022-01-14 DIAGNOSIS — I80.01 THROMBOPHLEBITIS OF SUPERFICIAL VEINS OF RIGHT LOWER EXTREMITY: ICD-10-CM

## 2022-01-14 DIAGNOSIS — Z79.899 ENCOUNTER FOR LONG-TERM CURRENT USE OF MEDICATION: ICD-10-CM

## 2022-01-14 DIAGNOSIS — Z79.01 LONG TERM CURRENT USE OF ANTICOAGULANT THERAPY: ICD-10-CM

## 2022-01-14 LAB — INR BLD: 2.4 (ref 0.9–1.1)

## 2022-01-14 PROCEDURE — 36416 COLLJ CAPILLARY BLOOD SPEC: CPT

## 2022-01-14 PROCEDURE — 85610 PROTHROMBIN TIME: CPT

## 2022-01-14 NOTE — PROGRESS NOTES
ANTICOAGULATION MANAGEMENT     Thomas Davila 71 year old female is on warfarin with therapeutic INR result. (Goal INR 2.0-3.0)    Recent labs: (last 7 days)     01/14/22  0825   INR 2.4*       ASSESSMENT     Source(s): Patient/Caregiver Call         Previous INR: Supratherapeutic       PLAN          Dosing Instructions: Continue your current warfarin dose with next INR in 3 weeks       Summary  As of 1/14/2022    Full warfarin instructions:  2.5 mg every Mon, Wed, Sat; 5 mg all other days   Next INR check:  2/4/2022             Detailed voice message left for Thomas with dosing instructions and follow up date.   Sent [employer] message with dosing and follow up instructions    Contact [phone]  to schedule and with any changes, questions or concerns.     Education provided: Please call back if any changes to your diet, medications or how you've been taking warfarin and Contact [phone]  with any changes, questions or concerns.     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 Allergy to Lovenox         Anticoagulation Care Providers     Provider Role Specialty Phone number    Lizabeth Zavala MD Referring Family Medicine [phone]    Dung Prieto MD Referring Family Medicine [phone]

## 2022-01-27 NOTE — PROGRESS NOTES
RECORDS STATUS - BREAST    RECORDS REQUESTED FROM: EPIC   DATE REQUESTED: 2/25/2021   NOTES DETAILS STATUS   OFFICE NOTE from referring provider Complete Epic   Dung Prieto MD   OFFICE NOTE from medical oncologist     OFFICE NOTE from surgeon N/A    OFFICE NOTE from radiation oncologist     DISCHARGE SUMMARY from hospital     DISCHARGE REPORT from the ER     OPERATIVE REPORT N/A    MEDICATION LIST Complete [facility]   CLINICAL TRIAL TREATMENTS TO DATE     LABS     REQUEST BLOCKS FOR ALL BREAST CANCER PTS     PATHOLOGY REPORTS  (Tissue diagnosis, Stage, ER/WI percentage positive and intensity of staining, HER2 IHC, FISH, and all biopsies from breast and any distant metastasis)                 N/A    GENONOMIC TESTING     TYPE:   (Next Generation Sequencing, including Foundation One testing, and Oncotype score)     IMAGING (NEED IMAGES & REPORT)     CT SCANS     MRI     MAMMO     ULTRASOUND     PET     BONE SCAN     BRAIN MRI

## 2022-02-15 ENCOUNTER — MYC MEDICAL ADVICE (OUTPATIENT)
Dept: ANTICOAGULATION | Facility: CLINIC | Age: 72
End: 2022-02-15
Payer: OTHER GOVERNMENT

## 2022-02-15 NOTE — TELEPHONE ENCOUNTER
ANTICOAGULATION     Thomas Davila is overdue for INR check.      My chart reminder sent.    Eusebia Ceballos RN

## 2022-02-21 ENCOUNTER — ANTICOAGULATION THERAPY VISIT (OUTPATIENT)
Dept: ANTICOAGULATION | Facility: CLINIC | Age: 72
End: 2022-02-21

## 2022-02-21 ENCOUNTER — LAB (OUTPATIENT)
Dept: LAB | Facility: CLINIC | Age: 72
End: 2022-02-21
Payer: MEDICARE

## 2022-02-21 DIAGNOSIS — I80.01 THROMBOPHLEBITIS OF SUPERFICIAL VEINS OF RIGHT LOWER EXTREMITY: ICD-10-CM

## 2022-02-21 DIAGNOSIS — Z79.899 ENCOUNTER FOR LONG-TERM CURRENT USE OF MEDICATION: ICD-10-CM

## 2022-02-21 DIAGNOSIS — I80.01 THROMBOPHLEBITIS OF SUPERFICIAL VEINS OF RIGHT LOWER EXTREMITY: Primary | ICD-10-CM

## 2022-02-21 DIAGNOSIS — Z79.01 LONG TERM CURRENT USE OF ANTICOAGULANT THERAPY: ICD-10-CM

## 2022-02-21 DIAGNOSIS — Z83.2 FAMILY HISTORY OF FACTOR V DEFICIENCY: ICD-10-CM

## 2022-02-21 LAB — INR BLD: 2.5 (ref 0.9–1.1)

## 2022-02-21 PROCEDURE — 36416 COLLJ CAPILLARY BLOOD SPEC: CPT

## 2022-02-21 PROCEDURE — 85610 PROTHROMBIN TIME: CPT

## 2022-02-21 NOTE — PROGRESS NOTES
ANTICOAGULATION MANAGEMENT     Thomas Davila 72 year old female is on warfarin with therapeutic INR result. (Goal INR 2.0-3.0)    Recent labs: (last 7 days)     02/21/22  1228   INR 2.5*       ASSESSMENT     Source(s): Chart Review and Patient/Caregiver Call       Warfarin doses taken: Warfarin taken as instructed    Diet: No new diet changes identified    New illness, injury, or hospitalization: No    Medication/supplement changes: None noted    Signs or symptoms of bleeding or clotting: No    Previous INR: Therapeutic last visit; previously outside of goal range    Additional findings: None     PLAN     Recommended plan for no diet, medication or health factor changes affecting INR     Dosing Instructions: Continue your current warfarin dose with next INR in 4 weeks       Summary  As of 2/21/2022    Full warfarin instructions:  2.5 mg every Mon, Wed, Sat; 5 mg all other days   Next INR check:  3/21/2022             Telephone call with Thomas who verbalizes understanding and agrees to plan    Lab visit scheduled    Education provided: Please call back if any changes to your diet, medications or how you've been taking warfarin    Plan made per [facility] anticoagulation protocol    Harper Kline RN  Anticoagulation Clinic  2/21/2022    _______________________________________________________________________     Anticoagulation Episode Summary     Current INR goal:  2.0-3.0   TTR:  42.2 % (11 mo)   Target end date:  Indefinite   Send INR reminders to:  [facility]    Indications    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

## 2022-02-21 NOTE — PROGRESS NOTES
VM left for pt to return call to [facility] - [phone]. Dosing instructions not given to pt.  Tentative plan: consider 4 week recheck.  Harper Kline RN on 2/21/2022 at 2:51 PM      62yo M with HTN, T2DM on insulin, HLD, copd presents with shortness of breath x 1 day. Admitted for suspected COVID pneumonia vs. CHF exacerbation.    COVID PNEUMONIA    d4 of plaquenil  d3 of anakinra  day 3 of solumedrol:  on 3 L of nasal cannula    crp is decreasing:   follow up leena maria as wellas ferritin tomorrow: he has high ferritin:  LFTS normal   :  d5 of laquenil: and steorids: dc after today :  d4 of anakinra: from tomorrow am: finished qid dosing today   crp is down:     :   on 3 L of nasal cannula: \not obviously sob: dc steroids:  cont anakinra: d5 of bid dosing:   D5 dvt prophyalxis:   blood glcuse is co ntrolled:   if he comes off oxygen/l then dcplannin/8:  d6 of anakinra dosing: bid: from tomorrow: change to once a day  cont dvt rpopahxylis  on 3 L of oxygen:   monitor leena maria:   his symptoms are better:     :   last day of q bid dosing of anakinra  off steroids as well as plaquenil: o2 : 2 L per minute: improved   crp is decreased; ferritin as wellas d dimer is mildly increased:   Cont symbicort for copd:   dvt prophaylxis   has ckd too: creat downtrending    4/10:   change to q daily anakinra:  hypoxia is btter: but still hypoaxic:  ? dco n oxygen :  dvt propahylaxis   cont symbicort        DM  HTN  CHF   NANCY

## 2022-02-25 ENCOUNTER — TELEPHONE (OUTPATIENT)
Dept: ONCOLOGY | Facility: HOSPITAL | Age: 72
End: 2022-02-25

## 2022-02-25 ENCOUNTER — APPOINTMENT (OUTPATIENT)
Dept: INFUSION THERAPY | Facility: HOSPITAL | Age: 72
End: 2022-02-25
Attending: FAMILY MEDICINE
Payer: MEDICARE

## 2022-02-25 ENCOUNTER — ONCOLOGY VISIT (OUTPATIENT)
Dept: ONCOLOGY | Facility: HOSPITAL | Age: 72
End: 2022-02-25
Attending: FAMILY MEDICINE
Payer: MEDICARE

## 2022-02-25 ENCOUNTER — PRE VISIT (OUTPATIENT)
Dept: ONCOLOGY | Facility: HOSPITAL | Age: 72
End: 2022-02-25

## 2022-02-25 VITALS
HEIGHT: 64 IN | SYSTOLIC BLOOD PRESSURE: 143 MMHG | BODY MASS INDEX: 32.61 KG/M2 | HEART RATE: 94 BPM | TEMPERATURE: 98 F | RESPIRATION RATE: 24 BRPM | OXYGEN SATURATION: 100 % | WEIGHT: 191 LBS | DIASTOLIC BLOOD PRESSURE: 81 MMHG

## 2022-02-25 DIAGNOSIS — I80.01 THROMBOPHLEBITIS OF SUPERFICIAL VEINS OF RIGHT LOWER EXTREMITY: Primary | ICD-10-CM

## 2022-02-25 DIAGNOSIS — Z79.01 LONG TERM CURRENT USE OF ANTICOAGULANT THERAPY: ICD-10-CM

## 2022-02-25 DIAGNOSIS — Z79.899 ENCOUNTER FOR LONG-TERM CURRENT USE OF MEDICATION: ICD-10-CM

## 2022-02-25 DIAGNOSIS — Z83.2 FAMILY HISTORY OF FACTOR V DEFICIENCY: ICD-10-CM

## 2022-02-25 DIAGNOSIS — R07.82 INTERCOSTAL PAIN: ICD-10-CM

## 2022-02-25 DIAGNOSIS — Z82.49 FAMILY HISTORY OF BLOOD CLOTS: ICD-10-CM

## 2022-02-25 PROCEDURE — G0463 HOSPITAL OUTPT CLINIC VISIT: HCPCS

## 2022-02-25 PROCEDURE — 99213 OFFICE O/P EST LOW 20 MIN: CPT | Performed by: INTERNAL MEDICINE

## 2022-02-25 PROCEDURE — 36415 COLL VENOUS BLD VENIPUNCTURE: CPT | Performed by: INTERNAL MEDICINE

## 2022-02-25 ASSESSMENT — PAIN SCALES - GENERAL: PAINLEVEL: NO PAIN (0)

## 2022-02-25 NOTE — LETTER
2/25/2022         RE: Thomas Davila  [address] [facility] [address]        Dear Colleague,    Thank you for referring your patient, Thomas Davila, to the [facility]. Please see a copy of my visit note below.      [facility] Hematology and Oncology Consult Note      Patient: Thomas Davila  MRN: [id number]  Date of Service: Feb 25, 2022      We have been asked by Dr. Prieto to evaluate Thomas Davila because of a history of blood clotting.    Assessment/Plan:    1.  Thrombosis involving the bilateral saphenous veins: Patient herself has never had a DVT. This factors for lower extremity superficial thrombosis include obesity and varicosities.  She has been on warfarin.  She has been tolerating it well.  I would recommend long-term anticoagulation to prevent further lower extremity superficial thrombosis and worsening of her varicosities, which could lead to both thrombotic syndrome.  I think the benefits of warfarin outweigh risk at this time.  I think she would be at high risk of developing further superficial thrombosis in the legs if she stopped.  She has a low bleeding risk.  Additionally, she does not mind staying on anticoagulation and likes the idea of decreasing her clotting risk.  I do not think she needs to have a further testing to look for inherited or acquired conditions associated with an increased risk of clotting as the results of these tests would not change our treatment recommendation for her or family members.  She had an opportunity to have her questions answered.  Return to clinic as needed.    ECOG Performance  0    History:    Akhil is a 72-year-old woman referred for an opinion regarding bilateral superficial venous thrombosis involving the saphenous veins in the legs.  Her first episode was back in April 2013 when she was found to have extensive occlusive thrombus throughout the left greater saphenous vein. Thrombosis of superficial  varicosities in the calf was also seen.  Deep veins were patent.  She had a second episode in March 2021 when an ultrasound showed a thrombus in the right greater saphenous vein and anterior accessory saphenous vein in the proximal thigh. Clot was at the saphenofemoral junction. The right saphenous vein was occluded from the proximal to mid calf.  Clot was not present from mid thigh through the knee.  There was no deep venous thrombosis seen at that time.  Factor V Leiden mutation testing was normal.  She was started on warfarin at that time.  She continues on warfarin today.  She is tolerating it well.  No bleeding problems.  She doesn't have issues with chronic swelling or pain in her lower extremities.    Past History:    Past Medical History:   Diagnosis Date     Acute parametritis and pelvic cellulitis 1994    salpingitis     ASCUS with positive high risk HPV 1/2013     Asthma     No recent issues     Basal cell carcinoma      breast cancer 1998    left lumpectomy, radiation, tamoxifen     Breast cancer (H) 1998    L Breast; Lumpectomy & Radiation     Chronic salpingitis and oophoritis 02/94    PID        Embolism and thrombosis of unspecified site     left leg, due to tamoxifen therapy     Generalized osteoarthrosis, unspecified site     hands     Leiomyoma of uterus, unspecified      Morbid obesity (H) 12/4/2017     Other malignant neoplasm of skin, site unspecified 11/2006    Basal cell cancer on nose     Squamous cell carcinoma      Thrombosis of leg     Left     Unspecified essential hypertension     Family History   Problem Relation Age of Onset     Cerebrovascular Disease Mother      Hypertension Mother      Diabetes Mother      Thyroid Disease Mother      Arthritis Mother      Alzheimer Disease Mother      Neurologic Disorder Mother         Parkinsons     Heart Disease Father      Hypertension Father      Diabetes Father      Thyroid Disease Father      Arthritis Father      Blood Disease Father       Factor V Leiden deficiency Father      Anesthesia Reaction Sister      Thyroid Disease Sister      Anesthesia Reaction Sister      Arthritis Sister         RA     Hypertension Brother      Factor V Leiden deficiency Brother      Allergies Son      Gastrointestinal Disease Son         GERD     Hypertension Brother      Allergies Son         percocett     Gastrointestinal Disease Son         GERD     Hypertension Son      Factor V Leiden deficiency Niece       [unfilled] Social History     Socioeconomic History     Marital status:      Spouse name: Not on file     Number of children: 2     Years of education: Not on file     Highest education level: Not on file   Occupational History     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 No   Social History Narrative    [employer]ehovah's witness-- [employer]ES ALL BLOOD PRODUCTS        April 15, 2021    ENVIRONMENTAL HISTORY: [place] heated with a forced air. They do have central air conditioning. The patient's bedroom is furnished with hard vijaya in bedroom and animals sleep in bedroom.  Pets inside the house include 2 cat(s). There is no history of cockroach or mice infestation. There is/are 0 smokers in the house.  The house does not have a basement.      Social Determinants of Health     Financial Resource Strain: Not on file   Food Insecurity: Not on file   Transportation Needs: Not on file   Physical Activity: Not on file   Stress: Not on file   Social Connections: Not on file   Intimate Partner Violence: Not on file   Housing Stability: Not on file     "    Allergies:    Allergies   Allergen Reactions     Acetaminophen      Other reaction(s): Gastrointestinal     Lovenox [Enoxaparin] Itching     The patient had a delayed hypersensitivity reaction, manifested by pruritic rash.  There are wide ranges of cross-reactivity that have been documented to occur with LMWHs in DHR, but fondaparinux and  danaparoid are generally well tolerated. Hypersensitivity to heparin is not cross-reactive with structurally distinct anticoagulants such as hirudins or factor Xa inhibitors, and these are often used as alternative agents.     Metal [Staples] Other (See Comments)     Not staples, but metal surgical screws; Sutures     Percocet [Oxycodone-Acetaminophen] Nausea and Vomiting     Review of Systems:    As above in the history.     Review of Systems otherwise Negative for:  General: chills, fever or night sweats  Psychological: anxiety or depression  Ophthalmic: blurry vision, double vision or loss of vision, vision change  ENT: epistaxis, oral lesions, hearing changes  Hematological and Lymphatic: bleeding, bruising, jaundice, swollen lymph nodes  Endocrine: hot flashes, unexpected weight changes  Respiratory: cough, hemoptysis, orthopnea  Cardiovascular: chest pain, edema, palpitations or PND  Gastrointestinal: abdominal pain, blood in stools, change in bowel habits, constipation, diarrhea or nausea/vomiting  Genito-Urinary: change in urinary stream, incontinence, frequency/urgency  Musculoskeletal: joint pain, stiffness, swelling  Neurological: dizziness, headaches, numbness/tingling  Dermatological: lumps and rash    Physical Exam:    BP (!) 143/81 (BP Location: Right arm, Patient Position: Sitting, Cuff Size: Adult Large)   Pulse 94   Temp 98  F (36.7  C) (Oral)   Resp 24   Ht 1.617 m (5' 3.66\")   Wt 86.6 kg (191 lb)   SpO2 100%   BMI 33.14 kg/m      General: patient appears stated age of 72 year old. Nontoxic and in no distress.   HEENT: Head: atraumatic, normocephalic. " "Sclerae anicteric.  Chest:  Normal respiratory effort  Cardiac:  No edema.   Abdomen: abdomen is non-distended  Extremities: normal tone and muscle bulk.   Skin: no lesions or rash on visible skin. Warm and dry.   CNS: alert and oriented. Grossly non-focal.   Psychiatric: normal mood and affect.     Lab Results:    Recent Results (from the past 168 hour(s))   INR point of care   Result Value Ref Range    INR 2.5 (H) 0.9 - 1.1     Imaging Results:    No results found.      Signed by: Hitesh Monroy MD        Oncology Rooming Note    February 25, 2022 11:32 AM   Thomas Davila is a 72 year old female who presents for:    Chief Complaint   Patient presents with     Hematology     New Patient - Long term current use of anticoagulant therapy,  Family history of blood clots     Initial Vitals: BP (!) 143/81 (BP Location: Right arm, Patient Position: Sitting, Cuff Size: Adult Large)   Pulse 94   Temp 98  F (36.7  C) (Oral)   Resp 24   Ht 1.617 m (5' 3.66\")   Wt 86.6 kg (191 lb)   SpO2 100%   BMI 33.14 kg/m   Estimated body mass index is 33.14 kg/m  as calculated from the following:    Height as of this encounter: 1.617 m (5' 3.66\").    Weight as of this encounter: 86.6 kg (191 lb). Body surface area is 1.97 meters squared.  No Pain (0) Comment: Data Unavailable   No LMP recorded. Patient has had a hysterectomy.  Allergies reviewed: Yes  Medications reviewed: Yes    Medications: Medication refills not needed today.  Pharmacy name entered into EPIC:      [id number] [address]      Clinical concerns: New Patient - Long term current use of anticoagulant therapy,  Family history of blood clots.       Kendra Jenkins [facility]                  Again, thank you for allowing me to participate in the care of your patient.        Sincerely,        Hitesh Monroy MD    "

## 2022-02-25 NOTE — TELEPHONE ENCOUNTER
Per Dr Monroy Patient needs Lipids drawn and PT redrawn. Use Dr Prieto's PT order.   Patient called and notified of lab appointment scheduled for 2-28-22 @t 8:15 am in [facility].Patient verbalized she understood.

## 2022-02-25 NOTE — PROGRESS NOTES
[facility] Hematology and Oncology Consult Note      Patient: Thomas Davila  MRN: [id number]  Date of Service: Feb 25, 2022      We have been asked by Dr. Prieto to evaluate Thomas Davila because of a history of blood clotting.    Assessment/Plan:    1.  Thrombosis involving the bilateral saphenous veins: Patient herself has never had a DVT. This factors for lower extremity superficial thrombosis include obesity and varicosities.  She has been on warfarin.  She has been tolerating it well.  I would recommend long-term anticoagulation to prevent further lower extremity superficial thrombosis and worsening of her varicosities, which could lead to both thrombotic syndrome.  I think the benefits of warfarin outweigh risk at this time.  I think she would be at high risk of developing further superficial thrombosis in the legs if she stopped.  She has a low bleeding risk.  Additionally, she does not mind staying on anticoagulation and likes the idea of decreasing her clotting risk.  I do not think she needs to have a further testing to look for inherited or acquired conditions associated with an increased risk of clotting as the results of these tests would not change our treatment recommendation for her or family members.  She had an opportunity to have her questions answered.  Return to clinic as needed.    ECOG Performance  0    History:    Akhil is a 72-year-old woman referred for an opinion regarding bilateral superficial venous thrombosis involving the saphenous veins in the legs.  Her first episode was back in April 2013 when she was found to have extensive occlusive thrombus throughout the left greater saphenous vein. Thrombosis of superficial varicosities in the calf was also seen.  Deep veins were patent.  She had a second episode in March 2021 when an ultrasound showed a thrombus in the right greater saphenous vein and anterior accessory saphenous vein in the proximal thigh. Clot was at the  saphenofemoral junction. The right saphenous vein was occluded from the proximal to mid calf.  Clot was not present from mid thigh through the knee.  There was no deep venous thrombosis seen at that time.  Factor V Leiden mutation testing was normal.  She was started on warfarin at that time.  She continues on warfarin today.  She is tolerating it well.  No bleeding problems.  She doesn't have issues with chronic swelling or pain in her lower extremities.    Past History:    Past Medical History:   Diagnosis Date     Acute parametritis and pelvic cellulitis 1994    salpingitis     ASCUS with positive high risk HPV 1/2013     Asthma     No recent issues     Basal cell carcinoma      breast cancer 1998    left lumpectomy, radiation, tamoxifen     Breast cancer (H) 1998    L Breast; Lumpectomy & Radiation     Chronic salpingitis and oophoritis 02/94    PID        Embolism and thrombosis of unspecified site     left leg, due to tamoxifen therapy     Generalized osteoarthrosis, unspecified site     hands     Leiomyoma of uterus, unspecified      Morbid obesity (H) 12/4/2017     Other malignant neoplasm of skin, site unspecified 11/2006    Basal cell cancer on nose     Squamous cell carcinoma      Thrombosis of leg     Left     Unspecified essential hypertension     Family History   Problem Relation Age of Onset     Cerebrovascular Disease Mother      Hypertension Mother      Diabetes Mother      Thyroid Disease Mother      Arthritis Mother      Alzheimer Disease Mother      Neurologic Disorder Mother         Parkinsons     Heart Disease Father      Hypertension Father      Diabetes Father      Thyroid Disease Father      Arthritis Father      Blood Disease Father      Factor V Leiden deficiency Father      Anesthesia Reaction Sister      Thyroid Disease Sister      Anesthesia Reaction Sister      Arthritis Sister         RA     Hypertension Brother      Factor V Leiden deficiency Brother      Allergies Son       Gastrointestinal Disease Son         GERD     Hypertension Brother      Allergies Son         percocett     Gastrointestinal Disease Son         GERD     Hypertension Son      Factor V Leiden deficiency Niece       [unfilled] Social History     Socioeconomic History     Marital status:      Spouse name: Not on file     Number of children: 2     Years of education: Not on file     Highest education level: Not on file   Occupational History     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 No   Social History Narrative    Religion-- DECLINES ALL BLOOD PRODUCTS        April 15, 2021    ENVIRONMENTAL HISTORY: [place] The home is heated with a forced air. They do have central air conditioning. The patient's bedroom is furnished with hard vijaya in bedroom and animals sleep in bedroom.  Pets inside the house include 2 cat(s). There is no history of cockroach or mice infestation. There is/are 0 smokers in the house.  The house does not have a basement.      Social Determinants of Health     Financial Resource Strain: Not on file   Food Insecurity: Not on file   Transportation Needs: Not on file   Physical Activity: Not on file   Stress: Not on file   Social Connections: Not on file   Intimate Partner Violence: Not on file   Housing Stability: Not on file        Allergies:    Allergies   Allergen Reactions     Acetaminophen      Other reaction(s): Gastrointestinal     Lovenox [Enoxaparin] Itching     The patient had a delayed hypersensitivity reaction, manifested by pruritic rash.  There are wide ranges of  "cross-reactivity that have been documented to occur with LMWHs in DHR, but fondaparinux and  danaparoid are generally well tolerated. Hypersensitivity to heparin is not cross-reactive with structurally distinct anticoagulants such as hirudins or factor Xa inhibitors, and these are often used as alternative agents.     Metal [Staples] Other (See Comments)     Not staples, but metal surgical screws; Sutures     Percocet [Oxycodone-Acetaminophen] Nausea and Vomiting     Review of Systems:    As above in the history.     Review of Systems otherwise Negative for:  General: chills, fever or night sweats  Psychological: anxiety or depression  Ophthalmic: blurry vision, double vision or loss of vision, vision change  ENT: epistaxis, oral lesions, hearing changes  Hematological and Lymphatic: bleeding, bruising, jaundice, swollen lymph nodes  Endocrine: hot flashes, unexpected weight changes  Respiratory: cough, hemoptysis, orthopnea  Cardiovascular: chest pain, edema, palpitations or PND  Gastrointestinal: abdominal pain, blood in stools, change in bowel habits, constipation, diarrhea or nausea/vomiting  Genito-Urinary: change in urinary stream, incontinence, frequency/urgency  Musculoskeletal: joint pain, stiffness, swelling  Neurological: dizziness, headaches, numbness/tingling  Dermatological: lumps and rash    Physical Exam:    BP (!) 143/81 (BP Location: Right arm, Patient Position: Sitting, Cuff Size: Adult Large)   Pulse 94   Temp 98  F (36.7  C) (Oral)   Resp 24   Ht 1.617 m (5' 3.66\")   Wt 86.6 kg (191 lb)   SpO2 100%   BMI 33.14 kg/m      General: patient appears stated age of 72 year old. Nontoxic and in no distress.   HEENT: Head: atraumatic, normocephalic. Sclerae anicteric.  Chest:  Normal respiratory effort  Cardiac:  No edema.   Abdomen: abdomen is non-distended  Extremities: normal tone and muscle bulk.   Skin: no lesions or rash on visible skin. Warm and dry.   CNS: alert and oriented. Grossly " non-focal.   Psychiatric: normal mood and affect.     Lab Results:    Recent Results (from the past 168 hour(s))   INR point of care   Result Value Ref Range    INR 2.5 (H) 0.9 - 1.1     Imaging Results:    No results found.      Signed by: Hitesh Monroy MD

## 2022-02-25 NOTE — PROGRESS NOTES
"Oncology Rooming Note    February 25, 2022 11:32 AM   Thomas Davila is a 72 year old female who presents for:    Chief Complaint   Patient presents with     Hematology     New Patient - Long term current use of anticoagulant therapy,  Family history of blood clots     Initial Vitals: BP (!) 143/81 (BP Location: Right arm, Patient Position: Sitting, Cuff Size: Adult Large)   Pulse 94   Temp 98  F (36.7  C) (Oral)   Resp 24   Ht 1.617 m (5' 3.66\")   Wt 86.6 kg (191 lb)   SpO2 100%   BMI 33.14 kg/m   Estimated body mass index is 33.14 kg/m  as calculated from the following:    Height as of this encounter: 1.617 m (5' 3.66\").    Weight as of this encounter: 86.6 kg (191 lb). Body surface area is 1.97 meters squared.  No Pain (0) Comment: Data Unavailable   No LMP recorded. Patient has had a hysterectomy.  Allergies reviewed: Yes  Medications reviewed: Yes    Medications: Medication refills not needed today.  Pharmacy name entered into EPIC:      [id number] - [address]      Clinical concerns: New Patient - Long term current use of anticoagulant therapy,  Family history of blood clots.       Kendra Jenkins [facility]              "

## 2022-02-27 ENCOUNTER — HEALTH MAINTENANCE LETTER (OUTPATIENT)
Age: 72
End: 2022-02-27

## 2022-02-28 ENCOUNTER — LAB (OUTPATIENT)
Dept: LAB | Facility: CLINIC | Age: 72
End: 2022-02-28
Payer: MEDICARE

## 2022-02-28 ENCOUNTER — ANTICOAGULATION THERAPY VISIT (OUTPATIENT)
Dept: ANTICOAGULATION | Facility: CLINIC | Age: 72
End: 2022-02-28

## 2022-02-28 DIAGNOSIS — I80.01 THROMBOPHLEBITIS OF SUPERFICIAL VEINS OF RIGHT LOWER EXTREMITY: ICD-10-CM

## 2022-02-28 DIAGNOSIS — Z79.01 LONG TERM CURRENT USE OF ANTICOAGULANT THERAPY: ICD-10-CM

## 2022-02-28 DIAGNOSIS — Z82.49 FAMILY HISTORY OF BLOOD CLOTS: ICD-10-CM

## 2022-02-28 DIAGNOSIS — R07.82 INTERCOSTAL PAIN: ICD-10-CM

## 2022-02-28 DIAGNOSIS — Z79.899 ENCOUNTER FOR LONG-TERM CURRENT USE OF MEDICATION: ICD-10-CM

## 2022-02-28 DIAGNOSIS — Z83.2 FAMILY HISTORY OF FACTOR V DEFICIENCY: ICD-10-CM

## 2022-02-28 DIAGNOSIS — I80.01 THROMBOPHLEBITIS OF SUPERFICIAL VEINS OF RIGHT LOWER EXTREMITY: Primary | ICD-10-CM

## 2022-02-28 LAB
CHOLEST SERPL-MCNC: 207 MG/DL
FASTING STATUS PATIENT QL REPORTED: YES
HDLC SERPL-MCNC: 57 MG/DL
INR PPP: 2.18 (ref 0.85–1.15)
LDLC SERPL CALC-MCNC: 130 MG/DL
NONHDLC SERPL-MCNC: 150 MG/DL
TRIGL SERPL-MCNC: 101 MG/DL

## 2022-02-28 PROCEDURE — 80061 LIPID PANEL: CPT

## 2022-02-28 PROCEDURE — 36415 COLL VENOUS BLD VENIPUNCTURE: CPT

## 2022-02-28 PROCEDURE — 85610 PROTHROMBIN TIME: CPT

## 2022-02-28 NOTE — PROGRESS NOTES
ANTICOAGULATION MANAGEMENT     Thomas Davila 72 year old female is on warfarin with therapeutic INR result. (Goal INR 2.0-3.0)    Recent labs: (last 7 days)     02/28/22  0821   INR 2.18*       ASSESSMENT       Source(s): Chart Review and Patient/Caregiver Call       Warfarin doses taken: Warfarin taken as instructed    Diet: No new diet changes identified    New illness, injury, or hospitalization: No    Medication/supplement changes: None noted    Signs or symptoms of bleeding or clotting: No    Previous INR: Therapeutic last 2(+) visits    Additional findings: None       PLAN     Recommended plan for no diet, medication or health factor changes affecting INR     Dosing Instructions: Continue your current warfarin dose with next INR in 4 weeks       Summary  As of 2/28/2022    Full warfarin instructions:  2.5 mg every Mon, Wed, Sat; 5 mg all other days   Next INR check:  3/21/2022             Telephone call with Thomas who verbalizes understanding and agrees to plan    Lab visit scheduled    Education provided: Please call back if any changes to your diet, medications or how you've been taking warfarin    Plan made per [facility] anticoagulation protocol    Harper Kline RN  Anticoagulation Clinic  2/28/2022    _______________________________________________________________________     Anticoagulation Episode Summary     Current INR goal:  2.0-3.0   TTR:  43.4 % (11.2 mo)   Target end date:  Indefinite   Send INR reminders to:  [facility]    Indications    Thrombophlebitis of superficial veins of right lower extremity [I80.01]  Family history of factor V deficiency [Z83.2]  Encounter for long-term current use of medication [Z79.899]  Long term current use of anticoagulant therapy [Z79.01]           Comments:  Allergy to Lovenox         Anticoagulation Care Providers     Provider Role Specialty Phone number    Lizabeth Zavala MD Referring Family Medicine [phone]    Dung Prieto MD  [facility] Family Medicine [phone]

## 2022-03-03 ENCOUNTER — OFFICE VISIT (OUTPATIENT)
Dept: DERMATOLOGY | Facility: CLINIC | Age: 72
End: 2022-03-03
Payer: MEDICARE

## 2022-03-03 VITALS — DIASTOLIC BLOOD PRESSURE: 87 MMHG | SYSTOLIC BLOOD PRESSURE: 166 MMHG | OXYGEN SATURATION: 99 % | HEART RATE: 96 BPM

## 2022-03-03 DIAGNOSIS — D22.9 NEVUS: ICD-10-CM

## 2022-03-03 DIAGNOSIS — L57.0 ACTINIC KERATOSIS: Primary | ICD-10-CM

## 2022-03-03 DIAGNOSIS — L82.1 SEBORRHEIC KERATOSIS: ICD-10-CM

## 2022-03-03 DIAGNOSIS — Z85.828 HISTORY OF SCC (SQUAMOUS CELL CARCINOMA) OF SKIN: ICD-10-CM

## 2022-03-03 DIAGNOSIS — D18.01 ANGIOMA OF SKIN: ICD-10-CM

## 2022-03-03 DIAGNOSIS — D48.5 NEOPLASM OF UNCERTAIN BEHAVIOR OF SKIN: ICD-10-CM

## 2022-03-03 DIAGNOSIS — L81.4 LENTIGO: ICD-10-CM

## 2022-03-03 PROCEDURE — 17004 DESTROY PREMAL LESIONS 15/>: CPT | Performed by: PHYSICIAN ASSISTANT

## 2022-03-03 PROCEDURE — 11103 TANGNTL BX SKIN EA SEP/ADDL: CPT | Mod: 59 | Performed by: PHYSICIAN ASSISTANT

## 2022-03-03 PROCEDURE — 88305 TISSUE EXAM BY PATHOLOGIST: CPT | Performed by: DERMATOLOGY

## 2022-03-03 PROCEDURE — 99213 OFFICE O/P EST LOW 20 MIN: CPT | Mod: 25 | Performed by: PHYSICIAN ASSISTANT

## 2022-03-03 PROCEDURE — 11102 TANGNTL BX SKIN SINGLE LES: CPT | Mod: 59 | Performed by: PHYSICIAN ASSISTANT

## 2022-03-03 NOTE — NURSING NOTE
"Initial BP (!) 166/87 (BP Location: Left arm)   Pulse 96   SpO2 99%  Estimated body mass index is 33.14 kg/m  as calculated from the following:    Height as of 2/25/22: 1.617 m (5' 3.66\").    Weight as of 2/25/22: 86.6 kg (191 lb). .      "

## 2022-03-03 NOTE — PATIENT INSTRUCTIONS
Wound Care Instructions     FOR SUPERFICIAL WOUNDS     [facility]  [phone]                 AFTER 24 HOURS YOU SHOULD REMOVE THE BANDAGE AND BEGIN DAILY DRESSING CHANGES AS FOLLOWS:     1) Remove Dressing.     2) Clean and dry the area with tap water using a Q-tip or sterile gauze pad.     3) Apply Vaseline, Aquaphor, Polysporin ointment or Bacitracin ointment over entire wound.  Do NOT use Neosporin ointment.     4) Cover the wound with a band-aid, or a sterile non-stick gauze pad and micropore paper tape    REPEAT THESE INSTRUCTIONS AT LEAST ONCE A DAY UNTIL THE WOUND HAS COMPLETELY HEALED.    It is an old wives tale that a wound heals better when it is exposed to air and allowed to dry out. The wound will heal faster with a better cosmetic result if it is kept moist with ointment and covered with a bandage.    **Do not let the wound dry out.**    Supplies Needed:      *Cotton tipped applicators (Q-tips)    *Vaseline, Aquaphor, Polysporin or Bacitracin Ointment (NOT NEOSPORIN)    *Band-aids or non-stick gauze pads and micropore paper tape.      PATIENT INFORMATION:    During the healing process you will notice a number of changes. All wounds develop a small halo of redness surrounding the wound.  This means healing is occurring. Severe itching with extensive redness usually indicates sensitivity to the ointment or bandage tape used to dress the wound.  You should call our office if this develops.      Swelling  and/or discoloration around your surgical site is common, particularly when performed around the eye.    All wounds normally drain.  The larger the wound the more drainage there will be.  After 7-10 days, you will notice the wound beginning to shrink and new skin will begin to grow.  The wound is healed when you can see skin has formed over the entire area.  A healed wound has a healthy, shiny look to the surface and is red to dark pink in color to normalize.  Wounds may  take approximately 4-6 weeks to heal.  Larger wounds may take 6-8 weeks.  After the wound is healed you may discontinue dressing changes.    You may experience a sensation of tightness as your wound heals. This is normal and will gradually subside.    Your healed wound may be sensitive to temperature changes. This sensitivity improves with time, but if you re having a lot of discomfort, try to avoid temperature extremes.    Patients frequently experience itching after their wound appears to have healed because of the continue healing under the skin.  Plain Vaseline will help relieve the itching.      POSSIBLE COMPLICATIONS    BLEEDIN. Leave the bandage in place.  2. Use tightly rolled up gauze or a cloth to apply direct pressure over the bandage for 30  minutes.  3. Reapply pressure for an additional 30 minutes if necessary  4. Use additional gauze and tape to maintain pressure once the bleeding has stopped.

## 2022-03-03 NOTE — PROGRESS NOTES
HPI:   Chief complaints: Thomas Davila is a pleasant 72 year old female who presents for Full skin cancer screening to rule out skin cancer   Last Skin Exam: 1 year ago      1st Baseline: no  Personal HX of Skin Cancer: yes SCC   Personal HX of Malignant Melanoma: no   Family HX of Skin Cancer / Malignant Melanoma: no  Personal HX of Atypical Moles:   no  Risk factors: history of sun exposure and burns  New / Changing lesions:yes painful spot on the bridge of the nose. Also many rough spots on the arms  Social History:  passed away. She lives in a home adjacent to her son and family  On review of systems, there are no further skin complaints, patient is feeling otherwise well.   ROS of the following were done and are negative: Constitutional, Eyes, Ears, Nose,   Mouth, Throat, Cardiovascular, Respiratory, GI, Genitourinary, Musculoskeletal,   Psychiatric, Endocrine, Allergic/Immunologic.    PHYSICAL EXAM:   BP (!) 166/87 (BP Location: Left arm)   Pulse 96   SpO2 99%   Skin exam performed as follows: Type 2 skin. Mood appropriate  Alert and Oriented X 3. Well developed, well nourished in no distress.  General appearance: Normal  Head including face: Normal  Eyes: conjunctiva and lids: Normal  Mouth: Lips, teeth, gums: Normal  Neck: Normal  Chest-breast/axillae: Normal  Back: Normal  Spleen and liver: Normal  Cardiovascular: Exam of peripheral vascular system by observation for swelling, varicosities, edema: Normal  Genitalia: groin, buttocks: Normal  Extremities: digits/nails (clubbing): Normal  Eccrine and Apocrine glands: Normal  Right upper extremity: Normal  Left upper extremity: Normal  Right lower extremity: Normal  Left lower extremity: Normal  Skin: Scalp and body hair: See below    Pt deferred exam of breasts, groin, buttocks: No    Other physical findings:  1. Multiple pigmented macules on extremities and trunk  2. Multiple pigmented macules on face, trunk and extremities  3. Multiple vascular  papules on trunk, arms and legs  4. Multiple scattered keratotic plaques  6. Pink gritty papules on the right forearm x 10, left forearm x 11  7. 4 mm pink tender papule on the left nasal bridge  8. 9 mm pink macule on the left upper arm       Except as noted above, no other signs of skin cancer or melanoma.     ASSESSMENT/PLAN:   Benign above waist skin cancer screening today. . Patient with history of SCC  Advised on monthly self exams and 1 year  Patient Education: Appropriate brochures given.    1. Multiple benign appearing melanocytic nevi on arms, legs and trunk. Discussed ABCDEs of melanoma and sunscreen.   2. Multiple lentigos on arms, legs and trunk. Advised benign, no treatment needed.  3. Multiple scattered angiomas. Advised benign, no treatment needed.   4. Seborrheic keratosis on arms, legs and trunk. Advised benign, no treatment needed.  5. Actinic keratosis on the right forearm x 10, left forearm x 11. As precancerous, cryosurgery performed. Advised on blistering and post-op care. Advised if not resolved in 1-2 months to return for evaluation  6. R/o SCC on the left nasal bridge. Shave biopsy performed.  Area cleaned and anesthetized with 1% lidocaine with epinephrine.  Dermablade used to remove the lesion and sent to pathology. Bleeding was cauterized. Pt tolerated procedure well with no complications.   7. R/o BCC on the left upper arm. Shave biopsy performed.  Area cleaned and anesthetized with 1% lidocaine with epinephrine.  Dermablade used to remove the lesion and sent to pathology. Bleeding was cauterized. Pt tolerated procedure well with no complications.   8. Akhil to follow up with Primary Care provider regarding elevated blood pressure.            Follow-up: yearly    1.) Patient was asked about new and changing moles. YES  2.) Patient received a complete physical skin examination: YES  3.) Patient was counseled to perform a monthly self skin examination: YES  Scribed By: Maryana Velasquez MS,  TYLER

## 2022-03-03 NOTE — LETTER
3/3/2022         RE: Thomas Davila  [id number] [facility] [id number]        Dear Colleague,    Thank you for referring your patient, Thomas Davila, to the [facility]. Please see a copy of my visit note below.    HPI:   Chief complaints: Thomas Davila is a pleasant 72 year old female who presents for Full skin cancer screening to rule out skin cancer   Last Skin Exam: 1 year ago      1st Baseline: no  Personal HX of Skin Cancer: yes SCC   Personal HX of Malignant Melanoma: no   Family HX of Skin Cancer / Malignant Melanoma: no  Personal HX of Atypical Moles:   no  Risk factors: history of sun exposure and burns  New / Changing lesions:yes painful spot on the bridge of the nose. Also many rough spots on the arms  Social History:  passed away. She lives in a home adjacent to her son and family  On review of systems, there are no further skin complaints, patient is feeling otherwise well.   ROS of the following were done and are negative: Constitutional, Eyes, Ears, Nose,   Mouth, Throat, Cardiovascular, Respiratory, GI, Genitourinary, Musculoskeletal,   Psychiatric, Endocrine, Allergic/Immunologic.    PHYSICAL EXAM:   BP (!) 166/87 (BP Location: Left arm)   Pulse 96   SpO2 99%   Skin exam performed as follows: Type 2 skin. Mood appropriate  Alert and Oriented X 3. Well developed, well nourished in no distress.  General appearance: Normal  Head including face: Normal  Eyes: conjunctiva and lids: Normal  Mouth: Lips, teeth, gums: Normal  Neck: Normal  Chest-breast/axillae: Normal  Back: Normal  Spleen and liver: Normal  Cardiovascular: Exam of peripheral vascular system by observation for swelling, varicosities, edema: Normal  Genitalia: groin, buttocks: Normal  Extremities: digits/nails (clubbing): Normal  Eccrine and Apocrine glands: Normal  Right upper extremity: Normal  Left upper extremity: Normal  Right lower extremity: Normal  Left lower extremity: Normal  Skin: Scalp  and body hair: See below    Pt deferred exam of breasts, groin, buttocks: No    Other physical findings:  1. Multiple pigmented macules on extremities and trunk  2. Multiple pigmented macules on face, trunk and extremities  3. Multiple vascular papules on trunk, arms and legs  4. Multiple scattered keratotic plaques  6. Pink gritty papules on the right forearm x 10, left forearm x 11  7. 4 mm pink tender papule on the left nasal bridge  8. 9 mm pink macule on the left upper arm       Except as noted above, no other signs of skin cancer or melanoma.     ASSESSMENT/PLAN:   Benign above waist skin cancer screening today. . Patient with history of SCC  Advised on monthly self exams and 1 year  Patient Education: Appropriate brochures given.    1. Multiple benign appearing melanocytic nevi on arms, legs and trunk. Discussed ABCDEs of melanoma and sunscreen.   2. Multiple lentigos on arms, legs and trunk. Advised benign, no treatment needed.  3. Multiple scattered angiomas. Advised benign, no treatment needed.   4. Seborrheic keratosis on arms, legs and trunk. Advised benign, no treatment needed.  5. Actinic keratosis on the right forearm x 10, left forearm x 11. As precancerous, cryosurgery performed. Advised on blistering and post-op care. Advised if not resolved in 1-2 months to return for evaluation  6. R/o SCC on the left nasal bridge. Shave biopsy performed.  Area cleaned and anesthetized with 1% lidocaine with epinephrine.  Dermablade used to remove the lesion and sent to pathology. Bleeding was cauterized. Pt tolerated procedure well with no complications.   7. R/o BCC on the left upper arm. Shave biopsy performed.  Area cleaned and anesthetized with 1% lidocaine with epinephrine.  Dermablade used to remove the lesion and sent to pathology. Bleeding was cauterized. Pt tolerated procedure well with no complications.   8. Akhil to follow up with Primary Care provider regarding elevated blood  pressure.            Follow-up: yearly    1.) Patient was asked about new and changing moles. YES  2.) Patient received a complete physical skin examination: YES  3.) Patient was counseled to perform a monthly self skin examination: YES  Scribed By: Maryana Velasquez MS, PALarsC          Again, thank you for allowing me to participate in the care of your patient.        Sincerely,        Maryana Velasquez PA-C

## 2022-03-07 LAB
PATH REPORT.COMMENTS IMP SPEC: ABNORMAL
PATH REPORT.COMMENTS IMP SPEC: ABNORMAL
PATH REPORT.COMMENTS IMP SPEC: YES
PATH REPORT.FINAL DX SPEC: ABNORMAL
PATH REPORT.GROSS SPEC: ABNORMAL
PATH REPORT.MICROSCOPIC SPEC OTHER STN: ABNORMAL
PATH REPORT.RELEVANT HX SPEC: ABNORMAL

## 2022-03-14 ENCOUNTER — OFFICE VISIT (OUTPATIENT)
Dept: DERMATOLOGY | Facility: CLINIC | Age: 72
End: 2022-03-14
Payer: MEDICARE

## 2022-03-14 VITALS — SYSTOLIC BLOOD PRESSURE: 159 MMHG | HEART RATE: 98 BPM | OXYGEN SATURATION: 98 % | DIASTOLIC BLOOD PRESSURE: 100 MMHG

## 2022-03-14 DIAGNOSIS — L82.1 SEBORRHEIC KERATOSIS: Primary | ICD-10-CM

## 2022-03-14 DIAGNOSIS — D04.62 SQUAMOUS CELL CARCINOMA IN SITU (SCCIS) OF SKIN OF FINGER OF LEFT HAND: ICD-10-CM

## 2022-03-14 DIAGNOSIS — D23.9 DERMAL NEVUS: ICD-10-CM

## 2022-03-14 DIAGNOSIS — L57.0 AK (ACTINIC KERATOSIS): ICD-10-CM

## 2022-03-14 DIAGNOSIS — L81.4 LENTIGO: ICD-10-CM

## 2022-03-14 DIAGNOSIS — D18.01 ANGIOMA OF SKIN: ICD-10-CM

## 2022-03-14 DIAGNOSIS — L82.0 INFLAMED SEBORRHEIC KERATOSIS: ICD-10-CM

## 2022-03-14 DIAGNOSIS — C44.321 SQUAMOUS CELL CANCER OF SKIN OF NOSE: ICD-10-CM

## 2022-03-14 PROCEDURE — 88331 PATH CONSLTJ SURG 1 BLK 1SPC: CPT | Mod: 59 | Performed by: DERMATOLOGY

## 2022-03-14 PROCEDURE — 99213 OFFICE O/P EST LOW 20 MIN: CPT | Mod: 25 | Performed by: DERMATOLOGY

## 2022-03-14 PROCEDURE — 17311 MOHS 1 STAGE H/N/HF/G: CPT | Mod: 59 | Performed by: DERMATOLOGY

## 2022-03-14 PROCEDURE — 11102 TANGNTL BX SKIN SINGLE LES: CPT | Mod: 59 | Performed by: DERMATOLOGY

## 2022-03-14 PROCEDURE — 11313 SHAVE SKIN LESION >2.0 CM: CPT | Mod: 51 | Performed by: DERMATOLOGY

## 2022-03-14 PROCEDURE — 17311 MOHS 1 STAGE H/N/HF/G: CPT | Performed by: DERMATOLOGY

## 2022-03-14 NOTE — PROGRESS NOTES
Thomas Davila is an extremely pleasant 72 year old year old female patient here today for evaluation and managment of squamous cell carcinoma on nose, actinic keratosis on arm.  Today she notes spot on left arm and L thumb.   .   Patient states this has been present for a while.  Patient reports the following symptoms:  Painful on thumb.  Patient reports the following previous treatments cryo x3.  .  These treatments did not work.  Patient reports the following modifying factors none.  Associated symptoms: none.  Patient has no other skin complaints today.  Remainder of the HPI, Meds, PMH, Allergies, FH, and SH was reviewed in chart.      Past Medical History:   Diagnosis Date     Acute parametritis and pelvic cellulitis     salpingitis     ASCUS with positive high risk HPV 2013     Asthma     No recent issues     Basal cell carcinoma      breast cancer     left lumpectomy, radiation, tamoxifen     Breast cancer (H)     L Breast; Lumpectomy & Radiation     Chronic salpingitis and oophoritis     PID        Embolism and thrombosis of unspecified site     left leg, due to tamoxifen therapy     Generalized osteoarthrosis, unspecified site     hands     Leiomyoma of uterus, unspecified      Morbid obesity (H) 2017     Other malignant neoplasm of skin, site unspecified 2006    Basal cell cancer on nose     Squamous cell carcinoma      Thrombosis of leg     Left     Unspecified essential hypertension        Past Surgical History:   Procedure Laterality Date     BREAST LUMPECTOMY, RT/LT      left     C/SECTION, LOW TRANSVERSE  1972,     , Low Transverse x2     CL [facility]      Breast reduction     COLONOSCOPY  10/15/02    normal     FOOT SURGERY      R Foot      HC EXCISION BREAST LESION, OPEN >=1  98    1) Needle localization with generous lumpectomy 2) Left axillary node dissection.     HC LAPAROSCOPY, SURGICAL, ABDOMEN, PERITONEUM & OMENTUM; DX W/  OR W/O SPECIMEN(S)  02/07/94     HYSTERECTOMY, PAP NO LONGER INDICATED       INSERT PORT VASCULAR ACCESS  3/14/2013    Procedure: INSERT PORT VASCULAR ACCESS;  Port Revision;  Surgeon: Arash Puente MD;  Location: [facility]     LAPAROSCOPIC HYSTERECTOMY TOTAL, BILATERAL SALPINGO-OOPHORECTOMY, NODE DISSECTION, COMBINED  1/31/2013    Procedure: COMBINED LAPAROSCOPIC HYSTERECTOMY TOTAL, SALPINGO-OOPHORECTOMY, NODE DISSECTION;  Laparosocpic  Total Abdominal Hysterectomy, Bilateral Salphino-Oophorectomy, Bilateral Pelvic Lymph Node Dissection, Pelvic Washings, Cystoscopy;  Surgeon: Tanya Walker MD;  Location: [facility]     PHACOEMULSIFICATION WITH STANDARD INTRAOCULAR LENS IMPLANT Left 6/12/2019    Procedure: Cataract Removal with Implant;  Surgeon: Walt Mckeon MD;  Location: [facility]     PHACOEMULSIFICATION WITH STANDARD INTRAOCULAR LENS IMPLANT Right 7/3/2019    Procedure: Cataract Removal with Implant;  Surgeon: Walt Mckeon MD;  Location: [facility]     SURGICAL HISTORY OF -   06/22/93    1) Excision of digital cyst right mid finger  2)  Excision of dermatofibroma left calf     SURGICAL HISTORY OF -   12/18/2001    Excision of mucinous cyst & partial ostectomy, bone removal of benign osteophytic overgrowth osteophyte of the distal aspect of the middle phalanx and distal phalanx     SURGICAL HISTORY OF -   2006    Basal cell cancer removal     TONSILLECTOMY       TUBAL LIGATION  1978        Family History   Problem Relation Age of Onset     Cerebrovascular Disease Mother      Hypertension Mother      Diabetes Mother      Thyroid Disease Mother      Arthritis Mother      Alzheimer Disease Mother      Neurologic Disorder Mother         Parkinsons     Heart Disease Father      Hypertension Father      Diabetes Father      Thyroid Disease Father      Arthritis Father      Blood Disease Father      Factor V Leiden deficiency Father      Anesthesia Reaction Sister      Thyroid Disease Sister       Anesthesia Reaction Sister      Arthritis Sister         RA     Hypertension Brother      Factor V Leiden deficiency Brother      Allergies Son      Gastrointestinal Disease Son         GERD     Hypertension Brother      Allergies Son         percocett     Gastrointestinal Disease Son         GERD     Hypertension Son      Factor V Leiden deficiency Niece        Social History     Socioeconomic History     Marital status:      Spouse name: Not on file     Number of children: 2     Years of education: Not on file     Highest education level: Not on file   Occupational History     Employer: sub teacher in [employer]     Employer: RETIRED   Tobacco Use     Smoking status: Never Smoker     Smokeless tobacco: Never Used   Vaping Use     Vaping Use: Never used   Substance and Sexual Activity     Alcohol use: Yes     Comment: Rare     Drug use: No     Sexual activity: Yes     Partners: Male   Other Topics Concern     Parent/sibling w/ CABG, MI or angioplasty before 65F 55M? No   Social History Narrative    Rastafari-- DECLINES ALL BLOOD PRODUCTS        April 15, 2021    ENVIRONMENTAL HISTORY: [place] The home is heated with a forced air. They do have central air conditioning. The patient's bedroom is furnished with hard vijaya in bedroom and animals sleep in bedroom.  Pets inside the house include 2 cat(s). There is no history of cockroach or mice infestation. There is/are 0 smokers in the house.  The house does not have a basement.      Social Determinants of Health     Financial Resource Strain: Not on file   Food Insecurity: Not on file   Transportation Needs: Not on file   Physical Activity: Not on file   Stress: Not on file   Social Connections: Not on file   Intimate Partner Violence: Not on file   Housing Stability: Not on file       Outpatient Encounter Medications as of 3/14/2022   Medication Sig Dispense Refill     lisinopril (ZESTRIL) 20 MG tablet  Take 1 tablet (20 mg) by mouth daily 90 tablet 3     omeprazole (PRILOSEC) 20 MG DR capsule TAKE 1 CAPSULE BY MOUTH ONCE DAILY 90 capsule 3     phentermine (ADIPEX-P) 30 MG capsule Take 1 capsule (30 mg) by mouth every morning 90 capsule 0     topiramate (TOPAMAX) 50 MG tablet Take 1 tablet (50 mg) by mouth daily 90 tablet 3     triamterene-HCTZ (MAXZIDE-25) 37.5-25 MG tablet Take 1 tablet by mouth daily 90 tablet 3     warfarin ANTICOAGULANT (JANTOVEN ANTICOAGULANT) 5 MG tablet Take 2.5 mg every Mon, Wed, Sat; 5 mg all other days or As directed by Anticoagulation clinic 84 tablet 1     No facility-administered encounter medications on file as of 3/14/2022.             O:   NAD, WDWN, Alert & Oriented, Mood & Affect wnl, Vitals stable   Here today alone   BP (!) 159/100   Pulse 98   SpO2 98%    General appearance normal   Vitals stable   Alert, oriented and in no acute distress      Following lymph nodes palpated: Occipital, Cervical, Supraclavicular, antecubital no lad  L nasal sidewall 4mm scaly papule   L thumb 6mm tender ill-defined scaly papule   L forearm inflamed seborrheic keratosis   L bicep gritty scaly papule       Stuck on papules and brown macules on trunk and ext   Red papules on trunk  Flesh colored papules on trunk         Eyes: Conjunctivae/lids:Normal     ENT: Lips, buccal mucosa, tongue: normal    MSK:Normal    Cardiovascular: peripheral edema none    Pulm: Breathing Normal    Lymph Nodes: No Head and Neck Lymphadenopathy     Neuro/Psych: Orientation:Alert and Orientedx3 ; Mood/Affect:normal       MICRO:   L thumb:There is hyperkeratosis & parakeratosis of the epidermis, with full thickness epidermal involvement by atypical keratinocytes with rare pale vacuolated cells.  Unremarkable dermis.    A/P:  1. Seborrheic keratosis, lentigo, angioma, dermal nevus, hx of non-melanoma skin cancer   2. L thumb r/o squamous cell carcinoma   TANGENTIAL BIOPSY IN HOUSE:  After consent, anesthesia with LEC and  prep, tangential excision performed and dx above confirmed with frozen section histology.  No complications and routine wound care.  Patient is on  anticoagulants and risk of bleeding discussed with patient.       I have personally reviewed all specimens and/or slides and used them with my medical judgement to determine or confirm the final diagnosis.     Patient told result squamous cell carcinoma in situ .    3. L bicep actinic keratosis   LN2:  Treated with LN2 for 5s for 1-2 cycles. Warned risks of blistering, pain, pigment change, scarring, and incomplete resolution.  Advised patient to return if lesions do not completely resolve.  Wound care sheet given.  4. L forearm seborrheic keratosis   5. L [place] squamous cell carcinoma     It was a pleasure speaking to Thomas Davila today.  Previous clinic notes and pertinent laboratory tests were reviewed prior to Thomas Davila's visit.  Nature and genetics of benign skin lesions dicussed with patient.  Signs and Symptoms of skin cancer discussed with patient.  Patient encouraged to perform monthly skin exams.  UV precautions reviewed with patient.  Risks of non-melanoma skin cancer discussed with patient   Return to clinic 6 months  PROCEDURE NOTE  L [place] squamous cell carcinoma   MOHS:   Location    The rationale for Mohs surgery was discussed with the patient and consent was obtained.  The risks and benefits as well as alternatives to therapy were discussed, in detail.  Specifically, the risks of infection, scarring, bleeding, prolonged wound healing, incomplete removal, allergy to anesthesia, nerve injury and recurrence were addressed.  Indication for Mohs was Location. Prior to the procedure, the treatment site was clearly identified and, if available, confirmed with previous photos and confirmed by the patient   All components of the Universal Protocol/PAUSE rule were completed.  The Mohs surgeon operated in two distinct and integrated capacities as the surgeon  and pathologist.      The area was prepped with Betasept.  A rim of normal appearing skin was marked circumferentially around the lesion.  The area was infiltrated with local anesthesia.  The tumor was first debulked to remove all clinically apparent tumor.  An incision following the standard Mohs approach was done and the specimen was oriented,mapped and placed in 1 block(s).  Each specimen was then chromacoded and processed in the Mohs laboratory using standard Mohs technique and submitted for frozen section histology.  Frozen section analysis showed no residual tumor but CLEAR MARGINS.      The tumor was excised using standard Mohs technique in 1 stages(s).  CLEAR MARGINS OBTAINED and Final defect size was 0.9cm.     We discussed the options for wound management in full with the patient including risks/benefits/ possible outcomes.        DERMABRASION: After PGACAC discussed with patient, decision for tangential excision and dermabrasion was made. After anesthesia with Lido/Epi/Clinda and prep with hibiclens, hypertrophic areas were tangentially excised and entire cosmetic unit was smoothed 2.3cm. Hemostasis was obtained with pressure. Patient tolerated procedure well. There were no complications and EBL minimal. Patient advised to keep abraded surfaces covered with generous ointment until healed, approximately 2 weeks. Return to office in 3 months or prn.     L thumb squamous cell carcinoma   MOHS:   Location and Ill-defined margins    The rationale for Mohs surgery was discussed with the patient and consent was obtained.  The risks and benefits as well as alternatives to therapy were discussed, in detail.  Specifically, the risks of infection, scarring, bleeding, prolonged wound healing, incomplete removal, allergy to anesthesia, nerve injury and recurrence were addressed.  Indication for Mohs was Location and Ill-defined margins. Prior to the procedure, the treatment site was clearly identified and, if  available, confirmed with previous photos and confirmed by the patient   All components of the Universal Protocol/PAUSE rule were completed.  The Mohs surgeon operated in two distinct and integrated capacities as the surgeon and pathologist.      The area was prepped with Betasept.  A rim of normal appearing skin was marked circumferentially around the lesion.  The area was infiltrated with local anesthesia.  The tumor was first debulked to remove all clinically apparent tumor.  An incision following the standard Mohs approach was done and the specimen was oriented,mapped and placed in 1 block(s).  Each specimen was then chromacoded and processed in the Mohs laboratory using standard Mohs technique and submitted for frozen section histology.  Frozen section analysis showed no residual tumor but CLEAR MARGINS.      The tumor was excised using standard Mohs technique in 1 stages(s).  CLEAR MARGINS OBTAINED and Final defect size was 1.1 cm.     We discussed the options for wound management in full with the patient including risks/benefits/ possible outcomes.        REPAIR SECOND INTENT: We discussed the options for wound management in full with the patient including risks/benefits/possible outcomes. Decision made to allow the wound to heal by second intention. Cautery was used for for hemostasis. EBL minimal; complications none; wound care routine.  The patient was discharged in good condition and will return in one month or prn for wound evaluation.

## 2022-03-14 NOTE — NURSING NOTE
"Initial BP (!) 159/100   Pulse 98   SpO2 98%  Estimated body mass index is 33.14 kg/m  as calculated from the following:    Height as of 2/25/22: 1.617 m (5' 3.66\").    Weight as of 2/25/22: 86.6 kg (191 lb). .      "
Surgical Office Location :   [facility]  [address] [place] [address]    
1-2 weeks

## 2022-03-14 NOTE — PATIENT INSTRUCTIONS
WOUND CARE INSTRUCTIONS   FOR CRYOSURGERY   On Left Arm  This area treated with liquid nitrogen should form a blister (areas treated may or may not blister-skin may just turn dark and slough off). You do not need to bandage the area unless a blister forms and breaks (which may be a few days). When the blister breaks, begin daily dressing changes as follows:   1) Clean and dry the area with tap water using clean Q-tip or sterile gauze pad.   2) Apply Polysporin ointment or Bacitracin ointment over entire wound. Do NOT use Neosporin ointment.   3) Cover the wound with a band-aid or sterile non-stick gauze pad and micropore paper tape.   REPEAT THESE INSTRUCTIONS AT LEAST ONCE A DAY UNTIL THE WOUND HAS COMPLETELY HEALED.   It is an old wives tale that a wound heals better when it is exposed to air and allowed to dry out. The wound will heal faster with a better cosmetic result if it is kept moist with ointment and covered with a bandage.   *Do not let the wound dry out.   IMPORTANT INFORMATION ON REVERSE SIDE   Supplies Needed:   *Cotton tipped applicators (Q-tips)   *Polysporin ointment or Bacitracin ointment (NOT NEOSPORIN)   *Band-aids, or non stick gauze pads and micropore paper tape   PATIENT INFORMATION   During the healing process you will notice a number of changes. All wounds develop a small halo of redness surrounding the wound. This means healing is occurring. Severe itching with extensive redness usually indicates sensitivity to the ointment or bandage tape used to dress the wound. You should call our office if this develops.   Swelling and/or discoloration around your surgical site is common, particularly when performed around the eye.   All wounds normally drain. The larger the wound the more drainage there will be. After 7-10 days, you will notice the wound beginning to shrink and new skin will begin to grow. The wound is healed when you can see skin has formed over the entire area. A healed wound has a  healthy, shiny look to the surface and is red to dark pink in color to normalize. Wounds may take approximately 4-6 weeks to heal. Larger wounds may take 6-8 weeks. After the wound is healed you may discontinue dressing changes.   You may experience a sensation of tightness as your wound heals. This is normal and will gradually subside.   Your healed wound may be sensitive to temperature changes. This sensitivity improves with time, but if you re having a lot of discomfort, try to avoid temperature extremes.   Patients frequently experience itching after their wound appears to have healed because of the continue healing under the skin. Plain Vaseline will help relieve the itching.       Open Wound Care     for _____Nose_________    ? No strenuous activity for 48 hours    ? Take Tylenol as needed for discomfort.                                                .         ? Do not drink alcoholic beverages for 48 hours.    ? Keep the pressure bandage in place for 24 hours. If the bandage becomes blood tinged or loose, reinforce it with gauze and tape.        (Refer to the reverse side of this page for management of bleeding).    ? Remove bandage in 24 hours and begin wound care as follows:     1. Clean area with tap water using a Q tip or gauze pad, (shower / bathe normally)  2. Dry wound with Q tip or gauze pad  3. Apply Aquaphor, Vaseline, Polysporin or Bacitracin Ointment with a Q tip    Do NOT use Neosporin Ointment *  4. Cover the wound with a band-aid or nonstick gauze pad and paper tape.  5. Repeat wound care once a day until wound is completely healed.    It is an old wives tale that a wound heals better when it is exposed to air and allowed to dry out. The wound will heal faster with a better cosmetic result if it is kept moist with ointment and covered with a bandage.  Do not let the wound dry out.      Supplies Needed:                Qtips or gauze pads                Polysporin or Bacitracin Ointment                 Bandaids or nonstick gauze pads and paper tape    Wound care kits and brown paper tape are available for purchase at   the pharmacy.    BLEEDIN. Use tightly rolled up gauze or cloth to apply direct pressure over the bandage for 20   minutes.  2. Reapply pressure for an additional 20 minutes if necessary  3. Call the office or go to the nearest emergency room if pressure fails to stop the bleeding.  4. Use additional gauze and tape to maintain pressure once the bleeding has stopped.  5. Begin wound care 24 hours after surgery as directed.                  WOUND HEALING    1. One week after surgery a pink / red halo will form around the outside of the wound.   This is new skin.  2. The center of the wound will appear yellowish white and produce some drainage.  3. The pink halo will slowly migrate in toward the center of the wound until the wound is covered with new shiny pink skin.  4. There will be no more drainage when the wound is completely healed.  5. It will take six months to one year for the redness to fade.  6. The scar may be itchy, tight and sensitive to extreme temperatures for a year after the surgery.  7. Massaging the area several times a day for several minutes after the wound is completely healed will help the scar soften and normalize faster. Begin massage only after healing is complete.      In case of emergency call: Dr Golden: [phone]  [facility]: [phone]  [facility]:[phone]

## 2022-03-14 NOTE — LETTER
3/14/2022         RE: Thomas Davila  [address] [facility] [address]        Dear Colleague,    Thank you for referring your patient, Thomas Davila, to the [facility]. Please see a copy of my visit note below.    Thomas Davila is an extremely pleasant 72 year old year old female patient here today for evaluation and managment of squamous cell carcinoma on nose, actinic keratosis on arm.  Today she notes spot on left arm and L thumb.   .   Patient states this has been present for a while.  Patient reports the following symptoms:  Painful on thumb.  Patient reports the following previous treatments cryo x3.  .  These treatments did not work.  Patient reports the following modifying factors none.  Associated symptoms: none.  Patient has no other skin complaints today.  Remainder of the HPI, Meds, PMH, Allergies, FH, and SH was reviewed in chart.      Past Medical History:   Diagnosis Date     Acute parametritis and pelvic cellulitis     salpingitis     ASCUS with positive high risk HPV 2013     Asthma     No recent issues     Basal cell carcinoma      breast cancer     left lumpectomy, radiation, tamoxifen     Breast cancer (H)     L Breast; Lumpectomy & Radiation     Chronic salpingitis and oophoritis     PID        Embolism and thrombosis of unspecified site     left leg, due to tamoxifen therapy     Generalized osteoarthrosis, unspecified site     hands     Leiomyoma of uterus, unspecified      Morbid obesity (H) 2017     Other malignant neoplasm of skin, site unspecified 2006    Basal cell cancer on nose     Squamous cell carcinoma      Thrombosis of leg     Left     Unspecified essential hypertension        Past Surgical History:   Procedure Laterality Date     BREAST LUMPECTOMY, RT/LT      left     C/SECTION, LOW TRANSVERSE  ,     , Low Transverse x2     CL AFF SURGICAL PATHOLOGY      Breast reduction     COLONOSCOPY   10/15/02    normal     FOOT SURGERY  2011    R Foot      HC EXCISION BREAST LESION, OPEN >=1  11/30/98    1) Needle localization with generous lumpectomy 2) Left axillary node dissection.     HC LAPAROSCOPY, SURGICAL, ABDOMEN, PERITONEUM & OMENTUM; DX W/ OR W/O SPECIMEN(S)  02/07/94     HYSTERECTOMY, PAP NO LONGER INDICATED       INSERT PORT VASCULAR ACCESS  3/14/2013    Procedure: INSERT PORT VASCULAR ACCESS;  Port Revision;  Surgeon: Arash Puetne MD;  Location: [facility] OR     LAPAROSCOPIC HYSTERECTOMY TOTAL, BILATERAL SALPINGO-OOPHORECTOMY, NODE DISSECTION, COMBINED  1/31/2013    Procedure: COMBINED LAPAROSCOPIC HYSTERECTOMY TOTAL, SALPINGO-OOPHORECTOMY, NODE DISSECTION;  Laparosocpic  Total Abdominal Hysterectomy, Bilateral Salphino-Oophorectomy, Bilateral Pelvic Lymph Node Dissection, Pelvic Washings, Cystoscopy;  Surgeon: Tanya Walker MD;  Location: [facility] OR     PHACOEMULSIFICATION WITH STANDARD INTRAOCULAR LENS IMPLANT Left 6/12/2019    Procedure: Cataract Removal with Implant;  Surgeon: Walt Mckeon MD;  Location: [facility] OR     PHACOEMULSIFICATION WITH STANDARD INTRAOCULAR LENS IMPLANT Right 7/3/2019    Procedure: Cataract Removal with Implant;  Surgeon: Walt Mckeon MD;  Location: [facility]     SURGICAL HISTORY OF -   06/22/93    1) Excision of digital cyst right mid finger  2)  Excision of dermatofibroma left calf     SURGICAL HISTORY OF -   12/18/2001    Excision of mucinous cyst & partial ostectomy, bone removal of benign osteophytic overgrowth osteophyte of the distal aspect of the middle phalanx and distal phalanx     SURGICAL HISTORY OF -   2006    Basal cell cancer removal     TONSILLECTOMY       TUBAL LIGATION  1978        Family History   Problem Relation Age of Onset     Cerebrovascular Disease Mother      Hypertension Mother      Diabetes Mother      Thyroid Disease Mother      Arthritis Mother      Alzheimer Disease Mother      Neurologic Disorder Mother         Parkinsons      Heart Disease Father      Hypertension Father      Diabetes Father      Thyroid Disease Father      Arthritis Father      Blood Disease Father      Factor V Leiden deficiency Father      Anesthesia Reaction Sister      Thyroid Disease Sister      Anesthesia Reaction Sister      Arthritis Sister         RA     Hypertension Brother      Factor V Leiden deficiency Brother      Allergies Son      Gastrointestinal Disease Son         GERD     Hypertension Brother      Allergies Son         percocett     Gastrointestinal Disease Son         GERD     Hypertension Son      Factor V Leiden deficiency Niece        Social History     Socioeconomic History     Marital status:      Spouse name: Not on file     Number of children: 2     Years of education: Not on file     Highest education level: Not on file   Occupational History     Employer: sub teacher in [employer]     Employer: RETIRED   Tobacco Use     Smoking status: Never Smoker     Smokeless tobacco: Never Used   Vaping Use     Vaping Use: Never used   Substance and Sexual Activity     Alcohol use: Yes     Comment: Rare     Drug use: No     Sexual activity: Yes     Partners: Male   Other Topics Concern     Parent/sibling w/ CABG, MI or angioplasty before 65F 55M? 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 The home is heated with a forced air. They do have central air conditioning. The patient's bedroom is furnished with hard vijaya in bedroom and animals sleep in bedroom.  Pets inside the house include 2 cat(s). There is no history of cockroach or mice infestation. There is/are 0 smokers in the house.  The house does not have a basement.      Social Determinants of Health     Financial Resource Strain: Not on file   Food Insecurity: Not on file   Transportation Needs: Not on file   Physical Activity: Not on file   Stress:  Not on file   Social Connections: Not on file   Intimate Partner Violence: Not on file   Housing Stability: Not on file       Outpatient Encounter Medications as of 3/14/2022   Medication Sig Dispense Refill     lisinopril (ZESTRIL) 20 MG tablet Take 1 tablet (20 mg) by mouth daily 90 tablet 3     omeprazole (PRILOSEC) 20 MG DR capsule TAKE 1 CAPSULE BY MOUTH ONCE DAILY 90 capsule 3     phentermine (ADIPEX-P) 30 MG capsule Take 1 capsule (30 mg) by mouth every morning 90 capsule 0     topiramate (TOPAMAX) 50 MG tablet Take 1 tablet (50 mg) by mouth daily 90 tablet 3     triamterene-HCTZ (MAXZIDE-25) 37.5-25 MG tablet Take 1 tablet by mouth daily 90 tablet 3     warfarin ANTICOAGULANT (JANTOVEN ANTICOAGULANT) 5 MG tablet Take 2.5 mg every Mon, Wed, Sat; 5 mg all other days or As directed by Anticoagulation clinic 84 tablet 1     No facility-administered encounter medications on file as of 3/14/2022.             O:   NAD, WDWN, Alert & Oriented, Mood & Affect wnl, Vitals stable   Here today alone   BP (!) 159/100   Pulse 98   SpO2 98%    General appearance normal   Vitals stable   Alert, oriented and in no acute distress      Following lymph nodes palpated: Occipital, Cervical, Supraclavicular, antecubital no lad  L nasal sidewall 4mm scaly papule   L thumb 6mm tender ill-defined scaly papule   L forearm inflamed seborrheic keratosis   L bicep gritty scaly papule       Stuck on papules and brown macules on trunk and ext   Red papules on trunk  Flesh colored papules on trunk         Eyes: Conjunctivae/lids:Normal     ENT: Lips, buccal mucosa, tongue: normal    MSK:Normal    Cardiovascular: peripheral edema none    Pulm: Breathing Normal    Lymph Nodes: No Head and Neck Lymphadenopathy     Neuro/Psych: Orientation:Alert and Orientedx3 ; Mood/Affect:normal       MICRO:   L thumb:There is hyperkeratosis & parakeratosis of the epidermis, with full thickness epidermal involvement by atypical keratinocytes with rare pale  vacuolated cells.  Unremarkable dermis.    A/P:  1. Seborrheic keratosis, lentigo, angioma, dermal nevus, hx of non-melanoma skin cancer   2. L thumb r/o squamous cell carcinoma   TANGENTIAL BIOPSY IN HOUSE:  After consent, anesthesia with LEC and prep, tangential excision performed and dx above confirmed with frozen section histology.  No complications and routine wound care.  Patient is on  anticoagulants and risk of bleeding discussed with patient.       I have personally reviewed all specimens and/or slides and used them with my medical judgement to determine or confirm the final diagnosis.     Patient told result squamous cell carcinoma in situ .    3. L bicep actinic keratosis   LN2:  Treated with LN2 for 5s for 1-2 cycles. Warned risks of blistering, pain, pigment change, scarring, and incomplete resolution.  Advised patient to return if lesions do not completely resolve.  Wound care sheet given.  4. L forearm seborrheic keratosis   5. L NSW squamous cell carcinoma     It was a pleasure speaking to Thomas Davila today.  Previous clinic notes and pertinent laboratory tests were reviewed prior to Thomas Davila's visit.  Nature and genetics of benign skin lesions dicussed with patient.  Signs and Symptoms of skin cancer discussed with patient.  Patient encouraged to perform monthly skin exams.  UV precautions reviewed with patient.  Risks of non-melanoma skin cancer discussed with patient   Return to clinic 6 months  PROCEDURE NOTE  L NSW squamous cell carcinoma   MOHS:   Location    The rationale for Mohs surgery was discussed with the patient and consent was obtained.  The risks and benefits as well as alternatives to therapy were discussed, in detail.  Specifically, the risks of infection, scarring, bleeding, prolonged wound healing, incomplete removal, allergy to anesthesia, nerve injury and recurrence were addressed.  Indication for Mohs was Location. Prior to the procedure, the treatment site was  clearly identified and, if available, confirmed with previous photos and confirmed by the patient   All components of the Universal Protocol/PAUSE rule were completed.  The Mohs surgeon operated in two distinct and integrated capacities as the surgeon and pathologist.      The area was prepped with Betasept.  A rim of normal appearing skin was marked circumferentially around the lesion.  The area was infiltrated with local anesthesia.  The tumor was first debulked to remove all clinically apparent tumor.  An incision following the standard Mohs approach was done and the specimen was oriented,mapped and placed in 1 block(s).  Each specimen was then chromacoded and processed in the Mohs laboratory using standard Mohs technique and submitted for frozen section histology.  Frozen section analysis showed no residual tumor but CLEAR MARGINS.      The tumor was excised using standard Mohs technique in 1 stages(s).  CLEAR MARGINS OBTAINED and Final defect size was 0.9cm.     We discussed the options for wound management in full with the patient including risks/benefits/ possible outcomes.        DERMABRASION: After PGACAC discussed with patient, decision for tangential excision and dermabrasion was made. After anesthesia with Lido/Epi/Clinda and prep with hibiclens, hypertrophic areas were tangentially excised and entire cosmetic unit was smoothed 2.3cm. Hemostasis was obtained with pressure. Patient tolerated procedure well. There were no complications and EBL minimal. Patient advised to keep abraded surfaces covered with generous ointment until healed, approximately 2 weeks. Return to office in 3 months or prn.     L thumb squamous cell carcinoma   MOHS:   Location and Ill-defined margins    The rationale for Mohs surgery was discussed with the patient and consent was obtained.  The risks and benefits as well as alternatives to therapy were discussed, in detail.  Specifically, the risks of infection, scarring, bleeding,  prolonged wound healing, incomplete removal, allergy to anesthesia, nerve injury and recurrence were addressed.  Indication for Mohs was Location and Ill-defined margins. Prior to the procedure, the treatment site was clearly identified and, if available, confirmed with previous photos and confirmed by the patient   All components of the Universal Protocol/PAUSE rule were completed.  The Mohs surgeon operated in two distinct and integrated capacities as the surgeon and pathologist.      The area was prepped with Betasept.  A rim of normal appearing skin was marked circumferentially around the lesion.  The area was infiltrated with local anesthesia.  The tumor was first debulked to remove all clinically apparent tumor.  An incision following the standard Mohs approach was done and the specimen was oriented,mapped and placed in 1 block(s).  Each specimen was then chromacoded and processed in the Mohs laboratory using standard Mohs technique and submitted for frozen section histology.  Frozen section analysis showed no residual tumor but CLEAR MARGINS.      The tumor was excised using standard Mohs technique in 1 stages(s).  CLEAR MARGINS OBTAINED and Final defect size was 1.1 cm.     We discussed the options for wound management in full with the patient including risks/benefits/ possible outcomes.        REPAIR SECOND INTENT: We discussed the options for wound management in full with the patient including risks/benefits/possible outcomes. Decision made to allow the wound to heal by second intention. Cautery was used for for hemostasis. EBL minimal; complications none; wound care routine.  The patient was discharged in good condition and will return in one month or prn for wound evaluation.         Again, thank you for allowing me to participate in the care of your patient.        Sincerely,        Walt Golden MD

## 2022-03-21 ENCOUNTER — ANTICOAGULATION THERAPY VISIT (OUTPATIENT)
Dept: ANTICOAGULATION | Facility: CLINIC | Age: 72
End: 2022-03-21

## 2022-03-21 ENCOUNTER — LAB (OUTPATIENT)
Dept: LAB | Facility: CLINIC | Age: 72
End: 2022-03-21
Payer: OTHER GOVERNMENT

## 2022-03-21 DIAGNOSIS — I80.01 THROMBOPHLEBITIS OF SUPERFICIAL VEINS OF RIGHT LOWER EXTREMITY: ICD-10-CM

## 2022-03-21 DIAGNOSIS — Z79.899 ENCOUNTER FOR LONG-TERM CURRENT USE OF MEDICATION: ICD-10-CM

## 2022-03-21 DIAGNOSIS — Z83.2 FAMILY HISTORY OF FACTOR V DEFICIENCY: ICD-10-CM

## 2022-03-21 DIAGNOSIS — Z79.01 LONG TERM CURRENT USE OF ANTICOAGULANT THERAPY: ICD-10-CM

## 2022-03-21 DIAGNOSIS — I80.01 THROMBOPHLEBITIS OF SUPERFICIAL VEINS OF RIGHT LOWER EXTREMITY: Primary | ICD-10-CM

## 2022-03-21 LAB — INR BLD: 1.6 (ref 0.9–1.1)

## 2022-03-21 PROCEDURE — 36416 COLLJ CAPILLARY BLOOD SPEC: CPT

## 2022-03-21 PROCEDURE — 85610 PROTHROMBIN TIME: CPT

## 2022-03-21 NOTE — PROGRESS NOTES
Pt called back to confirm what dose she was to take today. Reviewed dosing with pt and she expressed understanding.  Harper Kline RN on 3/21/2022 at 2:31 PM

## 2022-03-21 NOTE — PROGRESS NOTES
ANTICOAGULATION MANAGEMENT     Thomas Davila 72 year old female is on warfarin with subtherapeutic INR result. (Goal INR 2.0-3.0)    Recent labs: (last 7 days)     03/21/22  0837   INR 1.6*       ASSESSMENT       Source(s): Chart Review and Patient/Caregiver Call       Warfarin doses taken: pt believes that she did take all doses as instructed, but then pt did find an extra tablet. She is unsure if she missed a dose.    Diet: No new diet changes identified    New illness, injury, or hospitalization: allergies     Medication/supplement changes: pt did take an allergy allegra medication yesterday - no interactions anticipated     Signs or symptoms of bleeding or clotting: No    Previous INR: Therapeutic last 2(+) visits    Additional findings: None     PLAN     Recommended plan for possible temporary changes affecting INR     Dosing Instructions: Booster dose then continue your current warfarin dose with next INR in 2 weeks       Summary  As of 3/21/2022    Full warfarin instructions:  3/21: 5 mg; Otherwise 2.5 mg every Mon, Wed, Sat; 5 mg all other days   Next INR check:  4/4/2022             Telephone call with Thomas who verbalizes understanding and agrees to plan    Lab visit scheduled    Education provided: Please call back if any changes to your diet, medications or how you've been taking warfarin and Monitoring for clotting signs and symptoms    Plan made per [facility] anticoagulation protocol    Harper Kline RN  Anticoagulation Clinic  3/21/2022    _______________________________________________________________________     Anticoagulation Episode Summary     Current INR goal:  2.0-3.0   TTR:  42.7 % (11.9 mo)   Target end date:  Indefinite   Send INR reminders to:  [facility]    Indications    Thrombophlebitis of superficial veins of right lower extremity [I80.01]  Family history of factor V deficiency [Z83.2]  Encounter for long-term current use of medication [Z79.899]  Long term current use of  anticoagulant therapy [Z79.01]           Comments:  Allergy to Lovenox         Anticoagulation Care Providers     Provider Role Specialty Phone number    Lizabeth Zavala MD Referring Family Medicine [phone]    Dung Prieto MD Referring Family Medicine [phone]

## 2022-04-02 ENCOUNTER — HOSPITAL ENCOUNTER (EMERGENCY)
Facility: CLINIC | Age: 72
Discharge: HOME | End: 2022-04-02
Payer: MEDICARE

## 2022-04-02 VITALS
OXYGEN SATURATION: 100 % | RESPIRATION RATE: 18 BRPM | HEART RATE: 81 BPM | SYSTOLIC BLOOD PRESSURE: 150 MMHG | DIASTOLIC BLOOD PRESSURE: 77 MMHG | TEMPERATURE: 98 F

## 2022-04-02 NOTE — ED TRIAGE NOTES
Rechecked BP after 30 minutes, 150/80 HR-88, pt did not want to be evaluated, wanted to wait and recheck it no other symptoms, pt will bring her home BP cuff to the clinic next week and have it checked .  Filomena Johnson RN

## 2022-04-04 ENCOUNTER — LAB (OUTPATIENT)
Dept: LAB | Facility: CLINIC | Age: 72
End: 2022-04-04
Payer: MEDICARE

## 2022-04-04 ENCOUNTER — ANTICOAGULATION THERAPY VISIT (OUTPATIENT)
Dept: ANTICOAGULATION | Facility: CLINIC | Age: 72
End: 2022-04-04

## 2022-04-04 DIAGNOSIS — Z79.01 LONG TERM CURRENT USE OF ANTICOAGULANT THERAPY: ICD-10-CM

## 2022-04-04 DIAGNOSIS — Z79.899 ENCOUNTER FOR LONG-TERM CURRENT USE OF MEDICATION: ICD-10-CM

## 2022-04-04 DIAGNOSIS — I80.01 THROMBOPHLEBITIS OF SUPERFICIAL VEINS OF RIGHT LOWER EXTREMITY: ICD-10-CM

## 2022-04-04 DIAGNOSIS — Z83.2 FAMILY HISTORY OF FACTOR V DEFICIENCY: ICD-10-CM

## 2022-04-04 DIAGNOSIS — I80.01 THROMBOPHLEBITIS OF SUPERFICIAL VEINS OF RIGHT LOWER EXTREMITY: Primary | ICD-10-CM

## 2022-04-04 LAB — INR BLD: 3.1 (ref 0.9–1.1)

## 2022-04-04 PROCEDURE — 85610 PROTHROMBIN TIME: CPT

## 2022-04-04 PROCEDURE — 36416 COLLJ CAPILLARY BLOOD SPEC: CPT

## 2022-04-04 NOTE — PROGRESS NOTES
ANTICOAGULATION MANAGEMENT     Thomas Davila 72 year old female is on warfarin with supratherapeutic INR result. (Goal INR 2.0-3.0)    Recent labs: (last 7 days)     04/04/22  1013   INR 3.1*       ASSESSMENT       Source(s): Chart Review and Patient/Caregiver Call       Warfarin doses taken: Warfarin taken as instructed    Diet: No new diet changes identified    New illness, injury, or hospitalization: Yes: sinus Sx and tooth ache    Medication/supplement changes: Allegra allergy - OTC - no interactions anticipated    Signs or symptoms of bleeding or clotting: No    Previous INR: Subtherapeutic    Additional findings: None     PLAN     Recommended plan for temporary change(s) affecting INR     Dosing Instructions: continue your current warfarin dose with next INR in 2 weeks       Summary  As of 4/4/2022    Full warfarin instructions:  2.5 mg every Mon, Wed, Sat; 5 mg all other days   Next INR check:  4/18/2022             Telephone call with Akhil who verbalizes understanding and agrees to plan    Lab visit scheduled    Education provided: Please call back if any changes to your diet, medications or how you've been taking warfarin and Monitoring for bleeding signs and symptoms    Plan made per [facility] anticoagulation protocol    Harper Kline RN  Anticoagulation Clinic  4/4/2022    _______________________________________________________________________     Anticoagulation Episode Summary     Current INR goal:  2.0-3.0   TTR:  43.7 % (1 y)   Target end date:  Indefinite   Send INR reminders to:  [facility]    Indications    Thrombophlebitis of superficial veins of right lower extremity [I80.01]  Family history of factor V deficiency [Z83.2]  Encounter for long-term current use of medication [Z79.899]  Long term current use of anticoagulant therapy [Z79.01]           Comments:  Allergy to Lovenox         Anticoagulation Care Providers     Provider Role Specialty Phone number    Lizabeth Zavala MD  Referring Family Medicine [phone]    Dung Prieto MD Referring Family Medicine [phone]

## 2022-04-08 ENCOUNTER — OFFICE VISIT (OUTPATIENT)
Dept: FAMILY MEDICINE | Facility: CLINIC | Age: 72
End: 2022-04-08
Payer: MEDICARE

## 2022-04-08 ENCOUNTER — APPOINTMENT (OUTPATIENT)
Dept: LAB | Facility: CLINIC | Age: 72
End: 2022-04-08
Payer: MEDICARE

## 2022-04-08 VITALS
SYSTOLIC BLOOD PRESSURE: 136 MMHG | HEIGHT: 64 IN | HEART RATE: 86 BPM | WEIGHT: 191.6 LBS | DIASTOLIC BLOOD PRESSURE: 64 MMHG | RESPIRATION RATE: 12 BRPM | BODY MASS INDEX: 32.71 KG/M2 | TEMPERATURE: 97.2 F | OXYGEN SATURATION: 98 %

## 2022-04-08 DIAGNOSIS — Z12.11 SCREEN FOR COLON CANCER: ICD-10-CM

## 2022-04-08 DIAGNOSIS — Z00.00 ENCOUNTER FOR MEDICARE ANNUAL WELLNESS EXAM: Primary | ICD-10-CM

## 2022-04-08 PROCEDURE — G0439 PPPS, SUBSEQ VISIT: HCPCS | Performed by: FAMILY MEDICINE

## 2022-04-08 PROCEDURE — 82274 ASSAY TEST FOR BLOOD FECAL: CPT

## 2022-04-08 RX ORDER — PHENTERMINE HYDROCHLORIDE 30 MG/1
30 CAPSULE ORAL EVERY MORNING
Qty: 90 CAPSULE | Refills: 0 | Status: SHIPPED | OUTPATIENT
Start: 2022-04-08 | End: 2022-09-13

## 2022-04-08 RX ORDER — FEXOFENADINE HCL AND PSEUDOEPHEDRINE HCL 180; 240 MG/1; MG/1
1 TABLET, EXTENDED RELEASE ORAL DAILY
COMMUNITY
End: 2022-12-16

## 2022-04-08 ASSESSMENT — PAIN SCALES - GENERAL: PAINLEVEL: NO PAIN (0)

## 2022-04-08 NOTE — PROGRESS NOTES
SUBJECTIVE:   Thomas Davila is a 72 year old female who presents for Preventive Visit.      Patient has been advised of split billing requirements and indicates understanding: Yes  Are you in the first 12 months of your Medicare coverage?  No    History of Present Illness       Vascular Disease:  She presents for follow up of vascular disease.  She never takes nitroglycerin. She is not taking daily aspirin.    Reason for visit:  Blood tests    She eats 0-1 servings of fruits and vegetables daily.She consumes 0 sweetened beverage(s) daily.She exercises with enough effort to increase her heart rate 9 or less minutes per day.  She exercises with enough effort to increase her heart rate 3 or less days per week.   She is taking medications regularly.    Do you feel safe in your environment? Yes    Have you ever done Advance Care Planning? (For example, a Health Directive, POLST, or a discussion with a medical provider or your loved ones about your wishes): Yes, advance care planning is on file.       Fall risk  Fallen 2 or more times in the past year?: No  Any fall with injury in the past year?: No    Cognitive Screening   1) Repeat 3 items (Leader, Season, Table)    2) Clock draw: NORMAL  3) 3 item recall: Recalls 3 objects  Results: 3 items recalled: COGNITIVE IMPAIRMENT LESS LIKELY    Mini-CogTM Copyright S Brennen. Licensed by the author for use in [facility]; reprinted with permission (vasile@.[facility]). All rights reserved.      Do you have sleep apnea, excessive snoring or daytime drowsiness?: no    Reviewed and updated as needed this visit by clinical staff   Tobacco  Allergies      Fam Hx  Soc Hx        Reviewed and updated as needed this visit by Provider                 Social History     Tobacco Use     Smoking status: Never Smoker     Smokeless tobacco: Never Used   Substance Use Topics     Alcohol use: Yes     Comment: Rare     If you drink alcohol do you typically have >3 drinks per day or >7  "drinks per week? No    Alcohol Use 12/4/2017   Prescreen: >3 drinks/day or >7 drinks/week? The patient does not drink >3 drinks per day nor >7 drinks per week.   No flowsheet data found.            Current providers sharing in care for this patient include:   Patient Care Team:  Dung Prieto MD as PCP - General  Jojo Banks RN as Specialty Care Coordinator (Oncology)  Dung Prieto MD as Assigned PCP  Trevor Elena MD as Assigned Allergy Provider  Hitesh Monroy MD as Assigned Cancer Care Provider  Walt Golden MD as Assigned Surgical Provider    The following health maintenance items are reviewed in Epic and correct as of today:  Health Maintenance Due   Topic Date Due     ANNUAL REVIEW OF  ORDERS  Never done     HEPATITIS C SCREENING  Never done     ZOSTER IMMUNIZATION (1 of 2) Never done     Pneumococcal Vaccine: 65+ Years (1 of 1 - PPSV23) Never done     FALL RISK ASSESSMENT  01/18/2022     COLORECTAL CANCER SCREENING  04/20/2022               Review of Systems  Review Of Systems  Skin: seeing dermatology for skin checks  Eyes: negative  Ears/Nose/Throat: having tooth pain and seeing dentist  Respiratory: No shortness of breath, dyspnea on exertion, cough, or hemoptysis  Cardiovascular: negative  Gastrointestinal: negative  Genitourinary: negative  Musculoskeletal: negative  Neurologic: negative  Psychiatric: stilling having grief, has support system  Hematologic/Lymphatic/Immunologic: negative  Endocrine: negative      OBJECTIVE:   /64 (BP Location: Right arm, Patient Position: Chair, Cuff Size: Adult Large)   Pulse 86   Temp 97.2  F (36.2  C) (Tympanic)   Resp 12   Ht 1.613 m (5' 3.5\")   Wt 86.9 kg (191 lb 9.6 oz)   SpO2 98%   Breastfeeding No   BMI 33.41 kg/m   Estimated body mass index is 33.41 kg/m  as calculated from the following:    Height as of this encounter: 1.613 m (5' 3.5\").    Weight as of this encounter: 86.9 kg (191 lb 9.6 oz).  Physical " "Exam  GENERAL: healthy, alert and no distress  EYES: Eyes grossly normal to inspection, PERRL and conjunctivae and sclerae normal  HENT: ear canals and TM's normal, nose and mouth without ulcers or lesions  NECK: no adenopathy, no asymmetry, masses, or scars and thyroid normal to palpation  RESP: lungs clear to auscultation - no rales, rhonchi or wheezes  CV: regular rate and rhythm, normal S1 S2, no S3 or S4, no murmur, click or rub, no peripheral edema and peripheral pulses strong  ABDOMEN: soft, nontender, no hepatosplenomegaly, no masses and bowel sounds normal  MS: no gross musculoskeletal defects noted, no edema  SKIN: no suspicious lesions or rashes  NEURO: Normal strength and tone, mentation intact and speech normal  PSYCH: mentation appears normal, affect normal/bright  LYMPH: anterior cervical: no adenopathy  posterior cervical: no adenopathy        ASSESSMENT / PLAN:   (Z00.00) Encounter for Medicare annual wellness exam  (primary encounter diagnosis)  Comment: Discussed healthy lifestyle and preventative cares.    Plan:     (Z68.33) BMI 33.0-33.9,adult  Comment: refilled medication and she is slowly losing weight  Plan: phentermine (ADIPEX-P) 30 MG capsule        bp is controlled    (Z12.11) Screen for colon cancer  Comment:   Plan: Fecal colorectal cancer screen FIT - Future         (S+30)              Patient has been advised of split billing requirements and indicates understanding: No    COUNSELING:  Reviewed preventive health counseling, as reflected in patient instructions       Regular exercise       Healthy diet/nutrition       Colon cancer screening    Estimated body mass index is 33.41 kg/m  as calculated from the following:    Height as of this encounter: 1.613 m (5' 3.5\").    Weight as of this encounter: 86.9 kg (191 lb 9.6 oz).    Weight management plan: medication    She reports that she has never smoked. She has never used smokeless tobacco.      Appropriate preventive services were " discussed with this patient, including applicable screening as appropriate for cardiovascular disease, diabetes, osteopenia/osteoporosis, and glaucoma.  As appropriate for age/gender, discussed screening for colorectal cancer, prostate cancer, breast cancer, and cervical cancer. Checklist reviewing preventive services available has been given to the patient.    Reviewed patients plan of care and provided an AVS. The Basic Care Plan (routine screening as documented in Health Maintenance) for Thomas meets the Care Plan requirement. This Care Plan has been established and reviewed with the Patient.    Counseling Resources:  ATP IV Guidelines  Pooled Cohorts Equation Calculator  Breast Cancer Risk Calculator  Breast Cancer: Medication to Reduce Risk  FRAX Risk Assessment  ICSI Preventive Guidelines  Dietary Guidelines for Americans, 2010  USDA's MyPlate  ASA Prophylaxis  Lung CA Screening    Dung Prieto MD  [facility]    Identified Health Risks:

## 2022-04-08 NOTE — PATIENT INSTRUCTIONS
I refilled your weight loss medication.    Your blood pressure is controlled.    All of your labs are up to date.    Please return the FIT test.    Please get your mammogram in 9/2022.    Your other medications were refilled for the whole year in 1/2022  For your chronic pain medications.      Thank you for choosing [facility].  You may be receiving an email and/or telephone survey request from [facility] Customer Experience regarding your visit today.  Please take a few minutes to respond to the survey to let us know how we are doing.      If you have questions or concerns, please contact us via Locationary or you can contact your care team at [phone] option [phone].    Our Clinic hours are:  Monday - Thursday 7am-6pm  Friday 7am-5pm    The [place] outpatient lab hours are:  Monday - Friday 7am-4:30pm    Appointments are required, call [phone]    If you have clinical questions after hours or would like to schedule an appointment,  call the clinic at [phone].            Patient Education   Personalized Prevention Plan  You are due for the preventive services outlined below.  Your care team is available to assist you in scheduling these services.  If you have already completed any of these items, please share that information with your care team to update in your medical record.  Health Maintenance Due   Topic Date Due     ANNUAL REVIEW OF HM ORDERS  Never done     Hepatitis C Screening  Never done     Zoster (Shingles) Vaccine (1 of 2) Never done     Pneumococcal Vaccine (1 of 1 - PPSV23) Never done     FALL RISK ASSESSMENT  01/18/2022     Colorectal Cancer Screening  04/20/2022

## 2022-04-11 DIAGNOSIS — Z12.11 SCREEN FOR COLON CANCER: ICD-10-CM

## 2022-04-12 LAB — HEMOCCULT STL QL IA: NEGATIVE

## 2022-04-18 ENCOUNTER — ANTICOAGULATION THERAPY VISIT (OUTPATIENT)
Dept: ANTICOAGULATION | Facility: CLINIC | Age: 72
End: 2022-04-18

## 2022-04-18 ENCOUNTER — LAB (OUTPATIENT)
Dept: LAB | Facility: CLINIC | Age: 72
End: 2022-04-18
Payer: MEDICARE

## 2022-04-18 DIAGNOSIS — Z79.899 ENCOUNTER FOR LONG-TERM CURRENT USE OF MEDICATION: ICD-10-CM

## 2022-04-18 DIAGNOSIS — Z83.2 FAMILY HISTORY OF FACTOR V DEFICIENCY: ICD-10-CM

## 2022-04-18 DIAGNOSIS — I80.01 THROMBOPHLEBITIS OF SUPERFICIAL VEINS OF RIGHT LOWER EXTREMITY: ICD-10-CM

## 2022-04-18 DIAGNOSIS — Z79.01 LONG TERM CURRENT USE OF ANTICOAGULANT THERAPY: ICD-10-CM

## 2022-04-18 DIAGNOSIS — I80.01 THROMBOPHLEBITIS OF SUPERFICIAL VEINS OF RIGHT LOWER EXTREMITY: Primary | ICD-10-CM

## 2022-04-18 LAB — INR BLD: 3.2 (ref 0.9–1.1)

## 2022-04-18 PROCEDURE — 85610 PROTHROMBIN TIME: CPT

## 2022-04-18 PROCEDURE — 36416 COLLJ CAPILLARY BLOOD SPEC: CPT

## 2022-04-18 RX ORDER — WARFARIN SODIUM 5 MG/1
TABLET ORAL
Qty: 84 TABLET | Refills: 1 | COMMUNITY
Start: 2022-04-18 | End: 2022-05-03

## 2022-04-18 NOTE — PROGRESS NOTES
ANTICOAGULATION MANAGEMENT     Thomas Davila 72 year old female is on warfarin with supratherapeutic INR result. (Goal INR 2.0-3.0)    Recent labs: (last 7 days)     04/18/22  0904   INR 3.2*       ASSESSMENT       Source(s): Chart Review and Patient/Caregiver Call       Warfarin doses taken: Warfarin taken as instructed    Diet: No new diet changes identified    New illness, injury, or hospitalization: No    Medication/supplement changes: None noted    Signs or symptoms of bleeding or clotting: No    Previous INR: Supratherapeutic    Additional findings: None     PLAN     Recommended plan for no diet, medication or health factor changes affecting INR     Dosing Instructions: decrease your warfarin dose (9.1% change) with next INR in 2 weeks       Summary  As of 4/18/2022    Full warfarin instructions:  5 mg every Sun, Tue, Fri; 2.5 mg all other days   Next INR check:  5/2/2022             Telephone call with Akhil who verbalizes understanding and agrees to plan    Lab visit scheduled    Education provided: Please call back if any changes to your diet, medications or how you've been taking warfarin and Monitoring for bleeding signs and symptoms    Plan made per [facility] anticoagulation protocol    Harper Kline RN  Anticoagulation Clinic  4/18/2022    _______________________________________________________________________     Anticoagulation Episode Summary     Current INR goal:  2.0-3.0   TTR:  41.8 % (1 y)   Target end date:  Indefinite   Send INR reminders to:  [facility]    Indications    Thrombophlebitis of superficial veins of right lower extremity [I80.01]  Family history of factor V deficiency [Z83.2]  Encounter for long-term current use of medication [Z79.899]  Long term current use of anticoagulant therapy [Z79.01]           Comments:  Allergy to Lovenox         Anticoagulation Care Providers     Provider Role Specialty Phone number    Lizabeth Zavala MD Referring Family Medicine  [phone]    Dung Prieto MD Referring Family Medicine [phone]        VM left for pt to return call to [facility] [phone]. Dosing instructions not given to pt.  Tentative plan: consider decreasing maintenance dose by 9.1% and recheck in 2 weeks  Harper Kline RN on 4/18/2022 at 10:12 AM     May transfer to Harper at [phone]. Route to [facility]

## 2022-04-20 ENCOUNTER — NURSE TRIAGE (OUTPATIENT)
Dept: NURSING | Facility: CLINIC | Age: 72
End: 2022-04-20
Payer: OTHER GOVERNMENT

## 2022-04-20 NOTE — TELEPHONE ENCOUNTER
Nurse Triage SBAR    Is this a 2nd Level Triage? Yes    Situation:   Red spot on left upper fore arm that has not healed from Moh's Procedure in March 2022.     Background:      Had some spot removed off her arm back in March.   There is one that does not look good.    Assessment:   Pt reporting she has some spots taking off her upper left forearm back in March 2022.    Moh's procedure.     Pt mentioned all of them healed expect for one.       It is about a 1/2 inch long and 1/4 inch wide.   It is red and very itchy.   Pt mentioned there was a scab on it.  But in the middle of the night a couple of weeks ago.  She itched her left arm in the middle of the night and woke up the next morning with some blood on her arm.    She applied some antibiotic ointment and a Band-Aid.    She thought it would heal.  But it has not.   It is red and open.  It does not look good.   No pus colored drainage, no hot sweats, cold sweats or the chills.   The redness is in the area of the sore on her arm.  It has not spread.        Pt requesting to be seen in the clinic for further evaluation.     Protocol Recommended Disposition:   See Within 3 Days In Office     Recommendation:   A phone call back to Pt to set up an in person visit for Pt care.     Routing to clinic for review and a call back to Pt @ [phone] for further assistance.    Thank you     Jeannie Jacob RN  Central Triage Red Flags/Med Refills      Reason for Disposition    Patient wants to be seen    Additional Information    Negative: Patient sounds very sick or weak to the triager    Negative: Fever and bump is tender to touch    Negative: Looks infected (e.g., spreading redness, pus, red streak)    Negative: Looks like a boil, infected sore, or deep ulcer    Negative: Caller can't describe it clearly    Protocols used: SKIN LESION - MOLES OR GROWTHS-A-OH

## 2022-04-20 NOTE — TELEPHONE ENCOUNTER
Spoke with patient and she reported that the spot on her left forearm was healing well and she noticed it was blood blister. She put a bandaid on it and it is still red. She noted that the spot is not healing. She is concerned that it is not healing. No signs of infection per pt. Pt would like to be seen. Appt made for pt.   [place] DEE RN BSN PHN  Specialty Clinics

## 2022-04-25 ENCOUNTER — TELEPHONE (OUTPATIENT)
Dept: DERMATOLOGY | Facility: CLINIC | Age: 72
End: 2022-04-25

## 2022-04-25 ENCOUNTER — OFFICE VISIT (OUTPATIENT)
Dept: DERMATOLOGY | Facility: CLINIC | Age: 72
End: 2022-04-25
Payer: MEDICARE

## 2022-04-25 VITALS — DIASTOLIC BLOOD PRESSURE: 90 MMHG | OXYGEN SATURATION: 99 % | HEART RATE: 88 BPM | SYSTOLIC BLOOD PRESSURE: 147 MMHG

## 2022-04-25 DIAGNOSIS — Z85.828 HISTORY OF SKIN CANCER: Primary | ICD-10-CM

## 2022-04-25 DIAGNOSIS — L82.1 SEBORRHEIC KERATOSIS: ICD-10-CM

## 2022-04-25 DIAGNOSIS — D18.01 ANGIOMA OF SKIN: ICD-10-CM

## 2022-04-25 DIAGNOSIS — L90.5 SCAR: ICD-10-CM

## 2022-04-25 DIAGNOSIS — L81.4 LENTIGO: ICD-10-CM

## 2022-04-25 PROCEDURE — 11102 TANGNTL BX SKIN SINGLE LES: CPT | Performed by: DERMATOLOGY

## 2022-04-25 PROCEDURE — 99213 OFFICE O/P EST LOW 20 MIN: CPT | Mod: 25 | Performed by: DERMATOLOGY

## 2022-04-25 PROCEDURE — 88331 PATH CONSLTJ SURG 1 BLK 1SPC: CPT | Performed by: DERMATOLOGY

## 2022-04-25 NOTE — LETTER
2022         RE: Thomas Davila  [address] [address] [facility] [facility] [address]        Dear Colleague,    Thank you for referring your patient, Thomas Davila, to the [facility]. Please see a copy of my visit note below.    Thomas Davila is an extremely pleasant 72 year old year old female patient here today for spot on left arm.   .   Patient states this has been present for not sure.  Patient reports the following symptoms:  scale.  Patient reports the following previous treatments none.  These treatments did not work.  Patient reports the following modifying factors none.  Associated symptoms: none.  Patient has no other skin complaints today.  Remainder of the HPI, Meds, PMH, Allergies, FH, and SH was reviewed in chart.      Past Medical History:   Diagnosis Date     Acute parametritis and pelvic cellulitis     salpingitis     ASCUS with positive high risk HPV 2013     Asthma     No recent issues     Basal cell carcinoma      breast cancer     left lumpectomy, radiation, tamoxifen     Breast cancer (H)     L Breast; Lumpectomy & Radiation     Chronic salpingitis and oophoritis     PID        Embolism and thrombosis of unspecified site     left leg, due to tamoxifen therapy     Generalized osteoarthrosis, unspecified site     hands     Leiomyoma of uterus, unspecified      Morbid obesity (H) 2017     Other malignant neoplasm of skin, site unspecified 2006    Basal cell cancer on nose     Squamous cell carcinoma      Thrombosis of leg     Left     Unspecified essential hypertension        Past Surgical History:   Procedure Laterality Date     BREAST LUMPECTOMY, RT/LT      left     C/SECTION, LOW TRANSVERSE  1972,     , Low Transverse x2     CL AFF SURGICAL PATHOLOGY      Breast reduction     COLONOSCOPY  10/15/02    normal     FOOT SURGERY      R Foot      HC EXCISION BREAST LESION, OPEN >=1  98    1) Needle localization with  generous lumpectomy 2) Left axillary node dissection.     HC LAPAROSCOPY, SURGICAL, ABDOMEN, PERITONEUM & OMENTUM; DX W/ OR W/O SPECIMEN(S)  02/07/94     HYSTERECTOMY, PAP NO LONGER INDICATED       INSERT PORT VASCULAR ACCESS  3/14/2013    Procedure: INSERT PORT VASCULAR ACCESS;  Port Revision;  Surgeon: Arash Puente MD;  Location: [facility] OR     LAPAROSCOPIC HYSTERECTOMY TOTAL, BILATERAL SALPINGO-OOPHORECTOMY, NODE DISSECTION, COMBINED  1/31/2013    Procedure: COMBINED LAPAROSCOPIC HYSTERECTOMY TOTAL, SALPINGO-OOPHORECTOMY, NODE DISSECTION;  Laparosocpic  Total Abdominal Hysterectomy, Bilateral Salphino-Oophorectomy, Bilateral Pelvic Lymph Node Dissection, Pelvic Washings, Cystoscopy;  Surgeon: Tanya Walker MD;  Location: [facility] OR     PHACOEMULSIFICATION WITH STANDARD INTRAOCULAR LENS IMPLANT Left 6/12/2019    Procedure: Cataract Removal with Implant;  Surgeon: Walt Mckeon MD;  Location: [facility] OR     PHACOEMULSIFICATION WITH STANDARD INTRAOCULAR LENS IMPLANT Right 7/3/2019    Procedure: Cataract Removal with Implant;  Surgeon: Walt Mckeon MD;  Location: [facility] OR     SURGICAL HISTORY OF -   06/22/93    1) Excision of digital cyst right mid finger  2)  Excision of dermatofibroma left calf     SURGICAL HISTORY OF -   12/18/2001    Excision of mucinous cyst & partial ostectomy, bone removal of benign osteophytic overgrowth osteophyte of the distal aspect of the middle phalanx and distal phalanx     SURGICAL HISTORY OF -   2006    Basal cell cancer removal     TONSILLECTOMY       TUBAL LIGATION  1978        Family History   Problem Relation Age of Onset     Cerebrovascular Disease Mother      Hypertension Mother      Diabetes Mother      Thyroid Disease Mother      Arthritis Mother      Alzheimer Disease Mother      Neurologic Disorder Mother         Parkinsons     Heart Disease Father      Hypertension Father      Diabetes Father      Thyroid Disease Father      Arthritis Father      Blood  Disease Father      Factor V Leiden deficiency Father      Anesthesia Reaction Sister      Thyroid Disease Sister      Anesthesia Reaction Sister      Arthritis Sister         RA     Hypertension Brother      Factor V Leiden deficiency Brother      Allergies Son      Gastrointestinal Disease Son         GERD     Hypertension Brother      Allergies Son         percocett     Gastrointestinal Disease Son         GERD     Hypertension Son      Factor V Leiden deficiency Niece        Social History     Socioeconomic History     Marital status:      Spouse name: Not on file     Number of children: 2     Years of education: Not on file     Highest education level: Not on file   Occupational History     Employer: sub teacher in [employer]     Employer: RETIRED   Tobacco Use     Smoking status: Never Smoker     Smokeless tobacco: Never Used   Vaping Use     Vaping Use: Never used   Substance and Sexual Activity     Alcohol use: Yes     Comment: Rare     Drug use: No     Sexual activity: Yes     Partners: Male   Other Topics Concern     Parent/sibling w/ CABG, MI or angioplasty before 65F 55M? No   Social History Narrative    Taoism-- DECLINES ALL BLOOD PRODUCTS        April 15, 2021    ENVIRONMENTAL HISTORY: [place] The home is heated with a forced air. They do have central air conditioning. The patient's bedroom is furnished with hard vijaya in bedroom and animals sleep in bedroom.  Pets inside the house include 2 cat(s). There is no history of cockroach or mice infestation. There is/are 0 smokers in the house.  The house does not have a basement.      Social Determinants of Health     Financial Resource Strain: Not on file   Food Insecurity: Not on file   Transportation Needs: Not on file   Physical Activity: Not on file   Stress: Not on file   Social Connections: Not on file   Intimate Partner Violence: Not on file   Housing Stability: Not on file        Outpatient Encounter Medications as of 4/25/2022   Medication Sig Dispense Refill     Acetaminophen (TYLENOL PO) Take 500 mg by mouth as needed for mild pain or fever       fexofenadine-pseudoePHEDrine (ALLEGRA-D 24) 180-240 MG 24 hr tablet Take 1 tablet by mouth daily Last taken this morning       lisinopril (ZESTRIL) 20 MG tablet Take 1 tablet (20 mg) by mouth daily 90 tablet 3     omeprazole (PRILOSEC) 20 MG DR capsule TAKE 1 CAPSULE BY MOUTH ONCE DAILY 90 capsule 3     phentermine (ADIPEX-P) 30 MG capsule Take 1 capsule (30 mg) by mouth every morning 90 capsule 0     topiramate (TOPAMAX) 50 MG tablet Take 1 tablet (50 mg) by mouth daily 90 tablet 3     triamterene-HCTZ (MAXZIDE-25) 37.5-25 MG tablet Take 1 tablet by mouth daily 90 tablet 3     warfarin ANTICOAGULANT (COUMADIN) 5 MG tablet Take 5 mg every Sun, Tue, Fri; 2.5 mg all other days or As directed by Anticoagulation clinic 84 tablet 1     No facility-administered encounter medications on file as of 4/25/2022.             O:   NAD, WDWN, Alert & Oriented, Mood & Affect wnl, Vitals stable   Here today alone   BP (!) 147/90 (BP Location: Right arm, Patient Position: Sitting, Cuff Size: Adult Large)   Pulse 88   SpO2 99%    General appearance normal   Vitals stable   Alert, oriented and in no acute distress     L arm 4mm scaly papule    Stuck on papules and brown macules on trunk and ext   Red papules on trunk  Eyes: Conjunctivae/lids:Normal     ENT: Lips, buccal mucosa, tongue: normal    MSK:Normal    Cardiovascular: peripheral edema none    Pulm: Breathing Normal    Neuro/Psych: Orientation:Alert and Orientedx3 ; Mood/Affect:normal       MICRO:   L arm:Unremarkable epidermis with parallel bundles of cellular collagen within the superficial dermis.  No concerning areas for malignancy.     A/P:  1. Seborrheic keratosis, lentigo, angioma, hx of non-melanoma skin cancer   2. L arm r/o non-melanoma skin cancer   TANGENTIAL BIOPSY IN HOUSE:  After  consent, anesthesia with LEC and prep, tangential excision performed and dx above confirmed with frozen section histology.  No complications and routine wound care.  Patient is on coumadin  anticoagulants and risk of bleeding discussed with patient.       I have personally reviewed all specimens and/or slides and used them with my medical judgement to determine or confirm the final diagnosis.     Patient told result scar No further treatment  .    It was a pleasure speaking to Thomas Davila today.  Previous clinic notes and pertinent laboratory tests were reviewed prior to Thomas Davila's visit.  Nature and genetics of benign skin lesions dicussed with patient.  Signs and Symptoms of skin cancer discussed with patient.  Patient encouraged to perform monthly skin exams.  UV precautions reviewed with patient.  Return to clinic 6 months        Again, thank you for allowing me to participate in the care of your patient.        Sincerely,        Walt Golden MD

## 2022-04-25 NOTE — NURSING NOTE
"Initial BP (!) 147/90 (BP Location: Right arm, Patient Position: Sitting, Cuff Size: Adult Large)   Pulse 88   SpO2 99%  Estimated body mass index is 33.41 kg/m  as calculated from the following:    Height as of 4/8/22: 1.613 m (5' 3.5\").    Weight as of 4/8/22: 86.9 kg (191 lb 9.6 oz). .      "

## 2022-04-25 NOTE — PROGRESS NOTES
Thomas Davila is an extremely pleasant 72 year old year old female patient here today for spot on left arm.   .   Patient states this has been present for not sure.  Patient reports the following symptoms:  scale.  Patient reports the following previous treatments none.  These treatments did not work.  Patient reports the following modifying factors none.  Associated symptoms: none.  Patient has no other skin complaints today.  Remainder of the HPI, Meds, PMH, Allergies, FH, and SH was reviewed in chart.      Past Medical History:   Diagnosis Date     Acute parametritis and pelvic cellulitis     salpingitis     ASCUS with positive high risk HPV 2013     Asthma     No recent issues     Basal cell carcinoma      breast cancer     left lumpectomy, radiation, tamoxifen     Breast cancer (H)     L Breast; Lumpectomy & Radiation     Chronic salpingitis and oophoritis     PID        Embolism and thrombosis of unspecified site     left leg, due to tamoxifen therapy     Generalized osteoarthrosis, unspecified site     hands     Leiomyoma of uterus, unspecified      Morbid obesity (H) 2017     Other malignant neoplasm of skin, site unspecified 2006    Basal cell cancer on nose     Squamous cell carcinoma      Thrombosis of leg     Left     Unspecified essential hypertension        Past Surgical History:   Procedure Laterality Date     BREAST LUMPECTOMY, RT/LT      left     C/SECTION, LOW TRANSVERSE  1972,     , Low Transverse x2     CL AFF SURGICAL PATHOLOGY      Breast reduction     COLONOSCOPY  10/15/02    normal     FOOT SURGERY      R Foot      HC EXCISION BREAST LESION, OPEN >=1  98    1) Needle localization with generous lumpectomy 2) Left axillary node dissection.     HC LAPAROSCOPY, SURGICAL, ABDOMEN, PERITONEUM & OMENTUM; DX W/ OR W/O SPECIMEN(S)  94     HYSTERECTOMY, PAP NO LONGER INDICATED       INSERT PORT VASCULAR ACCESS  3/14/2013    Procedure:  INSERT PORT VASCULAR ACCESS;  Port Revision;  Surgeon: Arash Puente MD;  Location: [facility]     LAPAROSCOPIC HYSTERECTOMY TOTAL, BILATERAL SALPINGO-OOPHORECTOMY, NODE DISSECTION, COMBINED  1/31/2013    Procedure: COMBINED LAPAROSCOPIC HYSTERECTOMY TOTAL, SALPINGO-OOPHORECTOMY, NODE DISSECTION;  Laparosocpic  Total Abdominal Hysterectomy, Bilateral Salphino-Oophorectomy, Bilateral Pelvic Lymph Node Dissection, Pelvic Washings, Cystoscopy;  Surgeon: Tanya Walker MD;  Location: [facility]     PHACOEMULSIFICATION WITH STANDARD INTRAOCULAR LENS IMPLANT Left 6/12/2019    Procedure: Cataract Removal with Implant;  Surgeon: Walt Mckeon MD;  Location: [facility]     PHACOEMULSIFICATION WITH STANDARD INTRAOCULAR LENS IMPLANT Right 7/3/2019    Procedure: Cataract Removal with Implant;  Surgeon: Walt Mckeon MD;  Location: [facility]     SURGICAL HISTORY OF -   06/22/93    1) Excision of digital cyst right mid finger  2)  Excision of dermatofibroma left calf     SURGICAL HISTORY OF -   12/18/2001    Excision of mucinous cyst & partial ostectomy, bone removal of benign osteophytic overgrowth osteophyte of the distal aspect of the middle phalanx and distal phalanx     SURGICAL HISTORY OF -   2006    Basal cell cancer removal     TONSILLECTOMY       TUBAL LIGATION  1978        Family History   Problem Relation Age of Onset     Cerebrovascular Disease Mother      Hypertension Mother      Diabetes Mother      Thyroid Disease Mother      Arthritis Mother      Alzheimer Disease Mother      Neurologic Disorder Mother         Parkinsons     Heart Disease Father      Hypertension Father      Diabetes Father      Thyroid Disease Father      Arthritis Father      Blood Disease Father      Factor V Leiden deficiency Father      Anesthesia Reaction Sister      Thyroid Disease Sister      Anesthesia Reaction Sister      Arthritis Sister         RA     Hypertension Brother      Factor V Leiden deficiency Brother       Allergies Son      Gastrointestinal Disease Son         GERD     Hypertension Brother      Allergies Son         percocett     Gastrointestinal Disease Son         GERD     Hypertension Son      Factor V Leiden deficiency Niece        Social History     Socioeconomic History     Marital status:      Spouse name: Not on file     Number of children: 2     Years of education: Not on file     Highest education level: Not on file   Occupational History     Employer: sub teacher in [employer]     Employer: RETIRED   Tobacco Use     Smoking status: Never Smoker     Smokeless tobacco: Never Used   Vaping Use     Vaping Use: Never used   Substance and Sexual Activity     Alcohol use: Yes     Comment: Rare     Drug use: No     Sexual activity: Yes     Partners: Male   Other Topics Concern     Parent/sibling w/ CABG, MI or angioplasty before 65F 55M? No   Social History Narrative    [employer]-- [employer] ALL [employer]        April 15, 2021    ENVIRONMENTAL HISTORY: [place] The home is heated with a forced air. They do have central air conditioning. The patient's bedroom is furnished with hard vijaya in bedroom and animals sleep in bedroom.  Pets inside the house include 2 cat(s). There is no history of cockroach or mice infestation. There is/are 0 smokers in the house.  The house does not have a basement.      Social Determinants of Health     Financial Resource Strain: Not on file   Food Insecurity: Not on file   Transportation Needs: Not on file   Physical Activity: Not on file   Stress: Not on file   Social Connections: Not on file   Intimate Partner Violence: Not on file   Housing Stability: Not on file       Outpatient Encounter Medications as of 4/25/2022   Medication Sig Dispense Refill     Acetaminophen (TYLENOL PO) Take 500 mg by mouth as needed for mild pain or fever       fexofenadine-pseudoePHEDrine (ALLEGRA-D 24) 180-240 MG 24 hr tablet Take 1  tablet by mouth daily Last taken this morning       lisinopril (ZESTRIL) 20 MG tablet Take 1 tablet (20 mg) by mouth daily 90 tablet 3     omeprazole (PRILOSEC) 20 MG DR capsule TAKE 1 CAPSULE BY MOUTH ONCE DAILY 90 capsule 3     phentermine (ADIPEX-P) 30 MG capsule Take 1 capsule (30 mg) by mouth every morning 90 capsule 0     topiramate (TOPAMAX) 50 MG tablet Take 1 tablet (50 mg) by mouth daily 90 tablet 3     triamterene-HCTZ (MAXZIDE-25) 37.5-25 MG tablet Take 1 tablet by mouth daily 90 tablet 3     warfarin ANTICOAGULANT (COUMADIN) 5 MG tablet Take 5 mg every Sun, Tue, Fri; 2.5 mg all other days or As directed by Anticoagulation clinic 84 tablet 1     No facility-administered encounter medications on file as of 4/25/2022.             O:   NAD, WDWN, Alert & Oriented, Mood & Affect wnl, Vitals stable   Here today alone   BP (!) 147/90 (BP Location: Right arm, Patient Position: Sitting, Cuff Size: Adult Large)   Pulse 88   SpO2 99%    General appearance normal   Vitals stable   Alert, oriented and in no acute distress     L arm 4mm scaly papule    Stuck on papules and brown macules on trunk and ext   Red papules on trunk  Eyes: Conjunctivae/lids:Normal     ENT: Lips, buccal mucosa, tongue: normal    MSK:Normal    Cardiovascular: peripheral edema none    Pulm: Breathing Normal    Neuro/Psych: Orientation:Alert and Orientedx3 ; Mood/Affect:normal       MICRO:   L arm:Unremarkable epidermis with parallel bundles of cellular collagen within the superficial dermis.  No concerning areas for malignancy.     A/P:  1. Seborrheic keratosis, lentigo, angioma, hx of non-melanoma skin cancer   2. L arm r/o non-melanoma skin cancer   TANGENTIAL BIOPSY IN HOUSE:  After consent, anesthesia with LEC and prep, tangential excision performed and dx above confirmed with frozen section histology.  No complications and routine wound care.  Patient is on coumadin  anticoagulants and risk of bleeding discussed with patient.       I  have personally reviewed all specimens and/or slides and used them with my medical judgement to determine or confirm the final diagnosis.     Patient told result scar No further treatment  .    It was a pleasure speaking to Thomas Davila today.  Previous clinic notes and pertinent laboratory tests were reviewed prior to Thomas Davila's visit.  Nature and genetics of benign skin lesions dicussed with patient.  Signs and Symptoms of skin cancer discussed with patient.  Patient encouraged to perform monthly skin exams.  UV precautions reviewed with patient.  Return to clinic 6 months

## 2022-04-25 NOTE — TELEPHONE ENCOUNTER
----- Message from Walt Golden MD sent at 4/25/2022 12:19 PM CDT -----  L arm scar No further treatment

## 2022-05-02 ENCOUNTER — ANTICOAGULATION THERAPY VISIT (OUTPATIENT)
Dept: ANTICOAGULATION | Facility: CLINIC | Age: 72
End: 2022-05-02

## 2022-05-02 ENCOUNTER — LAB (OUTPATIENT)
Dept: LAB | Facility: CLINIC | Age: 72
End: 2022-05-02
Payer: MEDICARE

## 2022-05-02 DIAGNOSIS — Z83.2 FAMILY HISTORY OF FACTOR V DEFICIENCY: ICD-10-CM

## 2022-05-02 DIAGNOSIS — I80.01 THROMBOPHLEBITIS OF SUPERFICIAL VEINS OF RIGHT LOWER EXTREMITY: ICD-10-CM

## 2022-05-02 DIAGNOSIS — Z79.899 ENCOUNTER FOR LONG-TERM CURRENT USE OF MEDICATION: ICD-10-CM

## 2022-05-02 DIAGNOSIS — Z79.01 LONG TERM CURRENT USE OF ANTICOAGULANT THERAPY: ICD-10-CM

## 2022-05-02 DIAGNOSIS — I80.01 THROMBOPHLEBITIS OF SUPERFICIAL VEINS OF RIGHT LOWER EXTREMITY: Primary | ICD-10-CM

## 2022-05-02 LAB — INR BLD: 1.7 (ref 0.9–1.1)

## 2022-05-02 PROCEDURE — 85610 PROTHROMBIN TIME: CPT

## 2022-05-02 PROCEDURE — 36416 COLLJ CAPILLARY BLOOD SPEC: CPT

## 2022-05-02 NOTE — PROGRESS NOTES
ANTICOAGULATION MANAGEMENT     Thomas Davila 72 year old female is on warfarin with subtherapeutic INR result. (Goal INR 2.0-3.0)    Recent labs: (last 7 days)     05/02/22  0803   INR 1.7*       ASSESSMENT       Source(s): Chart Review and Patient/Caregiver Call       Warfarin doses taken: Warfarin taken as instructed    Diet: Increased greens/vitamin K in diet; plans to resume previous intake    New illness, injury, or hospitalization: No    Medication/supplement changes: None noted    Signs or symptoms of bleeding or clotting: No    Previous INR: Supratherapeutic    Additional findings: None       PLAN     Recommended plan for ongoing change(s) affecting INR     Dosing Instructions: Increase your warfarin dose (10% change) with next INR in 2 weeks       Summary  As of 5/2/2022    Full warfarin instructions:  2.5 mg every Sun, Wed, Fri; 5 mg all other days   Next INR check:  5/16/2022             Telephone call with Akhil who verbalizes understanding and agrees to plan    Lab visit scheduled    Education provided: Importance of consistent vitamin K intake, Impact of vitamin K foods on INR, Goal range and significance of current result and Importance of therapeutic range    Plan made per [facility] anticoagulation protocol    Abelardo Cuellar RN  Anticoagulation Clinic  5/2/2022    _______________________________________________________________________     Anticoagulation Episode Summary     Current INR goal:  2.0-3.0   TTR:  44.3 % (1 y)   Target end date:  Indefinite   Send INR reminders to:  [facility]    Indications    Thrombophlebitis of superficial veins of right lower extremity [I80.01]  Family history of factor V deficiency [Z83.2]  Encounter for long-term current use of medication [Z79.899]  Long term current use of anticoagulant therapy [Z79.01]           Comments:  Allergy to Lovenox         Anticoagulation Care Providers     Provider Role Specialty Phone number    Lizabeth Zavala MD Referring  Family Medicine [phone]    Dung Prieto MD Referring [facility] Medicine [phone]

## 2022-05-03 ENCOUNTER — TELEPHONE (OUTPATIENT)
Dept: FAMILY MEDICINE | Facility: CLINIC | Age: 72
End: 2022-05-03
Payer: OTHER GOVERNMENT

## 2022-05-03 RX ORDER — WARFARIN SODIUM 5 MG/1
TABLET ORAL
Qty: 66 TABLET | Refills: 0 | Status: SHIPPED | OUTPATIENT
Start: 2022-05-03 | End: 2022-05-24

## 2022-05-03 NOTE — PROGRESS NOTES
10:48 AM    Patient requested a refill sent to [facility].    Eusebia Ceballos RN, BSN, PHN  Anticoagulation Clinic   [place] # [id number]  [phone]

## 2022-05-03 NOTE — TELEPHONE ENCOUNTER
10:51 AM    Dosing reviewed with the patient. New prescription sent to [facility].    Eusebia Ceballos RN, BSN, PHN  Anticoagulation Clinic   [place] # [id number] [phone]

## 2022-05-03 NOTE — TELEPHONE ENCOUNTER
Reason for Call:  Other call back    Detailed comments:Akhil forgot her dosing, needs a call back to confirm she is taking the correct doseage    Phone Number Patient can be reached at: Home number on file [phone] (home)    Best Time: call either phone leave detailed message    Can we leave a detailed message on this number? YES    Call taken on 5/3/2022 at 9:11 AM by Annalee Schafer

## 2022-05-17 ENCOUNTER — ANTICOAGULATION THERAPY VISIT (OUTPATIENT)
Dept: ANTICOAGULATION | Facility: CLINIC | Age: 72
End: 2022-05-17

## 2022-05-17 ENCOUNTER — LAB (OUTPATIENT)
Dept: LAB | Facility: CLINIC | Age: 72
End: 2022-05-17
Payer: MEDICARE

## 2022-05-17 DIAGNOSIS — I80.01 THROMBOPHLEBITIS OF SUPERFICIAL VEINS OF RIGHT LOWER EXTREMITY: Primary | ICD-10-CM

## 2022-05-17 DIAGNOSIS — Z79.899 ENCOUNTER FOR LONG-TERM CURRENT USE OF MEDICATION: ICD-10-CM

## 2022-05-17 DIAGNOSIS — Z83.2 FAMILY HISTORY OF FACTOR V DEFICIENCY: ICD-10-CM

## 2022-05-17 DIAGNOSIS — I80.01 THROMBOPHLEBITIS OF SUPERFICIAL VEINS OF RIGHT LOWER EXTREMITY: ICD-10-CM

## 2022-05-17 DIAGNOSIS — Z79.01 LONG TERM CURRENT USE OF ANTICOAGULANT THERAPY: ICD-10-CM

## 2022-05-17 LAB — INR BLD: 1.7 (ref 0.9–1.1)

## 2022-05-17 PROCEDURE — 85610 PROTHROMBIN TIME: CPT

## 2022-05-17 PROCEDURE — 36416 COLLJ CAPILLARY BLOOD SPEC: CPT

## 2022-05-18 ENCOUNTER — TELEPHONE (OUTPATIENT)
Dept: NURSING | Facility: CLINIC | Age: 72
End: 2022-05-18
Payer: OTHER GOVERNMENT

## 2022-05-18 NOTE — TELEPHONE ENCOUNTER
Read her the message from [facility] nurse, 5mg 5/18, 2.5mg 5/19, otherwise 5mg every Tuesday and Friday, 3.75mg all the other days (Sun, Mon, Weds, Thurs, Sat). Patient wrote down the instructions and read them back correctly. Verbalizes understanding.  Tonya Flores RN  [facility]

## 2022-05-23 ENCOUNTER — TELEPHONE (OUTPATIENT)
Dept: ANTICOAGULATION | Facility: CLINIC | Age: 72
End: 2022-05-23
Payer: OTHER GOVERNMENT

## 2022-05-23 NOTE — TELEPHONE ENCOUNTER
Reason for Call:  Other call back and returning call    Detailed comments: Pt took blood test for tick bite, took INR (result was 3.5-3.7) and would like to be called to discuss medication dosing. Said she could come in to check INR again if necessary since INR was checked last outside of [facility].    Phone Number Patient can be reached at: Cell number on file:    Telephone Information:   Mobile [phone]     *home phone is dead, do not call home number    Best Time: Anytime    Can we leave a detailed message on this number? YES    Call taken on 5/23/2022 at 2:48 PM by Celeste Reveles

## 2022-05-24 ENCOUNTER — LAB (OUTPATIENT)
Dept: LAB | Facility: CLINIC | Age: 72
End: 2022-05-24
Payer: MEDICARE

## 2022-05-24 ENCOUNTER — ANTICOAGULATION THERAPY VISIT (OUTPATIENT)
Dept: ANTICOAGULATION | Facility: CLINIC | Age: 72
End: 2022-05-24

## 2022-05-24 DIAGNOSIS — I80.01 THROMBOPHLEBITIS OF SUPERFICIAL VEINS OF RIGHT LOWER EXTREMITY: Primary | ICD-10-CM

## 2022-05-24 DIAGNOSIS — Z79.899 ENCOUNTER FOR LONG-TERM CURRENT USE OF MEDICATION: ICD-10-CM

## 2022-05-24 DIAGNOSIS — Z83.2 FAMILY HISTORY OF FACTOR V DEFICIENCY: ICD-10-CM

## 2022-05-24 DIAGNOSIS — I80.01 THROMBOPHLEBITIS OF SUPERFICIAL VEINS OF RIGHT LOWER EXTREMITY: ICD-10-CM

## 2022-05-24 DIAGNOSIS — Z79.01 LONG TERM CURRENT USE OF ANTICOAGULANT THERAPY: ICD-10-CM

## 2022-05-24 LAB — INR BLD: 2.4 (ref 0.9–1.1)

## 2022-05-24 PROCEDURE — 36416 COLLJ CAPILLARY BLOOD SPEC: CPT

## 2022-05-24 PROCEDURE — 85610 PROTHROMBIN TIME: CPT

## 2022-05-24 RX ORDER — WARFARIN SODIUM 5 MG/1
TABLET ORAL
Qty: 66 TABLET | Refills: 0 | COMMUNITY
Start: 2022-05-24 | End: 2022-07-15

## 2022-05-24 NOTE — PROGRESS NOTES
"ANTICOAGULATION MANAGEMENT     Thomas Davila 72 year old female is on warfarin with therapeutic INR result. (Goal INR 2.0-3.0)    Recent labs: (last 7 days)     05/24/22  1100   INR 2.4*       ASSESSMENT       Source(s): Chart Review and Patient/Caregiver Call       Warfarin doses taken: after further discussion, pt states that she has been \"quartering\" the 5 mg tablets. She states that she does not have 2.5 mg tablets.     Diet: No new diet changes identified    New illness, injury, or hospitalization: No    Medication/supplement changes: None noted    Signs or symptoms of bleeding or clotting: Yes: pt does have bruises from bumping herself or scratching herself.    Previous INR: Subtherapeutic    Additional findings: None     PLAN     Recommended plan for no diet, medication or health factor changes affecting INR     Dosing Instructions: Discussed with Solange Claros [facility] due to patient stating that she doesn't have 2.5 mg tablets    OK to change dosing to 30 mg/wk. Will have patient take new dose of 2.5 mg every Sun, Thu; 5 mg all other days. RN read dosing for each day to patient and patient read back to RN.     Patient confirmed that she had 5 - 1/4 tablets and advised patient to throw those tablets away. Education provided and patient advised only to cut tablets in half, on the scored line.    Summary  As of 5/24/2022    Full warfarin instructions:  2.5 mg every Sun, Thu; 5 mg all other days   Next INR check:  5/31/2022             Telephone call with Akhil who verbalizes understanding and agrees to plan    Lab visit scheduled    Education provided: Please call back if any changes to your diet, medications or how you've been taking warfarin    Plan made per [facility] anticoagulation protocol    Harper Kline RN  Anticoagulation Clinic  5/24/2022    _______________________________________________________________________     Anticoagulation Episode Summary     Current INR goal:  2.0-3.0   TTR:  44.3 % (1 y) "   Target end date:  Indefinite   Send INR reminders to:  [facility]    Indications    Thrombophlebitis of superficial veins of right lower extremity [I80.01]  Family history of factor V deficiency [Z83.2]  Encounter for long-term current use of medication [Z79.899]  Long term current use of anticoagulant therapy [Z79.01]           Comments:  Allergy to Lovenox         Anticoagulation Care Providers     Provider Role Specialty Phone number    Lizabeth Zavala MD Referring Family Medicine [phone]    Dung Prieto MD Referring Family Medicine [phone]

## 2022-05-24 NOTE — TELEPHONE ENCOUNTER
11:21 AM    INR drawn on 5/24/22. Refer to [facility] encounter.    Eusebia Ceballos RN, BSN, PHN  Anticoagulation Clinic   [place] # [id number] [phone]

## 2022-05-31 ENCOUNTER — LAB (OUTPATIENT)
Dept: LAB | Facility: CLINIC | Age: 72
End: 2022-05-31
Payer: MEDICARE

## 2022-05-31 ENCOUNTER — MYC MEDICAL ADVICE (OUTPATIENT)
Dept: ANTICOAGULATION | Facility: CLINIC | Age: 72
End: 2022-05-31

## 2022-05-31 ENCOUNTER — ANTICOAGULATION THERAPY VISIT (OUTPATIENT)
Dept: ANTICOAGULATION | Facility: CLINIC | Age: 72
End: 2022-05-31

## 2022-05-31 DIAGNOSIS — I80.01 THROMBOPHLEBITIS OF SUPERFICIAL VEINS OF RIGHT LOWER EXTREMITY: Primary | ICD-10-CM

## 2022-05-31 DIAGNOSIS — Z79.01 LONG TERM CURRENT USE OF ANTICOAGULANT THERAPY: ICD-10-CM

## 2022-05-31 DIAGNOSIS — Z83.2 FAMILY HISTORY OF FACTOR V DEFICIENCY: ICD-10-CM

## 2022-05-31 DIAGNOSIS — Z79.899 ENCOUNTER FOR LONG-TERM CURRENT USE OF MEDICATION: ICD-10-CM

## 2022-05-31 DIAGNOSIS — I80.01 THROMBOPHLEBITIS OF SUPERFICIAL VEINS OF RIGHT LOWER EXTREMITY: ICD-10-CM

## 2022-05-31 LAB — INR BLD: 1.9 (ref 0.9–1.1)

## 2022-05-31 PROCEDURE — 85610 PROTHROMBIN TIME: CPT

## 2022-05-31 PROCEDURE — 36416 COLLJ CAPILLARY BLOOD SPEC: CPT

## 2022-05-31 NOTE — PROGRESS NOTES
ANTICOAGULATION MANAGEMENT     Thomas Davila 72 year old female is on warfarin with subtherapeutic INR result. (Goal INR 2.0-3.0)    Recent labs: (last 7 days)     05/31/22  0803   INR 1.9*       ASSESSMENT       Source(s): Patient/Caregiver Call       Warfarin doses taken: Warfarin taken as instructed    Diet: No new diet changes identified    New illness, injury, or hospitalization: No    Medication/supplement changes: None noted    Signs or symptoms of bleeding or clotting: No    Previous INR: Therapeutic last visit; previously outside of goal range    Additional findings: None       PLAN     Recommended plan for no diet, medication or health factor changes affecting INR     Dosing Instructions: booster dose then Increase your warfarin dose (8.3% change) with next INR in 2 weeks       Summary  As of 5/31/2022    Full warfarin instructions:  5/31: 7.5 mg; Otherwise 2.5 mg every Sun; 5 mg all other days   Next INR check:  6/14/2022             Telephone call with Akhil who verbalizes understanding and agrees to plan    Lab visit scheduled    Education provided: Please call back if any changes to your diet, medications or how you've been taking warfarin, Monitoring for bleeding signs and symptoms, Monitoring for clotting signs and symptoms, When to seek medical attention/emergency care, Importance of notifying clinic for changes in medications; a sooner lab recheck maybe needed., Importance of notifying clinic for diarrhea, nausea/vomiting, reduced intake, and/or illness; a sooner lab recheck maybe needed., Importance of notifying clinic of upcoming surgeries and procedures 2 weeks in advance and Contact [phone]  with any changes, questions or concerns.     Plan made per [facility] anticoagulation protocol    Eusebia Ceballos RN  Anticoagulation Clinic  5/31/2022    _______________________________________________________________________     Anticoagulation Episode Summary     Current INR goal:  2.0-3.0   TTR:   43.8 % (1 y)   Target end date:  Indefinite   Send INR reminders to:  [facility]    Indications    Thrombophlebitis of superficial veins of right lower extremity [I80.01]  Family history of factor V deficiency [Z83.2]  Encounter for long-term current use of medication [Z79.899]  Long term current use of anticoagulant therapy [Z79.01]           Comments:  Allergy to Lovenox         Anticoagulation Care Providers     Provider Role Specialty Phone number    Lizabeth Zavala MD Referring Family Medicine [phone]    Dung Prieto MD Referring Family Medicine [phone]        ANTICOAGULATION MANAGEMENT     Thomas Davila 72 year old female is on warfarin with subtherapeutic INR result. (Goal INR 2.0-3.0)    Recent labs: (last 7 days)     05/31/22  0803   INR 1.9*       ASSESSMENT       Source(s): Chart Review    Previous INR was Therapeutic last visit; previously outside of goal range    Medication, diet, health changes since last INR chart reviewed; none identified           PLAN     Unable to reach Akhil today.    My chart message sent.    Follow up required to confirm warfarin dose taken and assess for changes and discuss dosing instructions and confirm understanding of instructions    Eusebia Ceballos RN  Anticoagulation Clinic  5/31/2022

## 2022-06-02 NOTE — PROGRESS NOTES
ANTICOAGULATION MANAGEMENT     Thomas Davila 72 year old female is on warfarin with subtherapeutic INR result. (Goal INR 2.0-3.0)                   ASSESSMENT       Source(s): Chart Review and Patient/Caregiver Call       Warfarin doses taken: Warfarin taken as instructed    Diet: Increased greens/vitamin K in diet; ongoing change    New illness, injury, or hospitalization: No    Medication/supplement changes: None noted    Signs or symptoms of bleeding or clotting: No    Previous INR: Subtherapeutic    Additional findings: Patient took 5 mg today already       PLAN     Recommended plan for ongoing change(s) affecting INR     Dosing Instructions: 5 mg tomorrow, then 2.5 mg the following day then Increase your warfarin dose (~5% change) with next INR in 1 week       Summary  As of 5/17/2022    Full warfarin instructions:  5/18: 5 mg; 5/19: 2.5 mg; Otherwise 5 mg every Tue, Fri; 3.75 mg all other days   Next INR check:  5/31/2022             Telephone call with Akhil who verbalizes understanding and agrees to plan    Lab visit scheduled    Education provided: Please call back if any changes to your diet, medications or how you've been taking warfarin, Importance of consistent vitamin K intake and Contact [phone]  with any changes, questions or concerns.     Plan made per [facility] anticoagulation protocol    Idalia Green RN  Anticoagulation Clinic  6/2/2022    _______________________________________________________________________     Anticoagulation Episode Summary     Current INR goal:  2.0-3.0   TTR:  43.5 % (1 y)   Target end date:  Indefinite   Send INR reminders to:  [facility]    Indications    Thrombophlebitis of superficial veins of right lower extremity [I80.01]  Family history of factor V deficiency [Z83.2]  Encounter for long-term current use of medication [Z79.899]  Long term current use of anticoagulant therapy [Z79.01]           Comments:  Allergy to Lovenox         Anticoagulation Care  Providers     Provider Role Specialty Phone number    Lizabeth Zavala MD Referring Family Medicine [phone]    Dung Prieto MD Referring Family Medicine [phone]

## 2022-06-14 ENCOUNTER — ANTICOAGULATION THERAPY VISIT (OUTPATIENT)
Dept: ANTICOAGULATION | Facility: CLINIC | Age: 72
End: 2022-06-14

## 2022-06-14 ENCOUNTER — OFFICE VISIT (OUTPATIENT)
Dept: DERMATOLOGY | Facility: CLINIC | Age: 72
End: 2022-06-14
Payer: MEDICARE

## 2022-06-14 DIAGNOSIS — Z83.2 FAMILY HISTORY OF FACTOR V DEFICIENCY: ICD-10-CM

## 2022-06-14 DIAGNOSIS — D23.9 DERMAL NEVUS: ICD-10-CM

## 2022-06-14 DIAGNOSIS — Z85.828 HISTORY OF SKIN CANCER: Primary | ICD-10-CM

## 2022-06-14 DIAGNOSIS — I80.01 THROMBOPHLEBITIS OF SUPERFICIAL VEINS OF RIGHT LOWER EXTREMITY: ICD-10-CM

## 2022-06-14 DIAGNOSIS — L81.4 LENTIGO: ICD-10-CM

## 2022-06-14 DIAGNOSIS — Z79.899 ENCOUNTER FOR LONG-TERM CURRENT USE OF MEDICATION: ICD-10-CM

## 2022-06-14 DIAGNOSIS — D18.01 ANGIOMA OF SKIN: ICD-10-CM

## 2022-06-14 DIAGNOSIS — L82.1 SEBORRHEIC KERATOSIS: ICD-10-CM

## 2022-06-14 DIAGNOSIS — I80.01 THROMBOPHLEBITIS OF SUPERFICIAL VEINS OF RIGHT LOWER EXTREMITY: Primary | ICD-10-CM

## 2022-06-14 DIAGNOSIS — Z79.01 LONG TERM CURRENT USE OF ANTICOAGULANT THERAPY: ICD-10-CM

## 2022-06-14 LAB — INR BLD: 3.4 (ref 0.9–1.1)

## 2022-06-14 PROCEDURE — 85610 PROTHROMBIN TIME: CPT | Performed by: DERMATOLOGY

## 2022-06-14 PROCEDURE — 99213 OFFICE O/P EST LOW 20 MIN: CPT | Performed by: DERMATOLOGY

## 2022-06-14 PROCEDURE — 36416 COLLJ CAPILLARY BLOOD SPEC: CPT | Performed by: DERMATOLOGY

## 2022-06-14 ASSESSMENT — PAIN SCALES - GENERAL: PAINLEVEL: NO PAIN (0)

## 2022-06-14 NOTE — PROGRESS NOTES
Thomas Davila is an extremely pleasant 72 year old year old female patient here today for hx of non-melanoma skin cancer.  She notes some bug bites on skin but no new or changing skin lesions.  Patient has no other skin complaints today.  Remainder of the HPI, Meds, PMH, Allergies, FH, and SH was reviewed in chart.      Past Medical History:   Diagnosis Date     Acute parametritis and pelvic cellulitis     salpingitis     ASCUS with positive high risk HPV 2013     Asthma     No recent issues     Basal cell carcinoma      breast cancer     left lumpectomy, radiation, tamoxifen     Breast cancer (H)     L Breast; Lumpectomy & Radiation     Chronic salpingitis and oophoritis     PID        Embolism and thrombosis of unspecified site     left leg, due to tamoxifen therapy     Generalized osteoarthrosis, unspecified site     hands     Leiomyoma of uterus, unspecified      Morbid obesity (H) 2017     Other malignant neoplasm of skin, site unspecified 2006    Basal cell cancer on nose     Squamous cell carcinoma      Thrombosis of leg     Left     Unspecified essential hypertension        Past Surgical History:   Procedure Laterality Date     BREAST LUMPECTOMY, RT/LT      left     C/SECTION, LOW TRANSVERSE  1972,     , Low Transverse x2     CL AFF SURGICAL PATHOLOGY      Breast reduction     COLONOSCOPY  10/15/02    normal     FOOT SURGERY      R Foot      HC EXCISION BREAST LESION, OPEN >=1  98    1) Needle localization with generous lumpectomy 2) Left axillary node dissection.     HC LAPAROSCOPY, SURGICAL, ABDOMEN, PERITONEUM & OMENTUM; DX W/ OR W/O SPECIMEN(S)  94     HYSTERECTOMY, PAP NO LONGER INDICATED       INSERT PORT VASCULAR ACCESS  3/14/2013    Procedure: INSERT PORT VASCULAR ACCESS;  Port Revision;  Surgeon: Arash Puente MD;  Location: [facility]     LAPAROSCOPIC HYSTERECTOMY TOTAL, BILATERAL SALPINGO-OOPHORECTOMY, NODE DISSECTION, COMBINED   1/31/2013    Procedure: COMBINED LAPAROSCOPIC HYSTERECTOMY TOTAL, SALPINGO-OOPHORECTOMY, NODE DISSECTION;  Laparosocpic  Total Abdominal Hysterectomy, Bilateral Salphino-Oophorectomy, Bilateral Pelvic Lymph Node Dissection, Pelvic Washings, Cystoscopy;  Surgeon: Tanya Walker MD;  Location: [facility]     PHACOEMULSIFICATION WITH STANDARD INTRAOCULAR LENS IMPLANT Left 6/12/2019    Procedure: Cataract Removal with Implant;  Surgeon: Walt Mckeon MD;  Location: [facility]     PHACOEMULSIFICATION WITH STANDARD INTRAOCULAR LENS IMPLANT Right 7/3/2019    Procedure: Cataract Removal with Implant;  Surgeon: Walt Mckeon MD;  Location: [facility]     SURGICAL HISTORY OF -   06/22/93    1) Excision of digital cyst right mid finger  2)  Excision of dermatofibroma left calf     SURGICAL HISTORY OF -   12/18/2001    Excision of mucinous cyst & partial ostectomy, bone removal of benign osteophytic overgrowth osteophyte of the distal aspect of the middle phalanx and distal phalanx     SURGICAL HISTORY OF -   2006    Basal cell cancer removal     TONSILLECTOMY       TUBAL LIGATION  1978        Family History   Problem Relation Age of Onset     Cerebrovascular Disease Mother      Hypertension Mother      Diabetes Mother      Thyroid Disease Mother      Arthritis Mother      Alzheimer Disease Mother      Neurologic Disorder Mother         Parkinsons     Heart Disease Father      Hypertension Father      Diabetes Father      Thyroid Disease Father      Arthritis Father      Blood Disease Father      Factor V Leiden deficiency Father      Anesthesia Reaction Sister      Thyroid Disease Sister      Anesthesia Reaction Sister      Arthritis Sister         RA     Hypertension Brother      Factor V Leiden deficiency Brother      Allergies Son      Gastrointestinal Disease Son         GERD     Hypertension Brother      Allergies Son         percocett     Gastrointestinal Disease Son         GERD     Hypertension Son      Factor  V Leiden deficiency Niece        Social History     Socioeconomic History     Marital status:      Spouse name: Not on file     Number of children: 2     Years of education: Not on file     Highest education level: Not on file   Occupational History     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 No   Social History Narrative    Denominational-- DECL[employer]ES ALL B[employer] PRODUCTS        April 15, 2021    ENVIRONMENTAL HISTORY: [place] home is [place] with a forced air. They do have central air conditioning. The patient's bedroom is furnished with hard vijaya in bedroom and animals sleep in bedroom.  Pets inside the house include 2 cat(s). There is no history of cockroach or mice infestation. There is/are 0 smokers in the house.  The house does not have a basement.      Social Determinants of Health     Financial Resource Strain: Not on file   Food Insecurity: Not on file   Transportation Needs: Not on file   Physical Activity: Not on file   Stress: Not on file   Social Connections: Not on file   Intimate Partner Violence: Not on file   Housing Stability: Not on file       Outpatient Encounter Medications as of 6/14/2022   Medication Sig Dispense Refill     Acetaminophen (TYLENOL PO) Take 500 mg by mouth as needed for mild pain or fever       fexofenadine-pseudoePHEDrine (ALLEGRA-D 24) 180-240 MG 24 hr tablet Take 1 tablet by mouth daily Last taken this morning       lisinopril (ZESTRIL) 20 MG tablet Take 1 tablet (20 mg) by mouth daily 90 tablet 3     omeprazole (PRILOSEC) 20 MG DR capsule TAKE 1 CAPSULE BY MOUTH  ONCE DAILY 90 capsule 3     phentermine (ADIPEX-P) 30 MG capsule Take 1 capsule (30 mg) by mouth every morning 90 capsule 0     topiramate (TOPAMAX) 50 MG tablet Take 1 tablet (50 mg) by mouth daily 90 tablet 3     triamterene-HCTZ (MAXZIDE-25) 37.5-25 MG tablet Take 1 tablet by mouth daily 90 tablet 3     warfarin ANTICOAGULANT (COUMADIN) 5 MG tablet Take 2.5 mg every Sun, Thu; 5 mg all other days or As directed by Anticoagulation clinic 66 tablet 0     No facility-administered encounter medications on file as of 6/14/2022.             O:   NAD, WDWN, Alert & Oriented, Mood & Affect wnl, Vitals stable   Here today alone   General appearance normal   Vitals stable   Alert, oriented and in no acute distress        Stuck on papules and brown macules on trunk and ext   Red papules on trunk  Flesh colored papules on trunk     The remainder of the full exam was normal; the following areas were examined:  conjunctiva/lids, oral mucosa, neck, peripheral vascular system, abdomen, lymph nodes, digits/nails, eccrine and apocrine glands, scalp/hair, face, neck, chest, abdomen, buttocks, back, RUE, LUE, RLE, LLE       Eyes: Conjunctivae/lids:Normal     ENT: Lips, buccal mucosa, tongue: normal    MSK:Normal    Cardiovascular: peripheral edema none    Pulm: Breathing Normal    Lymph Nodes: No Head and Neck Lymphadenopathy     Neuro/Psych: Orientation:Alert and Orientedx3 ; Mood/Affect:normal       A/P:  1. Seborrheic keratosis, lentigo, angioma, dermal nevus, hx of non-melanoma skin cancer   It was a pleasure speaking to Thomas Davila today.  Previous clinic notes and pertinent laboratory tests were reviewed prior to Thomas Davila's visit.  Nature and genetics of benign skin lesions dicussed with patient.  Signs and Symptoms of skin cancer discussed with patient.  Patient encouraged to perform monthly skin exams.  UV precautions reviewed with patient.  Risks of non-melanoma skin cancer discussed with patient   Return to  clinic 12 months

## 2022-06-14 NOTE — PROGRESS NOTES
ANTICOAGULATION MANAGEMENT     Thomas Davila 72 year old female is on warfarin with supratherapeutic INR result. (Goal INR 2.0-3.0)    Recent labs: (last 7 days)     06/14/22  0942   INR 3.4*       ASSESSMENT       Source(s): Chart Review    Previous INR was Subtherapeutic resulting in a dose adjustment    Medication, diet, health changes since last INR chart reviewed; none identified           PLAN     Recommended plan for no diet, medication or health factor changes affecting INR     Dosing Instructions: decrease your warfarin dose (7.7% change) with next INR in 2 weeks       Summary  As of 6/14/2022    Full warfarin instructions:  2.5 mg every Tue, Fri; 5 mg all other days   Next INR check:  6/28/2022             Detailed voice message left for Akhil with dosing instructions and follow up date.     Contact [phone]  to schedule and with any changes, questions or concerns.     Education provided: Please call back if any changes to your diet, medications or how you've been taking warfarin and Contact [phone]  with any changes, questions or concerns.     Plan made per [facility] anticoagulation protocol    Idalia Green RN  Anticoagulation Clinic  6/14/2022    _______________________________________________________________________     Anticoagulation Episode Summary     Current INR goal:  2.0-3.0   TTR:  42.6 % (1 y)   Target end date:  Indefinite   Send INR reminders to:  [facility]    Indications    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 okay to leave DVM per signed consent         Anticoagulation Care Providers     Provider Role Specialty Phone number    Lizabeth Zavala MD Referring Family Medicine [phone]    Dung Prieto MD Referring  Family Medicine [phone]

## 2022-06-14 NOTE — LETTER
2022         RE: [place]  [address] [address] [facility] [address]        Dear Colleague,    Thank you for referring your patient, Thomas Davila, to the [facility]. Please see a copy of my visit note below.    Thomas Davila is an extremely pleasant 72 year old year old female patient here today for hx of non-melanoma skin cancer.  She notes some bug bites on skin but no new or changing skin lesions.  Patient has no other skin complaints today.  Remainder of the HPI, Meds, PMH, Allergies, FH, and SH was reviewed in chart.      Past Medical History:   Diagnosis Date     Acute parametritis and pelvic cellulitis     salpingitis     ASCUS with positive high risk HPV 2013     Asthma     No recent issues     Basal cell carcinoma      breast cancer     left lumpectomy, radiation, tamoxifen     Breast cancer (H)     L Breast; Lumpectomy & Radiation     Chronic salpingitis and oophoritis     PID        Embolism and thrombosis of unspecified site     left leg, due to tamoxifen therapy     Generalized osteoarthrosis, unspecified site     hands     Leiomyoma of uterus, unspecified      Morbid obesity (H) 2017     Other malignant neoplasm of skin, site unspecified 2006    Basal cell cancer on nose     Squamous cell carcinoma      Thrombosis of leg     Left     Unspecified essential hypertension        Past Surgical History:   Procedure Laterality Date     BREAST LUMPECTOMY, RT/LT      left     C/SECTION, LOW TRANSVERSE  1972,     , Low Transverse x2     CL AFF SURGICAL PATHOLOGY      Breast reduction     COLONOSCOPY  10/15/02    normal     FOOT SURGERY      R Foot      HC EXCISION BREAST LESION, OPEN >=1  98    1) Needle localization with generous lumpectomy 2) Left axillary node dissection.     HC LAPAROSCOPY, SURGICAL, ABDOMEN, PERITONEUM & OMENTUM; DX W/ OR W/O SPECIMEN(S)  94     HYSTERECTOMY, PAP NO LONGER INDICATED        INSERT PORT VASCULAR ACCESS  3/14/2013    Procedure: INSERT PORT VASCULAR ACCESS;  Port Revision;  Surgeon: Arash Puente MD;  Location: [facility]     LAPAROSCOPIC HYSTERECTOMY TOTAL, BILATERAL SALPINGO-OOPHORECTOMY, NODE DISSECTION, COMBINED  1/31/2013    Procedure: COMBINED LAPAROSCOPIC HYSTERECTOMY TOTAL, SALPINGO-OOPHORECTOMY, NODE DISSECTION;  Laparosocpic  Total Abdominal Hysterectomy, Bilateral Salphino-Oophorectomy, Bilateral Pelvic Lymph Node Dissection, Pelvic Washings, Cystoscopy;  Surgeon: Tanya Walker MD;  Location: [facility]     PHACOEMULSIFICATION WITH STANDARD INTRAOCULAR LENS IMPLANT Left 6/12/2019    Procedure: Cataract Removal with Implant;  Surgeon: Walt Mckeon MD;  Location: [facility]     PHACOEMULSIFICATION WITH STANDARD INTRAOCULAR LENS IMPLANT Right 7/3/2019    Procedure: Cataract Removal with Implant;  Surgeon: Walt Mckeon MD;  Location: [facility]     SURGICAL HISTORY OF -   06/22/93    1) Excision of digital cyst right mid finger  2)  Excision of dermatofibroma left calf     SURGICAL HISTORY OF -   12/18/2001    Excision of mucinous cyst & partial ostectomy, bone removal of benign osteophytic overgrowth osteophyte of the distal aspect of the middle phalanx and distal phalanx     SURGICAL HISTORY OF -   2006    Basal cell cancer removal     TONSILLECTOMY       TUBAL LIGATION  1978        Family History   Problem Relation Age of Onset     Cerebrovascular Disease Mother      Hypertension Mother      Diabetes Mother      Thyroid Disease Mother      Arthritis Mother      Alzheimer Disease Mother      Neurologic Disorder Mother         Parkinsons     Heart Disease Father      Hypertension Father      Diabetes Father      Thyroid Disease Father      Arthritis Father      Blood Disease Father      Factor V Leiden deficiency Father      Anesthesia Reaction Sister      Thyroid Disease Sister      Anesthesia Reaction Sister      Arthritis Sister         RA     Hypertension Brother       Factor V Leiden deficiency Brother      Allergies Son      Gastrointestinal Disease Son         GERD     Hypertension Brother      Allergies Son         percocett     Gastrointestinal Disease Son         GERD     Hypertension Son      Factor V Leiden deficiency Niece        Social History     Socioeconomic History     Marital status:      Spouse name: Not on file     Number of children: 2     Years of education: Not on file     Highest education level: Not on file   Occupational History     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 No   Social History Narrative    Yazidi-- [employer]CL[employer]ES ALL [employer] PRODUCTS        April 15, 2021    ENVIRONMENTAL HISTORY: [place] The home is heated with a forced air. They do have central air conditioning. The patient's bedroom is furnished with hard vijaya in bedroom and animals sleep in bedroom.  Pets inside the house include 2 cat(s). There is no history of cockroach or mice infestation. There is/are 0 smokers in the house.  The house does not have a basement.      Social Determinants of Health     Financial Resource Strain: Not on file   Food Insecurity: Not on file   Transportation Needs: Not on file   Physical Activity: Not on file   Stress: Not on file   Social Connections: Not on file   Intimate Partner Violence: Not on file   Housing Stability: Not on file       Outpatient Encounter Medications as of 6/14/2022   Medication Sig Dispense Refill     Acetaminophen (TYLENOL PO) Take 500 mg by mouth as needed for mild pain or fever       fexofenadine-pseudoePHEDrine  (ALLEGRA-D 24) 180-240 MG 24 hr tablet Take 1 tablet by mouth daily Last taken this morning       lisinopril (ZESTRIL) 20 MG tablet Take 1 tablet (20 mg) by mouth daily 90 tablet 3     omeprazole (PRILOSEC) 20 MG DR capsule TAKE 1 CAPSULE BY MOUTH ONCE DAILY 90 capsule 3     phentermine (ADIPEX-P) 30 MG capsule Take 1 capsule (30 mg) by mouth every morning 90 capsule 0     topiramate (TOPAMAX) 50 MG tablet Take 1 tablet (50 mg) by mouth daily 90 tablet 3     triamterene-HCTZ (MAXZIDE-25) 37.5-25 MG tablet Take 1 tablet by mouth daily 90 tablet 3     warfarin ANTICOAGULANT (COUMADIN) 5 MG tablet Take 2.5 mg every Sun, Thu; 5 mg all other days or As directed by Anticoagulation clinic 66 tablet 0     No facility-administered encounter medications on file as of 6/14/2022.             O:   NAD, WDWN, Alert & Oriented, Mood & Affect wnl, Vitals stable   Here today alone   General appearance normal   Vitals stable   Alert, oriented and in no acute distress        Stuck on papules and brown macules on trunk and ext   Red papules on trunk  Flesh colored papules on trunk     The remainder of the full exam was normal; the following areas were examined:  conjunctiva/lids, oral mucosa, neck, peripheral vascular system, abdomen, lymph nodes, digits/nails, eccrine and apocrine glands, scalp/hair, face, neck, chest, abdomen, buttocks, back, RUE, LUE, RLE, LLE       Eyes: Conjunctivae/lids:Normal     ENT: Lips, buccal mucosa, tongue: normal    MSK:Normal    Cardiovascular: peripheral edema none    Pulm: Breathing Normal    Lymph Nodes: No Head and Neck Lymphadenopathy     Neuro/Psych: Orientation:Alert and Orientedx3 ; Mood/Affect:normal       A/P:  1. Seborrheic keratosis, lentigo, angioma, dermal nevus, hx of non-melanoma skin cancer   It was a pleasure speaking to Thomas Davila today.  Previous clinic notes and pertinent laboratory tests were reviewed prior to Thomas Davila's visit.  Nature and genetics of benign skin  lesions dicussed with patient.  Signs and Symptoms of skin cancer discussed with patient.  Patient encouraged to perform monthly skin exams.  UV precautions reviewed with patient.  Risks of non-melanoma skin cancer discussed with patient   Return to clinic 12 months        Again, thank you for allowing me to participate in the care of your patient.        Sincerely,        Walt Golden MD

## 2022-06-28 ENCOUNTER — TELEPHONE (OUTPATIENT)
Dept: FAMILY MEDICINE | Facility: CLINIC | Age: 72
End: 2022-06-28

## 2022-06-28 ENCOUNTER — ANTICOAGULATION THERAPY VISIT (OUTPATIENT)
Dept: ANTICOAGULATION | Facility: CLINIC | Age: 72
End: 2022-06-28

## 2022-06-28 ENCOUNTER — LAB (OUTPATIENT)
Dept: LAB | Facility: CLINIC | Age: 72
End: 2022-06-28
Payer: MEDICARE

## 2022-06-28 DIAGNOSIS — Z79.899 ENCOUNTER FOR LONG-TERM CURRENT USE OF MEDICATION: ICD-10-CM

## 2022-06-28 DIAGNOSIS — I80.01 THROMBOPHLEBITIS OF SUPERFICIAL VEINS OF RIGHT LOWER EXTREMITY: Primary | ICD-10-CM

## 2022-06-28 DIAGNOSIS — Z83.2 FAMILY HISTORY OF FACTOR V DEFICIENCY: ICD-10-CM

## 2022-06-28 DIAGNOSIS — I80.01 THROMBOPHLEBITIS OF SUPERFICIAL VEINS OF RIGHT LOWER EXTREMITY: ICD-10-CM

## 2022-06-28 DIAGNOSIS — Z79.01 LONG TERM CURRENT USE OF ANTICOAGULANT THERAPY: ICD-10-CM

## 2022-06-28 LAB — INR BLD: 3.9 (ref 0.9–1.1)

## 2022-06-28 PROCEDURE — 36416 COLLJ CAPILLARY BLOOD SPEC: CPT

## 2022-06-28 PROCEDURE — 85610 PROTHROMBIN TIME: CPT

## 2022-06-28 NOTE — TELEPHONE ENCOUNTER
Writer called both patient numbers and no answer. VM left on both home and mobile numbers to call back.    Idalia Green RN, BSN, PHN

## 2022-06-28 NOTE — TELEPHONE ENCOUNTER
Reason for Call:  Other returning call    Detailed comments: Patient was returning call from INR nurse. She needs the correct dosing information. She said it may be best to call her home number and leave a detailed message on the voicemail because she will be in and out all day.    Phone Number Patient can be reached at: Home number on file [phone] (home)    Best Time: any    Can we leave a detailed message on this number? YES    Call taken on 6/28/2022 at 1:52 PM by Elmira Adamson

## 2022-06-28 NOTE — TELEPHONE ENCOUNTER
Reason for Call:  Other call back    Detailed comments: Pt returned phone, ph# left would not dial out. Pt is at home waiting on a return call.    Phone Number Patient can be reached at: Home number on file [phone] (home)    Best Time: any    Can we leave a detailed message on this number? YES    Call taken on 6/28/2022 at 10:34 AM by Minda Palma

## 2022-06-28 NOTE — PROGRESS NOTES
ANTICOAGULATION MANAGEMENT     Thomas Davila 72 year old female is on warfarin with supratherapeutic INR result. (Goal INR 2.0-3.0)    Recent labs: (last 7 days)     06/28/22  0759   INR 3.9*       ASSESSMENT       Source(s): Chart Review and Patient/Caregiver Call       Warfarin doses taken: Warfarin taken as instructed    Diet: patient not eating well. She is so busy running here and there and forgetting to eat    New illness, injury, or hospitalization: No    Medication/supplement changes: None noted    Signs or symptoms of bleeding or clotting: No    Previous INR: Supratherapeutic    Additional findings: patient has ongoing arthritis issues. She switched from tylenol to ibuprofen but that did not help her pain levels so she has switched back to tylenol again. She does not take more than recommended amount. Patient took warfarin today. Will lower maintenance dose so tomorrow is a half dose day instead of holding a full dose.       PLAN     Recommended plan for ongoing change(s) affecting INR     Dosing Instructions: decrease your warfarin dose (8% change) with next INR in 9 days       Summary  As of 6/28/2022    Full warfarin instructions:  2.5 mg every Mon, Wed, Fri; 5 mg all other days   Next INR check:  7/7/2022             Telephone call with Akhil who verbalizes understanding and agrees to plan    Lab visit scheduled    Education provided: Please call back if any changes to your diet, medications or how you've been taking warfarin and Contact [phone]  with any changes, questions or concerns.     Plan made per [facility] anticoagulation protocol    Idalia Green RN  Anticoagulation Clinic  6/28/2022    _______________________________________________________________________     Anticoagulation Episode Summary     Current INR goal:  2.0-3.0   TTR:  40.4 % (1 y)   Target end date:  Indefinite   Send INR reminders to:  [facility]    Indications    Thrombophlebitis of superficial veins of right lower  extremity [I80.01]  Family history of factor V deficiency [Z83.2]  Encounter for long-term current use of medication [Z79.899]  Long term current use of anticoagulant therapy [Z79.01]           Comments:  Allergy to Lovenox. okay to leave DVM per signed consent         Anticoagulation Care Providers     Provider Role Specialty Phone number    Lizabeth Zavala MD Referring Family Medicine [phone]    Dung Prieto MD Referring Family Medicine [phone]

## 2022-07-07 ENCOUNTER — ANTICOAGULATION THERAPY VISIT (OUTPATIENT)
Dept: ANTICOAGULATION | Facility: CLINIC | Age: 72
End: 2022-07-07

## 2022-07-07 ENCOUNTER — LAB (OUTPATIENT)
Dept: LAB | Facility: CLINIC | Age: 72
End: 2022-07-07
Payer: MEDICARE

## 2022-07-07 DIAGNOSIS — I80.01 THROMBOPHLEBITIS OF SUPERFICIAL VEINS OF RIGHT LOWER EXTREMITY: ICD-10-CM

## 2022-07-07 DIAGNOSIS — Z79.01 LONG TERM CURRENT USE OF ANTICOAGULANT THERAPY: ICD-10-CM

## 2022-07-07 DIAGNOSIS — I80.01 THROMBOPHLEBITIS OF SUPERFICIAL VEINS OF RIGHT LOWER EXTREMITY: Primary | ICD-10-CM

## 2022-07-07 DIAGNOSIS — Z83.2 FAMILY HISTORY OF FACTOR V DEFICIENCY: ICD-10-CM

## 2022-07-07 DIAGNOSIS — Z79.899 ENCOUNTER FOR LONG-TERM CURRENT USE OF MEDICATION: ICD-10-CM

## 2022-07-07 LAB — INR BLD: 1.8 (ref 0.9–1.1)

## 2022-07-07 PROCEDURE — 36416 COLLJ CAPILLARY BLOOD SPEC: CPT

## 2022-07-07 PROCEDURE — 85610 PROTHROMBIN TIME: CPT

## 2022-07-07 NOTE — PROGRESS NOTES
ANTICOAGULATION MANAGEMENT     Thomas Davila 72 year old female is on warfarin with subtherapeutic INR result. (Goal INR 2.0-3.0)    Recent labs: (last 7 days)     07/07/22  0741   INR 1.8*       ASSESSMENT       Source(s): Chart Review and Patient/Caregiver Call       Warfarin doses taken: Missed dose(s) may be affecting INR    Diet: No new diet changes identified    New illness, injury, or hospitalization: No    Medication/supplement changes: None noted    Signs or symptoms of bleeding or clotting: No    Previous INR: Supratherapeutic resulting in a dose decrease    Additional findings: None       PLAN     Recommended plan for temporary change(s) affecting INR     Dosing Instructions: continue your current warfarin dose with next INR in 2 weeks       Summary  As of 7/7/2022    Full warfarin instructions:  2.5 mg every Mon, Wed, Fri; 5 mg all other days   Next INR check:  7/21/2022             Telephone call with Akhil who verbalizes understanding and agrees to plan    Lab visit scheduled    Education provided: Please call back if any changes to your diet, medications or how you've been taking warfarin, Importance of taking warfarin as instructed and Contact [phone]  with any changes, questions or concerns.     Plan made per [facility] anticoagulation protocol    Idalia Green RN  Anticoagulation Clinic  7/7/2022    _______________________________________________________________________     Anticoagulation Episode Summary     Current INR goal:  2.0-3.0   TTR:  41.6 % (1 y)   Target end date:  Indefinite   Send INR reminders to:  [facility]    Indications    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 okay to leave DVM per signed consent         Anticoagulation Care  Providers     Provider Role Specialty Phone number    Lizabeth Zavala MD Referring Family Medicine [phone]    Dung Prieto MD Referring Family Medicine [phone]

## 2022-07-21 ENCOUNTER — LAB (OUTPATIENT)
Dept: LAB | Facility: CLINIC | Age: 72
End: 2022-07-21
Payer: MEDICARE

## 2022-07-21 ENCOUNTER — ANTICOAGULATION THERAPY VISIT (OUTPATIENT)
Dept: ANTICOAGULATION | Facility: CLINIC | Age: 72
End: 2022-07-21

## 2022-07-21 DIAGNOSIS — Z79.899 ENCOUNTER FOR LONG-TERM CURRENT USE OF MEDICATION: ICD-10-CM

## 2022-07-21 DIAGNOSIS — Z83.2 FAMILY HISTORY OF FACTOR V DEFICIENCY: ICD-10-CM

## 2022-07-21 DIAGNOSIS — I80.01 THROMBOPHLEBITIS OF SUPERFICIAL VEINS OF RIGHT LOWER EXTREMITY: Primary | ICD-10-CM

## 2022-07-21 DIAGNOSIS — Z79.01 LONG TERM CURRENT USE OF ANTICOAGULANT THERAPY: ICD-10-CM

## 2022-07-21 LAB — INR BLD: 2.2 (ref 0.9–1.1)

## 2022-07-21 PROCEDURE — 85610 PROTHROMBIN TIME: CPT

## 2022-07-21 PROCEDURE — 36416 COLLJ CAPILLARY BLOOD SPEC: CPT

## 2022-07-21 NOTE — PROGRESS NOTES
ANTICOAGULATION MANAGEMENT     Thomas Davila 72 year old female is on warfarin with therapeutic INR result. (Goal INR 2.0-3.0)    Recent labs: (last 7 days)     07/21/22  0816   INR 2.2*       ASSESSMENT       Source(s): Chart Review and Patient/Caregiver Call       Warfarin doses taken: Warfarin taken as instructed    Diet: No new diet changes identified    New illness, injury, or hospitalization: No    Medication/supplement changes: None noted    Signs or symptoms of bleeding or clotting: No    Previous INR: Subtherapeutic    Additional findings: None       PLAN     Recommended plan for no diet, medication or health factor changes affecting INR     Dosing Instructions: continue your current warfarin dose with next INR in 3 weeks       Summary  As of 7/21/2022    Full warfarin instructions:  2.5 mg every Mon, Wed, Fri; 5 mg all other days   Next INR check:  8/11/2022             Telephone call with Akhil who verbalizes understanding and agrees to plan    Lab visit scheduled    Education provided: Please call back if any changes to your diet, medications or how you've been taking warfarin and Contact [phone]  with any changes, questions or concerns.     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 okay to leave DVM per signed consent         Anticoagulation Care Providers     Provider Role Specialty Phone number    Sally,  Lizabeth Waters MD Referring Family Medicine [phone]    Dung Prieto MD Referring Family Medicine [phone]

## 2022-07-24 NOTE — MR AVS SNAPSHOT
After Visit Summary   4/3/2017    Thomas Davila    MRN: [id number]           Patient Information     Date Of Birth          1950        Visit Information        Provider Department      4/3/2017 1:00 PM Alissa Chacon MD [facility]        Today's Diagnoses     Endometrial adenocarcinoma (H)    -  1    Thyroid nodule        Fatty liver        Personal history of DVT (deep vein thrombosis)        Overweight           Follow-ups after your visit        Your next 10 appointments already scheduled     Apr 10, 2017  4:00 PM CDT   Return Visit with Maryana Velasquez PA-C   [facility]    [address] [address]   [phone]            Sep 27, 2017 12:50 PM CDT   LAB [facility] ([facility])    [address] [address]   [phone]           Patient must bring picture ID.  Patient should be prepared to give a urine specimen  Please do not eat 10-12 hours before your appointment if you are coming in fasting for labs on lipids, cholesterol, or glucose (sugar).  Pregnant women should follow their Care Team instructions. Water with medications is okay. Do not drink coffee or other fluids.   If you have concerns about taking  your medications, please ask at office or if scheduling via [facility], send a message by clicking on Secure Messaging, Message Your Care Team.            Sep 27, 2017  1:00 PM CDT   CT CHEST/ABDOMEN/PELVIS W CONTRAST with [facility]   [facility] ([facility])    [address] [address]   [phone]           Please bring any scans or X-rays taken at other hospitals, if similar tests were done. Also bring a list of your medicines, including vitamins, minerals and over-the-counter drugs. It is safest to leave personal items at home.  Be sure to tell your doctor:   If you have any allergies.   If there s any  chance you are pregnant.   If you are breastfeeding.   If you have any special needs.  You may have contrast for this exam. To prepare:   Do not eat or drink for 2 hours before your exam. If you need to take medicine, you may take it with small sips of water. (We may ask you to take liquid medicine as well.)   The day before your exam, drink extra fluids at least six 8-ounce glasses (unless your doctor tells you to restrict your fluids).  Patients over 70 or patients with diabetes or kidney problems:   If you haven t had a blood test (creatinine test) within the last 30 days, go to your clinic or Diagnostic Imaging Department for this test.  If you have diabetes:   If your kidney function is normal, continue taking your metformin (Avandamet, Glucophage, Glucovance, Metaglip) on the day of your exam.   If your kidney function is abnormal, wait 48 hours before restarting this medicine.  You will have oral contrast for this exam:   You will drink the contrast at home. Get this from your clinic or Diagnostic Imaging Department. Please follow the directions given.  Please wear loose clothing, such as a sweat suit or jogging clothes. Avoid snaps, zippers and other metal. We may ask you to undress and put on a hospital gown.  If you have any questions, please call the Imaging Department where you will have your exam.            Oct 02, 2017  1:30 PM CDT   Return Visit with Alissa Chacon MD   [facility] ([facility])    [facility]  [address]  [facility] [address]   [phone]              Future tests that were ordered for you today     Open Future Orders        Priority Expected Expires Ordered    CBC with platelets differential Routine 10/1/2017 11/30/2017 4/3/2017    Comprehensive metabolic panel Routine 10/1/2017 11/30/2017 4/3/2017     Routine 10/1/2017 11/30/2017 4/3/2017    CT Chest/Abdomen/Pelvis w Contrast Routine 10/1/2017 11/30/2017 4/3/2017     "        Who to contact     If you have questions or need follow up information about today's clinic visit or your schedule please contact [facility] directly at [phone].  Normal or non-critical lab and imaging results will be communicated to you by MyChart, letter or phone within 4 business days after the clinic has received the results. If you do not hear from us within 7 days, please contact the clinic through [employer] or phone. If you have a critical or abnormal lab result, we will notify you by phone as soon as possible.  Submit refill requests through [employer] or call your pharmacy and they will forward the refill request to us. Please allow 3 business days for your refill to be completed.          Additional Information About Your Visit        [employer] Information     [employer] lets you send messages to your doctor, view your test results, renew your prescriptions, schedule appointments and more. To sign up, go to [email or web address] . Click on \"Log in\" on the left side of the screen, which will take you to the Welcome page. Then click on \"Sign up Now\" on the right side of the page.     You will be asked to enter the access code listed below, as well as some personal information. Please follow the directions to create your username and password.     Your access code is: [email or web address]  Expires: 2017 12:23 PM     Your access code will  in 90 days. If you need help or a new code, please call your [facility] clinic or [phone].        Care EveryWhere ID     This is your Care EveryWhere ID. This could be used by other organizations to access your [facility] medical records  [id number]        Your Vitals Were     Pulse Temperature Respirations Height Pulse Oximetry Breastfeeding?    97 97.4  F (36.3  C) (Tympanic) 18 1.626 m (5' 4\") 96% No    BMI (Body Mass Index)                   41.32 kg/m2            Blood Pressure from Last 3 Encounters:   17 135/73   17 148/68   17 " 150/83    Weight from Last 3 Encounters:   04/03/17 109.2 kg (240 lb 11.2 oz)   01/30/17 110.2 kg (243 lb)   12/30/16 110.2 kg (243 lb)               Primary Care Provider Office Phone # Fax #    Dung Prieto [phone]       [facility] [address] [address]        Thank you!     Thank you for choosing [facility]  for your care. Our goal is always to provide you with excellent care. Hearing back from our patients is one way we can continue to improve our services. Please take a few minutes to complete the written survey that you may receive in the mail after your visit with us. Thank you!             Your Updated Medication List - Protect others around you: Learn how to safely use, store and throw away your medicines at www.disposemymeds.org.          This list is accurate as of: 4/3/17  1:37 PM.  Always use your most recent med list.                   Brand Name Dispense Instructions for use    ACETAMINOPHEN PO      Take 500 mg by mouth as needed for pain       aspirin 81 MG tablet      Take by mouth daily       lisinopril 20 MG tablet    PRINIVIL/ZESTRIL    90 tablet    Take 1 tablet (20 mg) by mouth daily       naproxen 500 MG tablet    NAPROSYN    60 tablet    Take 1 tablet (500 mg) by mouth 2 times daily as needed for moderate pain       omeprazole 20 MG CR capsule    priLOSEC     TAKE ONE CAPSULE BY MOUTH DAILY BEFORE A MEAL       * order for DME     1 Device    Medium Tri Tracy       * order for DME     1 Device    Equipment being ordered: [employer]el inserts       triamterene-hydrochlorothiazide 37.5-25 MG per tablet    MAXZIDE-25    90 tablet    Take 1 tablet by mouth daily       * Notice:  This list has 2 medication(s) that are the same as other medications prescribed for you. Read the directions carefully, and ask your doctor or other care provider to review them with you.       stated

## 2022-08-11 ENCOUNTER — DOCUMENTATION ONLY (OUTPATIENT)
Dept: ANTICOAGULATION | Facility: CLINIC | Age: 72
End: 2022-08-11

## 2022-08-11 ENCOUNTER — LAB (OUTPATIENT)
Dept: LAB | Facility: CLINIC | Age: 72
End: 2022-08-11
Payer: MEDICARE

## 2022-08-11 ENCOUNTER — ANTICOAGULATION THERAPY VISIT (OUTPATIENT)
Dept: ANTICOAGULATION | Facility: CLINIC | Age: 72
End: 2022-08-11

## 2022-08-11 DIAGNOSIS — Z79.01 LONG TERM CURRENT USE OF ANTICOAGULANT THERAPY: ICD-10-CM

## 2022-08-11 DIAGNOSIS — I80.01 THROMBOPHLEBITIS OF SUPERFICIAL VEINS OF RIGHT LOWER EXTREMITY: Primary | ICD-10-CM

## 2022-08-11 DIAGNOSIS — Z83.2 FAMILY HISTORY OF FACTOR V DEFICIENCY: ICD-10-CM

## 2022-08-11 DIAGNOSIS — Z79.899 ENCOUNTER FOR LONG-TERM CURRENT USE OF MEDICATION: ICD-10-CM

## 2022-08-11 LAB — INR BLD: 2.5 (ref 0.9–1.1)

## 2022-08-11 PROCEDURE — 36416 COLLJ CAPILLARY BLOOD SPEC: CPT

## 2022-08-11 PROCEDURE — 85610 PROTHROMBIN TIME: CPT

## 2022-08-11 NOTE — PROGRESS NOTES
ANTICOAGULATION CLINIC REFERRAL RENEWAL REQUEST       An annual renewal order is required for all patients referred to [facility].?  Please review and sign the pended referral order for Thomas Davila.       ANTICOAGULATION SUMMARY      Warfarin indication(s)   Bilateral superficial venous thrombosis involving the saphenous veins. Family history of Factor V deficiency, family history of blood clots. Patient saw Hematology 2/25/2022 and they recommended lifelong anticoagulation    Mechanical heart valve present?  NO       Current goal range   INR: 2.0-3.0     Goal appropriate for indication? Goal INR 2-3, standard for indication(s) above     Time in Therapeutic Range (TTR)  (Goal > 60%) 49.3%       Office visit with referring provider's group within last year yes on 04/08/2022       Nithya Muniz RN  [facility]

## 2022-08-11 NOTE — PROGRESS NOTES
ANTICOAGULATION MANAGEMENT     Thomas Davila 72 year old female is on warfarin with therapeutic INR result. (Goal INR 2.0-3.0)    Recent labs: (last 7 days)     08/11/22  0807   INR 2.5*       ASSESSMENT       Source(s): Chart Review    Previous INR was Therapeutic last visit; previously outside of goal range    Medication, diet, health changes since last INR chart reviewed; none identified           PLAN     Recommended plan for no diet, medication or health factor changes affecting INR     Dosing Instructions: Continue your current warfarin dose with next INR in 4 weeks       Summary  As of 8/11/2022    Full warfarin instructions:  2.5 mg every Mon, Wed, Fri; 5 mg all other days   Next INR check:  9/8/2022             Detailed voice message left for Akhil with dosing instructions and follow up date.     Contact [phone]  to schedule and with any changes, questions or concerns.     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 okay to leave DVM per signed consent         Anticoagulation Care Providers     Provider Role Specialty Phone number    Lizabeth Zavala MD Referring Family Medicine [phone]    Dung Prieto MD Referring Family Medicine [phone]

## 2022-09-08 ENCOUNTER — HOSPITAL ENCOUNTER (OUTPATIENT)
Dept: MAMMOGRAPHY | Facility: CLINIC | Age: 72
Discharge: HOME | End: 2022-09-08
Attending: FAMILY MEDICINE | Admitting: FAMILY MEDICINE
Payer: MEDICARE

## 2022-09-08 ENCOUNTER — LAB (OUTPATIENT)
Dept: LAB | Facility: CLINIC | Age: 72
End: 2022-09-08
Payer: MEDICARE

## 2022-09-08 ENCOUNTER — ANTICOAGULATION THERAPY VISIT (OUTPATIENT)
Dept: ANTICOAGULATION | Facility: CLINIC | Age: 72
End: 2022-09-08

## 2022-09-08 DIAGNOSIS — I80.01 THROMBOPHLEBITIS OF SUPERFICIAL VEINS OF RIGHT LOWER EXTREMITY: Primary | ICD-10-CM

## 2022-09-08 DIAGNOSIS — Z79.01 LONG TERM CURRENT USE OF ANTICOAGULANT THERAPY: ICD-10-CM

## 2022-09-08 DIAGNOSIS — Z79.899 ENCOUNTER FOR LONG-TERM CURRENT USE OF MEDICATION: ICD-10-CM

## 2022-09-08 DIAGNOSIS — Z83.2 FAMILY HISTORY OF FACTOR V DEFICIENCY: ICD-10-CM

## 2022-09-08 DIAGNOSIS — Z12.31 VISIT FOR SCREENING MAMMOGRAM: ICD-10-CM

## 2022-09-08 DIAGNOSIS — I80.01 THROMBOPHLEBITIS OF SUPERFICIAL VEINS OF RIGHT LOWER EXTREMITY: ICD-10-CM

## 2022-09-08 LAB — INR BLD: 3 (ref 0.9–1.1)

## 2022-09-08 PROCEDURE — 36416 COLLJ CAPILLARY BLOOD SPEC: CPT

## 2022-09-08 PROCEDURE — 85610 PROTHROMBIN TIME: CPT

## 2022-09-08 PROCEDURE — 77067 SCR MAMMO BI INCL CAD: CPT

## 2022-09-08 NOTE — PROGRESS NOTES
ANTICOAGULATION MANAGEMENT     Thomas Davila 72 year old female is on warfarin with therapeutic INR result. (Goal INR 2.0-3.0)    Recent labs: (last 7 days)     09/08/22  0841   INR 3.0*       ASSESSMENT       Source(s): Chart Review and Patient/Caregiver Call       Warfarin doses taken: Warfarin taken as instructed    Diet: No new diet changes identified    New illness, injury, or hospitalization: No    Medication/supplement changes: None noted    Signs or symptoms of bleeding or clotting: No    Previous INR: Therapeutic last 2(+) visits    Additional findings: None       PLAN     Recommended plan for no diet, medication or health factor changes affecting INR     Dosing Instructions: Continue your current warfarin dose with next INR in 5 weeks       Summary  As of 9/8/2022    Full warfarin instructions:  2.5 mg every Mon, Wed, Fri; 5 mg all other days   Next INR check:  10/13/2022             Telephone call with Akhil who verbalizes understanding and agrees to plan    Lab visit scheduled    Education provided: Contact [phone]  with any changes, questions or concerns.     Plan made per 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 okay to leave DVM per signed consent         Anticoagulation Care Providers     Provider Role Specialty Phone number    Lizabeth Zavala MD Referring Family Medicine [phone]    Dung Prieto,  MD [facility] Family Medicine [phone]

## 2022-10-12 ENCOUNTER — TELEPHONE (OUTPATIENT)
Dept: FAMILY MEDICINE | Facility: CLINIC | Age: 72
End: 2022-10-12

## 2022-10-12 ENCOUNTER — LAB (OUTPATIENT)
Dept: LAB | Facility: CLINIC | Age: 72
End: 2022-10-12
Payer: MEDICARE

## 2022-10-12 ENCOUNTER — ANTICOAGULATION THERAPY VISIT (OUTPATIENT)
Dept: ANTICOAGULATION | Facility: CLINIC | Age: 72
End: 2022-10-12

## 2022-10-12 DIAGNOSIS — Z83.2 FAMILY HISTORY OF FACTOR V DEFICIENCY: ICD-10-CM

## 2022-10-12 DIAGNOSIS — Z79.899 ENCOUNTER FOR LONG-TERM CURRENT USE OF MEDICATION: ICD-10-CM

## 2022-10-12 DIAGNOSIS — I80.01 THROMBOPHLEBITIS OF SUPERFICIAL VEINS OF RIGHT LOWER EXTREMITY: Primary | ICD-10-CM

## 2022-10-12 DIAGNOSIS — I80.01 THROMBOPHLEBITIS OF SUPERFICIAL VEINS OF RIGHT LOWER EXTREMITY: ICD-10-CM

## 2022-10-12 DIAGNOSIS — Z79.01 LONG TERM CURRENT USE OF ANTICOAGULANT THERAPY: ICD-10-CM

## 2022-10-12 LAB — INR BLD: 2.4 (ref 0.9–1.1)

## 2022-10-12 PROCEDURE — 36416 COLLJ CAPILLARY BLOOD SPEC: CPT

## 2022-10-12 PROCEDURE — 85610 PROTHROMBIN TIME: CPT

## 2022-10-12 RX ORDER — WARFARIN SODIUM 5 MG/1
TABLET ORAL
Qty: 66 TABLET | Refills: 0 | Status: SHIPPED | OUTPATIENT
Start: 2022-10-12 | End: 2023-01-02

## 2022-10-12 NOTE — TELEPHONE ENCOUNTER
1:55 PM    This was already addressed. Refill was placed and and writer left a VM with the patient.    Eusebia Ceballos RN, BSN, N  Anticoagulation Clinic   [place] # [id number] [phone]

## 2022-10-12 NOTE — PROGRESS NOTES
ANTICOAGULATION MANAGEMENT     Thomas Davila 72 year old female is on warfarin with therapeutic INR result. (Goal INR 2.0-3.0)    Recent labs: (last 7 days)     10/12/22  1119   INR 2.4*       ASSESSMENT       Source(s): Chart Review    Previous INR was Therapeutic last 2(+) visits    Medication, diet, health changes since last INR chart reviewed; none identified           PLAN     Recommended plan for no diet, medication or health factor changes affecting INR     Dosing Instructions: Continue your current warfarin dose with next INR in 6 weeks       Summary  As of 10/12/2022    Full warfarin instructions:  2.5 mg every Mon, Wed, Fri; 5 mg all other days   Next INR check:  11/23/2022             Detailed voice message left for Akhil with dosing instructions and follow up date.     Contact [phone]  to schedule and with any changes, questions or concerns.     Education provided: Please call back if any changes to your diet, medications or how you've been taking warfarin and Contact [phone]  with any changes, questions or concerns.     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 okay to leave DVM per signed consent         Anticoagulation Care Providers     Provider Role Specialty Phone number    Lizabeth Zavala MD Referring Family Medicine [phone]    Dung Prieto MD Referring Family Medicine [phone]

## 2022-10-12 NOTE — CONFIDENTIAL NOTE
Reason for Call:  Medication or medication refill:    Do you use a [facility]?  Name of the pharmacy and phone number for the current request:  [facility]    Name of the medication requested: Warfarin 5 mg tab    Other request: no    Can we leave a detailed message on this number? YES    Phone number patient can be reached at: Mobile Phone on file [phone]    Best Time: as soon as possible    Call taken on 10/12/2022 at 1:27 PM by Nancy Newton

## 2022-10-12 NOTE — PROGRESS NOTES
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 referral signed within last year: Yes    Thomas meets all criteria for refill (current [facility] referral, office visit with referring provider/group in last year, lab monitoring up to date or not exceeding 2 weeks overdue). Rx instructions and quantity match patient's current dosing plan. 90 day supply with 0 refills granted per [facility] protocol     Eusebia Ceballos RN  Anticoagulation Clinic

## 2022-10-12 NOTE — TELEPHONE ENCOUNTER
Medication Question or Refill    Contacts       Type Contact Phone/Fax    10/12/2022 01:32 PM CDT Phone (Incoming) Akhil Davila (Self) [phone] (H)          What medication are you calling about (include dose and sig)?: Patient needs refill of Warfarin, going out of town, needs it today.       Controlled Substance Agreement on file:   CSA -- Patient Level:    CSA: None found at the patient level.       Who prescribed the medication?: Dr. Prieto    Do you need a refill? Yes:     When did you use the medication last? Daily    Patient offered an appointment? No Patient only has enough med for today, Wed, 10/12/22    Do you have any questions or concerns?  No    Preferred Pharmacy:      [id number] - [address]  [facility] [address]  Phone: [phone] Fax: [phone]      Could we send this information to you in [employer] or would you prefer to receive a phone call?:   Patient would prefer a phone call   Okay to leave a detailed message?: Yes at Home number on file [phone] (home)

## 2022-10-13 NOTE — TELEPHONE ENCOUNTER
Writer spoke with patient. Her refill was completed yesterday and she will be picking it up today. She wanted to ensure the amount she has available will last her until her next INR in 6 weeks. Confirmed a 90-day supply was sent to her Pharmacy. Patient verbalized understanding.     Nithya Muniz RN, BSN  [facility]ticoagulation Clinic  [phone]

## 2022-11-23 ENCOUNTER — ANTICOAGULATION THERAPY VISIT (OUTPATIENT)
Dept: ANTICOAGULATION | Facility: CLINIC | Age: 72
End: 2022-11-23

## 2022-11-23 ENCOUNTER — LAB (OUTPATIENT)
Dept: LAB | Facility: CLINIC | Age: 72
End: 2022-11-23
Payer: OTHER GOVERNMENT

## 2022-11-23 DIAGNOSIS — I80.01 THROMBOPHLEBITIS OF SUPERFICIAL VEINS OF RIGHT LOWER EXTREMITY: Primary | ICD-10-CM

## 2022-11-23 DIAGNOSIS — Z79.899 ENCOUNTER FOR LONG-TERM CURRENT USE OF MEDICATION: ICD-10-CM

## 2022-11-23 DIAGNOSIS — Z79.01 LONG TERM CURRENT USE OF ANTICOAGULANT THERAPY: ICD-10-CM

## 2022-11-23 DIAGNOSIS — I80.01 THROMBOPHLEBITIS OF SUPERFICIAL VEINS OF RIGHT LOWER EXTREMITY: ICD-10-CM

## 2022-11-23 DIAGNOSIS — Z83.2 FAMILY HISTORY OF FACTOR V DEFICIENCY: ICD-10-CM

## 2022-11-23 LAB — INR BLD: 1.9 (ref 0.9–1.1)

## 2022-11-23 PROCEDURE — 36416 COLLJ CAPILLARY BLOOD SPEC: CPT

## 2022-11-23 PROCEDURE — 85610 PROTHROMBIN TIME: CPT

## 2022-11-23 NOTE — PROGRESS NOTES
ANTICOAGULATION MANAGEMENT     Thomas Davila 72 year old female is on warfarin with subtherapeutic INR result. (Goal INR 2.0-3.0)    Recent labs: (last 7 days)     11/23/22  0810   INR 1.9*       ASSESSMENT       Source(s): Chart Review and Patient/Caregiver Call       Warfarin doses taken: Warfarin taken as instructed    Diet: No new diet changes identified    New illness, injury, or hospitalization: No    Medication/supplement changes: None noted    Signs or symptoms of bleeding or clotting: No    Previous INR: Therapeutic last 2(+) visits    Additional findings: None       PLAN     Recommended plan for no diet, medication or health factor changes affecting INR     Dosing Instructions: Continue your current warfarin dose with next INR in 2 weeks       Summary  As of 11/23/2022    Full warfarin instructions:  2.5 mg every Mon, Wed, Fri; 5 mg all other days; Starting 11/23/2022   Next INR check:  12/7/2022             Telephone call with Akhil who verbalizes understanding and agrees to plan    Lab visit scheduled    Education provided:     Goal range and lab monitoring: goal range and significance of current result    Contact [phone]  with any changes, questions or concerns.     Plan made per [facility] anticoagulation protocol    Ana Maria Wolff RN  Anticoagulation Clinic  11/23/2022    _______________________________________________________________________     Anticoagulation Episode Summary     Current INR goal:  2.0-3.0   TTR:  61.5 % (1 y)   Target end date:  Indefinite   Send INR reminders to:  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 okay to leave DVM per signed consent         Anticoagulation Care Providers     Provider Role  Specialty Phone number    Lizabeth Zavala MD Referring Family Medicine [phone]    Dung Prieto MD Referring Family Medicine [phone]

## 2022-12-07 ENCOUNTER — LAB (OUTPATIENT)
Dept: LAB | Facility: CLINIC | Age: 72
End: 2022-12-07
Payer: MEDICARE

## 2022-12-07 ENCOUNTER — ANTICOAGULATION THERAPY VISIT (OUTPATIENT)
Dept: ANTICOAGULATION | Facility: CLINIC | Age: 72
End: 2022-12-07

## 2022-12-07 DIAGNOSIS — Z79.899 ENCOUNTER FOR LONG-TERM CURRENT USE OF MEDICATION: ICD-10-CM

## 2022-12-07 DIAGNOSIS — I80.01 THROMBOPHLEBITIS OF SUPERFICIAL VEINS OF RIGHT LOWER EXTREMITY: Primary | ICD-10-CM

## 2022-12-07 DIAGNOSIS — Z79.01 LONG TERM CURRENT USE OF ANTICOAGULANT THERAPY: ICD-10-CM

## 2022-12-07 DIAGNOSIS — Z83.2 FAMILY HISTORY OF FACTOR V DEFICIENCY: ICD-10-CM

## 2022-12-07 DIAGNOSIS — I80.01 THROMBOPHLEBITIS OF SUPERFICIAL VEINS OF RIGHT LOWER EXTREMITY: ICD-10-CM

## 2022-12-07 LAB — INR BLD: 2 (ref 0.9–1.1)

## 2022-12-07 PROCEDURE — 85610 PROTHROMBIN TIME: CPT

## 2022-12-07 PROCEDURE — 36416 COLLJ CAPILLARY BLOOD SPEC: CPT

## 2022-12-07 NOTE — PROGRESS NOTES
ANTICOAGULATION MANAGEMENT     Thomas Davila 72 year old female is on warfarin with therapeutic INR result. (Goal INR 2.0-3.0)    Recent labs: (last 7 days)     12/07/22  0830   INR 2.0*       ASSESSMENT       Source(s): Chart Review    Previous INR was Subtherapeutic    Medication, diet, health changes since last INR chart reviewed; none identified           PLAN     Recommended plan for no diet, medication or health factor changes affecting INR     Dosing Instructions: Continue your current warfarin dose with next INR in 3 weeks       Summary  As of 12/7/2022    Full warfarin instructions:  2.5 mg every Mon, Wed, Fri; 5 mg all other days; Starting 12/7/2022   Next INR check:  12/28/2022             Detailed voice message left for Akhil with dosing instructions and follow up date.     Contact [phone]  to schedule and with any changes, questions or concerns.     Education provided:     Please call back if any changes to your diet, medications or how you've been taking warfarin    Contact [phone]  with any changes, questions or concerns.     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 okay to leave DVM per signed consent         Anticoagulation Care Providers     Provider Role Specialty Phone number    Lizabeth Zavala MD Referring Family Medicine [phone]    Dung Prieto MD Referring Family Medicine  [phone]

## 2022-12-12 RX ORDER — PHENTERMINE HYDROCHLORIDE 30 MG/1
30 CAPSULE ORAL EVERY MORNING
Qty: 90 CAPSULE | Refills: 0 | Status: SHIPPED | OUTPATIENT
Start: 2022-12-12 | End: 2022-12-16

## 2022-12-16 ENCOUNTER — TELEPHONE (OUTPATIENT)
Dept: FAMILY MEDICINE | Facility: CLINIC | Age: 72
End: 2022-12-16

## 2022-12-16 ENCOUNTER — OFFICE VISIT (OUTPATIENT)
Dept: FAMILY MEDICINE | Facility: CLINIC | Age: 72
End: 2022-12-16
Payer: MEDICARE

## 2022-12-16 VITALS
SYSTOLIC BLOOD PRESSURE: 130 MMHG | HEART RATE: 98 BPM | BODY MASS INDEX: 33.3 KG/M2 | DIASTOLIC BLOOD PRESSURE: 72 MMHG | WEIGHT: 191 LBS | TEMPERATURE: 97 F | OXYGEN SATURATION: 98 %

## 2022-12-16 DIAGNOSIS — M06.9 RHEUMATOID ARTHRITIS, INVOLVING UNSPECIFIED SITE, UNSPECIFIED WHETHER RHEUMATOID FACTOR PRESENT (H): Primary | ICD-10-CM

## 2022-12-16 DIAGNOSIS — M25.511 CHRONIC RIGHT SHOULDER PAIN: ICD-10-CM

## 2022-12-16 DIAGNOSIS — E65 PENDULOUS ABDOMEN: ICD-10-CM

## 2022-12-16 DIAGNOSIS — D68.9 BLOOD COAGULATION DISORDER (H): ICD-10-CM

## 2022-12-16 DIAGNOSIS — G89.29 CHRONIC RIGHT SHOULDER PAIN: ICD-10-CM

## 2022-12-16 DIAGNOSIS — I73.00 RAYNAUD'S DISEASE WITHOUT GANGRENE: ICD-10-CM

## 2022-12-16 DIAGNOSIS — Z23 ENCOUNTER FOR IMMUNIZATION: ICD-10-CM

## 2022-12-16 DIAGNOSIS — I10 ESSENTIAL HYPERTENSION: ICD-10-CM

## 2022-12-16 LAB
ANION GAP SERPL CALCULATED.3IONS-SCNC: 12 MMOL/L (ref 7–15)
BUN SERPL-MCNC: 22.1 MG/DL (ref 8–23)
CALCIUM SERPL-MCNC: 9.3 MG/DL (ref 8.8–10.2)
CHLORIDE SERPL-SCNC: 102 MMOL/L (ref 98–107)
CREAT SERPL-MCNC: 0.71 MG/DL (ref 0.51–0.95)
DEPRECATED HCO3 PLAS-SCNC: 25 MMOL/L (ref 22–29)
GFR SERPL CREATININE-BSD FRML MDRD: 89 ML/MIN/1.73M2
GLUCOSE SERPL-MCNC: 103 MG/DL (ref 70–99)
POTASSIUM SERPL-SCNC: 3.6 MMOL/L (ref 3.4–5.3)
SODIUM SERPL-SCNC: 139 MMOL/L (ref 136–145)

## 2022-12-16 PROCEDURE — 99214 OFFICE O/P EST MOD 30 MIN: CPT | Mod: 25 | Performed by: FAMILY MEDICINE

## 2022-12-16 PROCEDURE — 90677 PCV20 VACCINE IM: CPT | Performed by: FAMILY MEDICINE

## 2022-12-16 PROCEDURE — 80048 BASIC METABOLIC PNL TOTAL CA: CPT | Performed by: FAMILY MEDICINE

## 2022-12-16 PROCEDURE — G0009 ADMIN PNEUMOCOCCAL VACCINE: HCPCS | Mod: 59 | Performed by: FAMILY MEDICINE

## 2022-12-16 PROCEDURE — 36415 COLL VENOUS BLD VENIPUNCTURE: CPT | Performed by: FAMILY MEDICINE

## 2022-12-16 PROCEDURE — 90471 IMMUNIZATION ADMIN: CPT | Performed by: FAMILY MEDICINE

## 2022-12-16 PROCEDURE — 90714 TD VACC NO PRESV 7 YRS+ IM: CPT | Performed by: FAMILY MEDICINE

## 2022-12-16 RX ORDER — AMLODIPINE BESYLATE 2.5 MG/1
2.5 TABLET ORAL DAILY
Qty: 90 TABLET | Refills: 3 | Status: SHIPPED | OUTPATIENT
Start: 2022-12-16 | End: 2023-02-15 | Stop reason: SINTOL

## 2022-12-16 RX ORDER — TRIAMTERENE/HYDROCHLOROTHIAZID 37.5-25 MG
1 TABLET ORAL DAILY
Qty: 90 TABLET | Refills: 3 | Status: SHIPPED | OUTPATIENT
Start: 2022-12-16 | End: 2023-05-31

## 2022-12-16 RX ORDER — TOPIRAMATE 50 MG/1
50 TABLET, FILM COATED ORAL DAILY
Qty: 90 TABLET | Refills: 3 | Status: SHIPPED | OUTPATIENT
Start: 2022-12-16 | End: 2023-05-31

## 2022-12-16 RX ORDER — PHENTERMINE HYDROCHLORIDE 30 MG/1
30 CAPSULE ORAL EVERY MORNING
Qty: 90 CAPSULE | Refills: 0 | Status: SHIPPED | OUTPATIENT
Start: 2023-03-10 | End: 2023-05-31

## 2022-12-16 RX ORDER — LISINOPRIL 20 MG/1
20 TABLET ORAL DAILY
Qty: 90 TABLET | Refills: 3 | Status: SHIPPED | OUTPATIENT
Start: 2022-12-16 | End: 2023-05-31

## 2022-12-16 NOTE — PROGRESS NOTES
"  Assessment & Plan     Rheumatoid arthritis, involving unspecified site, unspecified whether rheumatoid factor present (H)  Has diagnosis, no current treatment    Blood coagulation disorder (H)  On warfarin currently, wondering about DOAC, she will check with insurance    Chronic right shoulder pain  Will do referral ,reports chronic right shoulder pain for years, wants evaluation  - Orthopedic  Referral; Future    BMI 33.0-33.9,adult  On medications and refilled, has pendulus abdomen, reports intermittent red rash, pain   - Adult Plastic Surgery  Referral; Future  - topiramate (TOPAMAX) 50 MG tablet; Take 1 tablet (50 mg) by mouth daily  - phentermine (ADIPEX-P) 30 MG capsule; Take 1 capsule (30 mg) by mouth every morning for 90 days Hold on file until needed.    Pendulous abdomen    - Adult Plastic Surgery  Referral; Future    Raynaud's disease without gangrene  Reports intermittent blue toes.  Tries to warm feet  - amLODIPine (NORVASC) 2.5 MG tablet; Take 1 tablet (2.5 mg) by mouth daily    Encounter for immunization    - Pneumococcal 20 Valent Conjugate (PCV20)  - TD PRESERV FREE, IM (7+ YRS) (DECAVAC/TENIVAC)    BMI 34.0-34.9,adult    - topiramate (TOPAMAX) 50 MG tablet; Take 1 tablet (50 mg) by mouth daily    Essential hypertension  Controlled, refilled med check lab  - lisinopril (ZESTRIL) 20 MG tablet; Take 1 tablet (20 mg) by mouth daily  - triamterene-HCTZ (MAXZIDE-25) 37.5-25 MG tablet; Take 1 tablet by mouth daily  - Basic metabolic panel; Future             BMI:   Estimated body mass index is 33.3 kg/m  as calculated from the following:    Height as of 4/8/22: 1.613 m (5' 3.5\").    Weight as of this encounter: 86.6 kg (191 lb).   Weight management plan: medications    See Patient Instructions    Return in about 6 months (around 6/16/2023) for Office Visit.    Dung Prieto MD  [facility]    Patiot Landaverde is a 72 year old, presenting for the " following health issues:  Shoulder      History of Present Illness       Reason for visit:  Tingling in feet  feet turn blue hands and shouldet pain leg cramping   arthritis pain    She eats 0-1 servings of fruits and vegetables daily.She consumes 0 sweetened beverage(s) daily.She exercises with enough effort to increase her heart rate 30 to 60 minutes per day.  She exercises with enough effort to increase her heart rate 4 days per week.   She is taking medications regularly.             Review of Systems         Objective    /72   Pulse 98   Temp 97  F (36.1  C) (Tympanic)   Wt 86.6 kg (191 lb)   SpO2 98%   BMI 33.30 kg/m    Body mass index is 33.3 kg/m .  Physical Exam   GENERAL APPEARANCE: alert, no distress and cooperative  RESP: lungs clear to auscultation - no rales, rhonchi or wheezes  CV: regular rates and rhythm, normal S1 S2, no S3 or S4 and no murmur, click or rub  ABDOMEN: soft, nontender, without hepatosplenomegaly or masses, bowel sounds normal and noted pendulus abdomen, no rash under skin folds currently  MS: hands have joint deformities.  SKIN: toes slight blue, good pulses to feet, warm  NEURO: Normal strength and tone, mentation intact and speech normal  PSYCH: mentation appears normal and affect normal/bright

## 2022-12-16 NOTE — PATIENT INSTRUCTIONS
I refilled your weight loss medication.    Please see ortho regarding your right shoulder.    Please start amlodipine 2.5 mg once a day for raynauds.    Please see plastic surgery regarding your abdominal weight    I refilled your blood pressure medication.    Please go to lab.      Thank you for choosing [facility].  You may be receiving an email and/or telephone survey request from [facility] Customer Experience regarding your visit today.  Please take a few minutes to respond to the survey to let us know how we are doing.      If you have questions or concerns, please contact us via [employer] or you can contact your care team at [phone] option 2.    Our Clinic hours are:  Monday - Thursday 7am-6pm  Friday 7am-5pm    The [place] outpatient lab hours are:  Monday - Friday 7am-4:30pm    Appointments are required, call [phone]    If you have clinical questions after hours or would like to schedule an appointment,  call the clinic at [phone].

## 2022-12-16 NOTE — NURSING NOTE
"Initial /72   Pulse 98   Temp 97  F (36.1  C) (Tympanic)   Wt 86.6 kg (191 lb)   SpO2 98%   BMI 33.30 kg/m   Estimated body mass index is 33.3 kg/m  as calculated from the following:    Height as of 4/8/22: 1.613 m (5' 3.5\").    Weight as of this encounter: 86.6 kg (191 lb). .      "

## 2022-12-27 NOTE — PROGRESS NOTES
ASSESSMENT & PLAN    Thomas was seen today for pain.    Diagnoses and all orders for this visit:    AC separation, right, sequela  -     Physical Therapy Referral; Future    Chronic right shoulder pain  -     Orthopedic  Referral  -     XR Shoulder Right G/E 3 Views; Future  -     Physical Therapy Referral; Future      This issue is acute on chronic and Unchanged.    Low suspicion for rotator cuff tear given current history and exam.  Recommended rest from irritating activities coupled with physical therapy.  Would consider further imaging or treatment pending clinical course.  - Likely some mechanical issues from prior AC separation    Plan:  - Today's Plan of Care:  Rehab: Physical Therapy: [facility] - [phone]  Discussed activity considerations and other supportive care including Ice/Heat, OTC and other topical medications as needed.    -We also discussed other future treatment options:  MRI if no improving    Follow Up: 6 - 8 weeks and as needed    Concerning signs and symptoms were reviewed.  The patient expressed understanding of this management plan and all questions were answered at this time.    Elmira Weber MD [facility]  Sports Medicine Physician  [facility]      -----  Chief Complaint   Patient presents with     Right Shoulder - Pain       SUBJECTIVE  Thomas Davila is a/an 72 year old female who is seen in consultation at the request of  Dung Prieto M.D. for evaluation of chronic right shoulder pain.     The patient is seen by themselves.  The patient is Right handed    Onset: 2 years(s) ago. Reports insidious onset without acute precipitating event. 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     REVIEW OF SYSTEMS:  Review of Systems  Skin: no bruising, no swelling  Musculoskeletal: as above  Neurologic: no numbness, paresthesias  Remainder of review of systems is negative including constitutional, CV, pulmonary, GI, except as noted in HPI or medical history.    OBJECTIVE:  BP (!) 149/88   Pulse 92   Ht 1.6 m (5' 3\")   Wt 86.6 kg (191 lb)   BMI 33.83 kg/m     General: healthy, alert and in no distress  HEENT: no scleral icterus or conjunctival erythema  Skin: no suspicious lesions or rash. No jaundice.  CV: distal perfusion intact  Resp: normal respiratory effort without conversational dyspnea   Psych: normal mood and affect  Gait: NORMAL  Neuro: Normal light sensory exam of upper extremity    Bilateral Shoulder exam    Inspection and Posture:       rounded shoulders and upper back       Right AC defomity    Skin:        no visible deformities    Tender:        Mild AC joint    Non Tender:       remainder of shoulder bilateral    ROM:        Full active and passive ROM with flexion, extension, abduction, internal and external rotation bilateral       asymmetric scapular motion    Painful motions:       end range flexion and elevation right    Strength:        abduction 5/5 bilateral       flexion 5/5 bilateral       internal rotation 5/5 bilateral       external rotation 5/5 bilateral    Impingement testing:       neg (-) Neer right       neg (-) Vuong right       positive (+) crossover right    Sensation:        normal sensation over shoulder and upper extremity          RADIOLOGY:  I independently ordered, visualized and reviewed these images with the patient  3 XR views of right shoulder reviewed: no acute bony " abnormality, no significant degenerative change, sequelae of prior AC separation  - will follow official read        Review of the result(s) of each unique test - XR

## 2022-12-28 ENCOUNTER — OFFICE VISIT (OUTPATIENT)
Dept: ORTHOPEDICS | Facility: CLINIC | Age: 72
End: 2022-12-28
Payer: MEDICARE

## 2022-12-28 ENCOUNTER — ANTICOAGULATION THERAPY VISIT (OUTPATIENT)
Dept: ANTICOAGULATION | Facility: CLINIC | Age: 72
End: 2022-12-28

## 2022-12-28 ENCOUNTER — LAB (OUTPATIENT)
Dept: LAB | Facility: CLINIC | Age: 72
End: 2022-12-28
Payer: MEDICARE

## 2022-12-28 ENCOUNTER — ANCILLARY PROCEDURE (OUTPATIENT)
Dept: GENERAL RADIOLOGY | Facility: CLINIC | Age: 72
End: 2022-12-28
Attending: PEDIATRICS
Payer: MEDICARE

## 2022-12-28 VITALS
HEIGHT: 63 IN | SYSTOLIC BLOOD PRESSURE: 149 MMHG | BODY MASS INDEX: 33.84 KG/M2 | WEIGHT: 191 LBS | HEART RATE: 92 BPM | DIASTOLIC BLOOD PRESSURE: 88 MMHG

## 2022-12-28 DIAGNOSIS — G89.29 CHRONIC RIGHT SHOULDER PAIN: ICD-10-CM

## 2022-12-28 DIAGNOSIS — M25.511 CHRONIC RIGHT SHOULDER PAIN: ICD-10-CM

## 2022-12-28 DIAGNOSIS — Z79.01 LONG TERM CURRENT USE OF ANTICOAGULANT THERAPY: ICD-10-CM

## 2022-12-28 DIAGNOSIS — Z83.2 FAMILY HISTORY OF FACTOR V DEFICIENCY: ICD-10-CM

## 2022-12-28 DIAGNOSIS — I80.01 THROMBOPHLEBITIS OF SUPERFICIAL VEINS OF RIGHT LOWER EXTREMITY: Primary | ICD-10-CM

## 2022-12-28 DIAGNOSIS — I80.01 THROMBOPHLEBITIS OF SUPERFICIAL VEINS OF RIGHT LOWER EXTREMITY: ICD-10-CM

## 2022-12-28 DIAGNOSIS — Z79.899 ENCOUNTER FOR LONG-TERM CURRENT USE OF MEDICATION: ICD-10-CM

## 2022-12-28 DIAGNOSIS — S43.101S AC SEPARATION, RIGHT, SEQUELA: Primary | ICD-10-CM

## 2022-12-28 LAB — INR BLD: 2.4 (ref 0.9–1.1)

## 2022-12-28 PROCEDURE — 85610 PROTHROMBIN TIME: CPT

## 2022-12-28 PROCEDURE — 99203 OFFICE O/P NEW LOW 30 MIN: CPT | Performed by: PEDIATRICS

## 2022-12-28 PROCEDURE — 73030 X-RAY EXAM OF SHOULDER: CPT | Mod: TC | Performed by: RADIOLOGY

## 2022-12-28 PROCEDURE — 36416 COLLJ CAPILLARY BLOOD SPEC: CPT

## 2022-12-28 ASSESSMENT — PAIN SCALES - GENERAL: PAINLEVEL: MILD PAIN (2)

## 2022-12-28 NOTE — PATIENT INSTRUCTIONS
Low suspicion for rotator cuff tear given current history and exam.  Recommended rest from irritating activities coupled with physical therapy.  Would consider further imaging or treatment pending clinical course.  - Likely some mechanical issues from prior AC separation    Plan:  - Today's Plan of Care:  Rehab: Physical Therapy: [facility] - [phone]  Discussed activity considerations and other supportive care including Ice/Heat, OTC and other topical medications as needed.    -We also discussed other future treatment options:  MRI if no improving    Follow Up: 6 - 8 weeks and as needed    If you have any further questions for your physician or physician s care team you can call [phone] and use option 3 to leave a voice message.

## 2022-12-28 NOTE — PROGRESS NOTES
ANTICOAGULATION MANAGEMENT     Thomas Davila 72 year old female is on warfarin with therapeutic INR result. (Goal INR 2.0-3.0)    Recent labs: (last 7 days)     12/28/22  1229   INR 2.4*       ASSESSMENT       Source(s): Chart Review       Warfarin doses taken: Reviewed in chart    Diet: No new diet changes identified    New illness, injury, or hospitalization: No    Medication/supplement changes: Amlodipine started on 12/16 No interaction anticipated    Phentermine  on hold until needed.    Signs or symptoms of bleeding or clotting: No    Previous INR: Therapeutic last visit; previously outside of goal range    Additional findings: None       PLAN     Recommended plan for no diet, medication or health factor changes affecting INR     Dosing Instructions: Continue your current warfarin dose with next INR in 4 weeks       Summary  As of 12/28/2022    Full warfarin instructions:  2.5 mg every Mon, Wed, Fri; 5 mg all other days   Next INR check:  1/25/2023             Detailed voice message left for Akhil with dosing instructions and follow up date.     Contact [phone] to schedule and with any changes, questions or concerns.     Education provided:     Please call back if any changes to your diet, medications or how you've been taking warfarin    Plan made per [facility] anticoagulation protocol    Lata Lockwood RN  Anticoagulation Clinic  12/28/2022    _______________________________________________________________________     Anticoagulation Episode Summary     Current INR goal:  2.0-3.0   TTR:  67.3 % (1 y)   Target end date:  Indefinite   Send INR reminders to:  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 okay to leave DVM per signed  consent         Anticoagulation Care Providers     Provider Role Specialty Phone number    Lizabeth Zavala MD Referring Family Medicine [phone]    Dung Prieto MD Referring Family Medicine [phone]

## 2022-12-28 NOTE — LETTER
12/28/2022         RE: Thomas Davila  [id number] [address] [address]        Dear Colleague,    Thank you for referring your patient, Thomas Davila, to the [facility] [place]. Please see a copy of my visit note below.    ASSESSMENT & PLAN    Thomas was seen today for pain.    Diagnoses and all orders for this visit:    AC separation, right, sequela  -     Physical Therapy Referral; Future    Chronic right shoulder pain  -     Orthopedic  Referral  -     XR Shoulder Right G/E 3 Views; Future  -     Physical Therapy Referral; Future      This issue is acute on chronic and Unchanged.    Low suspicion for rotator cuff tear given current history and exam.  Recommended rest from irritating activities coupled with physical therapy.  Would consider further imaging or treatment pending clinical course.  - Likely some mechanical issues from prior AC separation    Plan:  - Today's Plan of Care:  Rehab: Physical Therapy: [facility] - [phone]  Discussed activity considerations and other supportive care including Ice/Heat, OTC and other topical medications as needed.    -We also discussed other future treatment options:  MRI if no improving    Follow Up: 6 - 8 weeks and as needed    Concerning signs and symptoms were reviewed.  The patient expressed understanding of this management plan and all questions were answered at this time.    Elmira Weber MD [facility]  Sports Medicine Physician  [facility]      -----  Chief Complaint   Patient presents with     Right Shoulder - Pain       SUBJECTIVE  Thomas Davila is a/an 72 year old female who is seen in consultation at the request of  Dung Prieto M.D. for evaluation of chronic right shoulder pain.     The patient is seen by themselves.  The patient is Right handed    Onset: 2 years(s) ago. Reports insidious onset without acute precipitating event. 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     REVIEW OF SYSTEMS:  Review of Systems  Skin: no bruising, no swelling  Musculoskeletal: as above  Neurologic: no numbness, paresthesias  Remainder of review of systems is negative including constitutional, CV, pulmonary, GI, except as noted in HPI or medical history.    OBJECTIVE:  BP (!) 149/88   Pulse 92   Ht 1.6 m (5' 3\")   Wt 86.6 kg (191 lb)   BMI 33.83 kg/m     General: healthy, alert and in no distress  HEENT: no scleral icterus or conjunctival erythema  Skin: no suspicious lesions or rash. No jaundice.  CV: distal perfusion intact  Resp: normal respiratory effort without conversational dyspnea   Psych: normal mood and affect  Gait: NORMAL  Neuro: Normal light sensory exam of upper extremity    Bilateral Shoulder exam    Inspection and Posture:       rounded shoulders and upper back       Right AC defomity    Skin:        no visible deformities    Tender:        Mild AC joint    Non Tender:       remainder of shoulder bilateral    ROM:        Full active and passive ROM with flexion, extension, abduction, internal and external rotation bilateral       asymmetric scapular motion    Painful motions:       end range flexion and elevation right    Strength:        abduction 5/5 bilateral       flexion 5/5 bilateral       internal rotation 5/5 bilateral       external rotation 5/5 bilateral    Impingement testing:       neg (-) Neer right       neg (-) Vuong right   "     positive (+) crossover right    Sensation:        normal sensation over shoulder and upper extremity          RADIOLOGY:  I independently ordered, visualized and reviewed these images with the patient  3 XR views of right shoulder reviewed: no acute bony abnormality, no significant degenerative change, sequelae of prior AC separation  - will follow official read        Review of the result(s) of each unique test - XR               Again, thank you for allowing me to participate in the care of your patient.        Sincerely,        Elmira Weber MD

## 2022-12-29 DIAGNOSIS — Z83.2 FAMILY HISTORY OF FACTOR V DEFICIENCY: ICD-10-CM

## 2022-12-29 DIAGNOSIS — Z79.01 LONG TERM CURRENT USE OF ANTICOAGULANT THERAPY: ICD-10-CM

## 2022-12-29 DIAGNOSIS — I80.01 THROMBOPHLEBITIS OF SUPERFICIAL VEINS OF RIGHT LOWER EXTREMITY: ICD-10-CM

## 2022-12-29 DIAGNOSIS — Z79.899 ENCOUNTER FOR LONG-TERM CURRENT USE OF MEDICATION: ICD-10-CM

## 2023-01-01 DIAGNOSIS — K21.9 GASTROESOPHAGEAL REFLUX DISEASE WITHOUT ESOPHAGITIS: ICD-10-CM

## 2023-01-02 DIAGNOSIS — Z79.01 LONG TERM CURRENT USE OF ANTICOAGULANT THERAPY: ICD-10-CM

## 2023-01-02 DIAGNOSIS — Z83.2 FAMILY HISTORY OF FACTOR V DEFICIENCY: ICD-10-CM

## 2023-01-02 DIAGNOSIS — I80.01 THROMBOPHLEBITIS OF SUPERFICIAL VEINS OF RIGHT LOWER EXTREMITY: ICD-10-CM

## 2023-01-02 DIAGNOSIS — Z79.899 ENCOUNTER FOR LONG-TERM CURRENT USE OF MEDICATION: ICD-10-CM

## 2023-01-02 RX ORDER — WARFARIN SODIUM 5 MG/1
TABLET ORAL
Qty: 66 TABLET | Refills: 0 | OUTPATIENT
Start: 2023-01-02

## 2023-01-02 RX ORDER — WARFARIN SODIUM 5 MG/1
TABLET ORAL
Qty: 180 TABLET | Refills: 1 | Status: SHIPPED | OUTPATIENT
Start: 2023-01-02 | End: 2023-05-31

## 2023-01-02 NOTE — TELEPHONE ENCOUNTER
Duplicate request. Refill(s) on file from 1/2/23. Request denied. [facility] pharmacy.     warfarin ANTICOAGULANT (JANTOVEN ANTICOAGULANT) 5 MG tablet 180 tablet 1 1/2/2023  No   Sig: Take 2.5 mg every Mon, Wed, Fri; 5 mg all other days or as directed by the INR clinic.   Sent to pharmacy as: Warfarin Sodium 5 MG Oral Tablet (JANTOVEN ANTICOAGULANT)   Class: E-Prescribe   Order: [id number]   E-Prescribing Status: Receipt confirmed by pharmacy (1/2/2023  8:51 AM CST)       Yas Claros RN on 1/2/2023 at 9:19 AM

## 2023-01-02 NOTE — TELEPHONE ENCOUNTER
"Prescription approved per [facility] Refill Protocol.    Pending Prescriptions:                       Disp   Refills    omeprazole (PRILOSEC) 20 MG DR capsule [P*90 cap*3            Sig: TAKE 1 CAPSULE BY MOUTH ONCE DAILY      Requested Prescriptions   Pending Prescriptions Disp Refills     omeprazole (PRILOSEC) 20 MG DR capsule [Pharmacy Med Name: OMEPRAZOLE 20MG DR CAPSULE] 90 capsule 3     Sig: TAKE 1 CAPSULE BY MOUTH ONCE DAILY       PPI Protocol Passed - 1/1/2023  5:00 AM        Passed - Not on Clopidogrel (unless Pantoprazole ordered)        Passed - No diagnosis of osteoporosis on record        Passed - Recent (12 mo) or future (30 days) visit within the authorizing provider's specialty     Patient has had an office visit with the authorizing provider or a provider within the authorizing providers department within the previous 12 mos or has a future within next 30 days. See \"Patient Info\" tab in inbasket, or \"Choose Columns\" in Meds & Orders section of the refill encounter.              Passed - Medication is active on med list        Passed - Patient is age 18 or older        Passed - No active pregnacy on record        Passed - No positive pregnancy test in past 12 months             "

## 2023-01-02 NOTE — TELEPHONE ENCOUNTER
Prescription approved per [facility] Refill Protocol.    Pending Prescriptions:                       Disp   Refills    warfarin ANTICOAGULANT (JANTOVEN ANTICOAG*180 ta*1            Sig: Take 2.5 mg every Mon, Wed, Fri; 5 mg all other           days or as directed by the INR clinic.      Requested Prescriptions   Pending Prescriptions Disp Refills     warfarin ANTICOAGULANT (JANTOVEN ANTICOAGULANT) 5 MG tablet 180 tablet 1     Sig: Take 2.5 mg every Mon, Wed, Fri; 5 mg all other days or as directed by the INR clinic.       There is no refill protocol information for this order        Yas Claros RN on 1/2/2023 at 8:48 AM

## 2023-01-02 NOTE — TELEPHONE ENCOUNTER
"Prescription approved per [facility] Refill Protocol.    Pending Prescriptions:                       Disp   Refills    warfarin ANTICOAGULANT (JANTOVEN ANTICOAG*180 ta*1            Sig: Take 2.5 mg every Mon, Wed, Fri; 5 mg all other           days or as directed by the INR clinic.    Requested Prescriptions   Pending Prescriptions Disp Refills     warfarin ANTICOAGULANT (JANTOVEN ANTICOAGULANT) 5 MG tablet 180 tablet 1     Sig: Take 2.5 mg every Mon, Wed, Fri; 5 mg all other days or as directed by the INR clinic.       Vitamin K Antagonists Failed - 1/2/2023  8:48 AM        Failed - INR is within goal in the past 6 weeks     Confirm INR is within goal in the past 6 weeks.     Recent Labs   Lab Test 12/28/22  1229   INR 2.4*                       Passed - Recent (12 mo) or future (30 days) visit within the authorizing provider's specialty     Patient has had an office visit with the authorizing provider or a provider within the authorizing providers department within the previous 12 mos or has a future within next 30 days. See \"Patient Info\" tab in inbasket, or \"Choose Columns\" in Meds & Orders section of the refill encounter.              Passed - Medication is active on med list        Passed - Patient is 18 years of age or older        Passed - Patient is not pregnant        Passed - No positive pregnancy on file in past 12 months           Yas Claros RN on 1/2/2023 at 8:50 AM    "

## 2023-01-05 ENCOUNTER — HOSPITAL ENCOUNTER (OUTPATIENT)
Dept: PHYSICAL THERAPY | Facility: CLINIC | Age: 73
Setting detail: THERAPIES SERIES
Discharge: HOME | End: 2023-01-05
Attending: PEDIATRICS
Payer: MEDICARE

## 2023-01-05 DIAGNOSIS — M25.511 CHRONIC RIGHT SHOULDER PAIN: ICD-10-CM

## 2023-01-05 DIAGNOSIS — G89.29 CHRONIC RIGHT SHOULDER PAIN: ICD-10-CM

## 2023-01-05 DIAGNOSIS — S43.101S AC SEPARATION, RIGHT, SEQUELA: ICD-10-CM

## 2023-01-05 PROCEDURE — 97161 PT EVAL LOW COMPLEX 20 MIN: CPT | Mod: GP | Performed by: PHYSICAL MEDICINE & REHABILITATION

## 2023-01-05 PROCEDURE — 97110 THERAPEUTIC EXERCISES: CPT | Mod: GP | Performed by: PHYSICAL MEDICINE & REHABILITATION

## 2023-01-05 NOTE — PROGRESS NOTES
01/05/23 0900   General Information   Type of Visit Initial OP Ortho PT Evaluation   Start of Care Date 01/05/23   Referring Physician Dr. Elmira Weber   Patient/Family Goals Statement to be able to not have to shrug shoulder into place for pain relief   Orders Evaluate and Treat   Date of Order 12/28/22   Certification Required? Yes   Medical Diagnosis AC separation, right, sequela (S43.101S)  - Primary; Chronic right shoulder pain (M25.511, G89.29)   Surgical/Medical history reviewed Yes   Precautions/Limitations no known precautions/limitations   Body Part(s)   Body Part(s) Shoulder   Presentation and Etiology   Pertinent history of current problem (include personal factors and/or comorbidities that impact the POC) Patient reports original injury wwas from prior injury patient 30 years ago where she had an AC separation. This summer she has noticed an increase of pain with reaching and pulling movements. Provokes pain with shoveling but continues to do so anyways.   Impairments A. Pain   Functional Limitations perform activities of daily living;perform desired leisure / sports activities   Symptom Location Right shoulder   How/Where did it occur With a fall   Onset date of current episode/exacerbation 01/05/93   Chronicity Chronic   Pain rating (0-10 point scale) Best (/10);Worst (/10)   Best (/10) 0   Worst (/10) 8   Pain quality B. Dull;C. Aching   Frequency of pain/symptoms C. With activity   Pain/symptoms are: The same all the time   Pain/symptoms exacerbated by C. Lifting;D. Carrying;G. Certain positions;L. Work tasks   Pain/symptoms eased by C. Rest;E. Changing positions   Progression of symptoms since onset: Unchanged   Current / Previous Interventions   Diagnostic Tests: X-ray   X-ray Results Results   X-ray results 1. Grade 3 acromioclavicular separation, with widening and  dislocation of the acromioclavicular joint, and widening of the  coracoclavicular space (1.8 cm). This is favored to be  chronic, with  corticated ossifications surrounding soft tissues. 2.  Normal glenohumeral joint alignment. 3.  Right shoulder negative for fracture. Mild degenerative arthrosis  at the glenohumeral joint.   Current Level of Function   Patient role/employment history F. Retired   Fall Risk Screen   Fall screen completed by PT   Have you fallen 2 or more times in the past year? No   Have you fallen and had an injury in the past year? No   Is patient a fall risk? No   Abuse Screen (yes response referral indicated)   Feels Unsafe at Home or Work/School no   Feels Threatened by Someone no   Does Anyone Try to Keep You From Having Contact with Others or Doing Things Outside Your Home? no   Physical Signs of Abuse Present no   Shoulder Objective Findings   Side (if bilateral, select both right and left) Right   Cervical Screen (ROM, quadrant) negative   Thoracic Mobility Screen 30% limited thoracic extension   Shoulder ROM Comment functional, but mildly impaired compared to contralateral UE   Scapulothoracic Rhythm impaired with UT sub pattern   Pec Minor (supine) Flexibility 9 cm off table R   Sulcus Test positive   Crossover Test positive   Shoulder Special Tests Comments s/s consistent with chronic shoulder instability related to AC joint separation   Palpation min TTP at ACJ   Accessory Motion/Joint Mobility excessive inferior GHJ glide   Observation no signs of acute distress   Posture forward shoulders   Right Shoulder Flexion AROM 155*   Right Shoulder Abduction AROM 135*   Right Shoulder ER AROM T3 reach   Right Shoulder IR AROM L3 reach, stiff   Right Shoulder Flexion Strength 4-/5   Right Shoulder Abduction Strength 3+/5   Right Shoulder ER Strength 3+/5   Right Shoulder IR Strength 4/5   Right Shoulder Extension Strength 4/5   Planned Therapy Interventions   Planned Therapy Interventions stretching;strengthening;ROM;manual therapy;joint mobilization;neuromuscular re-education   Planned Modality Interventions    Planned Modality Interventions Biofeedback   Clinical Impression   Criteria for Skilled Therapeutic Interventions Met yes, treatment indicated   PT Diagnosis right shoulder pain and weakness   Influenced by the following impairments pain, weakness   Functional limitations due to impairments reaching, pulling, pushing   Clinical Presentation Stable/Uncomplicated   Clinical Presentation Rationale clinical judgement   Clinical Decision Making (Complexity) Low complexity   Therapy Frequency 1 time/week   Predicted Duration of Therapy Intervention (days/wks) 8 weeks   Risk & Benefits of therapy have been explained Yes   Patient, Family & other staff in agreement with plan of care Yes   Clinical Impression Comments Chronic ACJ separation will benefit from skilled PT to address instabilty in joint through therex, NMR, and manual techniques.   Education Assessment   Preferred Learning Style Listening;Reading;Demonstration;Pictures/video   Barriers to Learning No barriers   ORTHO GOALS   PT Ortho Eval Goals 1;2;3;4   Ortho Goal 1   Goal Identifier STG   Goal Description Patient to be able to lay on R arm with <3/10 pain.   Target Date 02/02/23   Ortho Goal 2   Goal Identifier STG   Goal Description Patient to report getting something out of back pocket with <2/10 pain.   Target Date 02/02/23   Ortho Goal 3   Goal Identifier LTG   Goal Description Patient to be able to lift heavy object of 10 pounds with <2/10 pain.   Target Date 03/02/23   Ortho Goal 4   Goal Identifier LTG   Goal Description Patient to report less pain and less clicking associated with active functional shoulder movements demonstrating improved joint stability.   Target Date 03/02/23   Total Evaluation Time   PT Sanjay Low Complexity Minutes (80080) 15   Therapy Certification   Certification date from 01/05/23   Certification date to 03/16/23   Medical Diagnosis AC separation, right, sequela (S43.101S)  - Primary; Chronic right shoulder pain (M25.511,  G89.29)

## 2023-01-05 NOTE — PROGRESS NOTES
[facility]    OUTPATIENT PHYSICAL THERAPY ORTHOPEDIC EVALUATION  PLAN OF TREATMENT FOR OUTPATIENT REHABILITATION  (COMPLETE FOR INITIAL CLAIMS ONLY)  Patient's Last Name, First Name, M.I.  YOB: 1950  Thomas Davila    Provider s Name:  [facility]   Medical Record No.  [id number]   Start of Care Date:  01/05/23   Onset Date:  01/05/93   Type:     _X__PT   ___OT   ___SLP Medical Diagnosis:  AC separation, right, sequela (S43.101S)  - Primary; Chronic right shoulder pain (M25.511, G89.29)     PT Diagnosis:  right shoulder pain and weakness   Visits from SOC:  1      _________________________________________________________________________________  Plan of Treatment/Functional Goals:  stretching, strengthening, ROM, manual therapy, joint mobilization, neuromuscular re-education     Biofeedback     Goals  Goal Identifier: STG  Goal Description: Patient to be able to lay on R arm with <3/10 pain.  Target Date: 02/02/23    Goal Identifier: STG  Goal Description: Patient to report getting something out of back pocket with <2/10 pain.  Target Date: 02/02/23    Goal Identifier: LTG  Goal Description: Patient to be able to lift heavy object of 10 pounds with <2/10 pain.  Target Date: 03/02/23    Goal Identifier: LTG  Goal Description: Patient to report less pain and less clicking associated with active functional shoulder movements demonstrating improved joint stability.  Target Date: 03/02/23      Therapy Frequency:  1 time/week  Predicted Duration of Therapy Intervention:  8 weeks    Bob Colin, PT                 I CERTIFY THE NEED FOR THESE SERVICES FURNISHED UNDER        THIS PLAN OF TREATMENT AND WHILE UNDER MY CARE     (Physician co-signature of this document indicates review and certification of the therapy plan).                       Certification Date  From:  01/05/23   Certification Date To:  03/16/23    Referring Provider:  Dr. Elmira Weber    Initial Assessment        See Epic Evaluation Start of Care Date: 01/05/23

## 2023-01-12 ENCOUNTER — HOSPITAL ENCOUNTER (OUTPATIENT)
Dept: PHYSICAL THERAPY | Facility: CLINIC | Age: 73
Setting detail: THERAPIES SERIES
Discharge: HOME | End: 2023-01-12
Attending: PEDIATRICS
Payer: MEDICARE

## 2023-01-12 PROCEDURE — 97110 THERAPEUTIC EXERCISES: CPT | Mod: GP | Performed by: PHYSICAL MEDICINE & REHABILITATION

## 2023-01-18 ENCOUNTER — E-VISIT (OUTPATIENT)
Dept: FAMILY MEDICINE | Facility: CLINIC | Age: 73
End: 2023-01-18
Payer: OTHER GOVERNMENT

## 2023-01-18 DIAGNOSIS — L98.9 SKIN LESION: Primary | ICD-10-CM

## 2023-01-18 PROCEDURE — 99421 OL DIG E/M SVC 5-10 MIN: CPT | Performed by: NURSE PRACTITIONER

## 2023-01-19 ENCOUNTER — HOSPITAL ENCOUNTER (OUTPATIENT)
Dept: PHYSICAL THERAPY | Facility: CLINIC | Age: 73
Setting detail: THERAPIES SERIES
Discharge: HOME | End: 2023-01-19
Attending: PEDIATRICS
Payer: MEDICARE

## 2023-01-19 PROCEDURE — 97110 THERAPEUTIC EXERCISES: CPT | Mod: GP | Performed by: PHYSICAL MEDICINE & REHABILITATION

## 2023-01-19 NOTE — PATIENT INSTRUCTIONS
Dear Thomas Davila    After looking at your picture this does not appear infected.  It is hard to see specifically but may be a hematoma under the skin from the the area being pinched possibly.  I do not feel that you need antibiotic or topical treatments for this unless it is open or bleeding and then would recommend topical neosporin.  Watch for warmth, redness extending out, increasing pain/tenderness and/or drainage coming from the area if this occurs then follow-up in clinic for evaluation.  Otherwise I think this will heal up on its own.      Thanks for choosing us as your health care partner,    Solange Marrero, NP

## 2023-01-25 ENCOUNTER — LAB (OUTPATIENT)
Dept: LAB | Facility: CLINIC | Age: 73
End: 2023-01-25
Payer: MEDICARE

## 2023-01-25 ENCOUNTER — ANTICOAGULATION THERAPY VISIT (OUTPATIENT)
Dept: ANTICOAGULATION | Facility: CLINIC | Age: 73
End: 2023-01-25

## 2023-01-25 DIAGNOSIS — I80.01 THROMBOPHLEBITIS OF SUPERFICIAL VEINS OF RIGHT LOWER EXTREMITY: ICD-10-CM

## 2023-01-25 DIAGNOSIS — Z79.899 ENCOUNTER FOR LONG-TERM CURRENT USE OF MEDICATION: ICD-10-CM

## 2023-01-25 DIAGNOSIS — I80.01 THROMBOPHLEBITIS OF SUPERFICIAL VEINS OF RIGHT LOWER EXTREMITY: Primary | ICD-10-CM

## 2023-01-25 DIAGNOSIS — Z83.2 FAMILY HISTORY OF FACTOR V DEFICIENCY: ICD-10-CM

## 2023-01-25 DIAGNOSIS — Z79.01 LONG TERM CURRENT USE OF ANTICOAGULANT THERAPY: ICD-10-CM

## 2023-01-25 LAB — INR BLD: 2.4 (ref 0.9–1.1)

## 2023-01-25 PROCEDURE — 85610 PROTHROMBIN TIME: CPT

## 2023-01-25 PROCEDURE — 36416 COLLJ CAPILLARY BLOOD SPEC: CPT

## 2023-01-25 NOTE — PROGRESS NOTES
ANTICOAGULATION MANAGEMENT     Thomas Davila 72 year old female is on warfarin with therapeutic INR result. (Goal INR 2.0-3.0)    Recent labs: (last 7 days)     01/25/23  1232   INR 2.4*       ASSESSMENT       Source(s): Chart Review and Patient/Caregiver Call       Warfarin doses taken: Warfarin taken as instructed    Diet: No new diet changes identified    New illness, injury, or hospitalization: No    Medication/supplement changes: None noted    Signs or symptoms of bleeding or clotting: cat scratch on stomach - no further concerns    Previous INR: Therapeutic last 2(+) visits    Additional findings: None      PLAN     Recommended plan for no diet, medication or health factor changes affecting INR     Dosing Instructions: Continue your current warfarin dose with next INR in 5 weeks       Summary  As of 1/25/2023    Full warfarin instructions:  2.5 mg every Mon, Wed, Fri; 5 mg all other days   Next INR check:  3/1/2023             Telephone call with Akhil who verbalizes understanding and agrees to plan    Lab visit scheduled    Education provided:     Please call back if any changes to your diet, medications or how you've been taking warfarin    Plan made per [facility] anticoagulation protocol    Harper Kline RN  Anticoagulation Clinic  1/25/2023    _______________________________________________________________________     Anticoagulation Episode Summary     Current INR goal:  2.0-3.0   TTR:  69.9 % (1 y)   Target end date:  Indefinite   Send INR reminders to:  [facility]    Indications    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 okay to leave DVM per signed consent         Anticoagulation Care Providers     Provider Role Specialty Phone number    Lizabeth Zavala  MD Evelin Referring Family Medicine [phone]    Dung Prieto MD Referring Family Medicine [phone]

## 2023-01-26 ENCOUNTER — HOSPITAL ENCOUNTER (OUTPATIENT)
Dept: PHYSICAL THERAPY | Facility: CLINIC | Age: 73
Setting detail: THERAPIES SERIES
Discharge: HOME | End: 2023-01-26
Attending: PEDIATRICS
Payer: MEDICARE

## 2023-01-26 PROCEDURE — 97110 THERAPEUTIC EXERCISES: CPT | Mod: GP | Performed by: PHYSICAL MEDICINE & REHABILITATION

## 2023-01-27 ENCOUNTER — TELEPHONE (OUTPATIENT)
Dept: FAMILY MEDICINE | Facility: CLINIC | Age: 73
End: 2023-01-27
Payer: MEDICARE

## 2023-01-27 NOTE — TELEPHONE ENCOUNTER
Symptoms    Describe your symptoms: new medication (AMLODIPINE)  for her feet. It helped, but her calf's started tingling. She stopped taking it. She could not sleep because of this. She stopped taking this med and her feet are not cold anymore.   Should she continue this med? Is it safe to stop this med?   Preferred Pharmacy:   [id number] - [address] [address]  Phone: [phone] Fax: [phone]    Could we send this information to you in [employer] or would you prefer to receive a phone call?:   Patient would prefer a phone call   Okay to leave a detailed message?: Yes at Home number on file [phone] (home)

## 2023-01-31 NOTE — TELEPHONE ENCOUNTER
"See note below. Pt says that she before starting amlodipine, her feet were ice cold. Says that she noticed after starting amlodipine, her feet started warming up, but she started having tingling sensations in her calves. Said that it was so intense at times, that she couldn't sleep. Pt says that she also started taking phentermine, but saw on [facility] that she wasn't supposed to start that medication until March. She says that she stopped taking the amlodipine about a week ago, and stopped the phentermine a couple of days ago, and says that the tingling has improved significantly. Denies any swelling or discoloration to lower extremities; says that they always swell up when she eats too much salt, and have \"always been kind of a funny color\". Says that her feet feel warm. Pt says she hasn't been checking her BP, but has felt fine. Tried to check her BP while on the phone with writer, but her cuff wasn't working properly. Routing to PCP for review and recommendation. Last seen by PCP on 12/16/22, and amlodipine was started at that visit for Raynaud's disease.     Kassie Balderrama RN  [facility]  "

## 2023-01-31 NOTE — TELEPHONE ENCOUNTER
Pt returned call to Leticia Tate RN, reported resting BP of 127/96 and P- 81.    Kassie Balderrama RN  [facility]

## 2023-02-01 NOTE — TELEPHONE ENCOUNTER
I recommend to restart the amlodipine only.  Recheck with a nurse blood pressure check in 2 weeks.  I would like to see how she responds with the amlodipine alone.  Sincerely,  Dung Prieto MD

## 2023-02-01 NOTE — TELEPHONE ENCOUNTER
Patient contacted told of starting the amlodipine only and parish in 2 weeks with RN BP CK.  appt made for [facility] as per patients request. Mary TRIMBLE RN

## 2023-02-09 ENCOUNTER — HOSPITAL ENCOUNTER (OUTPATIENT)
Dept: PHYSICAL THERAPY | Facility: CLINIC | Age: 73
Setting detail: THERAPIES SERIES
Discharge: HOME | End: 2023-02-09
Attending: PEDIATRICS
Payer: MEDICARE

## 2023-02-09 PROCEDURE — 97110 THERAPEUTIC EXERCISES: CPT | Mod: GP | Performed by: PHYSICAL MEDICINE & REHABILITATION

## 2023-02-10 ENCOUNTER — TELEPHONE (OUTPATIENT)
Dept: FAMILY MEDICINE | Facility: CLINIC | Age: 73
End: 2023-02-10
Payer: MEDICARE

## 2023-02-10 NOTE — TELEPHONE ENCOUNTER
"  FYI - Status Update    Who is Calling: patient    Update:  Patient recently started on new BP medication and reporting that lately it has been fairly high ~ 194 / 90 and legs \"look like sausages\" and swollen.    Patient also had RN BP appt set for 2/16/23 in [facility], but will have it taken while in PT that same day.     Does caller want a call/response back: Yes     Could we send this information to you in [facility] or would you prefer to receive a phone call?:   Patient would prefer a phone call   Okay to leave a detailed message?: Yes at Home number on file [phone] (home)        "

## 2023-02-13 ENCOUNTER — ALLIED HEALTH/NURSE VISIT (OUTPATIENT)
Dept: FAMILY MEDICINE | Facility: CLINIC | Age: 73
End: 2023-02-13
Payer: OTHER GOVERNMENT

## 2023-02-13 ENCOUNTER — TELEPHONE (OUTPATIENT)
Dept: FAMILY MEDICINE | Facility: CLINIC | Age: 73
End: 2023-02-13

## 2023-02-13 VITALS — DIASTOLIC BLOOD PRESSURE: 92 MMHG | HEART RATE: 84 BPM | SYSTOLIC BLOOD PRESSURE: 170 MMHG

## 2023-02-13 DIAGNOSIS — I10 ESSENTIAL HYPERTENSION: Primary | ICD-10-CM

## 2023-02-13 PROCEDURE — 99207 PR NO CHARGE NURSE ONLY: CPT

## 2023-02-13 NOTE — TELEPHONE ENCOUNTER
"Thomas Davila is a 72 year old year old patient who comes in today for a Blood Pressure check because of ongoing blood pressure monitoring.     Pt resumed amlodipine, 2.5 mg daily a couple weeks ago.     Vital Signs as repeated by RN:  168/96, pulse 80  170/92, pulse 84, rechecked 5 min later.     Patient is taking medication as prescribed    Patient is monitoring Blood Pressure at home.  Average readings if yes are:  190's/90's.    Current complaints: mild bilateral ankle swelling since starting amlodipine.  Hands are puffy and toes are \"like sausages.\"  Slight pitting edema posterior ankles bilaterally.  Also, pt reports 8 lb weight gain over the past 2 weeks.     Disposition:  Routed to provider for further instructions.     Pharmacy, [facility].     Leticia Tate RN   "

## 2023-02-13 NOTE — PROGRESS NOTES
"Thomas Davila is a 72 year old year old patient who comes in today for a Blood Pressure check because of ongoing blood pressure monitoring.    Pt resumed amlodipine, 2.5 mg daily a couple weeks ago.    Vital Signs as repeated by RN:  168/96, pulse 80    Patient is taking medication as prescribed    Patient is monitoring Blood Pressure at home.  Average readings if yes are:  190's/90's.    Current complaints: mild bilateral ankle swelling since starting amlodipine.  Hands are puffy and toes are \"like sausages.\"  Slight pitting edema posterior ankles bilaterally.  Also, pt reports 8 lb weight gain over the past 2 weeks.    Disposition:  Routed to provider for further instructions.    Pharmacy, [facility].    Leticia Tate RN   "

## 2023-02-14 NOTE — TELEPHONE ENCOUNTER
Patient has uncontrolled blood pressure.  Since she has swelling of her extremities, please stop the amlodipine.  From the list she is not taking lisinopril or triamterene/hctz which she is supposed to be taking.  Please have her restart these two meds and follow up in one week to have her blood pressure checked.  Need to find out where she is on these two meds.  Dung Prieto MD  Family Medicine

## 2023-02-14 NOTE — TELEPHONE ENCOUNTER
Patient calling looking for an answer regarding her previous message about her BP. Relayed Dr Prieto's message. She says she stopped the lisinopril and triamterene-hydrochlorothiazide at least 2 weeks ago. Patient wrote down instructions and  read back correctly.   Nurse only BP check scheduled for [facility] 02/23/23 per her request.   Yas SANTANA RN

## 2023-02-14 NOTE — TELEPHONE ENCOUNTER
Patient really wants a call back if patient should continue the medication.  Florecita Pizarro PSC on 2/14/2023 at 9:08 AM

## 2023-02-15 NOTE — TELEPHONE ENCOUNTER
She was on amlodipine but having side effects.  Need to restart the other two blood pressure meds and follow up in person for recheck of blood pressure and check her swelling.  Correct, in person appt.  Dung Prieto MD  Family Medicine

## 2023-02-15 NOTE — TELEPHONE ENCOUNTER
Due to her blood pressure restart her lisinopril and triamterene/HCTZ since she is not toleration the amlodipine.  I wanted to confirm what she was taking and is not taking.  Please have her follow up with me in clinic, may use same day or hosp follow up slot.  Dung Prieto MD  Family Medicine

## 2023-02-15 NOTE — TELEPHONE ENCOUNTER
Dr. Prieto,    Had to leave a message for the patient to call us back.  Unable to triage for SOA with the leg swelling.  Patient is on Amlodipine.  In person appt? Mary TRIMBLE RN

## 2023-02-15 NOTE — TELEPHONE ENCOUNTER
Left message for the patient to call the clinic.  Mary TRIMBLE RN     Detail Level: Zone Initiate Treatment: ketoconazole BID x 4 wks.\\nAvoid use of deodorant if at all possible. \\nWill tx suspected impetiginized area with mupirocin, apply BID x 2 wks.\\nDiscussed presumed Dx of intertrigo based on hx and exam, potentially triggered by limited range of motion in this shoulder and resulting increased skin-to-skin friction.  \\nF/u in 4 wks. Initiate Treatment: clindamycin in AM, tretinoin up to nightly as tolerated. Medication instructions reviewed in detail.\\nGentle skincare regimen and consistent sun protection.

## 2023-02-15 NOTE — TELEPHONE ENCOUNTER
Spoke with pt and shared provider's instructions.     Yes, pt confirms that she stopped amlodipine yesterday.  Pt says that she resumed lisinopril and triamterene-hydrochlorothiazide yesterday.    Leticia Tate RN

## 2023-02-16 ENCOUNTER — HOSPITAL ENCOUNTER (OUTPATIENT)
Dept: PHYSICAL THERAPY | Facility: CLINIC | Age: 73
Setting detail: THERAPIES SERIES
Discharge: HOME | End: 2023-02-16
Attending: PEDIATRICS
Payer: MEDICARE

## 2023-02-16 PROCEDURE — 97110 THERAPEUTIC EXERCISES: CPT | Mod: GP | Performed by: PHYSICAL MEDICINE & REHABILITATION

## 2023-02-22 ENCOUNTER — ALLIED HEALTH/NURSE VISIT (OUTPATIENT)
Dept: FAMILY MEDICINE | Facility: CLINIC | Age: 73
End: 2023-02-22
Payer: OTHER GOVERNMENT

## 2023-02-22 ENCOUNTER — TELEPHONE (OUTPATIENT)
Dept: FAMILY MEDICINE | Facility: CLINIC | Age: 73
End: 2023-02-22

## 2023-02-22 VITALS
RESPIRATION RATE: 18 BRPM | DIASTOLIC BLOOD PRESSURE: 78 MMHG | HEART RATE: 90 BPM | OXYGEN SATURATION: 98 % | SYSTOLIC BLOOD PRESSURE: 146 MMHG

## 2023-02-22 DIAGNOSIS — I10 ESSENTIAL HYPERTENSION: Primary | ICD-10-CM

## 2023-02-22 PROCEDURE — 99207 PR NO CHARGE NURSE ONLY: CPT

## 2023-02-22 NOTE — PROGRESS NOTES
Thomas Davila is a 73 year old year old patient who comes in today for a Blood Pressure check because of ongoing blood pressure monitoring.  Last RN BP check 2/13. Restarted lisinopril & triamterene/hctz   Pt has upcoming appt with PCP 2/27/23    Vital Signs as repeated by RN   152/74 p93  146/78 p90    Patient is taking medication as prescribed  Patient is tolerating medications well.  Patient is monitoring Blood Pressure at home-doesn't know if machine is working.  Told pt to bring in her machine at next visit to check.  Current complaints: none  Disposition:  Routing to provider for review.  Preferred pharmacy: [facility]

## 2023-02-22 NOTE — TELEPHONE ENCOUNTER
Please have her continue her current regiment and I will see her in follow up as planned.  Marked improvement of her blood pressure.  Dung Prieto MD  Family Medicine

## 2023-02-22 NOTE — TELEPHONE ENCOUNTER
Thomas Harley is a 73 year old year old patient who comes in today for a Blood Pressure check because of ongoing blood pressure monitoring.  Last RN BP check 2/13. Restarted lisinopril & triamterene/hctz   Pt has upcoming appt with PCP 2/27/23     Vital Signs as repeated by RN   152/74 p93  146/78 p90     Patient is taking medication as prescribed  Patient is tolerating medications well.  Patient is monitoring Blood Pressure at home-doesn't know if machine is working.  Told pt to bring in her machine at next visit to check.  Current complaints: none  Disposition:  Routing to provider for review.  Preferred pharmacy: [facility]

## 2023-02-24 ENCOUNTER — OFFICE VISIT (OUTPATIENT)
Dept: PLASTIC SURGERY | Facility: AMBULATORY SURGERY CENTER | Age: 73
End: 2023-02-24
Attending: FAMILY MEDICINE
Payer: MEDICARE

## 2023-02-24 VITALS
SYSTOLIC BLOOD PRESSURE: 148 MMHG | BODY MASS INDEX: 34.91 KG/M2 | HEIGHT: 63 IN | WEIGHT: 197 LBS | HEART RATE: 76 BPM | DIASTOLIC BLOOD PRESSURE: 85 MMHG

## 2023-02-24 DIAGNOSIS — E65 PENDULOUS ABDOMEN: Primary | ICD-10-CM

## 2023-02-24 PROCEDURE — 99205 OFFICE O/P NEW HI 60 MIN: CPT | Performed by: PLASTIC SURGERY

## 2023-02-24 NOTE — PROGRESS NOTES
Chief complaint:  Abdominal wall lipodystrophy, hanging abdominal wall pannus.     History of present illness:  This is 73 year old lady who presents with diffuse abdominal wall lipodystrophy as well as skin laxity and hanging abdominal wall pannus over the suprapubic region.    She has lost approximately 50 pounds after diet and exercise.  Her weight has been stable for the last several months.  Now she is presenting with abdominal wall skin laxity and hanging abdominal wall pannus.  She is interested in body contouring surgery.    Patient does have history of bilateral lower extremity deep venous thrombosis (DVT) and she is currently taking warfarin.       Past medical history:  Past Medical History:   Diagnosis Date     Acute parametritis and pelvic cellulitis 1994    salpingitis     ASCUS with positive high risk HPV 01/2013     Asthma     No recent issues     Basal cell carcinoma      breast cancer 1998    left lumpectomy, radiation, tamoxifen     Breast cancer (H) 1998    L Breast; Lumpectomy & Radiation     Chronic salpingitis and oophoritis 02/1994    PID        Embolism and thrombosis of unspecified site     left leg, due to tamoxifen therapy     Generalized osteoarthrosis, unspecified site     hands     Leiomyoma of uterus, unspecified      Morbid obesity (H) 12/04/2017     Other malignant neoplasm of skin, site unspecified 11/2006    Basal cell cancer on nose     Squamous cell carcinoma      Thrombosis of leg     Left     Unspecified essential hypertension        High blood pressure, bilateral lower extremity DVTs, endometrial cancer, left breast cancer, multiple skin cancers nonmelanoma.     Past surgical history:  Total abdominal hysterectomy with bilateral salpingo-oophorectomy, left breast lumpectomy with radiation, C-sections x2, exploratory laparoscopic, ventral hernia repair, foot surgery       Allergies:  Lovenox, unknown antibiotic.     Medications:       Current Outpatient Medications:       Acetaminophen (TYLENOL PO), Take 500 mg by mouth as needed for mild pain or fever, Disp: , Rfl:      lisinopril (ZESTRIL) 20 MG tablet, Take 1 tablet (20 mg) by mouth daily (Patient not taking: Reported on 2/13/2023), Disp: 90 tablet, Rfl: 3     omeprazole (PRILOSEC) 20 MG DR capsule, TAKE 1 CAPSULE BY MOUTH ONCE DAILY, Disp: 90 capsule, Rfl: 3     [START ON 3/10/2023] phentermine (ADIPEX-P) 30 MG capsule, Take 1 capsule (30 mg) by mouth every morning for 90 days Hold on file until needed. (Patient not taking: Reported on 2/13/2023), Disp: 90 capsule, Rfl: 0     topiramate (TOPAMAX) 50 MG tablet, Take 1 tablet (50 mg) by mouth daily, Disp: 90 tablet, Rfl: 3     triamterene-HCTZ (MAXZIDE-25) 37.5-25 MG tablet, Take 1 tablet by mouth daily, Disp: 90 tablet, Rfl: 3     warfarin ANTICOAGULANT (JANTOVEN ANTICOAGULANT) 5 MG tablet, Take 2.5 mg every Mon, Wed, Fri; 5 mg all other days or as directed by the INR clinic., Disp: 180 tablet, Rfl: 1    BIRTH CONTROL: Denies     Family history:  Father with history of venous Thromboembolism (VTE)       GYN history:  G  2, P 2     Social History:  Denies tobacco, denies alcohol.     Review of systems:  General ROS: No complaints or constitutional symptoms  Skin: Occasional intertrigo in the suprapubic region treated with nystatin powder  Hematologic/Lymphatic: No symptoms or complaints  Psychiatric: No symptoms or complaints  Endocrine: No excessive fatigue, no hypermetabolic symptoms reported  Respiratory ROS: No cough, shortness of breath, or wheezing  Cardiovascular ROS: No chest pain or dyspnea on exertion  Breast ROS: Denies nipple discharge, denies nipple inversion, denies palpable breast masses, denies changes in the color of the skin of both breasts  Gastrointestinal ROS: No abdominal pain, nausea, diarrhea, or constipation  Musculoskeletal ROS: Occasional back pain.  Neurological ROS: No focal neurologic defects reported.       Physical exam:     BP (!) 148/85 (BP  "Location: Right arm, Patient Position: Sitting)   Pulse 76   Ht 1.6 m (5' 3\")   Wt 89.4 kg (197 lb)   BMI 34.90 kg/m    General: Alert, cooperative, appears stated age   Skin: Skin color, texture, turgor normal, no rashes or lesions   Lymphatic: No obvious adenopathy, no swelling   Eyes: No scleral icterus, pupils equal  HENT: No traumatic injury to the head or face, no gross abnormalities  Lungs: Normal respiratory effort, breath sounds equal bilaterally  Heart: Regular rate and rhythm  Breasts: Not examined  Abdomen: Presents with diffuse lipodystrophy, striae and cellulite throughout the whole abdominal wall.  There is severe skin laxity throughout the whole anterior abdominal wall.  There is important amount of lipodystrophy on bilateral flank areas as well as in both supraumbilical and infraumbilical region.  There is hanging abdominal wall pannus over the suprapubic region.  The umbilicus is deformed secondary to the weight of the supraumbilical lipodystrophy.  The umbilicus is also inferiorly displaced secondary to the weight of the infra umbilical hanging pannus that is pulling the umbilicus down.  I did not see any evidence of intertrigo in the infra abdominal fold.  Patient does present with diastases recti.  I did not palpate any evidence of umbilical hernias or incisional hernias.  Abdomen is otherwise soft, non-distended and non-tender to palpation.  Extremities: Patient DOES present with varicose veins.  Neurologic: Grossly intact                                Assessment:     73 year old female with abdominal wall lipodystrophy, excessive abdominal skin laxity and hanging abdominal wall pannus with inferiorly displaced umbilicus.  She also presents with diastases recti.    Patient with personal and family history of deep venous thrombosis (DVT), currently receiving chronic anticoagulation with Coumadin.     Her Caprini score is as follows: one- point for varicose veins, one-point for BMI greater " than 25, three-point because history of DVT, three-point because of family history of DVTs, two-points because any body contouring procedure will take more than 45 minutes,  2 points because of her age of 73. Total risk factor score is 12 points.  Patient will require postoperative chemoprophylaxis.  That being said, she is on chronic anticoagulation.        PLAN:      I do not believe that Mrs. Davila is a good candidate for body contouring procedures.    I have explained to her that she has significant comorbidities like her prior history of DVTs as well as family history of venous thromboembolism on chronic anticoagulation that would make her exceedingly prone to postoperative bleeding and hematoma.  This patient is also a [employer], and she alerted me that she cannot receive any blood transfusion.    Furthermore, she will have to stop her anticoagulation perioperatively, and any body contouring procedure for her abdomen such as panniculectomies, will make her prone to venous thromboembolism (VTE).    Therefore, due to the above mentioned reasons, she is not an acceptable candidate for body contouring.  I have explained this to the patient.  She is in agreement.    Time spent with the patient, 60 minutes.      Zane Reyes MD , FACS   Diplomate American Board of Plastic Surgery  Diplomate American Board of Surgery  Adj. Assistant Professor of Surgery  Division of Plastic & Reconstructive Surgery   [facility]  Office: [phone]   2/24/2023 at 11:31 AM

## 2023-02-24 NOTE — LETTER
2/24/2023         RE: Thomas Davila  [id number] [facility] [id number]        Dear Colleague,    Thank you for referring your patient, Thomas Davila, to the [facility] [place]. Please see a copy of my visit note below.    Chief complaint:  Abdominal wall lipodystrophy, hanging abdominal wall pannus.     History of present illness:  This is 73 year old lady who presents with diffuse abdominal wall lipodystrophy as well as skin laxity and hanging abdominal wall pannus over the suprapubic region.    She has lost approximately 50 pounds after diet and exercise.  Her weight has been stable for the last several months.  Now she is presenting with abdominal wall skin laxity and hanging abdominal wall pannus.  She is interested in body contouring surgery.    Patient does have history of bilateral lower extremity deep venous thrombosis (DVT) and she is currently taking warfarin.       Past medical history:  Past Medical History:   Diagnosis Date     Acute parametritis and pelvic cellulitis 1994    salpingitis     ASCUS with positive high risk HPV 01/2013     Asthma     No recent issues     Basal cell carcinoma      breast cancer 1998    left lumpectomy, radiation, tamoxifen     Breast cancer (H) 1998    L Breast; Lumpectomy & Radiation     Chronic salpingitis and oophoritis 02/1994    PID        Embolism and thrombosis of unspecified site     left leg, due to tamoxifen therapy     Generalized osteoarthrosis, unspecified site     hands     Leiomyoma of uterus, unspecified      Morbid obesity (H) 12/04/2017     Other malignant neoplasm of skin, site unspecified 11/2006    Basal cell cancer on nose     Squamous cell carcinoma      Thrombosis of leg     Left     Unspecified essential hypertension        High blood pressure, bilateral lower extremity DVTs, endometrial cancer, left breast cancer, multiple skin cancers nonmelanoma.     Past surgical history:  Total abdominal hysterectomy  with bilateral salpingo-oophorectomy, left breast lumpectomy with radiation, C-sections x2, exploratory laparoscopic, ventral hernia repair, foot surgery       Allergies:  Lovenox, unknown antibiotic.     Medications:       Current Outpatient Medications:      Acetaminophen (TYLENOL PO), Take 500 mg by mouth as needed for mild pain or fever, Disp: , Rfl:      lisinopril (ZESTRIL) 20 MG tablet, Take 1 tablet (20 mg) by mouth daily (Patient not taking: Reported on 2/13/2023), Disp: 90 tablet, Rfl: 3     omeprazole (PRILOSEC) 20 MG DR capsule, TAKE 1 CAPSULE BY MOUTH ONCE DAILY, Disp: 90 capsule, Rfl: 3     [START ON 3/10/2023] phentermine (ADIPEX-P) 30 MG capsule, Take 1 capsule (30 mg) by mouth every morning for 90 days Hold on file until needed. (Patient not taking: Reported on 2/13/2023), Disp: 90 capsule, Rfl: 0     topiramate (TOPAMAX) 50 MG tablet, Take 1 tablet (50 mg) by mouth daily, Disp: 90 tablet, Rfl: 3     triamterene-HCTZ (MAXZIDE-25) 37.5-25 MG tablet, Take 1 tablet by mouth daily, Disp: 90 tablet, Rfl: 3     warfarin ANTICOAGULANT (JANTOVEN ANTICOAGULANT) 5 MG tablet, Take 2.5 mg every Mon, Wed, Fri; 5 mg all other days or as directed by the INR clinic., Disp: 180 tablet, Rfl: 1    BIRTH CONTROL: Denies     Family history:  Father with history of venous Thromboembolism (VTE)       GYN history:  G  2, P 2     Social History:  Denies tobacco, denies alcohol.     Review of systems:  General ROS: No complaints or constitutional symptoms  Skin: Occasional intertrigo in the suprapubic region treated with nystatin powder  Hematologic/Lymphatic: No symptoms or complaints  Psychiatric: No symptoms or complaints  Endocrine: No excessive fatigue, no hypermetabolic symptoms reported  Respiratory ROS: No cough, shortness of breath, or wheezing  Cardiovascular ROS: No chest pain or dyspnea on exertion  Breast ROS: Denies nipple discharge, denies nipple inversion, denies palpable breast masses, denies changes in the  "color of the skin of both breasts  Gastrointestinal ROS: No abdominal pain, nausea, diarrhea, or constipation  Musculoskeletal ROS: Occasional back pain.  Neurological ROS: No focal neurologic defects reported.       Physical exam:     BP (!) 148/85 (BP Location: Right arm, Patient Position: Sitting)   Pulse 76   Ht 1.6 m (5' 3\")   Wt 89.4 kg (197 lb)   BMI 34.90 kg/m    General: Alert, cooperative, appears stated age   Skin: Skin color, texture, turgor normal, no rashes or lesions   Lymphatic: No obvious adenopathy, no swelling   Eyes: No scleral icterus, pupils equal  HENT: No traumatic injury to the head or face, no gross abnormalities  Lungs: Normal respiratory effort, breath sounds equal bilaterally  Heart: Regular rate and rhythm  Breasts: Not examined  Abdomen: Presents with diffuse lipodystrophy, striae and cellulite throughout the whole abdominal wall.  There is severe skin laxity throughout the whole anterior abdominal wall.  There is important amount of lipodystrophy on bilateral flank areas as well as in both supraumbilical and infraumbilical region.  There is hanging abdominal wall pannus over the suprapubic region.  The umbilicus is deformed secondary to the weight of the supraumbilical lipodystrophy.  The umbilicus is also inferiorly displaced secondary to the weight of the infra umbilical hanging pannus that is pulling the umbilicus down.  I did not see any evidence of intertrigo in the infra abdominal fold.  Patient does present with diastases recti.  I did not palpate any evidence of umbilical hernias or incisional hernias.  Abdomen is otherwise soft, non-distended and non-tender to palpation.  Extremities: Patient DOES present with varicose veins.  Neurologic: Grossly intact                                Assessment:     73 year old female with abdominal wall lipodystrophy, excessive abdominal skin laxity and hanging abdominal wall pannus with inferiorly displaced umbilicus.  She also presents " with diastases recti.    Patient with personal and family history of deep venous thrombosis (DVT), currently receiving chronic anticoagulation with Coumadin.     Her Caprini score is as follows: one- point for varicose veins, one-point for BMI greater than 25, three-point because history of DVT, three-point because of family history of DVTs, two-points because any body contouring procedure will take more than 45 minutes,  2 points because of her age of 73. Total risk factor score is 12 points.  Patient will require postoperative chemoprophylaxis.  That being said, she is on chronic anticoagulation.        PLAN:      I do not believe that Mrs. Davila is a good candidate for body contouring procedures.    I have explained to her that she has significant comorbidities like her prior history of DVTs as well as family history of venous thromboembolism on chronic anticoagulation that would make her exceedingly prone to postoperative bleeding and hematoma.  This patient is also a [employer], and she alerted me that she cannot receive any blood transfusion.    Furthermore, she will have to stop her anticoagulation perioperatively, and any body contouring procedure for her abdomen such as panniculectomies, will make her prone to venous thromboembolism (VTE).    Therefore, due to the above mentioned reasons, she is not an acceptable candidate for body contouring.  I have explained this to the patient.  She is in agreement.    Time spent with the patient, 60 minutes.      Zane Reyes MD , FACS   Diplomate American Board of Plastic Surgery  Diplomate American Board of Surgery  Adj. Assistant Professor of Surgery  Division of Plastic & Reconstructive Surgery   [facility]  Office: [phone]   2/24/2023 at 11:31 AM         Again, thank you for allowing me to participate in the care of your patient.        Sincerely,        aZne Reyes MD

## 2023-02-24 NOTE — NURSING NOTE
Patient here today for panniculectomy consultation. Patient gets rashes and hives under loose skin. The patient reports history of 50 lbs weight loss.     Patient does have history of blood clots after taking Tamoxifen for cancer. The patient takes Warfarin regularly     Jocelyn Bernardo RN on 2/24/2023 at 10:23 AM

## 2023-02-27 ENCOUNTER — OFFICE VISIT (OUTPATIENT)
Dept: FAMILY MEDICINE | Facility: CLINIC | Age: 73
End: 2023-02-27
Payer: MEDICARE

## 2023-02-27 VITALS
TEMPERATURE: 98 F | RESPIRATION RATE: 16 BRPM | SYSTOLIC BLOOD PRESSURE: 132 MMHG | OXYGEN SATURATION: 97 % | BODY MASS INDEX: 33.63 KG/M2 | HEART RATE: 60 BPM | DIASTOLIC BLOOD PRESSURE: 72 MMHG | HEIGHT: 64 IN | WEIGHT: 197 LBS

## 2023-02-27 DIAGNOSIS — D68.9 BLOOD COAGULATION DISORDER (H): ICD-10-CM

## 2023-02-27 DIAGNOSIS — I10 ESSENTIAL HYPERTENSION, BENIGN: Primary | ICD-10-CM

## 2023-02-27 DIAGNOSIS — M06.9 RHEUMATOID ARTHRITIS, INVOLVING UNSPECIFIED SITE, UNSPECIFIED WHETHER RHEUMATOID FACTOR PRESENT (H): ICD-10-CM

## 2023-02-27 PROCEDURE — 99213 OFFICE O/P EST LOW 20 MIN: CPT | Performed by: FAMILY MEDICINE

## 2023-02-27 RX ORDER — TRIAMTERENE/HYDROCHLOROTHIAZID 37.5-25 MG
TABLET ORAL EVERY 24 HOURS
COMMUNITY
End: 2023-02-27

## 2023-02-27 ASSESSMENT — PAIN SCALES - GENERAL: PAINLEVEL: MILD PAIN (3)

## 2023-02-27 NOTE — PATIENT INSTRUCTIONS
Follow up in 3 months in the office.    You should have refill of your phentermine medication at your pharmacy starting on 3/10/2023.  You make take this medication for weight loss.    Your blood pressure is controlled.  Please continue with lisinopril 20 mg a day and your triamterene-hydrochlorothiazide 37.5/25 taking one once a day.    Thank you for choosing [facility].  You may be receiving an email and/or telephone survey request from [facility] Customer Experience regarding your visit today.  Please take a few minutes to respond to the survey to let us know how we are doing.      If you have questions or concerns, please contact us via [place] or you can contact your care team at [phone] option [phone].    Our Clinic hours are:  Monday - Thursday 7am-6pm  Friday 7am-5pm    The [place] outpatient lab hours are:  Monday - Friday 7am-4:30pm    Appointments are required, call [phone]    If you have clinical questions after hours or would like to schedule an appointment,  call the clinic at [phone].

## 2023-02-27 NOTE — PROGRESS NOTES
"  Assessment & Plan     Essential hypertension, benign  controlled    Rheumatoid arthritis, involving unspecified site, unspecified whether rheumatoid factor present (H)  Not seeing specialist.  She is using otc acetaminophen as needed    Blood coagulation disorder (H)  She is taking her warfarin and getting monitored               See Patient Instructions    Return in about 3 months (around 5/27/2023) for Office Visit.    Dnug Prieto MD  [facility] NORBERTO Landaverde is a 73 year old, presenting for the following health issues:  Hypertension      HPI     Hypertension Follow-up      Do you check your blood pressure regularly outside of the clinic? Yes     Are you following a low salt diet? No    Are your blood pressures ever more than 140 on the top number (systolic) OR more   than 90 on the bottom number (diastolic), for example 140/90? Yes      How many servings of fruits and vegetables do you eat daily?  0-1    On average, how many sweetened beverages do you drink each day (Examples: soda, juice, sweet tea, etc.  Do NOT count diet or artificially sweetened beverages)?   0    How many days per week do you exercise enough to make your heart beat faster? Not in the winter; just housework;   Busier in the summer    How many minutes a day do you exercise enough to make your heart beat faster?     How many days per week do you miss taking your medication? 0        Review of Systems   No resp or cardiac symptoms      Objective    /72   Pulse 60   Temp 98  F (36.7  C) (Tympanic)   Resp 16   Ht 1.613 m (5' 3.5\")   Wt 89.4 kg (197 lb)   SpO2 97%   BMI 34.35 kg/m    Body mass index is 34.35 kg/m .  Physical Exam   GENERAL APPEARANCE: healthy, alert and no distress  MS: trace edema at ankles and lower legs  SKIN: no suspicious lesions or rashes  NEURO: Normal strength and tone, mentation intact and speech normal  PSYCH: mentation appears normal and affect normal/bright          "

## 2023-02-28 ENCOUNTER — TELEPHONE (OUTPATIENT)
Dept: DERMATOLOGY | Facility: CLINIC | Age: 73
End: 2023-02-28

## 2023-02-28 ENCOUNTER — TELEPHONE (OUTPATIENT)
Dept: NURSING | Facility: CLINIC | Age: 73
End: 2023-02-28

## 2023-02-28 ENCOUNTER — OFFICE VISIT (OUTPATIENT)
Dept: DERMATOLOGY | Facility: CLINIC | Age: 73
End: 2023-02-28
Payer: MEDICARE

## 2023-02-28 DIAGNOSIS — D23.9 DERMAL NEVUS: ICD-10-CM

## 2023-02-28 DIAGNOSIS — D18.01 ANGIOMA OF SKIN: ICD-10-CM

## 2023-02-28 DIAGNOSIS — C44.612 BASAL CELL CARCINOMA (BCC) OF RIGHT UPPER ARM: ICD-10-CM

## 2023-02-28 DIAGNOSIS — L81.4 LENTIGO: Primary | ICD-10-CM

## 2023-02-28 DIAGNOSIS — L82.1 SEBORRHEIC KERATOSIS: ICD-10-CM

## 2023-02-28 DIAGNOSIS — D04.62 SQUAMOUS CELL CARCINOMA IN SITU (SCCIS) OF SKIN OF LEFT UPPER ARM: ICD-10-CM

## 2023-02-28 DIAGNOSIS — Z85.828 HISTORY OF SKIN CANCER: ICD-10-CM

## 2023-02-28 DIAGNOSIS — L57.0 AK (ACTINIC KERATOSIS): ICD-10-CM

## 2023-02-28 PROCEDURE — 11103 TANGNTL BX SKIN EA SEP/ADDL: CPT | Performed by: DERMATOLOGY

## 2023-02-28 PROCEDURE — 99213 OFFICE O/P EST LOW 20 MIN: CPT | Mod: 25 | Performed by: DERMATOLOGY

## 2023-02-28 PROCEDURE — 11102 TANGNTL BX SKIN SINGLE LES: CPT | Performed by: DERMATOLOGY

## 2023-02-28 NOTE — PROGRESS NOTES
Thomas Davila is an extremely pleasant 73 year old year old female patient here today for bleeding spot on arms.   .   Patient states this has been present for a while.  Patient reports the following symptoms:  Rough and bleeding.  .  Patient reports the following previous treatments none.  These treatments did not work.  Patient reports the following modifying factors none.  Associated symptoms: none.  Patient has no other skin complaints today.  Remainder of the HPI, Meds, PMH, Allergies, FH, and SH was reviewed in chart.      Past Medical History:   Diagnosis Date     Acute parametritis and pelvic cellulitis     salpingitis     ASCUS with positive high risk HPV 2013     Asthma     No recent issues     Basal cell carcinoma      breast cancer     left lumpectomy, radiation, tamoxifen     Breast cancer (H)     L Breast; Lumpectomy & Radiation     Chronic salpingitis and oophoritis 1994    PID        Embolism and thrombosis of unspecified site     left leg, due to tamoxifen therapy     Generalized osteoarthrosis, unspecified site     hands     Leiomyoma of uterus, unspecified      Morbid obesity (H) 2017     Other malignant neoplasm of skin, site unspecified 2006    Basal cell cancer on nose     Squamous cell carcinoma      Thrombosis of leg     Left     Unspecified essential hypertension        Past Surgical History:   Procedure Laterality Date     BREAST LUMPECTOMY, RT/LT      left     C/SECTION, LOW TRANSVERSE  1972,     , Low Transverse x2     CL AFF SURGICAL PATHOLOGY      Breast reduction     COLONOSCOPY  10/15/02    normal     FOOT SURGERY      R Foot      HC EXCISION BREAST LESION, OPEN >=1  98    1) Needle localization with generous lumpectomy 2) Left axillary node dissection.     HC LAPAROSCOPY, SURGICAL, ABDOMEN, PERITONEUM & OMENTUM; DX W/ OR W/O SPECIMEN(S)  94     HYSTERECTOMY, PAP NO LONGER INDICATED       INSERT PORT VASCULAR ACCESS   3/14/2013    Procedure: INSERT PORT VASCULAR ACCESS;  Port Revision;  Surgeon: Arash Puente MD;  Location: [facility]     LAPAROSCOPIC HYSTERECTOMY TOTAL, BILATERAL SALPINGO-OOPHORECTOMY, NODE DISSECTION, COMBINED  1/31/2013    Procedure: COMBINED LAPAROSCOPIC HYSTERECTOMY TOTAL, SALPINGO-OOPHORECTOMY, NODE DISSECTION;  Laparosocpic  Total Abdominal Hysterectomy, Bilateral Salphino-Oophorectomy, Bilateral Pelvic Lymph Node Dissection, Pelvic Washings, Cystoscopy;  Surgeon: Tanya Walker MD;  Location: [facility]     PHACOEMULSIFICATION WITH STANDARD INTRAOCULAR LENS IMPLANT Left 6/12/2019    Procedure: Cataract Removal with Implant;  Surgeon: Walt Mckeon MD;  Location: [facility]     PHACOEMULSIFICATION WITH STANDARD INTRAOCULAR LENS IMPLANT Right 7/3/2019    Procedure: Cataract Removal with Implant;  Surgeon: Walt Mckeon MD;  Location: [facility]     SURGICAL HISTORY OF -   06/22/93    1) Excision of digital cyst right mid finger  2)  Excision of dermatofibroma left calf     SURGICAL HISTORY OF -   12/18/2001    Excision of mucinous cyst & partial ostectomy, bone removal of benign osteophytic overgrowth osteophyte of the distal aspect of the middle phalanx and distal phalanx     SURGICAL HISTORY OF -   2006    Basal cell cancer removal     TONSILLECTOMY       TUBAL LIGATION  1978        Family History   Problem Relation Age of Onset     Cerebrovascular Disease Mother      Hypertension Mother      Diabetes Mother      Thyroid Disease Mother      Arthritis Mother      Alzheimer Disease Mother      Neurologic Disorder Mother         Parkinsons     Heart Disease Father      Hypertension Father      Diabetes Father      Thyroid Disease Father      Arthritis Father      Blood Disease Father      Factor V Leiden deficiency Father      Anesthesia Reaction Sister      Thyroid Disease Sister      Anesthesia Reaction Sister      Arthritis Sister         RA     Hypertension Brother      Factor V Leiden  deficiency Brother      Allergies Son      Gastrointestinal Disease Son         GERD     Hypertension Brother      Allergies Son         percocett     Gastrointestinal Disease Son         GERD     Hypertension Son      Factor V Leiden deficiency Niece        Social History     Socioeconomic History     Marital status:      Spouse name: Not on file     Number of children: 2     Years of education: Not on file     Highest education level: Not on file   Occupational History     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 No   Social History Narrative    Rastafari-- DECLINES ALL BLOOD PRODUCTS        April 15, 2021    ENVIRONMENTAL HISTORY: [place] home is heated with a forced air. They do have central air conditioning. The patient's bedroom is furnished with hard vijaya in bedroom and animals sleep in bedroom.  Pets inside the house include 2 cat(s). There is no history of cockroach or mice infestation. There is/are 0 smokers in the house.  The house does not have a basement.      Social Determinants of Health     Financial Resource Strain: Not on file   Food Insecurity: Not on file   Transportation Needs: Not on file   Physical Activity: Not on file   Stress: Not on file   Social Connections: Not on file   Intimate Partner Violence: Not on file   Housing Stability: Not on file       Outpatient Encounter Medications as of 2/28/2023   Medication Sig Dispense Refill     Acetaminophen (TYLENOL PO) Take 500 mg by mouth as needed for mild pain or fever       lisinopril (ZESTRIL) 20 MG tablet Take 1 tablet (20 mg) by mouth  daily 90 tablet 3     omeprazole (PRILOSEC) 20 MG DR capsule TAKE 1 CAPSULE BY MOUTH ONCE DAILY 90 capsule 3     [START ON 3/10/2023] phentermine (ADIPEX-P) 30 MG capsule Take 1 capsule (30 mg) by mouth every morning for 90 days Hold on file until needed. 90 capsule 0     topiramate (TOPAMAX) 50 MG tablet Take 1 tablet (50 mg) by mouth daily 90 tablet 3     triamterene-HCTZ (MAXZIDE-25) 37.5-25 MG tablet Take 1 tablet by mouth daily 90 tablet 3     warfarin ANTICOAGULANT (JANTOVEN ANTICOAGULANT) 5 MG tablet Take 2.5 mg every Mon, Wed, Fri; 5 mg all other days or as directed by the INR clinic. 180 tablet 1     No facility-administered encounter medications on file as of 2/28/2023.             O:   NAD, WDWN, Alert & Oriented, Mood & Affect wnl, Vitals stable   Here today alone    General appearance normal   Vitals stable   Alert, oriented and in no acute distress     L deltoid 2cm crusted red plaque  L elbow  Keratotic 9mm scaly papule   R deltoid 1cm pink pearly papule   Stuck on papules and brown macules on trunk and ext   Red papules on trunk  Flesh colored papules on trunk     Gritty scaly papule on arms    Eyes: Conjunctivae/lids:Normal     ENT: Lips, buccal mucosa, tongue: normal    MSK:Normal    Cardiovascular: peripheral edema none    Pulm: Breathing Normal    Lymph Nodes: No Head and Neck Lymphadenopathy     Neuro/Psych: Orientation:Alert and Orientedx3 ; Mood/Affect:normal       MICRO:     L deltoid (red):There is hyperkeratosis & parakeratosis of the epidermis, with full thickness epidermal involvement by atypical keratinocytes with rare pale vacuolated cells.  Unremarkable dermis.   L elbow (blue):There is hyperkeratosis and focal parakeratosis of the epidermis, overlying atypical keratinocytes,  by areas of orthokeratosis, there are scattered basal atypical keratinocytes: with varying degrees of overlying loss of maturation, hyperchromatism, pleomorphism, increased and abnormal mitoses,  dyskeratosis.  The dermis shows a variable superficial perivascular and lichenoid chronic inflammatory infiltrate.   R deltoid (green):Orthokeratosis of epidermis with a proliferation of nests of basaloid cells, with peripheral palisading and a haphazard arrangement in the center extending into the dermis, forming nodules.  The tumor cells have hyperchromatic nuclei. Poor cytoplasm and intercellular bridging.    A/P:  1. Seborrheic keratosis, lentigo, angioma, dermal nevus, hx of non-melanoma skin cancer   2. R/o squamous cell carcinoma   TANGENTIAL BIOPSY IN HOUSE:  After consent, anesthesia with LEC and prep, tangential excision performed and dx above confirmed with frozen section histology.  No complications and routine wound care.  Patient is on anticoagulants and risk of bleeding discussed with patient.       I have personally reviewed all specimens and/or slides and used them with my medical judgement to determine or confirm the final diagnosis.     Patient told result   L deltoid squamous cell carcinoma in situ schedule excision  L elbow actinic keratosis cryo   R deltoid basal cell carcinoma schedule excision .    3. Actinic keratosis arms  Using 5-Flurouracil Cream    5-Fluorouracil (5FU) topical cream (brand names Efudex, Carac) is a prescription topical medicine to treat actinic keratoses (pre-squamous cell skin cancer lesions), sun-damaged skin as well as superficial skin cancers.    When applied the areas of sun-damaged skin, the 5FU will  find  damaged skin cells and destroy them.   During treatment, the skin will become red and look very irritated. This is the expected  normal response,  Some patients using 5FU show minimal redness and scaling while others have a very  vigorous  response where the skin scabs and peels. The important thing to realize is that 5FU is treating sun-damaged skin that carries skin cancer risk.        While the skin is irritated, open, sore or scabbed you can apply aquaphor,  vaseline or 1% hydrocortisone cream in the morning.      You should apply a thin layer of the cream to the affected area twice a day for 2  weeks every night. A strip of cream the length of your finger tip should be enough to cover your entire face.  For tougher skin like arms, legs, or back, we may suggest longer treatment plans.  However if you react really strong and fast, you might stop earlier or use less frequently. - please call if it is very strong and you are concern you might need to stop early.      If you prescription coverage allows we may add calcipotriene (Dovonex ), a vitamin-D derivative, to the treatment plan.  In these cases we will have you mix the calcipotriene with the efudex to help shorten the treatment course and improve outcomes.      Typically very strong reactions are related to lots of underlying sun damage, and this means you are getting a good response to the medication. However, there is no need to be miserable while using this. Please let us know if you are having trouble or concerns!       It was a pleasure speaking to Thomas Davila today.  Previous clinic notes and pertinent laboratory tests were reviewed prior to Thomas Davila's visit.  Nature of benign skin lesions dicussed with patient.  Signs and Symptoms of skin cancer discussed with patient.  Patient encouraged to perform monthly skin exams.  UV precautions reviewed with patient.  Risks of non-melanoma skin cancer discussed with patient   Return to clinic next appt

## 2023-02-28 NOTE — LETTER
2023         RE: Thomas Davila  [address] [facility] [place] [address]        Dear Colleague,    Thank you for referring your patient, Thomas Davila, to the [facility]. Please see a copy of my visit note below.    Thomas Davila is an extremely pleasant 73 year old year old female patient here today for bleeding spot on arms.   .   Patient states this has been present for a while.  Patient reports the following symptoms:  Rough and bleeding.  .  Patient reports the following previous treatments none.  These treatments did not work.  Patient reports the following modifying factors none.  Associated symptoms: none.  Patient has no other skin complaints today.  Remainder of the HPI, Meds, PMH, Allergies, FH, and SH was reviewed in chart.      Past Medical History:   Diagnosis Date     Acute parametritis and pelvic cellulitis     salpingitis     ASCUS with positive high risk HPV 2013     Asthma     No recent issues     Basal cell carcinoma      breast cancer     left lumpectomy, radiation, tamoxifen     Breast cancer (H)     L Breast; Lumpectomy & Radiation     Chronic salpingitis and oophoritis 1994    PID        Embolism and thrombosis of unspecified site     left leg, due to tamoxifen therapy     Generalized osteoarthrosis, unspecified site     hands     Leiomyoma of uterus, unspecified      Morbid obesity (H) 2017     Other malignant neoplasm of skin, site unspecified 2006    Basal cell cancer on nose     Squamous cell carcinoma      Thrombosis of leg     Left     Unspecified essential hypertension        Past Surgical History:   Procedure Laterality Date     BREAST LUMPECTOMY, RT/LT      left     C/SECTION, LOW TRANSVERSE  1972,     , Low Transverse x2     CL AFF SURGICAL PATHOLOGY      Breast reduction     COLONOSCOPY  10/15/02    normal     FOOT SURGERY      R Foot      HC EXCISION BREAST LESION, OPEN >=1  98    1)  Needle localization with generous lumpectomy 2) Left axillary node dissection.     HC LAPAROSCOPY, SURGICAL, ABDOMEN, PERITONEUM & OMENTUM; DX W/ OR W/O SPECIMEN(S)  02/07/94     HYSTERECTOMY, PAP NO LONGER INDICATED       INSERT PORT VASCULAR ACCESS  3/14/2013    Procedure: INSERT PORT VASCULAR ACCESS;  Port Revision;  Surgeon: Arash Puente MD;  Location: [facility]     LAPAROSCOPIC HYSTERECTOMY TOTAL, BILATERAL SALPINGO-OOPHORECTOMY, NODE DISSECTION, COMBINED  1/31/2013    Procedure: COMBINED LAPAROSCOPIC HYSTERECTOMY TOTAL, SALPINGO-OOPHORECTOMY, NODE DISSECTION;  Laparosocpic  Total Abdominal Hysterectomy, Bilateral Salphino-Oophorectomy, Bilateral Pelvic Lymph Node Dissection, Pelvic Washings, Cystoscopy;  Surgeon: Tanya Walker MD;  Location: [facility]     PHACOEMULSIFICATION WITH STANDARD INTRAOCULAR LENS IMPLANT Left 6/12/2019    Procedure: Cataract Removal with Implant;  Surgeon: Walt Mckeon MD;  Location: [facility]     PHACOEMULSIFICATION WITH STANDARD INTRAOCULAR LENS IMPLANT Right 7/3/2019    Procedure: Cataract Removal with Implant;  Surgeon: Walt Mckeon MD;  Location: [facility]     SURGICAL HISTORY OF -   06/22/93    1) Excision of digital cyst right mid finger  2)  Excision of dermatofibroma left calf     SURGICAL HISTORY OF -   12/18/2001    Excision of mucinous cyst & partial ostectomy, bone removal of benign osteophytic overgrowth osteophyte of the distal aspect of the middle phalanx and distal phalanx     SURGICAL HISTORY OF -   2006    Basal cell cancer removal     TONSILLECTOMY       TUBAL LIGATION  1978        Family History   Problem Relation Age of Onset     Cerebrovascular Disease Mother      Hypertension Mother      Diabetes Mother      Thyroid Disease Mother      Arthritis Mother      Alzheimer Disease Mother      Neurologic Disorder Mother         Parkinsons     Heart Disease Father      Hypertension Father      Diabetes Father      Thyroid Disease Father      Arthritis  Father      Blood Disease Father      Factor V Leiden deficiency Father      Anesthesia Reaction Sister      Thyroid Disease Sister      Anesthesia Reaction Sister      Arthritis Sister         RA     Hypertension Brother      Factor V Leiden deficiency Brother      Allergies Son      Gastrointestinal Disease Son         GERD     Hypertension Brother      Allergies Son         percocett     Gastrointestinal Disease Son         GERD     Hypertension Son      Factor V Leiden deficiency Niece        Social History     Socioeconomic History     Marital status:      Spouse name: Not on file     Number of children: 2     Years of education: Not on file     Highest education level: Not on file   Occupational History     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 No   Social History Narrative    Hinduism-- DECLINES ALL BLOOD PRODUCTS        April 15, 2021    ENVIRONMENTAL HISTORY: [place] The home is heated with a forced air. They do have central air conditioning. The patient's bedroom is furnished with hard vijaya in bedroom and animals sleep in bedroom.  Pets inside the house include 2 cat(s). There is no history of cockroach or mice infestation. There is/are 0 smokers in the house.  The house does not have a basement.      Social Determinants of Health     Financial Resource Strain: Not on file   Food Insecurity: Not on file   Transportation Needs: Not on file   Physical Activity: Not on file   Stress: Not on file   Social Connections: Not on file   Intimate Partner Violence: Not on file   Housing Stability: Not on file        Outpatient Encounter Medications as of 2/28/2023   Medication Sig Dispense Refill     Acetaminophen (TYLENOL PO) Take 500 mg by mouth as needed for mild pain or fever       lisinopril (ZESTRIL) 20 MG tablet Take 1 tablet (20 mg) by mouth daily 90 tablet 3     omeprazole (PRILOSEC) 20 MG DR capsule TAKE 1 CAPSULE BY MOUTH ONCE DAILY 90 capsule 3     [START ON 3/10/2023] phentermine (ADIPEX-P) 30 MG capsule Take 1 capsule (30 mg) by mouth every morning for 90 days Hold on file until needed. 90 capsule 0     topiramate (TOPAMAX) 50 MG tablet Take 1 tablet (50 mg) by mouth daily 90 tablet 3     triamterene-HCTZ (MAXZIDE-25) 37.5-25 MG tablet Take 1 tablet by mouth daily 90 tablet 3     warfarin ANTICOAGULANT (JANTOVEN ANTICOAGULANT) 5 MG tablet Take 2.5 mg every Mon, Wed, Fri; 5 mg all other days or as directed by the INR clinic. 180 tablet 1     No facility-administered encounter medications on file as of 2/28/2023.             O:   NAD, WDWN, Alert & Oriented, Mood & Affect wnl, Vitals stable   Here today alone    General appearance normal   Vitals stable   Alert, oriented and in no acute distress     L deltoid 2cm crusted red plaque  L elbow  Keratotic 9mm scaly papule   R deltoid 1cm pink pearly papule   Stuck on papules and brown macules on trunk and ext   Red papules on trunk  Flesh colored papules on trunk         Eyes: Conjunctivae/lids:Normal     ENT: Lips, buccal mucosa, tongue: normal    MSK:Normal    Cardiovascular: peripheral edema none    Pulm: Breathing Normal    Lymph Nodes: No Head and Neck Lymphadenopathy     Neuro/Psych: Orientation:Alert and Orientedx3 ; Mood/Affect:normal       MICRO:     L deltoid (red):There is hyperkeratosis & parakeratosis of the epidermis, with full thickness epidermal involvement by atypical keratinocytes with rare pale vacuolated cells.  Unremarkable dermis.   L elbow (blue):There is hyperkeratosis and focal parakeratosis of the epidermis, overlying atypical  keratinocytes,  by areas of orthokeratosis, there are scattered basal atypical keratinocytes: with varying degrees of overlying loss of maturation, hyperchromatism, pleomorphism, increased and abnormal mitoses, dyskeratosis.  The dermis shows a variable superficial perivascular and lichenoid chronic inflammatory infiltrate.   R deltoid (green):Orthokeratosis of epidermis with a proliferation of nests of basaloid cells, with peripheral palisading and a haphazard arrangement in the center extending into the dermis, forming nodules.  The tumor cells have hyperchromatic nuclei. Poor cytoplasm and intercellular bridging.    A/P:  1. Seborrheic keratosis, lentigo, angioma, dermal nevus, hx of non-melanoma skin cancer   2. R/o squamous cell carcinoma   TANGENTIAL BIOPSY IN HOUSE:  After consent, anesthesia with LEC and prep, tangential excision performed and dx above confirmed with frozen section histology.  No complications and routine wound care.  Patient is on anticoagulants and risk of bleeding discussed with patient.       I have personally reviewed all specimens and/or slides and used them with my medical judgement to determine or confirm the final diagnosis.     Patient told result   L deltoid squamous cell carcinoma in situ schedule excision  L elbow actinic keratosis cryo   R deltoid basal cell carcinoma schedule excision .    It was a pleasure speaking to Thomas Davila today.  Previous clinic notes and pertinent laboratory tests were reviewed prior to Thomas Davila's visit.  Nature of benign skin lesions dicussed with patient.  Signs and Symptoms of skin cancer discussed with patient.  Patient encouraged to perform monthly skin exams.  UV precautions reviewed with patient.  Risks of non-melanoma skin cancer discussed with patient   Return to clinic next appt        Again, thank you for allowing me to participate in the care of your patient.        Sincerely,        Walt Golden MD

## 2023-02-28 NOTE — TELEPHONE ENCOUNTER
Called patient. No answer. Left message to call back. Clinic number was provided.   Jojo Jordan LPN

## 2023-02-28 NOTE — PATIENT INSTRUCTIONS
Wound Care Instructions     FOR SUPERFICIAL WOUNDS     [facility] [phone]    [facility] [phone]                       AFTER 24 HOURS YOU SHOULD REMOVE THE BANDAGE AND BEGIN DAILY DRESSING CHANGES AS FOLLOWS:     1) Remove Dressing.     2) Clean and dry the area with tap water using a Q-tip or sterile gauze pad.     3) Apply Vaseline, Aquaphor, Polysporin ointment or Bacitracin ointment over entire wound.  Do NOT use Neosporin ointment.     4) Cover the wound with a band-aid, or a sterile non-stick gauze pad and micropore paper tape      REPEAT THESE INSTRUCTIONS AT LEAST ONCE A DAY UNTIL THE WOUND HAS COMPLETELY HEALED.    It is an old wives tale that a wound heals better when it is exposed to air and allowed to dry out. The wound will heal faster with a better cosmetic result if it is kept moist with ointment and covered with a bandage.    **Do not let the wound dry out.**      Supplies Needed:      *Cotton tipped applicators (Q-tips)    *Polysporin Ointment or Bacitracin Ointment (NOT NEOSPORIN)    *Band-aids or non-stick gauze pads and micropore paper tape.      PATIENT INFORMATION:    During the healing process you will notice a number of changes. All wounds develop a small halo of redness surrounding the wound.  This means healing is occurring. Severe itching with extensive redness usually indicates sensitivity to the ointment or bandage tape used to dress the wound.  You should call our office if this develops.      Swelling  and/or discoloration around your surgical site is common, particularly when performed around the eye.    All wounds normally drain.  The larger the wound the more drainage there will be.  After 7-10 days, you will notice the wound beginning to shrink and new skin will begin to grow.  The wound is healed when you can see skin has formed over the entire area.  A healed wound has a healthy, shiny look to the surface and is red to dark pink in color  to normalize.  Wounds may take approximately 4-6 weeks to heal.  Larger wounds may take 6-8 weeks.  After the wound is healed you may discontinue dressing changes.    You may experience a sensation of tightness as your wound heals. This is normal and will gradually subside.    Your healed wound may be sensitive to temperature changes. This sensitivity improves with time, but if you re having a lot of discomfort, try to avoid temperature extremes.    Patients frequently experience itching after their wound appears to have healed because of the continue healing under the skin.  Plain Vaseline will help relieve the itching.        POSSIBLE COMPLICATIONS    BLEEDING:    Leave the bandage in place.  Use tightly rolled up gauze or a cloth to apply direct pressure over the bandage for 30  minutes.  Reapply pressure for an additional 30 minutes if necessary  Use additional gauze and tape to maintain pressure once the bleeding has stopped.

## 2023-02-28 NOTE — LETTER
[facility] [place]  [address] [address]  Phone: [phone]    February 28, 2023    Thomas Davila                                                                                                       [address] [facility] [address]            Dear Ms. Davila,    You are scheduled for Mohs Surgery on:     Monday March 13 th at 8:00 am.  Monday March 27 th at 8:00 am.    Please check in at Dermatology Clinic.   (2nd Floor, last  Clinic on right up staircase or elevator -past OB/GYN clinic)    You don't need to arrive more than 5-10 minutes prior to your appointment time.     Be sure to eat a good breakfast and bathe and wash your hair prior to Surgery.    If you are taking any anti-coagulants that are prescribed by your Doctor (such as Coumadin/warfarin, Plavix, Aspirin, Ibuprofen), please continue taking them.     However, If you are taking anti-coagulants over the counter without  a Doctor's order for a Medical condition, please discontinue them 10 days prior to Surgery.      Please wear loose comfortable clothing as it could possibly be 4-6 hours until your surgery is completed depending upon how many layers of tissue need to be removed.     Wi-fi access is available.     Thank you,      Walt Golden MD/ Azucena Calle RN

## 2023-02-28 NOTE — TELEPHONE ENCOUNTER
Telephone call  Patient calling back relayed the results.  She has a appointment on 3/13/2023.    Harleen Collins RN   [facility] Nurse Advisor  12:59 PM 2/28/2023

## 2023-02-28 NOTE — TELEPHONE ENCOUNTER
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

## 2023-03-01 ENCOUNTER — ANTICOAGULATION THERAPY VISIT (OUTPATIENT)
Dept: ANTICOAGULATION | Facility: CLINIC | Age: 73
End: 2023-03-01

## 2023-03-01 ENCOUNTER — LAB (OUTPATIENT)
Dept: LAB | Facility: CLINIC | Age: 73
End: 2023-03-01
Payer: OTHER GOVERNMENT

## 2023-03-01 DIAGNOSIS — Z79.01 LONG TERM CURRENT USE OF ANTICOAGULANT THERAPY: ICD-10-CM

## 2023-03-01 DIAGNOSIS — Z79.899 ENCOUNTER FOR LONG-TERM CURRENT USE OF MEDICATION: ICD-10-CM

## 2023-03-01 DIAGNOSIS — I80.01 THROMBOPHLEBITIS OF SUPERFICIAL VEINS OF RIGHT LOWER EXTREMITY: Primary | ICD-10-CM

## 2023-03-01 DIAGNOSIS — Z83.2 FAMILY HISTORY OF FACTOR V DEFICIENCY: ICD-10-CM

## 2023-03-01 DIAGNOSIS — I80.01 THROMBOPHLEBITIS OF SUPERFICIAL VEINS OF RIGHT LOWER EXTREMITY: ICD-10-CM

## 2023-03-01 LAB — INR BLD: 2.5 (ref 0.9–1.1)

## 2023-03-01 PROCEDURE — 36416 COLLJ CAPILLARY BLOOD SPEC: CPT

## 2023-03-01 PROCEDURE — 85610 PROTHROMBIN TIME: CPT

## 2023-03-01 NOTE — PROGRESS NOTES
ANTICOAGULATION MANAGEMENT     Thomas Davila 73 year old female is on warfarin with therapeutic INR result. (Goal INR 2.0-3.0)    Recent labs: (last 7 days)     03/01/23  0850   INR 2.5*       ASSESSMENT       Source(s): Chart Review and Patient/Caregiver Call       Warfarin doses taken: Warfarin taken as instructed    Diet: No new diet changes identified    New illness, injury, or hospitalization: No   Dermatology appt on 2/28/23    Medication/supplement changes: None noted     Signs or symptoms of bleeding or clotting: No    Previous INR: Therapeutic last 3 visits    Additional findings: None         PLAN     Recommended plan for no diet, medication or health factor changes affecting INR     Dosing Instructions: Continue your current warfarin dose with next INR in 4-5 weeks       Summary  As of 3/1/2023    Full warfarin instructions:  2.5 mg every Mon, Wed, Fri; 5 mg all other days   Next INR check:  4/5/2023             Telephone call with  Akhil ([phone]) who verbalizes understanding and agrees to plan    Lab visit scheduled - INR on 4/5/23 @ [facility]    Education provided:     Taking warfarin: Importance of taking warfarin as instructed    Goal range and lab monitoring: goal range and significance of current result    Plan made per [facility] anticoagulation protocol    Leatha Kirby RN  Anticoagulation Clinic  3/1/2023    _______________________________________________________________________     Anticoagulation Episode Summary     Current INR goal:  2.0-3.0   TTR:  69.9 % (1 y)   Target end date:  Indefinite   Send INR reminders to:  [facility] [place]    Indications    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 okay to leave DVM per signed consent         Anticoagulation  Care Providers     Provider Role Specialty Phone number    Lizabeth Zavala MD Referring Family Medicine [phone]    Dung Prieto MD Referring Family Medicine [phone]

## 2023-03-06 NOTE — TELEPHONE ENCOUNTER
Message left to return call.     Looks like patient called back and was scheduled for a routine appt, but I don't see anyone gave her test results from her 2-28-23 office visit?..    Azucena Calle RN

## 2023-03-13 ENCOUNTER — OFFICE VISIT (OUTPATIENT)
Dept: DERMATOLOGY | Facility: CLINIC | Age: 73
End: 2023-03-13
Payer: MEDICARE

## 2023-03-13 VITALS — OXYGEN SATURATION: 98 % | HEART RATE: 95 BPM | SYSTOLIC BLOOD PRESSURE: 154 MMHG | DIASTOLIC BLOOD PRESSURE: 96 MMHG

## 2023-03-13 DIAGNOSIS — L57.0 AK (ACTINIC KERATOSIS): Primary | ICD-10-CM

## 2023-03-13 DIAGNOSIS — D04.62 SQUAMOUS CELL CARCINOMA IN SITU (SCCIS) OF SKIN OF LEFT UPPER ARM: ICD-10-CM

## 2023-03-13 PROCEDURE — 17000 DESTRUCT PREMALG LESION: CPT | Mod: 51 | Performed by: DERMATOLOGY

## 2023-03-13 PROCEDURE — 17313 MOHS 1 STAGE T/A/L: CPT | Performed by: DERMATOLOGY

## 2023-03-13 ASSESSMENT — PAIN SCALES - GENERAL: PAINLEVEL: NO PAIN (0)

## 2023-03-13 NOTE — LETTER
3/13/2023         RE: Thomas Davila  [address]  [place] [facility] [address]        Dear Colleague,    Thank you for referring your patient, Thomas Davila, to the [facility]. Please see a copy of my visit note below.    Surgical Office Location :   [facility]  [address] [place] [address]      Thomas Davila is an extremely pleasant 73 year old year old female patient here today for evaluation and managment of actinic keratosis and squamous cell carcinoma in situ.  Patient has no other skin complaints today.  Remainder of the HPI, Meds, PMH, Allergies, FH, and SH was reviewed in chart.      Past Medical History:   Diagnosis Date     Acute parametritis and pelvic cellulitis     salpingitis     ASCUS with positive high risk HPV 2013     Asthma     No recent issues     Basal cell carcinoma      breast cancer     left lumpectomy, radiation, tamoxifen     Breast cancer (H)     L Breast; Lumpectomy & Radiation     Chronic salpingitis and oophoritis 1994    PID        Embolism and thrombosis of unspecified site     left leg, due to tamoxifen therapy     Generalized osteoarthrosis, unspecified site     hands     Leiomyoma of uterus, unspecified      Morbid obesity (H) 2017     Other malignant neoplasm of skin, site unspecified 2006    Basal cell cancer on nose     Squamous cell carcinoma      Thrombosis of leg     Left     Unspecified essential hypertension        Past Surgical History:   Procedure Laterality Date     BREAST LUMPECTOMY, RT/LT      left     C/SECTION, LOW TRANSVERSE  1972,     , Low Transverse x2     CL AFF SURGICAL PATHOLOGY      Breast reduction     COLONOSCOPY  10/15/02    normal     FOOT SURGERY      R Foot      HC EXCISION BREAST LESION, OPEN >=1  98    1) Needle localization with generous lumpectomy 2) Left axillary node dissection.     HC LAPAROSCOPY, SURGICAL, ABDOMEN, PERITONEUM &  OMENTUM; DX W/ OR W/O SPECIMEN(S)  02/07/94     HYSTERECTOMY, PAP NO LONGER INDICATED       INSERT PORT VASCULAR ACCESS  3/14/2013    Procedure: INSERT PORT VASCULAR ACCESS;  Port Revision;  Surgeon: Arash Puente MD;  Location: [facility] OR     LAPAROSCOPIC HYSTERECTOMY TOTAL, BILATERAL SALPINGO-OOPHORECTOMY, NODE DISSECTION, COMBINED  1/31/2013    Procedure: COMBINED LAPAROSCOPIC HYSTERECTOMY TOTAL, SALPINGO-OOPHORECTOMY, NODE DISSECTION;  Laparosocpic  Total Abdominal Hysterectomy, Bilateral Salphino-Oophorectomy, Bilateral Pelvic Lymph Node Dissection, Pelvic Washings, Cystoscopy;  Surgeon: Tanya Walker MD;  Location: [facility] OR     PHACOEMULSIFICATION WITH STANDARD INTRAOCULAR LENS IMPLANT Left 6/12/2019    Procedure: Cataract Removal with Implant;  Surgeon: Walt Mckeon MD;  Location: [facility] OR     PHACOEMULSIFICATION WITH STANDARD INTRAOCULAR LENS IMPLANT Right 7/3/2019    Procedure: Cataract Removal with Implant;  Surgeon: Walt Mckeon MD;  Location: [facility] OR     SURGICAL HISTORY OF -   06/22/93    1) Excision of digital cyst right mid finger  2)  Excision of dermatofibroma left calf     SURGICAL HISTORY OF -   12/18/2001    Excision of mucinous cyst & partial ostectomy, bone removal of benign osteophytic overgrowth osteophyte of the distal aspect of the middle phalanx and distal phalanx     SURGICAL HISTORY OF -   2006    Basal cell cancer removal     TONSILLECTOMY       TUBAL LIGATION  1978        Family History   Problem Relation Age of Onset     Cerebrovascular Disease Mother      Hypertension Mother      Diabetes Mother      Thyroid Disease Mother      Arthritis Mother      Alzheimer Disease Mother      Neurologic Disorder Mother         Parkinsons     Heart Disease Father      Hypertension Father      Diabetes Father      Thyroid Disease Father      Arthritis Father      Blood Disease Father      Factor V Leiden deficiency Father      Anesthesia Reaction Sister      Thyroid Disease  Sister      Anesthesia Reaction Sister      Arthritis Sister         RA     Hypertension Brother      Factor V Leiden deficiency Brother      Allergies Son      Gastrointestinal Disease Son         GERD     Hypertension Brother      Allergies Son         percocett     Gastrointestinal Disease Son         GERD     Hypertension Son      Factor V Leiden deficiency Niece        Social History     Socioeconomic History     Marital status:      Spouse name: Not on file     Number of children: 2     Years of education: Not on file     Highest education level: Not on file   Occupational History     Employer: sub teacher in [employer]     Employer: RETIRED   Tobacco Use     Smoking status: Never     Smokeless tobacco: Never   Vaping Use     Vaping Use: Never used   Substance and Sexual Activity     Alcohol use: Yes     Comment: Rare     Drug use: No     Sexual activity: Yes     Partners: Male   Other Topics Concern     Parent/sibling w/ CABG, MI or angioplasty before 65F 55M? No   Social History Narrative    [employer]-- [employer]        April 15, 2021    ENVIRONMENTAL HISTORY: [place] The home is heated with a forced air. They do have central air conditioning. The patient's bedroom is furnished with hard vijaya in bedroom and animals sleep in bedroom.  Pets inside the house include 2 cat(s). There is no history of cockroach or mice infestation. There is/are 0 smokers in the house.  The house does not have a basement.      Social Determinants of Health     Financial Resource Strain: Not on file   Food Insecurity: Not on file   Transportation Needs: Not on file   Physical Activity: Not on file   Stress: Not on file   Social Connections: Not on file   Intimate Partner Violence: Not on file   Housing Stability: Not on file       Outpatient Encounter Medications as of 3/13/2023   Medication Sig Dispense Refill     Acetaminophen (TYLENOL PO) Take 500  mg by mouth as needed for mild pain or fever       COMPOUNDED NON-CONTROLLED SUBSTANCE (CMPD RX) - PHARMACY TO MIX COMPOUNDED MEDICATION Fluorouracil 5% Calcipotriene 0.005% 1:1 Cream apply twice daily for 2 weeks to arms 30 g 6     lisinopril (ZESTRIL) 20 MG tablet Take 1 tablet (20 mg) by mouth daily 90 tablet 3     omeprazole (PRILOSEC) 20 MG DR capsule TAKE 1 CAPSULE BY MOUTH ONCE DAILY 90 capsule 3     phentermine (ADIPEX-P) 30 MG capsule Take 1 capsule (30 mg) by mouth every morning for 90 days Hold on file until needed. 90 capsule 0     topiramate (TOPAMAX) 50 MG tablet Take 1 tablet (50 mg) by mouth daily 90 tablet 3     triamterene-HCTZ (MAXZIDE-25) 37.5-25 MG tablet Take 1 tablet by mouth daily 90 tablet 3     warfarin ANTICOAGULANT (JANTOVEN ANTICOAGULANT) 5 MG tablet Take 2.5 mg every Mon, Wed, Fri; 5 mg all other days or as directed by the INR clinic. 180 tablet 1     No facility-administered encounter medications on file as of 3/13/2023.             O:   NAD, WDWN, Alert & Oriented, Mood & Affect wnl, Vitals stable   Here today alone   BP (!) 154/96 (BP Location: Right arm, Patient Position: Sitting, Cuff Size: Adult Large)   Pulse 95   SpO2 98%    General appearance normal   Vitals stable   Alert, oriented and in no acute distress   L elbow gritty plaque  L deltoid 2cm red plaque      Eyes: Conjunctivae/lids:Normal     ENT: Lips, buccal mucosa, tongue: normal    MSK:Normal    Cardiovascular: peripheral edema none    Pulm: Breathing Normal    Neuro/Psych: Orientation:Alert and Orientedx3 ; Mood/Affect:normal       A/P:  1. l elbow actinic keratosis   LN2:  Treated with LN2 for 5s for 1-2 cycles. Warned risks of blistering, pain, pigment change, scarring, and incomplete resolution.  Advised patient to return if lesions do not completely resolve.  Wound care sheet given.  2. 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  The risks and benefits as well as alternatives to therapy were discussed, in detail.  Specifically, the risks of infection, scarring, bleeding, prolonged wound healing, incomplete removal, allergy to anesthesia, nerve injury and recurrence were addressed.  Indication for Mohs was Size. Prior to the procedure, the treatment site was clearly identified and, if available, confirmed with previous photos and confirmed by the patient   All components of the Universal Protocol/PAUSE rule were completed.  The Mohs surgeon operated in two distinct and integrated capacities as the surgeon and pathologist.      The area was prepped with Betasept.  A rim of normal appearing skin was marked circumferentially around the lesion.  The area was infiltrated with local anesthesia.  The tumor was first debulked to remove all clinically apparent tumor.  An incision following the standard Mohs approach was done and the specimen was oriented,mapped and placed in 1 block(s).  Each specimen was then chromacoded and processed in the Mohs laboratory using standard Mohs technique and submitted for frozen section histology.  Frozen section analysis showed no residual tumor but CLEAR MARGINS.      The tumor was excised using standard Mohs technique in 1 stages(s).  CLEAR MARGINS OBTAINED and Final defect size was 2.6 x 2.3 cm.     We discussed the options for wound management in full with the patient including risks/benefits/ possible outcomes.        REPAIR SECOND INTENT: We discussed the options for wound management in full with the patient including risks/benefits/possible outcomes. Decision made to allow the wound to heal by second intention. Cautery was used for for hemostasis. EBL minimal; complications none; wound care routine.  The patient was discharged in good condition and will return in one month or prn for wound evaluation.  It was a pleasure speaking to Thomas Davila today.  Previous clinic notes and  pertinent laboratory tests were reviewed prior to Thomas Davila's visit.  Signs and Symptoms of skin cancer discussed with patient.  Patient encouraged to perform monthly skin exams.  UV precautions reviewed with patient.  Return to clinic next appt        Again, thank you for allowing me to participate in the care of your patient.        Sincerely,        Walt Golden MD

## 2023-03-13 NOTE — PATIENT INSTRUCTIONS
Open Wound Care     for __Left Upper Arm____________        No strenuous activity for 48 hours    Take Tylenol as needed for discomfort.                                                .         Do not drink alcoholic beverages for 48 hours.    Keep the pressure bandage in place for 24 hours. If the bandage becomes blood tinged or loose, reinforce it with gauze and tape.        (Refer to the reverse side of this page for management of bleeding).    Remove bandage in 24 hours and begin wound care as follows:     Clean area with tap water using a Q tip or gauze pad, (shower / bathe normally)  Dry wound with Q tip or gauze pad  Apply Aquaphor, Vaseline, Polysporin or Bacitracin Ointment with a Q tip  Do NOT use Neosporin Ointment *  Cover the wound with a band-aid or nonstick gauze pad and paper tape.  Repeat wound care once a day until wound is completely healed.    It is an old wives tale that a wound heals better when it is exposed to air and allowed to dry out. The wound will heal faster with a better cosmetic result if it is kept moist with ointment and covered with a bandage.  Do not let the wound dry out.      Supplies Needed:                Qtips or gauze pads                Polysporin or Bacitracin Ointment                Bandaids or nonstick gauze pads and paper tape    Wound care kits and brown paper tape are available for purchase at   the pharmacy.    BLEEDING:    Use tightly rolled up gauze or cloth to apply direct pressure over the bandage for 20   minutes.  Reapply pressure for an additional 20 minutes if necessary  Call the office or go to the nearest emergency room if pressure fails to stop the bleeding.  Use additional gauze and tape to maintain pressure once the bleeding has stopped.  Begin wound care 24 hours after surgery as directed.                  WOUND HEALING    One week after surgery a pink / red halo will form around the outside of the wound.   This is new skin.  The center of the wound  will appear yellowish white and produce some drainage.  The pink halo will slowly migrate in toward the center of the wound until the wound is covered with new shiny pink skin.  There will be no more drainage when the wound is completely healed.  It will take six months to one year for the redness to fade.  The scar may be itchy, tight and sensitive to extreme temperatures for a year after the surgery.  Massaging the area several times a day for several minutes after the wound is completely healed will help the scar soften and normalize faster. Begin massage only after healing is complete.      In case of emergency call: Dr Golden: [phone]    [facility]: [phone]    [facility]:[phone]     WOUND CARE INSTRUCTIONS   FOR CRYOSURGERY   L Elbow  This area treated with liquid nitrogen should form a blister (areas treated may or may not blister-skin may just turn dark and slough off). You do not need to bandage the area unless a blister forms and breaks (which may be a few days). When the blister breaks, begin daily dressing changes as follows:  1) Clean and dry the area with tap water using clean Q-tip or sterile gauze pad.   2) Apply Polysporin ointment or Bacitracin ointment over entire wound. Do NOT use Neosporin ointment.   3) Cover the wound with a band-aid or sterile non-stick gauze pad and micropore paper tape.   REPEAT THESE INSTRUCTIONS AT LEAST ONCE A DAY UNTIL THE WOUND HAS COMPLETELY HEALED.   It is an old wives tale that a wound heals better when it is exposed to air and allowed to dry out. The wound will heal faster with a better cosmetic result if it is kept moist with ointment and covered with a bandage.   Do not let the wound dry out.   IMPORTANT INFORMATION ON REVERSE SIDE   Supplies Needed:   *Cotton tipped applicators (Q-tips)   *Polysporin ointment or Bacitracin ointment (NOT NEOSPORIN)   *Band-aids, or non stick gauze pads and micropore paper tape   PATIENT  INFORMATION   During the healing process you will notice a number of changes. All wounds develop a small halo of redness surrounding the wound. This means healing is occurring. Severe itching with extensive redness usually indicates sensitivity to the ointment or bandage tape used to dress the wound. You should call our office if this develops.   Swelling and/or discoloration around your surgical site is common, particularly when performed around the eye.   All wounds normally drain. The larger the wound the more drainage there will be. After 7-10 days, you will notice the wound beginning to shrink and new skin will begin to grow. The wound is healed when you can see skin has formed over the entire area. A healed wound has a healthy, shiny look to the surface and is red to dark pink in color to normalize. Wounds may take approximately 4-6 weeks to heal. Larger wounds may take 6-8 weeks. After the wound is healed you may discontinue dressing changes.   You may experience a sensation of tightness as your wound heals. This is normal and will gradually subside.   Your healed wound may be sensitive to temperature changes. This sensitivity improves with time, but if you re having a lot of discomfort, try to avoid temperature extremes.   Patients frequently experience itching after their wound appears to have healed because of the continue healing under the skin. Plain Vaseline will help relieve the itching.

## 2023-03-13 NOTE — NURSING NOTE
Chief Complaint   Patient presents with     Derm Problem     Mohs- L Deltoid       Vitals:    03/13/23 0803   BP: (!) 154/96   BP Location: Right arm   Patient Position: Sitting   Cuff Size: Adult Large   Pulse: 95   SpO2: 98%     Wt Readings from Last 1 Encounters:   02/27/23 89.4 kg (197 lb)       Jojo Jordan LPN .................3/13/2023

## 2023-03-13 NOTE — PROGRESS NOTES
Thomas Davila is an extremely pleasant 73 year old year old female patient here today for evaluation and managment of actinic keratosis and squamous cell carcinoma in situ.  Patient has no other skin complaints today.  Remainder of the HPI, Meds, PMH, Allergies, FH, and SH was reviewed in chart.      Past Medical History:   Diagnosis Date     Acute parametritis and pelvic cellulitis     salpingitis     ASCUS with positive high risk HPV 2013     Asthma     No recent issues     Basal cell carcinoma      breast cancer     left lumpectomy, radiation, tamoxifen     Breast cancer (H)     L Breast; Lumpectomy & Radiation     Chronic salpingitis and oophoritis 1994    PID        Embolism and thrombosis of unspecified site     left leg, due to tamoxifen therapy     Generalized osteoarthrosis, unspecified site     hands     Leiomyoma of uterus, unspecified      Morbid obesity (H) 2017     Other malignant neoplasm of skin, site unspecified 2006    Basal cell cancer on nose     Squamous cell carcinoma      Thrombosis of leg     Left     Unspecified essential hypertension        Past Surgical History:   Procedure Laterality Date     BREAST LUMPECTOMY, RT/LT      left     C/SECTION, LOW TRANSVERSE  1972,     , Low Transverse x2     CL AFF SURGICAL PATHOLOGY      Breast reduction     COLONOSCOPY  10/15/02    normal     FOOT SURGERY      R Foot      HC EXCISION BREAST LESION, OPEN >=1  98    1) Needle localization with generous lumpectomy 2) Left axillary node dissection.     HC LAPAROSCOPY, SURGICAL, ABDOMEN, PERITONEUM & OMENTUM; DX W/ OR W/O SPECIMEN(S)  94     HYSTERECTOMY, PAP NO LONGER INDICATED       INSERT PORT VASCULAR ACCESS  3/14/2013    Procedure: INSERT PORT VASCULAR ACCESS;  Port Revision;  Surgeon: Arash Puente MD;  Location: [facility]     LAPAROSCOPIC HYSTERECTOMY TOTAL, BILATERAL SALPINGO-OOPHORECTOMY, NODE DISSECTION, COMBINED  2013     Procedure: COMBINED LAPAROSCOPIC HYSTERECTOMY TOTAL, SALPINGO-OOPHORECTOMY, NODE DISSECTION;  Laparosocpic  Total Abdominal Hysterectomy, Bilateral Salphino-Oophorectomy, Bilateral Pelvic Lymph Node Dissection, Pelvic Washings, Cystoscopy;  Surgeon: Tanya Walker MD;  Location: [facility]     PHACOEMULSIFICATION WITH STANDARD INTRAOCULAR LENS IMPLANT Left 6/12/2019    Procedure: Cataract Removal with Implant;  Surgeon: Walt Mckeon MD;  Location: [facility]     PHACOEMULSIFICATION WITH STANDARD INTRAOCULAR LENS IMPLANT Right 7/3/2019    Procedure: Cataract Removal with Implant;  Surgeon: Walt Mckeon MD;  Location: [facility]     SURGICAL HISTORY OF -   06/22/93    1) Excision of digital cyst right mid finger  2)  Excision of dermatofibroma left calf     SURGICAL HISTORY OF -   12/18/2001    Excision of mucinous cyst & partial ostectomy, bone removal of benign osteophytic overgrowth osteophyte of the distal aspect of the middle phalanx and distal phalanx     SURGICAL HISTORY OF -   2006    Basal cell cancer removal     TONSILLECTOMY       TUBAL LIGATION  1978        Family History   Problem Relation Age of Onset     Cerebrovascular Disease Mother      Hypertension Mother      Diabetes Mother      Thyroid Disease Mother      Arthritis Mother      Alzheimer Disease Mother      Neurologic Disorder Mother         Parkinsons     Heart Disease Father      Hypertension Father      Diabetes Father      Thyroid Disease Father      Arthritis Father      Blood Disease Father      Factor V Leiden deficiency Father      Anesthesia Reaction Sister      Thyroid Disease Sister      Anesthesia Reaction Sister      Arthritis Sister         RA     Hypertension Brother      Factor V Leiden deficiency Brother      Allergies Son      Gastrointestinal Disease Son         GERD     Hypertension Brother      Allergies Son         percocett     Gastrointestinal Disease Son         GERD     Hypertension Son      Factor V Leiden  deficiency Niece        Social History     Socioeconomic History     Marital status:      Spouse name: Not on file     Number of children: 2     Years of education: Not on file     Highest education level: Not on file   Occupational History     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 No   Social History Narrative    [employer]-- [employer]CL[employer]ES ALL BLO[employer] PRODUCTS        April 15, 2021    ENVIRONMENTAL HISTORY: [place] heated with a forced air. They do have central air conditioning. The patient's bedroom is furnished with hard vijaya in bedroom and animals sleep in bedroom.  Pets inside the house include 2 cat(s). There is no history of cockroach or mice infestation. There is/are 0 smokers in the house.  The house does not have a basement.      Social Determinants of Health     Financial Resource Strain: Not on file   Food Insecurity: Not on file   Transportation Needs: Not on file   Physical Activity: Not on file   Stress: Not on file   Social Connections: Not on file   Intimate Partner Violence: Not on file   Housing Stability: Not on file       Outpatient Encounter Medications as of 3/13/2023   Medication Sig Dispense Refill     Acetaminophen (TYLENOL PO) Take 500 mg by mouth as needed for mild pain or fever       COMPOUNDED NON-CONTROLLED SUBSTANCE (CMPD RX) - PHARMACY TO MIX COMPOUNDED MEDICATION Fluorouracil 5% Calcipotriene 0.005% 1:1 Cream apply twice daily for 2 weeks to arms 30 g 6     lisinopril (ZESTRIL) 20 MG tablet Take 1 tablet (20 mg) by mouth daily 90 tablet 3     omeprazole (PRILOSEC) 20 MG   capsule TAKE 1 CAPSULE BY MOUTH ONCE DAILY 90 capsule 3     phentermine (ADIPEX-P) 30 MG capsule Take 1 capsule (30 mg) by mouth every morning for 90 days Hold on file until needed. 90 capsule 0     topiramate (TOPAMAX) 50 MG tablet Take 1 tablet (50 mg) by mouth daily 90 tablet 3     triamterene-HCTZ (MAXZIDE-25) 37.5-25 MG tablet Take 1 tablet by mouth daily 90 tablet 3     warfarin ANTICOAGULANT (JANTOVEN ANTICOAGULANT) 5 MG tablet Take 2.5 mg every Mon, Wed, Fri; 5 mg all other days or as directed by the INR clinic. 180 tablet 1     No facility-administered encounter medications on file as of 3/13/2023.             O:   NAD, WDWN, Alert & Oriented, Mood & Affect wnl, Vitals stable   Here today alone   BP (!) 154/96 (BP Location: Right arm, Patient Position: Sitting, Cuff Size: Adult Large)   Pulse 95   SpO2 98%    General appearance normal   Vitals stable   Alert, oriented and in no acute distress   L elbow gritty plaque  L deltoid 2cm red plaque      Eyes: Conjunctivae/lids:Normal     ENT: Lips, buccal mucosa, tongue: normal    MSK:Normal    Cardiovascular: peripheral edema none    Pulm: Breathing Normal    Neuro/Psych: Orientation:Alert and Orientedx3 ; Mood/Affect:normal       A/P:  1. l elbow actinic keratosis   LN2:  Treated with LN2 for 5s for 1-2 cycles. Warned risks of blistering, pain, pigment change, scarring, and incomplete resolution.  Advised patient to return if lesions do not completely resolve.  Wound care sheet given.  2. L deltoid squamous cell carcinoma in situ   MOHS:   Size    The rationale for Mohs surgery was discussed with the patient and consent was obtained.  The risks and benefits as well as alternatives to therapy were discussed, in detail.  Specifically, the risks of infection, scarring, bleeding, prolonged wound healing, incomplete removal, allergy to anesthesia, nerve injury and recurrence were addressed.  Indication for Mohs was Size. Prior to the procedure, the treatment site  was clearly identified and, if available, confirmed with previous photos and confirmed by the patient   All components of the Universal Protocol/PAUSE rule were completed.  The Mohs surgeon operated in two distinct and integrated capacities as the surgeon and pathologist.      The area was prepped with Betasept.  A rim of normal appearing skin was marked circumferentially around the lesion.  The area was infiltrated with local anesthesia.  The tumor was first debulked to remove all clinically apparent tumor.  An incision following the standard Mohs approach was done and the specimen was oriented,mapped and placed in 1 block(s).  Each specimen was then chromacoded and processed in the Mohs laboratory using standard Mohs technique and submitted for frozen section histology.  Frozen section analysis showed no residual tumor but CLEAR MARGINS.      The tumor was excised using standard Mohs technique in 1 stages(s).  CLEAR MARGINS OBTAINED and Final defect size was 2.6 x 2.3 cm.     We discussed the options for wound management in full with the patient including risks/benefits/ possible outcomes.        REPAIR SECOND INTENT: We discussed the options for wound management in full with the patient including risks/benefits/possible outcomes. Decision made to allow the wound to heal by second intention. Cautery was used for for hemostasis. EBL minimal; complications none; wound care routine.  The patient was discharged in good condition and will return in one month or prn for wound evaluation.  It was a pleasure speaking to Thomas Davila today.  Previous clinic notes and pertinent laboratory tests were reviewed prior to Thomas Davila's visit.  Signs and Symptoms of skin cancer discussed with patient.  Patient encouraged to perform monthly skin exams.  UV precautions reviewed with patient.  Return to clinic next appt

## 2023-03-27 ENCOUNTER — OFFICE VISIT (OUTPATIENT)
Dept: DERMATOLOGY | Facility: CLINIC | Age: 73
End: 2023-03-27
Payer: MEDICARE

## 2023-03-27 VITALS — DIASTOLIC BLOOD PRESSURE: 96 MMHG | OXYGEN SATURATION: 98 % | SYSTOLIC BLOOD PRESSURE: 154 MMHG | HEART RATE: 95 BPM

## 2023-03-27 DIAGNOSIS — C44.612 BASAL CELL CARCINOMA (BCC) OF RIGHT UPPER ARM: Primary | ICD-10-CM

## 2023-03-27 PROCEDURE — 88331 PATH CONSLTJ SURG 1 BLK 1SPC: CPT | Performed by: DERMATOLOGY

## 2023-03-27 PROCEDURE — 11602 EXC TR-EXT MAL+MARG 1.1-2 CM: CPT | Performed by: DERMATOLOGY

## 2023-03-27 ASSESSMENT — PAIN SCALES - GENERAL: PAINLEVEL: NO PAIN (0)

## 2023-03-27 NOTE — LETTER
3/27/2023         RE: Thomas Davila  [address] [facility] [facility] [address]        Dear Colleague,    Thank you for referring your patient, Thomas Davila, to the [facility]. Please see a copy of my visit note below.    Surgical Office Location :   [facility]  [address] [place] [address]      Thomas Davila is an extremely pleasant 73 year old year old female patient here today for evaluation and managment of basal cell carcinoma on right deltoid. Previous site healing well.  Patient has no other skin complaints today.  Remainder of the HPI, Meds, PMH, Allergies, FH, and SH was reviewed in chart.      Past Medical History:   Diagnosis Date     Acute parametritis and pelvic cellulitis     salpingitis     ASCUS with positive high risk HPV 2013     Asthma     No recent issues     Basal cell carcinoma      breast cancer     left lumpectomy, radiation, tamoxifen     Breast cancer (H)     L Breast; Lumpectomy & Radiation     Chronic salpingitis and oophoritis 1994    PID        Embolism and thrombosis of unspecified site     left leg, due to tamoxifen therapy     Generalized osteoarthrosis, unspecified site     hands     Leiomyoma of uterus, unspecified      Morbid obesity (H) 2017     Other malignant neoplasm of skin, site unspecified 2006    Basal cell cancer on nose     Squamous cell carcinoma      Thrombosis of leg     Left     Unspecified essential hypertension        Past Surgical History:   Procedure Laterality Date     BREAST LUMPECTOMY, RT/LT      left     C/SECTION, LOW TRANSVERSE  1972,     , Low Transverse x2     CL AFF SURGICAL PATHOLOGY      Breast reduction     COLONOSCOPY  10/15/02    normal     FOOT SURGERY      R Foot      HC EXCISION BREAST LESION, OPEN >=1  98    1) Needle localization with generous lumpectomy 2) Left axillary node dissection.     HC LAPAROSCOPY, SURGICAL, ABDOMEN,  PERITONEUM & OMENTUM; DX W/ OR W/O SPECIMEN(S)  02/07/94     HYSTERECTOMY, PAP NO LONGER INDICATED       INSERT PORT VASCULAR ACCESS  3/14/2013    Procedure: INSERT PORT VASCULAR ACCESS;  Port Revision;  Surgeon: Arash Puente MD;  Location: [facility]     LAPAROSCOPIC HYSTERECTOMY TOTAL, BILATERAL SALPINGO-OOPHORECTOMY, NODE DISSECTION, COMBINED  1/31/2013    Procedure: COMBINED LAPAROSCOPIC HYSTERECTOMY TOTAL, SALPINGO-OOPHORECTOMY, NODE DISSECTION;  Laparosocpic  Total Abdominal Hysterectomy, Bilateral Salphino-Oophorectomy, Bilateral Pelvic Lymph Node Dissection, Pelvic Washings, Cystoscopy;  Surgeon: Tanya Walker MD;  Location: [facility]     PHACOEMULSIFICATION WITH STANDARD INTRAOCULAR LENS IMPLANT Left 6/12/2019    Procedure: Cataract Removal with Implant;  Surgeon: Walt Mckeon MD;  Location: [facility]     PHACOEMULSIFICATION WITH STANDARD INTRAOCULAR LENS IMPLANT Right 7/3/2019    Procedure: Cataract Removal with Implant;  Surgeon: Walt Mckeon MD;  Location: [facility]     SURGICAL HISTORY OF -   06/22/93    1) Excision of digital cyst right mid finger  2)  Excision of dermatofibroma left calf     SURGICAL HISTORY OF -   12/18/2001    Excision of mucinous cyst & partial ostectomy, bone removal of benign osteophytic overgrowth osteophyte of the distal aspect of the middle phalanx and distal phalanx     SURGICAL HISTORY OF -   2006    Basal cell cancer removal     TONSILLECTOMY       TUBAL LIGATION  1978        Family History   Problem Relation Age of Onset     Cerebrovascular Disease Mother      Hypertension Mother      Diabetes Mother      Thyroid Disease Mother      Arthritis Mother      Alzheimer Disease Mother      Neurologic Disorder Mother         Parkinsons     Heart Disease Father      Hypertension Father      Diabetes Father      Thyroid Disease Father      Arthritis Father      Blood Disease Father      Factor V Leiden deficiency Father      Anesthesia Reaction Sister      Thyroid  Disease Sister      Anesthesia Reaction Sister      Arthritis Sister         RA     Hypertension Brother      Factor V Leiden deficiency Brother      Allergies Son      Gastrointestinal Disease Son         GERD     Hypertension Brother      Allergies Son         percocett     Gastrointestinal Disease Son         GERD     Hypertension Son      Factor V Leiden deficiency Niece        Social History     Socioeconomic History     Marital status:      Spouse name: Not on file     Number of children: 2     Years of education: Not on file     Highest education level: Not on file   Occupational History     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 The home is heated with a forced air. They do have central air conditioning. The patient's bedroom is furnished with hard vijaya in bedroom and animals sleep in bedroom.  Pets inside the house include 2 cat(s). There is no history of cockroach or mice infestation. There is/are 0 smokers in the house.  The house does not have a basement.      Social Determinants of Health     Financial Resource Strain: Not on file   Food Insecurity: Not on file   Transportation Needs: Not on file   Physical Activity: Not on file   Stress: Not on file   Social Connections: Not on file   Intimate Partner Violence: Not on file   Housing Stability: Not on file       Outpatient Encounter Medications as of 3/27/2023   Medication Sig Dispense Refill     Acetaminophen (TYLENOL PO)  Take 500 mg by mouth as needed for mild pain or fever       COMPOUNDED NON-CONTROLLED SUBSTANCE (CMPD RX) - PHARMACY TO MIX COMPOUNDED MEDICATION Fluorouracil 5% Calcipotriene 0.005% 1:1 Cream apply twice daily for 2 weeks to arms 30 g 6     lisinopril (ZESTRIL) 20 MG tablet Take 1 tablet (20 mg) by mouth daily 90 tablet 3     omeprazole (PRILOSEC) 20 MG DR capsule TAKE 1 CAPSULE BY MOUTH ONCE DAILY 90 capsule 3     phentermine (ADIPEX-P) 30 MG capsule Take 1 capsule (30 mg) by mouth every morning for 90 days Hold on file until needed. 90 capsule 0     topiramate (TOPAMAX) 50 MG tablet Take 1 tablet (50 mg) by mouth daily 90 tablet 3     triamterene-HCTZ (MAXZIDE-25) 37.5-25 MG tablet Take 1 tablet by mouth daily 90 tablet 3     warfarin ANTICOAGULANT (JANTOVEN ANTICOAGULANT) 5 MG tablet Take 2.5 mg every Mon, Wed, Fri; 5 mg all other days or as directed by the INR clinic. 180 tablet 1     No facility-administered encounter medications on file as of 3/27/2023.             O:   NAD, WDWN, Alert & Oriented, Mood & Affect wnl, Vitals stable   Here today alone    General appearance normal   Vitals stable   Alert, oriented and in no acute distress     R deltoid 1cm red plaque      Eyes: Conjunctivae/lids:Normal     ENT: Lips, buccal mucosa, tongue: normal    MSK:Normal    Cardiovascular: peripheral edema none    Pulm: Breathing Normal    Neuro/Psych: Orientation:Alert and Orientedx3 ; Mood/Affect:normal       MICRO:   R deltoid:Unremarkable epidermis, dermis and superficial subcutis.  No concerning areas for malignancy.     A/P:  1. R deltoid basal cell carcinoma   EXCISION OF BASAL CELL CARCINOMA, Margins confirmed with FROZEN SECTIONS AND Second intent: After thorough discussion of PGACAC, consent obtained, anesthesia and prep, the margins of the lesion were identified and an incision was made encompassing the lesion with 4mm margin. The incisions were made through the skin and down to and including the superficial  subcutis.  The lesion was removed en bloc and submitted for frozen section pathologic review. Clear margins obtained (1.7cm).    REPAIR SECOND INTENT: We discussed the options for wound management in full with the patient including risks/benefits/possible outcomes. Decision made to allow the wound to heal by second intention. EBL minimal; complications none; wound care routine.  The patient was discharged in good condition and will return in one month or prn for wound evaluation.     It was a pleasure speaking to Thomas Davila today.  Previous clinic notes and pertinent laboratory tests were reviewed prior to Thomas Davila's visit.  Signs and Symptoms of skin cancer discussed with patient.  Patient encouraged to perform monthly skin exams.  UV precautions reviewed with patient.  Risks of non-melanoma skin cancer discussed with patient   Return to clinic 6 months        Again, thank you for allowing me to participate in the care of your patient.        Sincerely,        Walt Golden MD

## 2023-03-27 NOTE — PROGRESS NOTES
Thomas Davila is an extremely pleasant 73 year old year old female patient here today for evaluation and managment of basal cell carcinoma on right deltoid. Previous site healing well.  Patient has no other skin complaints today.  Remainder of the HPI, Meds, PMH, Allergies, FH, and SH was reviewed in chart.      Past Medical History:   Diagnosis Date     Acute parametritis and pelvic cellulitis     salpingitis     ASCUS with positive high risk HPV 2013     Asthma     No recent issues     Basal cell carcinoma      breast cancer     left lumpectomy, radiation, tamoxifen     Breast cancer (H)     L Breast; Lumpectomy & Radiation     Chronic salpingitis and oophoritis 1994    PID        Embolism and thrombosis of unspecified site     left leg, due to tamoxifen therapy     Generalized osteoarthrosis, unspecified site     hands     Leiomyoma of uterus, unspecified      Morbid obesity (H) 2017     Other malignant neoplasm of skin, site unspecified 2006    Basal cell cancer on nose     Squamous cell carcinoma      Thrombosis of leg     Left     Unspecified essential hypertension        Past Surgical History:   Procedure Laterality Date     BREAST LUMPECTOMY, RT/LT      left     C/SECTION, LOW TRANSVERSE  1972,     , Low Transverse x2     CL AFF SURGICAL PATHOLOGY      Breast reduction     COLONOSCOPY  10/15/02    normal     FOOT SURGERY      R Foot      HC EXCISION BREAST LESION, OPEN >=1  98    1) Needle localization with generous lumpectomy 2) Left axillary node dissection.     HC LAPAROSCOPY, SURGICAL, ABDOMEN, PERITONEUM & OMENTUM; DX W/ OR W/O SPECIMEN(S)  94     HYSTERECTOMY, PAP NO LONGER INDICATED       INSERT PORT VASCULAR ACCESS  3/14/2013    Procedure: INSERT PORT VASCULAR ACCESS;  Port Revision;  Surgeon: Arash Puente MD;  Location: [facility]     LAPAROSCOPIC HYSTERECTOMY TOTAL, BILATERAL SALPINGO-OOPHORECTOMY, NODE DISSECTION, COMBINED   1/31/2013    Procedure: COMBINED LAPAROSCOPIC HYSTERECTOMY TOTAL, SALPINGO-OOPHORECTOMY, NODE DISSECTION;  Laparosocpic  Total Abdominal Hysterectomy, Bilateral Salphino-Oophorectomy, Bilateral Pelvic Lymph Node Dissection, Pelvic Washings, Cystoscopy;  Surgeon: Tanya Walker MD;  Location: [facility]     PHACOEMULSIFICATION WITH STANDARD INTRAOCULAR LENS IMPLANT Left 6/12/2019    Procedure: Cataract Removal with Implant;  Surgeon: Walt Mckeon MD;  Location: [facility]     PHACOEMULSIFICATION WITH STANDARD INTRAOCULAR LENS IMPLANT Right 7/3/2019    Procedure: Cataract Removal with Implant;  Surgeon: Walt Mckeon MD;  Location: [facility]     SURGICAL HISTORY OF -   06/22/93    1) Excision of digital cyst right mid finger  2)  Excision of dermatofibroma left calf     SURGICAL HISTORY OF -   12/18/2001    Excision of mucinous cyst & partial ostectomy, bone removal of benign osteophytic overgrowth osteophyte of the distal aspect of the middle phalanx and distal phalanx     SURGICAL HISTORY OF -   2006    Basal cell cancer removal     TONSILLECTOMY       TUBAL LIGATION  1978        Family History   Problem Relation Age of Onset     Cerebrovascular Disease Mother      Hypertension Mother      Diabetes Mother      Thyroid Disease Mother      Arthritis Mother      Alzheimer Disease Mother      Neurologic Disorder Mother         Parkinsons     Heart Disease Father      Hypertension Father      Diabetes Father      Thyroid Disease Father      Arthritis Father      Blood Disease Father      Factor V Leiden deficiency Father      Anesthesia Reaction Sister      Thyroid Disease Sister      Anesthesia Reaction Sister      Arthritis Sister         RA     Hypertension Brother      Factor V Leiden deficiency Brother      Allergies Son      Gastrointestinal Disease Son         GERD     Hypertension Brother      Allergies Son         percocett     Gastrointestinal Disease Son         GERD     Hypertension Son      Factor  V Leiden deficiency Niece        Social History     Socioeconomic History     Marital status:      Spouse name: Not on file     Number of children: 2     Years of education: Not on file     Highest education level: Not on file   Occupational History     Employer: sub teacher in [employer]     Employer: RETIRED   Tobacco Use     Smoking status: Never     Smokeless tobacco: Never   Vaping Use     Vaping Use: Never used   Substance and Sexual Activity     Alcohol use: Yes     Comment: Rare     Drug use: No     Sexual activity: Yes     Partners: Male   Other Topics Concern     Parent/sibling w/ CABG, MI or angioplasty before 65F 55M? No   Social History Narrative    Anabaptist-- DECLINES ALL BLOOD PRODUCTS        April 15, 2021    ENVIRONMENTAL HISTORY: [place] The home is heated with a forced air. They do have central air conditioning. The patient's bedroom is furnished with hard vijaya in bedroom and animals sleep in bedroom.  Pets inside the house include 2 cat(s). There is no history of cockroach or mice infestation. There is/are 0 smokers in the house.  The house does not have a basement.      Social Determinants of Health     Financial Resource Strain: Not on file   Food Insecurity: Not on file   Transportation Needs: Not on file   Physical Activity: Not on file   Stress: Not on file   Social Connections: Not on file   Intimate Partner Violence: Not on file   Housing Stability: Not on file       Outpatient Encounter Medications as of 3/27/2023   Medication Sig Dispense Refill     Acetaminophen (TYLENOL PO) Take 500 mg by mouth as needed for mild pain or fever       COMPOUNDED NON-CONTROLLED SUBSTANCE (CMPD RX) - PHARMACY TO MIX COMPOUNDED MEDICATION Fluorouracil 5% Calcipotriene 0.005% 1:1 Cream apply twice daily for 2 weeks to arms 30 g 6     lisinopril (ZESTRIL) 20 MG tablet Take 1 tablet (20 mg) by mouth daily 90 tablet 3     omeprazole (PRILOSEC)  20 MG DR capsule TAKE 1 CAPSULE BY MOUTH ONCE DAILY 90 capsule 3     phentermine (ADIPEX-P) 30 MG capsule Take 1 capsule (30 mg) by mouth every morning for 90 days Hold on file until needed. 90 capsule 0     topiramate (TOPAMAX) 50 MG tablet Take 1 tablet (50 mg) by mouth daily 90 tablet 3     triamterene-HCTZ (MAXZIDE-25) 37.5-25 MG tablet Take 1 tablet by mouth daily 90 tablet 3     warfarin ANTICOAGULANT (JANTOVEN ANTICOAGULANT) 5 MG tablet Take 2.5 mg every Mon, Wed, Fri; 5 mg all other days or as directed by the INR clinic. 180 tablet 1     No facility-administered encounter medications on file as of 3/27/2023.             O:   NAD, WDWN, Alert & Oriented, Mood & Affect wnl, Vitals stable   Here today alone    General appearance normal   Vitals stable   Alert, oriented and in no acute distress     R deltoid 1cm red plaque      Eyes: Conjunctivae/lids:Normal     ENT: Lips, buccal mucosa, tongue: normal    MSK:Normal    Cardiovascular: peripheral edema none    Pulm: Breathing Normal    Neuro/Psych: Orientation:Alert and Orientedx3 ; Mood/Affect:normal       MICRO:   R deltoid:Unremarkable epidermis, dermis and superficial subcutis.  No concerning areas for malignancy.     A/P:  1. R deltoid basal cell carcinoma   EXCISION OF BASAL CELL CARCINOMA, Margins confirmed with FROZEN SECTIONS AND Second intent: After thorough discussion of PGACAC, consent obtained, anesthesia and prep, the margins of the lesion were identified and an incision was made encompassing the lesion with 4mm margin. The incisions were made through the skin and down to and including the superficial subcutis.  The lesion was removed en bloc and submitted for frozen section pathologic review. Clear margins obtained (1.7cm).    REPAIR SECOND INTENT: We discussed the options for wound management in full with the patient including risks/benefits/possible outcomes. Decision made to allow the wound to heal by second intention. EBL minimal; complications  none; wound care routine.  The patient was discharged in good condition and will return in one month or prn for wound evaluation.     It was a pleasure speaking to Thomas Davila today.  Previous clinic notes and pertinent laboratory tests were reviewed prior to Thomas Davila's visit.  Signs and Symptoms of skin cancer discussed with patient.  Patient encouraged to perform monthly skin exams.  UV precautions reviewed with patient.  Risks of non-melanoma skin cancer discussed with patient   Return to clinic 6 months

## 2023-03-27 NOTE — PATIENT INSTRUCTIONS
Open Wound Care    Right arm    No strenuous activity for 48 hours    Take Tylenol as needed for discomfort.                                                .         Do not drink alcoholic beverages for 48 hours.    Keep the pressure bandage in place for 24 hours. If the bandage becomes blood tinged or loose, reinforce it with gauze and tape.        (Refer to the reverse side of this page for management of bleeding).    Remove bandage in 24 hours and begin wound care as follows:     Clean area with tap water using a Q tip or gauze pad, (shower / bathe normally)  Dry wound with Q tip or gauze pad  Apply Aquaphor, Vaseline, Polysporin or Bacitracin Ointment with a Q tip  Do NOT use Neosporin Ointment *  Cover the wound with a band-aid or nonstick gauze pad and paper tape.  Repeat wound care once a day until wound is completely healed.    It is an old wives tale that a wound heals better when it is exposed to air and allowed to dry out. The wound will heal faster with a better cosmetic result if it is kept moist with ointment and covered with a bandage.  Do not let the wound dry out.    Supplies Needed:                Qtips or gauze pads                Polysporin or Bacitracin Ointment                Bandaids or nonstick gauze pads and paper tape    Wound care kits and brown paper tape are available for purchase at   the pharmacy.    BLEEDING:    Use tightly rolled up gauze or cloth to apply direct pressure over the bandage for 20   minutes.  Reapply pressure for an additional 20 minutes if necessary  Call the office or go to the nearest emergency room if pressure fails to stop the bleeding.  Use additional gauze and tape to maintain pressure once the bleeding has stopped.  Begin wound care 24 hours after surgery as directed.                  WOUND HEALING    One week after surgery a pink / red halo will form around the outside of the wound.   This is new skin.  The center of the wound will appear yellowish white and  produce some drainage.  The pink halo will slowly migrate in toward the center of the wound until the wound is covered with new shiny pink skin.  There will be no more drainage when the wound is completely healed.  It will take six months to one year for the redness to fade.  The scar may be itchy, tight and sensitive to extreme temperatures for a year after the surgery.  Massaging the area several times a day for several minutes after the wound is completely healed will help the scar soften and normalize faster. Begin massage only after healing is complete.      In case of emergency call: Dr Golden: [phone]    [facility]: [phone]    [facility]:[phone]

## 2023-03-28 ENCOUNTER — LAB (OUTPATIENT)
Dept: LAB | Facility: CLINIC | Age: 73
End: 2023-03-28
Payer: MEDICARE

## 2023-03-28 ENCOUNTER — ANTICOAGULATION THERAPY VISIT (OUTPATIENT)
Dept: ANTICOAGULATION | Facility: CLINIC | Age: 73
End: 2023-03-28

## 2023-03-28 DIAGNOSIS — Z79.899 ENCOUNTER FOR LONG-TERM CURRENT USE OF MEDICATION: ICD-10-CM

## 2023-03-28 DIAGNOSIS — Z79.01 LONG TERM CURRENT USE OF ANTICOAGULANT THERAPY: ICD-10-CM

## 2023-03-28 DIAGNOSIS — Z83.2 FAMILY HISTORY OF FACTOR V DEFICIENCY: ICD-10-CM

## 2023-03-28 DIAGNOSIS — I80.01 THROMBOPHLEBITIS OF SUPERFICIAL VEINS OF RIGHT LOWER EXTREMITY: Primary | ICD-10-CM

## 2023-03-28 DIAGNOSIS — I80.01 THROMBOPHLEBITIS OF SUPERFICIAL VEINS OF RIGHT LOWER EXTREMITY: ICD-10-CM

## 2023-03-28 LAB — INR BLD: 2.5 (ref 0.9–1.1)

## 2023-03-28 PROCEDURE — 85610 PROTHROMBIN TIME: CPT

## 2023-03-28 PROCEDURE — 36416 COLLJ CAPILLARY BLOOD SPEC: CPT

## 2023-03-28 NOTE — PROGRESS NOTES
ANTICOAGULATION MANAGEMENT     Thomas Davila 73 year old female is on warfarin with therapeutic INR result. (Goal INR 2.0-3.0)    Recent labs: (last 7 days)     03/28/23  0828   INR 2.5*       ASSESSMENT       Source(s): Chart Review and Patient/Caregiver Call       Warfarin doses taken: Warfarin taken as instructed    Diet: No new diet changes identified    New illness, injury, or hospitalization: No    Medication/supplement changes: None noted    Signs or symptoms of bleeding or clotting: No    Previous INR: Therapeutic last 2(+) visits    Additional findings: None         PLAN     Recommended plan for no diet, medication or health factor changes affecting INR     Dosing Instructions: Continue your current warfarin dose with next INR in 5 weeks       Summary  As of 3/28/2023    Full warfarin instructions:  2.5 mg every Mon, Wed, Fri; 5 mg all other days   Next INR check:  5/2/2023             Telephone call with Akhil who verbalizes understanding and agrees to plan    Lab visit scheduled    Education provided:     Please call back if any changes to your diet, medications or how you've been taking warfarin    Contact [phone]  with any changes, questions or concerns.     Plan made per [facility] anticoagulation protocol    Idalia Green RN  Anticoagulation Clinic  3/28/2023    _______________________________________________________________________     Anticoagulation Episode Summary     Current INR goal:  2.0-3.0   TTR:  74.9 % (1 y)   Target end date:  Indefinite   Send INR reminders to:  ALEJANDRO THORNTON    Indications    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 okay to leave DVM per signed consent         Anticoagulation Care Providers     Provider Role Specialty Phone number     Lizabeth Zavala MD Referring Family Medicine [phone]    Dung Prieto MD Referring Family Medicine [phone]

## 2023-04-17 ENCOUNTER — MYC MEDICAL ADVICE (OUTPATIENT)
Dept: FAMILY MEDICINE | Facility: CLINIC | Age: 73
End: 2023-04-17
Payer: MEDICARE

## 2023-04-17 DIAGNOSIS — I73.00 RAYNAUD'S DISEASE WITHOUT GANGRENE: Primary | ICD-10-CM

## 2023-04-17 NOTE — TELEPHONE ENCOUNTER
Forwarding MyChart status update to PCP for review please.   There is documentation re: Raynaud's disease from back on 12/16/22 visit, and treated with amlodipine, but then appears that was discontinued recently due to lower extremity edema.  Please advise.   Should patient have another visit to discuss?  Last seen 2/27/23 with advice to f/u in 3 months.     Venita Ambriz RN  [facility]

## 2023-04-18 RX ORDER — AMLODIPINE BESYLATE 2.5 MG/1
2.5 TABLET ORAL DAILY
Qty: 30 TABLET | Refills: 0 | Status: SHIPPED | OUTPATIENT
Start: 2023-04-18 | End: 2023-05-31

## 2023-04-18 NOTE — TELEPHONE ENCOUNTER
Pt was notified to try the amlodipine again.  She has an appt set up for next month with Dr Prieto.

## 2023-04-18 NOTE — TELEPHONE ENCOUNTER
Please have Akhil try amlodipine 2.5 mg a day again.    I do want to see her in clinic in 2-3 weeks.    Please use a hold slot.  My schedule is currently blocked due to possible jury duty.    I will send a month of the amlodipine to her pharmacy.    Sincerely,  Dung Prieto MD

## 2023-04-18 NOTE — TELEPHONE ENCOUNTER
Attempted to notify pt of PCP's message below and schedule follow up, no answer. VM left to return call to clinic.    Kassie Balderrama RN  [facility]

## 2023-04-20 ENCOUNTER — PATIENT OUTREACH (OUTPATIENT)
Dept: CARE COORDINATION | Facility: CLINIC | Age: 73
End: 2023-04-20
Payer: MEDICARE

## 2023-05-02 ENCOUNTER — LAB (OUTPATIENT)
Dept: LAB | Facility: CLINIC | Age: 73
End: 2023-05-02
Payer: MEDICARE

## 2023-05-02 ENCOUNTER — ANTICOAGULATION THERAPY VISIT (OUTPATIENT)
Dept: ANTICOAGULATION | Facility: CLINIC | Age: 73
End: 2023-05-02

## 2023-05-02 DIAGNOSIS — Z79.899 ENCOUNTER FOR LONG-TERM CURRENT USE OF MEDICATION: ICD-10-CM

## 2023-05-02 DIAGNOSIS — Z79.01 LONG TERM CURRENT USE OF ANTICOAGULANT THERAPY: ICD-10-CM

## 2023-05-02 DIAGNOSIS — I80.01 THROMBOPHLEBITIS OF SUPERFICIAL VEINS OF RIGHT LOWER EXTREMITY: Primary | ICD-10-CM

## 2023-05-02 DIAGNOSIS — I80.01 THROMBOPHLEBITIS OF SUPERFICIAL VEINS OF RIGHT LOWER EXTREMITY: ICD-10-CM

## 2023-05-02 DIAGNOSIS — Z83.2 FAMILY HISTORY OF FACTOR V DEFICIENCY: ICD-10-CM

## 2023-05-02 LAB — INR BLD: 2.3 (ref 0.9–1.1)

## 2023-05-02 PROCEDURE — 36416 COLLJ CAPILLARY BLOOD SPEC: CPT

## 2023-05-02 PROCEDURE — 85610 PROTHROMBIN TIME: CPT

## 2023-05-02 NOTE — PROGRESS NOTES
ANTICOAGULATION MANAGEMENT     Thomas Davila 73 year old female is on warfarin with therapeutic INR result. (Goal INR 2.0-3.0)    Recent labs: (last 7 days)     05/02/23  0753   INR 2.3*       ASSESSMENT       Source(s): Chart Review    Previous INR was Therapeutic last 2(+) visits    Medication, diet, health changes since last INR chart reviewed; none identified         PLAN     Unable to reach Akhil today.    Left message to continue current dose of warfarin 5 mg tonight. Request call back for assessment.    Follow up required to confirm warfarin dose taken and assess for changes    Cassia Mann RN  Anticoagulation Clinic  5/2/2023

## 2023-05-02 NOTE — PROGRESS NOTES
ANTICOAGULATION MANAGEMENT     Thomas Davila 73 year old female is on warfarin with therapeutic INR result. (Goal INR 2.0-3.0)    Recent labs: (last 7 days)     05/02/23  0753   INR 2.3*       ASSESSMENT       Source(s): Chart Review       Warfarin doses taken: Reviewed in chart    Diet: No new diet changes identified    Medication/supplement changes: None noted    New illness, injury, or hospitalization: No    Signs or symptoms of bleeding or clotting: No    Previous result: Therapeutic last 2(+) visits    Additional findings: None         PLAN     Recommended plan for no diet, medication or health factor changes affecting INR     Dosing Instructions: Continue your current warfarin dose with next INR in 4-6 weeks       Summary  As of 5/2/2023    Full warfarin instructions:  2.5 mg every Mon, Wed, Fri; 5 mg all other days   Next INR check:  6/6/2023             Detailed voice message left for Akhil with dosing instructions and follow up date.     Contact [phone]  to schedule and with any changes, questions or concerns.     Education provided:     Contact [phone]  with any changes, questions or concerns.     Plan made per [facility] anticoagulation protocol    Cassia Mann RN  Anticoagulation Clinic  5/2/2023    _______________________________________________________________________     Anticoagulation Episode Summary     Current INR goal:  2.0-3.0   TTR:  80.2 % (1 y)   Target end date:  Indefinite   Send INR reminders to:  ALEJANDRO THORNTON    Indications    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 okay to leave DVM per signed consent         Anticoagulation Care Providers     Provider Role Specialty Phone number    Lizabeth Zavala MD Referring Family Medicine  [phone]    Dung Prieto MD [facility] [phone]

## 2023-05-11 ENCOUNTER — TELEPHONE (OUTPATIENT)
Dept: FAMILY MEDICINE | Facility: CLINIC | Age: 73
End: 2023-05-11
Payer: MEDICARE

## 2023-05-11 NOTE — TELEPHONE ENCOUNTER
Patient Quality Outreach    Patient is due for the following:   Colon Cancer Screening    Next Steps:   No follow up needed at this time.    Type of outreach:    Sent letter.      Questions for provider review:    Carmenza Workman, CMA

## 2023-05-11 NOTE — LETTER
May 11, 2023      Thomas Davila  [address] [address] [address]        Dear Thomas,     May 11, 2023    Your team at [facility] cares about your health. We have reviewed your chart and based on our findings; we are making the following recommendations to better manage your health.     You are in particular need of attention regarding the following:     Call or MyChart message your clinic to schedule a colonoscopy, schedule/ a FIT Test, or order a Cologuard test. If you are unsure what type of test you need, please call your clinic and speak to clinic staff.   Colon cancer is now the second leading cause of cancer-related deaths in the [place] for both men and women and there are over 130,000 new cases and 50,000 deaths per year from colon cancer. Colonoscopies can prevent 90-95% of these deaths. Problem lesions can be removed before they ever become cancer. This test is not only looking for cancer, but also getting rid of precancerous lesions.   Please call [phone] to schedule a colonoscopy.  Contact your clinic to schedule/ your FIT Test.   Schedule an office visit with your provider if you are interested in completing your colon cancer screening with a Cologuard test    If you have already completed these items, please contact the clinic via phone or   Tapp[employer] so your care team can review and update your records. Thank you for   choosing [facility] for your healthcare needs. For any questions,   concerns, or to schedule an appointment please contact our clinic.    Healthy Regards,    Your [facility] Care Team    Sincerely,    Dung Prieto MD

## 2023-05-31 ENCOUNTER — ANTICOAGULATION THERAPY VISIT (OUTPATIENT)
Dept: ANTICOAGULATION | Facility: CLINIC | Age: 73
End: 2023-05-31

## 2023-05-31 ENCOUNTER — OFFICE VISIT (OUTPATIENT)
Dept: FAMILY MEDICINE | Facility: CLINIC | Age: 73
End: 2023-05-31
Payer: MEDICARE

## 2023-05-31 VITALS
DIASTOLIC BLOOD PRESSURE: 72 MMHG | TEMPERATURE: 97.4 F | SYSTOLIC BLOOD PRESSURE: 126 MMHG | OXYGEN SATURATION: 97 % | WEIGHT: 193 LBS | HEIGHT: 64 IN | BODY MASS INDEX: 32.95 KG/M2 | RESPIRATION RATE: 18 BRPM | HEART RATE: 90 BPM

## 2023-05-31 DIAGNOSIS — Z12.11 SCREEN FOR COLON CANCER: ICD-10-CM

## 2023-05-31 DIAGNOSIS — Z79.01 LONG TERM CURRENT USE OF ANTICOAGULANT THERAPY: ICD-10-CM

## 2023-05-31 DIAGNOSIS — Z79.899 ENCOUNTER FOR LONG-TERM CURRENT USE OF MEDICATION: ICD-10-CM

## 2023-05-31 DIAGNOSIS — I73.00 RAYNAUD'S DISEASE WITHOUT GANGRENE: ICD-10-CM

## 2023-05-31 DIAGNOSIS — Z83.2 FAMILY HISTORY OF FACTOR V DEFICIENCY: ICD-10-CM

## 2023-05-31 DIAGNOSIS — I10 ESSENTIAL HYPERTENSION: ICD-10-CM

## 2023-05-31 DIAGNOSIS — Z00.00 ENCOUNTER FOR MEDICARE ANNUAL WELLNESS EXAM: Primary | ICD-10-CM

## 2023-05-31 DIAGNOSIS — I80.01 THROMBOPHLEBITIS OF SUPERFICIAL VEINS OF RIGHT LOWER EXTREMITY: Primary | ICD-10-CM

## 2023-05-31 DIAGNOSIS — I80.01 THROMBOPHLEBITIS OF SUPERFICIAL VEINS OF RIGHT LOWER EXTREMITY: ICD-10-CM

## 2023-05-31 DIAGNOSIS — E78.5 HYPERLIPIDEMIA LDL GOAL <100: ICD-10-CM

## 2023-05-31 DIAGNOSIS — K21.9 GASTROESOPHAGEAL REFLUX DISEASE WITHOUT ESOPHAGITIS: ICD-10-CM

## 2023-05-31 LAB
ANION GAP SERPL CALCULATED.3IONS-SCNC: 13 MMOL/L (ref 7–15)
BUN SERPL-MCNC: 23.3 MG/DL (ref 8–23)
CALCIUM SERPL-MCNC: 9.7 MG/DL (ref 8.8–10.2)
CHLORIDE SERPL-SCNC: 105 MMOL/L (ref 98–107)
CREAT SERPL-MCNC: 0.84 MG/DL (ref 0.51–0.95)
DEPRECATED HCO3 PLAS-SCNC: 25 MMOL/L (ref 22–29)
GFR SERPL CREATININE-BSD FRML MDRD: 72 ML/MIN/1.73M2
GLUCOSE SERPL-MCNC: 89 MG/DL (ref 70–99)
INR PPP: 1.82 (ref 0.85–1.15)
POTASSIUM SERPL-SCNC: 3.4 MMOL/L (ref 3.4–5.3)
SODIUM SERPL-SCNC: 143 MMOL/L (ref 136–145)

## 2023-05-31 PROCEDURE — 80048 BASIC METABOLIC PNL TOTAL CA: CPT | Performed by: FAMILY MEDICINE

## 2023-05-31 PROCEDURE — 99214 OFFICE O/P EST MOD 30 MIN: CPT | Mod: 25 | Performed by: FAMILY MEDICINE

## 2023-05-31 PROCEDURE — 0134A COVID-19 BIVALENT 18+ (MODERNA): CPT | Performed by: FAMILY MEDICINE

## 2023-05-31 PROCEDURE — 82274 ASSAY TEST FOR BLOOD FECAL: CPT | Performed by: FAMILY MEDICINE

## 2023-05-31 PROCEDURE — 91313 COVID-19 BIVALENT 18+ (MODERNA): CPT | Performed by: FAMILY MEDICINE

## 2023-05-31 PROCEDURE — 36415 COLL VENOUS BLD VENIPUNCTURE: CPT | Performed by: FAMILY MEDICINE

## 2023-05-31 PROCEDURE — 83721 ASSAY OF BLOOD LIPOPROTEIN: CPT | Performed by: FAMILY MEDICINE

## 2023-05-31 PROCEDURE — 85610 PROTHROMBIN TIME: CPT | Performed by: FAMILY MEDICINE

## 2023-05-31 PROCEDURE — G0439 PPPS, SUBSEQ VISIT: HCPCS | Performed by: FAMILY MEDICINE

## 2023-05-31 RX ORDER — AMLODIPINE BESYLATE 2.5 MG/1
2.5 TABLET ORAL DAILY
Qty: 90 TABLET | Refills: 3 | Status: SHIPPED | OUTPATIENT
Start: 2023-05-31 | End: 2023-11-09 | Stop reason: SINTOL

## 2023-05-31 RX ORDER — TOPIRAMATE 50 MG/1
50 TABLET, FILM COATED ORAL DAILY
Qty: 90 TABLET | Refills: 3 | Status: SHIPPED | OUTPATIENT
Start: 2023-05-31 | End: 2023-11-09

## 2023-05-31 RX ORDER — PHENTERMINE HYDROCHLORIDE 30 MG/1
30 CAPSULE ORAL EVERY MORNING
Qty: 90 CAPSULE | Refills: 0 | Status: SHIPPED | OUTPATIENT
Start: 2023-05-31 | End: 2023-11-09

## 2023-05-31 RX ORDER — WARFARIN SODIUM 5 MG/1
TABLET ORAL
Qty: 180 TABLET | Refills: 0 | Status: SHIPPED | OUTPATIENT
Start: 2023-05-31 | End: 2023-09-06

## 2023-05-31 RX ORDER — TRIAMTERENE/HYDROCHLOROTHIAZID 37.5-25 MG
1 TABLET ORAL DAILY
Qty: 90 TABLET | Refills: 3 | Status: SHIPPED | OUTPATIENT
Start: 2023-05-31 | End: 2024-03-01

## 2023-05-31 RX ORDER — LISINOPRIL 20 MG/1
20 TABLET ORAL DAILY
Qty: 90 TABLET | Refills: 3 | Status: SHIPPED | OUTPATIENT
Start: 2023-05-31 | End: 2023-12-13

## 2023-05-31 ASSESSMENT — PAIN SCALES - GENERAL: PAINLEVEL: MILD PAIN (2)

## 2023-05-31 NOTE — LETTER
June 7, 2023      Akhil Davila  [address] [address] [address]        Dear ,    We are writing to inform you of your test results.    Your FIT test is negative.     Resulted Orders   Fecal colorectal cancer screen FIT - Future (S+30)   Result Value Ref Range    Occult Blood Screen FIT Negative Negative   Basic metabolic panel   Result Value Ref Range    Sodium 143 136 - 145 mmol/L    Potassium 3.4 3.4 - 5.3 mmol/L    Chloride 105 98 - 107 mmol/L    Carbon Dioxide (CO2) 25 22 - 29 mmol/L    Anion Gap 13 7 - 15 mmol/L    Urea Nitrogen 23.3 (H) 8.0 - 23.0 mg/dL    Creatinine 0.84 0.51 - 0.95 mg/dL    Calcium 9.7 8.8 - 10.2 mg/dL    Glucose 89 70 - 99 mg/dL    GFR Estimate 72 >60 mL/min/1.73m2      Comment:      eGFR calculated using 2021 CKD-EPI equation.   LDL cholesterol direct   Result Value Ref Range    LDL Cholesterol Direct 116 (H) <100 mg/dL      Comment:      Age 2-19 years:  Desirable: 0-110 mg/dL   Borderline high: 110-129 mg/dL   High: >= 130 mg/dL    Age 20 years and older:  Desirable: <100mg/dL  Above desirable: 100-129 mg/dL   Borderline high: 130-159 mg/dL   High: 160-189 mg/dL   Very high: >= 190 mg/dL       If you have any questions or concerns, please call the clinic at the number listed above.       Sincerely,      Dung Prieto MD

## 2023-05-31 NOTE — PROGRESS NOTES
ANTICOAGULATION MANAGEMENT     Thomas Davila 73 year old female is on warfarin with subtherapeutic INR result. (Goal INR 2.0-3.0)    Recent labs: (last 7 days)     05/31/23  1433   INR 1.82*       ASSESSMENT            PLAN     Unable to reach Akhil  today.    Left message to continue current dose of warfarin 2.5 mg tonight. Request call back for assessment.    Follow up required to confirm warfarin dose taken and assess for changes, confirm warfarin dose taken, assess for changes  and discuss out of range result     Carly Peters RN  Anticoagulation Clinic  5/31/2023

## 2023-05-31 NOTE — PATIENT INSTRUCTIONS
Please go to lab.    Refilled all of your medications.    Please follow up virtually in 3 months for weight loss medication of phentermine.    Please return your FIT test.    For your weight, please check to see what your insurance would cover for another provider.  I do not know where to refer you to for a second opinion.    For your hands you have osteoarthritis and was seen by rheumatology.  The option is to see rheumatology for steroid injections of your joints if they bother you along with acetaminophen.            Patient Education   Personalized Prevention Plan  You are due for the preventive services outlined below.  Your care team is available to assist you in scheduling these services.  If you have already completed any of these items, please share that information with your care team to update in your medical record.  Health Maintenance Due   Topic Date Due    COVID-19 Vaccine (6 - Moderna series) 03/01/2023    Colorectal Cancer Screening  04/08/2023

## 2023-05-31 NOTE — PROGRESS NOTES
Assessment & Plan     Encounter for Medicare annual wellness exam  Discussed healthy lifestyle and preventative cares.    - PRIMARY CARE FOLLOW-UP SCHEDULING; Future    Raynaud's disease without gangrene  Fair control on medication and bp is controlled  - amLODIPine (NORVASC) 2.5 MG tablet; Take 1 tablet (2.5 mg) by mouth daily  - OFFICE/OUTPT VISIT,EST,LEVL IV    Essential hypertension  Controlled on medications  - lisinopril (ZESTRIL) 20 MG tablet; Take 1 tablet (20 mg) by mouth daily  - triamterene-HCTZ (MAXZIDE-25) 37.5-25 MG tablet; Take 1 tablet by mouth daily  - Basic metabolic panel; Future  - OFFICE/OUTPT VISIT,EST,LEVL IV    BMI 33.0-33.9,adult  On medication and no side effect will continue   - phentermine (ADIPEX-P) 30 MG capsule; Take 1 capsule (30 mg) by mouth every morning Hold on file until needed.  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  - INR; Future  - OFFICE/OUTPT VISIT,EST,LEVL IV    Family history of factor V deficiency    - warfarin ANTICOAGULANT (JANTOVEN ANTICOAGULANT) 5 MG tablet; Take 2.5 mg every Mon, Wed, Fri; 5 mg all other days or as directed by the INR clinic.  - INR; Future    Encounter for long-term current use of medication    - warfarin ANTICOAGULANT (JANTOVEN ANTICOAGULANT) 5 MG tablet; Take 2.5 mg every Mon, Wed, Fri; 5 mg all other days or as directed by the INR clinic.    Long term current use of anticoagulant therapy    - warfarin ANTICOAGULANT (JANTOVEN ANTICOAGULANT) 5 MG tablet; Take 2.5 mg every Mon, Wed, Fri; 5 mg all other days or as directed by the INR clinic.    Gastroesophageal reflux disease without esophagitis  Controlled on medication  - omeprazole  (PRILOSEC) 20 MG DR capsule; Take 1 capsule (20 mg) by mouth daily  - OFFICE/OUTPT VISIT,EST,LEVL IV    Screen for colon cancer  Will return the FIT Test  - Fecal colorectal cancer screen FIT - Future (S+30); Future    Hyperlipidemia LDL goal <100  Check levels h/o mild elevation  - LDL cholesterol direct; Future  - OFFICE/OUTPT VISIT,MILAGRO RIVER IV             See Patient Instructions    Dung Prieto MD  [facility]de is a 73 year old, presenting for the following health issues:  Recheck Medication (Blood Pressure.), Foot Problems (Been having some tingling and purple discoloration. Took amlodopine in the past to help. ), Weight Problem (Went to see a surgeon about stomach surgery, was told to discuss surgery with PCP. Would like another opinion, possible referral. 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 ), and Arthritis (Would like to discuss arthritis. Hands are getting worse. )        5/31/2023     1:17 PM   Additional Questions   Roomed by Soledad HUANG   Accompanied by self         5/31/2023     1:17 PM   Patient Reported Additional Medications   Patient reports taking the following new medications no new meds     History of Present Illness       Hypertension: She presents for follow up of hypertension.  She does not check blood pressure  regularly outside of the clinic. Outside blood pressures have been over 140/90. She does not follow a low salt diet.     Vascular Disease:  She presents for follow up of vascular disease.  She never takes nitroglycerin. She is not taking daily aspirin.    Reason for visit:  Arthritis foot circulation  weight    She eats 0-1 servings of fruits and vegetables daily.She consumes 0 sweetened beverage(s) daily.She exercises with enough effort to increase her heart rate 10 to 19 minutes per day.  She exercises with enough effort to increase her heart rate 3 or less days per week.   She is taking medications  "regularly.       Pain History:  When did you first notice your pain? Been on going, getting worse with outdoor work recently    Have you seen anyone else for your pain? No  How has your pain affected your ability to work? Not applicable  Where in your body do you have pain? Musculoskeletal problem/pain  Onset/Duration: On going  Description  Location: leg and hand - bilateral  Joint Swelling: No  Redness: No  Pain: YES  Warmth: No  Intensity:  moderate  Progression of Symptoms:  worsening  Accompanying signs and symptoms:   Fevers: No  Numbness/tingling/weakness: YES- in legs   History  Trauma to the area: YES- getting worse with outdoor work recently. Thinking could be due to right shoulder pain/ nerve  Recent illness:  No  Previous similar problem: YES  Previous evaluation:  YES, on shoulder and hands awhile ago   Precipitating or alleviating factors:  Aggravating factors include: overuse, pushing, and grasping   Therapies tried and outcome: Tylenol, creams     Using otc meds fo arthritis which is Osteo arthritis.      gerd is controlled on medication. No side effect.    She is on warfarin for her hypercoag state, doing well and asking to have INR drawn today.    She is taking the amlodipine for Raynauds and helping.    Annual Wellness Visit    Patient has been advised of split billing requirements and indicates understanding: Yes     Are you in the first 12 months of your Medicare Part B coverage?      Physical Health:    In general, how would you rate your overall physical health? good    Outside of work, how many days during the week do you exercise?4-5 days/week    Outside of work, approximately how many minutes a day do you exercise?greater than 60 minutes    If you drink alcohol do you typically have >3 drinks per day or >7 drinks per week? No    Do you usually eat at least 4 servings of fruit and vegetables a day, include whole grains & fiber and avoid regularly eating high fat or \"junk\" foods? Yes    Do " you have any problems taking medications regularly? No    Do you have any side effects from medications? none    Needs assistance for the following daily activities: no assistance needed    Which of the following safety concerns are present in your home?  none identified     Hearing impairment: Yes, Difficulty following a conversation in a noisy restaurant or crowded room.    In the past 6 months, have you been bothered by leaking of urine? no    Mental Health:    In general, how would you rate your overall mental or emotional health? good  PHQ-2 Score:    PHQ-2 Score:         2023     2:01 PM 2023    12:48 PM   PHQ-2 (  [employer])   Q1: Little interest or pleasure in doing things 0 0   Q2: Feeling down, depressed or hopeless 0 0   PHQ-2 Score 0 0       Do you feel safe in your environment? Yes    Have you ever done Advance Care Planning? (For example, a Health Directive, POLST, or a discussion with a medical provider or your loved ones about your wishes)? Yes, patient states has an Advance Care Planning document and will bring a copy to the clinic.    Fall risk:  Fallen 2 or more times in the past year?: No  Any fall with injury in the past year?: No    Cognitive Screenin) Repeat 3 items (Leader, Season, Table)    2) Clock draw: NORMAL  3) 3 item recall: Recalls 3 objects  Results: 3 items recalled: COGNITIVE IMPAIRMENT LESS LIKELY    Mini-CogTM Copyright CAROLYN Hutton. Licensed by the author for use in [facility]; reprinted with permission (vasile@.[facility]). All rights reserved.      Do you have sleep apnea, excessive snoring or daytime drowsiness?: no    Social History     Tobacco Use     Smoking status: Never     Smokeless tobacco: Never   Vaping Use     Vaping status: Never Used   Substance Use Topics     Alcohol use: Yes     Comment: Rare              View : No data to display.                   View : No data to display.              Do you have a current opioid prescription? No  Do you  "use any other controlled substances or medications that are not prescribed by a provider? None    Current providers sharing in care for this patient include:   Patient Care Team:  Dung Prieto MD as PCP - General  Jojo Banks RN as Specialty Care Coordinator (Oncology)  Dung Prieto MD as Assigned PCP  Hitesh Monroy MD as Assigned Cancer Care Provider  Walt Golden MD as Assigned Surgical Provider  Zane Reyes MD as Physician (Plastic Surgery)  Elmira Weber MD as Assigned Musculoskeletal Provider    Patient has been advised of split billing requirements and indicates understanding: Yes           Review of Systems   Review Of Systems  Skin: dry skin and seeing derm  Eyes: negative  Ears/Nose/Throat: decrease in hearing  Respiratory: No shortness of breath, dyspnea on exertion, cough, or hemoptysis  Cardiovascular: negative  Gastrointestinal: negative  Genitourinary: negative  Musculoskeletal: has hand arthritis  Neurologic: negative  Psychiatric: negative  Hematologic/Lymphatic/Immunologic: negative  Endocrine: negative        Objective    /72   Pulse 90   Temp 97.4  F (36.3  C) (Tympanic)   Resp 18   Ht 1.613 m (5' 3.5\")   Wt 87.5 kg (193 lb)   SpO2 97%   BMI 33.65 kg/m    Body mass index is 33.65 kg/m .  Physical Exam   GENERAL APPEARANCE: healthy, alert and no distress  HENT: ear canals and TM's normal and nose and mouth without ulcers or lesions  NECK: no adenopathy, no asymmetry, masses, or scars and thyroid normal to palpation  RESP: lungs clear to auscultation - no rales, rhonchi or wheezes  CV: regular rates and rhythm, normal S1 S2, no S3 or S4 and no murmur, click or rub  ABDOMEN: soft, nontender, without hepatosplenomegaly or masses and bowel sounds normal  MS: extremities normal- no gross deformities noted  SKIN: no suspicious lesions or rashes  NEURO: Normal strength and tone, mentation intact and speech normal  PSYCH: mentation appears normal and " affect normal/bright

## 2023-06-01 ENCOUNTER — TELEPHONE (OUTPATIENT)
Dept: FAMILY MEDICINE | Facility: CLINIC | Age: 73
End: 2023-06-01
Payer: MEDICARE

## 2023-06-01 DIAGNOSIS — Z79.899 ENCOUNTER FOR LONG-TERM CURRENT USE OF MEDICATION: ICD-10-CM

## 2023-06-01 DIAGNOSIS — Z79.01 LONG TERM CURRENT USE OF ANTICOAGULANT THERAPY: ICD-10-CM

## 2023-06-01 DIAGNOSIS — I80.01 THROMBOPHLEBITIS OF SUPERFICIAL VEINS OF RIGHT LOWER EXTREMITY: Primary | ICD-10-CM

## 2023-06-01 DIAGNOSIS — Z83.2 FAMILY HISTORY OF FACTOR V DEFICIENCY: ICD-10-CM

## 2023-06-01 LAB — LDLC SERPL DIRECT ASSAY-MCNC: 116 MG/DL

## 2023-06-01 NOTE — PROGRESS NOTES
ANTICOAGULATION MANAGEMENT     Thomas Davila 73 year old female is on warfarin with subtherapeutic INR result. (Goal INR 2.0-3.0)    Recent labs: (last 7 days)     05/31/23  1433   INR 1.82*       ASSESSMENT       Source(s): Chart Review and Patient/Caregiver Call       Warfarin doses taken: Missed dose(s) may be affecting INR    Diet: Increased greens/vitamin K in diet; plans to resume previous intake    Medication/supplement changes: None noted    New illness, injury, or hospitalization: No    Signs or symptoms of bleeding or clotting: No    Previous result: Therapeutic last 2(+) visits    Additional findings: None         PLAN     Recommended plan for temporary change(s) affecting INR     Dosing Instructions: booster dose then continue your current warfarin dose with next INR in 1-2 weeks       Summary  As of 5/31/2023    Full warfarin instructions:  6/1: 7.5 mg; Otherwise 2.5 mg every Mon, Wed, Fri; 5 mg all other days   Next INR check:  6/6/2023             Sent [employer] message with dosing and follow up instructions    Lab visit scheduled    Education provided:     Please call back if any changes to your diet, medications or how you've been taking warfarin    Contact [phone]  with any changes, questions or concerns.     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 okay to leave DVM per signed consent         Anticoagulation  Care Providers     Provider Role Specialty Phone number    Lizabeth Zavala MD Referring Family Medicine [phone]    Dung Prieto MD Referring Family Medicine [phone]

## 2023-06-01 NOTE — TELEPHONE ENCOUNTER
Reason for Call:  Other returning call    Detailed comments: Returning Anticoag call, pt is home now, please call.    Phone Number Patient can be reached at: Cell number on file:    Telephone Information:   Mobile [phone]       Best Time: ASAP    Can we leave a detailed message on this number? YES    Call taken on 6/1/2023 at 12:03 PM by Laura Kanavel

## 2023-06-01 NOTE — TELEPHONE ENCOUNTER
Writer spoke with the patient and relayed information sent via [employer]. Next INR appointment has been scheduled.    Idalia Green RN, BSN, PHN

## 2023-06-05 LAB — HEMOCCULT STL QL IA: NEGATIVE

## 2023-06-06 ENCOUNTER — ANTICOAGULATION THERAPY VISIT (OUTPATIENT)
Dept: ANTICOAGULATION | Facility: CLINIC | Age: 73
End: 2023-06-06

## 2023-06-06 ENCOUNTER — LAB (OUTPATIENT)
Dept: LAB | Facility: CLINIC | Age: 73
End: 2023-06-06
Payer: MEDICARE

## 2023-06-06 DIAGNOSIS — Z83.2 FAMILY HISTORY OF FACTOR V DEFICIENCY: ICD-10-CM

## 2023-06-06 DIAGNOSIS — Z79.899 ENCOUNTER FOR LONG-TERM CURRENT USE OF MEDICATION: ICD-10-CM

## 2023-06-06 DIAGNOSIS — Z79.01 LONG TERM CURRENT USE OF ANTICOAGULANT THERAPY: ICD-10-CM

## 2023-06-06 DIAGNOSIS — I80.01 THROMBOPHLEBITIS OF SUPERFICIAL VEINS OF RIGHT LOWER EXTREMITY: ICD-10-CM

## 2023-06-06 DIAGNOSIS — I80.01 THROMBOPHLEBITIS OF SUPERFICIAL VEINS OF RIGHT LOWER EXTREMITY: Primary | ICD-10-CM

## 2023-06-06 LAB — INR BLD: 2.1 (ref 0.9–1.1)

## 2023-06-06 PROCEDURE — 36416 COLLJ CAPILLARY BLOOD SPEC: CPT

## 2023-06-06 PROCEDURE — 85610 PROTHROMBIN TIME: CPT

## 2023-06-06 NOTE — PROGRESS NOTES
ANTICOAGULATION MANAGEMENT     Thomas Davila 73 year old female is on warfarin with therapeutic INR result. (Goal INR 2.0-3.0)    Recent labs: (last 7 days)     06/06/23  0853   INR 2.1*       ASSESSMENT       Source(s): Chart Review    Previous INR was Subtherapeutic    Medication, diet, health changes since last INR chart reviewed; none identified    I left a detailed voicemail with the orders reflected in flowsheet. I have also requested a call back if there have been any missed doses, concerns, illness, fever, or if there have been any changes in medications, activity level, or diet           PLAN     Recommended plan for no diet, medication or health factor changes affecting INR     Dosing Instructions: Continue your current warfarin dose with next INR in 2 weeks       Summary  As of 6/6/2023    Full warfarin instructions:  2.5 mg every Mon, Wed, Fri; 5 mg all other days   Next INR check:  6/20/2023             Detailed voice message left for  pt with dosing instructions and follow up date.     Contact [phone]  to schedule and with any changes, questions or concerns.     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 okay to leave DVM per signed consent         Anticoagulation Care Providers     Provider Role  Specialty Phone number    Lizabeth Zavala MD Referring Family Medicine [phone]    Dung Prieto MD Referring Family Medicine [phone]

## 2023-06-14 ENCOUNTER — LAB (OUTPATIENT)
Dept: LAB | Facility: CLINIC | Age: 73
End: 2023-06-14
Payer: MEDICARE

## 2023-06-14 ENCOUNTER — ANTICOAGULATION THERAPY VISIT (OUTPATIENT)
Dept: ANTICOAGULATION | Facility: CLINIC | Age: 73
End: 2023-06-14

## 2023-06-14 DIAGNOSIS — Z79.899 ENCOUNTER FOR LONG-TERM CURRENT USE OF MEDICATION: ICD-10-CM

## 2023-06-14 DIAGNOSIS — Z83.2 FAMILY HISTORY OF FACTOR V DEFICIENCY: ICD-10-CM

## 2023-06-14 DIAGNOSIS — Z79.01 LONG TERM CURRENT USE OF ANTICOAGULANT THERAPY: ICD-10-CM

## 2023-06-14 DIAGNOSIS — I80.01 THROMBOPHLEBITIS OF SUPERFICIAL VEINS OF RIGHT LOWER EXTREMITY: ICD-10-CM

## 2023-06-14 DIAGNOSIS — I80.01 THROMBOPHLEBITIS OF SUPERFICIAL VEINS OF RIGHT LOWER EXTREMITY: Primary | ICD-10-CM

## 2023-06-14 LAB — INR BLD: 1.9 (ref 0.9–1.1)

## 2023-06-14 PROCEDURE — 85610 PROTHROMBIN TIME: CPT

## 2023-06-14 PROCEDURE — 36416 COLLJ CAPILLARY BLOOD SPEC: CPT

## 2023-06-14 NOTE — PROGRESS NOTES
ANTICOAGULATION MANAGEMENT     Thomas Davila 73 year old female is on warfarin with subtherapeutic INR result. (Goal INR 2.0-3.0)    Recent labs: (last 7 days)     06/14/23  0914   INR 1.9*       ASSESSMENT       Source(s): Chart Review and Patient/Caregiver Call       Warfarin doses taken: Warfarin taken as instructed    Diet: No new diet changes identified    Medication/supplement changes: None noted    New illness, injury, or hospitalization: No    Signs or symptoms of bleeding or clotting: No    Previous result: Therapeutic last visit; previously outside of goal range    Additional findings: None         PLAN     Recommended plan for no diet, medication or health factor changes affecting INR     Dosing Instructions: Continue your current warfarin dose with next INR in 2 weeks   - pt prefers to stay on same does at this time.     Summary  As of 6/14/2023    Full warfarin instructions:  2.5 mg every Mon, Wed, Fri; 5 mg all other days   Next INR check:  6/28/2023             Telephone call with Akhil who verbalizes understanding and agrees to plan    Lab visit scheduled    Education provided:     Please call back if any changes to your diet, medications or how you've been taking warfarin    Plan made per [facility] anticoagulation protocol    Kasandra Rendon, RN  Anticoagulation Clinic  6/14/2023    _______________________________________________________________________     Anticoagulation Episode Summary     Current INR goal:  2.0-3.0   TTR:  81.7 % (1 y)   Target end date:  Indefinite   Send INR reminders to:  [facility]    Indications    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 okay to leave DVM per signed consent         Anticoagulation Care Providers     Provider Role  Specialty Phone number    Lizabeth Zavala MD Referring Family Medicine [phone]    Dung Prieto MD Referring Family Medicine [phone]

## 2023-06-14 NOTE — PROGRESS NOTES
ANTICOAGULATION MANAGEMENT     Thomas Davila 73 year old female is on warfarin with subtherapeutic INR result. (Goal INR 2.0-3.0)    Recent labs: (last 7 days)     06/14/23  0914   INR 1.9*       ASSESSMENT       Source(s): Chart Review    Previous INR was Therapeutic last visit; previously outside of goal range    Medication, diet, health changes since last INR chart reviewed; none identified        MyChart also sent      PLAN     Unable to reach pt today.    VM left to call [facility] back. Will try again lter.     Follow up required to confirm warfarin dose taken and assess for changes    Kasandra Rendon, RN  Anticoagulation Clinic  6/14/2023

## 2023-06-28 ENCOUNTER — ANTICOAGULATION THERAPY VISIT (OUTPATIENT)
Dept: ANTICOAGULATION | Facility: CLINIC | Age: 73
End: 2023-06-28

## 2023-06-28 ENCOUNTER — DOCUMENTATION ONLY (OUTPATIENT)
Dept: ANTICOAGULATION | Facility: CLINIC | Age: 73
End: 2023-06-28

## 2023-06-28 ENCOUNTER — LAB (OUTPATIENT)
Dept: LAB | Facility: CLINIC | Age: 73
End: 2023-06-28
Payer: MEDICARE

## 2023-06-28 DIAGNOSIS — I80.01 THROMBOPHLEBITIS OF SUPERFICIAL VEINS OF RIGHT LOWER EXTREMITY: Primary | ICD-10-CM

## 2023-06-28 DIAGNOSIS — Z79.01 LONG TERM CURRENT USE OF ANTICOAGULANT THERAPY: ICD-10-CM

## 2023-06-28 DIAGNOSIS — Z83.2 FAMILY HISTORY OF FACTOR V DEFICIENCY: ICD-10-CM

## 2023-06-28 DIAGNOSIS — Z79.899 ENCOUNTER FOR LONG-TERM CURRENT USE OF MEDICATION: ICD-10-CM

## 2023-06-28 DIAGNOSIS — Z83.2 FAMILY HISTORY OF FACTOR V DEFICIENCY: Primary | ICD-10-CM

## 2023-06-28 DIAGNOSIS — D68.9 BLOOD COAGULATION DISORDER (H): ICD-10-CM

## 2023-06-28 LAB — INR BLD: 1.9 (ref 0.9–1.1)

## 2023-06-28 PROCEDURE — 85610 PROTHROMBIN TIME: CPT

## 2023-06-28 PROCEDURE — 36416 COLLJ CAPILLARY BLOOD SPEC: CPT

## 2023-06-28 NOTE — PROGRESS NOTES
ANTICOAGULATION MANAGEMENT     Thomas Davila 73 year old female is on warfarin with subtherapeutic INR result. (Goal INR 2.0-3.0)    Recent labs: (last 7 days)     06/28/23  0908   INR 1.9*       ASSESSMENT       Source(s): Chart Review and Patient/Caregiver Call       Warfarin doses taken: Warfarin taken as instructed    Diet: No new diet changes identified    Medication/supplement changes: None noted    New illness, injury, or hospitalization: No    Signs or symptoms of bleeding or clotting: No    Previous result: Subtherapeutic    Additional findings: None         PLAN     Recommended plan for no diet, medication or health factor changes affecting INR     Dosing Instructions: Increase your warfarin dose (9% change) with next INR in 2 weeks       Summary  As of 6/28/2023    Full warfarin instructions:  2.5 mg every Mon, Fri; 5 mg all other days   Next INR check:  7/12/2023             Telephone call with Akhil who verbalizes understanding and agrees to plan    Lab visit scheduled    Education provided:     Contact [phone]  with any changes, questions or concerns.     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 okay to leave DVM per signed consent         Anticoagulation Care Providers     Provider Role Specialty Phone number    Lizabeth Zavala MD Referring Family Medicine [phone]    Dung Prieto MD  [facility] Family Medicine [phone]

## 2023-07-12 ENCOUNTER — LAB (OUTPATIENT)
Dept: LAB | Facility: CLINIC | Age: 73
End: 2023-07-12
Payer: MEDICARE

## 2023-07-12 ENCOUNTER — ANTICOAGULATION THERAPY VISIT (OUTPATIENT)
Dept: ANTICOAGULATION | Facility: CLINIC | Age: 73
End: 2023-07-12

## 2023-07-12 DIAGNOSIS — Z83.2 FAMILY HISTORY OF FACTOR V DEFICIENCY: ICD-10-CM

## 2023-07-12 DIAGNOSIS — D68.9 BLOOD COAGULATION DISORDER (H): ICD-10-CM

## 2023-07-12 DIAGNOSIS — Z79.01 LONG TERM CURRENT USE OF ANTICOAGULANT THERAPY: ICD-10-CM

## 2023-07-12 DIAGNOSIS — Z79.899 ENCOUNTER FOR LONG-TERM CURRENT USE OF MEDICATION: ICD-10-CM

## 2023-07-12 DIAGNOSIS — I80.01 THROMBOPHLEBITIS OF SUPERFICIAL VEINS OF RIGHT LOWER EXTREMITY: Primary | ICD-10-CM

## 2023-07-12 LAB — INR BLD: 2.5 (ref 0.9–1.1)

## 2023-07-12 PROCEDURE — 85610 PROTHROMBIN TIME: CPT

## 2023-07-12 PROCEDURE — 36415 COLL VENOUS BLD VENIPUNCTURE: CPT

## 2023-07-12 NOTE — PROGRESS NOTES
ANTICOAGULATION MANAGEMENT     Thomas Davila 73 year old female is on warfarin with therapeutic INR result. (Goal INR 2.0-3.0)    Recent labs: (last 7 days)     07/12/23  0840   INR 2.5*       ASSESSMENT       Source(s): Chart Review    Previous INR was Subtherapeutic    Medication, diet, health changes since last INR chart reviewed; none identified             PLAN     Unable to reach Akhil today.    LMTCB    Follow up required to confirm warfarin dose taken and assess for changes    Amber Cassidy RN  Anticoagulation Clinic  7/12/2023

## 2023-07-12 NOTE — PROGRESS NOTES
ANTICOAGULATION MANAGEMENT     Thomas Davila 73 year old female is on warfarin with therapeutic INR result. (Goal INR 2.0-3.0)    Recent labs: (last 7 days)     07/12/23  0840   INR 2.5*       ASSESSMENT       Source(s): Chart Review and Patient/Caregiver Call       Warfarin doses taken: Warfarin taken as instructed    Diet: No new diet changes identified    Medication/supplement changes: None noted    New illness, injury, or hospitalization: No    Signs or symptoms of bleeding or clotting: No    Previous result: Subtherapeutic    Additional findings: None         PLAN     Recommended plan for no diet, medication or health factor changes affecting INR     Dosing Instructions: Continue your current warfarin dose with next INR in 3 weeks       Summary  As of 7/12/2023    Full warfarin instructions:  2.5 mg every Mon, Fri; 5 mg all other days   Next INR check:  8/2/2023             Telephone call with Akhil who verbalizes understanding and agrees to plan    Lab visit scheduled    Education provided:     Goal range and lab monitoring: goal range and significance of current result    Plan made per [facility] anticoagulation protocol    Amber Cassidy RN  Anticoagulation Clinic  7/12/2023    _______________________________________________________________________     Anticoagulation Episode Summary     Current INR goal:  2.0-3.0   TTR:  83.6 % (1 y)   Target end date:  Indefinite   Send INR reminders to:  [facility]    Indications    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 okay to leave DVM per signed consent         Anticoagulation Care Providers     Provider Role Specialty Phone number    Lizabeth Zavala MD Referring Family Medicine  [phone]    Dung Prieto MD [facility] Medicine [phone]

## 2023-08-02 ENCOUNTER — OFFICE VISIT (OUTPATIENT)
Dept: DERMATOLOGY | Facility: CLINIC | Age: 73
End: 2023-08-02
Payer: MEDICARE

## 2023-08-02 ENCOUNTER — ANTICOAGULATION THERAPY VISIT (OUTPATIENT)
Dept: ANTICOAGULATION | Facility: CLINIC | Age: 73
End: 2023-08-02

## 2023-08-02 ENCOUNTER — LAB (OUTPATIENT)
Dept: LAB | Facility: CLINIC | Age: 73
End: 2023-08-02
Payer: MEDICARE

## 2023-08-02 DIAGNOSIS — D23.9 DERMAL NEVUS: ICD-10-CM

## 2023-08-02 DIAGNOSIS — L81.4 LENTIGO: ICD-10-CM

## 2023-08-02 DIAGNOSIS — D18.01 ANGIOMA OF SKIN: ICD-10-CM

## 2023-08-02 DIAGNOSIS — Z79.899 ENCOUNTER FOR LONG-TERM CURRENT USE OF MEDICATION: ICD-10-CM

## 2023-08-02 DIAGNOSIS — D68.9 BLOOD COAGULATION DISORDER (H): ICD-10-CM

## 2023-08-02 DIAGNOSIS — Z83.2 FAMILY HISTORY OF FACTOR V DEFICIENCY: ICD-10-CM

## 2023-08-02 DIAGNOSIS — I80.01 THROMBOPHLEBITIS OF SUPERFICIAL VEINS OF RIGHT LOWER EXTREMITY: Primary | ICD-10-CM

## 2023-08-02 DIAGNOSIS — L57.0 AK (ACTINIC KERATOSIS): ICD-10-CM

## 2023-08-02 DIAGNOSIS — Z79.01 LONG TERM CURRENT USE OF ANTICOAGULANT THERAPY: ICD-10-CM

## 2023-08-02 DIAGNOSIS — L82.1 SEBORRHEIC KERATOSIS: Primary | ICD-10-CM

## 2023-08-02 DIAGNOSIS — Z85.828 HISTORY OF SKIN CANCER: ICD-10-CM

## 2023-08-02 LAB — INR BLD: 2.6 (ref 0.9–1.1)

## 2023-08-02 PROCEDURE — 99213 OFFICE O/P EST LOW 20 MIN: CPT | Performed by: DERMATOLOGY

## 2023-08-02 PROCEDURE — 36416 COLLJ CAPILLARY BLOOD SPEC: CPT

## 2023-08-02 PROCEDURE — 85610 PROTHROMBIN TIME: CPT

## 2023-08-02 ASSESSMENT — PAIN SCALES - GENERAL: PAINLEVEL: NO PAIN (0)

## 2023-08-02 NOTE — NURSING NOTE
Chief Complaint   Patient presents with    Skin Check     Right &  Left Shoulder        There were no vitals filed for this visit.  Wt Readings from Last 1 Encounters:   05/31/23 87.5 kg (193 lb)       Jojo Jordan LPN .................8/2/2023

## 2023-08-02 NOTE — PATIENT INSTRUCTIONS
USE THIS FALL TO BOTH ARMS AND FACE    Using 5-Flurouracil Cream    5-Fluorouracil (5FU) topical cream (brand names Efudex, Carac) is a prescription topical medicine to treat actinic keratoses (pre-squamous cell skin cancer lesions), sun-damaged skin as well as superficial skin cancers.    When applied the areas of sun-damaged skin, the 5FU will  find  damaged skin cells and destroy them.   During treatment, the skin will become red and look very irritated. This is the expected  normal response,  Some patients using 5FU show minimal redness and scaling while others have a very  vigorous  response where the skin scabs and peels. The important thing to realize is that 5FU is treating sun-damaged skin that carries skin cancer risk.      While the skin is irritated, open, sore or scabbed you can apply aquaphor, vaseline or 1% hydrocortisone cream in the morning.     You should apply a thin layer of the cream to the affected area twice a day for 2  weeks every night. A strip of cream the length of your finger tip should be enough to cover your entire face.  For tougher skin like arms, legs, or back, we may suggest longer treatment plans.  However if you react really strong and fast, you might stop earlier or use less frequently. - please call if it is very strong and you are concern you might need to stop early.     If you prescription coverage allows we may add calcipotriene (Dovonex ), a vitamin-D derivative, to the treatment plan.  In these cases we will have you mix the calcipotriene with the efudex to help shorten the treatment course and improve outcomes.      Typically very strong reactions are related to lots of underlying sun damage, and this means you are getting a good response to the medication. However, there is no need to be miserable while using this. Please let us know if you are having trouble or concerns!

## 2023-08-02 NOTE — LETTER
2023         RE: Thomas Davila  [address] [facility] [id number]        Dear Colleague,    Thank you for referring your patient, Thomas Davila, to the [facility]. Please see a copy of my visit note below.    Thomas Davila is an extremely pleasant 73 year old year old female patient here today for rough spotson arms.   .   Patient states this has been present for a while.  Patient reports the following symptoms:  scale.  Patient reports the following previous treatments none.  These treatments did not work.  Patient reports the following modifying factors none.  Associated symptoms: none.  Patient has no other skin complaints today.  Remainder of the HPI, Meds, PMH, Allergies, FH, and SH was reviewed in chart.      Past Medical History:   Diagnosis Date     Acute parametritis and pelvic cellulitis     salpingitis     ASCUS with positive high risk HPV 2013     Asthma     No recent issues     Basal cell carcinoma      breast cancer     left lumpectomy, radiation, tamoxifen     Breast cancer (H)     L Breast; Lumpectomy & Radiation     Chronic salpingitis and oophoritis 1994    PID        Embolism and thrombosis of unspecified site     left leg, due to tamoxifen therapy     Generalized osteoarthrosis, unspecified site     hands     Leiomyoma of uterus, unspecified      Morbid obesity (H) 2017     Other malignant neoplasm of skin, site unspecified 2006    Basal cell cancer on nose     Squamous cell carcinoma      Thrombosis of leg     Left     Unspecified essential hypertension        Past Surgical History:   Procedure Laterality Date     BREAST LUMPECTOMY, RT/LT      left     C/SECTION, LOW TRANSVERSE  1972,     , Low Transverse x2     CL AFF SURGICAL PATHOLOGY      Breast reduction     COLONOSCOPY  10/15/02    normal     FOOT SURGERY      R Foot      HC EXCISION BREAST LESION, OPEN >=1  98    1) Needle localization with  generous lumpectomy 2) Left axillary node dissection.     HC LAPAROSCOPY, SURGICAL, ABDOMEN, PERITONEUM & OMENTUM; DX W/ OR W/O SPECIMEN(S)  02/07/94     HYSTERECTOMY, PAP NO LONGER INDICATED       INSERT PORT VASCULAR ACCESS  3/14/2013    Procedure: INSERT PORT VASCULAR ACCESS;  Port Revision;  Surgeon: Arash Puente MD;  Location: [facility]     LAPAROSCOPIC HYSTERECTOMY TOTAL, BILATERAL SALPINGO-OOPHORECTOMY, NODE DISSECTION, COMBINED  1/31/2013    Procedure: COMBINED LAPAROSCOPIC HYSTERECTOMY TOTAL, SALPINGO-OOPHORECTOMY, NODE DISSECTION;  Laparosocpic  Total Abdominal Hysterectomy, Bilateral Salphino-Oophorectomy, Bilateral Pelvic Lymph Node Dissection, Pelvic Washings, Cystoscopy;  Surgeon: Tanya Walker MD;  Location: [facility]     PHACOEMULSIFICATION WITH STANDARD INTRAOCULAR LENS IMPLANT Left 6/12/2019    Procedure: Cataract Removal with Implant;  Surgeon: Walt Mckeon MD;  Location: [facility]     PHACOEMULSIFICATION WITH STANDARD INTRAOCULAR LENS IMPLANT Right 7/3/2019    Procedure: Cataract Removal with Implant;  Surgeon: Walt Mckeon MD;  Location: [facility]     SURGICAL HISTORY OF -   06/22/93    1) Excision of digital cyst right mid finger  2)  Excision of dermatofibroma left calf     SURGICAL HISTORY OF -   12/18/2001    Excision of mucinous cyst & partial ostectomy, bone removal of benign osteophytic overgrowth osteophyte of the distal aspect of the middle phalanx and distal phalanx     SURGICAL HISTORY OF -   2006    Basal cell cancer removal     TONSILLECTOMY       TUBAL LIGATION  1978        Family History   Problem Relation Age of Onset     Cerebrovascular Disease Mother      Hypertension Mother      Diabetes Mother      Thyroid Disease Mother      Arthritis Mother      Alzheimer Disease Mother      Neurologic Disorder Mother         Parkinsons     Heart Disease Father      Hypertension Father      Diabetes Father      Thyroid Disease Father      Arthritis Father      Blood  Disease Father      Factor V Leiden deficiency Father      Anesthesia Reaction Sister      Thyroid Disease Sister      Anesthesia Reaction Sister      Arthritis Sister         RA     Hypertension Brother      Factor V Leiden deficiency Brother      Allergies Son      Gastrointestinal Disease Son         GERD     Hypertension Brother      Allergies Son         percocett     Gastrointestinal Disease Son         GERD     Hypertension Son      Factor V Leiden deficiency Niece        Social History     Socioeconomic History     Marital status:      Spouse name: Not on file     Number of children: 2     Years of education: Not on file     Highest education level: Not on file   Occupational History     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 No   Social History Narrative    [employer]        April 15, 2021    ENVIRONMENTAL HISTORY: [place] heated with a forced air. They do have central air conditioning. The patient's bedroom is furnished with hard vijaya in bedroom and animals sleep in bedroom.  Pets inside the house include 2 cat(s). There is no history of cockroach or mice infestation. There is/are 0 smokers in the house.  The house does not have a basement.      Social Determinants of Health     Financial Resource Strain: Not on file   Food Insecurity: Not on file   Transportation Needs: Not on file   Physical Activity: Not on file   Stress: Not on file   Social Connections: Not on file   Intimate Partner Violence: Not on file   Housing Stability: Not on file       Outpatient  Encounter Medications as of 8/2/2023   Medication Sig Dispense Refill     Acetaminophen (TYLENOL PO) Take 500 mg by mouth as needed for mild pain or fever       amLODIPine (NORVASC) 2.5 MG tablet Take 1 tablet (2.5 mg) by mouth daily 90 tablet 3     COMPOUNDED NON-CONTROLLED SUBSTANCE (CMPD RX) - PHARMACY TO MIX COMPOUNDED MEDICATION Fluorouracil 5% Calcipotriene 0.005% 1:1 Cream apply twice daily for 2 weeks to arms (Patient not taking: Reported on 5/31/2023) 30 g 6     lisinopril (ZESTRIL) 20 MG tablet Take 1 tablet (20 mg) by mouth daily 90 tablet 3     omeprazole (PRILOSEC) 20 MG DR capsule Take 1 capsule (20 mg) by mouth daily 90 capsule 3     phentermine (ADIPEX-P) 30 MG capsule Take 1 capsule (30 mg) by mouth every morning Hold on file until needed. 90 capsule 0     topiramate (TOPAMAX) 50 MG tablet Take 1 tablet (50 mg) by mouth daily 90 tablet 3     triamterene-HCTZ (MAXZIDE-25) 37.5-25 MG tablet Take 1 tablet by mouth daily 90 tablet 3     warfarin ANTICOAGULANT (JANTOVEN ANTICOAGULANT) 5 MG tablet Take 2.5 mg every Mon, Wed, Fri; 5 mg all other days or as directed by the INR clinic. 180 tablet 0     No facility-administered encounter medications on file as of 8/2/2023.             O:   NAD, WDWN, Alert & Oriented, Mood & Affect wnl, Vitals stable   Here today alone   General appearance normal   Vitals stable   Alert, oriented and in no acute distress     Gritty scaly papule on nose and arms    Stuck on papules and brown macules on trunk and ext   Red papules on trunk  Flesh colored papules on trunk     The remainder of the full exam was normal; the following areas were examined:  conjunctiva/lids, , neck, peripheral vascular system, abdomen, lymph nodes, digits/nails, eccrine and apocrine glands, scalp/hair, face, neck, chest, abdomen, buttocks, back, RUE, LUE, RLE, LLE       Eyes: Conjunctivae/lids:Normal     ENT: Lips, buccal mucosa, tongue: normal    MSK:Normal    Cardiovascular: peripheral edema  none    Pulm: Breathing Normal    Lymph Nodes: No Head and Neck Lymphadenopathy     Neuro/Psych: Orientation:Alert and Orientedx3 ; Mood/Affect:normal       A/P:  1. Seborrheic keratosis, lentigo, angioma, dermal nevus, hx of non-melanoma skin cancer   2. Actinic keratosis   Using 5-Flurouracil Cream    5-Fluorouracil (5FU) topical cream (brand names Efudex, Carac) is a prescription topical medicine to treat actinic keratoses (pre-squamous cell skin cancer lesions), sun-damaged skin as well as superficial skin cancers.    When applied the areas of sun-damaged skin, the 5FU will  find  damaged skin cells and destroy them.   During treatment, the skin will become red and look very irritated. This is the expected  normal response,  Some patients using 5FU show minimal redness and scaling while others have a very  vigorous  response where the skin scabs and peels. The important thing to realize is that 5FU is treating sun-damaged skin that carries skin cancer risk.        While the skin is irritated, open, sore or scabbed you can apply aquaphor, vaseline or 1% hydrocortisone cream in the morning.      You should apply a thin layer of the cream to the affected area twice a day for 2  weeks every night. A strip of cream the length of your finger tip should be enough to cover your entire face.  For tougher skin like arms, legs, or back, we may suggest longer treatment plans.  However if you react really strong and fast, you might stop earlier or use less frequently. - please call if it is very strong and you are concern you might need to stop early.      If you prescription coverage allows we may add calcipotriene (Dovonex ), a vitamin-D derivative, to the treatment plan.  In these cases we will have you mix the calcipotriene with the efudex to help shorten the treatment course and improve outcomes.      Typically very strong reactions are related to lots of underlying sun damage, and this means you are getting a good  response to the medication. However, there is no need to be miserable while using this. Please let us know if you are having trouble or concerns!     It was a pleasure speaking to Thomas Daivla today.  Previous clinic notes and pertinent laboratory tests were reviewed prior to Thomas Davila's visit.  Nature and genetics of benign skin lesions dicussed with patient.  Signs and Symptoms of skin cancer discussed with patient.  Patient encouraged to perform monthly skin exams.  UV precautions reviewed with patient.  Risks of non-melanoma skin cancer discussed with patient   Return to clinic 6 months      Again, thank you for allowing me to participate in the care of your patient.        Sincerely,        Walt Golden MD

## 2023-08-02 NOTE — PROGRESS NOTES
Thomas Davila is an extremely pleasant 73 year old year old female patient here today for rough spotson arms.   .   Patient states this has been present for a while.  Patient reports the following symptoms:  scale.  Patient reports the following previous treatments none.  These treatments did not work.  Patient reports the following modifying factors none.  Associated symptoms: none.  Patient has no other skin complaints today.  Remainder of the HPI, Meds, PMH, Allergies, FH, and SH was reviewed in chart.      Past Medical History:   Diagnosis Date    Acute parametritis and pelvic cellulitis     salpingitis    ASCUS with positive high risk HPV 2013    Asthma     No recent issues    Basal cell carcinoma     breast cancer     left lumpectomy, radiation, tamoxifen    Breast cancer (H)     L Breast; Lumpectomy & Radiation    Chronic salpingitis and oophoritis 1994    PID       Embolism and thrombosis of unspecified site     left leg, due to tamoxifen therapy    Generalized osteoarthrosis, unspecified site     hands    Leiomyoma of uterus, unspecified     Morbid obesity (H) 2017    Other malignant neoplasm of skin, site unspecified 2006    Basal cell cancer on nose    Squamous cell carcinoma     Thrombosis of leg     Left    Unspecified essential hypertension        Past Surgical History:   Procedure Laterality Date    BREAST LUMPECTOMY, RT/LT      left    C/SECTION, LOW TRANSVERSE  1972,     , Low Transverse x2    CL AFF SURGICAL PATHOLOGY      Breast reduction    COLONOSCOPY  10/15/02    normal    FOOT SURGERY      R Foot     HC EXCISION BREAST LESION, OPEN >=1  98    1) Needle localization with generous lumpectomy 2) Left axillary node dissection.    HC LAPAROSCOPY, SURGICAL, ABDOMEN, PERITONEUM & OMENTUM; DX W/ OR W/O SPECIMEN(S)  94    HYSTERECTOMY, PAP NO LONGER INDICATED      INSERT PORT VASCULAR ACCESS  3/14/2013    Procedure: INSERT PORT VASCULAR  ACCESS;  Port Revision;  Surgeon: Arash Puente MD;  Location: [facility]    LAPAROSCOPIC HYSTERECTOMY TOTAL, BILATERAL SALPINGO-OOPHORECTOMY, NODE DISSECTION, COMBINED  1/31/2013    Procedure: COMBINED LAPAROSCOPIC HYSTERECTOMY TOTAL, SALPINGO-OOPHORECTOMY, NODE DISSECTION;  Laparosocpic  Total Abdominal Hysterectomy, Bilateral Salphino-Oophorectomy, Bilateral Pelvic Lymph Node Dissection, Pelvic Washings, Cystoscopy;  Surgeon: Tanya Walker MD;  Location: [facility]    PHACOEMULSIFICATION WITH STANDARD INTRAOCULAR LENS IMPLANT Left 6/12/2019    Procedure: Cataract Removal with Implant;  Surgeon: Walt Mckeon MD;  Location: [facility]    PHACOEMULSIFICATION WITH STANDARD INTRAOCULAR LENS IMPLANT Right 7/3/2019    Procedure: Cataract Removal with Implant;  Surgeon: Walt Mckeon MD;  Location: [facility]    SURGICAL HISTORY OF -   06/22/93    1) Excision of digital cyst right mid finger  2)  Excision of dermatofibroma left calf    SURGICAL HISTORY OF -   12/18/2001    Excision of mucinous cyst & partial ostectomy, bone removal of benign osteophytic overgrowth osteophyte of the distal aspect of the middle phalanx and distal phalanx    SURGICAL HISTORY OF -   2006    Basal cell cancer removal    TONSILLECTOMY      TUBAL LIGATION  1978        Family History   Problem Relation Age of Onset    Cerebrovascular Disease Mother     Hypertension Mother     Diabetes Mother     Thyroid Disease Mother     Arthritis Mother     Alzheimer Disease Mother     Neurologic Disorder Mother         Parkinsons    Heart Disease Father     Hypertension Father     Diabetes Father     Thyroid Disease Father     Arthritis Father     Blood Disease Father     Factor V Leiden deficiency Father     Anesthesia Reaction Sister     Thyroid Disease Sister     Anesthesia Reaction Sister     Arthritis Sister         RA    Hypertension Brother     Factor V Leiden deficiency Brother     Allergies Son     Gastrointestinal Disease Son          GERD    Hypertension Brother     Allergies Son         percocett    Gastrointestinal Disease Son         GERD    Hypertension Son     Factor V Leiden deficiency Niece        Social History     Socioeconomic History    Marital status:      Spouse name: Not on file    Number of children: 2    Years of education: Not on file    Highest education level: Not on file   Occupational History     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 home is heated with a forced air. They do have central air conditioning. The patient's bedroom is furnished with hard vijaya in bedroom and animals sleep in bedroom.  Pets inside the house include 2 cat(s). There is no history of cockroach or mice infestation. There is/are 0 smokers in the house.  The house does not have a basement.      Social Determinants of Health     Financial Resource Strain: Not on file   Food Insecurity: Not on file   Transportation Needs: Not on file   Physical Activity: Not on file   Stress: Not on file   Social Connections: Not on file   Intimate Partner Violence: Not on file   Housing Stability: Not on file       Outpatient Encounter Medications as of 8/2/2023   Medication Sig Dispense Refill    Acetaminophen (TYLENOL PO) Take 500 mg by mouth as needed for mild pain or fever      amLODIPine (NORVASC) 2.5 MG tablet Take 1 tablet (2.5 mg) by mouth daily 90 tablet 3    COMPOUNDED NON-CONTROLLED SUBSTANCE (CMPD RX) - PHARMACY TO MIX COMPOUNDED  MEDICATION Fluorouracil 5% Calcipotriene 0.005% 1:1 Cream apply twice daily for 2 weeks to arms (Patient not taking: Reported on 5/31/2023) 30 g 6    lisinopril (ZESTRIL) 20 MG tablet Take 1 tablet (20 mg) by mouth daily 90 tablet 3    omeprazole (PRILOSEC) 20 MG DR capsule Take 1 capsule (20 mg) by mouth daily 90 capsule 3    phentermine (ADIPEX-P) 30 MG capsule Take 1 capsule (30 mg) by mouth every morning Hold on file until needed. 90 capsule 0    topiramate (TOPAMAX) 50 MG tablet Take 1 tablet (50 mg) by mouth daily 90 tablet 3    triamterene-HCTZ (MAXZIDE-25) 37.5-25 MG tablet Take 1 tablet by mouth daily 90 tablet 3    warfarin ANTICOAGULANT (JANTOVEN ANTICOAGULANT) 5 MG tablet Take 2.5 mg every Mon, Wed, Fri; 5 mg all other days or as directed by the INR clinic. 180 tablet 0     No facility-administered encounter medications on file as of 8/2/2023.             O:   NAD, WDWN, Alert & Oriented, Mood & Affect wnl, Vitals stable   Here today alone   General appearance normal   Vitals stable   Alert, oriented and in no acute distress     Gritty scaly papule on nose and arms    Stuck on papules and brown macules on trunk and ext   Red papules on trunk  Flesh colored papules on trunk     The remainder of the full exam was normal; the following areas were examined:  conjunctiva/lids, , neck, peripheral vascular system, abdomen, lymph nodes, digits/nails, eccrine and apocrine glands, scalp/hair, face, neck, chest, abdomen, buttocks, back, RUE, LUE, RLE, LLE       Eyes: Conjunctivae/lids:Normal     ENT: Lips, buccal mucosa, tongue: normal    MSK:Normal    Cardiovascular: peripheral edema none    Pulm: Breathing Normal    Lymph Nodes: No Head and Neck Lymphadenopathy     Neuro/Psych: Orientation:Alert and Orientedx3 ; Mood/Affect:normal       A/P:  1. Seborrheic keratosis, lentigo, angioma, dermal nevus, hx of non-melanoma skin cancer   2. Actinic keratosis   Using 5-Flurouracil Cream    5-Fluorouracil (5FU) topical  cream (brand names Efudex, Carac) is a prescription topical medicine to treat actinic keratoses (pre-squamous cell skin cancer lesions), sun-damaged skin as well as superficial skin cancers.    When applied the areas of sun-damaged skin, the 5FU will  find  damaged skin cells and destroy them.   During treatment, the skin will become red and look very irritated. This is the expected  normal response,  Some patients using 5FU show minimal redness and scaling while others have a very  vigorous  response where the skin scabs and peels. The important thing to realize is that 5FU is treating sun-damaged skin that carries skin cancer risk.        While the skin is irritated, open, sore or scabbed you can apply aquaphor, vaseline or 1% hydrocortisone cream in the morning.      You should apply a thin layer of the cream to the affected area twice a day for 2  weeks every night. A strip of cream the length of your finger tip should be enough to cover your entire face.  For tougher skin like arms, legs, or back, we may suggest longer treatment plans.  However if you react really strong and fast, you might stop earlier or use less frequently. - please call if it is very strong and you are concern you might need to stop early.      If you prescription coverage allows we may add calcipotriene (Dovonex ), a vitamin-D derivative, to the treatment plan.  In these cases we will have you mix the calcipotriene with the efudex to help shorten the treatment course and improve outcomes.      Typically very strong reactions are related to lots of underlying sun damage, and this means you are getting a good response to the medication. However, there is no need to be miserable while using this. Please let us know if you are having trouble or concerns!     It was a pleasure speaking to Thomas Davila today.  Previous clinic notes and pertinent laboratory tests were reviewed prior to Thomas Davila's visit.  Nature and genetics of benign  skin lesions dicussed with patient.  Signs and Symptoms of skin cancer discussed with patient.  Patient encouraged to perform monthly skin exams.  UV precautions reviewed with patient.  Risks of non-melanoma skin cancer discussed with patient   Return to clinic 6 months

## 2023-08-02 NOTE — PROGRESS NOTES
ANTICOAGULATION MANAGEMENT     Thomas Davila 73 year old female is on warfarin with therapeutic INR result. (Goal INR 2.0-3.0)    Recent labs: (last 7 days)     08/02/23  0953   INR 2.6*       ASSESSMENT     Source(s): Chart Review  Previous INR was Therapeutic last visit; previously outside of goal range  Medication, diet, health changes since last INR chart reviewed; none identified         PLAN     Recommended plan for no diet, medication or health factor changes affecting INR     Dosing Instructions: Continue your current warfarin dose with next INR in 4 weeks       Summary  As of 8/2/2023      Full warfarin instructions:  2.5 mg every Mon, Fri; 5 mg all other days   Next INR check:  8/30/2023               Detailed voice message left for Akhil with dosing instructions and follow up date.     Contact [phone]  to schedule and with any changes, questions or concerns.     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 okay to leave DVM per signed consent             Anticoagulation Care Providers       Provider Role Specialty Phone number    Lizabeth Zavala MD Referring Family Medicine  [phone]    Dung Prieto MD [facility] [phone]

## 2023-08-30 ENCOUNTER — ANTICOAGULATION THERAPY VISIT (OUTPATIENT)
Dept: ANTICOAGULATION | Facility: CLINIC | Age: 73
End: 2023-08-30

## 2023-08-30 ENCOUNTER — LAB (OUTPATIENT)
Dept: LAB | Facility: CLINIC | Age: 73
End: 2023-08-30
Payer: MEDICARE

## 2023-08-30 DIAGNOSIS — D68.9 BLOOD COAGULATION DISORDER (H): ICD-10-CM

## 2023-08-30 DIAGNOSIS — I80.01 THROMBOPHLEBITIS OF SUPERFICIAL VEINS OF RIGHT LOWER EXTREMITY: Primary | ICD-10-CM

## 2023-08-30 DIAGNOSIS — Z83.2 FAMILY HISTORY OF FACTOR V DEFICIENCY: ICD-10-CM

## 2023-08-30 DIAGNOSIS — Z79.01 LONG TERM CURRENT USE OF ANTICOAGULANT THERAPY: ICD-10-CM

## 2023-08-30 DIAGNOSIS — Z79.899 ENCOUNTER FOR LONG-TERM CURRENT USE OF MEDICATION: ICD-10-CM

## 2023-08-30 LAB — INR BLD: 2.6 (ref 0.9–1.1)

## 2023-08-30 PROCEDURE — 36416 COLLJ CAPILLARY BLOOD SPEC: CPT

## 2023-08-30 PROCEDURE — 85610 PROTHROMBIN TIME: CPT

## 2023-08-30 NOTE — PROGRESS NOTES
ANTICOAGULATION MANAGEMENT     Thomas Davila 73 year old female is on warfarin with therapeutic INR result. (Goal INR 2.0-3.0)    Recent labs: (last 7 days)     08/30/23  0911   INR 2.6*       ASSESSMENT     Source(s): Chart Review  Previous INR was Therapeutic last 2(+) visits  Medication, diet, health changes since last INR chart reviewed; none identified         PLAN     Recommended plan for no diet, medication or health factor changes affecting INR     Dosing Instructions: Continue your current warfarin dose with next INR in 5 weeks       Summary  As of 8/30/2023      Full warfarin instructions:  2.5 mg every Mon, Fri; 5 mg all other days   Next INR check:  10/4/2023               Detailed voice message left for Akhil with dosing instructions and follow up date.     Contact [phone]  to schedule and with any changes, questions or concerns.     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 okay to leave DVM per signed consent             Anticoagulation Care Providers       Provider Role Specialty Phone number    Lizabeth Zavala MD Referring Family Medicine [phone]    Conner  Dung MCGUIRE MD [facility] [phone]

## 2023-09-05 ENCOUNTER — TELEPHONE (OUTPATIENT)
Dept: FAMILY MEDICINE | Facility: CLINIC | Age: 73
End: 2023-09-05
Payer: MEDICARE

## 2023-09-05 DIAGNOSIS — Z12.31 VISIT FOR SCREENING MAMMOGRAM: Primary | ICD-10-CM

## 2023-09-05 DIAGNOSIS — Z79.899 ENCOUNTER FOR LONG-TERM CURRENT USE OF MEDICATION: ICD-10-CM

## 2023-09-05 DIAGNOSIS — I80.01 THROMBOPHLEBITIS OF SUPERFICIAL VEINS OF RIGHT LOWER EXTREMITY: ICD-10-CM

## 2023-09-05 DIAGNOSIS — Z79.01 LONG TERM CURRENT USE OF ANTICOAGULANT THERAPY: ICD-10-CM

## 2023-09-05 DIAGNOSIS — Z83.2 FAMILY HISTORY OF FACTOR V DEFICIENCY: ICD-10-CM

## 2023-09-05 NOTE — TELEPHONE ENCOUNTER
General Call      Reason for Call:  mammogram    What are your questions or concerns:  patients chart states that her mammogram is due September of 2024. She states she thought it was sooner then that? Please call her    Date of last appointment with provider: 5-31-23    Could we send this information to you in [employer] or would you prefer to receive a phone call?:   No preference   Okay to leave a detailed message?: Yes at Home number on file [phone] (home)

## 2023-09-06 RX ORDER — WARFARIN SODIUM 5 MG/1
TABLET ORAL
Qty: 80 TABLET | Refills: 1 | Status: SHIPPED | OUTPATIENT
Start: 2023-09-06 | End: 2023-11-24

## 2023-09-06 NOTE — TELEPHONE ENCOUNTER
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]  Family history of factor V deficiency [Z83.2]  Encounter for long-term current use of medication [Z79.899]  Long term current use of anticoagulant therapy [Z79.01]  Blood coagulation disorder (H) [D68.9]                 Anticoagulation Care Providers       Provider Role Specialty Phone number    Lizabeth Zavala MD Referring Family Medicine [phone]    Dung Prieto MD Referring Family Medicine [phone]            Refill Criteria    Visit with referring provider/group: Meets criteria: office visit within referring provider group in the last 1 year on 5/31/23    [facility] referral signed last signed: 06/28/2023; within last year: Yes    Lab monitoring not exceeding 2 weeks overdue: Yes    Thomas meets all criteria for refill. Rx instructions and quantity supplied updated to match patient's current dosing plan. Warfarin 90 day supply with 1 refill granted per [facility] protocol     Kasandra Rendon RN  Anticoagulation Clinic

## 2023-09-06 NOTE — TELEPHONE ENCOUNTER
Forwarding to PCP/covering providers for review please.  Patient's Health Maintenance advises mammogram every 2 years with next due 9/8/24; however, she has historically had them yearly with last one 9/8/22 and results indicating yearly mammogram.   Can the Health Maintenance be switched to yearly mammogram, and would provider like to place order for patient to have one now?  Order pended.    Venita Ambriz RN  [facility]

## 2023-09-06 NOTE — TELEPHONE ENCOUNTER
Pending Prescriptions:                       Disp   Refills    JANTOVEN ANTICOAGULANT 5 MG tablet [Pharma*180 ta*0        Sig: TAKE 1/2 TABLET (2.5MG) BY MOUTH ON MONDAY, WEDNESDAY           AND FRIDAY AND 1 TABLET ALL OTHER DAYS OR AS           DIRECTED BY INR CLINIC.    Routing refill request to provider for review/approval because:  Managed by [facility].    Kassie Balderrama RN  [facility]

## 2023-09-07 ENCOUNTER — PATIENT OUTREACH (OUTPATIENT)
Dept: CARE COORDINATION | Facility: CLINIC | Age: 73
End: 2023-09-07
Payer: MEDICARE

## 2023-09-07 NOTE — TELEPHONE ENCOUNTER
Covering for primary/ordering provider  Order placed, updated HEALTH CARE MAINTENANCE; all pts default to Q 2 yrs

## 2023-09-07 NOTE — TELEPHONE ENCOUNTER
Pt called & read providers message. Pt verbalized understanding. Number given for scheduling.    Cristela Connell RN

## 2023-09-11 ENCOUNTER — ANTICOAGULATION THERAPY VISIT (OUTPATIENT)
Dept: ANTICOAGULATION | Facility: CLINIC | Age: 73
End: 2023-09-11

## 2023-09-11 ENCOUNTER — ANCILLARY PROCEDURE (OUTPATIENT)
Dept: MAMMOGRAPHY | Facility: CLINIC | Age: 73
End: 2023-09-11
Attending: INTERNAL MEDICINE
Payer: MEDICARE

## 2023-09-11 ENCOUNTER — LAB (OUTPATIENT)
Dept: LAB | Facility: CLINIC | Age: 73
End: 2023-09-11
Payer: MEDICARE

## 2023-09-11 DIAGNOSIS — Z79.01 LONG TERM CURRENT USE OF ANTICOAGULANT THERAPY: ICD-10-CM

## 2023-09-11 DIAGNOSIS — Z83.2 FAMILY HISTORY OF FACTOR V DEFICIENCY: ICD-10-CM

## 2023-09-11 DIAGNOSIS — Z12.31 VISIT FOR SCREENING MAMMOGRAM: ICD-10-CM

## 2023-09-11 DIAGNOSIS — Z79.899 ENCOUNTER FOR LONG-TERM CURRENT USE OF MEDICATION: ICD-10-CM

## 2023-09-11 DIAGNOSIS — I80.01 THROMBOPHLEBITIS OF SUPERFICIAL VEINS OF RIGHT LOWER EXTREMITY: Primary | ICD-10-CM

## 2023-09-11 DIAGNOSIS — D68.9 BLOOD COAGULATION DISORDER (H): ICD-10-CM

## 2023-09-11 LAB — INR BLD: 2.9 (ref 0.9–1.1)

## 2023-09-11 PROCEDURE — 77067 SCR MAMMO BI INCL CAD: CPT | Mod: TC | Performed by: RADIOLOGY

## 2023-09-11 PROCEDURE — 36416 COLLJ CAPILLARY BLOOD SPEC: CPT

## 2023-09-11 PROCEDURE — 77063 BREAST TOMOSYNTHESIS BI: CPT | Mod: TC | Performed by: RADIOLOGY

## 2023-09-11 PROCEDURE — 85610 PROTHROMBIN TIME: CPT

## 2023-09-11 NOTE — PROGRESS NOTES
ANTICOAGULATION MANAGEMENT     Thomas Davila 73 year old female is on warfarin with therapeutic INR result. (Goal INR 2.0-3.0)    Recent labs: (last 7 days)     09/11/23  0922   INR 2.9*       ASSESSMENT     Source(s): Chart Review  Previous INR was Therapeutic last 2(+) visits  Medication, diet, health changes since last INR chart reviewed; none identified         PLAN     Recommended plan for no diet, medication or health factor changes affecting INR     Dosing Instructions: Continue your current warfarin dose with next INR in 5 weeks       Summary  As of 9/11/2023      Full warfarin instructions:  2.5 mg every Mon, Fri; 5 mg all other days   Next INR check:  10/23/2023               Detailed voice message left for Akhil with dosing instructions and follow up date.     Lab visit scheduled    Education provided:   Please call back if any changes to your diet, medications or how you've been taking warfarin  Contact [phone]  with any changes, questions or concerns.     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 okay to leave DVM per signed consent             Anticoagulation Care Providers       Provider Role Specialty Phone number    Lizabeth Zavala MD Referring Family Medicine [phone]    Dung Prieto MD Referring Family Medicine [phone]

## 2023-10-04 ENCOUNTER — ANTICOAGULATION THERAPY VISIT (OUTPATIENT)
Dept: ANTICOAGULATION | Facility: CLINIC | Age: 73
End: 2023-10-04

## 2023-10-04 ENCOUNTER — LAB (OUTPATIENT)
Dept: LAB | Facility: CLINIC | Age: 73
End: 2023-10-04
Payer: MEDICARE

## 2023-10-04 DIAGNOSIS — D68.9 BLOOD COAGULATION DISORDER (H): ICD-10-CM

## 2023-10-04 DIAGNOSIS — Z79.01 LONG TERM CURRENT USE OF ANTICOAGULANT THERAPY: ICD-10-CM

## 2023-10-04 DIAGNOSIS — Z83.2 FAMILY HISTORY OF FACTOR V DEFICIENCY: ICD-10-CM

## 2023-10-04 DIAGNOSIS — I80.01 THROMBOPHLEBITIS OF SUPERFICIAL VEINS OF RIGHT LOWER EXTREMITY: Primary | ICD-10-CM

## 2023-10-04 DIAGNOSIS — Z79.899 ENCOUNTER FOR LONG-TERM CURRENT USE OF MEDICATION: ICD-10-CM

## 2023-10-04 LAB — INR BLD: 2.4 (ref 0.9–1.1)

## 2023-10-04 PROCEDURE — 85610 PROTHROMBIN TIME: CPT

## 2023-10-04 PROCEDURE — 36415 COLL VENOUS BLD VENIPUNCTURE: CPT

## 2023-10-04 NOTE — PROGRESS NOTES
ANTICOAGULATION MANAGEMENT     Thomas Davila 73 year old female is on warfarin with therapeutic INR result. (Goal INR 2.0-3.0)    Recent labs: (last 7 days)     10/04/23  0853   INR 2.4*       ASSESSMENT     Source(s): Chart Review  Previous INR was Therapeutic last 2(+) visits  Medication, diet, health changes since last INR chart reviewed; none identified         PLAN     Recommended plan for no diet, medication or health factor changes affecting INR     Dosing Instructions: Continue your current warfarin dose with next INR in 6 weeks       Summary  As of 10/4/2023      Full warfarin instructions:  2.5 mg every Mon, Fri; 5 mg all other days   Next INR check:  11/15/2023               Detailed voice message left for Akhil with dosing instructions and follow up date.   Sent [employer] message with dosing and follow up instructions    Contact [phone]  to schedule and with any changes, questions or concerns.     Education provided:   Contact [phone]  with any changes, questions or concerns.     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 okay to leave DVM per signed consent             Anticoagulation Care Providers       Provider Role Specialty Phone number    Lizabeth Zavala MD Referring Family Medicine [phone]    Dung Prieto MD Referring Family Medicine  [phone]

## 2023-10-05 ENCOUNTER — IMMUNIZATION (OUTPATIENT)
Dept: FAMILY MEDICINE | Facility: CLINIC | Age: 73
End: 2023-10-05
Payer: MEDICARE

## 2023-10-05 PROCEDURE — 91320 SARSCV2 VAC 30MCG TRS-SUC IM: CPT

## 2023-10-05 PROCEDURE — G0008 ADMIN INFLUENZA VIRUS VAC: HCPCS | Mod: 59

## 2023-10-05 PROCEDURE — 90662 IIV NO PRSV INCREASED AG IM: CPT

## 2023-10-05 PROCEDURE — 90480 ADMN SARSCOV2 VAC 1/ONLY CMP: CPT

## 2023-11-01 ENCOUNTER — TELEPHONE (OUTPATIENT)
Dept: FAMILY MEDICINE | Facility: CLINIC | Age: 73
End: 2023-11-01

## 2023-11-01 ENCOUNTER — ALLIED HEALTH/NURSE VISIT (OUTPATIENT)
Dept: FAMILY MEDICINE | Facility: CLINIC | Age: 73
End: 2023-11-01
Payer: MEDICARE

## 2023-11-01 VITALS — DIASTOLIC BLOOD PRESSURE: 68 MMHG | OXYGEN SATURATION: 97 % | SYSTOLIC BLOOD PRESSURE: 148 MMHG | HEART RATE: 80 BPM

## 2023-11-01 DIAGNOSIS — I10 ESSENTIAL HYPERTENSION, BENIGN: Primary | ICD-10-CM

## 2023-11-01 PROCEDURE — 99207 PR NO CHARGE NURSE ONLY: CPT

## 2023-11-01 NOTE — TELEPHONE ENCOUNTER
Covering for primary/ordering provider:  Please call and schedule pt with a PCP for HTN management.  Amrita Gr, CNP

## 2023-11-01 NOTE — TELEPHONE ENCOUNTER
Routing for provider review.          Thomas Davila is a 73 year old year old patient who comes in today for a Blood Pressure check because of nosebleeds. Pt stopped amlodipine due to side effects reports her feet felt like they were mariaelena. Pt also reports drinking a cup of coffee today.  Vital Signs as repeated by RN          11/1/2023 10:10 AM 11/1/2023 10:16 AM    /78 148/68   BP Location Right arm Right arm   Patient Position Sitting Sitting   Cuff Size Adult Large Adult Large   Pulse 80        Patient is not taking medication as prescribed  Patient is not tolerating medications well.  Patient is not monitoring Blood Pressure at home.  Average readings if yes are   Current complaints: epistaxis  Disposition:  patient instructed to followup with provider for medication recommendations and patient reminded to call as needed . Also recommend follow up for INR check  Angelita Tim RN

## 2023-11-01 NOTE — PROGRESS NOTES
Thomas Davila is a 73 year old year old patient who comes in today for a Blood Pressure check because of nosebleeds. Pt stopped amlodipine due to side effects reports her feet felt like they were mariaelena. Pt also reports drinking a cup of coffee today.  Vital Signs as repeated by RN          11/1/2023 10:10 AM 11/1/2023 10:16 AM    /78 148/68   BP Location Right arm Right arm   Patient Position Sitting Sitting   Cuff Size Adult Large Adult Large   Pulse 80        Patient is not taking medication as prescribed  Patient is not tolerating medications well.  Patient is not monitoring Blood Pressure at home.  Average readings if yes are   Current complaints: epistaxis  Disposition:  patient instructed to followup with provider for medication recommendations and patient reminded to call as needed . Also recommend follow up for INR check  Angelita Tim RN

## 2023-11-03 NOTE — TELEPHONE ENCOUNTER
Patient returned call, appt scheduled with Dr. Zavala on 11/9/23 arrival time of 0840 for HTN follow up.    Julie Behrendt RN

## 2023-11-09 ENCOUNTER — OFFICE VISIT (OUTPATIENT)
Dept: FAMILY MEDICINE | Facility: CLINIC | Age: 73
End: 2023-11-09
Payer: MEDICARE

## 2023-11-09 VITALS
WEIGHT: 201 LBS | TEMPERATURE: 97.6 F | DIASTOLIC BLOOD PRESSURE: 76 MMHG | RESPIRATION RATE: 20 BRPM | HEART RATE: 82 BPM | SYSTOLIC BLOOD PRESSURE: 142 MMHG | HEIGHT: 64 IN | BODY MASS INDEX: 34.31 KG/M2 | OXYGEN SATURATION: 96 %

## 2023-11-09 DIAGNOSIS — I10 BENIGN ESSENTIAL HYPERTENSION: Primary | ICD-10-CM

## 2023-11-09 PROCEDURE — 99213 OFFICE O/P EST LOW 20 MIN: CPT | Performed by: FAMILY MEDICINE

## 2023-11-09 RX ORDER — LISINOPRIL 10 MG/1
10 TABLET ORAL DAILY
Qty: 30 TABLET | Refills: 3 | Status: SHIPPED | OUTPATIENT
Start: 2023-11-09 | End: 2023-12-13

## 2023-11-09 ASSESSMENT — ENCOUNTER SYMPTOMS
ALLERGIC/IMMUNOLOGIC NEGATIVE: 1
RESPIRATORY NEGATIVE: 1
GASTROINTESTINAL NEGATIVE: 1
MUSCULOSKELETAL NEGATIVE: 1
PSYCHIATRIC NEGATIVE: 1
EYES NEGATIVE: 1
NEUROLOGICAL NEGATIVE: 1
ENDOCRINE NEGATIVE: 1
HEMATOLOGIC/LYMPHATIC NEGATIVE: 1
CONSTITUTIONAL NEGATIVE: 1
CARDIOVASCULAR NEGATIVE: 1

## 2023-11-09 ASSESSMENT — PAIN SCALES - GENERAL: PAINLEVEL: NO PAIN (0)

## 2023-11-09 NOTE — PROGRESS NOTES
"  Assessment & Plan     (I10) Benign essential hypertension  (primary encounter diagnosis)  Comment: added 10 mg of lisinopril. Recommend close follow up with an RN for BP recheck.   Plan: lisinopril (ZESTRIL) 10 MG tablet            BMI:   Estimated body mass index is 35.05 kg/m  as calculated from the following:    Height as of this encounter: 1.613 m (5' 3.5\").    Weight as of this encounter: 91.2 kg (201 lb).       FUTURE APPOINTMENTS:       - Follow-up visit in 1- 2 weeks for BP recheck.     Lizabeth Zavala MD  [facility] NORBERTO Landaverde is a 73 year old, presenting for the following health issues:    73 yr old female here for blood pressure check. Patient says she had a nose bleed a couple of weeks ago and her blood pressure was also elevated. She was asked to follow up in clinic. She reports no systemic symptoms. Patient says she thinks the nose bleed was from dry weather.   Patient was on amlodipine but had side effects to the medication and she stopped taking the medication. Presently she is on triamterene- hydrochlorothiazide and lisinopril 20 mg. Recommend increasing the lisinopril. She also stopped taking phentermine because of cost. She is still on topiramate for her weight     Hypertension (Recheck on blood pressure.) and Medication stopped by patient (She stopped the Topamax about one week ago.  Phentermine is no longer covered by her insurance. )        11/9/2023     9:05 AM   Additional Questions   Roomed by Yohana Juarez CMA       History of Present Illness       Hypertension: She presents for follow up of hypertension.  She does not check blood pressure  regularly outside of the clinic. Outside blood pressures have been over 140/90. She does not follow a low salt diet.     She eats 0-1 servings of fruits and vegetables daily.She consumes 0 sweetened beverage(s) daily.She exercises with enough effort to increase her heart rate 9 or less minutes per day.  She " "exercises with enough effort to increase her heart rate 3 or less days per week.   She is taking medications regularly.                 Review of Systems   Constitutional: Negative.    HENT: Negative.     Eyes: Negative.    Respiratory: Negative.     Cardiovascular: Negative.    Gastrointestinal: Negative.    Endocrine: Negative.    Breasts:  negative.    Genitourinary: Negative.    Musculoskeletal: Negative.    Skin: Negative.    Allergic/Immunologic: Negative.    Neurological: Negative.    Hematological: Negative.    Psychiatric/Behavioral: Negative.              Objective    BP (!) 142/76 (BP Location: Left arm, Patient Position: Chair, Cuff Size: Adult Large)   Pulse 82   Temp 97.6  F (36.4  C) (Tympanic)   Resp 20   Ht 1.613 m (5' 3.5\")   Wt 91.2 kg (201 lb)   SpO2 96%   BMI 35.05 kg/m    Body mass index is 35.05 kg/m .  Physical Exam  Constitutional:       Appearance: Normal appearance. She is obese.   HENT:      Head: Normocephalic and atraumatic.   Musculoskeletal:         General: Normal range of motion.   Skin:     General: Skin is warm and dry.   Neurological:      Mental Status: She is alert and oriented to person, place, and time.   Psychiatric:         Mood and Affect: Mood normal.         Behavior: Behavior normal.                      "

## 2023-11-15 ENCOUNTER — ANTICOAGULATION THERAPY VISIT (OUTPATIENT)
Dept: ANTICOAGULATION | Facility: CLINIC | Age: 73
End: 2023-11-15

## 2023-11-15 ENCOUNTER — LAB (OUTPATIENT)
Dept: LAB | Facility: CLINIC | Age: 73
End: 2023-11-15
Payer: MEDICARE

## 2023-11-15 ENCOUNTER — ALLIED HEALTH/NURSE VISIT (OUTPATIENT)
Dept: FAMILY MEDICINE | Facility: CLINIC | Age: 73
End: 2023-11-15
Payer: MEDICARE

## 2023-11-15 ENCOUNTER — TELEPHONE (OUTPATIENT)
Dept: FAMILY MEDICINE | Facility: CLINIC | Age: 73
End: 2023-11-15

## 2023-11-15 VITALS — SYSTOLIC BLOOD PRESSURE: 139 MMHG | HEART RATE: 77 BPM | DIASTOLIC BLOOD PRESSURE: 68 MMHG

## 2023-11-15 DIAGNOSIS — Z01.30 BP CHECK: Primary | ICD-10-CM

## 2023-11-15 DIAGNOSIS — Z79.01 LONG TERM CURRENT USE OF ANTICOAGULANT THERAPY: ICD-10-CM

## 2023-11-15 DIAGNOSIS — Z79.899 ENCOUNTER FOR LONG-TERM CURRENT USE OF MEDICATION: ICD-10-CM

## 2023-11-15 DIAGNOSIS — D68.9 BLOOD COAGULATION DISORDER (H): ICD-10-CM

## 2023-11-15 DIAGNOSIS — I80.01 THROMBOPHLEBITIS OF SUPERFICIAL VEINS OF RIGHT LOWER EXTREMITY: Primary | ICD-10-CM

## 2023-11-15 DIAGNOSIS — Z83.2 FAMILY HISTORY OF FACTOR V DEFICIENCY: ICD-10-CM

## 2023-11-15 LAB — INR BLD: 2.7 (ref 0.9–1.1)

## 2023-11-15 PROCEDURE — 85610 PROTHROMBIN TIME: CPT

## 2023-11-15 PROCEDURE — 99207 PR NO CHARGE NURSE ONLY: CPT

## 2023-11-15 PROCEDURE — 36416 COLLJ CAPILLARY BLOOD SPEC: CPT

## 2023-11-15 NOTE — PROGRESS NOTES
Thomas Davila is a 73 year old year old patient who comes in today for a Blood Pressure check because of ongoing blood pressure monitoring.  Vital Signs as repeated by /76, 139/68, p-81  Patient is taking medication as prescribed  Patient is tolerating medications well.  Patient is not monitoring Blood Pressure at home.  Average readings if yes are NA  Current complaints: none  Disposition:  Pt was encourage to take and record her BP at home.

## 2023-11-15 NOTE — TELEPHONE ENCOUNTER
Thomas Davila is a 73  year old patient who comes in today for a Blood Pressure check because of ongoing blood pressure monitoring.  Vital Signs as repeated by /76, 139/68, p-81  Patient is taking medication as prescribed  Patient is tolerating medications well.  Patient is not monitoring Blood Pressure at home.  Average readings if yes are NA  Current complaints: none  Disposition:  Pt was encouraged to take and record her BP at home.

## 2023-11-15 NOTE — PROGRESS NOTES
ANTICOAGULATION MANAGEMENT     Thomas Davila 73 year old female is on warfarin with therapeutic INR result. (Goal INR 2.0-3.0)    Recent labs: (last 7 days)     11/15/23  1219   INR 2.7*       ASSESSMENT     Source(s): Chart Review  Previous INR was Therapeutic last 2(+) visits  Medication, diet, health changes since last INR chart reviewed; none identified         PLAN     Recommended plan for no diet, medication or health factor changes affecting INR     Dosing Instructions: Continue your current warfarin dose with next INR in 6 weeks       Summary  As of 11/15/2023      Full warfarin instructions:  2.5 mg every Mon, Fri; 5 mg all other days   Next INR check:  12/27/2023               Detailed voice message left for Akhil with dosing instructions and follow up date.     Contact [phone]  to schedule and with any changes, questions or concerns.     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 okay to leave DVM per signed consent             Anticoagulation Care Providers       Provider Role Specialty Phone number    Lizabeth Zavala MD Referring Family Medicine [phone]    Conner  Dung MCGUIRE MD [facility] [phone]

## 2023-11-16 NOTE — TELEPHONE ENCOUNTER
Pt called back and had good understanding of provider recommendations  Maya Montoya on 11/16/2023 at 3:27 PM

## 2023-11-16 NOTE — TELEPHONE ENCOUNTER
Left message for patient to return call to clinic.     Kassie Balderrama RN  [facility]

## 2023-11-24 DIAGNOSIS — Z79.01 LONG TERM CURRENT USE OF ANTICOAGULANT THERAPY: ICD-10-CM

## 2023-11-24 DIAGNOSIS — Z83.2 FAMILY HISTORY OF FACTOR V DEFICIENCY: ICD-10-CM

## 2023-11-24 DIAGNOSIS — I80.01 THROMBOPHLEBITIS OF SUPERFICIAL VEINS OF RIGHT LOWER EXTREMITY: ICD-10-CM

## 2023-11-24 DIAGNOSIS — Z79.899 ENCOUNTER FOR LONG-TERM CURRENT USE OF MEDICATION: ICD-10-CM

## 2023-11-24 RX ORDER — WARFARIN SODIUM 5 MG/1
TABLET ORAL
Qty: 80 TABLET | Refills: 1 | Status: SHIPPED | OUTPATIENT
Start: 2023-11-24 | End: 2024-03-01

## 2023-11-24 NOTE — TELEPHONE ENCOUNTER
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]  Family history of factor V deficiency [Z83.2]  Encounter for long-term current use of medication [Z79.899]  Long term current use of anticoagulant therapy [Z79.01]  Blood coagulation disorder (H24) [D68.9]                 Anticoagulation Care Providers       Provider Role Specialty Phone number    Lizabeth Zavala MD Referring Family Medicine [phone]    Dung Prieto MD Referring Family Medicine [phone]            Refill Criteria    Visit with referring provider/group: Meets criteria: office visit within referring provider group in the last 1 year on 11/9/23    [facility] referral signed last signed: 06/28/2023; within last year: Yes    Lab monitoring not exceeding 2 weeks overdue: Yes    Thomas meets all criteria for refill. Rx instructions and quantity supplied updated to match patient's current dosing plan. Warfarin 90 day supply with 1 refill granted per [facility] protocol     Amber Cassidy RN  Anticoagulation Clinic

## 2023-11-27 ENCOUNTER — TELEPHONE (OUTPATIENT)
Dept: FAMILY MEDICINE | Facility: CLINIC | Age: 73
End: 2023-11-27
Payer: MEDICARE

## 2023-11-27 NOTE — TELEPHONE ENCOUNTER
Symptoms    Describe your symptoms: Patient has high bp on Lisinopril, Dr. Zavala increased her Lisinopril by 10 mg, from 20 mg to 30 mg. Her bp is still high, told to call us . 150s/90 now    Any pain: No    How long have you been having symptoms: ongoing      Have you been seen for this:  Yes: 11/9/23 increased Lisinopril    Appointment offered?: No    Triage offered?: Yes:     Home remedies tried:     Preferred Pharmacy:     [id number] - [address] AT [facility] OF [address] [place] [id number]  Phone: [phone] Fax: [phone]      Could we send this information to you in [employer] or would you prefer to receive a phone call?:   Patient would prefer a phone call   Okay to leave a detailed message?: Yes at Home number on file [phone] (home)

## 2023-11-27 NOTE — TELEPHONE ENCOUNTER
Dr Zavala  Pt changed dose of Lisinopril to 30mg on 11-9 and since then she has checked bp every other day. States is always 150's over 90's and wonders if you want to make another change or if she needs to come back to be seen. No symptoms with bp    Harmeet Holly RN

## 2023-11-28 RX ORDER — LISINOPRIL 40 MG/1
40 TABLET ORAL DAILY
COMMUNITY
Start: 2023-11-28 | End: 2024-03-01

## 2023-11-28 NOTE — TELEPHONE ENCOUNTER
Patient given Dr. Zavala's recommendations below with understanding voiced.    RN updated medication list with historical entry to reflect current lisinopril dosing.  Patient prefers BP check at [facility], so scheduled her for 12/12/23 and advised to call back/MyChart with concerns in the meantime.    Venita Ambriz RN  [facility]

## 2023-12-12 ENCOUNTER — TELEPHONE (OUTPATIENT)
Dept: FAMILY MEDICINE | Facility: CLINIC | Age: 73
End: 2023-12-12

## 2023-12-12 ENCOUNTER — ALLIED HEALTH/NURSE VISIT (OUTPATIENT)
Dept: FAMILY MEDICINE | Facility: CLINIC | Age: 73
End: 2023-12-12
Payer: MEDICARE

## 2023-12-12 VITALS — HEART RATE: 71 BPM | DIASTOLIC BLOOD PRESSURE: 91 MMHG | SYSTOLIC BLOOD PRESSURE: 164 MMHG

## 2023-12-12 DIAGNOSIS — Z01.30 BLOOD PRESSURE CHECK: Primary | ICD-10-CM

## 2023-12-12 PROCEDURE — 99207 PR NO CHARGE NURSE ONLY: CPT

## 2023-12-12 NOTE — TELEPHONE ENCOUNTER
Akhil presents to clinic for a blood pressure check. Her first reading is 179/93 with pulse of 79 and second reading is 164/91 with pulse of 71. She notes that she has been having leg cramps, tingling in her feet with some purple color to them, and ankles are swelling. She denies any shortness of breath or headaches. I did schedule her with Dr Snider for tomorrow to be assessed as her PCP is out of clinic.  I will forward this to Dr Zavala for her review.    Shelley Munguia RN

## 2023-12-13 ENCOUNTER — OFFICE VISIT (OUTPATIENT)
Dept: FAMILY MEDICINE | Facility: CLINIC | Age: 73
End: 2023-12-13
Payer: MEDICARE

## 2023-12-13 VITALS
HEART RATE: 67 BPM | SYSTOLIC BLOOD PRESSURE: 136 MMHG | RESPIRATION RATE: 14 BRPM | TEMPERATURE: 98.1 F | OXYGEN SATURATION: 98 % | HEIGHT: 64 IN | DIASTOLIC BLOOD PRESSURE: 70 MMHG | WEIGHT: 203.2 LBS | BODY MASS INDEX: 34.69 KG/M2

## 2023-12-13 DIAGNOSIS — E66.812 CLASS 2 SEVERE OBESITY DUE TO EXCESS CALORIES WITH SERIOUS COMORBIDITY AND BODY MASS INDEX (BMI) OF 35.0 TO 35.9 IN ADULT (H): ICD-10-CM

## 2023-12-13 DIAGNOSIS — I10 ESSENTIAL HYPERTENSION, BENIGN: Primary | ICD-10-CM

## 2023-12-13 DIAGNOSIS — E66.01 CLASS 2 SEVERE OBESITY DUE TO EXCESS CALORIES WITH SERIOUS COMORBIDITY AND BODY MASS INDEX (BMI) OF 35.0 TO 35.9 IN ADULT (H): ICD-10-CM

## 2023-12-13 DIAGNOSIS — I87.8 VENOUS STASIS: ICD-10-CM

## 2023-12-13 PROCEDURE — 99214 OFFICE O/P EST MOD 30 MIN: CPT | Performed by: INTERNAL MEDICINE

## 2023-12-13 RX ORDER — SPIRONOLACTONE 25 MG/1
25 TABLET ORAL DAILY
Qty: 90 TABLET | Refills: 3 | Status: SHIPPED | OUTPATIENT
Start: 2023-12-13 | End: 2024-03-01 | Stop reason: SINTOL

## 2023-12-13 RX ORDER — TOPIRAMATE 50 MG/1
50 TABLET, FILM COATED ORAL DAILY
Qty: 90 TABLET | Refills: 1 | Status: SHIPPED | OUTPATIENT
Start: 2023-12-13 | End: 2024-04-18

## 2023-12-13 RX ORDER — PHENTERMINE HYDROCHLORIDE 30 MG/1
30 CAPSULE ORAL EVERY MORNING
Qty: 90 CAPSULE | Refills: 1 | Status: SHIPPED | OUTPATIENT
Start: 2023-12-13 | End: 2024-03-18

## 2023-12-13 ASSESSMENT — PAIN SCALES - GENERAL: PAINLEVEL: MODERATE PAIN (5)

## 2023-12-13 NOTE — PATIENT INSTRUCTIONS
Hypertension  Continue lisinopril and triamterene/ hydrochlorothiazide  Start spironolactone 25 mg once daily  Recommend to track blood pressure at home daily for every other day for the next 1-3 weeks or so, bring home blood pressure cuff and log to clinic.  Schedule RN visit and non-fasting lab    Blood Pressure Log     Ideal blood pressure is consistently less than 140.  Best way is take 1-2 hours after you have taken your blood pressure medication, and not while drinking coffee, alcohol, using advil, sudafed, etc.  These can raise your blood pressure.   Sit quietly for several min with feet flat on ground.  With arm at heart level, take blood pressure, then again in 1 min.  Average the 2 readings and record this.  You can come back to see RN for blood pressure check.  Omron is a good brand of blood pressure cuff.  It should be an arm cuff, not a wrist cuff      Weight  Restart phentermine and topiramate     Leg swelling  Purple discoloration  Due to varicose veins  Spironolactone should help with swelling to a degree  Compression stockings may also help

## 2023-12-13 NOTE — PROGRESS NOTES
Assessment & Plan     Essential hypertension, benign  Start spironolactone, should help with leg swellnig too.  RN blood pressure check and BMP in 1-3 weeks.  - spironolactone (ALDACTONE) 25 MG tablet; Take 1 tablet (25 mg) by mouth daily  - **Basic metabolic panel FUTURE 14d; Future  - topiramate (TOPAMAX) 50 MG tablet; Take 1 tablet (50 mg) by mouth daily  - phentermine (ADIPEX-P) 30 MG capsule; Take 1 capsule (30 mg) by mouth every morning    Class 2 severe obesity due to excess calories with serious comorbidity and body mass index (BMI) of 35.0 to 35.9 in adult (H)  Restart meds  - topiramate (TOPAMAX) 50 MG tablet; Take 1 tablet (50 mg) by mouth daily  - phentermine (ADIPEX-P) 30 MG capsule; Take 1 capsule (30 mg) by mouth every morning    Venous stasis  Cause of skin discoloration and leg swelling.        Patient Instructions   Hypertension  Continue lisinopril and triamterene/ hydrochlorothiazide  Start spironolactone 25 mg once daily  Recommend to track blood pressure at home daily for every other day for the next 1-3 weeks or so, bring home blood pressure cuff and log to clinic.  Schedule RN visit and non-fasting lab    Blood Pressure Log     Ideal blood pressure is consistently less than 140.  Best way is take 1-2 hours after you have taken your blood pressure medication, and not while drinking coffee, alcohol, using advil, sudafed, etc.  These can raise your blood pressure.   Sit quietly for several min with feet flat on ground.  With arm at heart level, take blood pressure, then again in 1 min.  Average the 2 readings and record this.  You can come back to see RN for blood pressure check.  Omron is a good brand of blood pressure cuff.  It should be an arm cuff, not a wrist cuff      Weight  Restart phentermine and topiramate     Leg swelling  Purple discoloration  Due to varicose veins  Spironolactone should help with swelling to a degree  Compression stockings may also help    Albina Snider  [facility] NORBERTO Landaverde is a 73 year old, presenting for the following health issues:  Edema, Health Maintenance (Due for RSV shingles has to go the pharmacy ), and Hypertension        12/13/2023     8:31 AM   Additional Questions   Roomed by mckayla middletno   Accompanied by self         12/13/2023     8:31 AM   Patient Reported Additional Medications   Patient reports taking the following new medications none       HPI     Chief Complaint   Patient presents with    Edema    Health Maintenance     Due for RSV shingles has to go the pharmacy     Hypertension           Hypertension Follow-up    Do you check your blood pressure regularly outside of the clinic? Yes   Are you following a low salt diet? Yes  Are your blood pressures ever more than 140 on the top number (systolic) OR more than 90 on the bottom number (diastolic), for example 140/90? Yes  How many servings of fruits and vegetables do you eat daily?  0-1  On average, how many sweetened beverages do you drink each day (Examples: soda, juice, sweet tea, etc.  Do NOT count diet or artificially sweetened beverages)?   0  How many days per week do you exercise enough to make your heart beat faster? 3  How many minutes a day do you exercise enough to make your heart beat faster? 20 - 29  How many days per week do you miss taking your medication? 0  When checking blood pressure at home 150-200s often athome      BP Readings from Last 6 Encounters:   12/13/23 136/70   12/12/23 (!) 164/91   11/15/23 139/68   11/09/23 (!) 142/76   11/01/23 (!) 148/68   05/31/23 126/72           Concern - edema   Onset: 2 months  Description: lower legs, ankles  Intensity: severe  Progression of Symptoms:  worsening  Accompanying Signs & Symptoms: this is been going on for 2 feet when sitting gets worse, leg cramps, bilateral   Previous history of similar problem: none  Precipitating factors:        Worsened by: sitting down  Alleviating factors:         "Improved by: Gatorade  this will, water   Therapies tried and outcome: Davidorade  this will, water   --she is concerned because feet are purple and tingly  --was given a med from Dr. Prieto () in the past and it helped with swelling but caused foot cramping    Weight:  --she was on phentermine and topiramate but stopped due to insurance coverage  --now reports she has insurance coverage, and wants to restart because she has regained weight  --she was up to 231 prior to starting meds and loly weight was 197 or so, has been off for ~ 2 months or so    Current Outpatient Medications   Medication Sig Dispense Refill    Acetaminophen (TYLENOL PO) Take 500 mg by mouth as needed for mild pain or fever      lisinopril (ZESTRIL) 40 MG tablet Take 1 tablet (40 mg) by mouth daily Per Dr. Zavala 11/28/23      omeprazole (PRILOSEC) 20 MG DR capsule Take 1 capsule (20 mg) by mouth daily 90 capsule 3    triamterene-HCTZ (MAXZIDE-25) 37.5-25 MG tablet Take 1 tablet by mouth daily 90 tablet 3    warfarin ANTICOAGULANT (COUMADIN) 5 MG tablet TAKE 1/2 TABLET(2.5MG) BY MOUTH ON MONDAY & FRIDAY AND 1 TABLET ALL OTHER DAYS OR AS DIRECTED BY INR CLINIC. 80 tablet 1           Review of Systems   Constitutional, HEENT, cardiovascular, pulmonary, gi and gu systems are negative, except as otherwise noted.      Objective    /70 (BP Location: Right arm, Patient Position: Sitting, Cuff Size: Adult Large)   Pulse 67   Temp 98.1  F (36.7  C) (Tympanic)   Resp 14   Ht 1.613 m (5' 3.5\")   Wt 92.2 kg (203 lb 3.2 oz)   SpO2 98%   BMI 35.43 kg/m    Body mass index is 35.43 kg/m .  Physical Exam   GENERAL APPEARANCE: healthy, alert, no distress, and over weight  CV: normal and equal bilateral pedal pulses  SKIN: varicose veins in bilateral legs, feet, ankles, with 1+ non-pitting edema bilateral ankles                    "

## 2023-12-27 ENCOUNTER — LAB (OUTPATIENT)
Dept: LAB | Facility: CLINIC | Age: 73
End: 2023-12-27
Payer: MEDICARE

## 2023-12-27 ENCOUNTER — ANTICOAGULATION THERAPY VISIT (OUTPATIENT)
Dept: ANTICOAGULATION | Facility: CLINIC | Age: 73
End: 2023-12-27

## 2023-12-27 ENCOUNTER — ALLIED HEALTH/NURSE VISIT (OUTPATIENT)
Dept: FAMILY MEDICINE | Facility: CLINIC | Age: 73
End: 2023-12-27
Payer: MEDICARE

## 2023-12-27 ENCOUNTER — TELEPHONE (OUTPATIENT)
Dept: FAMILY MEDICINE | Facility: CLINIC | Age: 73
End: 2023-12-27

## 2023-12-27 DIAGNOSIS — D68.9 BLOOD COAGULATION DISORDER (H): ICD-10-CM

## 2023-12-27 DIAGNOSIS — Z83.2 FAMILY HISTORY OF FACTOR V DEFICIENCY: ICD-10-CM

## 2023-12-27 DIAGNOSIS — I80.01 THROMBOPHLEBITIS OF SUPERFICIAL VEINS OF RIGHT LOWER EXTREMITY: Primary | ICD-10-CM

## 2023-12-27 DIAGNOSIS — Z01.30 BLOOD PRESSURE CHECK: Primary | ICD-10-CM

## 2023-12-27 DIAGNOSIS — I10 ESSENTIAL HYPERTENSION, BENIGN: ICD-10-CM

## 2023-12-27 DIAGNOSIS — Z79.01 LONG TERM CURRENT USE OF ANTICOAGULANT THERAPY: ICD-10-CM

## 2023-12-27 DIAGNOSIS — Z79.899 ENCOUNTER FOR LONG-TERM CURRENT USE OF MEDICATION: ICD-10-CM

## 2023-12-27 LAB
ANION GAP SERPL CALCULATED.3IONS-SCNC: 13 MMOL/L (ref 7–15)
BUN SERPL-MCNC: 34.5 MG/DL (ref 8–23)
CALCIUM SERPL-MCNC: 9.7 MG/DL (ref 8.8–10.2)
CHLORIDE SERPL-SCNC: 101 MMOL/L (ref 98–107)
CREAT SERPL-MCNC: 0.89 MG/DL (ref 0.51–0.95)
DEPRECATED HCO3 PLAS-SCNC: 23 MMOL/L (ref 22–29)
EGFRCR SERPLBLD CKD-EPI 2021: 67 ML/MIN/1.73M2
GLUCOSE SERPL-MCNC: 82 MG/DL (ref 70–99)
INR BLD: 3.8 (ref 0.9–1.1)
POTASSIUM SERPL-SCNC: 3.6 MMOL/L (ref 3.4–5.3)
SODIUM SERPL-SCNC: 137 MMOL/L (ref 135–145)

## 2023-12-27 PROCEDURE — 80048 BASIC METABOLIC PNL TOTAL CA: CPT

## 2023-12-27 PROCEDURE — 36415 COLL VENOUS BLD VENIPUNCTURE: CPT

## 2023-12-27 PROCEDURE — 99207 PR NO CHARGE NURSE ONLY: CPT

## 2023-12-27 PROCEDURE — 85610 PROTHROMBIN TIME: CPT

## 2023-12-27 NOTE — PROGRESS NOTES
ANTICOAGULATION MANAGEMENT     Thomas Davila 73 year old female is on warfarin with supratherapeutic INR result. (Goal INR 2.0-3.0)    Recent labs: (last 7 days)     12/27/23  1014   INR 3.8*       ASSESSMENT     Source(s): Chart Review and Patient/Caregiver Call     Warfarin doses taken: Warfarin taken as instructed  Diet: No new diet changes identified  Medication/supplement changes:  had increase to lisinopril and new spironolactone on 12/13, spironolactone. May decrease warfarin effectives  New illness, injury, or hospitalization: No  Signs or symptoms of bleeding or clotting: No  Previous result: Therapeutic last 2(+) visits  Additional findings: None       PLAN     Recommended plan for ongoing change(s) affecting INR     Dosing Instructions: hold dose then decrease your warfarin dose (8% change) with next INR in 2 weeks       Summary  As of 12/27/2023      Full warfarin instructions:  12/27: 5 mg; 12/28: Hold; Otherwise 2.5 mg every Mon, Wed, Fri; 5 mg all other days   Next INR check:  1/10/2024               Telephone call with Akhil who verbalizes understanding and agrees to plan    Lab visit scheduled    Education provided:   Please call back if any changes to your diet, medications or how you've been taking warfarin  Goal range and lab monitoring: goal range and significance of current result    Plan made per [facility] anticoagulation protocol    Carly Peters RN  Anticoagulation Clinic  12/27/2023    _______________________________________________________________________     Anticoagulation Episode Summary       Current INR goal:  2.0-3.0   TTR:  82.1% (1 y)   Target end date:  Indefinite   Send INR reminders to:  ALEJANDRO THORNTON    Indications    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 okay to leave DVM per signed consent             Anticoagulation Care Providers       Provider Role Specialty Phone number    Lizabeth Zavala MD Referring Family Medicine [phone]    Dung Prieto MD Referring Family Medicine [phone]

## 2023-12-27 NOTE — TELEPHONE ENCOUNTER
BPs are in goal.  Continue current medication.  Schedule appt if concerns.  Covering for your clinician.  Thank you,  Robert Mcneal MD  [facility]

## 2023-12-27 NOTE — PROGRESS NOTES
ANTICOAGULATION MANAGEMENT     Thomas Davila 73 year old female is on warfarin with supratherapeutic INR result. (Goal INR 2.0-3.0)    Recent labs: (last 7 days)     12/27/23  1014   INR 3.8*       ASSESSMENT            PLAN     Unable to reach Akhil today.    Left message to hold warfarin tonight. Request call back for assessment.    Follow up required to confirm warfarin dose taken and assess for changes, confirm warfarin dose taken, and discuss dosing instructions and confirm understanding of instructions    Carly Peters RN  Anticoagulation Clinic  12/27/2023

## 2023-12-27 NOTE — PROGRESS NOTES
Thomas Davila is a 73 year old year old patient who comes in today for a Blood Pressure check because of ongoing blood pressure monitoring.  Vital Signs as repeated by /68 right arm large adult cuff. HR 86 SPO2 98% (unable to check on left arm due to mastectomy with lymph node removal)  Patient is taking medication as prescribed  Patient is tolerating medications well.  Patient is monitoring Blood Pressure at home.  Average readings if yes are 107-134/80-95  Current complaints: light-headedness occasionally, decreased edema bilateral LE, mild tingling  Disposition:  patient to continue with the same medication or as advised    [employer] home monitor:  120/75    My chart or phone call if changes    Charlie Claros RN on 12/27/2023 at 10:34 AM

## 2023-12-28 ENCOUNTER — TELEPHONE (OUTPATIENT)
Dept: FAMILY MEDICINE | Facility: CLINIC | Age: 73
End: 2023-12-28
Payer: MEDICARE

## 2023-12-28 DIAGNOSIS — Z79.01 LONG TERM CURRENT USE OF ANTICOAGULANT THERAPY: ICD-10-CM

## 2023-12-28 DIAGNOSIS — Z83.2 FAMILY HISTORY OF FACTOR V DEFICIENCY: ICD-10-CM

## 2023-12-28 DIAGNOSIS — I80.01 THROMBOPHLEBITIS OF SUPERFICIAL VEINS OF RIGHT LOWER EXTREMITY: ICD-10-CM

## 2023-12-28 DIAGNOSIS — Z79.899 ENCOUNTER FOR LONG-TERM CURRENT USE OF MEDICATION: ICD-10-CM

## 2023-12-28 RX ORDER — WARFARIN SODIUM 5 MG/1
TABLET ORAL
Qty: 80 TABLET | Refills: 1 | Status: CANCELLED | OUTPATIENT
Start: 2023-12-28

## 2023-12-28 NOTE — TELEPHONE ENCOUNTER
Left detailed msg on pt identified voicemail & read providers message. Advised pt to call clinic with any further questions/concerns.    Cristela Connell RN

## 2023-12-28 NOTE — TELEPHONE ENCOUNTER
Patient Returning Call    Reason for call:  INR questions    Information relayed to patient:  INR to return call    Patient has additional questions:  Yes    What are your questions/concerns:  lost pills    Who does the patient want to speak with:  RN    Is an  needed?:  no      Could we send this information to you in [facility] or would you prefer to receive a phone call?:   Patient would prefer a phone call   Okay to leave a detailed message?: Yes at Cell number on file:    Telephone Information:   Mobile [phone]

## 2024-01-03 ENCOUNTER — OFFICE VISIT (OUTPATIENT)
Dept: FAMILY MEDICINE | Facility: CLINIC | Age: 74
End: 2024-01-03
Payer: MEDICARE

## 2024-01-03 VITALS
OXYGEN SATURATION: 96 % | HEIGHT: 63 IN | WEIGHT: 198 LBS | RESPIRATION RATE: 16 BRPM | BODY MASS INDEX: 35.08 KG/M2 | HEART RATE: 92 BPM | DIASTOLIC BLOOD PRESSURE: 80 MMHG | TEMPERATURE: 97.3 F | SYSTOLIC BLOOD PRESSURE: 132 MMHG

## 2024-01-03 DIAGNOSIS — K13.70 ORAL LESION: ICD-10-CM

## 2024-01-03 DIAGNOSIS — E66.01 CLASS 2 SEVERE OBESITY DUE TO EXCESS CALORIES WITH SERIOUS COMORBIDITY AND BODY MASS INDEX (BMI) OF 35.0 TO 35.9 IN ADULT (H): ICD-10-CM

## 2024-01-03 DIAGNOSIS — D68.9 BLOOD COAGULATION DISORDER (H): ICD-10-CM

## 2024-01-03 DIAGNOSIS — M06.9 RHEUMATOID ARTHRITIS, INVOLVING UNSPECIFIED SITE, UNSPECIFIED WHETHER RHEUMATOID FACTOR PRESENT (H): ICD-10-CM

## 2024-01-03 DIAGNOSIS — I10 ESSENTIAL HYPERTENSION, BENIGN: Primary | ICD-10-CM

## 2024-01-03 DIAGNOSIS — E66.812 CLASS 2 SEVERE OBESITY DUE TO EXCESS CALORIES WITH SERIOUS COMORBIDITY AND BODY MASS INDEX (BMI) OF 35.0 TO 35.9 IN ADULT (H): ICD-10-CM

## 2024-01-03 PROCEDURE — 99213 OFFICE O/P EST LOW 20 MIN: CPT | Performed by: INTERNAL MEDICINE

## 2024-01-03 ASSESSMENT — PAIN SCALES - GENERAL: PAINLEVEL: MILD PAIN (2)

## 2024-01-03 NOTE — PATIENT INSTRUCTIONS
Hypertension  Continue current medications  The elevated BUN (Urea) is not clinically concerning    Lip lesion  Avoid picking the skin/scab off  Keep skin well hydrated - use Aquaphor or Vaseline every 1 hr. Avoid licking lips  No other topicals needed

## 2024-01-03 NOTE — PROGRESS NOTES
Assessment & Plan     Essential hypertension, benign  Well controlled; elevated BUN is of no clinical concern    Oral lesion  Squamous cell vs herpes vs trauma/skin picking.      Rheumatoid arthritis, involving unspecified site, unspecified whether rheumatoid factor present (H)  Known issue that I take into account for their medical decisions, no current exacerbations or new concerns      Blood coagulation disorder (H24)  Known issue that I take into account for their medical decisions, no current exacerbations or new concerns      Class 2 severe obesity due to excess calories with serious comorbidity and body mass index (BMI) of 35.0 to 35.9 in adult (H)  Known issue that I take into account for their medical decisions, no current exacerbations or new concerns      Patient Instructions   Hypertension  Continue current medications  The elevated BUN (Urea) is not clinically concerning    Lip lesion  Avoid picking the skin/scab off  Keep skin well hydrated - use Aquaphor or Vaseline every 1 hr. Avoid licking lips  No other topicals needed    Albina Snider [facility] NORBERTO Landaverde is a 73 year old, presenting for the following health issues:  Follow Up (Labs 12/27/23/BMP testing satisfactory other than slight elevation in the urea nitrogen level.) and Mouth Lesions (2 weeks , 2 for the pain , this is of some drainage with it blood only , dry skin, lower lip, )        1/3/2024     7:06 AM   Additional Questions   Roomed by mckayla middleton   Accompanied by self         1/3/2024     7:06 AM   Patient Reported Additional Medications   Patient reports taking the following new medications none       HPI     Chief Complaint   Patient presents with    Follow Up     Labs 12/27/23  BMP testing satisfactory other than slight elevation in the urea nitrogen level.    Mouth Lesions     2 weeks , 2 for the pain , this is of some drainage with it blood only , dry skin, lower lip,   "    Hypertension  --new spironolactone is working well, no side effects  --leg swelling is improved, but legs still feel a bit numb and achy    Oral lesion  --used multiple topicals which did not help  --onset 2 weeks ago  --non healing lesion on lower lip, but small lesion on upper lip  --started as scab; picked scab off and now won't heal; thinks cat scratched her lip  --worried about skin cancer - next appointment with Derm is in May      Current Outpatient Medications   Medication Sig Dispense Refill    Acetaminophen (TYLENOL PO) Take 500 mg by mouth as needed for mild pain or fever      lisinopril (ZESTRIL) 40 MG tablet Take 1 tablet (40 mg) by mouth daily Per Dr. Zavala 11/28/23      omeprazole (PRILOSEC) 20 MG DR capsule Take 1 capsule (20 mg) by mouth daily 90 capsule 3    phentermine (ADIPEX-P) 30 MG capsule Take 1 capsule (30 mg) by mouth every morning 90 capsule 1    spironolactone (ALDACTONE) 25 MG tablet Take 1 tablet (25 mg) by mouth daily 90 tablet 3    topiramate (TOPAMAX) 50 MG tablet Take 1 tablet (50 mg) by mouth daily 90 tablet 1    triamterene-HCTZ (MAXZIDE-25) 37.5-25 MG tablet Take 1 tablet by mouth daily 90 tablet 3    warfarin ANTICOAGULANT (COUMADIN) 5 MG tablet TAKE 1/2 TABLET(2.5MG) BY MOUTH ON MONDAY & FRIDAY AND 1 TABLET ALL OTHER DAYS OR AS DIRECTED BY INR CLINIC. 80 tablet 1           Review of Systems   CONSTITUTIONAL: NEGATIVE for fever, chills, change in weight  ENT/MOUTH: NEGATIVE for ear, mouth and throat problems  RESP: NEGATIVE for significant cough or SOB  CV: NEGATIVE for chest pain, palpitations or peripheral edema      Objective    /80 (BP Location: Right arm, Patient Position: Sitting, Cuff Size: Adult Large)   Pulse 92   Temp 97.3  F (36.3  C) (Tympanic)   Resp 16   Ht 1.61 m (5' 3.39\")   Wt 89.8 kg (198 lb)   SpO2 96%   BMI 34.65 kg/m    Body mass index is 34.65 kg/m .  Physical Exam   GENERAL APPEARANCE: healthy, alert, and no distress  SKIN: scabbed " lower lip lesions without yellow crusting

## 2024-01-10 ENCOUNTER — ANTICOAGULATION THERAPY VISIT (OUTPATIENT)
Dept: ANTICOAGULATION | Facility: CLINIC | Age: 74
End: 2024-01-10

## 2024-01-10 ENCOUNTER — LAB (OUTPATIENT)
Dept: LAB | Facility: CLINIC | Age: 74
End: 2024-01-10
Payer: MEDICARE

## 2024-01-10 DIAGNOSIS — Z79.01 LONG TERM CURRENT USE OF ANTICOAGULANT THERAPY: ICD-10-CM

## 2024-01-10 DIAGNOSIS — Z83.2 FAMILY HISTORY OF FACTOR V DEFICIENCY: ICD-10-CM

## 2024-01-10 DIAGNOSIS — I80.01 THROMBOPHLEBITIS OF SUPERFICIAL VEINS OF RIGHT LOWER EXTREMITY: Primary | ICD-10-CM

## 2024-01-10 DIAGNOSIS — D68.9 BLOOD COAGULATION DISORDER (H): ICD-10-CM

## 2024-01-10 DIAGNOSIS — Z79.899 ENCOUNTER FOR LONG-TERM CURRENT USE OF MEDICATION: ICD-10-CM

## 2024-01-10 LAB — INR BLD: 1.7 (ref 0.9–1.1)

## 2024-01-10 PROCEDURE — 85610 PROTHROMBIN TIME: CPT

## 2024-01-10 PROCEDURE — 36416 COLLJ CAPILLARY BLOOD SPEC: CPT

## 2024-01-10 NOTE — PROGRESS NOTES
ANTICOAGULATION MANAGEMENT     Thomas Davila 73 year old female is on warfarin with subtherapeutic INR result. (Goal INR 2.0-3.0)    Recent labs: (last 7 days)     01/10/24  0915   INR 1.7*       ASSESSMENT     Source(s): Chart Review and Patient/Caregiver Call     Warfarin doses taken: Missed dose(s) may be affecting INR and Might have missed Sundays dose  Diet: No new diet changes identified  Medication/supplement changes: None noted  New illness, injury, or hospitalization: No  Signs or symptoms of bleeding or clotting: No  Previous result: Supratherapeutic  Additional findings: None       PLAN     Recommended plan for no diet, medication or health factor changes affecting INR     Dosing Instructions: Continue your current warfarin dose with next INR in 2 weeks       Summary  As of 1/10/2024      Full warfarin instructions:  1/10: 5 mg; Otherwise 2.5 mg every Mon, Wed, Fri; 5 mg all other days   Next INR check:  1/24/2024               Telephone call with Akhil who verbalizes understanding and agrees to plan    Lab visit scheduled    Education provided:   Goal range and lab monitoring: goal range and significance of current result    Plan made per [facility] anticoagulation protocol    Amber Cassidy RN  Anticoagulation Clinic  1/10/2024    _______________________________________________________________________     Anticoagulation Episode Summary       Current INR goal:  2.0-3.0   TTR:  80.1% (1 y)   Target end date:  Indefinite   Send INR reminders to:  ALEJANDRO THORNTON    Indications    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 okay to leave DVM per signed consent             Anticoagulation Care Providers       Provider Role Specialty Phone  number    Lizabeth Zavala MD Referring Family Medicine [phone]    Dung Prieto MD Referring Family Medicine [phone]

## 2024-01-24 ENCOUNTER — LAB (OUTPATIENT)
Dept: LAB | Facility: CLINIC | Age: 74
End: 2024-01-24
Payer: MEDICARE

## 2024-01-24 ENCOUNTER — ANTICOAGULATION THERAPY VISIT (OUTPATIENT)
Dept: ANTICOAGULATION | Facility: CLINIC | Age: 74
End: 2024-01-24

## 2024-01-24 DIAGNOSIS — Z79.01 LONG TERM CURRENT USE OF ANTICOAGULANT THERAPY: ICD-10-CM

## 2024-01-24 DIAGNOSIS — Z83.2 FAMILY HISTORY OF FACTOR V DEFICIENCY: ICD-10-CM

## 2024-01-24 DIAGNOSIS — D68.9 BLOOD COAGULATION DISORDER (H): ICD-10-CM

## 2024-01-24 DIAGNOSIS — I80.01 THROMBOPHLEBITIS OF SUPERFICIAL VEINS OF RIGHT LOWER EXTREMITY: Primary | ICD-10-CM

## 2024-01-24 DIAGNOSIS — Z79.899 ENCOUNTER FOR LONG-TERM CURRENT USE OF MEDICATION: ICD-10-CM

## 2024-01-24 LAB — INR BLD: 2.9 (ref 0.9–1.1)

## 2024-01-24 PROCEDURE — 36416 COLLJ CAPILLARY BLOOD SPEC: CPT

## 2024-01-24 PROCEDURE — 85610 PROTHROMBIN TIME: CPT

## 2024-01-24 NOTE — PROGRESS NOTES
ANTICOAGULATION MANAGEMENT     Thomas Davila 73 year old female is on warfarin with therapeutic INR result. (Goal INR 2.0-3.0)    Recent labs: (last 7 days)     01/24/24  0854   INR 2.9*       ASSESSMENT     Source(s): Chart Review   Previous result: Subtherapeutic  Additional findings: None     PLAN     Recommended plan for no diet, medication or health factor changes affecting INR     Dosing Instructions: Continue your current warfarin dose with next INR in 3 weeks       Summary  As of 1/24/2024      Full warfarin instructions:  2.5 mg every Mon, Wed, Fri; 5 mg all other days   Next INR check:  2/14/2024               Detailed voice message left for Akhil with dosing instructions and follow up date.     Contact [phone]  to schedule and with any changes, questions or concerns.     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 okay to leave DVM per signed consent             Anticoagulation Care Providers       Provider Role Specialty Phone number    Lizabeth Zavala MD Referring Family Medicine [phone]    Dung Prieto MD Referring Family Medicine [phone]

## 2024-02-05 ENCOUNTER — TELEPHONE (OUTPATIENT)
Dept: DERMATOLOGY | Facility: CLINIC | Age: 74
End: 2024-02-05

## 2024-02-05 ENCOUNTER — OFFICE VISIT (OUTPATIENT)
Dept: DERMATOLOGY | Facility: CLINIC | Age: 74
End: 2024-02-05
Payer: MEDICARE

## 2024-02-05 DIAGNOSIS — C44.519 BASAL CELL CARCINOMA (BCC) OF BACK: Primary | ICD-10-CM

## 2024-02-05 DIAGNOSIS — D18.01 ANGIOMA OF SKIN: ICD-10-CM

## 2024-02-05 DIAGNOSIS — C44.712 BASAL CELL CARCINOMA (BCC) OF RIGHT LOWER LEG: ICD-10-CM

## 2024-02-05 DIAGNOSIS — D23.9 DERMAL NEVUS: Primary | ICD-10-CM

## 2024-02-05 DIAGNOSIS — C44.519 BASAL CELL CARCINOMA (BCC) OF BACK: ICD-10-CM

## 2024-02-05 DIAGNOSIS — L82.1 SEBORRHEIC KERATOSES: ICD-10-CM

## 2024-02-05 PROCEDURE — 99213 OFFICE O/P EST LOW 20 MIN: CPT | Mod: 25 | Performed by: DERMATOLOGY

## 2024-02-05 PROCEDURE — 17313 MOHS 1 STAGE T/A/L: CPT | Performed by: DERMATOLOGY

## 2024-02-05 PROCEDURE — 88331 PATH CONSLTJ SURG 1 BLK 1SPC: CPT | Mod: 59 | Performed by: DERMATOLOGY

## 2024-02-05 PROCEDURE — 11103 TANGNTL BX SKIN EA SEP/ADDL: CPT | Performed by: DERMATOLOGY

## 2024-02-05 PROCEDURE — 11102 TANGNTL BX SKIN SINGLE LES: CPT | Mod: 59 | Performed by: DERMATOLOGY

## 2024-02-05 NOTE — PROGRESS NOTES
Surgical Office Location :   [facility]  [address] [place] [address]

## 2024-02-05 NOTE — PROGRESS NOTES
Thomas Davila is an extremely pleasant 73 year old year old female patient here today for hx of non-melanoma skin cancer.  Patient has no other skin complaints today.  Remainder of the HPI, Meds, PMH, Allergies, FH, and SH was reviewed in chart.      Past Medical History:   Diagnosis Date    Acute parametritis and pelvic cellulitis     salpingitis    ASCUS with positive high risk HPV 2013    Asthma     No recent issues    Basal cell carcinoma     breast cancer     left lumpectomy, radiation, tamoxifen    Breast cancer (H)     L Breast; Lumpectomy & Radiation    Chronic salpingitis and oophoritis 1994    PID       Embolism and thrombosis of unspecified site     left leg, due to tamoxifen therapy    Generalized osteoarthrosis, unspecified site     hands    Leiomyoma of uterus, unspecified     Morbid obesity (H) 2017    Other malignant neoplasm of skin, site unspecified 2006    Basal cell cancer on nose    Squamous cell carcinoma     Thrombosis of leg     Left    Unspecified essential hypertension        Past Surgical History:   Procedure Laterality Date    BREAST LUMPECTOMY, RT/LT      left    C/SECTION, LOW TRANSVERSE  1972,     , Low Transverse x2    [facility]      Breast reduction    COLONOSCOPY  10/15/02    normal    FOOT SURGERY      R Foot     HC EXCISION BREAST LESION, OPEN >=1  98    1) Needle localization with generous lumpectomy 2) Left axillary node dissection.    HC LAPAROSCOPY, SURGICAL, ABDOMEN, PERITONEUM & OMENTUM; DX W/ OR W/O SPECIMEN(S)  94    HYSTERECTOMY, PAP NO LONGER INDICATED      INSERT PORT VASCULAR ACCESS  3/14/2013    Procedure: INSERT PORT VASCULAR ACCESS;  Port Revision;  Surgeon: Arash Puente MD;  Location: [facility]    LAPAROSCOPIC HYSTERECTOMY TOTAL, BILATERAL SALPINGO-OOPHORECTOMY, NODE DISSECTION, COMBINED  2013    Procedure: COMBINED LAPAROSCOPIC HYSTERECTOMY TOTAL, SALPINGO-OOPHORECTOMY, NODE  DISSECTION;  Laparosocpic  Total Abdominal Hysterectomy, Bilateral Salphino-Oophorectomy, Bilateral Pelvic Lymph Node Dissection, Pelvic Washings, Cystoscopy;  Surgeon: Tanya Walker MD;  Location: [facility]    PHACOEMULSIFICATION WITH STANDARD INTRAOCULAR LENS IMPLANT Left 6/12/2019    Procedure: Cataract Removal with Implant;  Surgeon: Walt Mckeon MD;  Location: [facility]    PHACOEMULSIFICATION WITH STANDARD INTRAOCULAR LENS IMPLANT Right 7/3/2019    Procedure: Cataract Removal with Implant;  Surgeon: Walt Mckeon MD;  Location: [facility]    SURGICAL HISTORY OF -   06/22/93    1) Excision of digital cyst right mid finger  2)  Excision of dermatofibroma left calf    SURGICAL HISTORY OF -   12/18/2001    Excision of mucinous cyst & partial ostectomy, bone removal of benign osteophytic overgrowth osteophyte of the distal aspect of the middle phalanx and distal phalanx    SURGICAL HISTORY OF -   2006    Basal cell cancer removal    TONSILLECTOMY      TUBAL LIGATION  1978        Family History   Problem Relation Age of Onset    Cerebrovascular Disease Mother     Hypertension Mother     Diabetes Mother     Thyroid Disease Mother     Arthritis Mother     Alzheimer Disease Mother     Neurologic Disorder Mother         Parkinsons    Heart Disease Father     Hypertension Father     Diabetes Father     Thyroid Disease Father     Arthritis Father     Blood Disease Father     Factor V Leiden deficiency Father     Anesthesia Reaction Sister     Thyroid Disease Sister     Anesthesia Reaction Sister     Arthritis Sister         RA    Hypertension Brother     Factor V Leiden deficiency Brother     Allergies Son     Gastrointestinal Disease Son         GERD    Hypertension Brother     Allergies Son         percocett    Gastrointestinal Disease Son         GERD    Hypertension Son     Factor V Leiden deficiency Niece        Social History     Socioeconomic History    Marital status:      Spouse name: Not on  file    Number of children: 2    Years of education: Not on file    Highest education level: Not on file   Occupational History     Employer: sub teacher in [employer]     Employer: RETIRED   Tobacco Use    Smoking status: Never    Smokeless tobacco: Never   Vaping Use    Vaping Use: Never used   Substance and Sexual Activity    Alcohol use: Yes     Comment: Rare    Drug use: No    Sexual activity: Yes     Partners: Male   Other Topics Concern    Parent/sibling w/ CABG, MI or angioplasty before 65F 55M? No   Social History Narrative    [employer]-- DECL[employer]ES ALL BLOOD PRODUCTS        April 15, 2021    ENVIRONMENTAL HISTORY: [place] The home is heated with a forced air. They do have central air conditioning. The patient's bedroom is furnished with hard vijaya in bedroom and animals sleep in bedroom.  Pets inside the house include 2 cat(s). There is no history of cockroach or mice infestation. There is/are 0 smokers in the house.  The house does not have a basement.      Social Determinants of Health     Financial Resource Strain: Low Risk  (11/9/2023)    Financial Resource Strain     Within the past 12 months, have you or your family members you live with been unable to get utilities (heat, electricity) when it was really needed?: No   Food Insecurity: Low Risk  (11/9/2023)    Food Insecurity     Within the past 12 months, did you worry that your food would run out before you got money to buy more?: No     Within the past 12 months, did the food you bought just not last and you didn t have money to get more?: No   Transportation Needs: Low Risk  (11/9/2023)    Transportation Needs     Within the past 12 months, has lack of transportation kept you from medical appointments, getting your medicines, non-medical meetings or appointments, work, or from getting things that you need?: No   Physical Activity: Not on file   Stress: Not on file   Social Connections: Not on  file   Interpersonal Safety: Low Risk  (11/9/2023)    Interpersonal Safety     Do you feel physically and emotionally safe where you currently live?: Yes     Within the past 12 months, have you been hit, slapped, kicked or otherwise physically hurt by someone?: No     Within the past 12 months, have you been humiliated or emotionally abused in other ways by your partner or ex-partner?: No   Housing Stability: Low Risk  (11/9/2023)    Housing Stability     Do you have housing? : Yes     Are you worried about losing your housing?: No       Outpatient Encounter Medications as of 2/5/2024   Medication Sig Dispense Refill    Acetaminophen (TYLENOL PO) Take 500 mg by mouth as needed for mild pain or fever      lisinopril (ZESTRIL) 40 MG tablet Take 1 tablet (40 mg) by mouth daily Per Dr. Zavala 11/28/23      omeprazole (PRILOSEC) 20 MG DR capsule Take 1 capsule (20 mg) by mouth daily 90 capsule 3    phentermine (ADIPEX-P) 30 MG capsule Take 1 capsule (30 mg) by mouth every morning 90 capsule 1    spironolactone (ALDACTONE) 25 MG tablet Take 1 tablet (25 mg) by mouth daily 90 tablet 3    topiramate (TOPAMAX) 50 MG tablet Take 1 tablet (50 mg) by mouth daily 90 tablet 1    triamterene-HCTZ (MAXZIDE-25) 37.5-25 MG tablet Take 1 tablet by mouth daily 90 tablet 3    warfarin ANTICOAGULANT (COUMADIN) 5 MG tablet TAKE 1/2 TABLET(2.5MG) BY MOUTH ON MONDAY & FRIDAY AND 1 TABLET ALL OTHER DAYS OR AS DIRECTED BY INR CLINIC. 80 tablet 1     No facility-administered encounter medications on file as of 2/5/2024.             O:   NAD, WDWN, Alert & Oriented, Mood & Affect wnl, Vitals stable   General appearance normal   Vitals stable   Alert, oriented and in no acute distress     R upper back 1cm pink pearly papule   R shin 5mm pink pearly papule      Stuck on papules and brown macules on trunk and ext   Red papules on trunk  Flesh colored papules on trunk     The remainder of the full exam was normal; the following areas were  examined:  conjunctiva/lids, , neck, peripheral vascular system, abdomen, lymph nodes, digits/nails, eccrine and apocrine glands, scalp/hair, face, neck, chest, abdomen, buttocks, back, RUE, LUE, RLE, LLE       Eyes: Conjunctivae/lids:Normal     ENT: Lips, buccal mucosa, tongue: normal    MSK:Normal    Cardiovascular: peripheral edema none    Pulm: Breathing Normal    Lymph Nodes: No Head and Neck Lymphadenopathy     Neuro/Psych: Orientation:Alert and Orientedx3 ; Mood/Affect:normal       MICRO:     R upper back (red):Orthokeratosis of epidermis with a proliferation of nests of basaloid cells, with peripheral palisading and a haphazard arrangement in the center extending into the dermis, .  The tumor cells have hyperchromatic nuclei. Poor cytoplasm and intercellular bridging.    R shin (blue):Orthokeratosis of epidermis with a proliferation of nests of basaloid cells, with peripheral palisading and a haphazard arrangement in the center extending into the dermis, .  The tumor cells have hyperchromatic nuclei. Poor cytoplasm and intercellular bridging.    A/P:  1. Seborrheic keratosis, lentigo, angioma, dermal nevus, hx of non-melanoma skin cancer   2. R/o basal cell carcinoma   TANGENTIAL BIOPSY IN HOUSE:  After consent, anesthesia with LEC and prep, tangential excision performed and dx above confirmed with frozen section histology.  No complications and routine wound care.  Patient is on  anticoagulants and risk of bleeding discussed with patient.       I have personally reviewed all specimens and/or slides and used them with my medical judgement to determine or confirm the final diagnosis.     Patient told result   R upper back basal cell carcinoma schedule excision  R shin basal cell carcinoma treated today .      It was a pleasure speaking to Thomas Davila today.  Previous clinic notes and pertinent laboratory tests were reviewed prior to Thomas Davila's visit.  Signs and Symptoms of skin cancer discussed  with patient.  Patient encouraged to perform monthly skin exams.  UV precautions reviewed with patient.  Risks of non-melanoma skin cancer discussed with patient   Return to clinic next appt  PROCEDURE NOTE  R shin basal cell carcinoma   MOHS:   Location    The rationale for Mohs surgery was discussed with the patient and consent was obtained.  The risks and benefits as well as alternatives to therapy were discussed, in detail.  Specifically, the risks of infection, scarring, bleeding, prolonged wound healing, incomplete removal, allergy to anesthesia, nerve injury and recurrence were addressed.  Indication for Mohs was Location. Prior to the procedure, the treatment site was clearly identified and, if available, confirmed with previous photos and confirmed by the patient   All components of the Universal Protocol/PAUSE rule were completed.  The Mohs surgeon operated in two distinct and integrated capacities as the surgeon and pathologist.      The area was prepped with Betasept.  A rim of normal appearing skin was marked circumferentially around the lesion.  The area was infiltrated with local anesthesia.  The tumor was first debulked to remove all clinically apparent tumor.  An incision following the standard Mohs approach was done and the specimen was oriented,mapped and placed in 1 block(s).  Each specimen was then chromacoded and processed in the Mohs laboratory using standard Mohs technique and submitted for frozen section histology.  Frozen section analysis showed no residual tumor but CLEAR MARGINS.      The tumor was excised using standard Mohs technique in 1 stages(s).  CLEAR MARGINS OBTAINED and Final defect size was 0.9 x 1.1 cm.     We discussed the options for wound management in full with the patient including risks/benefits/ possible outcomes.      REPAIR SECOND INTENT: We discussed the options for wound management in full with the patient including risks/benefits/possible outcomes. Decision made to  allow the wound to heal by second intention. Cautery was used for for hemostasis. EBL minimal; complications none; wound care routine.  The patient was discharged in good condition and will return in one month or prn for wound evaluation.

## 2024-02-05 NOTE — TELEPHONE ENCOUNTER
Called patient:  Patient notified and educated on test results   Mohs procedure explained- all questions answered  appointment scheduled- .    Thank you,    Jordyn RUEDA RN BSN  [facility]  [phone]

## 2024-02-05 NOTE — PATIENT INSTRUCTIONS
Wound Care Instructions     FOR SUPERFICIAL WOUNDS    Back and right shin     [facility] [phone]    [facility] [phone]                       AFTER 24 HOURS YOU SHOULD REMOVE THE BANDAGE AND BEGIN DAILY DRESSING CHANGES AS FOLLOWS:     1) Remove Dressing.     2) Clean and dry the area with tap water using a Q-tip or sterile gauze pad.     3) Apply Vaseline, Aquaphor, Polysporin ointment or Bacitracin ointment over entire wound.  Do NOT use Neosporin ointment.     4) Cover the wound with a band-aid, or a sterile non-stick gauze pad and micropore paper tape      REPEAT THESE INSTRUCTIONS AT LEAST ONCE A DAY UNTIL THE WOUND HAS COMPLETELY HEALED.    It is an old wives tale that a wound heals better when it is exposed to air and allowed to dry out. The wound will heal faster with a better cosmetic result if it is kept moist with ointment and covered with a bandage.    **Do not let the wound dry out.**      Supplies Needed:      *Cotton tipped applicators (Q-tips)    *Polysporin Ointment or Bacitracin Ointment (NOT NEOSPORIN)    *Band-aids or non-stick gauze pads and micropore paper tape.      PATIENT INFORMATION:    During the healing process you will notice a number of changes. All wounds develop a small halo of redness surrounding the wound.  This means healing is occurring. Severe itching with extensive redness usually indicates sensitivity to the ointment or bandage tape used to dress the wound.  You should call our office if this develops.      Swelling  and/or discoloration around your surgical site is common, particularly when performed around the eye.    All wounds normally drain.  The larger the wound the more drainage there will be.  After 7-10 days, you will notice the wound beginning to shrink and new skin will begin to grow.  The wound is healed when you can see skin has formed over the entire area.  A healed wound has a healthy, shiny look to the surface and is red  to dark pink in color to normalize.  Wounds may take approximately 4-6 weeks to heal.  Larger wounds may take 6-8 weeks.  After the wound is healed you may discontinue dressing changes.    You may experience a sensation of tightness as your wound heals. This is normal and will gradually subside.    Your healed wound may be sensitive to temperature changes. This sensitivity improves with time, but if you re having a lot of discomfort, try to avoid temperature extremes.    Patients frequently experience itching after their wound appears to have healed because of the continue healing under the skin.  Plain Vaseline will help relieve the itching.        POSSIBLE COMPLICATIONS    BLEEDING:    Leave the bandage in place.  Use tightly rolled up gauze or a cloth to apply direct pressure over the bandage for 30  minutes.  Reapply pressure for an additional 30 minutes if necessary  Use additional gauze and tape to maintain pressure once the bleeding has stopped.

## 2024-02-05 NOTE — TELEPHONE ENCOUNTER
----- Message from Walt Godlen MD sent at 2/5/2024 11:46 AM CST -----  R upper back basal cell carcinoma schedule excision  R shin basal cell carcinoma treated today .

## 2024-02-05 NOTE — LETTER
2024         RE: [facility]  [address] [facility] [address]        Dear Colleague,    Thank you for referring your patient, Thomas Davila, to the [facility]. Please see a copy of my visit note below.    Thomas Davila is an extremely pleasant 73 year old year old female patient here today for hx of non-melanoma skin cancer.  Patient has no other skin complaints today.  Remainder of the HPI, Meds, PMH, Allergies, FH, and SH was reviewed in chart.      Past Medical History:   Diagnosis Date     Acute parametritis and pelvic cellulitis     salpingitis     ASCUS with positive high risk HPV 2013     Asthma     No recent issues     Basal cell carcinoma      breast cancer     left lumpectomy, radiation, tamoxifen     Breast cancer (H)     L Breast; Lumpectomy & Radiation     Chronic salpingitis and oophoritis 1994    PID        Embolism and thrombosis of unspecified site     left leg, due to tamoxifen therapy     Generalized osteoarthrosis, unspecified site     hands     Leiomyoma of uterus, unspecified      Morbid obesity (H) 2017     Other malignant neoplasm of skin, site unspecified 2006    Basal cell cancer on nose     Squamous cell carcinoma      Thrombosis of leg     Left     Unspecified essential hypertension        Past Surgical History:   Procedure Laterality Date     BREAST LUMPECTOMY, RT/LT      left     C/SECTION, LOW TRANSVERSE  1972,     , Low Transverse x2     CL AFF SURGICAL PATHOLOGY      Breast reduction     COLONOSCOPY  10/15/02    normal     FOOT SURGERY      R Foot      HC EXCISION BREAST LESION, OPEN >=1  98    1) Needle localization with generous lumpectomy 2) Left axillary node dissection.     HC LAPAROSCOPY, SURGICAL, ABDOMEN, PERITONEUM & OMENTUM; DX W/ OR W/O SPECIMEN(S)  94     HYSTERECTOMY, PAP NO LONGER INDICATED       INSERT PORT VASCULAR ACCESS  3/14/2013    Procedure: INSERT PORT  VASCULAR ACCESS;  Port Revision;  Surgeon: Arash Puente MD;  Location: [facility]     LAPAROSCOPIC HYSTERECTOMY TOTAL, BILATERAL SALPINGO-OOPHORECTOMY, NODE DISSECTION, COMBINED  1/31/2013    Procedure: COMBINED LAPAROSCOPIC HYSTERECTOMY TOTAL, SALPINGO-OOPHORECTOMY, NODE DISSECTION;  Laparosocpic  Total Abdominal Hysterectomy, Bilateral Salphino-Oophorectomy, Bilateral Pelvic Lymph Node Dissection, Pelvic Washings, Cystoscopy;  Surgeon: Tanya Walker MD;  Location: [facility]     PHACOEMULSIFICATION WITH STANDARD INTRAOCULAR LENS IMPLANT Left 6/12/2019    Procedure: Cataract Removal with Implant;  Surgeon: Walt Mckeon MD;  Location: [facility]     PHACOEMULSIFICATION WITH STANDARD INTRAOCULAR LENS IMPLANT Right 7/3/2019    Procedure: Cataract Removal with Implant;  Surgeon: Walt Mckeon MD;  Location: [facility]     SURGICAL HISTORY OF -   06/22/93    1) Excision of digital cyst right mid finger  2)  Excision of dermatofibroma left calf     SURGICAL HISTORY OF -   12/18/2001    Excision of mucinous cyst & partial ostectomy, bone removal of benign osteophytic overgrowth osteophyte of the distal aspect of the middle phalanx and distal phalanx     SURGICAL HISTORY OF -   2006    Basal cell cancer removal     TONSILLECTOMY       TUBAL LIGATION  1978        Family History   Problem Relation Age of Onset     Cerebrovascular Disease Mother      Hypertension Mother      Diabetes Mother      Thyroid Disease Mother      Arthritis Mother      Alzheimer Disease Mother      Neurologic Disorder Mother         Parkinsons     Heart Disease Father      Hypertension Father      Diabetes Father      Thyroid Disease Father      Arthritis Father      Blood Disease Father      Factor V Leiden deficiency Father      Anesthesia Reaction Sister      Thyroid Disease Sister      Anesthesia Reaction Sister      Arthritis Sister         RA     Hypertension Brother      Factor V Leiden deficiency Brother      Allergies Son       Gastrointestinal Disease Son         GERD     Hypertension Brother      Allergies Son         percocett     Gastrointestinal Disease Son         GERD     Hypertension Son      Factor V Leiden deficiency Niece        Social History     Socioeconomic History     Marital status:      Spouse name: Not on file     Number of children: 2     Years of education: Not on file     Highest education level: Not on file   Occupational History     Employer: sub teacher in [employer]     Employer: RETIRED   Tobacco Use     Smoking status: Never     Smokeless tobacco: Never   Vaping Use     Vaping Use: Never used   Substance and Sexual Activity     Alcohol use: Yes     Comment: Rare     Drug use: No     Sexual activity: Yes     Partners: Male   Other Topics Concern     Parent/sibling w/ CABG, MI or angioplasty before 65F 55M? No   Social History Narrative    [employer]        April 15, 2021    ENVIRONMENTAL HISTORY: [place] is heated with a forced air. They do have central air conditioning. The patient's bedroom is furnished with hard vijaya in bedroom and animals sleep in bedroom.  Pets inside the house include 2 cat(s). There is no history of cockroach or mice infestation. There is/are 0 smokers in the house.  The house does not have a basement.      Social Determinants of Health     Financial Resource Strain: Low Risk  (11/9/2023)    Financial Resource Strain      Within the past 12 months, have you or your family members you live with been unable to get utilities (heat, electricity) when it was really needed?: No   Food Insecurity: Low Risk  (11/9/2023)    Food Insecurity      Within the past 12 months, did you worry that your food would run out before you got money to buy more?: No      Within the past 12 months, did the food you bought just not last and you didn t have money to get more?: No   Transportation Needs: Low Risk   (11/9/2023)    Transportation Needs      Within the past 12 months, has lack of transportation kept you from medical appointments, getting your medicines, non-medical meetings or appointments, work, or from getting things that you need?: No   Physical Activity: Not on file   Stress: Not on file   Social Connections: Not on file   Interpersonal Safety: Low Risk  (11/9/2023)    Interpersonal Safety      Do you feel physically and emotionally safe where you currently live?: Yes      Within the past 12 months, have you been hit, slapped, kicked or otherwise physically hurt by someone?: No      Within the past 12 months, have you been humiliated or emotionally abused in other ways by your partner or ex-partner?: No   Housing Stability: Low Risk  (11/9/2023)    Housing Stability      Do you have housing? : Yes      Are you worried about losing your housing?: No       Outpatient Encounter Medications as of 2/5/2024   Medication Sig Dispense Refill     Acetaminophen (TYLENOL PO) Take 500 mg by mouth as needed for mild pain or fever       lisinopril (ZESTRIL) 40 MG tablet Take 1 tablet (40 mg) by mouth daily Per Dr. Zavala 11/28/23       omeprazole (PRILOSEC) 20 MG DR capsule Take 1 capsule (20 mg) by mouth daily 90 capsule 3     phentermine (ADIPEX-P) 30 MG capsule Take 1 capsule (30 mg) by mouth every morning 90 capsule 1     spironolactone (ALDACTONE) 25 MG tablet Take 1 tablet (25 mg) by mouth daily 90 tablet 3     topiramate (TOPAMAX) 50 MG tablet Take 1 tablet (50 mg) by mouth daily 90 tablet 1     triamterene-HCTZ (MAXZIDE-25) 37.5-25 MG tablet Take 1 tablet by mouth daily 90 tablet 3     warfarin ANTICOAGULANT (COUMADIN) 5 MG tablet TAKE 1/2 TABLET(2.5MG) BY MOUTH ON MONDAY & FRIDAY AND 1 TABLET ALL OTHER DAYS OR AS DIRECTED BY INR CLINIC. 80 tablet 1     No facility-administered encounter medications on file as of 2/5/2024.             O:   NAD, WDWN, Alert & Oriented, Mood & Affect wnl, Vitals stable   General  appearance normal   Vitals stable   Alert, oriented and in no acute distress     R upper back 1cm pink pearly papule   R shin 5mm pink pearly papule      Stuck on papules and brown macules on trunk and ext   Red papules on trunk  Flesh colored papules on trunk     The remainder of the full exam was normal; the following areas were examined:  conjunctiva/lids, , neck, peripheral vascular system, abdomen, lymph nodes, digits/nails, eccrine and apocrine glands, scalp/hair, face, neck, chest, abdomen, buttocks, back, RUE, LUE, RLE, LLE       Eyes: Conjunctivae/lids:Normal     ENT: Lips, buccal mucosa, tongue: normal    MSK:Normal    Cardiovascular: peripheral edema none    Pulm: Breathing Normal    Lymph Nodes: No Head and Neck Lymphadenopathy     Neuro/Psych: Orientation:Alert and Orientedx3 ; Mood/Affect:normal       MICRO:     R upper back (red):Orthokeratosis of epidermis with a proliferation of nests of basaloid cells, with peripheral palisading and a haphazard arrangement in the center extending into the dermis, .  The tumor cells have hyperchromatic nuclei. Poor cytoplasm and intercellular bridging.    R shin (blue):Orthokeratosis of epidermis with a proliferation of nests of basaloid cells, with peripheral palisading and a haphazard arrangement in the center extending into the dermis, .  The tumor cells have hyperchromatic nuclei. Poor cytoplasm and intercellular bridging.    A/P:  1. Seborrheic keratosis, lentigo, angioma, dermal nevus, hx of non-melanoma skin cancer   2. R/o basal cell carcinoma   TANGENTIAL BIOPSY IN HOUSE:  After consent, anesthesia with LEC and prep, tangential excision performed and dx above confirmed with frozen section histology.  No complications and routine wound care.  Patient is on  anticoagulants and risk of bleeding discussed with patient.       I have personally reviewed all specimens and/or slides and used them with my medical judgement to determine or confirm the final  diagnosis.     Patient told result   R upper back basal cell carcinoma schedule excision  R shin basal cell carcinoma treated today .      It was a pleasure speaking to Thomas Davila today.  Previous clinic notes and pertinent laboratory tests were reviewed prior to Thomas Davila's visit.  Signs and Symptoms of skin cancer discussed with patient.  Patient encouraged to perform monthly skin exams.  UV precautions reviewed with patient.  Risks of non-melanoma skin cancer discussed with patient   Return to clinic next appt  PROCEDURE NOTE  R shin basal cell carcinoma   MOHS:   Location    The rationale for Mohs surgery was discussed with the patient and consent was obtained.  The risks and benefits as well as alternatives to therapy were discussed, in detail.  Specifically, the risks of infection, scarring, bleeding, prolonged wound healing, incomplete removal, allergy to anesthesia, nerve injury and recurrence were addressed.  Indication for Mohs was Location. Prior to the procedure, the treatment site was clearly identified and, if available, confirmed with previous photos and confirmed by the patient   All components of the Universal Protocol/PAUSE rule were completed.  The Mohs surgeon operated in two distinct and integrated capacities as the surgeon and pathologist.      The area was prepped with Betasept.  A rim of normal appearing skin was marked circumferentially around the lesion.  The area was infiltrated with local anesthesia.  The tumor was first debulked to remove all clinically apparent tumor.  An incision following the standard Mohs approach was done and the specimen was oriented,mapped and placed in 1 block(s).  Each specimen was then chromacoded and processed in the Mohs laboratory using standard Mohs technique and submitted for frozen section histology.  Frozen section analysis showed no residual tumor but CLEAR MARGINS.      The tumor was excised using standard Mohs technique in 1 stages(s).   CLEAR MARGINS OBTAINED and Final defect size was 0.9 x 1.1 cm.     We discussed the options for wound management in full with the patient including risks/benefits/ possible outcomes.      REPAIR SECOND INTENT: We discussed the options for wound management in full with the patient including risks/benefits/possible outcomes. Decision made to allow the wound to heal by second intention. Cautery was used for for hemostasis. EBL minimal; complications none; wound care routine.  The patient was discharged in good condition and will return in one month or prn for wound evaluation.      Again, thank you for allowing me to participate in the care of your patient.        Sincerely,        Walt Golden MD

## 2024-02-06 ENCOUNTER — OFFICE VISIT (OUTPATIENT)
Dept: DERMATOLOGY | Facility: CLINIC | Age: 74
End: 2024-02-06
Payer: MEDICARE

## 2024-02-06 DIAGNOSIS — C44.519 BASAL CELL CARCINOMA (BCC) OF BACK: ICD-10-CM

## 2024-02-06 PROCEDURE — 11602 EXC TR-EXT MAL+MARG 1.1-2 CM: CPT | Performed by: DERMATOLOGY

## 2024-02-06 PROCEDURE — 88331 PATH CONSLTJ SURG 1 BLK 1SPC: CPT | Performed by: DERMATOLOGY

## 2024-02-06 ASSESSMENT — PAIN SCALES - GENERAL: PAINLEVEL: NO PAIN (0)

## 2024-02-06 NOTE — PATIENT INSTRUCTIONS
Open Wound Care     for Back        No strenuous activity for 48 hours    Take Tylenol as needed for discomfort.                                                .         Do not drink alcoholic beverages for 48 hours.    Keep the pressure bandage in place for 24 hours. If the bandage becomes blood tinged or loose, reinforce it with gauze and tape.        (Refer to the reverse side of this page for management of bleeding).    Remove bandage in 24 hours and begin wound care as follows:     Clean area with tap water using a Q tip or gauze pad, (shower / bathe normally)  Dry wound with Q tip or gauze pad  Apply Aquaphor, Vaseline, Polysporin or Bacitracin Ointment with a Q tip  Do NOT use Neosporin Ointment *  Cover the wound with a band-aid or nonstick gauze pad and paper tape.  Repeat wound care once a day until wound is completely healed.    It is an old wives tale that a wound heals better when it is exposed to air and allowed to dry out. The wound will heal faster with a better cosmetic result if it is kept moist with ointment and covered with a bandage.  Do not let the wound dry out.      Supplies Needed:                Qtips or gauze pads                Polysporin or Bacitracin Ointment                Bandaids or nonstick gauze pads and paper tape    Wound care kits and brown paper tape are available for purchase at   the pharmacy.    BLEEDING:    Use tightly rolled up gauze or cloth to apply direct pressure over the bandage for 20   minutes.  Reapply pressure for an additional 20 minutes if necessary  Call the office or go to the nearest emergency room if pressure fails to stop the bleeding.  Use additional gauze and tape to maintain pressure once the bleeding has stopped.  Begin wound care 24 hours after surgery as directed.                  WOUND HEALING    One week after surgery a pink / red halo will form around the outside of the wound.   This is new skin.  The center of the wound will appear yellowish white  and produce some drainage.  The pink halo will slowly migrate in toward the center of the wound until the wound is covered with new shiny pink skin.  There will be no more drainage when the wound is completely healed.  It will take six months to one year for the redness to fade.  The scar may be itchy, tight and sensitive to extreme temperatures for a year after the surgery.  Massaging the area several times a day for several minutes after the wound is completely healed will help the scar soften and normalize faster. Begin massage only after healing is complete.      In case of emergency call: Dr Golden: [phone]    [facility]: [phone]    [facility]:[phone]

## 2024-02-06 NOTE — LETTER
2024         RE: Thomas Davila  [address] [address] [facility] [address]        Dear Colleague,    Thank you for referring your patient, Thomas Davila, to the [facility]. Please see a copy of my visit note below.    Surgical Office Location :   [facility]  [address] [place] [address]      Thomas Davila is an extremely pleasant 73 year old year old female patient here today for evaluation and managment of basal cell carcinoma on right upper back.  Patient has no other skin complaints today.  Remainder of the HPI, Meds, PMH, Allergies, FH, and SH was reviewed in chart.      Past Medical History:   Diagnosis Date     Acute parametritis and pelvic cellulitis     salpingitis     ASCUS with positive high risk HPV 2013     Asthma     No recent issues     Basal cell carcinoma      breast cancer     left lumpectomy, radiation, tamoxifen     Breast cancer (H)     L Breast; Lumpectomy & Radiation     Chronic salpingitis and oophoritis 1994    PID        Embolism and thrombosis of unspecified site     left leg, due to tamoxifen therapy     Generalized osteoarthrosis, unspecified site     hands     Leiomyoma of uterus, unspecified      Morbid obesity (H) 2017     Other malignant neoplasm of skin, site unspecified 2006    Basal cell cancer on nose     Squamous cell carcinoma      Thrombosis of leg     Left     Unspecified essential hypertension        Past Surgical History:   Procedure Laterality Date     BREAST LUMPECTOMY, RT/LT      left     C/SECTION, LOW TRANSVERSE  1972,     , Low Transverse x2     CL AFF SURGICAL PATHOLOGY      Breast reduction     COLONOSCOPY  10/15/02    normal     FOOT SURGERY      R Foot      HC EXCISION BREAST LESION, OPEN >=1  98    1) Needle localization with generous lumpectomy 2) Left axillary node dissection.     HC LAPAROSCOPY, SURGICAL, ABDOMEN, PERITONEUM & OMENTUM; DX W/ OR  W/O SPECIMEN(S)  02/07/94     HYSTERECTOMY, PAP NO LONGER INDICATED       INSERT PORT VASCULAR ACCESS  3/14/2013    Procedure: INSERT PORT VASCULAR ACCESS;  Port Revision;  Surgeon: Arash Puente MD;  Location: [facility]     LAPAROSCOPIC HYSTERECTOMY TOTAL, BILATERAL SALPINGO-OOPHORECTOMY, NODE DISSECTION, COMBINED  1/31/2013    Procedure: COMBINED LAPAROSCOPIC HYSTERECTOMY TOTAL, SALPINGO-OOPHORECTOMY, NODE DISSECTION;  Laparosocpic  Total Abdominal Hysterectomy, Bilateral Salphino-Oophorectomy, Bilateral Pelvic Lymph Node Dissection, Pelvic Washings, Cystoscopy;  Surgeon: Tanya Walker MD;  Location: [facility]     PHACOEMULSIFICATION WITH STANDARD INTRAOCULAR LENS IMPLANT Left 6/12/2019    Procedure: Cataract Removal with Implant;  Surgeon: Walt Mckeon MD;  Location: [facility]     PHACOEMULSIFICATION WITH STANDARD INTRAOCULAR LENS IMPLANT Right 7/3/2019    Procedure: Cataract Removal with Implant;  Surgeon: Walt Mckeon MD;  Location: [facility]     SURGICAL HISTORY OF -   06/22/93    1) Excision of digital cyst right mid finger  2)  Excision of dermatofibroma left calf     SURGICAL HISTORY OF -   12/18/2001    Excision of mucinous cyst & partial ostectomy, bone removal of benign osteophytic overgrowth osteophyte of the distal aspect of the middle phalanx and distal phalanx     SURGICAL HISTORY OF -   2006    Basal cell cancer removal     TONSILLECTOMY       TUBAL LIGATION  1978        Family History   Problem Relation Age of Onset     Cerebrovascular Disease Mother      Hypertension Mother      Diabetes Mother      Thyroid Disease Mother      Arthritis Mother      Alzheimer Disease Mother      Neurologic Disorder Mother         Parkinsons     Heart Disease Father      Hypertension Father      Diabetes Father      Thyroid Disease Father      Arthritis Father      Blood Disease Father      Factor V Leiden deficiency Father      Anesthesia Reaction Sister      Thyroid Disease Sister      Anesthesia  Reaction Sister      Arthritis Sister         RA     Hypertension Brother      Factor V Leiden deficiency Brother      Allergies Son      Gastrointestinal Disease Son         GERD     Hypertension Brother      Allergies Son         percocett     Gastrointestinal Disease Son         GERD     Hypertension Son      Factor V Leiden deficiency Niece        Social History     Socioeconomic History     Marital status:      Spouse name: Not on file     Number of children: 2     Years of education: Not on file     Highest education level: Not on file   Occupational History     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 No   Social History Narrative    [employer]-- DECLINES ALL BLOOD PRODUCTS        April 15, 2021    ENVIRONMENTAL HISTORY: [place] home [place] heated with a forced air. They do have central air conditioning. The patient's bedroom is furnished with hard vijaya in bedroom and animals sleep in bedroom.  Pets inside the house include 2 cat(s). There is no history of cockroach or mice infestation. There is/are 0 smokers in the house.  The house does not have a basement.      Social Determinants of Health     Financial Resource Strain: Low Risk  (11/9/2023)    Financial Resource Strain      Within the past 12 months, have you or your family members you live with been unable to get utilities (heat, electricity) when it was really needed?: No   Food Insecurity: Low Risk  (11/9/2023)    Food Insecurity      Within the past 12 months, did you worry that your food would run out before you got money to buy more?: No       Within the past 12 months, did the food you bought just not last and you didn t have money to get more?: No   Transportation Needs: Low Risk  (11/9/2023)    Transportation Needs      Within the past 12 months, has lack of transportation kept you from medical appointments, getting your medicines, non-medical meetings or appointments, work, or from getting things that you need?: No   Physical Activity: Not on file   Stress: Not on file   Social Connections: Not on file   Interpersonal Safety: Low Risk  (11/9/2023)    Interpersonal Safety      Do you feel physically and emotionally safe where you currently live?: Yes      Within the past 12 months, have you been hit, slapped, kicked or otherwise physically hurt by someone?: No      Within the past 12 months, have you been humiliated or emotionally abused in other ways by your partner or ex-partner?: No   Housing Stability: Low Risk  (11/9/2023)    Housing Stability      Do you have housing? : Yes      Are you worried about losing your housing?: No       Outpatient Encounter Medications as of 2/6/2024   Medication Sig Dispense Refill     Acetaminophen (TYLENOL PO) Take 500 mg by mouth as needed for mild pain or fever       lisinopril (ZESTRIL) 40 MG tablet Take 1 tablet (40 mg) by mouth daily Per Dr. Zavala 11/28/23       omeprazole (PRILOSEC) 20 MG DR capsule Take 1 capsule (20 mg) by mouth daily 90 capsule 3     phentermine (ADIPEX-P) 30 MG capsule Take 1 capsule (30 mg) by mouth every morning 90 capsule 1     spironolactone (ALDACTONE) 25 MG tablet Take 1 tablet (25 mg) by mouth daily 90 tablet 3     topiramate (TOPAMAX) 50 MG tablet Take 1 tablet (50 mg) by mouth daily 90 tablet 1     triamterene-HCTZ (MAXZIDE-25) 37.5-25 MG tablet Take 1 tablet by mouth daily 90 tablet 3     warfarin ANTICOAGULANT (COUMADIN) 5 MG tablet TAKE 1/2 TABLET(2.5MG) BY MOUTH ON MONDAY & FRIDAY AND 1 TABLET ALL OTHER DAYS OR AS DIRECTED BY INR CLINIC. 80 tablet 1     No  facility-administered encounter medications on file as of 2/6/2024.             O:   NAD, WDWN, Alert & Oriented, Mood & Affect wnl, Vitals stable   General appearance normal   Vitals stable   Alert, oriented and in no acute distress     R upper back 1cm scaly papule       Eyes: Conjunctivae/lids:Normal     ENT: Lips, buccal mucosa, tongue: normal    MSK:Normal    Cardiovascular: peripheral edema none    Pulm: Breathing Normal    Neuro/Psych: Orientation:Alert and Orientedx3 ; Mood/Affect:normal       MICRO:   R upper back:Unremarkable epidermis, dermis and superficial subcutis.  No concerning areas for malignancy.     A/P:  R upper back basal cell carcinoma   EXCISION OF BASAL CELL CARCINOMA, Margins confirmed with FROZEN SECTIONS AND Second intent: After thorough discussion of PGACAC, consent obtained, anesthesia and prep, the margins of the lesion were identified and an incision was made encompassing the lesion with 4mm margin. The incisions were made through the skin and down to and including the superficial subcutis.  The lesion was removed en bloc and submitted for frozen section pathologic review. Clear margins obtained (1.8cm).    REPAIR SECOND INTENT: We discussed the options for wound management in full with the patient including risks/benefits/possible outcomes. Decision made to allow the wound to heal by second intention. EBL minimal; complications none; wound care routine.  The patient was discharged in good condition and will return in one month or prn for wound evaluation.     It was a pleasure speaking to Thomas Davila today.  Previous clinic notes and pertinent laboratory tests were reviewed prior to Thomas Davila's visit.  Signs and Symptoms of skin cancer discussed with patient.  Patient encouraged to perform monthly skin exams.  UV precautions reviewed with patient.  Risks of non-melanoma skin cancer discussed with patient   Return to clinic 6 months        Again, thank you for allowing me  to participate in the care of your patient.        Sincerely,        Walt Golden MD

## 2024-02-06 NOTE — PROGRESS NOTES
Thomas Davila is an extremely pleasant 73 year old year old female patient here today for evaluation and managment of basal cell carcinoma on right upper back.  Patient has no other skin complaints today.  Remainder of the HPI, Meds, PMH, Allergies, FH, and SH was reviewed in chart.      Past Medical History:   Diagnosis Date    Acute parametritis and pelvic cellulitis     salpingitis    ASCUS with positive high risk HPV 2013    Asthma     No recent issues    Basal cell carcinoma     breast cancer     left lumpectomy, radiation, tamoxifen    Breast cancer (H)     L Breast; Lumpectomy & Radiation    Chronic salpingitis and oophoritis 1994    PID       Embolism and thrombosis of unspecified site     left leg, due to tamoxifen therapy    Generalized osteoarthrosis, unspecified site     hands    Leiomyoma of uterus, unspecified     Morbid obesity (H) 2017    Other malignant neoplasm of skin, site unspecified 2006    Basal cell cancer on nose    Squamous cell carcinoma     Thrombosis of leg     Left    Unspecified essential hypertension        Past Surgical History:   Procedure Laterality Date    BREAST LUMPECTOMY, RT/LT      left    C/SECTION, LOW TRANSVERSE  1972,     , Low Transverse x2    CL AFF SURGICAL PATHOLOGY      Breast reduction    COLONOSCOPY  10/15/02    normal    FOOT SURGERY      R Foot     HC EXCISION BREAST LESION, OPEN >=1  98    1) Needle localization with generous lumpectomy 2) Left axillary node dissection.    HC LAPAROSCOPY, SURGICAL, ABDOMEN, PERITONEUM & OMENTUM; DX W/ OR W/O SPECIMEN(S)  94    HYSTERECTOMY, PAP NO LONGER INDICATED      INSERT PORT VASCULAR ACCESS  3/14/2013    Procedure: INSERT PORT VASCULAR ACCESS;  Port Revision;  Surgeon: Arash Puente MD;  Location: [facility]    LAPAROSCOPIC HYSTERECTOMY TOTAL, BILATERAL SALPINGO-OOPHORECTOMY, NODE DISSECTION, COMBINED  2013    Procedure: COMBINED LAPAROSCOPIC  HYSTERECTOMY TOTAL, SALPINGO-OOPHORECTOMY, NODE DISSECTION;  Laparosocpic  Total Abdominal Hysterectomy, Bilateral Salphino-Oophorectomy, Bilateral Pelvic Lymph Node Dissection, Pelvic Washings, Cystoscopy;  Surgeon: Tanya Walker MD;  [facility]    PHACOEMULSIFICATION WITH STANDARD INTRAOCULAR LENS IMPLANT Left 6/12/2019    Procedure: Cataract Removal with Implant;  Surgeon: Walt Mckeon MD;  Location: [facility]    PHACOEMULSIFICATION WITH STANDARD INTRAOCULAR LENS IMPLANT Right 7/3/2019    Procedure: Cataract Removal with Implant;  Surgeon: Walt Mckeon MD;  Location: [facility]    SURGICAL HISTORY OF -   06/22/93    1) Excision of digital cyst right mid finger  2)  Excision of dermatofibroma left calf    SURGICAL HISTORY OF -   12/18/2001    Excision of mucinous cyst & partial ostectomy, bone removal of benign osteophytic overgrowth osteophyte of the distal aspect of the middle phalanx and distal phalanx    SURGICAL HISTORY OF -   2006    Basal cell cancer removal    TONSILLECTOMY      TUBAL LIGATION  1978        Family History   Problem Relation Age of Onset    Cerebrovascular Disease Mother     Hypertension Mother     Diabetes Mother     Thyroid Disease Mother     Arthritis Mother     Alzheimer Disease Mother     Neurologic Disorder Mother         Parkinsons    Heart Disease Father     Hypertension Father     Diabetes Father     Thyroid Disease Father     Arthritis Father     Blood Disease Father     Factor V Leiden deficiency Father     Anesthesia Reaction Sister     Thyroid Disease Sister     Anesthesia Reaction Sister     Arthritis Sister         RA    Hypertension Brother     Factor V Leiden deficiency Brother     Allergies Son     Gastrointestinal Disease Son         GERD    Hypertension Brother     Allergies Son         percocett    Gastrointestinal Disease Son         GERD    Hypertension Son     Factor V Leiden deficiency Niece        Social History     Socioeconomic History     Marital status:      Spouse name: Not on file    Number of children: 2    Years of education: Not on file    Highest education level: Not on file   Occupational History     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 No   Social History Narrative    [employer]-- DECL[employer]ES ALL BLOOD PRODUCTS        April 15, 2021    ENVIRONMENTAL HISTORY: [place] The home is heated with a forced air. They do have central air conditioning. The patient's bedroom is furnished with hard vijaya in bedroom and animals sleep in bedroom.  Pets inside the house include 2 cat(s). There is no history of cockroach or mice infestation. There is/are 0 smokers in the house.  The house does not have a basement.      Social Determinants of Health     Financial Resource Strain: Low Risk  (11/9/2023)    Financial Resource Strain     Within the past 12 months, have you or your family members you live with been unable to get utilities (heat, electricity) when it was really needed?: No   Food Insecurity: Low Risk  (11/9/2023)    Food Insecurity     Within the past 12 months, did you worry that your food would run out before you got money to buy more?: No     Within the past 12 months, did the food you bought just not last and you didn t have money to get more?: No   Transportation Needs: Low Risk  (11/9/2023)    Transportation Needs     Within the past 12 months, has lack of transportation kept you from medical appointments, getting your medicines, non-medical meetings or appointments, work, or from getting things that you need?: No   Physical Activity: Not on file    Stress: Not on file   Social Connections: Not on file   Interpersonal Safety: Low Risk  (11/9/2023)    Interpersonal Safety     Do you feel physically and emotionally safe where you currently live?: Yes     Within the past 12 months, have you been hit, slapped, kicked or otherwise physically hurt by someone?: No     Within the past 12 months, have you been humiliated or emotionally abused in other ways by your partner or ex-partner?: No   Housing Stability: Low Risk  (11/9/2023)    Housing Stability     Do you have housing? : Yes     Are you worried about losing your housing?: No       Outpatient Encounter Medications as of 2/6/2024   Medication Sig Dispense Refill    Acetaminophen (TYLENOL PO) Take 500 mg by mouth as needed for mild pain or fever      lisinopril (ZESTRIL) 40 MG tablet Take 1 tablet (40 mg) by mouth daily Per Dr. Zavala 11/28/23      omeprazole (PRILOSEC) 20 MG DR capsule Take 1 capsule (20 mg) by mouth daily 90 capsule 3    phentermine (ADIPEX-P) 30 MG capsule Take 1 capsule (30 mg) by mouth every morning 90 capsule 1    spironolactone (ALDACTONE) 25 MG tablet Take 1 tablet (25 mg) by mouth daily 90 tablet 3    topiramate (TOPAMAX) 50 MG tablet Take 1 tablet (50 mg) by mouth daily 90 tablet 1    triamterene-HCTZ (MAXZIDE-25) 37.5-25 MG tablet Take 1 tablet by mouth daily 90 tablet 3    warfarin ANTICOAGULANT (COUMADIN) 5 MG tablet TAKE 1/2 TABLET(2.5MG) BY MOUTH ON MONDAY & FRIDAY AND 1 TABLET ALL OTHER DAYS OR AS DIRECTED BY INR CLINIC. 80 tablet 1     No facility-administered encounter medications on file as of 2/6/2024.             O:   NAD, WDWN, Alert & Oriented, Mood & Affect wnl, Vitals stable   General appearance normal   Vitals stable   Alert, oriented and in no acute distress     R upper back 1cm scaly papule       Eyes: Conjunctivae/lids:Normal     ENT: Lips, buccal mucosa, tongue: normal    MSK:Normal    Cardiovascular: peripheral edema none    Pulm: Breathing Normal    Neuro/Psych:  Orientation:Alert and Orientedx3 ; Mood/Affect:normal       MICRO:   R upper back:Unremarkable epidermis, dermis and superficial subcutis.  No concerning areas for malignancy.     A/P:  R upper back basal cell carcinoma   EXCISION OF BASAL CELL CARCINOMA, Margins confirmed with FROZEN SECTIONS AND Second intent: [facility], consent obtained, anesthesia and prep, the margins of the lesion were identified and an incision was made encompassing the lesion with 4mm margin. The incisions were made through the skin and down to and including the superficial subcutis.  The lesion was removed en bloc and submitted for frozen section pathologic review. Clear margins obtained (1.8cm).    REPAIR SECOND INTENT: We discussed the options for wound management in full with the patient including risks/benefits/possible outcomes. Decision made to allow the wound to heal by second intention. EBL minimal; complications none; wound care routine.  The patient was discharged in good condition and will return in one month or prn for wound evaluation.     It was a pleasure speaking to Thomas Davila today.  Previous clinic notes and pertinent laboratory tests were reviewed prior to Thomas Davila's visit.  Signs and Symptoms of skin cancer discussed with patient.  Patient encouraged to perform monthly skin exams.  UV precautions reviewed with patient.  Risks of non-melanoma skin cancer discussed with patient   Return to clinic 6 months

## 2024-02-06 NOTE — NURSING NOTE
Thomas Davila's chief complaint for this visit includes:  Chief Complaint   Patient presents with    Derm Problem     Mohs- upper back     PCP: No Ref-Primary, Physician    Referring Provider:  Walt Golden MD  [address] [place] [address]    There were no vitals taken for this visit.  No Pain (0)        Allergies   Allergen Reactions    Acetaminophen      Other reaction(s): Gastrointestinal    Lovenox [Enoxaparin] Itching     The patient had a delayed hypersensitivity reaction, manifested by pruritic rash.  There are wide ranges of cross-reactivity that have been documented to occur with LMWHs in D[facility], but fondaparinux and  danaparoid are generally well tolerated. Hypersensitivity to heparin is not cross-reactive with structurally distinct anticoagulants such as hirudins or factor Xa inhibitors, and these are often used as alternative agents.    Metal [Staples] Other (See Comments)     Not staples, but metal surgical screws; Sutures    Percocet [Oxycodone-Acetaminophen] Nausea and Vomiting         Do you need any medication refills at today's visit? No    Soledad Cai MA

## 2024-02-14 ENCOUNTER — LAB (OUTPATIENT)
Dept: LAB | Facility: CLINIC | Age: 74
End: 2024-02-14
Payer: MEDICARE

## 2024-02-14 ENCOUNTER — ALLIED HEALTH/NURSE VISIT (OUTPATIENT)
Dept: FAMILY MEDICINE | Facility: CLINIC | Age: 74
End: 2024-02-14
Payer: MEDICARE

## 2024-02-14 ENCOUNTER — ANTICOAGULATION THERAPY VISIT (OUTPATIENT)
Dept: ANTICOAGULATION | Facility: CLINIC | Age: 74
End: 2024-02-14

## 2024-02-14 DIAGNOSIS — D68.9 BLOOD COAGULATION DISORDER (H): ICD-10-CM

## 2024-02-14 DIAGNOSIS — Z83.2 FAMILY HISTORY OF FACTOR V DEFICIENCY: ICD-10-CM

## 2024-02-14 DIAGNOSIS — Z79.899 ENCOUNTER FOR LONG-TERM CURRENT USE OF MEDICATION: ICD-10-CM

## 2024-02-14 DIAGNOSIS — L98.9 SKIN LESION: Primary | ICD-10-CM

## 2024-02-14 DIAGNOSIS — Z79.01 LONG TERM CURRENT USE OF ANTICOAGULANT THERAPY: ICD-10-CM

## 2024-02-14 DIAGNOSIS — I80.01 THROMBOPHLEBITIS OF SUPERFICIAL VEINS OF RIGHT LOWER EXTREMITY: Primary | ICD-10-CM

## 2024-02-14 LAB — INR BLD: 2.8 (ref 0.9–1.1)

## 2024-02-14 PROCEDURE — 99207 PR NO CHARGE NURSE ONLY: CPT

## 2024-02-14 PROCEDURE — 85610 PROTHROMBIN TIME: CPT

## 2024-02-14 PROCEDURE — 36416 COLLJ CAPILLARY BLOOD SPEC: CPT

## 2024-02-14 NOTE — PROGRESS NOTES
Pt arrived in clinic requesting dressing change,  pt is unable to reach area on her back. pt had skin cancer removal. Wound appears to be healing well. Coverlet bandaid replaced with pt supplied vaseline ointment.  Angelita Tim RN on 2/14/2024 at 8:35 AM

## 2024-02-14 NOTE — PROGRESS NOTES
ANTICOAGULATION MANAGEMENT     Thomas Davila 73 year old female is on warfarin with therapeutic INR result. (Goal INR 2.0-3.0)    Recent labs: (last 7 days)     02/14/24  0817   INR 2.8*       ASSESSMENT     Source(s): Chart Review and Patient/Caregiver Call   New illness, injury, or hospitalization: Yes: OV 2/5/24 and 2/6/24 MOHS/derm appointments  Previous result: Therapeutic last visit; previously outside of goal range  Additional findings: None     PLAN     Recommended plan for no diet, medication or health factor changes affecting INR     Dosing Instructions: Continue your current warfarin dose with next INR in 4 weeks       Te be seen if any concerns with bleeding.    Summary  As of 2/14/2024      Full warfarin instructions:  2.5 mg every Mon, Wed, Fri; 5 mg all other days   Next INR check:  3/13/2024               Detailed voice message left for Akhil with dosing instructions and follow up date.     Contact [phone]  to schedule and with any changes, questions or concerns.     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 okay to leave DVM per signed consent             Anticoagulation Care Providers       Provider Role Specialty Phone number    Lizabeth Zavala  MD Evelin Referring Family Medicine [phone]    Dung Prieto MD Referring Family Medicine [phone]

## 2024-02-15 ENCOUNTER — TELEPHONE (OUTPATIENT)
Dept: FAMILY MEDICINE | Facility: CLINIC | Age: 74
End: 2024-02-15
Payer: MEDICARE

## 2024-02-15 NOTE — TELEPHONE ENCOUNTER
Reason for Call:  Other returning call    Detailed comments: patient called back on anti coag nurse.    Number called; no answer    Please contact patient.  Thank you.    Phone Number Patient can be reached at: Home number on file [phone] (home)    Best Time: any    Can we leave a detailed message on this number? YES    Call taken on 2/15/2024 at 7:41 AM by Diane Shane

## 2024-02-15 NOTE — TELEPHONE ENCOUNTER
Left message to call back    Amber Cassidy RN   [facility]

## 2024-02-15 NOTE — TELEPHONE ENCOUNTER
Patient just wanted to confirm that her next INR was due in 1 month. Advised yes it is and she has already scheduled the follow up    Amber Cassidy RN   [facility]

## 2024-03-01 ENCOUNTER — OFFICE VISIT (OUTPATIENT)
Dept: FAMILY MEDICINE | Facility: CLINIC | Age: 74
End: 2024-03-01
Payer: MEDICARE

## 2024-03-01 VITALS
HEIGHT: 64 IN | WEIGHT: 199.4 LBS | RESPIRATION RATE: 16 BRPM | SYSTOLIC BLOOD PRESSURE: 154 MMHG | TEMPERATURE: 97.9 F | OXYGEN SATURATION: 96 % | BODY MASS INDEX: 34.04 KG/M2 | HEART RATE: 97 BPM | DIASTOLIC BLOOD PRESSURE: 80 MMHG

## 2024-03-01 DIAGNOSIS — K21.9 GASTROESOPHAGEAL REFLUX DISEASE WITHOUT ESOPHAGITIS: ICD-10-CM

## 2024-03-01 DIAGNOSIS — M15.0 PRIMARY OSTEOARTHRITIS INVOLVING MULTIPLE JOINTS: ICD-10-CM

## 2024-03-01 DIAGNOSIS — I80.01 THROMBOPHLEBITIS OF SUPERFICIAL VEINS OF RIGHT LOWER EXTREMITY: ICD-10-CM

## 2024-03-01 DIAGNOSIS — Z85.3 PERSONAL HISTORY OF MALIGNANT NEOPLASM OF BREAST: ICD-10-CM

## 2024-03-01 DIAGNOSIS — Z83.2 FAMILY HISTORY OF FACTOR V DEFICIENCY: ICD-10-CM

## 2024-03-01 DIAGNOSIS — I10 ESSENTIAL HYPERTENSION: Primary | ICD-10-CM

## 2024-03-01 PROBLEM — M06.9 RHEUMATOID ARTHRITIS, INVOLVING UNSPECIFIED SITE, UNSPECIFIED WHETHER RHEUMATOID FACTOR PRESENT (H): Status: RESOLVED | Noted: 2022-12-16 | Resolved: 2024-03-01

## 2024-03-01 PROCEDURE — 99214 OFFICE O/P EST MOD 30 MIN: CPT | Performed by: INTERNAL MEDICINE

## 2024-03-01 RX ORDER — WARFARIN SODIUM 5 MG/1
TABLET ORAL
Qty: 80 TABLET | Refills: 1 | Status: SHIPPED | OUTPATIENT
Start: 2024-03-01 | End: 2024-07-29

## 2024-03-01 RX ORDER — SPIRONOLACTONE 25 MG/1
12.5 TABLET ORAL DAILY
Qty: 90 TABLET | Refills: 3 | Status: SHIPPED | OUTPATIENT
Start: 2024-03-01 | End: 2024-03-07

## 2024-03-01 RX ORDER — LISINOPRIL 40 MG/1
40 TABLET ORAL DAILY
Qty: 90 TABLET | Refills: 3 | Status: SHIPPED | OUTPATIENT
Start: 2024-03-01 | End: 2024-03-07

## 2024-03-01 RX ORDER — TRIAMTERENE/HYDROCHLOROTHIAZID 37.5-25 MG
1 TABLET ORAL DAILY
Qty: 90 TABLET | Refills: 3 | Status: SHIPPED | OUTPATIENT
Start: 2024-03-01

## 2024-03-01 ASSESSMENT — PAIN SCALES - GENERAL: PAINLEVEL: NO PAIN (0)

## 2024-03-01 NOTE — PROGRESS NOTES
Assessment & Plan     Essential hypertension  Blood pressure was better controlled on the spironolactone but it caused leg cramping.  She is willing to try half a pill, 12.5 mg once daily.  If the leg cramping returns, would switch the lisinopril to the max dose of olmesartan, continue the Maxide and stop the spironolactone.  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    Family history of factor V deficiency   - stable, refill provided  - warfarin ANTICOAGULANT (COUMADIN) 5 MG tablet; TAKE 1/2 TABLET(2.5MG) BY MOUTH ON MONDAY & FRIDAY AND 1 TABLET ALL OTHER DAYS OR AS DIRECTED BY INR CLINIC.    Personal history of malignant neoplasm of breast  Recommend yearly mammo    Primary osteoarthritis involving multiple joints  Updated her chart to remove RA since rheumatology gave her the diagnosis of OA    Blood transfusion declined because patient is [employer]        Patient Instructions   Hypertension  Try Spironolactone 12.5 mg once daily  If leg cramps or weakness return, let me know via My Chart or talk with RN  RN blood pressure check in 2 weeks    Weight  Recommend to resume topiramate and Phentermine.        Tips for a Healthy Diet    Add more fresh fruits and vegetables to your diet.  The more colorful with the fruit or vegetable (think berries, spinach, carrots, peppers)  the healthier it tends to be.  Juice is not a fruit.  Prepare the vegetables in a healthy way - steam, bake.  Avoid breading, butter/oil to cook.  Use herbs and spices instead of salt to season food.  Add more fiber to your diet.  Swap out white bread, white rice, white pasta for whole grain versions.  Reduce the portion size and frequency of carbohydrates/starches.   Choose healthier fats such as nuts, olive oil, avocado, etc. Stay away from lard, butter.  Decrease the frequency and portion size of 'junk food' -pizza, candy, cookies, potato chips, etc.  Watch liquid calories such as coffee drinks, juice, soda, teas.  There tends to be excessive sugar in these beverages.  Increase protein in your diet.  Eggs, cheese, Greek yogurt, lentils, chicken, fish, seafood, are good healthy choices.  Protein keeps you mehta longer, and you are less likely to have blood sugar spikes  Eat healthy at least 80% of the time.  It is ok for a special treat every once in a while, just not every day.  When are you going to indulge (think [place] time), be sure you are eating extra healthy the day before and after.      Resources:  [place] Resources for Health and Wellness:https://www.[email or web address].edu/dig-yes-foods    2. Book - Atomic Habits by Hitesh Hunter.  This a good book that looks into our habits and how to sustain good healthy habits and get rid of bad habits.    3. Food tracker -  My Fitness Pal, Lose It, Macros first, Spark People    Patito Landaverde is a 74 year old, presenting for the following health issues:  Hypertension and Establish Care (Was gigi patient would like to est care )        3/1/2024     6:45 AM   Additional Questions   Roomed by mckayla middleton   Accompanied by self         3/1/2024     6:45 AM   Patient Reported Additional Medications   Patient reports taking the following new medications none     History of Present Illness       Hypertension: She presents for follow up of hypertension.  She  does not check blood pressure  regularly outside of the clinic. Outside blood pressures have been over 140/90. She does not follow a low salt diet.     She eats 0-1 servings of fruits and vegetables daily.She consumes 0 sweetened beverage(s) daily.She exercises with enough effort to increase her heart rate 9 or less minutes per day.  She exercises with enough effort to increase her heart rate 3 or less days per week.   She is taking medications regularly.     Chief Complaint   Patient presents with    Hypertension    Establish Care     Was yu patient would like to est care          Hypertension Follow-up    Do you check your blood pressure regularly outside of the clinic? No   Are you following a low salt diet? No  Are your blood pressures ever more than 140 on the top number (systolic) OR more than 90 on the bottom number (diastolic), for example 140/90? Yes  On 12/13 I started spironolactone 25 mg once daily.  This helps with leg swelling at follow-up visit on 1/3 the blood pressure was better controlled.  Today's blood pressure is running higher - has been off spironolactone because it caused significant leg cramps and weakness  Was on amlodipine 2.5, stopped due to side effects of leg cramps 11/23      Obesity  -- On 12/13/2023 restarted topiramate and phentermine that she had been on in the past  --she stopped them because she 'wasn't losing any weight'  --it did help with weight loss in the past    History of endometrial cancer  -- Diagnosed in 2013.  Status post hysterectomy and bilateral SPO.  She received CarboTaxol and radiation  --Last saw oncology 4/2019    History of breast cancer  --She had remote history of left breast cancer then treated with lumpectomy, left-sided axillary lymph node dissection, radiation and tamoxifen for 1 year, discontinued due to phlebitis .     History of thrombophlebitis  --She saw oncology 2/22 -  Thrombosis involving the bilateral saphenous veins: Patient herself has  never had a DVT. This factors for lower extremity superficial thrombosis include obesity and varicosities.  She has been on warfarin.  She has been tolerating it well.  I would recommend long-term anticoagulation to prevent further lower extremity superficial thrombosis and worsening of her varicosities, which could lead to both thrombotic syndrome.  I think the benefits of warfarin outweigh risk at this time.  I think she would be at high risk of developing further superficial thrombosis in the legs if she stopped.  She has a low bleeding risk     History of RA   -- Per problem list.  There is only 1 rheumatology note from 2017 and the diagnosis was osteoarthritis; she is not seeing outside Rheum;    How many servings of fruits and vegetables do you eat daily?  0-1  On average, how many sweetened beverages do you drink each day (Examples: soda, juice, sweet tea, etc.  Do NOT count diet or artificially sweetened beverages)?   0  How many days per week do you exercise enough to make your heart beat faster? 3 or less  How many minutes a day do you exercise enough to make your heart beat faster? 9 or less  How many days per week do you miss taking your medication? 0      Current Outpatient Medications   Medication Sig Dispense Refill    Acetaminophen (TYLENOL PO) Take 500 mg by mouth as needed for mild pain or fever      lisinopril (ZESTRIL) 40 MG tablet Take 1 tablet (40 mg) by mouth daily Per Dr. Zavala 11/28/23      omeprazole (PRILOSEC) 20 MG DR capsule Take 1 capsule (20 mg) by mouth daily 90 capsule 3    triamterene-HCTZ (MAXZIDE-25) 37.5-25 MG tablet Take 1 tablet by mouth daily 90 tablet 3    warfarin ANTICOAGULANT (COUMADIN) 5 MG tablet TAKE 1/2 TABLET(2.5MG) BY MOUTH ON MONDAY & FRIDAY AND 1 TABLET ALL OTHER DAYS OR AS DIRECTED BY INR CLINIC. 80 tablet 1    phentermine (ADIPEX-P) 30 MG capsule Take 1 capsule (30 mg) by mouth every morning 90 capsule 1    spironolactone (ALDACTONE) 25 MG tablet Take 1 tablet  "(25 mg) by mouth daily 90 tablet 3    topiramate (TOPAMAX) 50 MG tablet Take 1 tablet (50 mg) by mouth daily 90 tablet 1           Review of Systems  Constitutional, HEENT, cardiovascular, pulmonary, gi and gu systems are negative, except as otherwise noted.      Objective    BP (!) 154/80 (BP Location: Right arm, Patient Position: Sitting, Cuff Size: Adult Regular)   Pulse 97   Temp 97.9  F (36.6  C) (Tympanic)   Resp 16   Ht 1.626 m (5' 4\")   Wt 90.4 kg (199 lb 6.4 oz)   SpO2 96%   BMI 34.23 kg/m    Body mass index is 34.23 kg/m .  Physical Exam   GENERAL: alert and no distress            Signed Electronically by: Albina Snider DO    "

## 2024-03-01 NOTE — PATIENT INSTRUCTIONS
Hypertension  Try Spironolactone 12.5 mg once daily  If leg cramps or weakness return, let me know via My Chart or talk with RN  RN blood pressure check in 2 weeks    Weight  Recommend to resume topiramate and Phentermine.        Tips for a Healthy Diet    Add more fresh fruits and vegetables to your diet.  The more colorful with the fruit or vegetable (think berries, spinach, carrots, peppers) the healthier it tends to be.  Juice is not a fruit.  Prepare the vegetables in a healthy way - steam, bake.  Avoid breading, butter/oil to cook.  Use herbs and spices instead of salt to season food.  Add more fiber to your diet.  Swap out white bread, white rice, white pasta for whole grain versions.  Reduce the portion size and frequency of carbohydrates/starches.   Choose healthier fats such as nuts, olive oil, avocado, etc. Stay away from lard, butter.  Decrease the frequency and portion size of 'junk food' -pizza, candy, cookies, potato chips, etc.  Watch liquid calories such as coffee drinks, juice, soda, teas.  There tends to be excessive sugar in these beverages.  Increase protein in your diet.  Eggs, cheese, Greek yogurt, lentils, chicken, fish, seafood, are good healthy choices.  Protein keeps you mehta longer, and you are less likely to have blood sugar spikes  Eat healthy at least 80% of the time.  It is ok for a special treat every once in a while, just not every day.  When are you going to indulge (think [place] time), be sure you are eating extra healthy the day before and after.      Resources:  [place] for Health and Wellness:https://www.[facility].edu/dig-yes-foods    2. Book - Atomic Habits by Hitesh Hunter.  This a good book that looks into our habits and how to sustain good healthy habits and get rid of bad habits.    3. Food tracker -  My Fitness Pal, Lose It, Macros first, Spark People

## 2024-03-07 DIAGNOSIS — I10 ESSENTIAL HYPERTENSION: Primary | ICD-10-CM

## 2024-03-07 RX ORDER — OLMESARTAN MEDOXOMIL 40 MG/1
40 TABLET ORAL DAILY
Qty: 90 TABLET | Refills: 3 | Status: SHIPPED | OUTPATIENT
Start: 2024-03-07

## 2024-03-13 ENCOUNTER — TELEPHONE (OUTPATIENT)
Dept: FAMILY MEDICINE | Facility: CLINIC | Age: 74
End: 2024-03-13

## 2024-03-13 ENCOUNTER — LAB (OUTPATIENT)
Dept: LAB | Facility: CLINIC | Age: 74
End: 2024-03-13
Payer: MEDICARE

## 2024-03-13 ENCOUNTER — ALLIED HEALTH/NURSE VISIT (OUTPATIENT)
Dept: FAMILY MEDICINE | Facility: CLINIC | Age: 74
End: 2024-03-13
Payer: MEDICARE

## 2024-03-13 ENCOUNTER — ANTICOAGULATION THERAPY VISIT (OUTPATIENT)
Dept: ANTICOAGULATION | Facility: CLINIC | Age: 74
End: 2024-03-13

## 2024-03-13 VITALS — SYSTOLIC BLOOD PRESSURE: 141 MMHG | OXYGEN SATURATION: 96 % | HEART RATE: 75 BPM | DIASTOLIC BLOOD PRESSURE: 71 MMHG

## 2024-03-13 DIAGNOSIS — D68.9 BLOOD COAGULATION DISORDER (H): ICD-10-CM

## 2024-03-13 DIAGNOSIS — I10 ESSENTIAL HYPERTENSION: ICD-10-CM

## 2024-03-13 DIAGNOSIS — Z83.2 FAMILY HISTORY OF FACTOR V DEFICIENCY: ICD-10-CM

## 2024-03-13 DIAGNOSIS — I10 ESSENTIAL HYPERTENSION: Primary | ICD-10-CM

## 2024-03-13 DIAGNOSIS — Z79.01 LONG TERM CURRENT USE OF ANTICOAGULANT THERAPY: ICD-10-CM

## 2024-03-13 DIAGNOSIS — I80.01 THROMBOPHLEBITIS OF SUPERFICIAL VEINS OF RIGHT LOWER EXTREMITY: Primary | ICD-10-CM

## 2024-03-13 LAB
ERYTHROCYTE [DISTWIDTH] IN BLOOD BY AUTOMATED COUNT: 13.1 % (ref 10–15)
HCT VFR BLD AUTO: 43.6 % (ref 35–47)
HGB BLD-MCNC: 14.7 G/DL (ref 11.7–15.7)
INR BLD: 2.3 (ref 0.9–1.1)
MCH RBC QN AUTO: 30.6 PG (ref 26.5–33)
MCHC RBC AUTO-ENTMCNC: 33.7 G/DL (ref 31.5–36.5)
MCV RBC AUTO: 91 FL (ref 78–100)
PLATELET # BLD AUTO: 178 10E3/UL (ref 150–450)
RBC # BLD AUTO: 4.8 10E6/UL (ref 3.8–5.2)
WBC # BLD AUTO: 4.3 10E3/UL (ref 4–11)

## 2024-03-13 PROCEDURE — 36415 COLL VENOUS BLD VENIPUNCTURE: CPT

## 2024-03-13 PROCEDURE — 99207 PR NO CHARGE NURSE ONLY: CPT

## 2024-03-13 PROCEDURE — 85027 COMPLETE CBC AUTOMATED: CPT

## 2024-03-13 PROCEDURE — 36416 COLLJ CAPILLARY BLOOD SPEC: CPT

## 2024-03-13 PROCEDURE — 85610 PROTHROMBIN TIME: CPT

## 2024-03-13 NOTE — TELEPHONE ENCOUNTER
The blood pressure is improved compared to a few weeks ago although still a bit high.  Will not make changes to medications at this time given the side effects at the higher dose of spironolactone.

## 2024-03-13 NOTE — PROGRESS NOTES
Thomas Davila is a 74 year old year old patient who comes in today for a Blood Pressure check because of medication change and ongoing blood pressure monitoring.  Vital Signs as repeated by RN    3/13/2024 9:40 AM 3/13/2024 9:45 AM   /78 141/71   BP Location Right arm Right arm   Patient Position Sitting Sitting   Cuff Size Adult Regular Adult Regular   Pulse 77 75   SpO2 95 % 96 %        Patient is taking medication as prescribed  Patient is tolerating medications well.  Patient is monitoring Blood Pressure at home.  Average readings if yes are 137/80  Current complaints: leg/feet tingling and a little swelling and feet feel cold, feet are a little purple in color, no longer getting leg cramps.  Disposition:  patient to continue with the same medication    Chandrika Gaspar RN on 3/13/2024 at 10:20 AM

## 2024-03-13 NOTE — PROGRESS NOTES
ANTICOAGULATION MANAGEMENT     Thomas Davila 74 year old female is on warfarin with therapeutic INR result. (Goal INR 2.0-3.0)    Recent labs: (last 7 days)     03/13/24  0852   INR 2.3*       ASSESSMENT     Source(s): Chart Review  Previous INR was Therapeutic last 2(+) visits  Medication, diet, health changes since last INR chart reviewed; none identified  Stopped spironolactone and lisinopril and started olmesartan         PLAN     Recommended plan for no diet, medication or health factor changes affecting INR     Dosing Instructions: Continue your current warfarin dose with next INR in 5 weeks       Summary  As of 3/13/2024      Full warfarin instructions:  2.5 mg every Mon, Wed, Fri; 5 mg all other days   Next INR check:  4/17/2024               Detailed voice message left for Akhil with dosing instructions and follow up date.     Contact [phone]  to schedule and with any changes, questions or concerns.     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 okay to leave DVM per signed consent             Anticoagulation Care Providers       Provider Role Specialty Phone number     Lizabeth Zavala MD Referring Family Medicine [phone]    Dung Prieto MD Referring Family Medicine [phone]

## 2024-03-13 NOTE — TELEPHONE ENCOUNTER
Left message for patient indicating no change at this time. Patient has apt with PCP tomorrow.     Jaja Whittaker RN

## 2024-03-14 ENCOUNTER — OFFICE VISIT (OUTPATIENT)
Dept: FAMILY MEDICINE | Facility: CLINIC | Age: 74
End: 2024-03-14
Payer: MEDICARE

## 2024-03-14 VITALS
HEIGHT: 64 IN | HEART RATE: 83 BPM | WEIGHT: 195 LBS | RESPIRATION RATE: 20 BRPM | SYSTOLIC BLOOD PRESSURE: 144 MMHG | OXYGEN SATURATION: 98 % | TEMPERATURE: 97.4 F | DIASTOLIC BLOOD PRESSURE: 86 MMHG | BODY MASS INDEX: 33.29 KG/M2

## 2024-03-14 DIAGNOSIS — I10 ESSENTIAL HYPERTENSION: Primary | ICD-10-CM

## 2024-03-14 DIAGNOSIS — R60.0 BILATERAL LEG EDEMA: ICD-10-CM

## 2024-03-14 DIAGNOSIS — I80.01 THROMBOPHLEBITIS OF SUPERFICIAL VEINS OF RIGHT LOWER EXTREMITY: ICD-10-CM

## 2024-03-14 PROCEDURE — 99214 OFFICE O/P EST MOD 30 MIN: CPT | Mod: GC | Performed by: INTERNAL MEDICINE

## 2024-03-14 RX ORDER — CARVEDILOL 6.25 MG/1
6.25 TABLET ORAL 2 TIMES DAILY WITH MEALS
Qty: 180 TABLET | Refills: 0 | Status: SHIPPED | OUTPATIENT
Start: 2024-03-14 | End: 2024-04-18

## 2024-03-14 ASSESSMENT — PAIN SCALES - GENERAL: PAINLEVEL: MILD PAIN (2)

## 2024-03-14 NOTE — PROGRESS NOTES
"  Assessment & Plan     Essential hypertension  Blood pressure remains above goal this visit. Previously trialed low dose aldactone and norvasc with resultant leg cramping. Plan to trial carvedilol with follow up blood pressure check.   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 Symptoms not consistent with radiculopathy. She can obtain second opinion from plastic surgeon if she would like. Unlikely to have developed recurrent clot with therapeutic INR, however given symptoms, leg swelling, and lower extremity pain, will obtain updated Duplex for further evaluation.   - Trial abdominal binder   - US Lower Extremity Venous Duplex Bilateral    BMI  Estimated body mass index is 33.47 kg/m  as calculated from the following:    Height as of this encounter: 1.626 m (5' 4\").    Weight as of this encounter: 88.5 kg (195 lb).   Weight management plan: Discussed healthy diet and exercise guidelines      Patient Instructions   Non healing surgical site  Recommend follow-up with Dermatology to let them know  Continue gentle washing with vaseline   Try padding with extra gauze with paper tape     Leg numbness  Wear an abdominal binder  Obtain US of lower extremity veins     Hypertension  Continue current meds  Continue to work on low salt diet   Add carvedilol (Coreg) 6.25 mg twice daily  Schedule RN blood pressure check in ~ 2 weeks   Let me know if side effects     Patient was seen and discussed with my attending, Dr. Snider.     Lynette Pearson MD  Internal Medicine PGY-3  [facility]       Physician Attestation   I, Albina Snider, , saw this patient and agree with the findings and " plan of care as documented in the note.      Items personally reviewed/procedural attestation: vitals.    Albina Snider, DO      Subjective   Akhil is a 74 year old, presenting for the following health issues:  Recheck Medication (BP follow up) and Swelling (Leg and feet swelling/ tingling )        3/14/2024     8:48 AM   Additional Questions   Roomed by Soledad HUANG   Accompanied by self         3/14/2024     8:48 AM   Patient Reported Additional Medications   Patient reports taking the following new medications no new meds     History of Present Illness       Hypertension: She presents for follow up of hypertension.  She does not check blood pressure  regularly outside of the clinic. Outside blood pressures have been over 140/90. She does not follow a low salt diet.     She eats 0-1 servings of fruits and vegetables daily.She consumes 0 sweetened beverage(s) daily.She exercises with enough effort to increase her heart rate 9 or less minutes per day.  She exercises with enough effort to increase her heart rate 3 or less days per week.   She is taking medications regularly.     She notes that she again experienced muscle cramping with low dose spironolactone. Her feet would cramp - unable to walk. She would also feel short of breath. Both of these symptoms resolved with stopping the medication. She does not check her blood pressure at home. She notes that she does not do regular physical activity. She has tried to do a lower sodium diet, however notes that she had a hyatt sandwich last night and could feel increased water retention. No chest pain or shortness of breath.     Pain History:  When did you first notice your pain? Noticed after changing meds, feet turned purple    Have you seen anyone else for your pain? No  How has your pain affected your ability to work? Not applicable  Where in your body do you have pain? Musculoskeletal problem/pain  Onset/Duration: a couple years   Description  Location: calves into  "feet - bilateral  Joint Swelling: YES and tightness   Redness: YES and purple   Pain: YES, pt reports got a cancer spot removed on r ght shin and it is not healing, wondering if there is an infection   Warmth: YES- and cold   Intensity:  mild- moderate  Progression of Symptoms:  same   Accompanying signs and symptoms:   Fevers: No  Numbness/tingling/weakness: YES and cramping occasionally   History  Trauma to the area: No  Recent illness:  No  Previous similar problem: YES  Previous evaluation:  YES  Precipitating or alleviating factors:  Aggravating factors include: sitting/ laying in bed   Therapies tried and outcome: nothing      She underwent shave biopsy x2 in February - mid-upper back and R anterior shin. Her back biopsy site healed well, however the biopsy site on her shin is not healing. She notes frequent trauma to the area (bumping into multiple objects). She has been placing a salve on it along with a band-aid daily. No fevers, chills, purulent drainage, or surrounding erythema.     She also complains of the sensation that her lower legs (knee downward) \"fall asleep\" - this improves with lifting her pannus. No lower back pain/radicular symptoms. She does wake up at night from her symptoms - improves with walking and pannus support. She does note lower extremity swelling in addition. No calf pain.           Objective    BP (!) 144/86   Pulse 83   Temp 97.4  F (36.3  C) (Tympanic)   Resp 20   Ht 1.626 m (5' 4\")   Wt 88.5 kg (195 lb)   SpO2 98%   BMI 33.47 kg/m    Body mass index is 33.47 kg/m .  Physical Exam   GENERAL: alert and no distress  RESP: lungs clear to auscultation - no rales, rhonchi or wheezes  CV: regular rate and rhythm, normal S1 S2, no S3 or S4, no murmur, click or rub, no peripheral edema  ABDOMEN: soft, nontender, no hepatosplenomegaly, no masses and bowel sounds normal  MS: varicose veins bilateral LE - superficial, trace pitting edema to level of thighs, and peripheral pulses " normal  SKIN: nonhealing shave biopsy on R anterior shin with serous drainage and granulation tissue without surrounding warmth, erythema, or purulent drainage.           Signed Electronically by: Albina Snider DO

## 2024-03-14 NOTE — PATIENT INSTRUCTIONS
Non healing surgical site  Recommend follow-up with Dermatology to let them know  Continue gentle washing with vaseline   Try padding with extra gauze with paper tape     Leg numbness  Wear an abdominal binder  Obtain US of lower extremity veins     Hypertension  Continue current meds  Continue to work on low salt diet   Add carvedilol (Coreg) 6.25 mg twice daily  Schedule RN blood pressure check in ~ 2 weeks   Let me know if side effects

## 2024-03-18 ENCOUNTER — TELEPHONE (OUTPATIENT)
Dept: FAMILY MEDICINE | Facility: CLINIC | Age: 74
End: 2024-03-18
Payer: MEDICARE

## 2024-03-18 DIAGNOSIS — E66.812 CLASS 2 SEVERE OBESITY DUE TO EXCESS CALORIES WITH SERIOUS COMORBIDITY AND BODY MASS INDEX (BMI) OF 35.0 TO 35.9 IN ADULT (H): ICD-10-CM

## 2024-03-18 DIAGNOSIS — E66.01 CLASS 2 SEVERE OBESITY DUE TO EXCESS CALORIES WITH SERIOUS COMORBIDITY AND BODY MASS INDEX (BMI) OF 35.0 TO 35.9 IN ADULT (H): ICD-10-CM

## 2024-03-18 DIAGNOSIS — I10 ESSENTIAL HYPERTENSION, BENIGN: ICD-10-CM

## 2024-03-18 NOTE — TELEPHONE ENCOUNTER
Patient would like phentermine sent to [facility] please.    Thank you,    Rosemarie Cramer, DANIELE Al

## 2024-03-19 RX ORDER — PHENTERMINE HYDROCHLORIDE 30 MG/1
30 CAPSULE ORAL EVERY MORNING
Qty: 90 CAPSULE | Refills: 0 | Status: SHIPPED | OUTPATIENT
Start: 2024-03-19 | End: 2024-04-18

## 2024-03-19 NOTE — TELEPHONE ENCOUNTER
Relayed refill to patient. Patient verbalized understanding and denies any further questions or concerns at this time.    Paulette YOUNG RN  [facility]  [phone]

## 2024-03-19 NOTE — TELEPHONE ENCOUNTER
Forwarding pharmacy transfer request to PCP due to Schedule IV controlled substance.   Last Rx was 12/13/23 for a 6 month supply to [facility].  Patient would like remaining refill sent to [facility].   Rx pended.    Venita Ambriz RN  [facility]

## 2024-03-22 ENCOUNTER — TELEPHONE (OUTPATIENT)
Dept: FAMILY MEDICINE | Facility: CLINIC | Age: 74
End: 2024-03-22
Payer: MEDICARE

## 2024-03-22 NOTE — TELEPHONE ENCOUNTER
Medication Question or Refill      What medication are you calling about (include dose and sig)?: Patient would like to review her medications with a nurse. She is unsure what she is supposed to be taking.     Preferred Pharmacy:    [facility] [id number] [place] [id number]  Phone: [phone] Fax: [phone]        Could we send this information to you in [place] or would you prefer to receive a phone call?:   Patient would prefer a phone call   Okay to leave a detailed message?: Yes at Home number on file [phone] (home)      DANIELE Garcia

## 2024-03-26 NOTE — TELEPHONE ENCOUNTER
Spoke with patient. Reviewed medication list patient verbalizes understanding. Gave imaging scheduling phone number to schedule Ultrasound.     Jaja Whittaker RN

## 2024-03-28 ENCOUNTER — ALLIED HEALTH/NURSE VISIT (OUTPATIENT)
Dept: FAMILY MEDICINE | Facility: CLINIC | Age: 74
End: 2024-03-28
Payer: MEDICARE

## 2024-03-28 ENCOUNTER — TELEPHONE (OUTPATIENT)
Dept: FAMILY MEDICINE | Facility: CLINIC | Age: 74
End: 2024-03-28

## 2024-03-28 VITALS — HEART RATE: 58 BPM | SYSTOLIC BLOOD PRESSURE: 142 MMHG | OXYGEN SATURATION: 94 % | DIASTOLIC BLOOD PRESSURE: 78 MMHG

## 2024-03-28 DIAGNOSIS — Z01.30 BLOOD PRESSURE CHECK: Primary | ICD-10-CM

## 2024-03-28 PROCEDURE — 99207 PR NO CHARGE NURSE ONLY: CPT

## 2024-03-28 NOTE — TELEPHONE ENCOUNTER
"Thomas Davila is a 74 year old year old patient who comes in today for a Blood Pressure check because of new medication.  Vital Signs as repeated by /82 right arm. After 10 minutes 142/78 right arm HR 58 SPO2 94% left arm mastectomy with lymph node removal  Patient is taking medication as prescribed  Patient is tolerating medications well.  Patient is not monitoring Blood Pressure at home.  Average readings if yes are NA  Current complaints: purple feet improving  Disposition:  patient to continue with the same medication or as advised    Patient reports drinking \"a pot of coffee this morning\".     Took medications at 7 am.     [facility] and phone call preferred if changes. Okay to leave a detailed message.     Yas Claros RN on 3/28/2024 at 8:47 AM  "

## 2024-04-02 ENCOUNTER — TELEPHONE (OUTPATIENT)
Dept: FAMILY MEDICINE | Facility: CLINIC | Age: 74
End: 2024-04-02
Payer: MEDICARE

## 2024-04-02 NOTE — TELEPHONE ENCOUNTER
Patient Quality Outreach    Patient is due for the following:   Hypertension -  BP check    Next Steps:   Schedule a nurse only visit for BP    Type of outreach:    Sent letter.    Next Steps:  Reach out within 90 days via Letter.    Max number of attempts reached: Yes. Will try again in 90 days if patient still on fail list.    Questions for provider review:    None           Elle Kitchen MA  Chart routed to .

## 2024-04-10 ENCOUNTER — OFFICE VISIT (OUTPATIENT)
Dept: DERMATOLOGY | Facility: CLINIC | Age: 74
End: 2024-04-10
Payer: MEDICARE

## 2024-04-10 DIAGNOSIS — Z85.828 HISTORY OF SKIN CANCER: Primary | ICD-10-CM

## 2024-04-10 DIAGNOSIS — L82.0 INFLAMED SEBORRHEIC KERATOSIS: ICD-10-CM

## 2024-04-10 PROCEDURE — 17110 DESTRUCTION B9 LES UP TO 14: CPT | Performed by: DERMATOLOGY

## 2024-04-10 PROCEDURE — 99212 OFFICE O/P EST SF 10 MIN: CPT | Mod: 25 | Performed by: DERMATOLOGY

## 2024-04-10 ASSESSMENT — PAIN SCALES - GENERAL: PAINLEVEL: NO PAIN (0)

## 2024-04-10 NOTE — NURSING NOTE
Chief Complaint   Patient presents with    Derm Problem     Spot on right shin        There were no vitals filed for this visit.  Wt Readings from Last 1 Encounters:   03/14/24 88.5 kg (195 lb)       Jojo Jordan LPN .................4/10/2024

## 2024-04-10 NOTE — PROGRESS NOTES
Thomas Davila is an extremely pleasant 74 year old year old female patient here today for wound check right leg.  Also notes crusted spot on left leg.  Patient has no other skin complaints today.  Remainder of the HPI, Meds, PMH, Allergies, FH, and SH was reviewed in chart.      Past Medical History:   Diagnosis Date    Acute parametritis and pelvic cellulitis     salpingitis    ASCUS with positive high risk HPV 2013    Asthma     No recent issues    Basal cell carcinoma     breast cancer     left lumpectomy, radiation, tamoxifen    Breast cancer (H)     L Breast; Lumpectomy & Radiation    Chronic salpingitis and oophoritis 1994    PID       Embolism and thrombosis of unspecified site     left leg, due to tamoxifen therapy    Generalized osteoarthrosis, unspecified site     hands    Leiomyoma of uterus, unspecified     Morbid obesity (H) 2017    Other malignant neoplasm of skin, site unspecified 2006    Basal cell cancer on nose    Squamous cell carcinoma     Thrombosis of leg     Left    Unspecified essential hypertension        Past Surgical History:   Procedure Laterality Date    BREAST LUMPECTOMY, RT/LT      left    C/SECTION, LOW TRANSVERSE  1972,     , Low Transverse x2    CL AFF SURGICAL PATHOLOGY      Breast reduction    COLONOSCOPY  10/15/02    normal    FOOT SURGERY      R Foot     HC EXCISION BREAST LESION, OPEN >=1  98    1) Needle localization with generous lumpectomy 2) Left axillary node dissection.    HC LAPAROSCOPY, SURGICAL, ABDOMEN, PERITONEUM & OMENTUM; DX W/ OR W/O SPECIMEN(S)  94    HYSTERECTOMY, PAP NO LONGER INDICATED      INSERT PORT VASCULAR ACCESS  3/14/2013    Procedure: INSERT PORT VASCULAR ACCESS;  Port Revision;  Surgeon: Arash Puente MD;  Location: [facility]    LAPAROSCOPIC HYSTERECTOMY TOTAL, BILATERAL SALPINGO-OOPHORECTOMY, NODE DISSECTION, COMBINED  2013    Procedure: COMBINED LAPAROSCOPIC HYSTERECTOMY  TOTAL, SALPINGO-OOPHORECTOMY, NODE DISSECTION;  Laparosocpic  Total Abdominal Hysterectomy, Bilateral Salphino-Oophorectomy, Bilateral Pelvic Lymph Node Dissection, Pelvic Washings, Cystoscopy;  Surgeon: Tanya Walker MD;  Location: [facility]    PHACOEMULSIFICATION WITH STANDARD INTRAOCULAR LENS IMPLANT Left 6/12/2019    Procedure: Cataract Removal with Implant;  Surgeon: Walt Mckeon MD;  Location: [facility]    PHACOEMULSIFICATION WITH STANDARD INTRAOCULAR LENS IMPLANT Right 7/3/2019    Procedure: Cataract Removal with Implant;  Surgeon: Walt Mckeon MD;  Location: [facility]    SURGICAL HISTORY OF -   06/22/93    1) Excision of digital cyst right mid finger  2)  Excision of dermatofibroma left calf    SURGICAL HISTORY OF -   12/18/2001    Excision of mucinous cyst & partial ostectomy, bone removal of benign osteophytic overgrowth osteophyte of the distal aspect of the middle phalanx and distal phalanx    SURGICAL HISTORY OF -   2006    Basal cell cancer removal    TONSILLECTOMY      TUBAL LIGATION  1978        Family History   Problem Relation Age of Onset    Cerebrovascular Disease Mother     Hypertension Mother     Diabetes Mother     Thyroid Disease Mother     Arthritis Mother     Alzheimer Disease Mother     Neurologic Disorder Mother         Parkinsons    Heart Disease Father     Hypertension Father     Diabetes Father     Thyroid Disease Father     Arthritis Father     Blood Disease Father     Factor V Leiden deficiency Father     Anesthesia Reaction Sister     Thyroid Disease Sister     Anesthesia Reaction Sister     Arthritis Sister         RA    Hypertension Brother     Factor V Leiden deficiency Brother     Allergies Son     Gastrointestinal Disease Son         GERD    Hypertension Brother     Allergies Son         percocett    Gastrointestinal Disease Son         GERD    Hypertension Son     Factor V Leiden deficiency Niece        Social History     Socioeconomic History    Marital status:       Spouse name: Not on file    Number of children: 2    Years of education: Not on file    Highest education level: Not on file   Occupational History     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 No   Social History Narrative    [employer]-- [employer]CL[employer]ES ALL BLOOD PRODUCTS        April 15, 2021    ENVIRONMENTAL HISTORY: [place] The home is heated with a forced air. They do have central air conditioning. The patient's bedroom is furnished with hard vijaya in bedroom and animals sleep in bedroom.  Pets inside the house include 2 cat(s). There is no history of cockroach or mice infestation. There is/are 0 smokers in the house.  The house does not have a basement.      Social Determinants of Health     Financial Resource Strain: Low Risk  (11/9/2023)    Financial Resource Strain     Within the past 12 months, have you or your family members you live with been unable to get utilities (heat, electricity) when it was really needed?: No   Food Insecurity: Low Risk  (11/9/2023)    Food Insecurity     Within the past 12 months, did you worry that your food would run out before you got money to buy more?: No     Within the past 12 months, did the food you bought just not last and you didn t have money to get more?: No   Transportation Needs: Low Risk  (11/9/2023)    Transportation Needs     Within the past 12 months, has lack of transportation kept you from medical appointments, getting your medicines, non-medical meetings or appointments, work, or from getting things that you need?: No   Physical Activity: Not on file   Stress: Not  on file   Social Connections: Not on file   Interpersonal Safety: Low Risk  (11/9/2023)    Interpersonal Safety     Do you feel physically and emotionally safe where you currently live?: Yes     Within the past 12 months, have you been hit, slapped, kicked or otherwise physically hurt by someone?: No     Within the past 12 months, have you been humiliated or emotionally abused in other ways by your partner or ex-partner?: No   Housing Stability: Low Risk  (11/9/2023)    Housing Stability     Do you have housing? : Yes     Are you worried about losing your housing?: No       Outpatient Encounter Medications as of 4/10/2024   Medication Sig Dispense Refill    Acetaminophen (TYLENOL PO) Take 500 mg by mouth as needed for mild pain or fever      carvedilol (COREG) 6.25 MG tablet Take 1 tablet (6.25 mg) by mouth 2 times daily (with meals) for 90 days 180 tablet 0    olmesartan (BENICAR) 40 MG tablet Take 1 tablet (40 mg) by mouth daily 90 tablet 3    omeprazole (PRILOSEC) 20 MG DR capsule Take 1 capsule (20 mg) by mouth daily 90 capsule 3    phentermine 30 MG capsule Take 1 capsule (30 mg) by mouth every morning 90 capsule 0    topiramate (TOPAMAX) 50 MG tablet Take 1 tablet (50 mg) by mouth daily 90 tablet 1    triamterene-HCTZ (MAXZIDE-25) 37.5-25 MG tablet Take 1 tablet by mouth daily 90 tablet 3    warfarin ANTICOAGULANT (COUMADIN) 5 MG tablet TAKE 1/2 TABLET(2.5MG) BY MOUTH ON MONDAY & FRIDAY AND 1 TABLET ALL OTHER DAYS OR AS DIRECTED BY INR CLINIC. 80 tablet 1     No facility-administered encounter medications on file as of 4/10/2024.             O:   NAD, WDWN, Alert & Oriented, Mood & Affect wnl, Vitals stable   General appearance normal   Vitals stable   Alert, oriented and in no acute distress     R shin almost healed helathy wound  L leg inflamed seborrheic keratosis       Eyes: Conjunctivae/lids:Normal     ENT: Lips, buccal mucosa, tongue: normal    MSK:Normal    Cardiovascular: peripheral edema none    Pulm:  Breathing Normal    Neuro/Psych: Orientation:Alert and Orientedx3 ; Mood/Affect:normal       A/P:  Hx of non-melanoma skin cancer   Wound care discussed with patient   2. L leg inflamed seborrheic keratosis   LN2:  Treated with LN2 for 5s for 1-2 cycles. Warned risks of blistering, pain, pigment change, scarring, and incomplete resolution.  Advised patient to return if lesions do not completely resolve.  Wound care sheet given.  It was a pleasure speaking to Thomas Davila today.  Previous clinic notes and pertinent laboratory tests were reviewed prior to Thomas Davila's visit.  Return to clinic 6 months

## 2024-04-10 NOTE — LETTER
4/10/2024         RE: Thomas Davila  [address] [facility] [address]        Dear Colleague,    Thank you for referring your patient, Thomas Davila, to the [facility]. Please see a copy of my visit note below.    Thomas Davila is an extremely pleasant 74 year old year old female patient here today for wound check right leg.  Also notes crusted spot on left leg.  Patient has no other skin complaints today.  Remainder of the HPI, Meds, PMH, Allergies, FH, and SH was reviewed in chart.      Past Medical History:   Diagnosis Date     Acute parametritis and pelvic cellulitis     salpingitis     ASCUS with positive high risk HPV 2013     Asthma     No recent issues     Basal cell carcinoma      breast cancer     left lumpectomy, radiation, tamoxifen     Breast cancer (H)     L Breast; Lumpectomy & Radiation     Chronic salpingitis and oophoritis 1994    PID        Embolism and thrombosis of unspecified site     left leg, due to tamoxifen therapy     Generalized osteoarthrosis, unspecified site     hands     Leiomyoma of uterus, unspecified      Morbid obesity (H) 2017     Other malignant neoplasm of skin, site unspecified 2006    Basal cell cancer on nose     Squamous cell carcinoma      Thrombosis of leg     Left     Unspecified essential hypertension        Past Surgical History:   Procedure Laterality Date     BREAST LUMPECTOMY, RT/LT      left     C/SECTION, LOW TRANSVERSE  1972,     , Low Transverse x2     CL AFF SURGICAL PATHOLOGY      Breast reduction     COLONOSCOPY  10/15/02    normal     FOOT SURGERY      R Foot      HC EXCISION BREAST LESION, OPEN >=1  98    1) Needle localization with generous lumpectomy 2) Left axillary node dissection.     HC LAPAROSCOPY, SURGICAL, ABDOMEN, PERITONEUM & OMENTUM; DX W/ OR W/O SPECIMEN(S)  94     HYSTERECTOMY, PAP NO LONGER INDICATED       INSERT PORT VASCULAR ACCESS   3/14/2013    Procedure: INSERT PORT VASCULAR ACCESS;  Port Revision;  Surgeon: Arash Puente MD;  Location: [facility]     LAPAROSCOPIC HYSTERECTOMY TOTAL, BILATERAL SALPINGO-OOPHORECTOMY, NODE DISSECTION, COMBINED  1/31/2013    Procedure: COMBINED LAPAROSCOPIC HYSTERECTOMY TOTAL, SALPINGO-OOPHORECTOMY, NODE DISSECTION;  Laparosocpic  Total Abdominal Hysterectomy, Bilateral Salphino-Oophorectomy, Bilateral Pelvic Lymph Node Dissection, Pelvic Washings, Cystoscopy;  Surgeon: Tanya Walker MD;  Location: [facility]     PHACOEMULSIFICATION WITH STANDARD INTRAOCULAR LENS IMPLANT Left 6/12/2019    Procedure: Cataract Removal with Implant;  Surgeon: Walt Mckeon MD;  Location: [facility]     PHACOEMULSIFICATION WITH STANDARD INTRAOCULAR LENS IMPLANT Right 7/3/2019    Procedure: Cataract Removal with Implant;  Surgeon: Walt Mckeon MD;  Location: [facility]     SURGICAL HISTORY OF -   06/22/93    1) Excision of digital cyst right mid finger  2)  Excision of dermatofibroma left calf     SURGICAL HISTORY OF -   12/18/2001    Excision of mucinous cyst & partial ostectomy, bone removal of benign osteophytic overgrowth osteophyte of the distal aspect of the middle phalanx and distal phalanx     SURGICAL HISTORY OF -   2006    Basal cell cancer removal     TONSILLECTOMY       TUBAL LIGATION  1978        Family History   Problem Relation Age of Onset     Cerebrovascular Disease Mother      Hypertension Mother      Diabetes Mother      Thyroid Disease Mother      Arthritis Mother      Alzheimer Disease Mother      Neurologic Disorder Mother         Parkinsons     Heart Disease Father      Hypertension Father      Diabetes Father      Thyroid Disease Father      Arthritis Father      Blood Disease Father      Factor V Leiden deficiency Father      Anesthesia Reaction Sister      Thyroid Disease Sister      Anesthesia Reaction Sister      Arthritis Sister         RA     Hypertension Brother      Factor V Leiden  deficiency Brother      Allergies Son      Gastrointestinal Disease Son         GERD     Hypertension Brother      Allergies Son         percocett     Gastrointestinal Disease Son         GERD     Hypertension Son      Factor V Leiden deficiency Niece        Social History     Socioeconomic History     Marital status:      Spouse name: Not on file     Number of children: 2     Years of education: Not on file     Highest education level: Not on file   Occupational History     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 The home is heated with a forced air. They do have central air conditioning. The patient's bedroom is furnished with hard vijaya in bedroom and animals sleep in bedroom.  Pets inside the house include 2 cat(s). There is no history of cockroach or mice infestation. There is/are 0 smokers in the house.  The house does not have a basement.      Social Determinants of Health     Financial Resource Strain: Low Risk  (11/9/2023)    Financial Resource Strain      Within the past 12 months, have you or your family members you live with been unable to get utilities (heat, electricity) when it was really needed?: No   Food Insecurity: Low Risk  (11/9/2023)    Food Insecurity      Within the past 12 months, did you worry that your food would run out before you got money to buy more?: No      Within the past 12 months, did the food you bought just not last and you didn t have money to get  more?: No   Transportation Needs: Low Risk  (11/9/2023)    Transportation Needs      Within the past 12 months, has lack of transportation kept you from medical appointments, getting your medicines, non-medical meetings or appointments, work, or from getting things that you need?: No   Physical Activity: Not on file   Stress: Not on file   Social Connections: Not on file   Interpersonal Safety: Low Risk  (11/9/2023)    Interpersonal Safety      Do you feel physically and emotionally safe where you currently live?: Yes      Within the past 12 months, have you been hit, slapped, kicked or otherwise physically hurt by someone?: No      Within the past 12 months, have you been humiliated or emotionally abused in other ways by your partner or ex-partner?: No   Housing Stability: Low Risk  (11/9/2023)    Housing Stability      Do you have housing? : Yes      Are you worried about losing your housing?: No       Outpatient Encounter Medications as of 4/10/2024   Medication Sig Dispense Refill     Acetaminophen (TYLENOL PO) Take 500 mg by mouth as needed for mild pain or fever       carvedilol (COREG) 6.25 MG tablet Take 1 tablet (6.25 mg) by mouth 2 times daily (with meals) for 90 days 180 tablet 0     olmesartan (BENICAR) 40 MG tablet Take 1 tablet (40 mg) by mouth daily 90 tablet 3     omeprazole (PRILOSEC) 20 MG DR capsule Take 1 capsule (20 mg) by mouth daily 90 capsule 3     phentermine 30 MG capsule Take 1 capsule (30 mg) by mouth every morning 90 capsule 0     topiramate (TOPAMAX) 50 MG tablet Take 1 tablet (50 mg) by mouth daily 90 tablet 1     triamterene-HCTZ (MAXZIDE-25) 37.5-25 MG tablet Take 1 tablet by mouth daily 90 tablet 3     warfarin ANTICOAGULANT (COUMADIN) 5 MG tablet TAKE 1/2 TABLET(2.5MG) BY MOUTH ON MONDAY & FRIDAY AND 1 TABLET ALL OTHER DAYS OR AS DIRECTED BY INR CLINIC. 80 tablet 1     No facility-administered encounter medications on file as of 4/10/2024.             O:   NAD, WDWN, Alert &  Oriented, Mood & Affect wnl, Vitals stable   General appearance normal   Vitals stable   Alert, oriented and in no acute distress     R shin almost healed helathy wound  L leg inflamed seborrheic keratosis       Eyes: Conjunctivae/lids:Normal     ENT: Lips, buccal mucosa, tongue: normal    MSK:Normal    Cardiovascular: peripheral edema none    Pulm: Breathing Normal    Neuro/Psych: Orientation:Alert and Orientedx3 ; Mood/Affect:normal       A/P:  Hx of non-melanoma skin cancer   Wound care discussed with patient   2. L leg inflamed seborrheic keratosis   LN2:  Treated with LN2 for 5s for 1-2 cycles. Warned risks of blistering, pain, pigment change, scarring, and incomplete resolution.  Advised patient to return if lesions do not completely resolve.  Wound care sheet given.  It was a pleasure speaking to Thomas Davila today.  Previous clinic notes and pertinent laboratory tests were reviewed prior to Thomas Davila's visit.  Return to clinic 6 months      Again, thank you for allowing me to participate in the care of your patient.        Sincerely,        Walt Golden MD

## 2024-04-15 ENCOUNTER — LAB (OUTPATIENT)
Dept: LAB | Facility: CLINIC | Age: 74
End: 2024-04-15
Payer: MEDICARE

## 2024-04-15 ENCOUNTER — ANTICOAGULATION THERAPY VISIT (OUTPATIENT)
Dept: ANTICOAGULATION | Facility: CLINIC | Age: 74
End: 2024-04-15

## 2024-04-15 DIAGNOSIS — D68.9 BLOOD COAGULATION DISORDER (H): ICD-10-CM

## 2024-04-15 DIAGNOSIS — Z79.01 LONG TERM CURRENT USE OF ANTICOAGULANT THERAPY: ICD-10-CM

## 2024-04-15 DIAGNOSIS — Z83.2 FAMILY HISTORY OF FACTOR V DEFICIENCY: ICD-10-CM

## 2024-04-15 DIAGNOSIS — I80.01 THROMBOPHLEBITIS OF SUPERFICIAL VEINS OF RIGHT LOWER EXTREMITY: Primary | ICD-10-CM

## 2024-04-15 LAB — INR BLD: 3.4 (ref 0.9–1.1)

## 2024-04-15 PROCEDURE — 85610 PROTHROMBIN TIME: CPT

## 2024-04-15 PROCEDURE — 36415 COLL VENOUS BLD VENIPUNCTURE: CPT

## 2024-04-15 NOTE — PROGRESS NOTES
ANTICOAGULATION MANAGEMENT     Thomas Davila 74 year old female is on warfarin with supratherapeutic INR result. (Goal INR 2.0-3.0)    Recent labs: (last 7 days)     04/15/24  0917   INR 3.4*       ASSESSMENT     Source(s): Chart Review and Patient/Caregiver Call     Warfarin doses taken: More warfarin taken than planned which may be affecting INR Pt accidentally followed the instructions on her rx bottle which was 2.5 mg MF.  Diet: No new diet changes identified  Medication/supplement changes: None noted  New illness, injury, or hospitalization: No  Signs or symptoms of bleeding or clotting: No  Previous result: Therapeutic last 2(+) visits  Additional findings: None       PLAN     Recommended plan for temporary change(s) affecting INR     Dosing Instructions: Continue your current warfarin dose (our advised dosing) with next INR in 2 weeks       Summary  As of 4/15/2024      Full warfarin instructions:  2.5 mg every Mon, Wed, Fri; 5 mg all other days   Next INR check:  4/29/2024               Telephone call with Akhil who verbalizes understanding and agrees to plan and who agrees to plan and repeated back plan correctly    Lab visit scheduled    Education provided:   Taking warfarin: importance of following [facility] instructions vs instructions on the prescription bottle    Plan made per [facility] anticoagulation protocol    Elle García RN  Anticoagulation Clinic  4/15/2024    _______________________________________________________________________     Anticoagulation Episode Summary       Current INR goal:  2.0-3.0   TTR:  75.8% (1 y)   Target end date:  Indefinite   Send INR reminders to:  [facility]    Indications    Thrombophlebitis of superficial veins of right lower extremity [I80.01]  Family history of factor V deficiency [Z83.2]  Encounter for long-term current use of medication (Resolved) [Z79.899]  Long term current use of anticoagulant therapy [Z79.01]  Blood coagulation disorder (H24) [D68.9]              Comments:  Allergy to Lovenox. okay to leave DVM per signed consent             Anticoagulation Care Providers       Provider Role Specialty Phone number    Lizabeth Zavala MD Referring Family Medicine [phone]    Dung Prieto MD Referring Family Medicine [phone]

## 2024-04-18 ENCOUNTER — OFFICE VISIT (OUTPATIENT)
Dept: FAMILY MEDICINE | Facility: CLINIC | Age: 74
End: 2024-04-18
Payer: MEDICARE

## 2024-04-18 VITALS
HEIGHT: 64 IN | WEIGHT: 200 LBS | BODY MASS INDEX: 34.15 KG/M2 | TEMPERATURE: 97 F | DIASTOLIC BLOOD PRESSURE: 70 MMHG | SYSTOLIC BLOOD PRESSURE: 128 MMHG | OXYGEN SATURATION: 98 % | HEART RATE: 80 BPM | RESPIRATION RATE: 18 BRPM

## 2024-04-18 DIAGNOSIS — I89.0 LYMPHEDEMA: ICD-10-CM

## 2024-04-18 DIAGNOSIS — E66.812 CLASS 2 SEVERE OBESITY DUE TO EXCESS CALORIES WITH SERIOUS COMORBIDITY AND BODY MASS INDEX (BMI) OF 35.0 TO 35.9 IN ADULT (H): ICD-10-CM

## 2024-04-18 DIAGNOSIS — I10 ESSENTIAL HYPERTENSION: Primary | ICD-10-CM

## 2024-04-18 DIAGNOSIS — E66.01 CLASS 2 SEVERE OBESITY DUE TO EXCESS CALORIES WITH SERIOUS COMORBIDITY AND BODY MASS INDEX (BMI) OF 35.0 TO 35.9 IN ADULT (H): ICD-10-CM

## 2024-04-18 PROCEDURE — G2211 COMPLEX E/M VISIT ADD ON: HCPCS | Performed by: INTERNAL MEDICINE

## 2024-04-18 PROCEDURE — 99214 OFFICE O/P EST MOD 30 MIN: CPT | Performed by: INTERNAL MEDICINE

## 2024-04-18 RX ORDER — PHENTERMINE HYDROCHLORIDE 30 MG/1
30 CAPSULE ORAL EVERY MORNING
Qty: 90 CAPSULE | Refills: 1 | Status: SHIPPED | OUTPATIENT
Start: 2024-04-18

## 2024-04-18 RX ORDER — TOPIRAMATE 50 MG/1
50 TABLET, FILM COATED ORAL DAILY
Qty: 90 TABLET | Refills: 1 | Status: SHIPPED | OUTPATIENT
Start: 2024-04-18

## 2024-04-18 RX ORDER — CARVEDILOL 6.25 MG/1
6.25 TABLET ORAL 2 TIMES DAILY WITH MEALS
Qty: 180 TABLET | Refills: 3 | Status: SHIPPED | OUTPATIENT
Start: 2024-04-18

## 2024-04-18 ASSESSMENT — PAIN SCALES - GENERAL: PAINLEVEL: NO PAIN (0)

## 2024-04-18 NOTE — PROGRESS NOTES
Assessment & Plan   Problem List Items Addressed This Visit       Class 2 severe obesity due to excess calories with serious comorbidity in adult (H)    Relevant Medications    phentermine 30 MG capsule    topiramate (TOPAMAX) 50 MG tablet    Essential hypertension - Primary    Relevant Medications    carvedilol (COREG) 6.25 MG tablet     Other Visit Diagnoses       Lymphedema        Relevant Orders    Lymphedema Therapy  Referral                    Patient Instructions     Hypertension  Review tips to check blood pressure at home    Ideal blood pressure is consistently less than 140.  Best way is take 1-2 hours after you have taken your blood pressure medication, and not while drinking coffee, alcohol, using advil, sudafed, etc.  These can raise your blood pressure.   Sit quietly for several min with feet flat on ground.  With arm at heart level, take blood pressure, then again in 1 min.  Average the 2 readings and record this.  You can come back to see RN for blood pressure check.  Omron is a good brand of blood pressure cuff.  It should be an arm cuff, not a wrist cuff    Leg swelling  Elevation, low salt diet, compression  Referral to edema therapist      Patito Landaverde is a 74 year old, presenting for the following health issues:  Hypertension        4/18/2024     7:37 AM   Additional Questions   Roomed by Soledad HUANG   Accompanied by self         4/18/2024     7:37 AM   Patient Reported Additional Medications   Patient reports taking the following new medications no new meds     Pt reports still has not got ultrasound for legs, will give pt the number to schedule.     HPI     Hypertension Follow-up    Do you check your blood pressure regularly outside of the clinic? No   Are you following a low salt diet? No  Are your blood pressures ever more than 140 on the top number (systolic) OR more than 90 on the bottom number (diastolic), for example 140/90? No  She is on carvedilol 6.25 twice daily,  "olmesartan 40 mg daily,  Previously trialed low dose aldactone and norvasc with resultant leg cramping   Legs feel 'full' with swelling; still has tingling but it is improved  Wears compression stockings but they are painful  Has not seen vein doc in some time      Wt  --med going ok wishes she would lose more wt        Wt Readings from Last 5 Encounters:   04/18/24 90.7 kg (200 lb)   03/14/24 88.5 kg (195 lb)   03/01/24 90.4 kg (199 lb 6.4 oz)   01/03/24 89.8 kg (198 lb)   12/13/23 92.2 kg (203 lb 3.2 oz)       Current Outpatient Medications   Medication Sig Dispense Refill    Acetaminophen (TYLENOL PO) Take 500 mg by mouth as needed for mild pain or fever      carvedilol (COREG) 6.25 MG tablet Take 1 tablet (6.25 mg) by mouth 2 times daily (with meals) for 90 days 180 tablet 0    olmesartan (BENICAR) 40 MG tablet Take 1 tablet (40 mg) by mouth daily 90 tablet 3    omeprazole (PRILOSEC) 20 MG DR capsule Take 1 capsule (20 mg) by mouth daily 90 capsule 3    phentermine 30 MG capsule Take 1 capsule (30 mg) by mouth every morning 90 capsule 0    topiramate (TOPAMAX) 50 MG tablet Take 1 tablet (50 mg) by mouth daily 90 tablet 1    triamterene-HCTZ (MAXZIDE-25) 37.5-25 MG tablet Take 1 tablet by mouth daily 90 tablet 3    warfarin ANTICOAGULANT (COUMADIN) 5 MG tablet TAKE 1/2 TABLET(2.5MG) BY MOUTH ON MONDAY & FRIDAY AND 1 TABLET ALL OTHER DAYS OR AS DIRECTED BY INR CLINIC. 80 tablet 1           Review of Systems  Constitutional, HEENT, cardiovascular, pulmonary, gi and gu systems are negative, except as otherwise noted.      Objective    BP (!) 148/82   Pulse 80   Temp 97  F (36.1  C) (Tympanic)   Resp 18   Ht 1.626 m (5' 4\")   Wt 90.7 kg (200 lb)   SpO2 98%   BMI 34.33 kg/m    Body mass index is 34.33 kg/m .  Physical Exam   GENERAL: alert and no distress  LYMPH: nonpitting edema of bilateral legs, R>L            The longitudinal plan of care for the diagnosis(es)/condition(s) as documented were addressed " during this visit. Due to the added complexity in care, I will continue to support Akhil in the subsequent management and with ongoing continuity of care.      Signed Electronically by: Albina Snider DO

## 2024-04-18 NOTE — PATIENT INSTRUCTIONS
Hypertension  Review tips to check blood pressure at home    Ideal blood pressure is consistently less than 140.  Best way is take 1-2 hours after you have taken your blood pressure medication, and not while drinking coffee, alcohol, using advil, sudafed, etc.  These can raise your blood pressure.   Sit quietly for several min with feet flat on ground.  With arm at heart level, take blood pressure, then again in 1 min.  Average the 2 readings and record this.  You can come back to see RN for blood pressure check.  Omron is a good brand of blood pressure cuff.  It should be an arm cuff, not a wrist cuff    Leg swelling  Elevation, low salt diet, compression  Referral to edema therapist

## 2024-04-22 ENCOUNTER — THERAPY VISIT (OUTPATIENT)
Dept: PHYSICAL THERAPY | Facility: CLINIC | Age: 74
End: 2024-04-22
Attending: INTERNAL MEDICINE
Payer: MEDICARE

## 2024-04-22 DIAGNOSIS — I89.0 LYMPHEDEMA: ICD-10-CM

## 2024-04-22 PROCEDURE — 97161 PT EVAL LOW COMPLEX 20 MIN: CPT | Mod: GP | Performed by: PHYSICAL THERAPIST

## 2024-04-22 PROCEDURE — 97140 MANUAL THERAPY 1/> REGIONS: CPT | Mod: GP | Performed by: PHYSICAL THERAPIST

## 2024-04-22 NOTE — PROGRESS NOTES
"PHYSICAL THERAPY EVALUATION  Type of Visit: Evaluation    See electronic medical record for Abuse and Falls Screening details.    Subjective       Presenting condition or subjective complaint: BLE swelling  Date of onset: 04/18/24    Relevant medical history: Non-healing wounds   Dates & types of surgery:      Prior diagnostic imaging/testing results:       Prior therapy history for the same diagnosis, illness or injury: No      Prior Level of Function  Transfers: Independent  Ambulation: Independent    Living Environment  Social support: Alone   Type of home: House (handicapped accessible)   Help at home: None  Equipment owned: -- (none)     Employment: No retired    Patient goals for therapy: decrease BLE swelling    Pain assessment: Pain denied today but reports tender to touch or if the legs get bumped     Objective       EDEMA EVALUATION  Additional history:  Body part affected by edema: BLEs  If cancer related, treatment: history of L breast cancer and uterine cancer (see below)  If not cancer related, problems with veins or cause of swelling:    Distance able to walk: slow but can do up and down hills  Time able to stand: until my back goes out  Sensation problems in hands/feet: Yes tingling cold in legs and feet that can turn purple and also tightness from swelling  Edema etiology:  74 year old with class 2 severe obesity, essential HTN and lymphedema; history of L breast cancer with \"all the LN removed\"  (20+ years ago) and history of uterine cancer with all reproductive organs removed a \"bunch\" of LN removed from groin (a few years ago) which pt then reports going on steroids and gaining ~100# and \"everything went hay wire\";  when pt had breast cancer she developed a LLE bloodclot and was hospitalized; pt reports later developed superficial RLE clots; pt reports swelling in legs started \"maybe about a year ago\"; pt reports she does sleep in a bed at night so legs are elevated; pt reports surgery at R great " "toe (\"there's pins in there\" and reports history of breaking her LLE years ago;  RLE had MOHS surgery by Dr. Golden and still healing and LLE had skin cancer frozen off      FUNCTIONAL SCALES  LLIS = 32    Cognitive Status Examination  Orientation: Oriented to person, place and time   Level of Consciousness: Alert  Follows Commands and Answers Questions: 100% of the time  Personal Safety and Judgement: Intact  Memory: Intact    EDEMA  Skin Condition:  BLE skin intact with exception of two small wounds (one on each LE); trace edema at bilateral feet and 2+ tight pitting edema ankles to knee; pt complains of thigh and proximal edema that \"Feels full\"  Scar: Yes; R great toe  Capillary Refill: Symmetrical  Dorsal Pedal Pulse: Symmetrical  Stemmer Sign: -  Ulceration: Yes  Wounds: Wound 1: RLE middle anterior shin; 0.5cm(L) x 0.2cm(W) x 0.1cm(D) with 50% yellow slough    Wound 2: LLE lower medial leg ~mid-leg 0.8cm(L) x 0.4cm(W) 100% dark thick scab    GIRTH MEASUREMENTS: Refer to separate girth measurement flowsheet.     VOLUME LE  Right LE (mL) 2538.75    Left LE (mL) 2659.4    LE Volume Comparison LLE volume greater than RLE volume   % Difference +4.8%   Head/Neck Volume     Trunk Volume    Genital Volume       RANGE OF MOTION: LE ROM WFL  STRENGTH: LE Strength WFL  POSTURE: WFL  PALPATION: tenderness with pitting assessment/palpation;  RLE wound painful with cleaning  ACTIVITIES OF DAILY LIVING: independent   GAIT/LOCOMOTION: independent, no AD  BALANCE: WFL  SENSATION:  pt reports numbness at bilateral feet  VASCULAR:  chronic discoloration in BLEs and feet (distal > proximal)  COORDINATION: WFL  MUSCLE TONE: WFL    Assessment & Plan   CLINICAL IMPRESSIONS  Medical Diagnosis: lymphedema    Treatment Diagnosis: BLE secondary lymphedema   Impression/Assessment: Patient is a 74 year old female with BLE swelling, wound and pain/tenderness complaints.  The following significant findings have been identified: Pain, " Impaired sensation, Edema, and wounds . These impairments interfere with their ability to perform self care tasks, recreational activities, household chores, household mobility, and community mobility as compared to previous level of function.     Clinical Decision Making (Complexity):  Clinical Presentation: Stable/Uncomplicated  Clinical Presentation Rationale: based on medical and personal factors listed in PT evaluation  Clinical Decision Making (Complexity): Low complexity    PLAN OF CARE  Treatment Interventions:  Interventions: Manual Therapy, Therapeutic Exercise, Self-Care/Home Management, Debridement, Gradient Compression Bandaging, Wound Therapy, compression garment fitting/training    Long Term Goals     PT Goal 1  Goal Identifier: stg  Goal Description: pt to have around the clock tolerance to BLE GCB for edema reduction and wound healing response  Rationale: to maximize safety and independence with self cares  Target Date: 05/06/24  PT Goal 2  Goal Identifier: ltg  Goal Description: pt to have 100% wound closure/healing on BLEs to decrease risk of infection  Rationale: to maximize safety and independence with self cares;to maximize safety and independence with performance of ADLs and functional tasks  Target Date: 07/15/24  PT Goal 3  Goal Identifier: ltg  Goal Description: once appropriate, pt to be independent with donning, doffing and care of compression garments for longterm edema management for maintenance  Rationale: to maximize safety and independence with self cares  Target Date: 07/15/24  PT Goal 4  Goal Identifier: ltg  Goal Description: pt to be independent with longterm BLE edema management via HEP, elevation, skin cares and compression garment wear/use  Rationale: to maximize safety and independence with self cares  Target Date: 07/15/24  PT Goal 5  Goal Identifier: ltg  Goal Description: pt to have at least 3-5 point improvement on LLIS due to decreased edema and associated symptoms in  BLEs  Rationale: to maximize safety and independence with performance of ADLs and functional tasks  Target Date: 07/15/24      Frequency of Treatment: 3x/week  Duration of Treatment: x12 weeks    Recommended Referrals to Other Professionals:  n/a  Education Assessment:   Learner/Method: Patient;Listening;Demonstration;No Barriers to Learning    Risks and benefits of evaluation/treatment have been explained.   Patient/Family/caregiver agrees with Plan of Care.     Evaluation Time:     PT Eval, Low Complexity Minutes (11850): 10  Signing Clinician: Azeb Bailon, PT, DPT, CLT      [facility]                                                                                   OUTPATIENT PHYSICAL THERAPY      PLAN OF TREATMENT FOR OUTPATIENT REHABILITATION   Patient's Last Name, First Name, Thomas Dempsey YOB: 1950   Provider's Name   [facility]   Medical Record No.  [id number]     Onset Date: 04/18/24  Start of Care Date: 04/22/24     Medical Diagnosis:  lymphedema      PT Treatment Diagnosis:  BLE secondary lymphedema Plan of Treatment  Frequency/Duration: 3x/week/ x12 weeks    Certification date from 04/22/24 to 07/15/24         See note for plan of treatment details and functional goals     Azeb Bailon, PT, DPT, CLT                         I CERTIFY THE NEED FOR THESE SERVICES FURNISHED UNDER        THIS PLAN OF TREATMENT AND WHILE UNDER MY CARE     (Physician attestation of this document indicates review and certification of the therapy plan).              Referring Provider:  Albina Snider,     Initial Assessment  See Epic Evaluation- Start of Care Date: 04/22/24

## 2024-04-26 ENCOUNTER — THERAPY VISIT (OUTPATIENT)
Dept: PHYSICAL THERAPY | Facility: CLINIC | Age: 74
End: 2024-04-26
Attending: INTERNAL MEDICINE
Payer: MEDICARE

## 2024-04-26 DIAGNOSIS — I89.0 LYMPHEDEMA: Primary | Chronic | ICD-10-CM

## 2024-04-26 PROCEDURE — 97140 MANUAL THERAPY 1/> REGIONS: CPT | Mod: GP,CQ | Performed by: REHABILITATION PRACTITIONER

## 2024-04-29 ENCOUNTER — THERAPY VISIT (OUTPATIENT)
Dept: PHYSICAL THERAPY | Facility: CLINIC | Age: 74
End: 2024-04-29
Attending: INTERNAL MEDICINE
Payer: MEDICARE

## 2024-04-29 ENCOUNTER — LAB (OUTPATIENT)
Dept: LAB | Facility: CLINIC | Age: 74
End: 2024-04-29
Payer: MEDICARE

## 2024-04-29 ENCOUNTER — ANTICOAGULATION THERAPY VISIT (OUTPATIENT)
Dept: ANTICOAGULATION | Facility: CLINIC | Age: 74
End: 2024-04-29

## 2024-04-29 DIAGNOSIS — I89.0 LYMPHEDEMA: Primary | Chronic | ICD-10-CM

## 2024-04-29 DIAGNOSIS — I10 ESSENTIAL HYPERTENSION: ICD-10-CM

## 2024-04-29 DIAGNOSIS — Z83.2 FAMILY HISTORY OF FACTOR V DEFICIENCY: ICD-10-CM

## 2024-04-29 DIAGNOSIS — D68.9 BLOOD COAGULATION DISORDER (H): Primary | ICD-10-CM

## 2024-04-29 DIAGNOSIS — D68.9 BLOOD COAGULATION DISORDER (H): ICD-10-CM

## 2024-04-29 DIAGNOSIS — Z79.01 LONG TERM CURRENT USE OF ANTICOAGULANT THERAPY: ICD-10-CM

## 2024-04-29 DIAGNOSIS — I80.01 THROMBOPHLEBITIS OF SUPERFICIAL VEINS OF RIGHT LOWER EXTREMITY: Primary | ICD-10-CM

## 2024-04-29 LAB
ANION GAP SERPL CALCULATED.3IONS-SCNC: 10 MMOL/L (ref 7–15)
BUN SERPL-MCNC: 28.7 MG/DL (ref 8–23)
CALCIUM SERPL-MCNC: 9.4 MG/DL (ref 8.8–10.2)
CHLORIDE SERPL-SCNC: 103 MMOL/L (ref 98–107)
CREAT SERPL-MCNC: 0.79 MG/DL (ref 0.51–0.95)
DEPRECATED HCO3 PLAS-SCNC: 25 MMOL/L (ref 22–29)
EGFRCR SERPLBLD CKD-EPI 2021: 77 ML/MIN/1.73M2
GLUCOSE SERPL-MCNC: 100 MG/DL (ref 70–99)
INR BLD: 3.1 (ref 0.9–1.1)
POTASSIUM SERPL-SCNC: 3.6 MMOL/L (ref 3.4–5.3)
SODIUM SERPL-SCNC: 138 MMOL/L (ref 135–145)

## 2024-04-29 PROCEDURE — 80048 BASIC METABOLIC PNL TOTAL CA: CPT

## 2024-04-29 PROCEDURE — 36415 COLL VENOUS BLD VENIPUNCTURE: CPT

## 2024-04-29 PROCEDURE — 36416 COLLJ CAPILLARY BLOOD SPEC: CPT | Performed by: NURSE PRACTITIONER

## 2024-04-29 PROCEDURE — 85610 PROTHROMBIN TIME: CPT | Performed by: NURSE PRACTITIONER

## 2024-04-29 PROCEDURE — 97140 MANUAL THERAPY 1/> REGIONS: CPT | Mod: GP,CQ | Performed by: REHABILITATION PRACTITIONER

## 2024-04-29 NOTE — PROGRESS NOTES
ANTICOAGULATION MANAGEMENT     Thomas Davila 74 year old female is on warfarin with supratherapeutic INR result. (Goal INR 2.0-3.0)    Recent labs: (last 7 days)     04/29/24  0956   INR 3.1*       ASSESSMENT     Source(s): Chart Review and Patient/Caregiver Call     Warfarin doses taken: Warfarin taken as instructed  Diet: No new diet changes identified  Medication/supplement changes: None noted  New illness, injury, or hospitalization: Yes: lymphedema changes  Signs or symptoms of bleeding or clotting: No  Previous result: Supratherapeutic  Additional findings:  decrease in activity      PLAN     Recommended plan for ongoing change(s) affecting INR     Dosing Instructions: decrease your warfarin dose (9.1% change) with next INR in 2 weeks       Patient may need to return to previous dose as legs improve or if activity increases    Summary  As of 4/29/2024      Full warfarin instructions:  5 mg every Tue, Thu, Sat; 2.5 mg all other days   Next INR check:  5/13/2024               Telephone call with Akhil who verbalizes understanding and agrees to plan    Lab visit scheduled    Education provided:   Please call back if any changes to your diet, medications or how you've been taking warfarin  Symptom monitoring: monitoring for bleeding signs and symptoms    Plan made per [facility] anticoagulation protocol    Harper Kline RN  Anticoagulation Clinic  4/29/2024    _______________________________________________________________________     Anticoagulation Episode Summary       Current INR goal:  2.0-3.0   TTR:  72.0% (1 y)   Target end date:  Indefinite   Send INR reminders to:  [facility]    Indications    Thrombophlebitis of superficial veins of right lower extremity [I80.01]  Family history of factor V deficiency [Z83.2]  Encounter for long-term current use of medication (Resolved) [Z79.899]  Long term current use of anticoagulant therapy [Z79.01]  Blood coagulation disorder (H24) [D68.9]             Comments:   Allergy to Lovenox. okay to leave DVM per signed consent             Anticoagulation Care Providers       Provider Role Specialty Phone number    Lizabeth Zavala MD Referring Family Medicine [phone]    Dung Prieto MD Referring Family Medicine [phone]

## 2024-05-01 ENCOUNTER — PATIENT OUTREACH (OUTPATIENT)
Dept: CARE COORDINATION | Facility: CLINIC | Age: 74
End: 2024-05-01

## 2024-05-01 ENCOUNTER — THERAPY VISIT (OUTPATIENT)
Dept: PHYSICAL THERAPY | Facility: CLINIC | Age: 74
End: 2024-05-01
Attending: INTERNAL MEDICINE
Payer: MEDICARE

## 2024-05-01 DIAGNOSIS — I89.0 LYMPHEDEMA: Primary | Chronic | ICD-10-CM

## 2024-05-01 PROCEDURE — 97140 MANUAL THERAPY 1/> REGIONS: CPT | Mod: GP | Performed by: PHYSICAL THERAPIST

## 2024-05-03 ENCOUNTER — THERAPY VISIT (OUTPATIENT)
Dept: PHYSICAL THERAPY | Facility: CLINIC | Age: 74
End: 2024-05-03
Attending: INTERNAL MEDICINE
Payer: MEDICARE

## 2024-05-03 DIAGNOSIS — I89.0 LYMPHEDEMA: Primary | Chronic | ICD-10-CM

## 2024-05-03 PROCEDURE — 97140 MANUAL THERAPY 1/> REGIONS: CPT | Mod: GP,CQ | Performed by: REHABILITATION PRACTITIONER

## 2024-05-06 ENCOUNTER — THERAPY VISIT (OUTPATIENT)
Dept: PHYSICAL THERAPY | Facility: CLINIC | Age: 74
End: 2024-05-06
Attending: INTERNAL MEDICINE
Payer: MEDICARE

## 2024-05-06 DIAGNOSIS — I89.0 LYMPHEDEMA: Primary | Chronic | ICD-10-CM

## 2024-05-06 PROCEDURE — 97140 MANUAL THERAPY 1/> REGIONS: CPT | Mod: GP,CQ | Performed by: REHABILITATION PRACTITIONER

## 2024-05-08 ENCOUNTER — TELEPHONE (OUTPATIENT)
Dept: FAMILY MEDICINE | Facility: CLINIC | Age: 74
End: 2024-05-08
Payer: MEDICARE

## 2024-05-08 DIAGNOSIS — D68.9 BLOOD COAGULATION DISORDER (H): Primary | ICD-10-CM

## 2024-05-08 NOTE — TELEPHONE ENCOUNTER
Followed by [facility] last visit 4/29 next INR due 5/13  I do not see standing order for INR     Routing to PCP for consideration of ordering lab.     Jaja Whittaker, RN

## 2024-05-08 NOTE — TELEPHONE ENCOUNTER
Order/Referral Request    Who is requesting: [facility]    Orders being requested: INR     When are orders needed by: asap    Does patient have a preference on a Group/Provider/Facility? [facility]    Does patient have an appointment scheduled?: Yes: 5/13    Where to send orders: Place orders within Epic    Could we send this information to you in [facility] or would you prefer to receive a phone call?:   No preference   Okay to leave a detailed message?: Yes at Home number on file [phone] ([place])

## 2024-05-13 ENCOUNTER — ANTICOAGULATION THERAPY VISIT (OUTPATIENT)
Dept: ANTICOAGULATION | Facility: CLINIC | Age: 74
End: 2024-05-13

## 2024-05-13 ENCOUNTER — LAB (OUTPATIENT)
Dept: LAB | Facility: CLINIC | Age: 74
End: 2024-05-13
Payer: MEDICARE

## 2024-05-13 ENCOUNTER — THERAPY VISIT (OUTPATIENT)
Dept: PHYSICAL THERAPY | Facility: CLINIC | Age: 74
End: 2024-05-13
Attending: INTERNAL MEDICINE
Payer: MEDICARE

## 2024-05-13 DIAGNOSIS — I80.01 THROMBOPHLEBITIS OF SUPERFICIAL VEINS OF RIGHT LOWER EXTREMITY: Primary | ICD-10-CM

## 2024-05-13 DIAGNOSIS — D68.9 BLOOD COAGULATION DISORDER (H): ICD-10-CM

## 2024-05-13 DIAGNOSIS — Z79.01 LONG TERM CURRENT USE OF ANTICOAGULANT THERAPY: ICD-10-CM

## 2024-05-13 DIAGNOSIS — I89.0 LYMPHEDEMA: Primary | Chronic | ICD-10-CM

## 2024-05-13 DIAGNOSIS — Z83.2 FAMILY HISTORY OF FACTOR V DEFICIENCY: ICD-10-CM

## 2024-05-13 LAB — INR PPP: 2.11 (ref 0.85–1.15)

## 2024-05-13 PROCEDURE — 97140 MANUAL THERAPY 1/> REGIONS: CPT | Mod: GP,CQ | Performed by: REHABILITATION PRACTITIONER

## 2024-05-13 PROCEDURE — 36415 COLL VENOUS BLD VENIPUNCTURE: CPT

## 2024-05-13 PROCEDURE — 85610 PROTHROMBIN TIME: CPT

## 2024-05-13 NOTE — PROGRESS NOTES
ANTICOAGULATION MANAGEMENT     Thomas Davila 74 year old female is on warfarin with therapeutic INR result. (Goal INR 2.0-3.0)    Recent labs: (last 7 days)     05/13/24  0820   INR 2.11*       ASSESSMENT     Source(s): Chart Review  Previous INR was Supratherapeutic  Medication, diet, health changes since last INR chart reviewed; none identified         PLAN     Recommended plan for no diet, medication or health factor changes affecting INR     Dosing Instructions: Continue your current warfarin dose with next INR in 3 weeks       Summary  As of 5/13/2024      Full warfarin instructions:  5 mg every Tue, Thu, Sat; 2.5 mg all other days   Next INR check:  6/3/2024               Detailed voice message left for Akhil with dosing instructions and follow up date.     Contact [phone]  to schedule and with any changes, questions or concerns.     Education provided:   Please call back if any changes to your diet, medications or how you've been taking warfarin    Plan made per [facility] anticoagulation protocol    Susana Ponce RN  Anticoagulation Clinic  5/13/2024    _______________________________________________________________________     Anticoagulation Episode Summary       Current INR goal:  2.0-3.0   TTR:  71.6% (1 y)   Target end date:  Indefinite   Send INR reminders to:  [facility]    Indications    Thrombophlebitis of superficial veins of right lower extremity [I80.01]  Family history of factor V deficiency [Z83.2]  Encounter for long-term current use of medication (Resolved) [Z79.899]  Long term current use of anticoagulant therapy [Z79.01]  Blood coagulation disorder (H24) [D68.9]             Comments:  Allergy to Lovenox. okay to leave DVM per signed consent             Anticoagulation Care Providers       Provider Role Specialty Phone number    Lizabeth Zavala MD Referring Family Medicine [phone]    Dung Prieto MD Referring Family Medicine [phone]

## 2024-05-21 NOTE — PROGRESS NOTES
Lymphedema Therapy Progress Note      PLAN  Pt has demonstrated wound healing and is independent with current lymphedema cares.  Pt is to follow up in 3 weeks to assess tolerance to compression garments an assess for presences of wounds.  Primary therapist will discharge patient at next appointment if all goals have been met and if pt demonstrates tolerance to garments without new wounds.    Beginning/End Dates of Progress Note Reporting Period:   4/22/2024 to 05/13/2024    Referring Provider:  Albina Snider     05/13/24 0500   Appointment Info   Signing clinician's name / credentials Alexa Cline PTA/CLT   Visits Used 7 BLE/Snider/Medicare    Medical Diagnosis lymphedema   PT Tx Diagnosis BLE secondary lymphedema   Progress Note/Certification   Start of Care Date 04/22/24   Onset of illness/injury or Date of Surgery 04/18/24   Therapy Frequency 3x/week   Predicted Duration x12 weeks   Certification date from 04/22/24   Certification date to 07/15/24   Progress Note Due Date 05/22/24   Progress Note Completed Date 04/22/24   Supervision   PT Assistant Visit Number 5       Present No   GOALS   PT Goals 2;3;4;5   PT Goal 1   Goal Identifier stg   Goal Description pt to have around the clock tolerance to BLE GCB for edema reduction and wound healing response   Rationale to maximize safety and independence with self cares   Target Date 05/06/24   Date Met 05/06/24   PT Goal 2   Goal Identifier ltg   Goal Description pt to have 100% wound closure/healing on BLEs to decrease risk of infection   Rationale to maximize safety and independence with self cares;to maximize safety and independence with performance of ADLs and functional tasks   Target Date 07/15/24   Date Met   (R % closed, L LE 95% closed)   PT Goal 3   Goal Identifier ltg   Goal Description once appropriate, pt to be independent with donning, doffing and care of compression garments for longterm edema  management for maintenance   Rationale to maximize safety and independence with self cares   Target Date 07/15/24   Date Met 05/06/24   PT Goal 4   Goal Identifier ltg   Goal Description pt to be independent with longterm BLE edema management via HEP, elevation, skin cares and compression garment wear/use   Rationale to maximize safety and independence with self cares   Target Date 07/15/24   PT Goal 5   Goal Identifier ltg   Goal Description pt to have at least 3-5 point improvement on LLIS due to decreased edema and associated symptoms in BLEs   Rationale to maximize safety and independence with performance of ADLs and functional tasks   Target Date 07/15/24   Subjective Report   Subjective Report they are getting easier to put on   Objective Measures   Objective Measures Objective Measure 1   Objective Measure 1   Objective Measure girth   Details since first measured on 5/6/24: RLE -2.4% and LLE +0.8%   Objective Measure 2   Objective Measure RLE middle anterior shin   Details 100% closed with continued healing   Objective Measure 3   Objective Measure LLE lower medial leg ~mid-leg   Details 100% scab   Treatment Interventions (PT)   Interventions Manual Therapy   Manual Therapy   Manual Therapy: Mobilization, MFR, MLD, friction massage minutes (82353) 15   Manual Therapy 1 Medi Plus 20-30mmHg knee hi with SB, size 3, start with one OT (black and two CT (both tan) from [facility]   Manual Therapy 1 - Details pt arrived with B LE compression socks in place, therapist removed, B LE girth and wound assessment, therapist donned socks, pt to continue with current home program and return x 3 wks with primary therapist for possible d/c but return sooner if wounds reopen   Skilled Intervention CDT partial; skin cares; basic wound cares; garment ed   Patient Response/Progress pt agrees with todays plan   Education   Learner/Method Listening;Patient   Plan   Home program compression garments daytime, no night compression, skin  care   Updates to plan of care return x 3 wks   Plan for next session B LE girth and assess B LE wound areas, review home program, possible discharge   Total Session Time   Timed Code Treatment Minutes 15   Total Treatment Time (sum of timed and untimed services) 15

## 2024-06-03 ENCOUNTER — ANTICOAGULATION THERAPY VISIT (OUTPATIENT)
Dept: ANTICOAGULATION | Facility: CLINIC | Age: 74
End: 2024-06-03

## 2024-06-03 ENCOUNTER — LAB (OUTPATIENT)
Dept: LAB | Facility: CLINIC | Age: 74
End: 2024-06-03
Payer: MEDICARE

## 2024-06-03 DIAGNOSIS — D68.9 BLOOD COAGULATION DISORDER (H): ICD-10-CM

## 2024-06-03 DIAGNOSIS — Z79.01 LONG TERM CURRENT USE OF ANTICOAGULANT THERAPY: ICD-10-CM

## 2024-06-03 DIAGNOSIS — Z83.2 FAMILY HISTORY OF FACTOR V DEFICIENCY: ICD-10-CM

## 2024-06-03 DIAGNOSIS — I80.01 THROMBOPHLEBITIS OF SUPERFICIAL VEINS OF RIGHT LOWER EXTREMITY: Primary | ICD-10-CM

## 2024-06-03 LAB — INR PPP: 1.8 (ref 0.85–1.15)

## 2024-06-03 PROCEDURE — 85610 PROTHROMBIN TIME: CPT

## 2024-06-03 PROCEDURE — 36415 COLL VENOUS BLD VENIPUNCTURE: CPT

## 2024-06-03 NOTE — PROGRESS NOTES
ANTICOAGULATION MANAGEMENT     Thomas Davila 74 year old female is on warfarin with subtherapeutic INR result. (Goal INR 2.0-3.0)    Recent labs: (last 7 days)     06/03/24  0818   INR 1.80*       ASSESSMENT     Source(s): Chart Review and Patient/Caregiver Call     Warfarin doses taken: Warfarin taken as instructed  Diet: No new diet changes identified  Medication/supplement changes: None noted  New illness, injury, or hospitalization: No  Signs or symptoms of bleeding or clotting: No  Previous result: Therapeutic last visit; previously outside of goal range  Additional findings:  increase in activity (ongoing change)  Patient previously on 27.5 mg/wk     PLAN     Recommended plan for ongoing change(s) affecting INR     Dosing Instructions: Increase your warfarin dose (10% change) with next INR in 2 weeks       Summary  As of 6/3/2024      Full warfarin instructions:  2.5 mg every Sun, Wed, Fri; 5 mg all other days   Next INR check:  6/17/2024               Telephone call with Akhil who verbalizes understanding and agrees to plan    Lab visit scheduled    Education provided:   Please call back if any changes to your diet, medications or how you've been taking warfarin  Symptom monitoring: monitoring for clotting signs and symptoms, monitoring for stroke signs and symptoms, and when to seek medical attention/emergency care    Plan made per [facility] anticoagulation protocol    Harper Kline RN  Anticoagulation Clinic  6/3/2024    _______________________________________________________________________     Anticoagulation Episode Summary       Current INR goal:  2.0-3.0   TTR:  71.8% (1 y)   Target end date:  Indefinite   [facility]    Indications    Thrombophlebitis of superficial veins of right lower extremity [I80.01]  Family history of factor V deficiency [Z83.2]  Encounter for long-term current use of medication (Resolved) [Z79.899]  Long term current use of anticoagulant therapy  [Z79.01]  Blood coagulation disorder (H24) [D68.9]             Comments:  Allergy to Lovenox. okay to leave DVM per signed consent             Anticoagulation Care Providers       Provider Role Specialty Phone number    Lizabeth Zavala MD Referring Family Medicine [phone]    Dung Prieto MD Referring Family Medicine [phone]

## 2024-06-07 ENCOUNTER — OFFICE VISIT (OUTPATIENT)
Dept: FAMILY MEDICINE | Facility: CLINIC | Age: 74
End: 2024-06-07
Payer: MEDICARE

## 2024-06-07 VITALS
SYSTOLIC BLOOD PRESSURE: 118 MMHG | HEIGHT: 64 IN | OXYGEN SATURATION: 98 % | DIASTOLIC BLOOD PRESSURE: 74 MMHG | WEIGHT: 195.9 LBS | BODY MASS INDEX: 33.44 KG/M2 | HEART RATE: 85 BPM | TEMPERATURE: 97.7 F

## 2024-06-07 DIAGNOSIS — Z79.01 LONG TERM CURRENT USE OF ANTICOAGULANT THERAPY: ICD-10-CM

## 2024-06-07 DIAGNOSIS — I10 ESSENTIAL HYPERTENSION: ICD-10-CM

## 2024-06-07 DIAGNOSIS — Z00.00 ENCOUNTER FOR MEDICARE ANNUAL WELLNESS EXAM: Primary | ICD-10-CM

## 2024-06-07 PROBLEM — D68.9 BLOOD COAGULATION DISORDER (H): Status: RESOLVED | Noted: 2022-12-16 | Resolved: 2024-06-07

## 2024-06-07 PROCEDURE — G0439 PPPS, SUBSEQ VISIT: HCPCS | Performed by: INTERNAL MEDICINE

## 2024-06-07 ASSESSMENT — PAIN SCALES - GENERAL: PAINLEVEL: MILD PAIN (2)

## 2024-06-07 NOTE — PROGRESS NOTES
Assessment & Plan   Problem List Items Addressed This Visit       Essential hypertension    Relevant Orders    Basic metabolic panel    CBC with platelets    Long term current use of anticoagulant therapy    Relevant Orders    INR point of care (finger stick)     Other Visit Diagnoses       Encounter for Medicare annual wellness exam    -  Primary                        Subjective   Akhil is a 74 year old, presenting for the following health issues:  Hypertension, Health Maintenance (Due for covid ), and AWV        6/7/2024     7:32 AM   Additional Questions   Roomed by mckayla   Accompanied by self         6/7/2024     7:32 AM   Patient Reported Additional Medications   Patient reports taking the following new medications none     History of Present Illness       Hypertension: She presents for follow up of hypertension.  She does not check blood pressure  regularly outside of the clinic. Outpatient blood pressures have not been over 140/90. She follows a low salt diet.     She eats 0-1 servings of fruits and vegetables daily.She consumes 0 sweetened beverage(s) daily.She exercises with enough effort to increase her heart rate 9 or less minutes per day.  She exercises with enough effort to increase her heart rate 3 or less days per week.   She is taking medications regularly.       Chief Complaint   Patient presents with    Hypertension    Health Maintenance     Due for covid     AWV       Weight  --on phentermine and topiramate since ~2019 or so  --would like to lose more weight  --is not exercising besides yard work  --doesn't want to work any harder on her diet      Wt Readings from Last 5 Encounters:   06/07/24 88.9 kg (195 lb 14.4 oz)   04/18/24 90.7 kg (200 lb)   03/14/24 88.5 kg (195 lb)   03/01/24 90.4 kg (199 lb 6.4 oz)   01/03/24 89.8 kg (198 lb)     Leg swelling  --wears compression stockings but still has leg swelling at times  --there is less aching in legs with compression  --has had fainting spells in  the past when on a diuretic    Hypertension Follow-up    Do you check your blood pressure regularly outside of the clinic? No   Are you following a low salt diet? Yes  Are your blood pressures ever more than 140 on the top number (systolic) OR more than 90 on the bottom number (diastolic), for example 140/90? No    How many servings of fruits and vegetables do you eat daily?  0-1  On average, how many sweetened beverages do you drink each day (Examples: soda, juice, sweet tea, etc.  Do NOT count diet or artificially sweetened beverages)?   0  How many days per week do you exercise enough to make your heart beat faster? 0  How many minutes a day do you exercise enough to make your heart beat faster? 0  How many days per week do you miss taking your medication? 0        Annual Wellness Visit     Patient has been advised of split billing requirements and indicates understanding: Yes          Health Care Directive  Patient has a Health Care Directive on file  Advance care planning document is on file and is current.  In general, how would you rate your overall physical health? good  Do you have a special diet?  Regular (no restrictions)        6/7/2024   Exercise, Social Connection, Stress   Days per week of moderate/strenous exercise 0 days   Average minutes spent exercising at this level 0 min   Frequency of gathering with friends or relatives More than 3   Feel stress (tense, anxious, or unable to sleep) Not at all     Do you see a dentist two times every year?  (!) NO  The patient was instructed to see the dentist every 6 months.   Have you been more tired than usual lately?  No  If you drink alcohol do you typically have >3 drinks per day or >7 drinks per week? No  Do you have a current opioid prescription? No  Do you use any other controlled substances or medications that are not prescribed by a provider? None  Social History     Tobacco Use    Smoking status: Never    Smokeless tobacco: Never   Vaping Use     Vaping status: Never Used   Substance Use Topics    Alcohol use: Yes     Comment: Rare    Drug use: No       Needs assistance for the following daily activities: no assistance needed  Which of the following safety concerns are present in your home?  none identified   Do you (or your family members) have any concerns about your safety while driving?  No  Do you have any of the following hearing concerns?: No hearing concerns  In the past 6 months, have you been bothered by leaking of urine? No        11/9/2023   Social Factors   Worry food won't last until get money to buy more No   Food not last or not have enough money for food? No   Do you have housing?  Yes   Are you worried about losing your housing? No   Lack of transportation? No   Unable to get utilities (heat,electricity)? No          6/7/2024   Fall Risk   Fallen 2 or more times in the past year? No   Trouble with walking or balance? No            Today's PHQ-2 Score:       1/3/2024     8:24 AM   PHQ-2 ( 1999 Pfizer)   Q1: Little interest or pleasure in doing things 0   Q2: Feeling down, depressed or hopeless 0   PHQ-2 Score 0         9/11/2023   LAST FHS-7 RESULTS   1st degree relative breast or ovarian cancer No   Any relative bilateral breast cancer No   Any male have breast cancer No   Any ONE woman have BOTH breast AND ovarian cancer No   Any woman with breast cancer before 50yrs No   2 or more relatives with breast AND/OR ovarian cancer No   2 or more relatives with breast AND/OR bowel cancer No        Mammogram Screening - After age 74- determine frequency with patient based on health status, life expectancy and patient goals          1/17/2013    11:50 AM 1/19/2007    12:00 AM 11/9/2005    12:00 AM   PAP / HPV   PAP (Historical) ASC-US  NIL  NIL      ASCVD Risk   The 10-year ASCVD risk score (Clarisse MACKEY, et al., 2019) is: 20.2%    Values used to calculate the score:      Age: 74 years      Sex: Female      Is Non- :  No      Diabetic: No      Tobacco smoker: No      Systolic Blood Pressure: 118 mmHg      Is BP treated: Yes      HDL Cholesterol: 57 mg/dL      Total Cholesterol: 207 mg/dL            Reviewed and updated as needed this visit by Provider                    Current Outpatient Medications   Medication Sig Dispense Refill    Acetaminophen (TYLENOL PO) Take 500 mg by mouth as needed for mild pain or fever      carvedilol (COREG) 6.25 MG tablet Take 1 tablet (6.25 mg) by mouth 2 times daily (with meals) 180 tablet 3    olmesartan (BENICAR) 40 MG tablet Take 1 tablet (40 mg) by mouth daily 90 tablet 3    omeprazole (PRILOSEC) 20 MG DR capsule Take 1 capsule (20 mg) by mouth daily 90 capsule 3    phentermine 30 MG capsule Take 1 capsule (30 mg) by mouth every morning 90 capsule 1    topiramate (TOPAMAX) 50 MG tablet Take 1 tablet (50 mg) by mouth daily 90 tablet 1    triamterene-HCTZ (MAXZIDE-25) 37.5-25 MG tablet Take 1 tablet by mouth daily 90 tablet 3    warfarin ANTICOAGULANT (COUMADIN) 5 MG tablet TAKE 1/2 TABLET(2.5MG) BY MOUTH ON MONDAY & FRIDAY AND 1 TABLET ALL OTHER DAYS OR AS DIRECTED BY INR CLINIC. 80 tablet 1       Current providers sharing in care for this patient include:  Patient Care Team:  Albina Snider DO as PCP - General (Internal Medicine)  Jojo Banks, RN as Specialty Care Coordinator (Oncology)  Walt Golden MD as Assigned Surgical Provider  Zane Reyes MD as Physician (Plastic Surgery)  Elmira Weber MD as Assigned Musculoskeletal Provider  Dashawn Velez MD as MD (Dermatology)  Albina Snider DO as Assigned PCP    The following health maintenance items are reviewed in Epic and correct as of today:  Health Maintenance   Topic Date Due    ANNUAL REVIEW OF HM ORDERS  05/31/2024    MEDICARE ANNUAL WELLNESS VISIT  05/31/2024    ZOSTER IMMUNIZATION (1 of 2) 12/27/2024 (Originally 2/17/1998)    RSV VACCINE (Pregnancy & 60+) (1 - 1-dose 60+ series) 01/01/2026  "(Originally 2/17/2008)    MAMMO SCREENING  09/11/2024    FALL RISK ASSESSMENT  06/12/2025    LIPID  02/28/2027    GLUCOSE  04/29/2027    ADVANCE CARE PLANNING  05/31/2028    DEXA  03/21/2031    DTAP/TDAP/TD IMMUNIZATION (3 - Td or Tdap) 12/16/2032    PHQ-2 (once per calendar year)  Completed    INFLUENZA VACCINE  Completed    Pneumococcal Vaccine: 65+ Years  Completed    COVID-19 Vaccine  Completed    IPV IMMUNIZATION  Aged Out    HPV IMMUNIZATION  Aged Out    MENINGITIS IMMUNIZATION  Aged Out    RSV MONOCLONAL ANTIBODY  Aged Out    HEPATITIS C SCREENING  Discontinued    COLORECTAL CANCER SCREENING  Discontinued       Appropriate preventive services were discussed with this patient, including applicable screening as appropriate for fall prevention, nutrition, physical activity, Tobacco-use cessation, weight loss and cognition.  Checklist reviewing preventive services available has been given to the patient.           6/7/2024   Mini Cog   Clock Draw Score 2 Normal   3 Item Recall 3 objects recalled   Mini Cog Total Score 5                Review of Systems  Constitutional, neuro, ENT, endocrine, pulmonary, cardiac, gastrointestinal, genitourinary, musculoskeletal, integument and psychiatric systems are negative, except as otherwise noted.      Objective    /74 (BP Location: Right arm, Patient Position: Sitting, Cuff Size: Adult Large)   Pulse 85   Temp 97.7  F (36.5  C) (Tympanic)   Ht 1.626 m (5' 4\")   Wt 88.9 kg (195 lb 14.4 oz)   SpO2 98%   BMI 33.63 kg/m    Body mass index is 33.63 kg/m .  Physical Exam   GENERAL: alert and no distress  EYES: Eyes grossly normal to inspection, PERRL and conjunctivae and sclerae normal  HENT: ear canals and TM's normal, nose and mouth without ulcers or lesions  NECK: no adenopathy, no asymmetry, masses, or scars  RESP: lungs clear to auscultation - no rales, rhonchi or wheezes  CV: regular rate and rhythm, normal S1 S2, no S3 or S4, no murmur, click or rub, no " peripheral edema  ABDOMEN: soft, nontender, no hepatosplenomegaly, no masses and bowel sounds normal  MS: no gross musculoskeletal defects noted, no edema  SKIN: no suspicious lesions or rashes  NEURO: Normal strength and tone, mentation intact and speech normal  PSYCH: mentation appears normal, affect normal/bright            Signed Electronically by: Albina Snider DO

## 2024-06-07 NOTE — PATIENT INSTRUCTIONS
"Preventive Care Advice   This is general advice we often give to help people stay healthy. Your care team may have specific advice just for you. Please talk to your care team about your own preventive care needs.  Lifestyle  Exercise at least 150 minutes each week (30 minutes a day, 5 days a week).  Do muscle strengthening activities 2 days a week. These help control your weight and prevent disease.  No smoking.  Wear sunscreen to prevent skin cancer.  Have your home tested for radon every 2 to 5 years. Radon is a colorless, odorless gas that can harm your lungs. To learn more, go to www.health.[facility].mn. and search for \"Radon in Homes.\"  Keep guns unloaded and locked up in a safe place like a safe or gun vault, or, use a gun lock and hide the keys. Always lock away bullets separately. To learn more, visit [place].gov and search for \"safe gun storage.\"  Nutrition  Eat 5 or more servings of fruits and vegetables each day.  Try wheat bread, brown rice and whole grain pasta (instead of white bread, rice, and pasta).  Get enough calcium and vitamin D. Check the label on foods and aim for 100% of the RDA (recommended daily allowance).  Regular exams  Have a dental exam and cleaning every 6 months.  See your health care team every year to talk about:  Any changes in your health.  Any medicines your care team has prescribed.  Preventive care, family planning, and ways to prevent chronic diseases.  Shots (vaccines)   HPV shots (up to age 26), if you've never had them before.  Hepatitis B shots (up to age 59), if you've never had them before.  COVID-19 shot: Get this shot when it's due.  Flu shot: Get a flu shot every year.  Tetanus shot: Get a tetanus shot every 10 years.  Pneumococcal, hepatitis A, and RSV shots: Ask your care team if you need these based on your risk.  Shingles shot (for age 50 and up).  General health tests  Diabetes screening:  Starting at age 35, Get screened for diabetes at least every 3 years.  If " you are younger than age 35, ask your care team if you should be screened for diabetes.  Cholesterol test: At age 39, start having a cholesterol test every 5 years, or more often if advised.  Bone density scan (DEXA): At age 50, ask your care team if you should have this scan for osteoporosis (brittle bones).  Hepatitis C: Get tested at least once in your life.  Abdominal aortic aneurysm screening: Talk to your doctor about having this screening if you:  Have ever smoked; and  Are biologically male; and  Are between the ages of 65 and 75.  STIs (sexually transmitted infections)  Before age 24: Ask your care team if you should be screened for STIs.  After age 24: Get screened for STIs if you're at risk. You are at risk for STIs (including HIV) if:  You are sexually active with more than one person.  You don't use condoms every time.  You or a partner was diagnosed with a sexually transmitted infection.  If you are at risk for HIV, ask about PrEP medicine to prevent HIV.  Get tested for HIV at least once in your life, whether you are at risk for HIV or not.  Cancer screening tests  Cervical cancer screening: If you have a cervix, begin getting regular cervical cancer screening tests at age 21. Most people who have regular screenings with normal results can stop after age 65. Talk about this with your provider.  Breast cancer scan (mammogram): If you've ever had breasts, begin having regular mammograms starting at age 40. This is a scan to check for breast cancer.  Colon cancer screening: It is important to start screening for colon cancer at age 45.  Have a colonoscopy test every 10 years (or more often if you're at risk) Or, ask your provider about stool tests like a FIT test every year or Cologuard test every 3 years.  To learn more about your testing options, visit: www.Best Response Strategies/111070.pdf.  For help making a decision, visit: dottie/wh63895.  Prostate cancer screening test: If you have a prostate and are age 55  to 69, ask your provider if you would benefit from a yearly prostate cancer screening test.  Lung cancer screening: If you are a current or former smoker age 50 to 80, ask your care team if ongoing lung cancer screenings are right for you.  For informational purposes only. Not to replace the advice of your health care provider. Copyright   2023 [facility]. All rights reserved. Clinically reviewed by the [facility] Transitions Program. JMEA 214460 - REV 04/24.      Health Care Maintenance  Recommend COVID, shingles RSV vaccine

## 2024-06-10 NOTE — NURSING NOTE
"Initial /78   Pulse 100   Temp 97.4  F (36.3  C) (Tympanic)   Resp 16   Ht 1.626 m (5' 4\")   Wt 87.5 kg (193 lb)   SpO2 95%   BMI 33.13 kg/m   Estimated body mass index is 33.13 kg/m  as calculated from the following:    Height as of this encounter: 1.626 m (5' 4\").    Weight as of this encounter: 87.5 kg (193 lb). .    Jojo Galvan, CARINA    " Prior Authorization Form:      DEMOGRAPHICS:                     Patient Name:  Shyla Ovalle  Patient :  1963            Insurance:  Payor: [employer] / Plan: [employer] [address] / Product Type: *No Product type* /   Insurance ID Number:    Payer/Plan Subscr  Sex Relation Sub. Ins. ID Effective Group Num   1. [employer]* DIEGO OVALLE* 1963 Female Self [id number]                                   [address]         DIAGNOSIS & PROCEDURE:                       Procedure/Operation: EGD           CPT Code: 57071    Diagnosis:  Duodenitis    ICD10 Code: K29.80    Location:  [place]    Surgeon:  Dr Veras    SCHEDULING INFORMATION:                          Date: 9/3/24    Time: 12:00pm              Anesthesia:  MAC/TIVA                                                       Status:  Outpatient        Special Comments:  N/A       Electronically signed by ABHIJEET FORBES MA on 6/10/2024 at 3:03 PM

## 2024-06-17 ENCOUNTER — ANTICOAGULATION THERAPY VISIT (OUTPATIENT)
Dept: ANTICOAGULATION | Facility: CLINIC | Age: 74
End: 2024-06-17

## 2024-06-17 ENCOUNTER — DOCUMENTATION ONLY (OUTPATIENT)
Dept: ANTICOAGULATION | Facility: CLINIC | Age: 74
End: 2024-06-17

## 2024-06-17 ENCOUNTER — LAB (OUTPATIENT)
Dept: LAB | Facility: CLINIC | Age: 74
End: 2024-06-17
Payer: MEDICARE

## 2024-06-17 DIAGNOSIS — Z79.01 LONG TERM CURRENT USE OF ANTICOAGULANT THERAPY: ICD-10-CM

## 2024-06-17 DIAGNOSIS — I80.01 THROMBOPHLEBITIS OF SUPERFICIAL VEINS OF RIGHT LOWER EXTREMITY: Primary | ICD-10-CM

## 2024-06-17 DIAGNOSIS — Z79.899 ENCOUNTER FOR LONG-TERM CURRENT USE OF MEDICATION: ICD-10-CM

## 2024-06-17 DIAGNOSIS — D68.9 BLOOD COAGULATION DISORDER (H): ICD-10-CM

## 2024-06-17 DIAGNOSIS — Z83.2 FAMILY HISTORY OF FACTOR V DEFICIENCY: ICD-10-CM

## 2024-06-17 LAB — INR BLD: 2.4 (ref 0.9–1.1)

## 2024-06-17 PROCEDURE — 36416 COLLJ CAPILLARY BLOOD SPEC: CPT

## 2024-06-17 PROCEDURE — 85610 PROTHROMBIN TIME: CPT

## 2024-06-17 NOTE — CONFIDENTIAL NOTE
ANTICOAGULATION CLINIC REFERRAL RENEWAL REQUEST       An annual renewal order is required for all patients referred to [facility].?  Please review and sign the pended referral order for Thomas Davila.       ANTICOAGULATION SUMMARY      Warfarin indication(s)   Thrombophlebitisi of superficial veins of RLE and family history of factor V deficiency     Mechanical heart valve present?  NO       Current goal range   INR: 2.0-3.0     Goal appropriate for indication? Goal INR 2-3, standard for indication(s) above     Time in Therapeutic Range (TTR)  (Goal > 60%) 82.7%       Office visit with referring provider's group within last year yes on 6/7/24       Harper Kline RN  [facility]

## 2024-06-25 ENCOUNTER — THERAPY VISIT (OUTPATIENT)
Dept: PHYSICAL THERAPY | Facility: CLINIC | Age: 74
End: 2024-06-25
Attending: INTERNAL MEDICINE
Payer: MEDICARE

## 2024-06-25 DIAGNOSIS — I89.0 LYMPHEDEMA: Primary | Chronic | ICD-10-CM

## 2024-06-25 PROCEDURE — 97140 MANUAL THERAPY 1/> REGIONS: CPT | Mod: GP | Performed by: PHYSICAL THERAPIST

## 2024-06-25 NOTE — PROGRESS NOTES
DISCHARGE  Reason for Discharge: Patient has met all goals.    Equipment Issued: Medi Plus 20-30mmHg knee hi with SB, size 3, start with one OT (black and two CT (both tan) from [facility]     Discharge Plan: Patient to continue home program.    Referring Provider:  Albina Snider DO           06/25/24 0500   Appointment Info   Signing clinician's name / credentials Azeb Bailon, PT, DPT, CLT   Visits Used 8 BLE/[place]/Medicare [facility]   Medical Diagnosis lymphedema   PT Tx Diagnosis BLE secondary lymphedema   Progress Note/Certification   Start of Care Date 04/22/24   Onset of illness/injury or Date of Surgery 04/18/24   Therapy Frequency 3x/week   Predicted Duration x12 weeks   Certification date from 04/22/24   Certification date to 07/15/24   Progress Note Due Date 05/22/24   Progress Note Completed Date 04/22/24       Present No   GOALS   PT Goals 2;3;4;5   PT Goal 1   Goal Identifier stg   Goal Description pt to have around the clock tolerance to BLE GCB for edema reduction and wound healing response   Rationale to maximize safety and independence with self cares   Target Date 05/06/24   Date Met 05/06/24   PT Goal 2   Goal Identifier ltg   Goal Description pt to have 100% wound closure/healing on BLEs to decrease risk of infection   Rationale to maximize safety and independence with self cares;to maximize safety and independence with performance of ADLs and functional tasks   Target Date 07/15/24   Date Met 06/25/24   PT Goal 3   Goal Identifier ltg   Goal Description once appropriate, pt to be independent with donning, doffing and care of compression garments for longterm edema management for maintenance   Rationale to maximize safety and independence with self cares   Target Date 07/15/24   Date Met 05/06/24   PT Goal 4   Goal Identifier ltg   Goal Description pt to be independent with longterm BLE edema management via HEP, elevation, skin cares and compression garment wear/use    Rationale to maximize safety and independence with self cares   Target Date 07/15/24   Date Met 06/25/24   PT Goal 5   Goal Identifier ltg   Goal Description pt to have at least 3-5 point improvement on LLIS due to decreased edema and associated symptoms in BLEs   Rationale to maximize safety and independence with performance of ADLs and functional tasks   Target Date 07/15/24   Date Met 06/25/24   Subjective Report   Subjective Report when can I get more stockings? they are going well   Objective Measures   Objective Measures Objective Measure 1   Objective Measure 1   Objective Measure girth   Details since last seen and measured on 5/13/24: RLE -1.4% and LLE -2.1% and since initial evaluation on 4/22 RLE -11.6% and LLE -12.5%   Objective Measure 2   Objective Measure RLE middle anterior shin   Details 100% closed / healed   Objective Measure 3   Objective Measure LLE lower medial leg ~mid-leg   Details 100% closed / healed   Treatment Interventions (PT)   Interventions Manual Therapy   Manual Therapy   Manual Therapy: Mobilization, MFR, MLD, friction massage minutes (18353) 25   Manual Therapy 1 Medi Plus 20-30mmHg knee hi with SB, size 3, start with one OT (black and two CT (both tan) from [facility]   Manual Therapy 1 - Details Pt into clinic wearing BLE compression stockings, therapist removed and BLE girth measurements taken, therapist re-donned stockings and pt reports comfort. Education to pt on continuing with ongoing daily compliance/consistency for optimal longterm edema management for maintenance, when/where to replace garments from [facility] and how many Medicare will cover (3 pair) every 6 months, reasons to return to OP lymphedema clinic in the future if needed, otherwise pt has met all OP lymphedema goals at this time, independent with BLE edema cares and appropriate for discharge which pt agrees with. Pt completed LLIS.   Skilled Intervention CDT, home program and longterm management education   Patient  Response/Progress pt agrees to discharge today   Education   Learner/Method Listening;Patient   Plan   Home program compression garments daytime, no night compression, skin care   Plan for next session discharge lymphedema therapy   Total Session Time   Timed Code Treatment Minutes 25   Total Treatment Time (sum of timed and untimed services) 25

## 2024-07-08 ENCOUNTER — LAB (OUTPATIENT)
Dept: LAB | Facility: CLINIC | Age: 74
End: 2024-07-08
Payer: MEDICARE

## 2024-07-08 ENCOUNTER — ANTICOAGULATION THERAPY VISIT (OUTPATIENT)
Dept: ANTICOAGULATION | Facility: CLINIC | Age: 74
End: 2024-07-08

## 2024-07-08 DIAGNOSIS — D68.9 BLOOD COAGULATION DISORDER (H): ICD-10-CM

## 2024-07-08 DIAGNOSIS — Z79.899 ENCOUNTER FOR LONG-TERM CURRENT USE OF MEDICATION: ICD-10-CM

## 2024-07-08 DIAGNOSIS — Z83.2 FAMILY HISTORY OF FACTOR V DEFICIENCY: ICD-10-CM

## 2024-07-08 DIAGNOSIS — I80.01 THROMBOPHLEBITIS OF SUPERFICIAL VEINS OF RIGHT LOWER EXTREMITY: Primary | ICD-10-CM

## 2024-07-08 DIAGNOSIS — Z79.01 LONG TERM CURRENT USE OF ANTICOAGULANT THERAPY: ICD-10-CM

## 2024-07-08 DIAGNOSIS — I80.01 THROMBOPHLEBITIS OF SUPERFICIAL VEINS OF RIGHT LOWER EXTREMITY: ICD-10-CM

## 2024-07-08 LAB — INR BLD: 2.3 (ref 0.9–1.1)

## 2024-07-08 PROCEDURE — 36416 COLLJ CAPILLARY BLOOD SPEC: CPT

## 2024-07-08 PROCEDURE — 85610 PROTHROMBIN TIME: CPT

## 2024-07-08 NOTE — PROGRESS NOTES
ANTICOAGULATION MANAGEMENT     Thomas Davila 74 year old female is on warfarin with therapeutic INR result. (Goal INR 2.0-3.0)    Recent labs: (last 7 days)     07/08/24  0930   INR 2.3*       ASSESSMENT     Source(s): Chart Review  Previous INR was Therapeutic last visit; previously outside of goal range  Medication, diet, health changes since last INR chart reviewed; none identified         PLAN     Recommended plan for no diet, medication or health factor changes affecting INR     Dosing Instructions: Continue your current warfarin dose with next INR in 4 weeks       Summary  As of 7/8/2024      Full warfarin instructions:  2.5 mg every Sun, Wed, Fri; 5 mg all other days   Next INR check:  8/5/2024               Detailed voice message left for Akhil with dosing instructions and follow up date.     Contact [phone]  to schedule and with any changes, questions or concerns.     Education provided: Please call back if any changes to your diet, medications or how you've been taking warfarin    Plan made per [facility] anticoagulation protocol    Nithya Muniz RN  Anticoagulation Clinic  7/8/2024    _______________________________________________________________________     Anticoagulation Episode Summary       Current INR goal:  2.0-3.0   TTR:  76.2% (1 y)   Target end date:  Indefinite   Send INR reminders to:  [facility]    Indications    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 okay to leave DVM per signed consent             Anticoagulation Care Providers       Provider Role Specialty Phone number     Lizabeth Zavala MD Referring Family Medicine [phone]    Dung Prieto MD Referring Family Medicine [phone]    Albina Snider DO Referring Internal Medicine [phone]

## 2024-07-27 DIAGNOSIS — I80.01 THROMBOPHLEBITIS OF SUPERFICIAL VEINS OF RIGHT LOWER EXTREMITY: ICD-10-CM

## 2024-07-27 DIAGNOSIS — Z83.2 FAMILY HISTORY OF FACTOR V DEFICIENCY: ICD-10-CM

## 2024-07-29 RX ORDER — WARFARIN SODIUM 5 MG/1
TABLET ORAL
Qty: 77 TABLET | Refills: 1 | Status: SHIPPED | OUTPATIENT
Start: 2024-07-29

## 2024-07-29 NOTE — TELEPHONE ENCOUNTER
Pending Prescriptions:                       Disp   Refills    JANTOVEN ANTICOAGULANT 5 MG tablet [Pharma*77 tab*1        Sig: TAKE 1/2 TABLET(2.5MG) BY MOUTH ON MONDAY & FRIDAY           AND 1 TABLET ALL OTHER DAYS OR AS DIRECTED BY INR           CLINIC.    Routing refill request to provider for review/approval because:  Routing to [facility] for review.    Kassie Balderrama RN  [facility]

## 2024-07-29 NOTE — TELEPHONE ENCOUNTER
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]  Family history of factor V deficiency [Z83.2]  Encounter for long-term current use of medication (Resolved) [Z79.899]  Long term current use of anticoagulant therapy [Z79.01]  Blood coagulation disorder (H24) (Resolved) [D68.9]  Blood coagulation disorder (H24) [D68.9]  Encounter for long-term current use of medication [Z79.899]                 Anticoagulation Care Providers       Provider Role Specialty Phone number    Lizabeth Zavala MD Referring Family Medicine [phone]    Dung Prieto MD Referring Family Medicine [phone]    Albina Snider DO Referring Internal Medicine [phone]            Refill Criteria    Visit with referring provider/group: Meets criteria: office visit within referring provider group in the last 1 year on 6/7/24    [facility] referral last signed: 06/17/2024; within last year: Yes    Lab monitoring not exceeding 2 weeks overdue: No    Thomas meets all criteria for refill. Rx instructions and quantity supplied updated to match patient's current dosing plan. Warfarin 90 day supply with 1 refill granted per [facility] protocol     Harper Kline RN  Anticoagulation Clinic

## 2024-07-30 NOTE — MR AVS SNAPSHOT
Thank you for choosing our Emergency Department for your care.  It is our privilege to care for you in your time of need.  In the next several days, you may receive a survey via email or mailed to your home about your experience with our team.  We would greatly appreciate you taking a few minutes to complete the survey, as we use this information to learn what we have done well and what we could be doing better. Thank you for trusting us with your care!    Below you will find a list of your tests from today's visit.   Labs  No results found for this or any previous visit (from the past 12 hour(s)).    Radiologic Studies  No orders to display     ------------------------------------------------------------------------------------------------------------  The evaluation and treatment you received in the Emergency Department were for an urgent problem. It is important that you follow-up with a doctor, nurse practitioner, or physician assistant to:  (1) confirm your diagnosis,  (2) re-evaluation of changes in your illness and treatment, and (3) for ongoing care. Please take your discharge instructions with you when you go to your follow-up appointment.     If you have any problem arranging a follow-up appointment, contact us!  If your symptoms become worse or you do not improve as expected, please return to us. We are available 24 hours a day.     If a prescription has been provided, please fill it as soon as possible to prevent a delay in treatment. If you have any questions or reservations about taking the medication due to side effects or interactions with other medications, please call your primary care provider or contact us directly.  Again, THANK YOU for choosing us to care for YOU!     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  Normal or non-critical lab and imaging results will be communicated to you by MyChart, letter or phone within 4 business days after the clinic has received the results. If you do not hear from us within 7 days, please contact the clinic through MyChart or phone. If you have a critical or abnormal lab result, we will notify you by phone as soon as possible.  Submit refill requests through [employer] or call your pharmacy and they will forward the refill request to us. Please allow 3 business days for your refill to be completed.          Additional Information About Your Visit        MyChart Information     [employer] lets you send messages to your doctor, view your test results, renew your prescriptions, schedule appointments and more. To sign up, go to [email or web address] . Click on \"Log in\" on the left side of the screen, which will take you to the Welcome page. Then click on \"Sign up Now\" on the right side of the page.     You will be asked to enter the access code listed below, as well as some personal information. Please follow the directions to " create your username and password.     Your access code is: [email or web address]  Expires: 2017 12:23 PM     Your access code will  in 90 days. If you need help or a new code, please call your [facility] or [phone].        Care EveryWhere ID     This is your Care EveryWhere ID. This could be used by other organizations to access your [facility] medical records  [id number]         Blood Pressure from Last 3 Encounters:   17 135/73   17 148/68   17 150/83    Weight from Last 3 Encounters:   17 109.2 kg (240 lb 11.2 oz)   17 110.2 kg (243 lb)   16 110.2 kg (243 lb)              We Performed the Following     CL FROZEN SECTION FIRST SPEC        Primary Care Provider Office Phone # Fax #    Dung Prieto [phone]       [facility] [address] [address]        Thank you!     Thank you for choosing [facility]  for your care. Our goal is always to provide you with excellent care. Hearing back from our patients is one way we can continue to improve our services. Please take a few minutes to complete the written survey that you may receive in the mail after your visit with us. Thank you!             Your Updated Medication List - Protect others around you: Learn how to safely use, store and throw away your medicines at www.disposemymeds.org.          This list is accurate as of: 4/3/17  1:13 PM.  Always use your most recent med list.                   Brand Name Dispense Instructions for use    ACETAMINOPHEN PO      Take 500 mg by mouth as needed for pain       aspirin 81 MG tablet      Take by mouth daily       lisinopril 20 MG tablet    PRINIVIL/ZESTRIL    90 tablet    Take 1 tablet (20 mg) by mouth daily       naproxen 500 MG tablet    NAPROSYN    60 tablet    Take 1 tablet (500 mg) by mouth 2 times daily as needed for moderate pain       omeprazole 20 MG CR capsule    priLOSEC     TAKE ONE CAPSULE BY MOUTH  DAILY BEFORE A MEAL       * order for DME     1 Device    Medium Tri Tracy       * order for DME     1 Device    Equipment being ordered: [employer] inserts       triamterene-hydrochlorothiazide 37.5-25 MG per tablet    MAXZIDE-25    90 tablet    Take 1 tablet by mouth daily       * Notice:  This list has 2 medication(s) that are the same as other medications prescribed for you. Read the directions carefully, and ask your doctor or other care provider to review them with you.

## 2024-08-06 ENCOUNTER — ANTICOAGULATION THERAPY VISIT (OUTPATIENT)
Dept: ANTICOAGULATION | Facility: CLINIC | Age: 74
End: 2024-08-06

## 2024-08-06 ENCOUNTER — LAB (OUTPATIENT)
Dept: LAB | Facility: CLINIC | Age: 74
End: 2024-08-06
Payer: MEDICARE

## 2024-08-06 DIAGNOSIS — Z79.01 LONG TERM CURRENT USE OF ANTICOAGULANT THERAPY: ICD-10-CM

## 2024-08-06 DIAGNOSIS — I80.01 THROMBOPHLEBITIS OF SUPERFICIAL VEINS OF RIGHT LOWER EXTREMITY: Primary | ICD-10-CM

## 2024-08-06 DIAGNOSIS — D68.9 BLOOD COAGULATION DISORDER (H): ICD-10-CM

## 2024-08-06 DIAGNOSIS — Z83.2 FAMILY HISTORY OF FACTOR V DEFICIENCY: ICD-10-CM

## 2024-08-06 DIAGNOSIS — Z79.899 ENCOUNTER FOR LONG-TERM CURRENT USE OF MEDICATION: ICD-10-CM

## 2024-08-06 DIAGNOSIS — I80.01 THROMBOPHLEBITIS OF SUPERFICIAL VEINS OF RIGHT LOWER EXTREMITY: ICD-10-CM

## 2024-08-06 LAB — INR BLD: 3.4 (ref 0.9–1.1)

## 2024-08-06 PROCEDURE — 85610 PROTHROMBIN TIME: CPT

## 2024-08-06 PROCEDURE — 36416 COLLJ CAPILLARY BLOOD SPEC: CPT

## 2024-08-06 NOTE — PROGRESS NOTES
ANTICOAGULATION MANAGEMENT     Thomas Davila 74 year old female is on warfarin with supratherapeutic INR result. (Goal INR 2.0-3.0)    Recent labs: (last 7 days)     08/06/24  1040   INR 3.4*       ASSESSMENT     Source(s): Chart Review  Previous INR was Therapeutic last 2(+) visits  Medication, diet, health changes since last INR chart reviewed; none identified         PLAN     Unable to reach Akhil today.    LMTCB    Follow up required to confirm warfarin dose taken and assess for changes and discuss out of range result     Amber Cassidy RN  Anticoagulation Clinic  8/6/2024

## 2024-08-06 NOTE — PROGRESS NOTES
ANTICOAGULATION MANAGEMENT     Thomas Davila 74 year old female is on warfarin with supratherapeutic INR result. (Goal INR 2.0-3.0)    Recent labs: (last 7 days)     08/06/24  1040   INR 3.4*       ASSESSMENT     Source(s): Chart Review and Patient/Caregiver Call     Warfarin doses taken: Warfarin taken as instructed  Diet: No new diet changes identified  Medication/supplement changes: None noted  New illness, injury, or hospitalization: No  Signs or symptoms of bleeding or clotting: No  Previous result: Therapeutic last 2(+) visits  Additional findings: None       PLAN     Recommended plan for no diet, medication or health factor changes affecting INR     Dosing Instructions: hold dose then decrease your warfarin dose (9.1% change) with next INR in 2 weeks       Summary  As of 8/6/2024      Full warfarin instructions:  8/6: 2.5 mg; Otherwise 2.5 mg every Sun, Wed, Fri; 5 mg all other days   Next INR check:  8/20/2024               Telephone call with Akhil who verbalizes understanding and agrees to plan    Lab visit scheduled    Education provided: Goal range and lab monitoring: goal range and significance of current result    Plan made per [facility] anticoagulation protocol    Amber Cassidy RN  Anticoagulation Clinic  8/6/2024    _______________________________________________________________________     Anticoagulation Episode Summary       Current INR goal:  2.0-3.0   TTR:  73.3% (1 y)   Target end date:  Indefinite   Send INR reminders to:  [facility] [place]    Indications    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  okay to leave DVM per signed consent             Anticoagulation Care Providers       Provider Role Specialty Phone number    Lizabeth Zavala MD Referring Family Medicine [phone]    Dung Prieto MD Referring Family Medicine [phone]    Albina Snider DO Referring Internal Medicine [phone]

## 2024-08-07 ENCOUNTER — PATIENT OUTREACH (OUTPATIENT)
Dept: ONCOLOGY | Facility: CLINIC | Age: 74
End: 2024-08-07
Payer: MEDICARE

## 2024-08-07 NOTE — PROGRESS NOTES
[facility]: [facility]                                                                                          Pt will return to clinic as needed. RNAZALIA Banks removed from pt care team.    Signature:  GABRIELA CHEATHAM RN

## 2024-08-12 ENCOUNTER — PATIENT OUTREACH (OUTPATIENT)
Dept: CARE COORDINATION | Facility: CLINIC | Age: 74
End: 2024-08-12
Payer: MEDICARE

## 2024-08-20 ENCOUNTER — ANTICOAGULATION THERAPY VISIT (OUTPATIENT)
Dept: ANTICOAGULATION | Facility: CLINIC | Age: 74
End: 2024-08-20

## 2024-08-20 ENCOUNTER — LAB (OUTPATIENT)
Dept: LAB | Facility: CLINIC | Age: 74
End: 2024-08-20
Payer: MEDICARE

## 2024-08-20 DIAGNOSIS — D68.9 BLOOD COAGULATION DISORDER (H): ICD-10-CM

## 2024-08-20 DIAGNOSIS — Z79.899 ENCOUNTER FOR LONG-TERM CURRENT USE OF MEDICATION: ICD-10-CM

## 2024-08-20 DIAGNOSIS — Z83.2 FAMILY HISTORY OF FACTOR V DEFICIENCY: ICD-10-CM

## 2024-08-20 DIAGNOSIS — I80.01 THROMBOPHLEBITIS OF SUPERFICIAL VEINS OF RIGHT LOWER EXTREMITY: Primary | ICD-10-CM

## 2024-08-20 DIAGNOSIS — Z79.01 LONG TERM CURRENT USE OF ANTICOAGULANT THERAPY: ICD-10-CM

## 2024-08-20 DIAGNOSIS — I80.01 THROMBOPHLEBITIS OF SUPERFICIAL VEINS OF RIGHT LOWER EXTREMITY: ICD-10-CM

## 2024-08-20 LAB — INR BLD: 2.4 (ref 0.9–1.1)

## 2024-08-20 PROCEDURE — 85610 PROTHROMBIN TIME: CPT

## 2024-08-20 PROCEDURE — 36416 COLLJ CAPILLARY BLOOD SPEC: CPT

## 2024-08-20 NOTE — PROGRESS NOTES
ANTICOAGULATION MANAGEMENT     Thomas Davila 74 year old female is on warfarin with therapeutic INR result. (Goal INR 2.0-3.0)    Recent labs: (last 7 days)     08/20/24  0831   INR 2.4*       ASSESSMENT     Source(s): Chart Review  Previous INR was Supratherapeutic hold dose then decreased overall by 9%  Medication, diet, health changes since last INR chart reviewed; none identified         PLAN     Recommended plan for no diet, medication or health factor changes affecting INR     Dosing Instructions: Continue your current warfarin dose with next INR in 3 weeks       Summary  As of 8/20/2024      Full warfarin instructions:  5 mg every Mon, Thu, Sat; 2.5 mg all other days   Next INR check:  9/10/2024               Detailed voice message left for Akhil with dosing instructions and follow up date.     Contact [phone] to schedule and with any changes, questions or concerns.     Education provided: Goal range and lab monitoring: goal range and significance of current result  Contact [phone] with any changes, questions or concerns.     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 okay to leave DVM per signed consent             Anticoagulation Care Providers        Provider Role Specialty Phone number    Lizabeth Zavala MD Referring Family Medicine [phone]    Dung Prieto MD Referring Family Medicine     Albina Snider DO Referring Internal Medicine [phone]

## 2024-09-16 ENCOUNTER — ANCILLARY PROCEDURE (OUTPATIENT)
Dept: MAMMOGRAPHY | Facility: CLINIC | Age: 74
End: 2024-09-16
Payer: MEDICARE

## 2024-09-16 DIAGNOSIS — Z12.31 VISIT FOR SCREENING MAMMOGRAM: ICD-10-CM

## 2024-09-16 PROCEDURE — 77067 SCR MAMMO BI INCL CAD: CPT | Mod: TC | Performed by: RADIOLOGY

## 2024-09-16 PROCEDURE — 77063 BREAST TOMOSYNTHESIS BI: CPT | Mod: TC | Performed by: RADIOLOGY

## 2024-09-30 ENCOUNTER — ANTICOAGULATION THERAPY VISIT (OUTPATIENT)
Dept: ANTICOAGULATION | Facility: CLINIC | Age: 74
End: 2024-09-30

## 2024-09-30 ENCOUNTER — LAB (OUTPATIENT)
Dept: LAB | Facility: CLINIC | Age: 74
End: 2024-09-30
Payer: MEDICARE

## 2024-09-30 DIAGNOSIS — Z83.2 FAMILY HISTORY OF FACTOR V DEFICIENCY: ICD-10-CM

## 2024-09-30 DIAGNOSIS — I80.01 THROMBOPHLEBITIS OF SUPERFICIAL VEINS OF RIGHT LOWER EXTREMITY: Primary | ICD-10-CM

## 2024-09-30 DIAGNOSIS — D68.9 BLOOD COAGULATION DISORDER (H): ICD-10-CM

## 2024-09-30 DIAGNOSIS — Z79.01 LONG TERM CURRENT USE OF ANTICOAGULANT THERAPY: ICD-10-CM

## 2024-09-30 DIAGNOSIS — Z79.899 ENCOUNTER FOR LONG-TERM CURRENT USE OF MEDICATION: ICD-10-CM

## 2024-09-30 DIAGNOSIS — I80.01 THROMBOPHLEBITIS OF SUPERFICIAL VEINS OF RIGHT LOWER EXTREMITY: ICD-10-CM

## 2024-09-30 LAB — INR BLD: 2.3 (ref 0.9–1.1)

## 2024-09-30 PROCEDURE — 36416 COLLJ CAPILLARY BLOOD SPEC: CPT

## 2024-09-30 PROCEDURE — 85610 PROTHROMBIN TIME: CPT

## 2024-09-30 NOTE — PROGRESS NOTES
ANTICOAGULATION MANAGEMENT     Thomas Davila 74 year old female is on warfarin with therapeutic INR result. (Goal INR 2.0-3.0)    Recent labs: (last 7 days)     09/30/24  1020   INR 2.3*       ASSESSMENT     Source(s): Chart Review  Previous INR was Therapeutic last visit; previously outside of goal range  Medication, diet, health changes since last INR chart reviewed; none identified- previous INR in range, recheck requested in 3 weeks, it has been six weeks. INR remains in range.          PLAN     Recommended plan for no diet, medication or health factor changes affecting INR     Dosing Instructions: Continue your current warfarin dose with next INR in 6 weeks       Summary  As of 9/30/2024      Full warfarin instructions:  5 mg every Mon, Thu, Sat; 2.5 mg all other days   Next INR check:  11/11/2024               Detailed voice message left for Akhil with dosing instructions and follow up date.     Contact [phone] to schedule and with any changes, questions or concerns.     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 okay to leave DVM per signed consent             Anticoagulation Care Providers       Provider Role Specialty Phone number    Lizabeth Zavala MD Referring Family Medicine [phone]    Dung Prieto MD Referring Family Medicine     Albina Snider DO Referring Internal Medicine [phone]

## 2024-10-01 PROBLEM — Z53.1 PROCEDURE AND TREATMENT NOT CARRIED OUT BECAUSE OF PATIENT'S DECISION FOR REASONS OF BELIEF AND GROUP PRESSURE: Status: ACTIVE | Noted: 2024-03-01

## 2024-10-14 ENCOUNTER — MYC MEDICAL ADVICE (OUTPATIENT)
Dept: ANTICOAGULATION | Facility: CLINIC | Age: 74
End: 2024-10-14

## 2024-10-14 ENCOUNTER — ANTICOAGULATION THERAPY VISIT (OUTPATIENT)
Dept: ANTICOAGULATION | Facility: CLINIC | Age: 74
End: 2024-10-14

## 2024-10-14 ENCOUNTER — ALLIED HEALTH/NURSE VISIT (OUTPATIENT)
Dept: FAMILY MEDICINE | Facility: CLINIC | Age: 74
End: 2024-10-14
Payer: MEDICARE

## 2024-10-14 ENCOUNTER — TELEPHONE (OUTPATIENT)
Dept: FAMILY MEDICINE | Facility: CLINIC | Age: 74
End: 2024-10-14

## 2024-10-14 ENCOUNTER — LAB (OUTPATIENT)
Dept: LAB | Facility: CLINIC | Age: 74
End: 2024-10-14
Payer: MEDICARE

## 2024-10-14 ENCOUNTER — DOCUMENTATION ONLY (OUTPATIENT)
Dept: FAMILY MEDICINE | Facility: CLINIC | Age: 74
End: 2024-10-14

## 2024-10-14 VITALS — SYSTOLIC BLOOD PRESSURE: 124 MMHG | DIASTOLIC BLOOD PRESSURE: 68 MMHG | OXYGEN SATURATION: 93 % | HEART RATE: 88 BPM

## 2024-10-14 DIAGNOSIS — Z79.899 ENCOUNTER FOR LONG-TERM CURRENT USE OF MEDICATION: ICD-10-CM

## 2024-10-14 DIAGNOSIS — Z13.6 CARDIOVASCULAR SCREENING; LDL GOAL LESS THAN 130: ICD-10-CM

## 2024-10-14 DIAGNOSIS — Z79.01 LONG TERM CURRENT USE OF ANTICOAGULANT THERAPY: ICD-10-CM

## 2024-10-14 DIAGNOSIS — I80.01 THROMBOPHLEBITIS OF SUPERFICIAL VEINS OF RIGHT LOWER EXTREMITY: Primary | ICD-10-CM

## 2024-10-14 DIAGNOSIS — D68.9 BLOOD COAGULATION DISORDER (H): ICD-10-CM

## 2024-10-14 DIAGNOSIS — I10 ESSENTIAL HYPERTENSION: Primary | ICD-10-CM

## 2024-10-14 DIAGNOSIS — I80.01 THROMBOPHLEBITIS OF SUPERFICIAL VEINS OF RIGHT LOWER EXTREMITY: ICD-10-CM

## 2024-10-14 DIAGNOSIS — Z01.30 BLOOD PRESSURE CHECK: Primary | ICD-10-CM

## 2024-10-14 DIAGNOSIS — Z83.2 FAMILY HISTORY OF FACTOR V DEFICIENCY: ICD-10-CM

## 2024-10-14 DIAGNOSIS — I89.0 LYMPHEDEMA: ICD-10-CM

## 2024-10-14 DIAGNOSIS — I10 ESSENTIAL HYPERTENSION: ICD-10-CM

## 2024-10-14 LAB
ALBUMIN SERPL BCG-MCNC: 4.2 G/DL (ref 3.5–5.2)
ALP SERPL-CCNC: 47 U/L (ref 40–150)
ALT SERPL W P-5'-P-CCNC: 14 U/L (ref 0–50)
ANION GAP SERPL CALCULATED.3IONS-SCNC: 12 MMOL/L (ref 7–15)
AST SERPL W P-5'-P-CCNC: 17 U/L (ref 0–45)
BILIRUB SERPL-MCNC: 0.3 MG/DL
BUN SERPL-MCNC: 33.9 MG/DL (ref 8–23)
CALCIUM SERPL-MCNC: 9.5 MG/DL (ref 8.8–10.4)
CHLORIDE SERPL-SCNC: 101 MMOL/L (ref 98–107)
CHOLEST SERPL-MCNC: 174 MG/DL
CREAT SERPL-MCNC: 0.94 MG/DL (ref 0.51–0.95)
EGFRCR SERPLBLD CKD-EPI 2021: 63 ML/MIN/1.73M2
ERYTHROCYTE [DISTWIDTH] IN BLOOD BY AUTOMATED COUNT: 12.3 % (ref 10–15)
GLUCOSE SERPL-MCNC: 98 MG/DL (ref 70–99)
HCO3 SERPL-SCNC: 27 MMOL/L (ref 22–29)
HCT VFR BLD AUTO: 40.6 % (ref 35–47)
HDLC SERPL-MCNC: 45 MG/DL
HGB BLD-MCNC: 13.7 G/DL (ref 11.7–15.7)
HOLD SPECIMEN: NORMAL
HOLD SPECIMEN: NORMAL
INR BLD: 3.2 (ref 0.9–1.1)
LDLC SERPL CALC-MCNC: 101 MG/DL
MCH RBC QN AUTO: 30.9 PG (ref 26.5–33)
MCHC RBC AUTO-ENTMCNC: 33.7 G/DL (ref 31.5–36.5)
MCV RBC AUTO: 92 FL (ref 78–100)
NONHDLC SERPL-MCNC: 129 MG/DL
PLATELET # BLD AUTO: 218 10E3/UL (ref 150–450)
POTASSIUM SERPL-SCNC: 3.8 MMOL/L (ref 3.4–5.3)
PROT SERPL-MCNC: 7.1 G/DL (ref 6.4–8.3)
RBC # BLD AUTO: 4.43 10E6/UL (ref 3.8–5.2)
SODIUM SERPL-SCNC: 140 MMOL/L (ref 135–145)
TRIGL SERPL-MCNC: 140 MG/DL
WBC # BLD AUTO: 8.8 10E3/UL (ref 4–11)

## 2024-10-14 PROCEDURE — 80061 LIPID PANEL: CPT

## 2024-10-14 PROCEDURE — 85027 COMPLETE CBC AUTOMATED: CPT

## 2024-10-14 PROCEDURE — 85610 PROTHROMBIN TIME: CPT

## 2024-10-14 PROCEDURE — 99207 PR NO CHARGE NURSE ONLY: CPT

## 2024-10-14 PROCEDURE — 36415 COLL VENOUS BLD VENIPUNCTURE: CPT

## 2024-10-14 PROCEDURE — 80053 COMPREHEN METABOLIC PANEL: CPT

## 2024-10-14 NOTE — PROGRESS NOTES
Thomas Davila has an upcoming lab appointment:    Future Appointments   Date Time Provider Department Center   10/14/2024 10:45 AM CL LAB [facility]ABR [facility]   10/14/2024 11:30 AM FL CL RN [facility]CL [facility]   10/22/2024 11:00 AM Albina Snider DO [facility]   2/3/2025 10:15 AM Walt Golden MD [facility]     Patient is scheduled for the following lab(s): Patient came in today for fasting labs prior to appt on 10/22.  Patient stated that she was told to come in the week before since it's a virtual wellness exam.  There are no fasting orders and the CBASIC and CBC order have an exptected date of 6/7/2025.  She wanted to be drawn since she was fasting, so I did draw her.  Please place add-on orders ASAP, so she doesn't have to fast again and be redrawn.  Thank you!    There is no order available. Please review and place either future orders or HMPO (Review of Health Maintenance Protocol Orders), as appropriate.    There are no preventive care reminders to display for this patient.  Huma Merchant

## 2024-10-14 NOTE — PROGRESS NOTES
ANTICOAGULATION MANAGEMENT     Thomas Davila 74 year old female is on warfarin with supratherapeutic INR result. (Goal INR 2.0-3.0)    Recent labs: (last 7 days)     10/14/24  1028   INR 3.2*       ASSESSMENT     Source(s): Chart Review and Patient/Caregiver Call     Warfarin doses taken: Warfarin taken as instructed  Diet: No new diet changes identified  Medication/supplement changes: None noted  New illness, injury, or hospitalization: Sinus infection-like Sx - pt is starting to feel better  Signs or symptoms of bleeding or clotting: No  Previous result: Therapeutic last 2(+) visits  Additional findings: None     PLAN     Recommended plan for temporary change(s) affecting INR     Dosing Instructions: pt already took dose today -  Continue your current warfarin dose with next INR in 2 weeks       AVS mychart sent.    Summary  As of 10/14/2024      Full warfarin instructions:  5 mg every Mon, Thu, Sat; 2.5 mg all other days   Next INR check:  10/28/2024               Telephone call with Akhil who verbalizes understanding and agrees to plan    Lab visit scheduled    Education provided: Please call back if any changes to your diet, medications or how you've been taking warfarin  Symptom monitoring: monitoring for bleeding signs and symptoms and when to seek medical attention/emergency care    Plan made per [facility] anticoagulation protocol    Harper Kline RN  10/14/2024  Anticoagulation Clinic  [employer] for routing messages: keith THORNTON  [facility] patient phone line: [phone]        _______________________________________________________________________     Anticoagulation Episode Summary       Current INR goal:  2.0-3.0   TTR:  70.9% (1 y)   Target end date:  Indefinite   Send INR reminders to:  [facility]    Indications    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 okay to leave DVM per signed consent             Anticoagulation Care Providers       Provider Role Specialty Phone number    Lizabeth Zavala MD Referring Family Medicine [phone]    Dung Prieto MD Referring Family Medicine     Albina Snider DO Referring Internal Medicine [phone]

## 2024-10-14 NOTE — TELEPHONE ENCOUNTER
Order signed  
Spoke w pt and notified her. Pt stated she also got her labs done but the lab told her they didn't have orders and she wants to make sure that is straightened out.    Rosenda Dillard on 10/14/2024 at 2:08 PM    
Thomas Davila is a 74 year old year old patient who comes in today for a Blood Pressure check because of ongoing blood pressure monitoring.  Vital Signs as repeated by RN /68 (BP Location: Right arm, Patient Position: Sitting, Cuff Size: Adult Large)   Pulse 88   SpO2 93%      Patient is taking medication as prescribed  Patient is tolerating medications well.  Patient is not monitoring Blood Pressure at home.  Average readings if yes are NA  Current complaints: sinus symptoms, tingling bilateral feet  Disposition:  patient to continue with the same medication or as advised.      Patient reports feet tingling bilaterally. She is wearing compression stockings to the knee however her thighs swell over the socks.      Requesting thigh high compression socks to [facility] medical. Fax: [phone]     Prefers phone call if changes [phone]     Yas Claros RN on 10/14/2024 at 10:59 AM        
No oral lesions; no gross abnormalities

## 2024-10-14 NOTE — PROGRESS NOTES
Thomas Davila is a 74 year old year old patient who comes in today for a Blood Pressure check because of ongoing blood pressure monitoring.  Vital Signs as repeated by RN /68 (BP Location: Right arm, Patient Position: Sitting, Cuff Size: Adult Large)   Pulse 88   SpO2 93%     Patient is taking medication as prescribed  Patient is tolerating medications well.  Patient is not monitoring Blood Pressure at home.  Average readings if yes are NA  Current complaints: sinus symptoms, tingling bilateral feet  Disposition:  patient to continue with the same medication or as advised.     Patient reports feet tingling bilaterally. She is wearing compression stockings to the knee however her thighs swell over the socks.     Requesting thigh high compression socks to [facility] medical. Fax: [phone]    Prefers phone call if changes [phone]    Yas Claros RN on 10/14/2024 at 10:59 AM

## 2024-10-22 ENCOUNTER — VIRTUAL VISIT (OUTPATIENT)
Dept: FAMILY MEDICINE | Facility: CLINIC | Age: 74
End: 2024-10-22
Payer: MEDICARE

## 2024-10-22 DIAGNOSIS — E66.812 CLASS 2 SEVERE OBESITY DUE TO EXCESS CALORIES WITH SERIOUS COMORBIDITY AND BODY MASS INDEX (BMI) OF 35.0 TO 35.9 IN ADULT (H): Primary | ICD-10-CM

## 2024-10-22 DIAGNOSIS — K21.9 GASTROESOPHAGEAL REFLUX DISEASE WITHOUT ESOPHAGITIS: ICD-10-CM

## 2024-10-22 DIAGNOSIS — E66.01 CLASS 2 SEVERE OBESITY DUE TO EXCESS CALORIES WITH SERIOUS COMORBIDITY AND BODY MASS INDEX (BMI) OF 35.0 TO 35.9 IN ADULT (H): Primary | ICD-10-CM

## 2024-10-22 DIAGNOSIS — I80.01 THROMBOPHLEBITIS OF SUPERFICIAL VEINS OF RIGHT LOWER EXTREMITY: ICD-10-CM

## 2024-10-22 DIAGNOSIS — Z83.2 FAMILY HISTORY OF FACTOR V DEFICIENCY: ICD-10-CM

## 2024-10-22 DIAGNOSIS — I10 ESSENTIAL HYPERTENSION: ICD-10-CM

## 2024-10-22 DIAGNOSIS — E65 ABDOMINAL PANNUS: ICD-10-CM

## 2024-10-22 DIAGNOSIS — I89.0 LYMPHEDEMA: ICD-10-CM

## 2024-10-22 PROCEDURE — 99214 OFFICE O/P EST MOD 30 MIN: CPT | Mod: 95 | Performed by: INTERNAL MEDICINE

## 2024-10-22 PROCEDURE — G2211 COMPLEX E/M VISIT ADD ON: HCPCS | Mod: 95 | Performed by: INTERNAL MEDICINE

## 2024-10-22 RX ORDER — WARFARIN SODIUM 5 MG/1
TABLET ORAL
Qty: 77 TABLET | Refills: 1 | Status: SHIPPED | OUTPATIENT
Start: 2024-10-22

## 2024-10-22 RX ORDER — OLMESARTAN MEDOXOMIL 40 MG/1
40 TABLET ORAL DAILY
Qty: 90 TABLET | Refills: 3 | Status: SHIPPED | OUTPATIENT
Start: 2024-10-22

## 2024-10-22 RX ORDER — PHENTERMINE HYDROCHLORIDE 30 MG/1
30 CAPSULE ORAL EVERY MORNING
Qty: 90 CAPSULE | Refills: 1 | Status: SHIPPED | OUTPATIENT
Start: 2024-10-22

## 2024-10-22 RX ORDER — TOPIRAMATE 50 MG/1
50 TABLET, FILM COATED ORAL DAILY
Qty: 90 TABLET | Refills: 1 | Status: SHIPPED | OUTPATIENT
Start: 2024-10-22

## 2024-10-22 SDOH — HEALTH STABILITY: PHYSICAL HEALTH: ON AVERAGE, HOW MANY DAYS PER WEEK DO YOU ENGAGE IN MODERATE TO STRENUOUS EXERCISE (LIKE A BRISK WALK)?: 0 DAYS

## 2024-10-22 SDOH — HEALTH STABILITY: PHYSICAL HEALTH: ON AVERAGE, HOW MANY MINUTES DO YOU ENGAGE IN EXERCISE AT THIS LEVEL?: 0 MIN

## 2024-10-22 ASSESSMENT — SOCIAL DETERMINANTS OF HEALTH (SDOH): HOW OFTEN DO YOU GET TOGETHER WITH FRIENDS OR RELATIVES?: MORE THAN THREE TIMES A WEEK

## 2024-10-22 NOTE — PROGRESS NOTES
Preventive Care Visit  [facility]  Albina Snider DO, Internal Medicine  Oct 22, 2024    Akhil is a 74 year old who is being evaluated via a billable video visit.    How would you like to obtain your AVS? MyChart  If the video visit is dropped, the invitation should be resent by: Send to e-mail at: [email or web address]  Will anyone else be joining your video visit? 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    Relevant Medications    warfarin ANTICOAGULANT (JANTOVEN ANTICOAGULANT) 5 MG tablet    Other Relevant Orders    Compression Sleeve/Stocking Order for DME - ONLY FOR DME     Other Visit Diagnoses       Lymphedema        Relevant Orders    Compression Sleeve/Stocking Order for DME - ONLY FOR DME    Abdominal pannus        Relevant Orders    Adult Plastic Surgery  Referral                 Patient Instructions   Surgical Request  Referral to [facility] for further discussion    Healthcare Directive  Will mail out a form    Compression  Will place a new order    Medications  Refills sent      Subjective   Akhil is a 74 year old, presenting for the following:  AWV, Dme (Compression sock without feet , will go in to get this sized,  would ankle to the hip without feet /), Hypertension, and Recheck Medication (phentermine 30 MG capsule and topiramate (TOPAMAX) 50 MG  tablet )        10/22/2024    10:33 AM   Additional Questions   Roomed by [email or web address]   Accompanied by self         10/22/2024    10:33 AM   Patient Reported Additional Medications   Patient reports taking the following new medications none         Video Start Time:  11:00 AM      HPI      Chief Complaint   Patient presents with    AWV    Dme     Compression sock without feet , will go in to get this sized,  would ankle to the hip without feet       Hypertension    Recheck Medication     phentermine 30 MG capsule and topiramate (TOPAMAX) 50 MG tablet    DME  --needs thigh high compression sleeve/stocking, footless    Forms:  Wants a healthcare directive sent to her    Hypertension Follow-up    Do you check your blood pressure regularly outside of the clinic? No   Are you following a low salt diet? Yes  Are your blood pressures ever more than 140 on the top number (systolic) OR more than 90 on the bottom number (diastolic), for example 140/90? No    Medication Followup of phentermine 30 MG capsule and topiramate (TOPAMAX) 50 MG tablet   Taking Medication as prescribed: yes  Side Effects:  None  Medication Helping Symptoms:  NO-196 was the last WT  Has central adiposity and the pannus 'cuts off her circulation' to her legs  Wonders if insurance would pay for the surgery  She will not use blood products and a past surgeon denied her surgery due to this. Legs fall asleep while awake and at night due to pannus    Long term use of warfarin  hrombosis involving the bilateral saphenous veins: Patient herself has never had a DVT. This factors for lower extremity superficial thrombosis include obesity and varicosities.  She has been on warfarin.  She has been tolerating it well.  I would recommend long-term anticoagulation to prevent further lower extremity superficial thrombosis and worsening of her varicosities, which could lead to both thrombotic syndrome       Current Outpatient Medications   Medication Sig Dispense Refill     Acetaminophen (TYLENOL PO) Take 500 mg by mouth as needed for mild pain or fever      carvedilol (COREG) 6.25 MG tablet Take 1 tablet (6.25 mg) by mouth 2 times daily (with meals) 180 tablet 3    olmesartan (BENICAR) 40 MG tablet Take 1 tablet (40 mg) by mouth daily 90 tablet 3    omeprazole (PRILOSEC) 20 MG DR capsule Take 1 capsule (20 mg) by mouth daily 90 capsule 3    phentermine 30 MG capsule Take 1 capsule (30 mg) by mouth every morning 90 capsule 1    topiramate (TOPAMAX) 50 MG tablet Take 1 tablet (50 mg) by mouth daily 90 tablet 1    triamterene-HCTZ (MAXZIDE-25) 37.5-25 MG tablet Take 1 tablet by mouth daily 90 tablet 3    warfarin ANTICOAGULANT (JANTOVEN ANTICOAGULANT) 5 MG tablet Take 2.5 mg every Sun, Wed, Fri; 5 mg all other days or As directed by Anticoagulation clinic 77 tablet 1     Current providers sharing in care for this patient include:  Patient Care Team:  Albina Snider DO as PCP - General (Internal Medicine)  Walt Golden MD as Assigned Surgical Provider  Zane Reyes MD as Physician (Plastic Surgery)  Dashawn Velez MD as MD (Dermatology)  Albina Snider DO as Assigned PCP    The following health maintenance items are reviewed in Epic and correct as of today:  Health Maintenance   Topic Date Due    COVID-19 Vaccine (9 - 2024-25 season) 09/01/2024    MEDICARE ANNUAL WELLNESS VISIT  06/07/2025    ANNUAL REVIEW OF HM ORDERS  06/07/2025    MAMMO SCREENING  09/16/2025    BMP  10/14/2025    FALL RISK ASSESSMENT  10/22/2025    GLUCOSE  10/14/2027    ADVANCE CARE PLANNING  06/07/2029    LIPID  10/14/2029    DEXA  03/21/2031    DTAP/TDAP/TD IMMUNIZATION (3 - Td or Tdap) 12/16/2032    PHQ-2 (once per calendar year)  Completed    INFLUENZA VACCINE  Completed    Pneumococcal Vaccine: 65+ Years  Completed    ZOSTER IMMUNIZATION  Completed    RSV VACCINE  Completed    HPV IMMUNIZATION  Aged Out    MENINGITIS IMMUNIZATION  Aged Out    RSV MONOCLONAL ANTIBODY  Aged Out     "HEPATITIS C SCREENING  Discontinued    COLORECTAL CANCER SCREENING  Discontinued         Review of Systems  Constitutional, neuro, ENT, endocrine, pulmonary, cardiac, gastrointestinal, genitourinary, musculoskeletal, integument and psychiatric systems are negative, except as otherwise noted.     Objective    Exam  There were no vitals taken for this visit.   Estimated body mass index is 33.63 kg/m  as calculated from the following:    Height as of 6/7/24: 1.626 m (5' 4\").    Weight as of 6/7/24: 88.9 kg (195 lb 14.4 oz).    Physical Exam  GENERAL: alert and no distress        10/22/2024   Mini Cog   Mini-Cog Not Completed (choose reason) Patient declines               Video-Visit Details    Type of service:  Video Visit   Video End Time:11:16 AM  Originating Location (pt. Location): Home    Distant Location (provider location):  On-site  Platform used for Video Visit: Oksana  Signed Electronically by: Albina Snider DO    "

## 2024-10-22 NOTE — PATIENT INSTRUCTIONS
Surgical Request  Referral to [facility] for further discussion    Healthcare Directive  Will mail out a form    Compression  Will place a new order    Medications  Refills sent   - - -

## 2024-10-28 ENCOUNTER — ANTICOAGULATION THERAPY VISIT (OUTPATIENT)
Dept: ANTICOAGULATION | Facility: CLINIC | Age: 74
End: 2024-10-28

## 2024-10-28 ENCOUNTER — LAB (OUTPATIENT)
Dept: LAB | Facility: CLINIC | Age: 74
End: 2024-10-28
Payer: MEDICARE

## 2024-10-28 DIAGNOSIS — Z83.2 FAMILY HISTORY OF FACTOR V DEFICIENCY: ICD-10-CM

## 2024-10-28 DIAGNOSIS — D68.9 BLOOD COAGULATION DISORDER (H): ICD-10-CM

## 2024-10-28 DIAGNOSIS — Z79.01 LONG TERM CURRENT USE OF ANTICOAGULANT THERAPY: ICD-10-CM

## 2024-10-28 DIAGNOSIS — Z79.899 ENCOUNTER FOR LONG-TERM CURRENT USE OF MEDICATION: ICD-10-CM

## 2024-10-28 DIAGNOSIS — I80.01 THROMBOPHLEBITIS OF SUPERFICIAL VEINS OF RIGHT LOWER EXTREMITY: Primary | ICD-10-CM

## 2024-10-28 DIAGNOSIS — I80.01 THROMBOPHLEBITIS OF SUPERFICIAL VEINS OF RIGHT LOWER EXTREMITY: ICD-10-CM

## 2024-10-28 LAB — INR BLD: 2.4 (ref 0.9–1.1)

## 2024-10-28 PROCEDURE — 36416 COLLJ CAPILLARY BLOOD SPEC: CPT

## 2024-10-28 PROCEDURE — 85610 PROTHROMBIN TIME: CPT

## 2024-10-28 NOTE — PROGRESS NOTES
ANTICOAGULATION MANAGEMENT     Thomas Davila 74 year old female is on warfarin with therapeutic INR result. (Goal INR 2.0-3.0)    Recent labs: (last 7 days)     10/28/24  0832   INR 2.4*       ASSESSMENT     Source(s): Chart Review   Previous result: Supratherapeutic  Additional findings: None     PLAN     Recommended plan for no diet, medication or health factor changes affecting INR     Dosing Instructions: Continue your current warfarin dose with next INR in 3 weeks       Summary  As of 10/28/2024      Full warfarin instructions:  5 mg every Mon, Thu, Sat; 2.5 mg all other days   Next INR check:  11/18/2024               Detailed voice message left for Akhil with dosing instructions and follow up date.     Contact [phone] to schedule and with any changes, questions or concerns.     Education provided: Please call back if any changes to your diet, medications or how you've been taking warfarin    Plan made per [facility] anticoagulation protocol    Harper Kline RN  10/28/2024  Anticoagulation Clinic  [facility] for routing messages: keith THORNTON  [facility] patient phone line: [phone]        _______________________________________________________________________     Anticoagulation Episode Summary       Current INR goal:  2.0-3.0   TTR:  70.0% (1 y)   Target end date:  Indefinite   Send INR reminders to:  ALEJANDRO THORNTON    Indications    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 okay to leave DVM per signed consent             Anticoagulation Care Providers       Provider Role Specialty Phone number    Sally  Lizabeth Waters MD Referring Family Medicine [phone]    Dung Prieto MD Referring Family Medicine     Albina Snider DO Referring Internal Medicine [phone]

## 2024-11-18 ENCOUNTER — ANTICOAGULATION THERAPY VISIT (OUTPATIENT)
Dept: ANTICOAGULATION | Facility: CLINIC | Age: 74
End: 2024-11-18

## 2024-11-18 ENCOUNTER — LAB (OUTPATIENT)
Dept: LAB | Facility: CLINIC | Age: 74
End: 2024-11-18
Payer: MEDICARE

## 2024-11-18 DIAGNOSIS — Z79.01 LONG TERM CURRENT USE OF ANTICOAGULANT THERAPY: ICD-10-CM

## 2024-11-18 DIAGNOSIS — I80.01 THROMBOPHLEBITIS OF SUPERFICIAL VEINS OF RIGHT LOWER EXTREMITY: ICD-10-CM

## 2024-11-18 DIAGNOSIS — Z79.899 ENCOUNTER FOR LONG-TERM CURRENT USE OF MEDICATION: ICD-10-CM

## 2024-11-18 DIAGNOSIS — I80.01 THROMBOPHLEBITIS OF SUPERFICIAL VEINS OF RIGHT LOWER EXTREMITY: Primary | ICD-10-CM

## 2024-11-18 DIAGNOSIS — Z83.2 FAMILY HISTORY OF FACTOR V DEFICIENCY: ICD-10-CM

## 2024-11-18 DIAGNOSIS — D68.9 BLOOD COAGULATION DISORDER (H): ICD-10-CM

## 2024-11-18 LAB — INR BLD: 2 (ref 0.9–1.1)

## 2024-11-18 PROCEDURE — 36416 COLLJ CAPILLARY BLOOD SPEC: CPT

## 2024-11-18 PROCEDURE — 85610 PROTHROMBIN TIME: CPT

## 2024-11-18 NOTE — PROGRESS NOTES
ANTICOAGULATION MANAGEMENT     Thomas Davila 74 year old female is on warfarin with therapeutic INR result. (Goal INR 2.0-3.0)    Recent labs: (last 7 days)     11/18/24  0939   INR 2.0*       ASSESSMENT     Source(s): Chart Review   Previous result: Therapeutic last visit; previously outside of goal range  Additional findings: None     PLAN     Recommended plan for no diet, medication or health factor changes affecting INR     Dosing Instructions: Continue your current warfarin dose with next INR in 4 weeks       Summary  As of 11/18/2024      Full warfarin instructions:  5 mg every Mon, Thu, Sat; 2.5 mg all other days   Next INR check:  12/16/2024               Detailed voice message left for Akhil with dosing instructions and follow up date.     Contact [phone] to schedule and with any changes, questions or concerns.     Education provided: Please call back if any changes to your diet, medications or how you've been taking warfarin    Plan made per [facility] anticoagulation protocol    Harper Kline RN  11/18/2024  Anticoagulation Clinic  [employer] for routing messages: keith THORNTON  [facility] patient phone line: [phone]        _______________________________________________________________________     Anticoagulation Episode Summary       Current INR goal:  2.0-3.0   TTR:  70.0% (1 y)   Target end date:  Indefinite   Send INR reminders to:  ALEJANDRO THORNTON    Indications    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 okay to leave DVM per signed consent             Anticoagulation Care Providers       Provider  Role Specialty Phone number    Lizabeth Zavala MD Referring Family Medicine [phone]    Dung Prieto MD Referring Family Medicine     Albina Snider DO Referring Internal Medicine [phone]

## 2024-11-20 ENCOUNTER — TELEPHONE (OUTPATIENT)
Dept: PLASTIC SURGERY | Facility: CLINIC | Age: 74
End: 2024-11-20
Payer: MEDICARE

## 2024-11-20 NOTE — TELEPHONE ENCOUNTER
Sent [employer] message and will either communicate via [employer] or talk with pt 11/21. Chip SANTANA RN

## 2024-11-20 NOTE — TELEPHONE ENCOUNTER
[facility] Call Center    Phone Message    May a detailed message be left on voicemail: yes     Reason for Call: Other: Pt requesting a call back in regards to some medical/Scientology questions she has. Pt is [employer], and is asking if we offer bloodless surgeries. Please call back, if no answer pt states it is ok to leave detailed message, or to communicate via [employer].      Action Taken: Other: plast surg    Travel Screening: Not Applicable     Date of Service:

## 2024-11-25 NOTE — TELEPHONE ENCOUNTER
FUTURE VISIT INFORMATION      FUTURE VISIT INFORMATION:  Date: 2/5/25  Time: 11:00am  [place] [facility] [place]FORMATION:  Referring provider:  Albina Snider DO   Referring providers clinic:  [facility]   Reason for visit/diagnosis  Weight loss, discuss panniculectomy. Pt is [employer], requesting bloodless surgery     RECORDS REQUESTED FROM:       Clinic name Comments Records Status Imaging Status   [facility]  OV/referral 10/22/24 epic

## 2024-12-18 ENCOUNTER — LAB (OUTPATIENT)
Dept: LAB | Facility: CLINIC | Age: 74
End: 2024-12-18
Payer: MEDICARE

## 2024-12-18 ENCOUNTER — ANTICOAGULATION THERAPY VISIT (OUTPATIENT)
Dept: ANTICOAGULATION | Facility: CLINIC | Age: 74
End: 2024-12-18

## 2024-12-18 DIAGNOSIS — Z83.2 FAMILY HISTORY OF FACTOR V DEFICIENCY: ICD-10-CM

## 2024-12-18 DIAGNOSIS — Z79.899 ENCOUNTER FOR LONG-TERM CURRENT USE OF MEDICATION: ICD-10-CM

## 2024-12-18 DIAGNOSIS — Z79.01 LONG TERM CURRENT USE OF ANTICOAGULANT THERAPY: ICD-10-CM

## 2024-12-18 DIAGNOSIS — I80.01 THROMBOPHLEBITIS OF SUPERFICIAL VEINS OF RIGHT LOWER EXTREMITY: Primary | ICD-10-CM

## 2024-12-18 DIAGNOSIS — I80.01 THROMBOPHLEBITIS OF SUPERFICIAL VEINS OF RIGHT LOWER EXTREMITY: ICD-10-CM

## 2024-12-18 DIAGNOSIS — D68.9 BLOOD COAGULATION DISORDER (H): ICD-10-CM

## 2024-12-18 LAB — INR BLD: 2.8 (ref 0.9–1.1)

## 2024-12-18 PROCEDURE — 36415 COLL VENOUS BLD VENIPUNCTURE: CPT

## 2024-12-18 PROCEDURE — 85610 PROTHROMBIN TIME: CPT

## 2024-12-18 NOTE — PROGRESS NOTES
ANTICOAGULATION MANAGEMENT     Thomas Davila 74 year old female is on warfarin with therapeutic INR result. (Goal INR 2.0-3.0)    Recent labs: (last 7 days)     12/18/24  0937   INR 2.8*       ASSESSMENT     Source(s): Chart Review  Previous INR was Therapeutic last 2(+) visits  Medication, diet, health changes since last INR chart reviewed; none identified         PLAN     Recommended plan for no diet, medication or health factor changes affecting INR     Dosing Instructions: Continue your current warfarin dose with next INR in 5 weeks       Summary  As of 12/18/2024      Full warfarin instructions:  5 mg every Mon, Thu, Sat; 2.5 mg all other days   Next INR check:  1/22/2025               Detailed voice message left for Akhil with dosing instructions and follow up date.     Contact [phone] to schedule and with any changes, questions or concerns.     Education provided: Please call back if any changes to your diet, medications or how you've been taking warfarin    Plan made per [facility] anticoagulation protocol    Nithya Muniz RN  12/18/2024  Anticoagulation Clinic  [employer] for routing messages: keith THORNTON  [facility] patient phone line: [phone]        _______________________________________________________________________     Anticoagulation Episode Summary       Current INR goal:  2.0-3.0   TTR:  76.0% (1 y)   Target end date:  Indefinite   Send INR reminders to:  ALEJANDRO THORNTON    Indications    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 okay to leave DVM per signed consent             Anticoagulation  Care Providers       Provider Role Specialty Phone number    Lizabeth Zavala MD Referring Family Medicine [phone]    Dung Prieto MD Referring Family Medicine     Albina Snider DO Referring Internal Medicine [phone]

## 2025-01-13 ENCOUNTER — TELEPHONE (OUTPATIENT)
Dept: SURGERY | Facility: CLINIC | Age: 75
End: 2025-01-13
Payer: MEDICARE

## 2025-01-13 NOTE — TELEPHONE ENCOUNTER
Patient confirmed scheduled appointment:  Date: 03/07/2025  Time:200 pm   Visit type: New Plastic Surgery   Provider:   Location: [facility]  Testing/imaging: n/a  Additional notes: Pt r/s from 02/05/2025

## 2025-01-20 ENCOUNTER — ANTICOAGULATION THERAPY VISIT (OUTPATIENT)
Dept: ANTICOAGULATION | Facility: CLINIC | Age: 75
End: 2025-01-20

## 2025-01-20 ENCOUNTER — ALLIED HEALTH/NURSE VISIT (OUTPATIENT)
Dept: FAMILY MEDICINE | Facility: CLINIC | Age: 75
End: 2025-01-20
Payer: MEDICARE

## 2025-01-20 ENCOUNTER — TELEPHONE (OUTPATIENT)
Dept: FAMILY MEDICINE | Facility: CLINIC | Age: 75
End: 2025-01-20

## 2025-01-20 ENCOUNTER — LAB (OUTPATIENT)
Dept: LAB | Facility: CLINIC | Age: 75
End: 2025-01-20
Payer: MEDICARE

## 2025-01-20 VITALS — HEART RATE: 80 BPM | SYSTOLIC BLOOD PRESSURE: 142 MMHG | DIASTOLIC BLOOD PRESSURE: 78 MMHG

## 2025-01-20 DIAGNOSIS — Z83.2 FAMILY HISTORY OF FACTOR V DEFICIENCY: ICD-10-CM

## 2025-01-20 DIAGNOSIS — Z79.01 LONG TERM CURRENT USE OF ANTICOAGULANT THERAPY: ICD-10-CM

## 2025-01-20 DIAGNOSIS — Z01.30 BLOOD PRESSURE CHECK: Primary | ICD-10-CM

## 2025-01-20 DIAGNOSIS — I10 ESSENTIAL HYPERTENSION: ICD-10-CM

## 2025-01-20 DIAGNOSIS — Z79.899 ENCOUNTER FOR LONG-TERM CURRENT USE OF MEDICATION: ICD-10-CM

## 2025-01-20 DIAGNOSIS — I80.01 THROMBOPHLEBITIS OF SUPERFICIAL VEINS OF RIGHT LOWER EXTREMITY: Primary | ICD-10-CM

## 2025-01-20 DIAGNOSIS — D68.9 BLOOD COAGULATION DISORDER: ICD-10-CM

## 2025-01-20 LAB — INR BLD: 3.9 (ref 0.9–1.1)

## 2025-01-20 PROCEDURE — 36416 COLLJ CAPILLARY BLOOD SPEC: CPT

## 2025-01-20 PROCEDURE — 99207 PR NO CHARGE NURSE ONLY: CPT

## 2025-01-20 PROCEDURE — 85610 PROTHROMBIN TIME: CPT

## 2025-01-20 RX ORDER — CARVEDILOL 12.5 MG/1
12.5 TABLET ORAL 2 TIMES DAILY WITH MEALS
Qty: 180 TABLET | Refills: 3 | Status: SHIPPED | OUTPATIENT
Start: 2025-01-20

## 2025-01-20 NOTE — PROGRESS NOTES
ANTICOAGULATION MANAGEMENT     Thomas Davila 74 year old female is on warfarin with supratherapeutic INR result. (Goal INR 2.0-3.0)    Recent labs: (last 7 days)     01/20/25  0926   INR 3.9*       ASSESSMENT     Source(s): Chart Review and Patient/Caregiver Call     Warfarin doses taken: Warfarin taken as instructed  Diet: Decreased greens/vitamin K in diet; plans to resume previous intake  Medication/supplement changes: extra tylenol  New illness, injury, or hospitalization: No having some flare up arthritis   Signs or symptoms of bleeding or clotting: No  Previous result: Therapeutic last 2(+) visits  Additional findings: None       PLAN     Recommended plan for temporary change(s) affecting INR     Dosing Instructions: hold dose then continue your current warfarin dose with next INR in 2 weeks   holding tomorrow dose took todays dose already.    Summary  As of 1/20/2025      Full warfarin instructions:  1/20: 5 mg; 1/21: Hold; Otherwise 5 mg every Mon, Thu, Sat; 2.5 mg all other days   Next INR check:  2/3/2025               Telephone call with Akhil who verbalizes understanding and agrees to plan    Lab visit scheduled    Education provided: Symptom monitoring: monitoring for bleeding signs and symptoms    Plan made per [facility] anticoagulation protocol    Lyssa Kirby RN  1/20/2025  Anticoagulation Clinic  [facility] for routing messages: keith THORNTON  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 okay to leave DVM per signed consent             Anticoagulation Care Providers       Provider Role Specialty Phone number    Lizabeth Zavala MD Referring Family Medicine [phone]    Dung Prieto MD Referring Family Medicine     Albina Snider DO Referring Internal Medicine [phone]

## 2025-01-20 NOTE — TELEPHONE ENCOUNTER
Dr. Snider:      Thomas Davila is a 74 year old year old patient who comes in today for a Blood Pressure check because of ongoing blood pressure monitoring.  Vital Signs as repeated by /76, 142/78, pulse 80.  Patient is taking medication as prescribed  Patient is tolerating medications well.  Patient is not monitoring Blood Pressure at home.  Current complaints: none  Disposition:  will send separate telephone encounter for provider to review.      ASHLEY Redd

## 2025-01-20 NOTE — PROGRESS NOTES
Thomas Davila is a 74 year old year old patient who comes in today for a Blood Pressure check because of ongoing blood pressure monitoring.  Vital Signs as repeated by /76, 142/78, pulse 80.  Patient is taking medication as prescribed  Patient is tolerating medications well.  Patient is not monitoring Blood Pressure at home.  Current complaints: none  Disposition:  will send separate telephone encounter for provider to review.      ASHLEY Redd

## 2025-01-20 NOTE — TELEPHONE ENCOUNTER
Increase carvedilol from 6.25 to 12.5 mg BID.  RN blood pressure check in 2 weeks. If patient is able to start check blood pressure at home, have those readings available at follow-up RN visit

## 2025-01-22 NOTE — TELEPHONE ENCOUNTER
Pt called back. Informed her of Dr. Lopez message. Pt in good understanding.    Rosenda Dillard on 1/22/2025 at 2:16 PM

## 2025-01-22 NOTE — PROGRESS NOTES
Patient calling to go over her warfarin dosing schedule. She was worried she had it written down wrong.   Reviewed [facility] note, 1/20: 5 mg; 1/21: Hold; Otherwise 5 mg every Mon, Thu, Sat; 2.5 mg all other days. Patient verbalized understanding. Recheck scheduled for 02/03/25.   Yas STEELE RN

## 2025-02-03 ENCOUNTER — ALLIED HEALTH/NURSE VISIT (OUTPATIENT)
Dept: FAMILY MEDICINE | Facility: CLINIC | Age: 75
End: 2025-02-03
Payer: MEDICARE

## 2025-02-03 ENCOUNTER — ANTICOAGULATION THERAPY VISIT (OUTPATIENT)
Dept: ANTICOAGULATION | Facility: CLINIC | Age: 75
End: 2025-02-03

## 2025-02-03 ENCOUNTER — LAB (OUTPATIENT)
Dept: LAB | Facility: CLINIC | Age: 75
End: 2025-02-03
Payer: MEDICARE

## 2025-02-03 ENCOUNTER — OFFICE VISIT (OUTPATIENT)
Dept: DERMATOLOGY | Facility: CLINIC | Age: 75
End: 2025-02-03
Payer: MEDICARE

## 2025-02-03 ENCOUNTER — TELEPHONE (OUTPATIENT)
Dept: FAMILY MEDICINE | Facility: CLINIC | Age: 75
End: 2025-02-03

## 2025-02-03 VITALS — DIASTOLIC BLOOD PRESSURE: 70 MMHG | SYSTOLIC BLOOD PRESSURE: 136 MMHG | HEART RATE: 78 BPM

## 2025-02-03 DIAGNOSIS — D23.9 DERMAL NEVUS: Primary | ICD-10-CM

## 2025-02-03 DIAGNOSIS — D68.9 BLOOD COAGULATION DISORDER: ICD-10-CM

## 2025-02-03 DIAGNOSIS — Z83.2 FAMILY HISTORY OF FACTOR V DEFICIENCY: ICD-10-CM

## 2025-02-03 DIAGNOSIS — L82.0 INFLAMED SEBORRHEIC KERATOSIS: ICD-10-CM

## 2025-02-03 DIAGNOSIS — Z85.828 HISTORY OF SKIN CANCER: ICD-10-CM

## 2025-02-03 DIAGNOSIS — D23.9 DERMATOFIBROMA: ICD-10-CM

## 2025-02-03 DIAGNOSIS — Z79.899 ENCOUNTER FOR LONG-TERM CURRENT USE OF MEDICATION: ICD-10-CM

## 2025-02-03 DIAGNOSIS — I10 ESSENTIAL HYPERTENSION: Primary | ICD-10-CM

## 2025-02-03 DIAGNOSIS — L57.0 AK (ACTINIC KERATOSIS): ICD-10-CM

## 2025-02-03 DIAGNOSIS — I80.01 THROMBOPHLEBITIS OF SUPERFICIAL VEINS OF RIGHT LOWER EXTREMITY: Primary | ICD-10-CM

## 2025-02-03 DIAGNOSIS — Z79.01 LONG TERM CURRENT USE OF ANTICOAGULANT THERAPY: ICD-10-CM

## 2025-02-03 DIAGNOSIS — L81.4 LENTIGO: ICD-10-CM

## 2025-02-03 DIAGNOSIS — I80.01 THROMBOPHLEBITIS OF SUPERFICIAL VEINS OF RIGHT LOWER EXTREMITY: ICD-10-CM

## 2025-02-03 DIAGNOSIS — L82.1 SEBORRHEIC KERATOSES: ICD-10-CM

## 2025-02-03 DIAGNOSIS — D18.01 ANGIOMA OF SKIN: ICD-10-CM

## 2025-02-03 LAB — INR BLD: 2.2 (ref 0.9–1.1)

## 2025-02-03 PROCEDURE — 17000 DESTRUCT PREMALG LESION: CPT | Mod: 59 | Performed by: DERMATOLOGY

## 2025-02-03 PROCEDURE — 17003 DESTRUCT PREMALG LES 2-14: CPT | Mod: 59 | Performed by: DERMATOLOGY

## 2025-02-03 PROCEDURE — 17110 DESTRUCTION B9 LES UP TO 14: CPT | Performed by: DERMATOLOGY

## 2025-02-03 PROCEDURE — 99213 OFFICE O/P EST LOW 20 MIN: CPT | Mod: 25 | Performed by: DERMATOLOGY

## 2025-02-03 PROCEDURE — 85610 PROTHROMBIN TIME: CPT

## 2025-02-03 PROCEDURE — 99207 PR NO CHARGE NURSE ONLY: CPT

## 2025-02-03 PROCEDURE — 36416 COLLJ CAPILLARY BLOOD SPEC: CPT

## 2025-02-03 NOTE — PROGRESS NOTES
ANTICOAGULATION MANAGEMENT     Thomas Davila 74 year old female is on warfarin with therapeutic INR result. (Goal INR 2.0-3.0)    Recent labs: (last 7 days)     02/03/25  1040   INR 2.2*       ASSESSMENT     Source(s): Chart Review and Patient/Caregiver Call     Warfarin doses taken: Warfarin taken as instructed  Diet: No new diet changes identified  Medication/supplement changes: None noted  New illness, injury, or hospitalization: No  Signs or symptoms of bleeding or clotting: No  Previous result: Supratherapeutic  Additional findings: None     PLAN     Recommended plan for no diet, medication or health factor changes affecting INR     Dosing Instructions: Continue your current warfarin dose with next INR in 3 weeks       Summary  As of 2/3/2025      Full warfarin instructions:  5 mg every Mon, Thu, Sat; 2.5 mg all other days   Next INR check:  2/24/2025               Telephone call with Akhil who verbalizes understanding and agrees to plan    Lab visit scheduled    Education provided: Please call back if any changes to your diet, medications or how you've been taking warfarin    Plan made per [facility] anticoagulation protocol    Harper Kline RN  2/3/2025  Anticoagulation Clinic  [employer] for routing messages: keith THORNTON  [facility] patient phone line: [phone]        _______________________________________________________________________     Anticoagulation Episode Summary       Current INR goal:  2.0-3.0   TTR:  71.9% (1 y)   Target end date:  Indefinite   Send INR reminders to:  ALEJANDRO THORNTON    Indications    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 okay to leave DVM per signed consent             Anticoagulation Care Providers       Provider Role Specialty Phone number    Lizabeth Zavala MD Referring Family Medicine [phone]    Dung Prieto MD Referring Family Medicine     Albina Snider DO Referring Internal Medicine [phone]

## 2025-02-03 NOTE — PATIENT INSTRUCTIONS
[facility]  [address]  [facility] [place] [address]  [phone]      February 3, 2025    Thomas Davila  [address] [address]      Dear Thomas      Please check in at 2nd floor Dermatology Clinic.     Be sure to eat a good breakfast and bathe and wash your hair prior to Surgery. Please bring  with you if this is above your neck    If you are taking any anti-coagulants that are prescribed by your Doctor (such as Coumadin/warfarin, Plavix, Aspirin, Ibuprofen), please continue taking them.     However, If you are taking anti-coagulants over the counter without  a Doctor's order for a Medical condition, please discontinue them 10 days prior to Surgery.      Please wear loose comfortable clothing as it could possibly be 4-6 hours until your surgery is completed depending upon how many layers of tissue need to be removed.     If you need any mobility assistance (getting on the exam chair or toilet) please bring a caregiver, family member, or staff member to assist you. We are not equipped to transfer patients.    Thank you,    Walt Golden MD     WOUND CARE INSTRUCTIONS   FOR CRYOSURGERY   Nose    This area treated with liquid nitrogen should form a blister (areas treated may or may not blister-skin may just turn dark and slough off). You do not need to bandage the area unless a blister forms and breaks (which may be a few days). When the blister breaks, begin daily dressing changes as follows:  1) Clean and dry the area with tap water using clean Q-tip or sterile gauze pad.   2) Apply Polysporin ointment or Bacitracin ointment over entire wound. Do NOT use Neosporin ointment.   3) Cover the wound with a band-aid or sterile non-stick gauze pad and micropore paper tape.   REPEAT THESE INSTRUCTIONS AT LEAST ONCE A DAY UNTIL THE WOUND HAS COMPLETELY HEALED.   It is an old wives tale that a wound heals better when it is exposed to air and allowed to dry out. The wound will heal  faster with a better cosmetic result if it is kept moist with ointment and covered with a bandage.   Do not let the wound dry out.   IMPORTANT INFORMATION ON REVERSE SIDE   Supplies Needed:   *Cotton tipped applicators (Q-tips)   *Polysporin ointment or Bacitracin ointment (NOT NEOSPORIN)   *Band-aids, or non stick gauze pads and micropore paper tape   PATIENT INFORMATION   During the healing process you will notice a number of changes. All wounds develop a small halo of redness surrounding the wound. This means healing is occurring. Severe itching with extensive redness usually indicates sensitivity to the ointment or bandage tape used to dress the wound. You should call our office if this develops.   Swelling and/or discoloration around your surgical site is common, particularly when performed around the eye.   All wounds normally drain. The larger the wound the more drainage there will be. After 7-10 days, you will notice the wound beginning to shrink and new skin will begin to grow. The wound is healed when you can see skin has formed over the entire area. A healed wound has a healthy, shiny look to the surface and is red to dark pink in color to normalize. Wounds may take approximately 4-6 weeks to heal. Larger wounds may take 6-8 weeks. After the wound is healed you may discontinue dressing changes.   You may experience a sensation of tightness as your wound heals. This is normal and will gradually subside.   Your healed wound may be sensitive to temperature changes. This sensitivity improves with time, but if you re having a lot of discomfort, try to avoid temperature extremes.   Patients frequently experience itching after their wound appears to have healed because of the continue healing under the skin. Plain Vaseline will help relieve the itching.

## 2025-02-03 NOTE — LETTER
2/3/2025      Thomas Davila  [address] [facility] [address]      Dear Colleague,    Thank you for referring your patient, Thomas Davila, to the [facility]. Please see a copy of my visit note below.    Thomas Davila is an extremely pleasant 74 year old year old female patient here today for hx of non-melanoma skin cancer.  Patient has no other skin complaints today.  Remainder of the HPI, Meds, PMH, Allergies, FH, and SH was reviewed in chart.      Past Medical History:   Diagnosis Date     Acute parametritis and pelvic cellulitis     salpingitis     ASCUS with positive high risk HPV 2013     Asthma     No recent issues     Basal cell carcinoma      breast cancer     left lumpectomy, radiation, tamoxifen     Breast cancer (H)     L Breast; Lumpectomy & Radiation     Chronic salpingitis and oophoritis 1994    PID        Embolism and thrombosis of unspecified site     left leg, due to tamoxifen therapy     Generalized osteoarthrosis, unspecified site     hands     Leiomyoma of uterus, unspecified      Morbid obesity (H) 2017     Other malignant neoplasm of skin, site unspecified 2006    Basal cell cancer on nose     Squamous cell carcinoma      Thrombosis of leg     Left     Unspecified essential hypertension        Past Surgical History:   Procedure Laterality Date     BREAST LUMPECTOMY, RT/LT      left     C/SECTION, LOW TRANSVERSE  1972,     , Low Transverse x2     CL [facility]FF SURGICAL PATHOLOGY      Breast reduction     COLONOSCOPY  10/15/02    normal     FOOT SURGERY      R Foot      HC EXCISION BREAST LESION, OPEN >=1  98    1) Needle localization with generous lumpectomy 2) Left axillary node dissection.     HC LAPAROSCOPY, SURGICAL, ABDOMEN, PERITONEUM & OMENTUM; DX W/ OR W/O SPECIMEN(S)  94     HYSTERECTOMY, PAP NO LONGER INDICATED       INSERT PORT VASCULAR ACCESS  3/14/2013    Procedure: INSERT PORT VASCULAR  ACCESS;  Port Revision;  Surgeon: Arash Puente MD;  Location: [facility]     LAPAROSCOPIC HYSTERECTOMY TOTAL, BILATERAL SALPINGO-OOPHORECTOMY, NODE DISSECTION, COMBINED  1/31/2013    Procedure: COMBINED LAPAROSCOPIC HYSTERECTOMY TOTAL, SALPINGO-OOPHORECTOMY, NODE DISSECTION;  Laparosocpic  Total Abdominal Hysterectomy, Bilateral Salphino-Oophorectomy, Bilateral Pelvic Lymph Node Dissection, Pelvic Washings, Cystoscopy;  Surgeon: Tanya Walker MD;  Location: [facility] OR     PHACOEMULSIFICATION WITH STANDARD INTRAOCULAR LENS IMPLANT Left 6/12/2019    Procedure: Cataract Removal with Implant;  Surgeon: Walt Mckeon MD;  Location: [facility]     PHACOEMULSIFICATION WITH STANDARD INTRAOCULAR LENS IMPLANT Right 7/3/2019    Procedure: Cataract Removal with Implant;  Surgeon: Walt Mckeon MD;  Location: [facility]     SURGICAL HISTORY OF -   06/22/93    1) Excision of digital cyst right mid finger  2)  Excision of dermatofibroma left calf     SURGICAL HISTORY OF -   12/18/2001    Excision of mucinous cyst & partial ostectomy, bone removal of benign osteophytic overgrowth osteophyte of the distal aspect of the middle phalanx and distal phalanx     SURGICAL HISTORY OF -   2006    Basal cell cancer removal     TONSILLECTOMY       TUBAL LIGATION  1978        Family History   Problem Relation Age of Onset     Cerebrovascular Disease Mother      Hypertension Mother      Diabetes Mother      Thyroid Disease Mother      Arthritis Mother      Alzheimer Disease Mother      Neurologic Disorder Mother         Parkinsons     Heart Disease Father      Hypertension Father      Diabetes Father      Thyroid Disease Father      Arthritis Father      Blood Disease Father      Factor V Leiden deficiency Father      Anesthesia Reaction Sister      Thyroid Disease Sister      Anesthesia Reaction Sister      Arthritis Sister         RA     Hypertension Brother      Factor V Leiden deficiency Brother      Allergies Son       Gastrointestinal Disease Son         GERD     Hypertension Brother      Allergies Son         percocett     Gastrointestinal Disease Son         GERD     Hypertension Son      Factor V Leiden deficiency Niece        Social History     Socioeconomic History     Marital status:      Spouse name: Not on file     Number of children: 2     Years of education: Not on file     Highest education level: Not on file   Occupational History     Employer: sub teacher in [employer]     Employer: RETIRED   Tobacco Use     Smoking status: Never     Smokeless tobacco: Never   Vaping Use     Vaping status: Never Used   Substance and Sexual Activity     Alcohol use: Yes     Comment: Rare     Drug use: No     Sexual activity: Yes     Partners: Male   Other Topics Concern     Parent/sibling w/ CABG, MI or angioplasty before 65F 55M? No   Social History Narrative    [employer]-- [employer]        April 15, 2021    ENVIRONMENTAL HISTORY: [place] home is heated with a forced air. They do have central air conditioning. The patient's bedroom is furnished with hard vijaya in bedroom and animals sleep in bedroom.  Pets inside the house include 2 cat(s). There is no history of cockroach or mice infestation. There is/are 0 smokers in the house.  The house does not have a basement.      Social Drivers of Health     Financial Resource Strain: Low Risk  (10/22/2024)    Financial Resource Strain      Within the past 12 months, have you or your family members you live with been unable to get utilities (heat, electricity) when it was really needed?: No   Food Insecurity: Low Risk  (10/22/2024)    Food Insecurity      Within the past 12 months, did you worry that your food would run out before you got money to buy more?: No      Within the past 12 months, did the food you bought just not last and you didn t have money to get more?: No   Transportation Needs: Low Risk   (10/22/2024)    Transportation Needs      Within the past 12 months, has lack of transportation kept you from medical appointments, getting your medicines, non-medical meetings or appointments, work, or from getting things that you need?: No   Physical Activity: Inactive (10/22/2024)    Exercise Vital Sign      Days of Exercise per Week: 0 days      Minutes of Exercise per Session: 0 min   Stress: No Stress Concern Present (10/22/2024)    [employer] [place] [employer] - Occupational Stress Questionnaire      Feeling of Stress : Not at all   Social Connections: Unknown (10/22/2024)    Social Connection and Isolation Panel [NHANES]      Frequency of Communication with Friends and Family: Not on file      Frequency of Social Gatherings with Friends and Family: More than three times a week      Attends Latter-day Services: Not on file      Active Member of Clubs or Organizations: Not on file      Attends Club or Organization Meetings: Not on file      Marital Status: Not on file   Interpersonal Safety: Low Risk  (6/7/2024)    Interpersonal Safety      Do you feel physically and emotionally safe where you currently live?: Yes      Within the past 12 months, have you been hit, slapped, kicked or otherwise physically hurt by someone?: No      Within the past 12 months, have you been humiliated or emotionally abused in other ways by your partner or ex-partner?: No   Housing Stability: Low Risk  (10/22/2024)    Housing Stability      Do you have housing? : Yes      Are you worried about losing your housing?: No       Outpatient Encounter Medications as of 2/3/2025   Medication Sig Dispense Refill     Acetaminophen (TYLENOL PO) Take 500 mg by mouth as needed for mild pain or fever       carvedilol (COREG) 12.5 MG tablet Take 1 tablet (12.5 mg) by mouth 2 times daily (with meals). 180 tablet 3     olmesartan (BENICAR) 40 MG tablet Take 1 tablet (40 mg) by mouth daily. 90 tablet 3     omeprazole (PRILOSEC) 20 MG   capsule Take 1 capsule (20 mg) by mouth daily. 90 capsule 3     phentermine 30 MG capsule Take 1 capsule (30 mg) by mouth every morning. 90 capsule 1     topiramate (TOPAMAX) 50 MG tablet Take 1 tablet (50 mg) by mouth daily. 90 tablet 1     triamterene-HCTZ (MAXZIDE-25) 37.5-25 MG tablet Take 1 tablet by mouth daily 90 tablet 3     warfarin ANTICOAGULANT (JANTOVEN ANTICOAGULANT) 5 MG tablet Take 2.5 mg every Sun, Wed, Fri; 5 mg all other days or As directed by Anticoagulation clinic 77 tablet 1     No facility-administered encounter medications on file as of 2/3/2025.             O:   NAD, WDWN, Alert & Oriented, Mood & Affect wnl, Vitals stable   General appearance normal   Vitals stable   Alert, oriented and in no acute distress     R shin firm papule  Scaly papules on face   Stuck on papules and brown macules on trunk and ext   Red papules on trunk  Flesh colored papules on trunk     The remainder of the full exam was normal; the following areas were examined:  conjunctiva/lids, , neck, peripheral vascular system, abdomen, lymph nodes, digits/nails, eccrine and apocrine glands, scalp/hair, face, neck, chest, abdomen, buttocks, back, RUE, LUE, RLE, LLE       Eyes: Conjunctivae/lids:Normal     ENT: Lips, mucosa: normal    MSK:Normal    Cardiovascular: peripheral edema none    Pulm: Breathing Normal    Lymph Nodes: No Head and Neck Lymphadenopathy     Neuro/Psych: Orientation:Alert and Orientedx3 ; Mood/Affect:normal       A/P:  1. Seborrheic keratosis, lentigo, angioma, dermal nevus, hx of non-melanoma skin cancer   2. Nose actinic keratosis x3  LN2:  Treated with LN2 for 5s for 1-2 cycles. Warned risks of blistering, pain, pigment change, scarring, and incomplete resolution.  Advised patient to return if lesions do not completely resolve.  Wound care sheet given.  3. Dermatofibroma  She would like excised  Schedule  4. L hand inflamed seborrheic keratosis   LN2:  Treated with LN2 for 5s for 1-2 cycles. Warned  risks of blistering, pain, pigment change, scarring, and incomplete resolution.  Advised patient to return if lesions do not completely resolve.  Wound care sheet given.  It was a pleasure speaking to Thomas Davila today.  Previous clinic notes and pertinent laboratory tests were reviewed prior to Thomas Davila's visit.  Signs and Symptoms of skin cancer discussed with patient.  Patient encouraged to perform monthly skin exams.  UV precautions reviewed with patient.  Risks of non-melanoma skin cancer discussed with patient   Return to clinic next appt      Again, thank you for allowing me to participate in the care of your patient.        Sincerely,        Walt Golden MD    Electronically signed

## 2025-02-03 NOTE — TELEPHONE ENCOUNTER
Routing to PCP for review    Thomas Davila is a 74 year old year old patient who comes in today for a Blood Pressure check because of medication change.  Vital Signs as repeated by /76 and 136/70 and pulse 78  Patient is taking medication as prescribed  Patient is tolerating medications well.  Patient is not monitoring Blood Pressure at home.    Current complaints: none  Disposition:  Will route readings to provider.     Kortney VELASQUEZ RN  [facility]

## 2025-02-03 NOTE — PROGRESS NOTES
Thomas Davila is a 74 year old year old patient who comes in today for a Blood Pressure check because of medication change.  Vital Signs as repeated by /76 and 136/70 and pulse 78  Patient is taking medication as prescribed  Patient is tolerating medications well.  Patient is not monitoring Blood Pressure at home.    Current complaints: none  Disposition:  Will route readings to provider.    Kortney VELASQUEZ RN  [facility]

## 2025-02-03 NOTE — PROGRESS NOTES
Thomas Davila is an extremely pleasant 74 year old year old female patient here today for hx of non-melanoma skin cancer.  Patient has no other skin complaints today.  Remainder of the HPI, Meds, PMH, Allergies, FH, and SH was reviewed in chart.      Past Medical History:   Diagnosis Date    Acute parametritis and pelvic cellulitis     salpingitis    ASCUS with positive high risk HPV 2013    Asthma     No recent issues    Basal cell carcinoma     breast cancer     left lumpectomy, radiation, tamoxifen    Breast cancer (H)     L Breast; Lumpectomy & Radiation    Chronic salpingitis and oophoritis 1994    PID       Embolism and thrombosis of unspecified site     left leg, due to tamoxifen therapy    Generalized osteoarthrosis, unspecified site     hands    Leiomyoma of uterus, unspecified     Morbid obesity (H) 2017    Other malignant neoplasm of skin, site unspecified 2006    Basal cell cancer on nose    Squamous cell carcinoma     Thrombosis of leg     Left    Unspecified essential hypertension        Past Surgical History:   Procedure Laterality Date    BREAST LUMPECTOMY, RT/LT      left    C/SECTION, LOW TRANSVERSE  1972,     , Low Transverse x2    CL [facility] SURGICAL PATHOLOGY      Breast reduction    COLONOSCOPY  10/15/02    normal    FOOT SURGERY      R Foot     HC EXCISION BREAST LESION, OPEN >=1  98    1) Needle localization with generous lumpectomy 2) Left axillary node dissection.    HC LAPAROSCOPY, SURGICAL, ABDOMEN, PERITONEUM & OMENTUM; DX W/ OR W/O SPECIMEN(S)  94    HYSTERECTOMY, PAP NO LONGER INDICATED      INSERT PORT VASCULAR ACCESS  3/14/2013    Procedure: INSERT PORT VASCULAR ACCESS;  Port Revision;  Surgeon: Arash Puente MD;  Location: [facility]    LAPAROSCOPIC HYSTERECTOMY TOTAL, BILATERAL SALPINGO-OOPHORECTOMY, NODE DISSECTION, COMBINED  2013    Procedure: COMBINED LAPAROSCOPIC HYSTERECTOMY TOTAL, SALPINGO-OOPHORECTOMY, NODE  DISSECTION;  Laparosocpic  Total Abdominal Hysterectomy, Bilateral Salphino-Oophorectomy, Bilateral Pelvic Lymph Node Dissection, Pelvic Washings, Cystoscopy;  Surgeon: Tanya Walker MD;  Location: [facility]    PHACOEMULSIFICATION WITH STANDARD INTRAOCULAR LENS IMPLANT Left 6/12/2019    Procedure: Cataract Removal with Implant;  Surgeon: Walt Mckeon MD;  Location: [facility]    PHACOEMULSIFICATION WITH STANDARD INTRAOCULAR LENS IMPLANT Right 7/3/2019    Procedure: Cataract Removal with Implant;  Surgeon: Walt Mckeon MD;  Location: [facility]    SURGICAL HISTORY OF -   06/22/93    1) Excision of digital cyst right mid finger  2)  Excision of dermatofibroma left calf    SURGICAL HISTORY OF -   12/18/2001    Excision of mucinous cyst & partial ostectomy, bone removal of benign osteophytic overgrowth osteophyte of the distal aspect of the middle phalanx and distal phalanx    SURGICAL HISTORY OF -   2006    Basal cell cancer removal    TONSILLECTOMY      TUBAL LIGATION  1978        Family History   Problem Relation Age of Onset    Cerebrovascular Disease Mother     Hypertension Mother     Diabetes Mother     Thyroid Disease Mother     Arthritis Mother     Alzheimer Disease Mother     Neurologic Disorder Mother         Parkinsons    Heart Disease Father     Hypertension Father     Diabetes Father     Thyroid Disease Father     Arthritis Father     Blood Disease Father     Factor V Leiden deficiency Father     Anesthesia Reaction Sister     Thyroid Disease Sister     Anesthesia Reaction Sister     Arthritis Sister         RA    Hypertension Brother     Factor V Leiden deficiency Brother     Allergies Son     Gastrointestinal Disease Son         GERD    Hypertension Brother     Allergies Son         percocett    Gastrointestinal Disease Son         GERD    Hypertension Son     Factor V Leiden deficiency Niece        Social History     Socioeconomic History    Marital status:      Spouse name: Not on  file    Number of children: 2    Years of education: Not on file    Highest education level: Not on file   Occupational History     Employer: sub teacher in [employer]     Employer: RETIRED   Tobacco Use    Smoking status: Never    Smokeless tobacco: Never   Vaping Use    Vaping status: Never Used   Substance and Sexual Activity    Alcohol use: Yes     Comment: Rare    Drug use: No    Sexual activity: Yes     Partners: Male   Other Topics Concern    Parent/sibling w/ CABG, MI or angioplasty before 65F 55M? No   Social History Narrative    Adventism-- DECLINES ALL BLOOD PRODUCTS        April 15, 2021    ENVIRONMENTAL HISTORY: [place] The home is heated with a forced air. They do have central air conditioning. The patient's bedroom is furnished with hard vijaya in bedroom and animals sleep in bedroom.  Pets inside the house include 2 cat(s). There is no history of cockroach or mice infestation. There is/are 0 smokers in the house.  The house does not have a basement.      Social Drivers of Health     Financial Resource Strain: Low Risk  (10/22/2024)    Financial Resource Strain     Within the past 12 months, have you or your family members you live with been unable to get utilities (heat, electricity) when it was really needed?: No   Food Insecurity: Low Risk  (10/22/2024)    Food Insecurity     Within the past 12 months, did you worry that your food would run out before you got money to buy more?: No     Within the past 12 months, did the food you bought just not last and you didn t have money to get more?: No   Transportation Needs: Low Risk  (10/22/2024)    Transportation Needs     Within the past 12 months, has lack of transportation kept you from medical appointments, getting your medicines, non-medical meetings or appointments, work, or from getting things that you need?: No   Physical Activity: Inactive (10/22/2024)    Exercise Vital Sign     Days of  Exercise per Week: 0 days     Minutes of Exercise per Session: 0 min   Stress: No Stress Concern Present (10/22/2024)    [place] [employer] - Occupational Stress Questionnaire     Feeling of Stress : Not at all   Social Connections: Unknown (10/22/2024)    Social Connection and Isolation Panel [NHANES]     Frequency of Communication with Friends and Family: Not on file     Frequency of Social Gatherings with Friends and Family: More than three times a week     Attends Scientologist Services: Not on file     Active Member of Clubs or Organizations: Not on file     Attends Club or Organization Meetings: Not on file     Marital Status: Not on file   Interpersonal Safety: Low Risk  (6/7/2024)    Interpersonal Safety     Do you feel physically and emotionally safe where you currently live?: Yes     Within the past 12 months, have you been hit, slapped, kicked or otherwise physically hurt by someone?: No     Within the past 12 months, have you been humiliated or emotionally abused in other ways by your partner or ex-partner?: No   Housing Stability: Low Risk  (10/22/2024)    Housing Stability     Do you have housing? : Yes     Are you worried about losing your housing?: No       Outpatient Encounter Medications as of 2/3/2025   Medication Sig Dispense Refill    Acetaminophen (TYLENOL PO) Take 500 mg by mouth as needed for mild pain or fever      carvedilol (COREG) 12.5 MG tablet Take 1 tablet (12.5 mg) by mouth 2 times daily (with meals). 180 tablet 3    olmesartan (BENICAR) 40 MG tablet Take 1 tablet (40 mg) by mouth daily. 90 tablet 3    omeprazole (PRILOSEC) 20 MG DR capsule Take 1 capsule (20 mg) by mouth daily. 90 capsule 3    phentermine 30 MG capsule Take 1 capsule (30 mg) by mouth every morning. 90 capsule 1    topiramate (TOPAMAX) 50 MG tablet Take 1 tablet (50 mg) by mouth daily. 90 tablet 1    triamterene-HCTZ (MAXZIDE-25) 37.5-25 MG tablet Take 1 tablet by mouth daily 90 tablet 3    warfarin  ANTICOAGULANT (JANTOVEN ANTICOAGULANT) 5 MG tablet Take 2.5 mg every Sun, Wed, Fri; 5 mg all other days or As directed by Anticoagulation clinic 77 tablet 1     No facility-administered encounter medications on file as of 2/3/2025.             O:   NAD, WDWN, Alert & Oriented, Mood & Affect wnl, Vitals stable   General appearance normal   Vitals stable   Alert, oriented and in no acute distress     R shin firm papule  Scaly papules on face   Stuck on papules and brown macules on trunk and ext   Red papules on trunk  Flesh colored papules on trunk     The remainder of the full exam was normal; the following areas were examined:  conjunctiva/lids, , neck, peripheral vascular system, abdomen, lymph nodes, digits/nails, eccrine and apocrine glands, scalp/hair, face, neck, chest, abdomen, buttocks, back, RUE, LUE, RLE, LLE       Eyes: Conjunctivae/lids:Normal     ENT: Lips, mucosa: normal    MSK:Normal    Cardiovascular: peripheral edema none    Pulm: Breathing Normal    Lymph Nodes: No Head and Neck Lymphadenopathy     Neuro/Psych: Orientation:Alert and Orientedx3 ; Mood/Affect:normal       A/P:  1. Seborrheic keratosis, lentigo, angioma, dermal nevus, hx of non-melanoma skin cancer   2. Nose actinic keratosis x3  LN2:  Treated with LN2 for 5s for 1-2 cycles. Warned risks of blistering, pain, pigment change, scarring, and incomplete resolution.  Advised patient to return if lesions do not completely resolve.  Wound care sheet given.  3. Dermatofibroma  She would like excised  Schedule  4. L hand inflamed seborrheic keratosis   LN2:  Treated with LN2 for 5s for 1-2 cycles. Warned risks of blistering, pain, pigment change, scarring, and incomplete resolution.  Advised patient to return if lesions do not completely resolve.  Wound care sheet given.  It was a pleasure speaking to Thomas Davila today.  Previous clinic notes and pertinent laboratory tests were reviewed prior to Thomas Davila's visit.  Signs and Symptoms  of skin cancer discussed with patient.  Patient encouraged to perform monthly skin exams.  UV precautions reviewed with patient.  Risks of non-melanoma skin cancer discussed with patient   Return to clinic next appt

## 2025-02-05 ENCOUNTER — PRE VISIT (OUTPATIENT)
Dept: PLASTIC SURGERY | Facility: CLINIC | Age: 75
End: 2025-02-05

## 2025-02-06 ENCOUNTER — TELEPHONE (OUTPATIENT)
Dept: DERMATOLOGY | Facility: CLINIC | Age: 75
End: 2025-02-06
Payer: MEDICARE

## 2025-02-06 NOTE — TELEPHONE ENCOUNTER
[facility] Call Center    Phone Message    May a detailed message be left on voicemail: yes     Reason for Call: Other: pt canceled excision on lower leg for 03/26 will call back to reschedule when things slow down a bit in her schedule     Action Taken: Other: TE to [facility]    Travel Screening: Not Applicable     Date of Service:

## 2025-02-18 ENCOUNTER — TELEPHONE (OUTPATIENT)
Dept: FAMILY MEDICINE | Facility: CLINIC | Age: 75
End: 2025-02-18
Payer: MEDICARE

## 2025-02-18 DIAGNOSIS — I80.01 THROMBOPHLEBITIS OF SUPERFICIAL VEINS OF RIGHT LOWER EXTREMITY: Primary | ICD-10-CM

## 2025-02-18 DIAGNOSIS — R22.43 LOCALIZED SWELLING OF BOTH LOWER LEGS: ICD-10-CM

## 2025-02-18 NOTE — TELEPHONE ENCOUNTER
Patient is calling asking if she still needs US lower extremity venous duplex bilateral. It was ordered on 3/14/24 by Dr. Snider. Patient noted order recently in [facility] and scheduled for 2/28/25. Patient is wearing her compression stockings and is on Warfarin. She has swelling of feet and lower legs if she sits for a long time as pannus is heavy. She is also asking for new order for compression stockings. Last ordered 10/22/24.    Patient will need to be called back with recommendations.    Thank you,  Bonita Goodrich RN

## 2025-02-18 NOTE — TELEPHONE ENCOUNTER
Called and spoke with patient . Gave her Dr. Snider message. Patient expressed understanding. No further questions.    Osiris Sethi MA

## 2025-02-24 ENCOUNTER — ANTICOAGULATION THERAPY VISIT (OUTPATIENT)
Dept: ANTICOAGULATION | Facility: CLINIC | Age: 75
End: 2025-02-24

## 2025-02-24 ENCOUNTER — LAB (OUTPATIENT)
Dept: LAB | Facility: CLINIC | Age: 75
End: 2025-02-24
Payer: MEDICARE

## 2025-02-24 DIAGNOSIS — I80.01 THROMBOPHLEBITIS OF SUPERFICIAL VEINS OF RIGHT LOWER EXTREMITY: ICD-10-CM

## 2025-02-24 DIAGNOSIS — Z79.899 ENCOUNTER FOR LONG-TERM CURRENT USE OF MEDICATION: ICD-10-CM

## 2025-02-24 DIAGNOSIS — Z83.2 FAMILY HISTORY OF FACTOR V DEFICIENCY: ICD-10-CM

## 2025-02-24 DIAGNOSIS — Z79.01 LONG TERM CURRENT USE OF ANTICOAGULANT THERAPY: ICD-10-CM

## 2025-02-24 DIAGNOSIS — D68.9 BLOOD COAGULATION DISORDER: ICD-10-CM

## 2025-02-24 DIAGNOSIS — I80.01 THROMBOPHLEBITIS OF SUPERFICIAL VEINS OF RIGHT LOWER EXTREMITY: Primary | ICD-10-CM

## 2025-02-24 LAB — INR BLD: 3.1 (ref 0.9–1.1)

## 2025-02-24 PROCEDURE — 85610 PROTHROMBIN TIME: CPT

## 2025-02-24 PROCEDURE — 36416 COLLJ CAPILLARY BLOOD SPEC: CPT

## 2025-02-24 NOTE — PROGRESS NOTES
ANTICOAGULATION MANAGEMENT     Thomas Davila 75 year old female is on warfarin with supratherapeutic INR result. (Goal INR 2.0-3.0)    Recent labs: (last 7 days)     02/24/25  0916   INR 3.1*       ASSESSMENT     Source(s): Chart Review  Previous INR was Therapeutic last visit; previously outside of goal range  Medication, diet, health changes since last INR chart reviewed; none identified         PLAN     Recommended plan for no diet, medication or health factor changes affecting INR     Dosing Instructions: Continue your current warfarin dose with next INR in 2 weeks       Summary  As of 2/24/2025      Full warfarin instructions:  5 mg every Mon, Thu, Sat; 2.5 mg all other days   Next INR check:  3/10/2025               Detailed voice message left for Akhil with dosing instructions and follow up date.     Contact [phone] to schedule and with any changes, questions or concerns.     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 okay to leave DVM per signed consent              Anticoagulation Care Providers       Provider Role Specialty Phone number    Lizabeth Zavala MD Referring Family Medicine [phone]    Dung Prieto MD Referring Family Medicine     Albina Snider DO Referring Internal Medicine [phone]

## 2025-02-27 ENCOUNTER — TELEPHONE (OUTPATIENT)
Dept: FAMILY MEDICINE | Facility: CLINIC | Age: 75
End: 2025-02-27
Payer: MEDICARE

## 2025-02-27 DIAGNOSIS — I89.0 LYMPHEDEMA: Primary | ICD-10-CM

## 2025-02-27 NOTE — TELEPHONE ENCOUNTER
"  FYI - Status Update    Who is Calling: patient    Update: Pt states [facility] wanted pt to reach out to PCP to change 2/18 compression stocking order to \"qty 12 over one year\" per insurance. Please advise.     Does caller want a call/response back: Yes     Could we send this information to you in [employer] or would you prefer to receive a phone call?:   Patient would prefer a phone call   Okay to leave a detailed message?: Yes at Home number on file [phone] (home)    "

## 2025-02-27 NOTE — TELEPHONE ENCOUNTER
Spoke with pt, pt was aware referral was in, pt just wanted PCP to be updated that she has a consult scheduled.    Maya Montoya on 2/27/2025 at 12:06 PM

## 2025-02-27 NOTE — TELEPHONE ENCOUNTER
Order/Referral Request    Who is requesting: patient    Orders being requested: Plastic Surgery Referral    Reason service is needed/diagnosis: Abdominal Pannus     When are orders needed by: ASAP    Has this been discussed with Provider: Yes    Does patient have a preference on a Group/Provider/Facility? [facility]    Does patient have an appointment scheduled?: Yes: 03/07/2025 @ 1:45    Where to send orders: Place orders within Epic    Could we send this information to you in [facility] or would you prefer to receive a phone call?:   Patient would prefer a phone call   Okay to leave a detailed message?: Yes at Home number on file [phone] (home)

## 2025-03-10 ENCOUNTER — ANTICOAGULATION THERAPY VISIT (OUTPATIENT)
Dept: ANTICOAGULATION | Facility: CLINIC | Age: 75
End: 2025-03-10

## 2025-03-10 ENCOUNTER — TELEPHONE (OUTPATIENT)
Dept: PLASTIC SURGERY | Facility: CLINIC | Age: 75
End: 2025-03-10

## 2025-03-10 ENCOUNTER — LAB (OUTPATIENT)
Dept: LAB | Facility: CLINIC | Age: 75
End: 2025-03-10
Payer: MEDICARE

## 2025-03-10 DIAGNOSIS — Z79.01 LONG TERM CURRENT USE OF ANTICOAGULANT THERAPY: ICD-10-CM

## 2025-03-10 DIAGNOSIS — I80.01 THROMBOPHLEBITIS OF SUPERFICIAL VEINS OF RIGHT LOWER EXTREMITY: ICD-10-CM

## 2025-03-10 DIAGNOSIS — D68.9 BLOOD COAGULATION DISORDER: ICD-10-CM

## 2025-03-10 DIAGNOSIS — Z83.2 FAMILY HISTORY OF FACTOR V DEFICIENCY: ICD-10-CM

## 2025-03-10 DIAGNOSIS — Z79.899 ENCOUNTER FOR LONG-TERM CURRENT USE OF MEDICATION: ICD-10-CM

## 2025-03-10 DIAGNOSIS — I80.01 THROMBOPHLEBITIS OF SUPERFICIAL VEINS OF RIGHT LOWER EXTREMITY: Primary | ICD-10-CM

## 2025-03-10 LAB — INR BLD: 2.7 (ref 0.9–1.1)

## 2025-03-10 PROCEDURE — 36416 COLLJ CAPILLARY BLOOD SPEC: CPT

## 2025-03-10 PROCEDURE — 85610 PROTHROMBIN TIME: CPT

## 2025-03-10 NOTE — TELEPHONE ENCOUNTER
[facility] Call Center    Phone Message    May a detailed message be left on voicemail: yes     Reason for Call: Other: Patient is reporting that she cannot go to her other provider for her panniculectomy as she was referred. She is stating that her legs go numb when she sits. She has a rash and soreness in her stomach crease. She is also having troubles clothing as well. Please call her back on the home phone. If no answer please call her cell or MyChart her.       Action Taken: Message routed to:  Adult Clinics: Surgery, Plastic p [id number]    Travel Screening: Not Applicable     Date of Service:

## 2025-03-10 NOTE — PROGRESS NOTES
ANTICOAGULATION MANAGEMENT     Thomas Davila 75 year old female is on warfarin with therapeutic INR result. (Goal INR 2.0-3.0)    Recent labs: (last 7 days)     03/10/25  1110   INR 2.7*       ASSESSMENT     Source(s): Chart Review   Previous result: Supratherapeutic  Additional findings: None     PLAN     Recommended plan for no diet, medication or health factor changes affecting INR     Dosing Instructions: Continue your current warfarin dose with next INR in 2-3 weeks       Summary  As of 3/10/2025      Full warfarin instructions:  5 mg every Mon, Thu, Sat; 2.5 mg all other days   Next INR check:  3/31/2025               Detailed voice message left for Akhil with dosing instructions and follow up date.     Contact [phone] to schedule and with any changes, questions or concerns.     Education provided: Please call back if any changes to your diet, medications or how you've been taking warfarin    Plan made per [facility] anticoagulation protocol    Harper Kline RN  3/10/2025  Anticoagulation Clinic  [facility] for routing messages: keith THORNTON  [facility] patient phone line: [phone]        _______________________________________________________________________     Anticoagulation Episode Summary       Current INR goal:  2.0-3.0   TTR:  70.3% (1 y)   Target end date:  Indefinite   Send INR reminders to:  ALEJANDRO THORNTON    Indications    Thrombophlebitis of superficial veins of right lower extremity [I80.01]  Family history of factor V deficiency [Z83.2]  Encounter for long-term current use of medication (Resolved) [Z79.899]  Long term current use of anticoagulant therapy [Z79.01]  Blood coagulation disorder (Resolved) [D68.9]  Blood coagulation disorder [D68.9]  Encounter for long-term current use of medication [Z79.899]             Comments:  Allergy to Lovenox. okay to leave DVM per signed consent             Anticoagulation Care Providers       Provider Role Specialty Phone number    Lizabeth Zavala  MD Evelin Referring Family Medicine [phone]    Dung Prieto MD Referring Family Medicine     Albina Snider DO Referring Internal Medicine [phone]

## 2025-03-13 ENCOUNTER — PATIENT OUTREACH (OUTPATIENT)
Dept: PLASTIC SURGERY | Facility: CLINIC | Age: 75
End: 2025-03-13
Payer: MEDICARE

## 2025-03-13 NOTE — TELEPHONE ENCOUNTER
Left message for patient to return call to [phone]. She called stating she had questions from her clinic consult with Dr Granados.

## 2025-03-13 NOTE — TELEPHONE ENCOUNTER
"Spoke with pt, she has appt with PCP on 3/20/25 to discuss her consult with Dr Granados, she is seeking elective panniculectomy.     From Dr Granados's note on 3/7/25:    \"The patient needs to discuss with her family and her medical doctors the risks of an elective panniculectomy in the face of a previous DVT. She needs to be off of anticoagulation at the time of the surgery. This does put her at risk for another DVT. We will start her on prophylactic dosing and then transition to therapeutic dosing as she is on. This does increase her risk for postoperative hematoma. She understood this all. She is not wanting any blood transfusions in the face of a hematoma due to Adventist reasons. This was discussed in detail and the fact that she is at a real risk for hematoma which may require blood transfusions in the face of her anticoagulation. This needs to be discussed with her family in detail. \"  "

## 2025-03-20 ENCOUNTER — TELEPHONE (OUTPATIENT)
Dept: ANTICOAGULATION | Facility: CLINIC | Age: 75
End: 2025-03-20

## 2025-03-20 ENCOUNTER — OFFICE VISIT (OUTPATIENT)
Dept: FAMILY MEDICINE | Facility: CLINIC | Age: 75
End: 2025-03-20
Payer: MEDICARE

## 2025-03-20 VITALS
OXYGEN SATURATION: 100 % | WEIGHT: 197.2 LBS | HEART RATE: 93 BPM | SYSTOLIC BLOOD PRESSURE: 114 MMHG | BODY MASS INDEX: 33.67 KG/M2 | DIASTOLIC BLOOD PRESSURE: 72 MMHG | HEIGHT: 64 IN | TEMPERATURE: 97.7 F | RESPIRATION RATE: 16 BRPM

## 2025-03-20 DIAGNOSIS — Z85.42 HISTORY OF ENDOMETRIAL CANCER: ICD-10-CM

## 2025-03-20 DIAGNOSIS — Z01.818 PREOP GENERAL PHYSICAL EXAM: Primary | ICD-10-CM

## 2025-03-20 DIAGNOSIS — I10 ESSENTIAL HYPERTENSION: ICD-10-CM

## 2025-03-20 DIAGNOSIS — Z53.1 PROCEDURE AND TREATMENT NOT CARRIED OUT BECAUSE OF PATIENT'S DECISION FOR REASONS OF BELIEF AND GROUP PRESSURE: ICD-10-CM

## 2025-03-20 DIAGNOSIS — I80.01 THROMBOPHLEBITIS OF SUPERFICIAL VEINS OF RIGHT LOWER EXTREMITY: ICD-10-CM

## 2025-03-20 DIAGNOSIS — M15.0 PRIMARY OSTEOARTHRITIS INVOLVING MULTIPLE JOINTS: ICD-10-CM

## 2025-03-20 DIAGNOSIS — Z85.3 PERSONAL HISTORY OF MALIGNANT NEOPLASM OF BREAST: ICD-10-CM

## 2025-03-20 DIAGNOSIS — K21.9 GASTROESOPHAGEAL REFLUX DISEASE WITHOUT ESOPHAGITIS: ICD-10-CM

## 2025-03-20 DIAGNOSIS — E66.01 CLASS 2 SEVERE OBESITY DUE TO EXCESS CALORIES WITH SERIOUS COMORBIDITY AND BODY MASS INDEX (BMI) OF 35.0 TO 35.9 IN ADULT (H): ICD-10-CM

## 2025-03-20 DIAGNOSIS — E66.812 CLASS 2 SEVERE OBESITY DUE TO EXCESS CALORIES WITH SERIOUS COMORBIDITY AND BODY MASS INDEX (BMI) OF 35.0 TO 35.9 IN ADULT (H): ICD-10-CM

## 2025-03-20 DIAGNOSIS — E65 PANNUS, ABDOMINAL: ICD-10-CM

## 2025-03-20 PROBLEM — Z79.899 ENCOUNTER FOR LONG-TERM CURRENT USE OF MEDICATION: Status: RESOLVED | Noted: 2024-06-17 | Resolved: 2025-03-20

## 2025-03-20 PROBLEM — D68.9 BLOOD COAGULATION DISORDER: Status: RESOLVED | Noted: 2024-06-17 | Resolved: 2025-03-20

## 2025-03-20 LAB
ANION GAP SERPL CALCULATED.3IONS-SCNC: 12 MMOL/L (ref 7–15)
BUN SERPL-MCNC: 23.6 MG/DL (ref 8–23)
CALCIUM SERPL-MCNC: 9.7 MG/DL (ref 8.8–10.4)
CHLORIDE SERPL-SCNC: 104 MMOL/L (ref 98–107)
CREAT SERPL-MCNC: 1 MG/DL (ref 0.51–0.95)
EGFRCR SERPLBLD CKD-EPI 2021: 58 ML/MIN/1.73M2
ERYTHROCYTE [DISTWIDTH] IN BLOOD BY AUTOMATED COUNT: 12.8 % (ref 10–15)
GLUCOSE SERPL-MCNC: 89 MG/DL (ref 70–99)
HCO3 SERPL-SCNC: 25 MMOL/L (ref 22–29)
HCT VFR BLD AUTO: 43.3 % (ref 35–47)
HGB BLD-MCNC: 14.7 G/DL (ref 11.7–15.7)
MCH RBC QN AUTO: 30.6 PG (ref 26.5–33)
MCHC RBC AUTO-ENTMCNC: 33.9 G/DL (ref 31.5–36.5)
MCV RBC AUTO: 90 FL (ref 78–100)
PLATELET # BLD AUTO: 190 10E3/UL (ref 150–450)
POTASSIUM SERPL-SCNC: 3.7 MMOL/L (ref 3.4–5.3)
RBC # BLD AUTO: 4.8 10E6/UL (ref 3.8–5.2)
SODIUM SERPL-SCNC: 141 MMOL/L (ref 135–145)
WBC # BLD AUTO: 5 10E3/UL (ref 4–11)

## 2025-03-20 ASSESSMENT — PAIN SCALES - GENERAL: PAINLEVEL_OUTOF10: NO PAIN (0)

## 2025-03-20 NOTE — PROGRESS NOTES
Preoperative Evaluation  [facility]  [address] [address]  Phone: [phone]  Primary Provider: Albina Snider DO  Pre-op Performing Provider: Albina Snider DO  Mar 20, 2025             3/20/2025   Surgical Information   What procedure is being done? stomach skin removal (panniculectomy. )   Facility or Hospital where procedure/surgery will be performed: [facility]   Who is doing the procedure / surgery? dr betancourt   Date of surgery / procedure: TBD   Time of surgery / procedure: TBD   Where do you plan to recover after surgery? at home with family     Fax number for surgical facility: Note does not need to be faxed, will be available electronically in Epic.    Assessment & Plan     The proposed surgical procedure is considered INTERMEDIATE risk.    Problem List Items Addressed This Visit       Class 2 severe obesity due to excess calories with serious comorbidity in adult (H)    Essential hypertension    Relevant Orders    Basic metabolic panel  (Ca, Cl, CO2, Creat, Gluc, K, Na, BUN)    CBC with platelets    EKG 12-lead complete w/read - Clinics (Completed)    GERD (gastroesophageal reflux disease)    History of endometrial cancer    Personal history of malignant neoplasm of breast    Primary osteoarthritis involving multiple joints    Procedure and treatment not carried out because of patient's decision for reasons of belief and group pressure    Thrombophlebitis of superficial veins of right lower extremity     Other Visit Diagnoses       Preop general physical exam    -  Primary    Pannus, abdominal            She has a history of 2 episodes of superficial thrombophlebitis that were extensive.  No history of DVT.  She has family history of factor V Leiden, no personal history of factor V Leiden.  See heme-onc note before.  At this time, I would not recommend bridging with enoxaparin but rather holding warfarin without  bridging perioperatively           Risks and Recommendations  The patient has the following additional risks and recommendations for perioperative complications:  Anemia/Bleeding/Clotting:    - Patient declines all blood products under any circumstance    Preoperative Medication Instructions  Antiplatelet or Anticoagulation Medication Instructions   - warfarin: Thromboembolic risk is low (<4%/year). DO NOT TAKE warfarin for 5 days without bridging before procedure.     Additional Medication Instructions   - phentermine: DO NOT TAKE 7 days prior to surgery.    I do not think you need Lovenox (Enoxaparin) bridging before or after surgery  The INR clinic will contact you about stopping and restarting date of the warfarin based on your surgery date  Do not take Olmesartan, triamterene/hydrochlorothiazide within 24 hrs of surgery  To prevent blood clots - Walk frequently after surgery.  Wear your compression stockings  Blood work and EKG today    Recommendation  Approval given to proceed with proposed procedure, without further diagnostic evaluation.    Patito Landaverde is a 75 year old, presenting for the following:  Pre-Op Exam        HPI: Large symptomatic pannus needing surgical repair          3/20/2025   Pre-Op Questionnaire   Have you ever had a heart attack or stroke? No   Have you ever had surgery on your heart or blood vessels, such as a stent placement, a coronary artery bypass, or surgery on an artery in your head, neck, heart, or legs? No   Do you have chest pain with activity? No   Do you have a history of heart failure? No   Do you currently have a cold, bronchitis or symptoms of other infection? No   Do you have a cough, shortness of breath, or wheezing? No   Do you or anyone in your family have previous history of blood clots? No   Do you or does anyone in your family have a serious bleeding problem such as prolonged bleeding following surgeries or cuts? No   Have you ever had problems with anemia or  been told to take iron pills? No   Have you had any abnormal blood loss such as black, tarry or bloody stools, or abnormal vaginal bleeding? No   Have you ever had a blood transfusion? No   Are you willing to have a blood transfusion if it is medically needed before, during, or after your surgery? No   Have you or any of your relatives ever had problems with anesthesia? Yes sister hard time walking    Do you have sleep apnea, excessive snoring or daytime drowsiness? No   Do you have any artifical heart valves or other implanted medical devices like a pacemaker, defibrillator, or continuous glucose monitor? No   Do you have artificial joints? No   Are you allergic to latex? No     Health Care Directive  Patient has a Health Care Directive on file      Preoperative Review of    reviewed - no record of controlled substances prescribed.      Status of Chronic Conditions:  See problem list for active medical problems.  Problems all longstanding and stable, except as noted/documented.  See ROS for pertinent symptoms related to these conditions.    History of VTE  From Heme note 2/2022    Thrombosis involving the bilateral saphenous veins: Patient herself has never had a DVT. This factors for lower extremity superficial thrombosis include obesity and varicosities.  She has been on warfarin.  She has been tolerating it well.  I would recommend long-term anticoagulation to prevent further lower extremity superficial thrombosis and worsening of her varicosities, which could lead to both thrombotic syndrome.  I think the benefits of warfarin outweigh risk at this time.  I think she would be at high risk of developing further superficial thrombosis in the legs if she stopped.  She has a low bleeding risk.  Additionally, she does not mind staying on anticoagulation and likes the idea of decreasing her clotting risk.   Her first episode was back in April 2013 when she was found to have extensive occlusive thrombus  throughout the left greater saphenous vein. Thrombosis of superficial varicosities in the calf was also seen.  Deep veins were patent.  She had a second episode in March 2021 when an ultrasound showed a thrombus in the right greater saphenous vein and anterior accessory saphenous vein in the proximal thigh. Clot was at the saphenofemoral junction. The right saphenous vein was occluded from the proximal to mid calf.   --Factor V Leiden mutation testing was normal.   --she developed itching on lovenox      History of endometrial cancer  1/31/13: MONICA performed for endometrial cancer.   --Chemotherapy with CarboTaxol and external radiation.  Last oncology 4/2019    History of breast cancer  In her 40s, 1998. She had remote history of left breast cancer then treated with lumpectomy, left-sided axillary lymph node dissection, radiation and tamoxifen for 1 year, discontinued due to phlebitis     Patient Active Problem List    Diagnosis Date Noted    Essential hypertension 03/01/2024     Priority: Medium     Leg cramps on amlodipine 2.5 mg and spironolactone 25 mg and 12.5 mg      Personal history of malignant neoplasm of breast 03/01/2024     Priority: Medium     In her 40s, 1998. She had remote history of left breast cancer then treated with lumpectomy, left-sided axillary lymph node dissection, radiation and tamoxifen for 1 year, discontinued due to phlebitis       Primary osteoarthritis involving multiple joints 03/01/2024     Priority: Medium     Saw Rheum in 2017, diagnosis was OA and not RA      Procedure and treatment not carried out because of patient's decision for reasons of belief and group pressure 03/01/2024     Priority: Medium     Ok with blood substitutes       Class 2 severe obesity due to excess calories with serious comorbidity in adult (H) 12/13/2023     Priority: Medium    Family history of factor V deficiency 07/05/2021     Priority: Medium    Long term current use of anticoagulant therapy 07/02/2021      Priority: Medium    Thrombophlebitis of superficial veins of right lower extremity 03/19/2021     Priority: Medium     --She saw oncology 2/22 -  Thrombosis involving the bilateral saphenous veins: Patient herself has never had a DVT. This factors for lower extremity superficial thrombosis include obesity and varicosities.  She has been on warfarin.  She has been tolerating it well.  I would recommend long-term anticoagulation to prevent further lower extremity superficial thrombosis and worsening of her varicosities, which could lead to both thrombotic syndrome.  I think the benefits of warfarin outweigh risk at this time.  I think she would be at high risk of developing further superficial thrombosis in the legs if she stopped.  She has a low bleeding risk       Nontoxic multinodular goiter 10/06/2014     Priority: Medium    History of endometrial cancer 01/25/2013     Priority: Medium     1/17/13: Pap--ASCUS, +HR HPV 66.   1/31/13: MONICA performed for endometrial cancer.   --Chemotherapy with CarboTaxol and external radiation.  Last oncology 4/2019      Plantar warts 07/18/2012     Priority: Medium    CARDIOVASCULAR SCREENING; LDL GOAL LESS THAN 130 10/31/2010     Priority: Medium    Sebaceous cyst 02/15/2010     Priority: Medium    GERD (gastroesophageal reflux disease) 09/29/2009     Priority: Medium      Past Medical History:   Diagnosis Date    Acute parametritis and pelvic cellulitis 1994    salpingitis    ASCUS with positive high risk HPV 01/2013    Asthma     No recent issues    Basal cell carcinoma     breast cancer 1998    left lumpectomy, radiation, tamoxifen    Breast cancer (H) 1998    L Breast; Lumpectomy & Radiation    Chronic salpingitis and oophoritis 02/1994    PID       Embolism and thrombosis of unspecified site     left leg, due to tamoxifen therapy    Generalized osteoarthrosis, unspecified site     hands    Leiomyoma of uterus, unspecified     Morbid obesity (H) 12/04/2017    Other  malignant neoplasm of skin, site unspecified 2006    Basal cell cancer on nose    Squamous cell carcinoma     Thrombosis of leg     Left    Unspecified essential hypertension      Past Surgical History:   Procedure Laterality Date    BREAST LUMPECTOMY, RT/LT      left    C/SECTION, LOW TRANSVERSE  1972,     , Low Transverse x2    CL AFF SURGICAL PATHOLOGY      Breast reduction    COLONOSCOPY  10/15/02    normal    FOOT SURGERY      R Foot     HC EXCISION BREAST LESION, OPEN >=1  98    1) Needle localization with generous lumpectomy 2) Left axillary node dissection.    HC LAPAROSCOPY, SURGICAL, ABDOMEN, PERITONEUM & OMENTUM; DX W/ OR W/O SPECIMEN(S)  94    HYSTERECTOMY, PAP NO LONGER INDICATED      INSERT PORT VASCULAR ACCESS  3/14/2013    Procedure: INSERT PORT VASCULAR ACCESS;  Port Revision;  Surgeon: Arash Puente MD;  Location: [facility] OR    LAPAROSCOPIC HYSTERECTOMY TOTAL, BILATERAL SALPINGO-OOPHORECTOMY, NODE DISSECTION, COMBINED  2013    Procedure: COMBINED LAPAROSCOPIC HYSTERECTOMY TOTAL, SALPINGO-OOPHORECTOMY, NODE DISSECTION;  Laparosocpic  Total Abdominal Hysterectomy, Bilateral Salphino-Oophorectomy, Bilateral Pelvic Lymph Node Dissection, Pelvic Washings, Cystoscopy;  Surgeon: Tanya Walker MD;  Location: [facility] OR    PHACOEMULSIFICATION WITH STANDARD INTRAOCULAR LENS IMPLANT Left 2019    Procedure: Cataract Removal with Implant;  Surgeon: Walt Mckeon MD;  Location: [facility]    PHACOEMULSIFICATION WITH STANDARD INTRAOCULAR LENS IMPLANT Right 7/3/2019    Procedure: Cataract Removal with Implant;  Surgeon: Walt Mckeon MD;  Location: [facility] OR    SURGICAL HISTORY OF -   93    1) Excision of digital cyst right mid finger  2)  Excision of dermatofibroma left calf    SURGICAL HISTORY OF -   2001    Excision of mucinous cyst & partial ostectomy, bone removal of benign osteophytic overgrowth osteophyte of the distal aspect of the  middle phalanx and distal phalanx    SURGICAL HISTORY OF -   2006    Basal cell cancer removal    TONSILLECTOMY      TUBAL LIGATION  1978     Current Outpatient Medications   Medication Sig Dispense Refill    Acetaminophen (TYLENOL PO) Take 500 mg by mouth as needed for mild pain or fever      carvedilol (COREG) 12.5 MG tablet Take 1 tablet (12.5 mg) by mouth 2 times daily (with meals). 180 tablet 3    olmesartan (BENICAR) 40 MG tablet Take 1 tablet (40 mg) by mouth daily. 90 tablet 3    omeprazole (PRILOSEC) 20 MG DR capsule Take 1 capsule (20 mg) by mouth daily. 90 capsule 3    phentermine 30 MG capsule Take 1 capsule (30 mg) by mouth every morning. 90 capsule 1    topiramate (TOPAMAX) 50 MG tablet Take 1 tablet (50 mg) by mouth daily. 90 tablet 1    triamterene-HCTZ (MAXZIDE-25) 37.5-25 MG tablet Take 1 tablet by mouth daily 90 tablet 3    warfarin ANTICOAGULANT (JANTOVEN ANTICOAGULANT) 5 MG tablet Take 2.5 mg every Sun, Wed, Fri; 5 mg all other days or As directed by Anticoagulation clinic 77 tablet 1       Allergies   Allergen Reactions    Acetaminophen      Other reaction(s): Gastrointestinal  Pt states that she can take this medication    Lovenox [Enoxaparin] Itching     The patient had a delayed hypersensitivity reaction, manifested by pruritic rash.  There are wide ranges of cross-reactivity that have been documented to occur with LMWHs in [facility], but fondaparinux and  danaparoid are generally well tolerated. Hypersensitivity to heparin is not cross-reactive with structurally distinct anticoagulants such as hirudins or factor Xa inhibitors, and these are often used as alternative agents.    Metal [Staples] Other (See Comments)     Not staples, but metal surgical screws; Sutures    Nickel      Pt is unable to have metal inserted into body- according to pt    Percocet [Oxycodone-Acetaminophen] Nausea and Vomiting        Social History     Tobacco Use    Smoking status: Never    Smokeless tobacco: Never  "  Substance Use Topics    Alcohol use: Yes     Comment: Rare     Family History   Problem Relation Age of Onset    Cerebrovascular Disease Mother     Hypertension Mother     Diabetes Mother     Thyroid Disease Mother     Arthritis Mother     Alzheimer Disease Mother     Neurologic Disorder Mother         Parkinsons    Heart Disease Father     Hypertension Father     Diabetes Father     Thyroid Disease Father     Arthritis Father     Blood Disease Father     Factor V Leiden deficiency Father     Anesthesia Reaction Sister     Thyroid Disease Sister     Anesthesia Reaction Sister     Arthritis Sister         RA    Hypertension Brother     Factor V Leiden deficiency Brother     Allergies Son     Gastrointestinal Disease Son         GERD    Hypertension Brother     Allergies Son         percocett    Gastrointestinal Disease Son         GERD    Hypertension Son     Factor V Leiden deficiency Niece      History   Drug Use No             Review of Systems  Constitutional, neuro, ENT, endocrine, pulmonary, cardiac, gastrointestinal, genitourinary, musculoskeletal, integument and psychiatric systems are negative, except as otherwise noted.    Objective    /72 (BP Location: Left arm, Patient Position: Sitting, Cuff Size: Adult Regular)   Pulse 93   Temp 97.7  F (36.5  C) (Tympanic)   Resp 16   Ht 1.613 m (5' 3.5\")   Wt 89.4 kg (197 lb 3.2 oz)   SpO2 100%   BMI 34.38 kg/m     Estimated body mass index is 34.38 kg/m  as calculated from the following:    Height as of this encounter: 1.613 m (5' 3.5\").    Weight as of this encounter: 89.4 kg (197 lb 3.2 oz).  Physical Exam  GENERAL: alert and no distress  EYES: Eyes grossly normal to inspection, PERRL and conjunctivae and sclerae normal  HENT: ear canals and TM's normal, nose and mouth without ulcers or lesions  NECK: no adenopathy, no asymmetry, masses, or scars  RESP: lungs clear to auscultation - no rales, rhonchi or wheezes  CV: regular rate and rhythm, normal S1 " S2, no S3 or S4, no murmur, click or rub, no peripheral edema  ABDOMEN: bowel sounds normal and obese, soft, non-tender  MS: no gross musculoskeletal defects noted, no edema  SKIN: no suspicious lesions or rashes  NEURO: Normal strength and tone, mentation intact and speech normal  PSYCH: mentation appears normal, affect normal/bright    Recent Labs   Lab Test 03/10/25  1110 02/24/25  0916 10/28/24  0832 10/14/24  1028 04/29/24  0956 04/29/24  0915   HGB  --   --   --  13.7  --   --    PLT  --   --   --  218  --   --    INR 2.7* 3.1*   < > 3.2*   < >  --    NA  --   --   --  140  --  138   POTASSIUM  --   --   --  3.8  --  3.6   CR  --   --   --  0.94  --  0.79    < > = values in this interval not displayed.        Diagnostics  Labs pending at this time.  Results will be reviewed when available.   EKG: appears normal, NSR, unchanged from previous tracings    Revised Cardiac Risk Index (RCRI)  The patient has the following serious cardiovascular risks for perioperative complications:   - No serious cardiac risks = 0 points     RCRI Interpretation: 0 points: Class I (very low risk - 0.4% complication rate)         Signed Electronically by: Albina Snider DO  A copy of this evaluation report is provided to the requesting physician.

## 2025-03-20 NOTE — TELEPHONE ENCOUNTER
PLACIDO-PROCEDURAL ANTICOAGULATION  MANAGEMENT    Thomas requesting pre-procedure hold orders for warfarin and review for bridging      Procedure date: TBD       Procedure:  Panniculectomy      Procedure location and phone number (if external): [place]     Number of warfarin hold days requested and/or target INR: 5 days    Pre-op date:  3/20/25  - per PCP,           Routing to Anticoagulation Pharmacist for review.     ACC pool: keith Ponce RN

## 2025-03-20 NOTE — PATIENT INSTRUCTIONS
How to Take Your Medication Before Surgery  Preoperative Medication Instructions   Antiplatelet or Anticoagulation Medication Instructions   - warfarin: Thromboembolic risk is low (<4%/year). DO NOT TAKE warfarin for 5 days without bridging before procedure.     Additional Medication Instructions   - phentermine: DO NOT TAKE 7 days prior to surgery.    I do not think you need Lovenox (Enoxaparin) bridging before or after surgery  The INR clinic will contact you about stopping and restarting date of the warfarin based on your surgery date  Do not take Olmesartan, triamterene/hydrochlorothiazide within 24 hrs of surgery  To prevent blood clots - Walk frequently after surgery.  Wear your compression stockings  Blood work and EKG today       Patient Education   Preparing for Your Surgery  For Adults  Getting started  In most cases, a nurse will call to review your health history and instructions. They will give you an arrival time based on your scheduled surgery time. Please be ready to share:  Your doctor's clinic name and phone number  Your medical, surgical, and anesthesia history  A list of allergies and sensitivities  A list of medicines, including herbal treatments and over-the-counter drugs  Whether the patient has a legal guardian (ask how to send us the papers in advance)  Note: You may not receive a call if you were seen at our PAC (Preoperative Assessment Center).  Please tell us if you're pregnant--or if there's any chance you might be pregnant. Some surgeries may injure a fetus (unborn baby), so they require a pregnancy test. Surgeries that are safe for a fetus don't always need a test, and you can choose whether to have one.   Preparing for surgery  Within 10 to 30 days of surgery: Have a pre-op exam (sometimes called an H&P, or History and Physical). This can be done at a clinic or pre-operative center.  If you're having a , you may not need this exam. Talk to your care team.  At your pre-op  exam, talk to your care team about all medicines you take. (This includes CBD oil and any drugs, such as THC, marijuana, and other forms of cannabis.) If you need to stop any medicine before surgery, ask when to start taking it again.  This is for your safety. Many medicines and drugs can make you bleed too much during surgery. Some change how well surgery (anesthesia) drugs work.  Call your insurance company to let them know you're having surgery. (If you don't have insurance, call [phone].)  Call your clinic if there's any change in your health. This includes a scrape or scratch near the surgery site, or any signs of a cold (sore throat, runny nose, cough, rash, fever).  Eating and drinking guidelines  For your safety: Unless your surgeon tells you otherwise, follow the guidelines below.  Eat and drink as normal until 8 hours before you arrive for surgery. After that, no food or milk. You can spit out gum when you arrive.  Drink clear liquids until 2 hours before you arrive. These are liquids you can see through, like water, Gatorade, and Propel Water. They also include plain black coffee and tea (no cream or milk).  No alcohol for 24 hours before you arrive. The night before surgery, stop any drinks that contain THC.  If your care team tells you to take medicine on the morning of surgery, it's okay to take it with a sip of water. No other medicines or drugs are allowed (including CBD oil)--follow your care team's instructions.  If you have questions the day of surgery, call your hospital or surgery center.   Preventing infection  Shower or bathe the night before and the morning of surgery. Follow the instructions your clinic gave you. (If no instructions, use regular soap.)  Don't shave or clip hair near your surgery site. We'll remove the hair if needed.  Don't smoke or vape the morning of surgery. No chewing tobacco for 6 hours before you arrive. A nicotine patch is okay. You may spit out nicotine gum when  you arrive.  For some surgeries, the surgeon will tell you to fully quit smoking and nicotine.  We will make every effort to keep you safe from infection. We will:  Clean our hands often with soap and water (or an alcohol-based hand rub).  Clean the skin at your surgery site with a special soap that kills germs.  Give you a special gown to keep you warm. (Cold raises the risk of infection.)  Wear hair covers, masks, gowns, and gloves during surgery.  Give antibiotic medicine, if prescribed. Not all surgeries need this medicine.  What to bring on the day of surgery  Photo ID and insurance card  Copy of your health care directive, if you have one  Glasses and hearing aids (bring cases)  You can't wear contacts during surgery  Inhaler and eye drops, if you use them (tell us about these when you arrive)  CPAP machine or breathing device, if you use them  A few personal items, if spending the night  If you have . . .  A pacemaker, ICD (cardiac defibrillator), or other implant: Bring the ID card.  An implanted stimulator: Bring the remote control.  A legal guardian: Bring a copy of the certified (court-stamped) guardianship papers.  Please remove any jewelry, including body piercings. Leave jewelry and other valuables at home.  If you're going home the day of surgery  You must have a responsible adult drive you home. They should stay with you overnight as well.  If you don't have someone to stay with you, and you aren't safe to go home alone, we may keep you overnight. Insurance often won't pay for this.  After surgery  If it's hard to control your pain or you need more pain medicine, please call your surgeon's office.  Questions?   If you have any questions for your care team, list them here:    ____________________________________________________________________________________________________________________________________________________________________________________________________________________________________________________________  For informational purposes only. Not to replace the advice of your health care provider. Copyright   2003, 2019 [facility]. All rights reserved. Clinically reviewed by Sebastian Sol MD. [employer] [id number] - REV 08/24.

## 2025-03-27 DIAGNOSIS — N62 BREAST HYPERTROPHY: Primary | ICD-10-CM

## 2025-03-27 RX ORDER — HEPARIN SODIUM 10000 [USP'U]/ML
5000 INJECTION, SOLUTION INTRAVENOUS; SUBCUTANEOUS
OUTPATIENT
Start: 2025-03-27

## 2025-03-28 ENCOUNTER — HOSPITAL ENCOUNTER (OUTPATIENT)
Facility: AMBULATORY SURGERY CENTER | Age: 75
End: 2025-03-28
Attending: PLASTIC SURGERY
Payer: MEDICARE

## 2025-03-28 PROBLEM — N62 BREAST HYPERTROPHY: Status: ACTIVE | Noted: 2025-03-27

## 2025-03-31 ENCOUNTER — ANTICOAGULATION THERAPY VISIT (OUTPATIENT)
Dept: ANTICOAGULATION | Facility: CLINIC | Age: 75
End: 2025-03-31

## 2025-03-31 ENCOUNTER — LAB (OUTPATIENT)
Dept: LAB | Facility: CLINIC | Age: 75
End: 2025-03-31
Payer: MEDICARE

## 2025-03-31 DIAGNOSIS — I80.01 THROMBOPHLEBITIS OF SUPERFICIAL VEINS OF RIGHT LOWER EXTREMITY: ICD-10-CM

## 2025-03-31 DIAGNOSIS — D68.9 BLOOD COAGULATION DISORDER: ICD-10-CM

## 2025-03-31 DIAGNOSIS — Z79.899 ENCOUNTER FOR LONG-TERM CURRENT USE OF MEDICATION: ICD-10-CM

## 2025-03-31 DIAGNOSIS — I80.01 THROMBOPHLEBITIS OF SUPERFICIAL VEINS OF RIGHT LOWER EXTREMITY: Primary | ICD-10-CM

## 2025-03-31 DIAGNOSIS — Z83.2 FAMILY HISTORY OF FACTOR V DEFICIENCY: ICD-10-CM

## 2025-03-31 DIAGNOSIS — Z79.01 LONG TERM CURRENT USE OF ANTICOAGULANT THERAPY: ICD-10-CM

## 2025-03-31 LAB — INR BLD: 2.9 (ref 0.9–1.1)

## 2025-03-31 PROCEDURE — 36416 COLLJ CAPILLARY BLOOD SPEC: CPT

## 2025-03-31 PROCEDURE — 85610 PROTHROMBIN TIME: CPT

## 2025-03-31 NOTE — TELEPHONE ENCOUNTER
"PLACIDO-PROCEDURAL ANTICOAGULATION  MANAGEMENT    ASSESSMENT     Warfarin interruption plan for Panniculectomy  on date TBD.    Indication for Anticoagulation: DVT    DVT HX 1999/2000 while on tamoxifen  Thrombophlebitis 04/2013 (treated for endometrial cancer at that time, treated with warfarin 6 months)  Superficial thrombophlebitis 02/27//2021  Factor V levels normal, however not screened for mutation     Past procedure management  Bridged with new onset thrombophlebitis 04/2013  Bridged with Lovenox 03/2021 & developed hives at injection sites    Placido-Procedure Risk stratification for thromboembolism: moderate (2022 Chest guidelines)    VTE: 2022 CHEST Perioperative Management guidelines suggest against bridging for patients with Hx of VTE as sole clinical indication for warfarin except in high risk stratification patients.    RECOMMENDATION     Pre-Procedure:  Hold warfarin for 5 days, until after procedure   No Bridge    Post-Procedure:  Resume warfarin dose if okay with provider doing procedure on night of procedure,  PM: 7.5mg   Recheck INR ~ 7 days after resuming warfarin     Plan routed to referring provider for approval  ?   Solange Claros [facility]    SUBJECTIVE/OBJECTIVE     Thomas Davila, a 75 year old female    Goal INR Range: 2.0-3.0     Wt Readings from Last 3 Encounters:   03/20/25 89.4 kg (197 lb 3.2 oz)   03/07/25 91.2 kg (201 lb)   06/07/24 88.9 kg (195 lb 14.4 oz)      Ideal body weight: 53.5 kg (118 lb 0.9 oz)  Adjusted ideal body weight: 67.9 kg (149 lb 11.4 oz)     Estimated body mass index is 34.38 kg/m  as calculated from the following:    Height as of an earlier encounter on 3/20/25: 1.613 m (5' 3.5\").    Weight as of an earlier encounter on 3/20/25: 89.4 kg (197 lb 3.2 oz).    Lab Results   Component Value Date    INR 2.9 (H) 03/31/2025    INR 2.7 (H) 03/10/2025    INR 3.1 (H) 02/24/2025     Lab Results   Component Value Date    HGB 14.7 03/20/2025    HCT 43.3 03/20/2025     " 03/20/2025     Lab Results   Component Value Date    CR 1.00 (H) 03/20/2025    CR 0.94 10/14/2024    CR 0.79 04/29/2024     Estimated Creatinine Clearance: 50.5 mL/min (A) (based on SCr of 1 mg/dL (H)).

## 2025-03-31 NOTE — PROGRESS NOTES
ANTICOAGULATION MANAGEMENT     Thmoas Davila 75 year old female is on warfarin with therapeutic INR result. (Goal INR 2.0-3.0)    Recent labs: (last 7 days)     03/31/25  0855   INR 2.9*       ASSESSMENT     Source(s): Chart Review   Previous result: Therapeutic last visit; previously outside of goal range  Additional findings: None     PLAN     Recommended plan for no diet, medication or health factor changes affecting INR     Dosing Instructions: Continue your current warfarin dose with next INR in 4 weeks       Summary  As of 3/31/2025      Full warfarin instructions:  5 mg every Mon, Thu, Sat; 2.5 mg all other days   Next INR check:  4/28/2025               Detailed voice message left for Akhil with dosing instructions and follow up date.     Contact [phone] to schedule and with any changes, questions or concerns.     Education provided: Please call back if any changes to your diet, medications or how you've been taking warfarin    Plan made per [facility] anticoagulation protocol    Harper Kline RN  3/31/2025  Anticoagulation Clinic  "[employer]" for routing messages: keith THORNTON  [facility] patient phone line: [phone]        _______________________________________________________________________     Anticoagulation Episode Summary       Current INR goal:  2.0-3.0   TTR:  70.3% (1 y)   Target end date:  Indefinite   Send INR reminders to:  ALEJANDRO THORNTON    Indications    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 okay to leave DVM per signed consent             Anticoagulation Care Providers        Provider Role Specialty Phone number    Lizabeth Zavala MD Referring Family Medicine [phone]    Dung Prieto MD Referring Family Medicine     Albina Snider DO Referring Internal Medicine [phone]

## 2025-04-07 PROBLEM — L98.7 EXCESS SKIN OF ABDOMINAL WALL: Status: ACTIVE | Noted: 2025-03-07

## 2025-04-21 DIAGNOSIS — I10 ESSENTIAL HYPERTENSION: ICD-10-CM

## 2025-04-21 DIAGNOSIS — E66.01 CLASS 2 SEVERE OBESITY DUE TO EXCESS CALORIES WITH SERIOUS COMORBIDITY AND BODY MASS INDEX (BMI) OF 35.0 TO 35.9 IN ADULT (H): ICD-10-CM

## 2025-04-21 DIAGNOSIS — E66.812 CLASS 2 SEVERE OBESITY DUE TO EXCESS CALORIES WITH SERIOUS COMORBIDITY AND BODY MASS INDEX (BMI) OF 35.0 TO 35.9 IN ADULT (H): ICD-10-CM

## 2025-04-22 RX ORDER — PHENTERMINE HYDROCHLORIDE 30 MG/1
30 CAPSULE ORAL EVERY MORNING
Qty: 90 CAPSULE | Refills: 1 | Status: SHIPPED | OUTPATIENT
Start: 2025-04-22

## 2025-04-22 RX ORDER — TRIAMTERENE AND HYDROCHLOROTHIAZIDE 37.5; 25 MG/1; MG/1
TABLET ORAL DAILY
Qty: 270 TABLET | Refills: 0 | Status: SHIPPED | OUTPATIENT
Start: 2025-04-22

## 2025-04-22 NOTE — TELEPHONE ENCOUNTER
Requested Prescriptions   Pending Prescriptions Disp Refills    phentermine 30 MG capsule [Pharmacy Med Name: PHENTERMINE HCL 30MG CAPS] 90 capsule 1     Sig: TAKE ONE CAPSULE BY MOUTH EVERY MORNING       Rx Protocol Controlled Substance Failed - 4/22/2025 10:04 AM        Failed - Urine drug screeen results on file in past 12 months     [unfilled]           Failed - Controlled Substance Agreement on file in last 12 months     Please review last Controlled Substance Pain agreement document.   CSA -- Encounter Level:    CSA: None found at the encounter level.       CSA -- Patient Level:    CSA: None found at the patient level.               Failed - Auto Fail - Please forward to Provider        Passed - Visit with relevant provider in past 3 months or upcoming 3 months        Passed - Medication is active on med list and the sig matches        Passed - Medication not refilled in past 28 days     Invalid Medication Grouper          Passed - No active pregnancy on record        Passed - No pregnancy test in past 12 months or most recent test was negative          triamterene-HCTZ (MAXZIDE-25) 37.5-25 MG tablet [Pharmacy Med Name: TRIAMTERENE-HCTZ 37.5-25MG TABS] 270 tablet 0     Sig: TAKE ONE CAPSULE BY MOUTH ONCE DAILY       Diuretics (Including Combos) Protocol Failed - 4/22/2025 10:04 AM        Failed - Medication is active on med list and the sig matches. RN to manually verify dose and sig if red X/fail.     If the protocol passes (green check), you do not need to verify med dose and sig.    A prescription matches if they are the same clinical intention.    For Example: once daily and every morning are the same.    The protocol can not identify upper and lower case letters as matching and will fail.     For Example: Take 1 tablet (50 mg) by mouth daily     TAKE 1 TABLET (50 MG) BY MOUTH DAILY    For all fails (red x), verify dose and sig.    If the refill does match what is on file, the RN can still proceed to  approve the refill request.       If they do not match, route to the appropriate provider.             Failed - Has GFR on file in past 12 months and most recent value is normal   GFR Estimate   Date Value Ref Range Status   03/20/2025 58 (L) >60 mL/min/1.73m2 Final     Comment:     eGFR calculated using 2021 CKD-EPI equation.   09/10/2020 >90 >60 mL/min/[1.73_m2] Final     Comment:     Non  GFR Calc  Starting 12/18/2018, serum creatinine based estimated GFR (eGFR) will be   calculated using the Chronic Kidney Disease Epidemiology Collaboration   (CKD-EPI) equation.            Passed - Most recent blood pressure under 140/90 in past 12 months     BP Readings from Last 3 Encounters:   03/20/25 114/72   02/03/25 136/70   01/20/25 (!) 142/78       No data recorded            Passed - Potassium level on file in past 12 months        Passed - Medication indicated for associated diagnosis     Medication is associated with one or more of the following diagnoses:     Edema   Hypertension   Heart Failure   Meniere's Disease   Bilateral localized swelling of lower limbs   Pulmonary Hypertension          Passed - Recent (12 mo) or future (90 days) visit within the authorizing provider's specialty     The patient must have completed an in-person or virtual visit within the past 12 months or has a future visit scheduled within the next 90 days with the authorizing provider s specialty.  Urgent care and e-visits do not qualify as an office visit for this protocol.          Passed - Patient is age 18 or older        Passed - No active pregancy on record        Passed - No positive pregnancy test in past 12 months

## 2025-04-28 ENCOUNTER — LAB (OUTPATIENT)
Dept: LAB | Facility: CLINIC | Age: 75
End: 2025-04-28
Payer: MEDICARE

## 2025-04-28 ENCOUNTER — ANTICOAGULATION THERAPY VISIT (OUTPATIENT)
Dept: ANTICOAGULATION | Facility: CLINIC | Age: 75
End: 2025-04-28

## 2025-04-28 DIAGNOSIS — Z79.01 LONG TERM CURRENT USE OF ANTICOAGULANT THERAPY: ICD-10-CM

## 2025-04-28 DIAGNOSIS — I80.01 THROMBOPHLEBITIS OF SUPERFICIAL VEINS OF RIGHT LOWER EXTREMITY: ICD-10-CM

## 2025-04-28 DIAGNOSIS — D68.9 BLOOD COAGULATION DISORDER: ICD-10-CM

## 2025-04-28 DIAGNOSIS — Z83.2 FAMILY HISTORY OF FACTOR V DEFICIENCY: ICD-10-CM

## 2025-04-28 DIAGNOSIS — Z79.899 ENCOUNTER FOR LONG-TERM CURRENT USE OF MEDICATION: ICD-10-CM

## 2025-04-28 DIAGNOSIS — I80.01 THROMBOPHLEBITIS OF SUPERFICIAL VEINS OF RIGHT LOWER EXTREMITY: Primary | ICD-10-CM

## 2025-04-28 LAB — INR BLD: 2 (ref 0.9–1.1)

## 2025-04-28 PROCEDURE — 85610 PROTHROMBIN TIME: CPT

## 2025-04-28 PROCEDURE — 36416 COLLJ CAPILLARY BLOOD SPEC: CPT

## 2025-04-28 NOTE — TELEPHONE ENCOUNTER
Procedure date 9/11/25    Voicemail left for patient to return call to Anticoagulation Clinic.  Harper Kline RN on 4/28/2025 at 2:39 PM

## 2025-04-28 NOTE — TELEPHONE ENCOUNTER
Left detailed VM with procedure plan. Informed patient that the procedure is scheduled later this summer - [facility] can discuss in detail as procedure is closer. Will send to [facility] to update procedure plan with procedure date.  Harper Kline RN on 4/28/2025 at 4:31 PM

## 2025-04-28 NOTE — PROGRESS NOTES
ANTICOAGULATION MANAGEMENT     Thomas Davila 75 year old female is on warfarin with therapeutic INR result. (Goal INR 2.0-3.0)    Recent labs: (last 7 days)     04/28/25  0917   INR 2.0*       ASSESSMENT     Source(s): Chart Review and Patient/Caregiver Call     Warfarin doses taken: Warfarin taken as instructed  Diet: No new diet changes identified  Medication/supplement changes: None noted  New illness, injury, or hospitalization: No  Signs or symptoms of bleeding or clotting: Yes: bruise on her arm, pt was working outside moving logs and dropped a log on her arm while working  Bruise is improving   Previous result: Therapeutic last 2(+) visits  Additional findings: None     PLAN     Recommended plan for temporary change(s) affecting INR     Dosing Instructions: Continue your current warfarin dose with next INR in 5 weeks       Continue to monitor bruise - be seen for worsening Sx or if bruise is not improving     Summary  As of 4/28/2025      Full warfarin instructions:  5 mg every Mon, Thu, Sat; 2.5 mg all other days   Next INR check:  6/2/2025               Telephone call with Akhil who verbalizes understanding and agrees to plan    Lab visit scheduled    Education provided: Please call back if any changes to your diet, medications or how you've been taking warfarin    Plan made per [facility] anticoagulation protocol    Harper Kline RN  4/28/2025  Anticoagulation Clinic  [facility] for routing messages: keith THORNTON  [facility] patient phone line: [phone]        _______________________________________________________________________     Anticoagulation Episode Summary       Current INR goal:  2.0-3.0   TTR:  77.0% (1 y)   Target end date:  Indefinite   Send INR reminders to:  ALEJANDRO THORNTON    Indications    Thrombophlebitis of superficial veins of right lower extremity [I80.01]  Family history of factor V deficiency [Z83.2]  Encounter for long-term current use of medication (Resolved) [Z79.749]  Long term  current use of anticoagulant therapy [Z79.01]  Blood coagulation disorder (Resolved) [D68.9]  Blood coagulation disorder (Resolved) [D68.9]  Encounter for long-term current use of medication (Resolved) [Z79.899]             Comments:  Allergy to Lovenox. okay to leave DVM per signed consent             Anticoagulation Care Providers       Provider Role Specialty Phone number    Lizabeth Zavala MD Referring Family Medicine [phone]    Dung Prieto MD Referring Family Medicine     Albina Snider DO Referring Internal Medicine [phone]

## 2025-04-29 NOTE — TELEPHONE ENCOUNTER
Will do new procedure plan as we approach the new surgery date    Solange Claros, PharmD BCACP, CACP  Anticoagulation Clinical Pharmacist

## 2025-06-02 ENCOUNTER — RESULTS FOLLOW-UP (OUTPATIENT)
Dept: ANTICOAGULATION | Facility: CLINIC | Age: 75
End: 2025-06-02

## 2025-06-02 ENCOUNTER — DOCUMENTATION ONLY (OUTPATIENT)
Dept: ANTICOAGULATION | Facility: CLINIC | Age: 75
End: 2025-06-02

## 2025-06-02 ENCOUNTER — LAB (OUTPATIENT)
Dept: LAB | Facility: CLINIC | Age: 75
End: 2025-06-02
Payer: MEDICARE

## 2025-06-02 ENCOUNTER — ANTICOAGULATION THERAPY VISIT (OUTPATIENT)
Dept: ANTICOAGULATION | Facility: CLINIC | Age: 75
End: 2025-06-02

## 2025-06-02 DIAGNOSIS — D68.9 BLOOD COAGULATION DISORDER: ICD-10-CM

## 2025-06-02 DIAGNOSIS — Z79.01 LONG TERM CURRENT USE OF ANTICOAGULANT THERAPY: ICD-10-CM

## 2025-06-02 DIAGNOSIS — I80.01 THROMBOPHLEBITIS OF SUPERFICIAL VEINS OF RIGHT LOWER EXTREMITY: Primary | ICD-10-CM

## 2025-06-02 DIAGNOSIS — I80.01 THROMBOPHLEBITIS OF SUPERFICIAL VEINS OF RIGHT LOWER EXTREMITY: ICD-10-CM

## 2025-06-02 DIAGNOSIS — Z79.899 ENCOUNTER FOR LONG-TERM CURRENT USE OF MEDICATION: ICD-10-CM

## 2025-06-02 DIAGNOSIS — Z83.2 FAMILY HISTORY OF FACTOR V DEFICIENCY: ICD-10-CM

## 2025-06-02 DIAGNOSIS — Z79.01 LONG TERM CURRENT USE OF ANTICOAGULANT THERAPY: Primary | ICD-10-CM

## 2025-06-02 LAB — INR BLD: 2.5 (ref 0.9–1.1)

## 2025-06-02 PROCEDURE — 36416 COLLJ CAPILLARY BLOOD SPEC: CPT

## 2025-06-02 PROCEDURE — 85610 PROTHROMBIN TIME: CPT

## 2025-06-02 NOTE — PROGRESS NOTES
ANTICOAGULATION CLINIC REFERRAL RENEWAL REQUEST       An annual renewal order is required for all patients referred to [facility].?  Please review and sign the pended referral order for Thomas Davila.       ANTICOAGULATION SUMMARY      Warfarin indication(s)   Thrombophlebitis of superficial veins of RLE, family history of Factor V deficiency    Mechanical heart valve present?  NO       Current goal range   INR: 2.0-3.0     Goal appropriate for indication? Goal INR 2-3, standard for indication(s) above     Time in Therapeutic Range (TTR)  (Goal > 60%) 81.1%       Office visit with referring provider's group within last year yes on 3/20/25       Nithya Muniz, RN  [facility]

## 2025-06-02 NOTE — PROGRESS NOTES
ANTICOAGULATION MANAGEMENT     Thomas Davila 75 year old female is on warfarin with therapeutic INR result. (Goal INR 2.0-3.0)    Recent labs: (last 7 days)     06/02/25  1000   INR 2.5*       ASSESSMENT     Source(s): Chart Review and Patient/Caregiver Call     Warfarin doses taken: Warfarin taken as instructed  Diet: No new diet changes identified  Medication/supplement changes: None noted  New illness, injury, or hospitalization: No  Signs or symptoms of bleeding or clotting: No  Previous result: Therapeutic last 2(+) visits  Additional findings: None       PLAN     Recommended plan for no diet, medication or health factor changes affecting INR     Dosing Instructions: Continue your current warfarin dose with next INR in 6 weeks       Summary  As of 6/2/2025      Full warfarin instructions:  9/6: Hold; 9/7: Hold; 9/8: Hold; 9/9: Hold; 9/10: Hold; 9/11: 7.5 mg; Otherwise 5 mg every Mon, Thu, Sat; 2.5 mg all other days   Next INR check:  7/14/2025               Telephone call with Akhil who verbalizes understanding and agrees to plan and who agrees to plan and repeated back plan correctly    Lab visit scheduled    Education provided: Contact [phone] with any changes, questions or concerns.     Plan made per [facility] anticoagulation protocol    Nithya Muniz RN  6/2/2025  Anticoagulation Clinic  [employer] for routing messages: keith THORNTON  [facility] patient phone line: [phone]        _______________________________________________________________________     Anticoagulation Episode Summary       Current INR goal:  2.0-3.0   TTR:  81.1% (1 y)   Target end date:  Indefinite   Send INR reminders to:  ALEJANDRO THORNTON    Indications    Thrombophlebitis of superficial veins of right lower extremity [I80.01]  Family history of factor V deficiency [Z83.2]  Encounter for long-term current use of medication (Resolved) [Z79.899]  Long term current use of anticoagulant therapy [Z79.01]  Blood coagulation disorder  (Resolved) [D68.9]  Blood coagulation disorder (Resolved) [D68.9]  Encounter for long-term current use of medication (Resolved) [Z01.015]             Comments:  Allergy to Lovenox. okay to leave DVM per signed consent             Anticoagulation Care Providers       Provider Role Specialty Phone number    Lizabeth Zavala MD Referring Family Medicine [phone]    Dung Prieto MD Referring Family Medicine     Albina Snider DO Referring Internal Medicine [phone]

## 2025-06-10 ENCOUNTER — TELEPHONE (OUTPATIENT)
Dept: FAMILY MEDICINE | Facility: CLINIC | Age: 75
End: 2025-06-10
Payer: MEDICARE

## 2025-06-10 NOTE — TELEPHONE ENCOUNTER
Patient Quality Outreach    Patient is due for the following:   Physical Annual Wellness Visit    Action(s) Taken:   Patient has upcoming appointment, these items will be addressed at that time.    Type of outreach:    Chart review performed, no outreach needed.    Questions for provider review:    None         Elle Kitchen MA  Chart routed to None.

## 2025-06-17 ENCOUNTER — OFFICE VISIT (OUTPATIENT)
Dept: FAMILY MEDICINE | Facility: CLINIC | Age: 75
End: 2025-06-17
Attending: INTERNAL MEDICINE
Payer: MEDICARE

## 2025-06-17 VITALS
TEMPERATURE: 96.9 F | WEIGHT: 195.9 LBS | OXYGEN SATURATION: 98 % | HEART RATE: 85 BPM | SYSTOLIC BLOOD PRESSURE: 120 MMHG | BODY MASS INDEX: 34.71 KG/M2 | HEIGHT: 63 IN | DIASTOLIC BLOOD PRESSURE: 72 MMHG | RESPIRATION RATE: 20 BRPM

## 2025-06-17 DIAGNOSIS — Z00.00 ENCOUNTER FOR MEDICARE ANNUAL WELLNESS EXAM: Primary | ICD-10-CM

## 2025-06-17 DIAGNOSIS — I10 ESSENTIAL HYPERTENSION: ICD-10-CM

## 2025-06-17 DIAGNOSIS — Z78.0 MENOPAUSE: ICD-10-CM

## 2025-06-17 DIAGNOSIS — E66.9 NON MORBID OBESITY: ICD-10-CM

## 2025-06-17 PROBLEM — E66.01 CLASS 2 SEVERE OBESITY DUE TO EXCESS CALORIES WITH SERIOUS COMORBIDITY IN ADULT (H): Status: RESOLVED | Noted: 2023-12-13 | Resolved: 2025-06-17

## 2025-06-17 PROBLEM — E66.812 CLASS 2 SEVERE OBESITY DUE TO EXCESS CALORIES WITH SERIOUS COMORBIDITY IN ADULT (H): Status: RESOLVED | Noted: 2023-12-13 | Resolved: 2025-06-17

## 2025-06-17 PROCEDURE — 3078F DIAST BP <80 MM HG: CPT | Performed by: INTERNAL MEDICINE

## 2025-06-17 PROCEDURE — 1126F AMNT PAIN NOTED NONE PRSNT: CPT | Performed by: INTERNAL MEDICINE

## 2025-06-17 PROCEDURE — G0439 PPPS, SUBSEQ VISIT: HCPCS | Performed by: INTERNAL MEDICINE

## 2025-06-17 PROCEDURE — 99213 OFFICE O/P EST LOW 20 MIN: CPT | Mod: 25 | Performed by: INTERNAL MEDICINE

## 2025-06-17 PROCEDURE — 3074F SYST BP LT 130 MM HG: CPT | Performed by: INTERNAL MEDICINE

## 2025-06-17 RX ORDER — TOPIRAMATE 50 MG/1
50 TABLET, FILM COATED ORAL DAILY
Qty: 90 TABLET | Refills: 3 | Status: SHIPPED | OUTPATIENT
Start: 2025-06-17

## 2025-06-17 RX ORDER — TRIAMTERENE AND HYDROCHLOROTHIAZIDE 37.5; 25 MG/1; MG/1
1 TABLET ORAL DAILY
Qty: 270 TABLET | Refills: 3 | Status: SHIPPED | OUTPATIENT
Start: 2025-06-17

## 2025-06-17 SDOH — HEALTH STABILITY: PHYSICAL HEALTH: ON AVERAGE, HOW MANY MINUTES DO YOU ENGAGE IN EXERCISE AT THIS LEVEL?: 0 MIN

## 2025-06-17 SDOH — HEALTH STABILITY: PHYSICAL HEALTH: ON AVERAGE, HOW MANY DAYS PER WEEK DO YOU ENGAGE IN MODERATE TO STRENUOUS EXERCISE (LIKE A BRISK WALK)?: 0 DAYS

## 2025-06-17 ASSESSMENT — PAIN SCALES - GENERAL: PAINLEVEL_OUTOF10: NO PAIN (0)

## 2025-06-17 ASSESSMENT — SOCIAL DETERMINANTS OF HEALTH (SDOH): HOW OFTEN DO YOU GET TOGETHER WITH FRIENDS OR RELATIVES?: MORE THAN THREE TIMES A WEEK

## 2025-06-17 NOTE — PROGRESS NOTES
"Preventive Care Visit  [facility]  Albina Snider DO, Internal Medicine  Jun 17, 2025      Assessment & Plan     Encounter for Medicare annual wellness exam    Non morbid obesity  Declines to increase dose which is reasonable.  Cannot afford GLP-1 out-of-pocket  - topiramate (TOPAMAX) 50 MG tablet; Take 1 tablet (50 mg) by mouth daily.    Essential hypertension  Due for refill  - triamterene-HCTZ (MAXZIDE-25) 37.5-25 MG tablet; Take 1 tablet by mouth daily.    Menopause  Due   - DX Bone Density; Future    Patient has been advised of split billing requirements and indicates understanding: Yes        BMI  Estimated body mass index is 34.43 kg/m  as calculated from the following:    Height as of this encounter: 1.607 m (5' 3.25\").    Weight as of this encounter: 88.9 kg (195 lb 14.4 oz).   Weight management plan: Discussed healthy diet and exercise guidelines    Counseling  Appropriate preventive services were addressed with this patient via screening, questionnaire, or discussion as appropriate for fall prevention, nutrition, physical activity, Tobacco-use cessation, social engagement, weight loss and cognition.  Checklist reviewing preventive services available has been given to the patient.  Reviewed patient's diet, addressing concerns and/or questions.   The patient was instructed to see the dentist every 6 months.   The patient was provided with written information regarding signs of hearing loss.           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 Legs fall asleep while awake and at night due to pannus  Cannot afford to pay for GLP1 out of pocket.  Doesn't want to increase dose of topirmate or phentermine due to side effects she is already having - dry mouth and brain fog  Working out in her yard for exercise.  Legs hurt too much to walk for long periods of time    Wt Readings from Last 5 Encounters:   06/17/25 88.9 kg (195 lb 14.4 oz)   03/20/25 89.4 kg (197 lb 3.2 oz)   03/07/25 91.2 kg (201 lb)   06/07/24 88.9 kg (195 lb 14.4 oz)   04/18/24 90.7 kg (200 lb)         Advance Care Planning    Document on file is a Health Care Directive or POLST.        6/17/2025   General Health   How would you rate your overall physical health? Good   Feel stress (tense, anxious, or unable to sleep) Patient declined         6/17/2025   Nutrition   Diet: I don't know         6/17/2025   Exercise   Days per week of moderate/strenous exercise 0 days   Average minutes spent exercising at this level 0 min   (!) EXERCISE CONCERN      6/17/2025   Social Factors   Frequency of gathering with friends or relatives More than three times a week   Worry food won't last until get money to buy more No   Food not last or not have enough money for food? No   Do you have housing? (Housing is defined as stable permanent housing and does not include staying outside in a car, in a tent, in an abandoned building, in an overnight shelter, or couch-surfing.) Yes   Are you worried about losing your housing? No   Lack of transportation? No   Unable to get utilities (heat,electricity)? No         6/17/2025   Fall Risk   Fallen 2 or more times in the past year? No   Trouble with walking or balance? No          6/17/2025   Activities of Daily Living- Home Safety   Needs help with the following daily activites None of the above   Safety concerns in the home None of the above         6/17/2025   Dental    Dentist two times every year? (!) NO         6/17/2025   Hearing Screening   Hearing concerns? (!) I FEEL THAT PEOPLE ARE MUMBLING OR NOT SPEAKING CLEARLY.    (!) IT'S HARD TO FOLLOW A CONVERSATION IN A NOISY RESTAURANT OR CROWDED ROOM.    Has audiology appointment coming up   Multiple values from one day are sorted in reverse-chronological order         6/17/2025   Driving Risk Screening   Patient/family members have concerns about driving No         6/17/2025   General Alertness/Fatigue Screening   Have you been more tired than usual lately? (!) DECLINE         6/17/2025   Urinary Incontinence Screening   Bothered by leaking urine in past 6 months No         Today's PHQ-2 Score:       6/17/2025    10:06 AM   PHQ-2 ( 1999 Pfizer)   Q1: Little interest or pleasure in doing things 0   Q2: Feeling down, depressed or hopeless 0   PHQ-2 Score 0    Q1: Little interest or pleasure in doing things Not at all   Q2: Feeling down, depressed or hopeless Not at all   PHQ-2 Score 0       Patient-reported           6/17/2025   Substance Use   Alcohol more than 3/day or more than 7/wk Not Applicable   Do you have a current opioid prescription? No   How severe/bad is pain from 1 to 10? 6/10   Do you use any other substances recreationally? No     Social History     Tobacco Use    Smoking status: Never    Smokeless tobacco: Never   Vaping Use    Vaping status: Never Used   Substance Use Topics    Alcohol use: Yes     Comment: Rare    Drug use: No           9/16/2024   LAST FHS-7 RESULTS   1st degree relative breast or ovarian cancer No   Any relative bilateral breast cancer No   Any male have breast cancer No   Any ONE woman have BOTH breast AND ovarian cancer No   Any woman with breast cancer before 50yrs No   2 or more relatives with breast AND/OR ovarian cancer No   2 or more relatives with breast AND/OR bowel cancer No        Mammogram Screening - After age 74- determine frequency with patient based on health status, life  expectancy and patient goals    ASCVD Risk   The 10-year ASCVD risk score (Clarisse MACKEY, et al., 2019) is: 22.5%    Values used to calculate the score:      Age: 75 years      Sex: Female      Is Non- : No      Diabetic: No      Tobacco smoker: No      Systolic Blood Pressure: 120 mmHg      Is BP treated: Yes      HDL Cholesterol: 45 mg/dL      Total Cholesterol: 174 mg/dL            Reviewed and updated as needed this visit by Provider   Tobacco  Allergies  Meds  Problems  Med Hx  Surg Hx  Fam Hx            Current Outpatient Medications   Medication Sig Dispense Refill    carvedilol (COREG) 12.5 MG tablet Take 1 tablet (12.5 mg) by mouth 2 times daily (with meals). 180 tablet 3    olmesartan (BENICAR) 40 MG tablet Take 1 tablet (40 mg) by mouth daily. 90 tablet 3    omeprazole (PRILOSEC) 20 MG DR capsule Take 1 capsule (20 mg) by mouth daily. 90 capsule 3    phentermine 30 MG capsule TAKE ONE CAPSULE BY MOUTH EVERY MORNING 90 capsule 1    topiramate (TOPAMAX) 50 MG tablet Take 1 tablet (50 mg) by mouth daily. 90 tablet 1    triamterene-HCTZ (MAXZIDE-25) 37.5-25 MG tablet TAKE ONE CAPSULE BY MOUTH ONCE DAILY 270 tablet 0    warfarin ANTICOAGULANT (JANTOVEN ANTICOAGULANT) 5 MG tablet Take 2.5 mg every Sun, Wed, Fri; 5 mg all other days or As directed by Anticoagulation clinic 77 tablet 1    Acetaminophen (TYLENOL PO) Take 500 mg by mouth as needed for mild pain or fever (Patient not taking: Reported on 6/17/2025)       Current providers sharing in care for this patient include:  Patient Care Team:  Albina Snider DO as PCP - General (Internal Medicine)  Zane Reyes MD as Physician (Plastic Surgery)  Dashawn Velez MD as MD (Dermatology)  Albina Snider DO as Assigned PCP  Kasandra Friedman PA-C as Physician Assistant (Plastic Surgery)  MIGNON Granados MD as Assigned Surgical Provider  Walt Golden MD as Assigned Dermatology  "Provider    The following health maintenance items are reviewed in Epic and correct as of today:  Health Maintenance   Topic Date Due    COVID-19 VACCINE (10 - 2024-25 season) 06/27/2025    BMP  03/20/2026    ANNUAL REVIEW OF HM ORDERS  03/20/2026    MEDICARE ANNUAL WELLNESS VISIT  06/17/2026    FALL RISK ASSESSMENT  06/17/2026    DIABETES SCREENING  03/20/2028    ADVANCE CARE PLANNING  06/07/2029    LIPID  10/14/2029    DEXA  03/21/2031    DTAP/TDAP/TD VACCINE (3 - Td or Tdap) 12/16/2032    PHQ-2 (once per calendar year)  Completed    INFLUENZA VACCINE  Completed    PNEUMOCOCCAL VACCINE 50+ YEARS  Completed    ZOSTER VACCINE  Completed    RSV VACCINE  Completed    HPV VACCINE  Aged Out    MENINGITIS VACCINE  Aged Out    HEPATITIS C SCREENING  Discontinued    MAMMO SCREENING  Discontinued    COLORECTAL CANCER SCREENING  Discontinued         Review of Systems  Constitutional, HEENT, cardiovascular, pulmonary, gi and gu systems are negative, except as otherwise noted.     Objective    Exam  /72 (BP Location: Right arm, Patient Position: Sitting, Cuff Size: Adult Large)   Pulse 85   Temp 96.9  F (36.1  C) (Tympanic)   Resp 20   Ht 1.607 m (5' 3.25\")   Wt 88.9 kg (195 lb 14.4 oz)   SpO2 98%   BMI 34.43 kg/m     Estimated body mass index is 34.43 kg/m  as calculated from the following:    Height as of this encounter: 1.607 m (5' 3.25\").    Weight as of this encounter: 88.9 kg (195 lb 14.4 oz).    Physical Exam  GENERAL: alert and no distress  EYES: Eyes grossly normal to inspection, PERRL and conjunctivae and sclerae normal  HENT: ear canals and TM's normal, nose and mouth without ulcers or lesions  NECK: no adenopathy, no asymmetry, masses, or scars  RESP: lungs clear to auscultation - no rales, rhonchi or wheezes  CV: regular rate and rhythm, normal S1 S2, no S3 or S4, no murmur, click or rub, no peripheral edema  ABDOMEN: soft, nontender, no hepatosplenomegaly, no masses and bowel sounds normal  MS: no " gross musculoskeletal defects noted, no edema  SKIN: no suspicious lesions or rashes  NEURO: Normal strength and tone, mentation intact and speech normal  PSYCH: mentation appears normal, affect normal/bright         6/17/2025   Mini Cog   Clock Draw Score 2 Normal   3 Item Recall 3 objects recalled   Mini Cog Total Score 5              Signed Electronically by: Albina Snider DO

## 2025-06-17 NOTE — PATIENT INSTRUCTIONS
Health Care Maintenance  Recommend bone density test.        Exercise Resources:    For Seniors or Those with Pain/Mobility Issues:  Silver Sneakers  https://tools.EndPlay/  [employer] Fitness, Snap Fitness, [facility]  2. [employer] Stretch (airs on PBS)  https://POKKTs.[place]/  3. Your [employer] - small group classes (in person or online) that help you stay active. Free or low cost  [employer].AutomateIt  4. Yoga for Seniors - free, online  https://www.Jellycoaster/content/yoga-seniors  5. Sit and Be Fit https://www.sitandbefit.org/  6. Rajeev Chi  Rajeev Chi is a great option for balance.  Consider Rajeev Chi or Qi Gong  Rajeev Td Pramod: moving for Better Balance  [facility] - videos for balance          Patient Education   Preventive Care Advice   This is general advice given by our system to help you stay healthy. However, your care team may have specific advice just for you. Please talk to your care team about your preventive care needs.  Nutrition  Eat 5 or more servings of fruits and vegetables each day.  Try wheat bread, brown rice and whole grain pasta (instead of white bread, rice, and pasta).  Get enough calcium and vitamin D. Check the label on foods and aim for 100% of the RDA (recommended daily allowance).  Lifestyle  Exercise at least 150 minutes each week  (30 minutes a day, 5 days a week).  Do muscle strengthening activities 2 days a week. These help control your weight and prevent disease.  No smoking.  Wear sunscreen to prevent skin cancer.  Have a dental exam and cleaning every 6 months.  Yearly exams  See your health care team every year to talk about:  Any changes in your health.  Any medicines your care team has prescribed.  Preventive care, family planning, and ways to prevent chronic diseases.  Shots (vaccines)   HPV shots (up to age 26), if you've never had them before.  Hepatitis B shots (up to age 59), if you've never  had them before.  COVID-19 shot: Get this shot when it's due.  Flu shot: Get a flu shot every year.  Tetanus shot: Get a tetanus shot every 10 years.  Pneumococcal, hepatitis A, and RSV shots: Ask your care team if you need these based on your risk.  Shingles shot (for age 50 and up)  General health tests  Diabetes screening:  Starting at age 35, Get screened for diabetes at least every 3 years.  If you are younger than age 35, ask your care team if you should be screened for diabetes.  Cholesterol test: At age 39, start having a cholesterol test every 5 years, or more often if advised.  Bone density scan (DEXA): At age 50, ask your care team if you should have this scan for osteoporosis (brittle bones).  Hepatitis C: Get tested at least once in your life.  STIs (sexually transmitted infections)  Before age 24: Ask your care team if you should be screened for STIs.  After age 24: Get screened for STIs if you're at risk. You are at risk for STIs (including HIV) if:  You are sexually active with more than one person.  You don't use condoms every time.  You or a partner was diagnosed with a sexually transmitted infection.  If you are at risk for HIV, ask about PrEP medicine to prevent HIV.  Get tested for HIV at least once in your life, whether you are at risk for HIV or not.  Cancer screening tests  Cervical cancer screening: If you have a cervix, begin getting regular cervical cancer screening tests starting at age 21.  Breast cancer scan (mammogram): If you've ever had breasts, begin having regular mammograms starting at age 40. This is a scan to check for breast cancer.  Colon cancer screening: It is important to start screening for colon cancer at age 45.  Have a colonoscopy test every 10 years (or more often if you're at risk) Or, ask your provider about stool tests like a FIT test every year or Cologuard test every 3 years.  To learn more about your testing options, visit:   .  For help making a decision, visit:    https://bit.Jaeger/no74558.  Prostate cancer screening test: If you have a prostate, ask your care team if a prostate cancer screening test (PSA) at age 55 is right for you.  Lung cancer screening: If you are a current or former smoker ages 50 to 80, ask your care team if ongoing lung cancer screenings are right for you.  For informational purposes only. Not to replace the advice of your health care provider. Copyright   2023 [place] [employer]. All rights reserved. Clinically reviewed by the  [employer] [place] [facility]. [employer] 335540 - REV 01/24.  Hearing Loss: Care Instructions  Overview     Hearing loss is a sudden or slow decrease in how well you hear. It can range from slight to profound. Permanent hearing loss can occur with aging. It also can happen when you are exposed long-term to loud noise. Examples include listening to loud music, riding motorcycles, or being around other loud machines.  Hearing loss can affect your work and home life. It can make you feel lonely or depressed. You may feel that you have lost your independence. But hearing aids and other devices can help you hear better and feel connected to others.  Follow-up care is a key part of your treatment and safety. Be sure to make and go to all appointments, and call your doctor if you are having problems. It's also a good idea to know your test results and keep a list of the medicines you take.  How can you care for yourself at home?  Avoid loud noises whenever possible. This helps keep your hearing from getting worse.  Always wear hearing protection around loud noises.  Wear a hearing aid as directed.  A professional can help you pick a hearing aid that will work best for you.  You can also get hearing aids over the counter for mild to moderate hearing loss.  Have hearing tests as your doctor suggests. They can show whether your hearing has changed. Your hearing aid may need to be adjusted.  Use other devices as needed. These  "may include:  Telephone amplifiers and hearing aids that can connect to a television, stereo, radio, or microphone.  Devices that use lights or vibrations. These alert you to the doorbell, a ringing telephone, or a baby monitor.  Television closed-captioning. This shows the words at the bottom of the screen. Most new TVs can do this.  TTY (text telephone). This lets you type messages back and forth on the telephone instead of talking or listening. These devices are also called TDD. When messages are typed on the keyboard, they are sent over the phone line to a receiving TTY. The message is shown on a monitor.  Use text messaging, social media, and email if it is hard for you to communicate by telephone.  Try to learn a listening technique called speechreading. It is not lipreading. You pay attention to people's gestures, expressions, posture, and tone of voice. These clues can help you understand what a person is saying. Face the person you are talking to, and have them face you. Make sure the lighting is good. You need to see the other person's face clearly.  Think about counseling if you need help to adjust to your hearing loss.  When should you call for help?  Watch closely for changes in your health, and be sure to contact your doctor if:    You think your hearing is getting worse.     You have new symptoms, such as dizziness or nausea.   Where can you learn more?  Go to https://www.[employer]OP [employer].net/patiented  Enter R798 in the search box to learn more about \"Hearing Loss: Care Instructions.\"  Current as of: October 27, 2024  Content Version: 14.5 2024-2025 [employer].   Care instructions adapted under license by your healthcare professional. If you have questions about a medical condition or this instruction, always ask your healthcare professional. [employer] disclaims any warranty or liability for your use of this information.       "

## 2025-06-21 ENCOUNTER — HEALTH MAINTENANCE LETTER (OUTPATIENT)
Age: 75
End: 2025-06-21

## 2025-06-25 ENCOUNTER — HOSPITAL ENCOUNTER (OUTPATIENT)
Dept: BONE DENSITY | Facility: CLINIC | Age: 75
Discharge: HOME | End: 2025-06-25
Attending: INTERNAL MEDICINE
Payer: MEDICARE

## 2025-06-25 DIAGNOSIS — Z78.0 MENOPAUSE: ICD-10-CM

## 2025-06-25 PROCEDURE — 77080 DXA BONE DENSITY AXIAL: CPT

## 2025-06-27 ENCOUNTER — RESULTS FOLLOW-UP (OUTPATIENT)
Dept: FAMILY MEDICINE | Facility: CLINIC | Age: 75
End: 2025-06-27

## 2025-07-14 ENCOUNTER — LAB (OUTPATIENT)
Dept: LAB | Facility: CLINIC | Age: 75
End: 2025-07-14
Payer: MEDICARE

## 2025-07-14 ENCOUNTER — ANTICOAGULATION THERAPY VISIT (OUTPATIENT)
Dept: ANTICOAGULATION | Facility: CLINIC | Age: 75
End: 2025-07-14

## 2025-07-14 DIAGNOSIS — I80.01 THROMBOPHLEBITIS OF SUPERFICIAL VEINS OF RIGHT LOWER EXTREMITY: ICD-10-CM

## 2025-07-14 DIAGNOSIS — Z79.01 LONG TERM CURRENT USE OF ANTICOAGULANT THERAPY: ICD-10-CM

## 2025-07-14 DIAGNOSIS — Z83.2 FAMILY HISTORY OF FACTOR V DEFICIENCY: ICD-10-CM

## 2025-07-14 DIAGNOSIS — I80.01 THROMBOPHLEBITIS OF SUPERFICIAL VEINS OF RIGHT LOWER EXTREMITY: Primary | ICD-10-CM

## 2025-07-14 LAB — INR BLD: 3 (ref 0.9–1.1)

## 2025-07-14 PROCEDURE — 85610 PROTHROMBIN TIME: CPT

## 2025-07-14 PROCEDURE — 36416 COLLJ CAPILLARY BLOOD SPEC: CPT

## 2025-07-14 NOTE — PROGRESS NOTES
ANTICOAGULATION MANAGEMENT     Thomas Davila 75 year old female is on warfarin with therapeutic INR result. (Goal INR 2.0-3.0)    Recent labs: (last 7 days)     07/14/25  0902   INR 3.0*       ASSESSMENT     Source(s): Chart Review   Previous result: Therapeutic last 2(+) visits  Additional findings: None     PLAN     Recommended plan for no diet, medication or health factor changes affecting INR     Dosing Instructions: Continue your current warfarin dose with next INR in 6 weeks       Summary  As of 7/14/2025      Full warfarin instructions:  9/6: Hold; 9/7: Hold; 9/8: Hold; 9/9: Hold; 9/10: Hold; 9/11: 7.5 mg; Otherwise 5 mg every Mon, Thu, Sat; 2.5 mg all other days   Next INR check:  8/25/2025               Detailed voice message left for Akhil with dosing instructions and follow up date.     Contact [phone] to schedule and with any changes, questions or concerns.     Education provided: Please call back if any changes to your diet, medications or how you've been taking warfarin    Plan made per [facility] anticoagulation protocol    Harper Kline RN  7/14/2025  Anticoagulation Clinic  [facility] for routing messages: keith THORNTON  [facility] patient phone line: [phone]        _______________________________________________________________________     Anticoagulation Episode Summary       Current INR goal:  2.0-3.0   TTR:  82.8% (1 y)   Target end date:  Indefinite   Send INR reminders to:  ALEJANDRO THORNTON    Indications    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 okay to leave DVM per signed  consent             Anticoagulation Care Providers       Provider Role Specialty Phone number    Lizabeth Zavala MD Referring Family Medicine [phone]    Dung Prieto MD Referring Family Medicine     Albina Snider DO Referring Internal Medicine [phone]

## 2025-07-21 DIAGNOSIS — I80.01 THROMBOPHLEBITIS OF SUPERFICIAL VEINS OF RIGHT LOWER EXTREMITY: ICD-10-CM

## 2025-07-21 DIAGNOSIS — Z83.2 FAMILY HISTORY OF FACTOR V DEFICIENCY: ICD-10-CM

## 2025-07-21 RX ORDER — WARFARIN SODIUM 5 MG/1
TABLET ORAL
Qty: 70 TABLET | Refills: 1 | Status: SHIPPED | OUTPATIENT
Start: 2025-07-21

## 2025-07-21 NOTE — TELEPHONE ENCOUNTER
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]  Family history of factor V deficiency [Z83.2]  Encounter for long-term current use of medication (Resolved) [Z79.899]  Long term current use of anticoagulant therapy [Z79.01]  Blood coagulation disorder (Resolved) [D68.9]  Blood coagulation disorder (Resolved) [D68.9]  Encounter for long-term current use of medication (Resolved) [Z79.899]                 Anticoagulation Care Providers       Provider Role Specialty Phone number    Lizabeth Zavala MD Referring Family Medicine [phone]    Dung Prieto MD Referring Family Medicine     Albina Snider DO Referring Internal Medicine [phone]            Refill Criteria    Visit with referring provider/group: Meets criteria: visit within referring provider group in the last 15 months on 6/17/25    [facility] referral last signed: 06/03/2025; within last year:  Yes    Lab monitoring is up to date (not exceeding 2 weeks overdue): Yes    Thomas meets all criteria for refill. Rx instructions and quantity supplied updated to match patient's current dosing plan. Warfarin 90 day supply with 1 refill granted per [facility] protocol     Harper Kline RN  Anticoagulation Clinic

## 2025-08-25 ENCOUNTER — LAB (OUTPATIENT)
Dept: LAB | Facility: CLINIC | Age: 75
End: 2025-08-25
Payer: OTHER GOVERNMENT

## 2025-08-25 ENCOUNTER — ANTICOAGULATION THERAPY VISIT (OUTPATIENT)
Dept: ANTICOAGULATION | Facility: CLINIC | Age: 75
End: 2025-08-25

## 2025-08-25 DIAGNOSIS — Z83.2 FAMILY HISTORY OF FACTOR V DEFICIENCY: ICD-10-CM

## 2025-08-25 DIAGNOSIS — Z79.01 LONG TERM CURRENT USE OF ANTICOAGULANT THERAPY: ICD-10-CM

## 2025-08-25 DIAGNOSIS — I80.01 THROMBOPHLEBITIS OF SUPERFICIAL VEINS OF RIGHT LOWER EXTREMITY: ICD-10-CM

## 2025-08-25 DIAGNOSIS — I80.01 THROMBOPHLEBITIS OF SUPERFICIAL VEINS OF RIGHT LOWER EXTREMITY: Primary | ICD-10-CM

## 2025-08-25 LAB — INR BLD: 3.7 (ref 0.9–1.1)

## 2025-08-25 PROCEDURE — 85610 PROTHROMBIN TIME: CPT

## 2025-08-25 PROCEDURE — 36416 COLLJ CAPILLARY BLOOD SPEC: CPT

## 2025-09-02 ENCOUNTER — NURSE TRIAGE (OUTPATIENT)
Dept: FAMILY MEDICINE | Facility: CLINIC | Age: 75
End: 2025-09-02
Payer: OTHER GOVERNMENT

## 2025-09-03 ENCOUNTER — OFFICE VISIT (OUTPATIENT)
Dept: DERMATOLOGY | Facility: CLINIC | Age: 75
End: 2025-09-03
Payer: OTHER GOVERNMENT

## 2025-09-03 ENCOUNTER — RESULTS FOLLOW-UP (OUTPATIENT)
Dept: NURSING | Facility: CLINIC | Age: 75
End: 2025-09-03

## 2025-09-03 ENCOUNTER — ANTICOAGULATION THERAPY VISIT (OUTPATIENT)
Dept: ANTICOAGULATION | Facility: CLINIC | Age: 75
End: 2025-09-03

## 2025-09-03 ENCOUNTER — OFFICE VISIT (OUTPATIENT)
Dept: FAMILY MEDICINE | Facility: CLINIC | Age: 75
End: 2025-09-03
Payer: OTHER GOVERNMENT

## 2025-09-03 ENCOUNTER — TELEPHONE (OUTPATIENT)
Dept: ANTICOAGULATION | Facility: CLINIC | Age: 75
End: 2025-09-03

## 2025-09-03 VITALS
SYSTOLIC BLOOD PRESSURE: 166 MMHG | HEART RATE: 83 BPM | TEMPERATURE: 98.2 F | HEIGHT: 64 IN | BODY MASS INDEX: 34.83 KG/M2 | DIASTOLIC BLOOD PRESSURE: 86 MMHG | OXYGEN SATURATION: 93 % | RESPIRATION RATE: 12 BRPM | WEIGHT: 204 LBS

## 2025-09-03 DIAGNOSIS — Z85.42 HISTORY OF ENDOMETRIAL CANCER: ICD-10-CM

## 2025-09-03 DIAGNOSIS — Z85.3 PERSONAL HISTORY OF MALIGNANT NEOPLASM OF BREAST: ICD-10-CM

## 2025-09-03 DIAGNOSIS — L81.4 LENTIGO: ICD-10-CM

## 2025-09-03 DIAGNOSIS — M15.0 PRIMARY OSTEOARTHRITIS INVOLVING MULTIPLE JOINTS: ICD-10-CM

## 2025-09-03 DIAGNOSIS — I10 ESSENTIAL HYPERTENSION: ICD-10-CM

## 2025-09-03 DIAGNOSIS — E66.01 MORBID OBESITY (H): ICD-10-CM

## 2025-09-03 DIAGNOSIS — D18.01 ANGIOMA OF SKIN: ICD-10-CM

## 2025-09-03 DIAGNOSIS — Z83.2 FAMILY HISTORY OF FACTOR V DEFICIENCY: ICD-10-CM

## 2025-09-03 DIAGNOSIS — L82.1 SEBORRHEIC KERATOSES: ICD-10-CM

## 2025-09-03 DIAGNOSIS — L98.7 EXCESS SKIN OF ABDOMINAL WALL: ICD-10-CM

## 2025-09-03 DIAGNOSIS — I80.01 THROMBOPHLEBITIS OF SUPERFICIAL VEINS OF RIGHT LOWER EXTREMITY: ICD-10-CM

## 2025-09-03 DIAGNOSIS — D23.9 DERMAL NEVUS: Primary | ICD-10-CM

## 2025-09-03 DIAGNOSIS — D23.9 DERMATOFIBROMA: ICD-10-CM

## 2025-09-03 DIAGNOSIS — I89.0 LYMPHEDEMA: ICD-10-CM

## 2025-09-03 DIAGNOSIS — Z85.828 HISTORY OF SKIN CANCER: ICD-10-CM

## 2025-09-03 DIAGNOSIS — Z01.818 PREOP GENERAL PHYSICAL EXAM: Primary | ICD-10-CM

## 2025-09-03 DIAGNOSIS — Z79.01 LONG TERM CURRENT USE OF ANTICOAGULANT THERAPY: ICD-10-CM

## 2025-09-03 DIAGNOSIS — K21.9 GASTROESOPHAGEAL REFLUX DISEASE WITHOUT ESOPHAGITIS: ICD-10-CM

## 2025-09-03 DIAGNOSIS — I80.01 THROMBOPHLEBITIS OF SUPERFICIAL VEINS OF RIGHT LOWER EXTREMITY: Primary | ICD-10-CM

## 2025-09-03 PROBLEM — E66.9 NON MORBID OBESITY: Status: RESOLVED | Noted: 2025-06-17 | Resolved: 2025-09-03

## 2025-09-03 LAB
ANION GAP SERPL CALCULATED.3IONS-SCNC: 12 MMOL/L (ref 7–15)
BUN SERPL-MCNC: 27.4 MG/DL (ref 8–23)
CALCIUM SERPL-MCNC: 9.4 MG/DL (ref 8.8–10.4)
CHLORIDE SERPL-SCNC: 105 MMOL/L (ref 98–107)
CREAT SERPL-MCNC: 0.87 MG/DL (ref 0.51–0.95)
EGFRCR SERPLBLD CKD-EPI 2021: 68 ML/MIN/1.73M2
ERYTHROCYTE [DISTWIDTH] IN BLOOD BY AUTOMATED COUNT: 12.7 % (ref 10–15)
GLUCOSE SERPL-MCNC: 101 MG/DL (ref 70–99)
HCO3 SERPL-SCNC: 21 MMOL/L (ref 22–29)
HCT VFR BLD AUTO: 42 % (ref 35–47)
HGB BLD-MCNC: 14.2 G/DL (ref 11.7–15.7)
INR BLD: 3 (ref 0.9–1.1)
MCH RBC QN AUTO: 30.6 PG (ref 26.5–33)
MCHC RBC AUTO-ENTMCNC: 33.8 G/DL (ref 31.5–36.5)
MCV RBC AUTO: 90.5 FL (ref 78–100)
PLATELET # BLD AUTO: 193 10E3/UL (ref 150–450)
POTASSIUM SERPL-SCNC: 4.2 MMOL/L (ref 3.4–5.3)
RBC # BLD AUTO: 4.64 10E6/UL (ref 3.8–5.2)
SODIUM SERPL-SCNC: 138 MMOL/L (ref 135–145)
WBC # BLD AUTO: 4.47 10E3/UL (ref 4–11)

## 2025-09-03 PROCEDURE — 80048 BASIC METABOLIC PNL TOTAL CA: CPT | Performed by: INTERNAL MEDICINE

## 2025-09-03 PROCEDURE — 85610 PROTHROMBIN TIME: CPT | Performed by: INTERNAL MEDICINE

## 2025-09-03 PROCEDURE — 85027 COMPLETE CBC AUTOMATED: CPT | Performed by: INTERNAL MEDICINE

## 2025-09-03 PROCEDURE — 36415 COLL VENOUS BLD VENIPUNCTURE: CPT | Performed by: INTERNAL MEDICINE

## 2025-09-03 PROCEDURE — 99213 OFFICE O/P EST LOW 20 MIN: CPT | Performed by: DERMATOLOGY

## 2025-09-03 ASSESSMENT — PAIN SCALES - GENERAL: PAINLEVEL_OUTOF10: NO PAIN (0)

## (undated) DEVICE — SOL NACL 0.9% IRRIG 1000ML BOTTLE 07138-09

## (undated) DEVICE — SOL WATER IRRIG 1000ML BOTTLE 07139-09

## (undated) RX ORDER — TROPICAMIDE 10 MG/ML
SOLUTION/ DROPS OPHTHALMIC
Status: DISPENSED
Start: 2019-07-03

## (undated) RX ORDER — PHENYLEPHRINE HYDROCHLORIDE 25 MG/ML
SOLUTION/ DROPS OPHTHALMIC
Status: DISPENSED
Start: 2019-07-03

## (undated) RX ORDER — CYCLOPENTOLATE HYDROCHLORIDE 10 MG/ML
SOLUTION/ DROPS OPHTHALMIC
Status: DISPENSED
Start: 2019-07-03

## (undated) RX ORDER — CYCLOPENTOLATE HYDROCHLORIDE 10 MG/ML
SOLUTION/ DROPS OPHTHALMIC
Status: DISPENSED
Start: 2019-06-12

## (undated) RX ORDER — TROPICAMIDE 10 MG/ML
SOLUTION/ DROPS OPHTHALMIC
Status: DISPENSED
Start: 2019-06-12

## (undated) RX ORDER — PHENYLEPHRINE HYDROCHLORIDE 25 MG/ML
SOLUTION/ DROPS OPHTHALMIC
Status: DISPENSED
Start: 2019-06-12